# Patient Record
Sex: MALE | Race: WHITE | NOT HISPANIC OR LATINO | Employment: PART TIME | URBAN - METROPOLITAN AREA
[De-identification: names, ages, dates, MRNs, and addresses within clinical notes are randomized per-mention and may not be internally consistent; named-entity substitution may affect disease eponyms.]

---

## 2017-10-31 ENCOUNTER — HOSPITAL ENCOUNTER (OUTPATIENT)
Dept: BEHAVIORAL HEALTH | Facility: CLINIC | Age: 22
Discharge: HOME | End: 2017-10-31
Attending: PSYCHIATRY & NEUROLOGY | Admitting: PSYCHIATRY & NEUROLOGY
Payer: COMMERCIAL

## 2017-10-31 ENCOUNTER — BEH TREATMENT PLAN (OUTPATIENT)
Dept: BEHAVIORAL HEALTH | Facility: CLINIC | Age: 22
End: 2017-10-31
Attending: PSYCHIATRY & NEUROLOGY

## 2017-10-31 PROCEDURE — 90791 PSYCH DIAGNOSTIC EVALUATION: CPT | Performed by: SOCIAL WORKER

## 2017-10-31 ASSESSMENT — PAIN SCALES - GENERAL: PAINLEVEL: MODERATE PAIN (4)

## 2017-10-31 NOTE — PROGRESS NOTES
Initial Individual Treatment Plan     Patient: Amrit Padilla   MRN: [id number]  : 1995  Age: 22 year old  Sex: male    Diagnostic Assessment Date / Date of Initial Individual Treatment Plan: 10/31/17      Immediate Health Concerns:  Yes. Identify health concern and plan to address: hx of Epilsepsy     Immediate Safety Concerns:  Passive suicidal ideation. A coping plan for safety was initiated. Mom is main support and willing to drive to appointments.    Identify the issues to be addressed in treatment:  Symptom Management, Personal Safety, Community Resources/Discharge Planning, Abstinence/Relapse Prevention, Develop / Improve Independent Living Skills, Develop Socialization / Interpersonal Relationship Skills and Physical Health     Client Initial Individualized Goals for Treatment: Use mindful breathing for 2 minutes to manage anxiety.    Initial Treatment suggestions for the client during the time between Diagnostic Assessment and completion of the Individualized Treatment Plan:  Follow Safety Plan   Ask for more information, support and/or assistance as needed.  Follow up with providers/community supports as needed: [facility]  Report increases or changes in symptoms to staff.  Report any personal safety concerns to staff.   Take medications as prescribed.  Report medication changes and/or side effects to staff.  Attend and participate in groups as scheduled or notify staff if unable to do so.  Report any use of substances to staff as this may impact your symptoms and/or personal safety.  Notify staff if you have any other issues that need to be addressed. This may include any current abuse / neglect / exploitation or other vulnerability.  Follow recommendations of your treatment team and discuss concerns if not in agreement.     Treatment Team Responsible: Day Treatment (DT)      Therapeutic Interventions/Treatment Strategies may include:  Support, Redirection,  Feedback, Limit/Boundaries, Safety Assessments, Structured Activity, Problem Solving, Clarification, Education, Motivational Enhancement and Relapse Prevention as needed.    Jie Newberry, MSW, LICSW

## 2017-10-31 NOTE — PROGRESS NOTES
MY COPING PLAN FOR SAFETY          Things that are most important to me & reasons for living: family              My relapse Warning Signs: difficulty getting out of bed, loss of interest in enjoyable things, feelings of giving up (suicidal ideation), negative self tlak  When in crisis, I will use the following coping skills: (relaxation/self-soothing/distraction/activity):  Watching classic cartoons, breathing, talk with mom    I will use My Support System:   Personal Supports: I can ask for reminders, support, or for them to stay with me  Family Member/s : Sarah Padilla [phone], Ted, brother                 Professional Supports: I can ask for med changes, emergency appointments, help  Psychiatrist:[facility]- Karin Carlisle [phone]   Other: [facility] [phone]    Crisis Lines I can call to discuss options and to access support:  [facility]:    [place] ([facility]) [phone]   [place] (Mental Health Crisis Program) [phone]   [place]  [phone]   [place] (COPE) [phone]   [place] [phone]   [place] ([facility]  Health Crisis Line) [phone]   [place] ([place], [place], [place], [place], [facility]) [phone]   Other Crisis Lines:   [facility] (Counseling) [phone]   National Suicide Prevention 1-890.940.4621   Suicide Prevention [phone]        x  I will attend my Treatment Program and talk to my one of my therapists: DANIA Guzman, [facility] [phone]      x   I agree that I will report any current / recent thoughts, impulses or plans of suicide, homicide, self injurious behavior or substance use .   x   I agree that I will not act on the above symptoms, but will follow the above plan instead.  [facility] can also go to the Emergency Department at [facility]: [facility] [phone] or call: [phone]

## 2017-10-31 NOTE — PROGRESS NOTES
Acknowledgement of Current Treatment Plan       I have reviewed my treatment plan with my therapist / counselor on 10/31/17. I agree with the plan as it is written in the electronic health record.    Name Signature   Amrit Padilla    Name of Therapist / Counselor    Jie Newberry, DANIA, LICSW

## 2017-10-31 NOTE — PROGRESS NOTES
"Standard Diagnostic Assessment     CLIENT'S NAME: Amrit Padilla  MRN:   [id number]  :   1995 AGE:22 year old SEX: male  ACCT. NUMBER: [id number]  DATE OF SERVICE: 10/31/17 Start Time:  10:45 End Time:  1:00      Home Phone [phone]   Work Phone Not on file.   Mobile [phone]     Preferred Phone: [phone]  May we leave a program related message? yes    Yes, the patient has been informed that any other mental health professional providing mental health services to me will need access to this Diagnostic Assessment in order to develop a treatment plan and receive payment.     Identifying Information:  Amrit Padilla is a 22 year old, White, single male. Amrit attended the   with mother.     Reason for Referral: Amrit was referred to [facility] ()    by [facility]. Amrit reports the reason for referral at this time is elevated anxiety and depression symptoms that are impairing his functioning level. He recently had to withdrawal from his college courses at the [employer]. He has been consulting with Disability Services.  Hx of Aspergers Syndrome and hx of Epilepsy.     Amrit verbalizes the following treatment/discharge goals: \"I dont know.\"  I just want to the pain to stop.    Current Stressors/Losses/Disappointments: recent withdrawal from college courses at the [employer], increased need for sleep-can sleep up to 12 hours per day with naps and still doesn't feel rested. He is unable to decide on a major or career path which is a source of stress. He compares himself to his younger siblings. His father is residing and working for his job as an  in [place].    Per Client, Review of Symptoms:  Mood (Depression/Anxiety/January/Anger): depressed mood, anxiety with panic attacks, frustrated with illness     Thoughts: worry, difficulty controlling the worry, rumination   Concentration/Memory: poor concentration, poor memory   Appetite/Weight: (see also, Physical Health " Screening below) low appetite with difficulty eating. Mom make meals for him.   Sleep: increased need for sleep with 12 hours with naps, difficulty falling asleep.    Motivation/Energy: low motivation, low energy  Behavior: social isolation     Psychosis: denies     Trauma: bullied in school by name calling   Other: low self esteem, negative self talk, anhedonia, difficulty making decisions, social isolation, has no social [place] outside of work, hx of heart palpitations, restlessness, worry, racing thoughts, hx of anxiety attacks a few times per day.    Mental Health History:  Amrit reports first onset of mental health symptoms age 8. Age 9: would bang head against the wall.  Age 12 dx with Epilepsy. Last Seizure was in September 2017. He does not drive.  Amrit was first diagnosed: Age 5 had IEP when attending [employer]. In  03' Aspergers Syndrome (Age 8). He had testing at the [facility]. Age 13 dx with Depression and Anxiety.  Amrit received the following mental health services in the past: counseling, medication(s) from physician / PCP and psychiatry. He has been consulting with Karin Carlisle at [facility] for the past year. He did consult with a therapist one year ago. In August, he consulted with [address]. Last week, he consulted with [facility].  Psychiatric Hospitalizations: None.   Amrit denies a history of civil commitment.      Onset/Duration/Pattern of Symptoms noted above:  He has had elevated depression and anxiety symptoms that impaired his functioning for the past several weeks. He had to withdrawal this semester from his college coursework at the  of . He was consulting with Disability Services on [place]. He had four classes.   He reports feeling hopeless with suicidal ideation and grabbed a kitchen knife three weeks ago. His mom and brother intervened and they have removed the kitchen knifes from the home. 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 He reports having increased nervousness due to feeling uncertain if he is doing things right.     Amrit reports the following understanding of his diagnosis: Clinical Depression, Anxiety, Aspergers Syndrome, Epilepsy.      Personal Safety:    Are you depressed or being treated for depression? yes   Have you ever thought about hurting yourself (SIB) now or in the past? yes in school use to band head when overstimulated. Urges to cute self with a knife a few weeks ago. Mother intervened.     Have you ever thought about suicide now or in the past? Suicidal ideation to either jump off a bridge or kill self with a knife. He does not drive or have a drivers license. His mom removed the kitchen knifes from the kitchen and moved them to another location. He resides with his mother who is very supportive. We inititated a coping plan for safety. He agreed that he could maintain his safety. He is aware of community resources. He has a 1:30 pm appointment with his medication provider at [facility] today. He does not feel an inpatient hopsitalization is needed at this time.     Do you have a gun, weapons or other means (including medications) to harm yourself available to you? No   Have any of your family members or friends attempted or completed suicide? (If yes, Who, When, How) no     Do you take chances with your safety?   no   Have you currently or in the past had trouble with physical aggression (If yes, describe)? no     Have you ever thought about killing someone else? No   Have you ever heard voices? No       Supports:   From whom do you receive support? (family/friends/agency) Mom- works within the home and is available for support as needed. His brother, Ted moved back into the home in September.      How often do you have contact with them? Daily     Do your support people want/need education/resources? no        Is there anything in your life (current or history)  that is satisfying to you (include leisure interests/hobbies)?   No-use to watch classic cartoons. He has two dogs at home.      Hope/Belief System:  Do you think things can get better? Not sure     Rate how strongly you believe things can get better:   (Scale 1-5; 1=no belief; 5=Very Strong Belief)    3    What would make it better?  Not sure   What gives you hope?    My family       Personal Safety Summary:  After gathering the above information, Amrit  presents the following high risk factors for suicide: Male Panic,extreme anxiety Extreme psychic pain Hopelessness, Worthlessness.  Amrit denies current fears or concerns for personal safety.    Amrit has the following Protective Factors: Sense of responsibility to family and Positive therapeutic releationships      Upon review of the patient interview and identification of high risk factors determine individualized safety strategies alternatives and treatment plan interventions. Client consented to co-developed safety plan, which includes coping skills, relapse warning signs, community supports and support system..     Substance Use History:   Denies alcohol or illicit chemical use.    Substance Use Disorder Treatment: Amrit is currently receiving the following services: No indications of CD issues.       CAGE-AID:  Have you ever felt you ought to cut down on your drinking or drug use?   No    Have people annoyed you by criticizing your drinking or drug use?   No    Have you ever felt bad or guilty about your drinking or drug use?   No    Have you ever had a drink or used drugs first thing in the morning to steady your nerves or to get rid of a hangover?  No    Do you feel these issues have been adequately addressed?   Yes. How? Maintaining abstinence.    Chemical Dependency Assessment Recommended?  No          Amrit has a negative Cage-Aid score.       Legal History:    Amrit reports that he has not been involved with the legal system.    ________________________________________________________________________    Life Situation (Employment/School/Finances/Basic Needs):  Amrit  is currently living with his mom, his brother and 2 dogs. His father comes back every few weeks.     The safety/stability of this environment is described as: safe. The knives were removed from the kitchen. Mother is very supportive.    Amrit is currently employed part time: two weeks has worked at [employer] in [place].     Amrit reports finances are obtained through Employment and parents  Amrit does not identify his finances as a current stressor.  Amrit denies a history of gambling and denies a history of gambling treatment.     Amrit reports his highest level of education is associate degree / vocational certificate. He has an AA from [employer]. He has taken courses at the [employer]. In 2011, he got his High School Diploma.   Amrit did identify the following learning problems: writing. Hx of having an IEP. He did particpate in [employer] for two years.  He is connected with Disability Services at the [employer]: separate testing, extended testing and note taking/notes online. He had to have a recent withdrawal from his four courses.  Amrit describes academic performance as: difficulty with writing, negative self talk made it difficult to complete work.  Amrit describes school social experience as: challenging. Few friends. His family is his main support. His younger sister is a Freshman and attends the [employer]. He sometimes compares himself with his younger siblings.     Amrit denies concerns regarding his current ability to meet basic needs.     Social/Family History:  Amrit  reports he grew up in [place] since age 5.   Amrit was the first born of 3 children. Has a younger brother who graduated from college and is an actor, age 23. Has a sister, age 18 who is a Freshman at the [employer]>  Amrit reports his biological parents are  . Parents are supportive. Mom has an AA in Business and works in the home. His father works as an  in [place].    Amrit describes his childhood as challenging due to having Aspergers Syndrome. He also had an IEP in school. He did well with accommodations. Had few friends. His family is supportive.   Amrit describes his current relationships with his family of origin as good.    Amrit identifies his relationship status as: single.      Amrit reports having 0 children.    Amrit describes the quantity/quality of his social relationships: has difficulty with social cues outside his family.        Significant Losses / Trauma / Abuse / Neglect Issues / Developmental Incidents:  Amrit denies significant loss/trauma/abuse/neglect issues/developmental incidents   Amrit reports client s experience of emotional abuse bullied by naming calling in school.   Amrit has not addressed the above concerns in previous therapy/treatment.     Amrit denies personal  experience.     [place] Preference/Spiritual Beliefs/Cultural Considerations:    A. Ethnic Self-Identification:  Amrit self-identifies his race/ethnicities as:  and his preferred language to be English.   Amrit reports he does not need the assistance of an . Amrit  reports he does not need other support or modifications involved in therapy.      B. Do you experience cultural bias (the practice of interpreting judging behavior by standards inherent to one's own culture) by other people as a stressor? If yes, describe how this relates to overall mental health symptoms.  No    C. Are there any cultural influences that may need to be considered for your treatment?  (This includes historical, geographical and familial factors that affect assessment and intervention processes). No, Denies any cultural influences or concerns that need to be considered for treatment    Strengths/Vulnerabilities:   Amrit identifies his  personal strengths as: caring, educated, employed, intelligent, motivated, open to learning, support of family, friends and providers, wants to learn and willing to ask questions .   Things that may interfere with the clients success in treatment include: few friends and transportation concerns.   Other identified areas of vulnerability include: Suicidal Ideation  Anxiety with/without panic attacks  Depressive symptoms  Physical/medical. Hx of epilepsy    Medical History / Physical Health Screen:     Primary Care Physician: Amrit consults with Ke Lee with  Friday..   Last Physical Exam: within the past year. Client was encouraged to follow up with PCP if symptoms were to develop.  He is going to lab work to check his Vitamin D level.  Mental Health Medication Management Provider / Psychiatrist: Amrit reports Karin Carlisle at [facility]. He has an appointment today at 1:30 pm..     Last visit: Recently        Next visit: Today at 1:30 pm.    Current medications including prescription, non-prescription, herbals, dietary aids and vitamins:  Per client report: Outpatient Prescriptions Marked as Taking for the 10/31/17 encounter (Hospital Encounter) with Jie Newberry LICSW   Medication Sig     ZONISAMIDE PO Take 300 mg by mouth 2 times daily     FLUOXETINE HCL PO Take 40 mg by mouth     MELATONIN PO Take 5 mg by mouth At Bedtime       Amrit reports current medications are: Not effective: depression and anxiety continue to elevate and persist.   Amrit describes taking his medications as: Independent.  Amrit reports taking prescribed medications as prescribed.     Amrit provides the following current assessment of pain: Pain Loc: Head; Pain Score: Moderate Pain (4);  .     Amrit provides the following information regarding past significant medical conditions/diagnoses:      Medical:  Past Medical History:   Diagnosis Date     Headaches, cluster        Surgical:  Past Surgical History:    Procedure Laterality Date     BACK SURGERY       Allergy:   Amrit reports   Allergies   Allergen Reactions     Dust Mites         Family History of Medical, Mental Health and/or Substance Use problems:  Per client report: History reviewed. No pertinent family history.    Amrit reports the following current medical concerns: hx of epilepsy. He takes medications. His last seizure was in August 2017..      General Health:   Have you had any exposure to any communicable disease in the past 2-3 weeks? no     Are you aware of safe sex practices? Not applicable     Is there a possibility of pregnancy?  no       Nutrition:    Are you on a special diet? If yes, please explain:  no   Do you have any concerns regarding your nutritional status? If yes, please explain:  yes -low appetite.   Have you had any appetite changes in the last 3 months?  Yes, low appetite. Low motivation to prepare food. Mom provides food.      Have you had any weight loss or weight gain in the last 3 months?  No     Do you have a history of an eating disorder? no   Do you have a history of being in an eating disorder program? no   NOTE: BMI to be calculated following program admission.    Fall Risk:   Have you had any falls in the past 3 months? no     Do you currently useany assistive devices for mobility?   no     NOTE: If client reports 3 or more falls in the past 3 months, the client will not be accepted into the program until further assessment is completed by the program nurse. Check if a nurse is available to assess at time of DA.    NOTE: If client reports 2 falls in the past 3 months and/or the client currently uses assistive devices for mobility, the  will send an in-basket to the program nurse to meet with the client within the first week of programming.    Head Injury/Trauma:   Do you have a history of head injury / trauma? no     Do you have any cognitive impairment? no       Per completion of the Medical History / Physical  Health Screen, is there a recommendation to see / follow up with a primary care physician/clinic?    No.      Clinical Findings     Mental Status Assessment/Clinical Observation:  Appearance:   awake, alert  Eye Contact:   good  Psychomotor Behavior: Normal  no evidence of tardive dyskinesia, dystonia, or tics  Attitude:   Cooperative    Oriented to:   All    Speech   Rate / Production: Normal    Volume:  Normal   Mood:    Anxious  Depressed     Affect:    Flat      Thought Content:  Rumination  passive suicidal ideation present  Thought Form:  logical no loose associations  Insight:    good    Judgment:     intact  Attention Span/Concentration: intact  Recent and Remote Memory:  intact      Psychiatric Diagnosis:    296.32 (F33.1) Major Depressive Disorder, Recurrent Episode, Moderate _ and With anxious distress  300.02 (F41.1) Generalized Anxiety Disorder   Hx of Aspergers syndrome    Provisional Diagnostic Hypothesis (Explain R/O, other Provisional Diagnosis, and why alternative Diagnosis that were considered were ruled out): N/A    Medical Concerns that may Impact Treatment:   Hx of epilepsy    Psychosocial and Contextual Factors (V-Codes):  V62.3 Academic or educational problem recent withdrawl from college courses at the [employer].    WHODAS 2.0 SCORE: 94.9/100 % (Total=39)      Client and family participation in assessment:   Amrit was alone during this assessment.   This assessment does not include collateral information.      Summary & Recommendations  Provide a brief summary of how diagnostic criteria is met (symptoms, duration & functional impairment), cause, prognosis, and likely consequences of symptoms. Include overview of pertinent client strengths, cultural influences, life situations, relationships, health concerns and how diagnosis interacts/impacts with client's life. Recommendations include: client preferences, prioritization of needed mental health, ancillary or other services and any referrals to  services required by statute or rule.          Amrit is a 23 year old  male who was referred to [facility] by [facility] due to elevated depression, anxiety symptoms that have been impairing his functioning to the point he had to withdrawal from his four college courses.  He had been consulting [facility] Disability Services. He has a hx of Aspergers Syndrome as well as Epilepsy. His last seizure was in September 2017. He does not drive.  About three weeks ago, Amrit's mood symptoms elevated where he was feeling quite hopeless and he grabbed a kitchen knife to stab himself. His mom and brother intervened. He  consulted with [facility] around that time. His mood symptoms continue to persist. 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 He resides with his mom and brother who moved back into the home in September. He is an Actor and completed college. His sister started at the [facility] in the fall. It is hard not to compare himself to his siblings. His father resides and works in [place] as an .  His mom works in the home currently.        Amrit reports mood symptoms include: depressed mood, anxiety mood with panic attacks, worry, difficulty controlling the worry, rumination, poor concentration, poor memory, low motivation, low energy, social isolation with no social [place] outside his family and co-worker, lower self esteem, negative self talk, feelings of shame, increased need for sleep, difficulty falling asleep, anhedonia, difficulty making decisions, hx of heart palpitations, restlessness, racing thoughts, helplessness, hopelessness with recent suicidal ideation. He endorses intermittent suicidal ideation. He is frustrated with his illness. He reports he will use crisis hotline called crisis text line. His mom is supportive and he finds tactile support helpful. During the interview he was holding his mom's hand for reassurance and grounding. He and  his mom feel he can be responsible for himself and his safety. He is also aware of the [facility] at [facility]. He does not want to be hospitalized nor feel it would be helpful.       Amrit has resilience and perserverance. He has a supportive family. We discussed what program would meet his needs and he was interested in [facility] Day Treatment. He recently retained a part time job and is concerned he may lost it if he takes time for treatment. He would like to consult with his family and contact writer back. Writer has left two messages for him so far to check in about his decision. He would be an ideal candidate for the day treatment program. He would benefit from learning coping skills for symptom management, sensory regulation tools and cognitive behavioral therapy to reframe negative self talk. Task and life management tools would also be helpful. He would be able to have a peer group for social skill building as well as to process his feelings about having depression, anxiety and co-morbid health issues. He has an appointment with Karin Carlisle today at [facility] and will continue to consult with her.        Prognosis is unknown. Without the recommended intervention, the client is likely to experience the following consequences of their symptoms: a higher level of care would be needed.    Referrals to services required by statute or rule:   Report to child/adult protection services was NA.   Referral to another professional/service is not indicated at this time.    Program Recommendation: Day Treatment (DT) .  He would be an ideal candidate in the 2C track.    Assessment Completed by: Jie Newberry, DANIA, LICSW

## 2017-11-03 NOTE — PROGRESS NOTES
ADULT PARTIAL PROGRESS NOTE                   Writer left a message for Amrit to inquire his decision about day treatment. Writer encouraged him to give a return call back to writer whatever his decision. Await call back.    DANIA Guzman, [facility]  Outpatient Psychotherapist

## 2017-11-07 NOTE — ADDENDUM NOTE
Encounter addended by: Jei Newberry LICSW on: 11/7/2017  5:52 PM<BR>     Actions taken: Sign clinical note

## 2017-11-07 NOTE — PROGRESS NOTES
ADULT PARTIAL PROGRESS NOTE                   Writer left a message on pts cell phone to inquire what he has decided about [facility] Day [facility]. He was at work and writer got his voicemail. Writer encouraged call back.     DANIA Guzman, [facility]  Outpatient Psychotherapist

## 2017-11-08 NOTE — PROGRESS NOTES
ADULT PARTIAL PROGRESS NOTE                   Writer left a third voicemail for Amrit. Writer has not received a return call back to discuss coordinating day treatment.  At this point, writer is assuming he is not interested in pursuing day treatment. Pt was encouraged to contact program if this changed. Assessment paperwork will be sent to medical records.    DANIA Guzman, [facility]  Outpatient Psychotherapist

## 2017-11-08 NOTE — ADDENDUM NOTE
Encounter addended by: Jie Newberry LICSW on: 11/8/2017  3:22 PM<BR>     Actions taken: Sign clinical note

## 2017-11-13 ENCOUNTER — TELEPHONE (OUTPATIENT)
Dept: BEHAVIORAL HEALTH | Facility: CLINIC | Age: 22
End: 2017-11-13

## 2017-11-13 NOTE — TELEPHONE ENCOUNTER
Kalyanilore had received a phone message on Thursday, 11/9 from Sergio Smith's mother, asking to talk about program options.  called back on her cell phone number and left a message with group time options and expectations about attendance. As of this afternoon,  has not received a call back.  A second message was left on Sarah's mobile phone line regarding her sons's interest in the program.

## 2017-11-16 ENCOUNTER — TELEPHONE (OUTPATIENT)
Dept: BEHAVIORAL HEALTH | Facility: CLINIC | Age: 22
End: 2017-11-16

## 2017-11-16 NOTE — TELEPHONE ENCOUNTER
Writer received a phone call from Pt's mother stating he has worked out a schedule with his work and he would like to be involved in the 2C group starting on December 4.  She explained with the Holiday next week and his current work schedule, he wouldn't be able to start until December.     Writer did call Pt's mother back and had to leave a message. Stated he can start the program on December 4.  Hopefully will not have to do an update since it will be a month between the DA and start date. Requested that if he is able to begin the program prior to December 4, even if he needs to miss a few days due to work, that they call back to change the start date.    At this time will plan on a December 4 start in the 2C group.

## 2017-12-04 ENCOUNTER — TELEPHONE (OUTPATIENT)
Dept: BEHAVIORAL HEALTH | Facility: CLINIC | Age: 22
End: 2017-12-04

## 2017-12-05 ENCOUNTER — HOSPITAL ENCOUNTER (OUTPATIENT)
Dept: BEHAVIORAL HEALTH | Facility: CLINIC | Age: 22
End: 2017-12-05
Attending: PSYCHIATRY & NEUROLOGY
Payer: COMMERCIAL

## 2017-12-05 PROBLEM — F32.9 MAJOR DEPRESSIVE DISORDER: Status: ACTIVE | Noted: 2017-12-05

## 2017-12-05 PROCEDURE — H2012 BEHAV HLTH DAY TREAT, PER HR: HCPCS

## 2017-12-05 PROCEDURE — 97150 GROUP THERAPEUTIC PROCEDURES: CPT | Mod: GO

## 2017-12-05 ASSESSMENT — PATIENT HEALTH QUESTIONNAIRE - PHQ9
5. POOR APPETITE OR OVEREATING: NEARLY EVERY DAY
SUM OF ALL RESPONSES TO PHQ QUESTIONS 1-9: 21

## 2017-12-05 ASSESSMENT — ANXIETY QUESTIONNAIRES
7. FEELING AFRAID AS IF SOMETHING AWFUL MIGHT HAPPEN: NEARLY EVERY DAY
2. NOT BEING ABLE TO STOP OR CONTROL WORRYING: NEARLY EVERY DAY
5. BEING SO RESTLESS THAT IT IS HARD TO SIT STILL: NEARLY EVERY DAY
3. WORRYING TOO MUCH ABOUT DIFFERENT THINGS: NEARLY EVERY DAY
1. FEELING NERVOUS, ANXIOUS, OR ON EDGE: NEARLY EVERY DAY
6. BECOMING EASILY ANNOYED OR IRRITABLE: MORE THAN HALF THE DAYS
GAD7 TOTAL SCORE: 20
IF YOU CHECKED OFF ANY PROBLEMS ON THIS QUESTIONNAIRE, HOW DIFFICULT HAVE THESE PROBLEMS MADE IT FOR YOU TO DO YOUR WORK, TAKE CARE OF THINGS AT HOME, OR GET ALONG WITH OTHER PEOPLE: VERY DIFFICULT

## 2017-12-05 NOTE — PROGRESS NOTES
"Adult Mental Health Outpatient Group Therapy Progress Note     Client Initial Individualized Goals for Treatment:  \"I don't know. I just want to the pain to stop.\"     Initial Treatment suggestions for the client during the time between Diagnostic Assessment and completion of the Individualized Treatment Plan:  Follow Safety Plan  Abstain from Substance Use   Ask for more information, support and/or assistance as needed.  Follow up with providers/community supports as needed: Dr. Cori Patel, Oneyda Koehler  Report increases or changes in symptoms to staff.  Report any personal safety concerns to staff.   Take medications as prescribed.  Report medication changes and/or side effects to staff.  Attend and participate in groups as scheduled or notify staff if unable to do so.  Report any use of substances to staff as this may impact your symptoms and/or personal safety.  Notify staff if you have any other issues that need to be addressed. This may include any current abuse / neglect / exploitation or other vulnerability.  Follow recommendations of your treatment team and discuss concerns if not in agreement.    See Initial Treatment suggestions for the client during the time between Diagnostic Assessment and completion of the Master Individualized Treatment Plan     Area of Treatment Focus:  Symptom Management    Therapeutic Interventions/Treatment Strategies:  Support, Feedback and Cognitive Behavioral Therapy    Response to Treatment Strategies:  Accepted Feedback    Name of Group:  Group Psychotherapy    Description and Outcome:  This was Amrit's first day in the Day Treatment Program.  Amrit was oriented to group psychotherapy, introduced to the other group members, and advised of group rules.  Amrit was offered the opportunity to share with the group.  He talked about his issues with seizures (epilepsy), and how in the past several weeks he feels \"my thoughts just go away.\"  He was asked about hallucinations, " and endorsed some auditory hallucinations (hypnagogic and hypnopompic), and racing thoughts.  He described his major issue as depression and low self esteem.  Sergio also described symptoms consistent with derealization and depersonalization.  His presentation was consistent with a possible thought disorder, and/or extreme anxiety.     Client demonstrated understanding of session content by sharing information with the group and accepting group rules and guidelines.    Client would benefit from additional opportunities to practice and implement content from this session.    Is this a Weekly Review of the Progress on the Treatment Plan?  No

## 2017-12-06 ENCOUNTER — HOSPITAL ENCOUNTER (OUTPATIENT)
Dept: BEHAVIORAL HEALTH | Facility: CLINIC | Age: 22
End: 2017-12-06
Attending: PSYCHIATRY & NEUROLOGY
Payer: COMMERCIAL

## 2017-12-06 PROCEDURE — H2012 BEHAV HLTH DAY TREAT, PER HR: HCPCS

## 2017-12-06 PROCEDURE — 97150 GROUP THERAPEUTIC PROCEDURES: CPT | Mod: GO

## 2017-12-06 ASSESSMENT — ANXIETY QUESTIONNAIRES: GAD7 TOTAL SCORE: 20

## 2017-12-07 NOTE — PROGRESS NOTES
"Adult Mental Health Outpatient Group Therapy Progress Note     Client Initial Individualized Goals for Treatment:  \"I don't know. I just want to the pain to stop.\"      Initial Treatment suggestions for the client during the time between Diagnostic Assessment and completion of the Individualized Treatment Plan:  Follow Safety Plan  Abstain from Substance Use   Ask for more information, support and/or assistance as needed.  Follow up with providers/community supports as needed: Dr. Cori Patel, Oneyda Koehler  Report increases or changes in symptoms to staff.  Report any personal safety concerns to staff.   Take medications as prescribed.  Report medication changes and/or side effects to staff.  Attend and participate in groups as scheduled or notify staff if unable to do so.  Report any use of substances to staff as this may impact your symptoms and/or personal safety.  Notify staff if you have any other issues that need to be addressed. This may include any current abuse / neglect / exploitation or other vulnerability.  Follow recommendations of your treatment team and discuss concerns if not in agreement.     Area of Treatment Focus:  Symptom Management, Personal Safety and Develop / Improve Independent Living Skills    Therapeutic Interventions/Treatment Strategies:  Support, Feedback, Safety Assessments, Structured Activity, Clarification and Education    Response to Treatment Strategies:  Gave Feedback, Accepted Support, Alert and Distracted    Name of Group:  OT Clinic/OT Initial Note     Description and Outcome:  Sergio  attended and participated in a structured occupational therapy group where intervention focused on coping through structured hands-on activities.  Sergio began Day Treatment today.  He was oriented to OT Services and the OT Clinic.  He completed and reviewed his OT Assessment with this writer.  Sergio reported increased difficulty \"putting his thoughts together and finishing sentences since last " "Thursday or Friday\".  Reported he has been having \"increased seizure activity\" as well and he \"called his doctor but hasn't heard back yet\".   Encouraged him to follow up as needed and offered to have him meet with a nurse here but he declined stating he was \"okay\".  Sergio had difficulty identifying anything particular he wanted to work on while here as he has \"difficulty with everything.\"  He was open to looking for a task to work on and spent the remainder of group doing this.  He denied any safety concerns.  Reported he currently lives with his parents.  Mood was anxious, despondent at times.  Client would benefit from additional opportunities to practice and implement content from this session.    Is this a Weekly Review of the Progress on the Treatment Plan?  No  "

## 2017-12-08 ENCOUNTER — HOSPITAL ENCOUNTER (OUTPATIENT)
Dept: BEHAVIORAL HEALTH | Facility: CLINIC | Age: 22
End: 2017-12-08
Attending: PSYCHIATRY & NEUROLOGY
Payer: COMMERCIAL

## 2017-12-08 PROCEDURE — H2012 BEHAV HLTH DAY TREAT, PER HR: HCPCS

## 2017-12-08 PROCEDURE — 97150 GROUP THERAPEUTIC PROCEDURES: CPT | Mod: GO

## 2017-12-11 NOTE — PROGRESS NOTES
"Adult Mental Health Outpatient Group Therapy Progress Note     Client Initial Individualized Goals for Treatment:  \"I don't know. I just want to the pain to stop.\"       Initial Treatment suggestions for the client during the time between Diagnostic Assessment and completion of the Individualized Treatment Plan:  Follow Safety Plan  Abstain from Substance Use   Ask for more information, support and/or assistance as needed.  Follow up with providers/community supports as needed: Dr. Cori Patel, Oneyda Koehelr  Report increases or changes in symptoms to staff.  Report any personal safety concerns to staff.   Take medications as prescribed.  Report medication changes and/or side effects to staff.  Attend and participate in groups as scheduled or notify staff if unable to do so.  Report any use of substances to staff as this may impact your symptoms and/or personal safety.  Notify staff if you have any other issues that need to be addressed. This may include any current abuse / neglect / exploitation or other vulnerability.  Follow recommendations of your treatment team and discuss concerns if not in agreement.     Area of Treatment Focus:  Symptom Management, Personal Safety and Develop / Improve Independent Living Skills    Therapeutic Interventions/Treatment Strategies:  Support, Feedback, Safety Assessments and Structured Activity    Response to Treatment Strategies:  Accepted Feedback, Focused on Goals, Accepted Support, Alert and Distracted    Name of Group:  OT Clinic     Description and Outcome:  Sergio  attended and participated in a structured occupational therapy group where intervention focused on coping through structured hands-on activities.  Sergio spent the first couple of minutes looking for a task to begin.  He eventually selected and worked on a paper pencil puzzle task with fair concentration.  He seemed able to concentrate for 10 minutes or so before needing a short break.  He was able to resume his work " independently.  He was quiet in group and only minimally interacted with others.  Affect was constricted.  Client demonstrated understanding of session content by engaging in structured task work to help manage symptoms and stressors. Client would benefit from additional opportunities to practice and implement content from this session.    Is this a Weekly Review of the Progress on the Treatment Plan?  No

## 2017-12-12 ENCOUNTER — HOSPITAL ENCOUNTER (OUTPATIENT)
Dept: BEHAVIORAL HEALTH | Facility: CLINIC | Age: 22
End: 2017-12-12
Attending: PSYCHIATRY & NEUROLOGY
Payer: COMMERCIAL

## 2017-12-12 PROCEDURE — H2012 BEHAV HLTH DAY TREAT, PER HR: HCPCS

## 2017-12-12 PROCEDURE — 97150 GROUP THERAPEUTIC PROCEDURES: CPT | Mod: GO

## 2017-12-12 NOTE — PROGRESS NOTES
"Adult Mental Health Outpatient Group Therapy Progress Note     Client Initial Individualized Goals for Treatment:  \"I don't know. I just want to the pain to stop.\"     Initial Treatment suggestions for the client during the time between Diagnostic Assessment and completion of the Individualized Treatment Plan:  Follow Safety Plan  Abstain from Substance Use   Ask for more information, support and/or assistance as needed.  Follow up with providers/community supports as needed: Dr. Cori Patel, Oneyda Koehler  Report increases or changes in symptoms to staff.  Report any personal safety concerns to staff.   Take medications as prescribed.  Report medication changes and/or side effects to staff.  Attend and participate in groups as scheduled or notify staff if unable to do so.  Report any use of substances to staff as this may impact your symptoms and/or personal safety.  Notify staff if you have any other issues that need to be addressed. This may include any current abuse / neglect / exploitation or other vulnerability.  Follow recommendations of your treatment team and discuss concerns if not in agreement.    See Initial Treatment suggestions for the client during the time between Diagnostic Assessment and completion of the Master Individualized Treatment Plan     Area of Treatment Focus:  Symptom Management    Therapeutic Interventions/Treatment Strategies:  Support, Feedback and Cognitive Behavioral Therapy    Response to Treatment Strategies:  Accepted Feedback    Name of Group:  Group Psychotherapy    Description and Outcome:  Sergio also described symptoms consistent with derealization and depersonalization.  He reported not feeling like himself for over one week. He reports not being able to remember thinks and says he speaks words, but doesn't feel like he can control them. He did report thinking about actions or incidents he had been part of in the past that he regretted and then as a result immediately having " the thought that he needs to kill himself. He stated he does not take that initial thought further into planning or action steps. He stated he understands the thoughts are not rational and often will squeeze a pillow, hit is legs with his fist, or try to go to sleep.  He did contract for safety in the group. Sergio also shared with the group his interest in football and fantasy football leagues. Will have to continue to assess safety and symptoms related to anxiety or thought disorder.     Client verbalized understanding of session content by reporting on symptoms and bran for safety. He also acknowledged resources if suicidal thoughts become more intense with intent.     Client would benefit from additional opportunities to practice and implement content from this session.    Is this a Weekly Review of the Progress on the Treatment Plan?  No

## 2017-12-12 NOTE — PROGRESS NOTES
"Adult Mental Health Outpatient Group Therapy Progress Note         Client Initial Individualized Goals for Treatment:  \"I don't know. I just want to the pain to stop.\"      Initial Treatment suggestions for the client during the time between Diagnostic Assessment and completion of the Individualized Treatment Plan:  Follow Safety Plan  Abstain from Substance Use   Ask for more information, support and/or assistance as needed.  Follow up with providers/community supports as needed: Dr. Cori Patel, Oneyda Koehler  Report increases or changes in symptoms to staff.  Report any personal safety concerns to staff.   Take medications as prescribed.  Report medication changes and/or side effects to staff.  Attend and participate in groups as scheduled or notify staff if unable to do so.  Report any use of substances to staff as this may impact your symptoms and/or personal safety.  Notify staff if you have any other issues that need to be addressed. This may include any current abuse / neglect / exploitation or other vulnerability.  Follow recommendations of your treatment team and discuss concerns if not in agreement.     See Initial Treatment suggestions for the client during the time between Diagnostic Assessment and completion of the Master Individualized Treatment Plan      Area of Treatment Focus:  Symptom Management     Therapeutic Interventions/Treatment Strategies:  Support, Feedback and Cognitive Behavioral Therapy     Response to Treatment Strategies:  Accepted Feedback     Name of Group:  Group Psychotherapy     Description and Outcome:  Sergio reported depression symptoms and low self esteem.  Sergio also described symptoms consistent with derealization and depersonalization. He reported a high level of anxiety. He stated that he felt stressed with recent seizures and uncomfortable experience with the derealization and depersonalization.  Client planned to discuss the new symptoms with his neurology provider.  Client " denied suicidal ideation, intent and plan.      Client demonstrated understanding of session content by sharing coping skills he plans to use.     Client would benefit from additional opportunities to practice and implement content from this session.     Is this a Weekly Review of the Progress on the Treatment Plan?  No

## 2017-12-12 NOTE — PROGRESS NOTES
"Adult Mental Health Outpatient Group Therapy Progress Note     Client Initial Individualized Goals for Treatment:  \"I don't know. I just want to the pain to stop.\"       Initial Treatment suggestions for the client during the time between Diagnostic Assessment and completion of the Individualized Treatment Plan:  Follow Safety Plan  Abstain from Substance Use   Ask for more information, support and/or assistance as needed.  Follow up with providers/community supports as needed: Dr. Cori Patel, Oneyda Koehler  Report increases or changes in symptoms to staff.  Report any personal safety concerns to staff.   Take medications as prescribed.  Report medication changes and/or side effects to staff.  Attend and participate in groups as scheduled or notify staff if unable to do so.  Report any use of substances to staff as this may impact your symptoms and/or personal safety.  Notify staff if you have any other issues that need to be addressed. This may include any current abuse / neglect / exploitation or other vulnerability.  Follow recommendations of your treatment team and discuss concerns if not in agreement.     Area of Treatment Focus:  Symptom Management, Personal Safety and Develop / Improve Independent Living Skills    Therapeutic Interventions/Treatment Strategies:  Support, Feedback, Safety Assessments, Structured Activity and Clarification    Response to Treatment Strategies:  Listened, Accepted Support and Alert    Name of Group:  OT Clinic     Description and Outcome:  Sergio  attended and participated in a structured occupational therapy group where intervention focused on coping through structured hands-on activities.  Sergio worked on a paper pencil puzzle task during group today with fair concentration and energy.  He appeared able to sustain his attention for 10-15 minutes before needing a short break.  He was quiet in group today.  Answered direct questions with brief, 1 word answers.  Affect was blunted. " Client would benefit from additional opportunities to practice and implement content from this session.    Is this a Weekly Review of the Progress on the Treatment Plan?  Yes.      Are Treatment Plan Goals being addressed?  Yes, continue treatment goals      Are Treatment Plan Strategies to Address Goals Effective?  Yes, continue treatment strategies      Are there any current contracts in place?  No

## 2017-12-13 ENCOUNTER — HOSPITAL ENCOUNTER (OUTPATIENT)
Dept: BEHAVIORAL HEALTH | Facility: CLINIC | Age: 22
End: 2017-12-13
Attending: PSYCHIATRY & NEUROLOGY
Payer: COMMERCIAL

## 2017-12-13 PROCEDURE — H2012 BEHAV HLTH DAY TREAT, PER HR: HCPCS

## 2017-12-13 PROCEDURE — 97150 GROUP THERAPEUTIC PROCEDURES: CPT | Mod: GO

## 2017-12-13 NOTE — PROGRESS NOTES
"Adult Mental Health Outpatient Group Therapy Progress Note     Client Initial Individualized Goals for Treatment:  \"I don't know. I just want to the pain to stop.\"     Initial Treatment suggestions for the client during the time between Diagnostic Assessment and completion of the Individualized Treatment Plan:  Follow Safety Plan  Abstain from Substance Use   Ask for more information, support and/or assistance as needed.  Follow up with providers/community supports as needed: Dr. Cori Patel, Oneyda Koehler  Report increases or changes in symptoms to staff.  Report any personal safety concerns to staff.   Take medications as prescribed.  Report medication changes and/or side effects to staff.  Attend and participate in groups as scheduled or notify staff if unable to do so.  Report any use of substances to staff as this may impact your symptoms and/or personal safety.  Notify staff if you have any other issues that need to be addressed. This may include any current abuse / neglect / exploitation or other vulnerability.  Follow recommendations of your treatment team and discuss concerns if not in agreement.    See Initial Treatment suggestions for the client during the time between Diagnostic Assessment and completion of the Master Individualized Treatment Plan     Area of Treatment Focus:  Symptom Management    Therapeutic Interventions/Treatment Strategies:  Support, Feedback and Cognitive Behavioral Therapy    Response to Treatment Strategies:  Accepted Feedback    Name of Group:  Group Psychotherapy    Description and Outcome:  Sergio reviewed his symptoms in group. He stated he was \"not feeling human\" he said he saw a shadow briefly yesterday and wasn't sure if it was a hallucination. He stated he doesn't feel like his movements or his words are his own. He briefly talked about his ability to function at work and said he is afraid to hold a knife. 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 We discussed " being safe at work. He also stated he found out he is not enjoying his typical interests including the [employer] and movies.  It was suggested in group that he see a psychiatrist to get his medication evaluated. He was placed on fluoxetine by a doctor at [place]. He is no longer eligible to see this provider since he is not enrolled at the [facility]. He says he is working with his parents on getting a psychiatrist appointment, but none scheduled yet. Writer recommends he see the nurse practitioner in Day Treatment due to the above increase in symptoms. He would like to talk to his parents. Writer highlighted the nurses' phone number and asked that he call after discussing with parents so that we can get him scheduled as soon as possible with our consulting nurse practitioner. Sergio agrees with this plan.     Client verbalized understanding of session content by reporting on symptoms and bran for safety. He also acknowledged resources if suicidal thoughts become more intense with intent. He verbalized understanding of the recommendation to see a psychiatrist or psychiatric provider as soon as possible (including the possibility of seeing the CNP in Day Tx program).     Client would benefit from additional opportunities to practice and implement content from this session.    Is this a Weekly Review of the Progress on the Treatment Plan?  No

## 2017-12-13 NOTE — PROGRESS NOTES
Adult Mental Health Outpatient Group Therapy Progress Note     Client Initial Individualized Goals for Treatment: Use mindful breathing for 2 minutes to manage anxiety.     Initial Treatment suggestions for the client during the time between Diagnostic Assessment and completion of the Individualized Treatment Plan:  Follow Safety Plan   Ask for more information, support and/or assistance as needed.  Follow up with providers/community supports as needed: [facility] Stallings [facility]  Report increases or changes in symptoms to staff.  Report any personal safety concerns to staff.   Take medications as prescribed.  Report medication changes and/or side effects to staff.  Attend and participate in groups as scheduled or notify staff if unable to do so.  Report any use of substances to staff as this may impact your symptoms and/or personal safety.  Notify staff if you have any other issues that need to be addressed. This may include any current abuse / neglect / exploitation or other vulnerability.  Follow recommendations of your treatment team and discuss concerns if not in agreement.     Area of Treatment Focus:  Symptom Management, Personal Safety and Develop / Improve Independent Living Skills    Therapeutic Interventions/Treatment Strategies:  Support, Feedback, Safety Assessments, Structured Activity and Clarification    Response to Treatment Strategies:  Listened, Accepted Support, Alert and Distracted    Name of Group:  OT Clinic     Description and Outcome:  Sergio  attended and participated in a structured occupational therapy group where intervention focused on coping through structured hands-on activities.  Sergio quietly worked on a paper pencil puzzle task during group today.  Concentration seemed limited to 5-10 minutes before he took a break.  Affect was blunted.  He was generally quiet in group.  Client demonstrated understanding of session content by working on focused task work related to  his psychiatric recovery.  Client would benefit from additional opportunities to practice and implement content from this session.    Is this a Weekly Review of the Progress on the Treatment Plan?  No

## 2017-12-14 NOTE — PROGRESS NOTES
"         Adult Mental Health Outpatient Group Therapy Progress Note         Client Initial Individualized Goals for Treatment:  \"I don't know. I just want to the pain to stop.\"      Initial Treatment suggestions for the client during the time between Diagnostic Assessment and completion of the Individualized Treatment Plan:  Follow Safety Plan  Abstain from Substance Use   Ask for more information, support and/or assistance as needed.  Follow up with providers/community supports as needed: Dr. Cori Patel, Oneyda Koehler  Report increases or changes in symptoms to staff.  Report any personal safety concerns to staff.   Take medications as prescribed.  Report medication changes and/or side effects to staff.  Attend and participate in groups as scheduled or notify staff if unable to do so.  Report any use of substances to staff as this may impact your symptoms and/or personal safety.  Notify staff if you have any other issues that need to be addressed. This may include any current abuse / neglect / exploitation or other vulnerability.  Follow recommendations of your treatment team and discuss concerns if not in agreement.     See Initial Treatment suggestions for the client during the time between Diagnostic Assessment and completion of the Master Individualized Treatment Plan    Area of Treatment Focus:  Symptom Management, Personal Safety and Community Resources/Discharge Planning    Therapeutic Interventions/Treatment Strategies:  Support, Feedback, Safety Assessments, Clarification, Education and Cognitive Behavioral Therapy    Response to Treatment Strategies:  Listened, Attentive, Accepted Support, Alert and Made bizarre statements    Name of Group:  Group Psychotherapy at 10:00 am       Description and Outcome:   Sergio's presence was different than the others in group.  He came into group with shorts on, even though the temps were in the teens.  His affect was flat and his voice was monotone.  He did not talk to " the others in group on any social level.  During his turn he spent a considerable amount of time discussing National Football statistics.  The group had been talking about National football but they had stopped before the actual group started.  He was redirected and then he described to the group that recently he had not been feeling like others, he was not feeling human.  He reported poor memory issues from seizure activity but he does have a seizure disorder.  He was very anxious and afraid that bad things were going to happen to him.  He would not elaborate which was probably okay in the group setting. His affect was flat and his voice was monotone.       Sergio demonstrated understanding of session content by taking a turn but some of his statements were odd and bizare.    Sergio would benefit from additional opportunities to practice and implement content from this session.  He is new to the group and there may need to be more assessment for an accurate diagnosis.      Is this a Weekly Review of the Progress on the Treatment Plan?  No

## 2017-12-15 ENCOUNTER — HOSPITAL ENCOUNTER (OUTPATIENT)
Dept: BEHAVIORAL HEALTH | Facility: CLINIC | Age: 22
End: 2017-12-15
Attending: PSYCHIATRY & NEUROLOGY
Payer: COMMERCIAL

## 2017-12-15 PROCEDURE — H2012 BEHAV HLTH DAY TREAT, PER HR: HCPCS

## 2017-12-15 PROCEDURE — 97150 GROUP THERAPEUTIC PROCEDURES: CPT | Mod: GO

## 2017-12-18 NOTE — PROGRESS NOTES
"  Adult Mental Health Outpatient Group Therapy Progress Note     Client Initial Individualized Goals for Treatment: \"I don't know. I just want to the pain to stop.\"     Initial Treatment suggestions for the client during the time between Diagnostic Assessment and completion of the Individualized Treatment Plan:  Follow Safety Plan  Abstain from Substance Use   Ask for more information, support and/or assistance as needed.  Follow up with providers/community supports as needed: Dr. Cori Patel, Oneyda Koehler  Report increases or changes in symptoms to staff.  Report any personal safety concerns to staff.   Take medications as prescribed.  Report medication changes and/or side effects to staff.  Attend and participate in groups as scheduled or notify staff if unable to do so.  Report any use of substances to staff as this may impact your symptoms and/or personal safety.  Notify staff if you have any other issues that need to be addressed. This may include any current abuse / neglect / exploitation or other vulnerability.  Follow recommendations of your treatment team and discuss concerns if not in agreement.    See Initial Treatment suggestions for the client during the time between Diagnostic Assessment and completion of the Master Individualized Treatment Plan.    Area of Treatment Focus:  Symptom Management and Develop Socialization / Interpersonal Relationship Skills    Therapeutic Interventions/Treatment Strategies:  Support, Feedback, Problem Solving, Clarification, Education and Cognitive Behavioral Therapy    Response to Treatment Strategies:  Accepted Feedback, Gave Feedback and Accepted Support    Name of Group:  Group Psychotherapy and Mental Health Management    Description and Outcome, Group Psychotherapy:  Sergio talked about having to put one of the family dogs down due to old age related issues.  He talked about understanding the necessity of this, and expressed some sadness at the situation.  His feelings " were validated, and the group discussed similar situations they had been through, providing Sergio with support.  Sergio also talked about frustration he feels at his job, wondering if he is doing good work, or if his boss is being honest with him in their interactions.  We discussed reassurance-seeking and external validation, and psychoeducation was provided to help Sergio distinguish this from paranoia.  The group again worked with Sergio to help him focus on what he can and can not control, and more assertive ways to communicate with his boss to better understand if/how she is evaluating him.  Sergio was receptive to this.  He mentioned he was diagnosed with Asperger syndrome when he was in elementary school.  We discussed the concrete thinking and challenges with picking up on social cues that are characteristic of the Autism spectrum, and how this may also contribute to Sergio's challenges with his boss.  We also discussed challenging his negative assumptions, and examining the evidence to support/refute his thoughts.      Client demonstrated understanding of session content by sharing more personal information with the group that will be helpful in clarifying how to best work with Sergio.    Client would benefit from additional opportunities to practice and implement content from this session.    Description and Outcome, Mental Health Management:  Amrit participated in a structured psychoeducational group focused on distress tolerance.  Background on DBT was provided, followed by information and instruction on practicing specific distress tolerance skills.  These included the STOP skill for recognizing and adjusting to emotionally activating crisis situations, using Pros and Cons, TIP skills for changing body chemistry and addressing physiological activation when in crisis, the use of distraction (ACCEPTS), and self-soothing (5 senses), and using IMPROVE skills to reduce the impact of crisis.  The group also discussed preparing  a self-soothing kit, and were given instructions on how to put one together at home.   Amrit participated in the discussions, asked relevant questions, and provided feedback to others.  Amrit gave examples of distressing situations in which he may find the skills helpful.        Client demonstrated understanding of session content by interacting with peers to discuss the material presented and sharing personal experiences relevant to the topic.    Is this a Weekly Review of the Progress on the Treatment Plan?  Yes.      Are Treatment Plan Goals being addressed?  Yes, continue treatment goals      Are Treatment Plan Strategies to Address Goals Effective?  Yes, continue treatment strategies      Are there any current contracts in place?  No

## 2017-12-19 ENCOUNTER — HOSPITAL ENCOUNTER (OUTPATIENT)
Dept: BEHAVIORAL HEALTH | Facility: CLINIC | Age: 22
End: 2017-12-19
Attending: PSYCHIATRY & NEUROLOGY
Payer: COMMERCIAL

## 2017-12-19 ENCOUNTER — HOSPITAL ENCOUNTER (EMERGENCY)
Facility: CLINIC | Age: 22
Discharge: HOME | End: 2017-12-19
Attending: PSYCHIATRY & NEUROLOGY | Admitting: PSYCHIATRY & NEUROLOGY
Payer: COMMERCIAL

## 2017-12-19 VITALS
TEMPERATURE: 96.9 F | WEIGHT: 183 LBS | OXYGEN SATURATION: 100 % | SYSTOLIC BLOOD PRESSURE: 125 MMHG | HEART RATE: 56 BPM | DIASTOLIC BLOOD PRESSURE: 78 MMHG | RESPIRATION RATE: 16 BRPM

## 2017-12-19 DIAGNOSIS — F43.23 ADJUSTMENT DISORDER WITH MIXED ANXIETY AND DEPRESSED MOOD: ICD-10-CM

## 2017-12-19 DIAGNOSIS — F84.0 HISTORY OF AUTISM SPECTRUM DISORDER: ICD-10-CM

## 2017-12-19 PROCEDURE — H2012 BEHAV HLTH DAY TREAT, PER HR: HCPCS

## 2017-12-19 PROCEDURE — 97150 GROUP THERAPEUTIC PROCEDURES: CPT | Mod: GO

## 2017-12-19 PROCEDURE — 99284 EMERGENCY DEPT VISIT MOD MDM: CPT | Mod: Z6 | Performed by: PSYCHIATRY & NEUROLOGY

## 2017-12-19 PROCEDURE — 99285 EMERGENCY DEPT VISIT HI MDM: CPT | Mod: 25 | Performed by: PSYCHIATRY & NEUROLOGY

## 2017-12-19 PROCEDURE — 90791 PSYCH DIAGNOSTIC EVALUATION: CPT

## 2017-12-19 RX ORDER — FEXOFENADINE HCL 180 MG/1
TABLET ORAL
COMMUNITY
Start: 2010-09-29 | End: 2020-07-27

## 2017-12-19 ASSESSMENT — ENCOUNTER SYMPTOMS
HYPERACTIVE: 0
ENDOCRINE NEGATIVE: 1
GASTROINTESTINAL NEGATIVE: 1
MUSCULOSKELETAL NEGATIVE: 1
EYES NEGATIVE: 1
NEUROLOGICAL NEGATIVE: 1
RESPIRATORY NEGATIVE: 1
NERVOUS/ANXIOUS: 1
CONSTITUTIONAL NEGATIVE: 1
CARDIOVASCULAR NEGATIVE: 1
HALLUCINATIONS: 0
SLEEP DISTURBANCE: 1
DECREASED CONCENTRATION: 1
HEMATOLOGIC/LYMPHATIC NEGATIVE: 1

## 2017-12-19 NOTE — ED PROVIDER NOTES
History     Chief Complaint   Patient presents with     Suicidal     More suicidal then ever.  has ongoing depression, flat affect, nightmares - stopped taking Seroquel on Sat. Is in a day treatment program here at      confused     mom stated he has been flat     HPI  Amrit Padilla is a 22 year old male who is here accompanied by mother. Patient has history of Aspergers/Autism Spectrum. He has a seizure disorder and is followed by a neurologist. Patient was enrolled here at the [facility] but dropped out 6 weeks ago as he was feeling overwhelmed and stressed, leading to poor seizure control and vice versa. He had been prescribed Prozac and Seroquel through [facility], but lost follow-up care when he dropped out. He now does not have a psychiatric provider and decided to stop Seroquel as he felt he was having nightmares from it. He also reduced his Prozac dose due to feeling worse on it. He began to feel better when he reduced the dose. Patient in the meantime started day treatment. He has been having misperceptions(?) of his surroundings and is concerned about it. He has no recall after his seizures so he does not know if it is related to seizures. He thought maybe he is having psychotic experiences. Day treatment staff processed this with him and he subsequently was referred here for further assessment when he was commenting on feeling suicidal. While waiting to be seen, patient was feeling much better. He denied feeling suicidal nor unsafe. He is comfortable going home. He denies using drugs. He is open to having help with getting connected to a new psychiatric provider.    Please see DEC Crisis Assessment on 12/19/17 in [facility] for further details.    PERSONAL MEDICAL HISTORY  Past Medical History:   Diagnosis Date     Depressive disorder      Headaches, cluster      PAST SURGICAL HISTORY  Past Surgical History:   Procedure Laterality Date     BACK SURGERY       FAMILY HISTORY  No family history on file.  SOCIAL  HISTORY  Social History   Substance Use Topics     Smoking status: Never Smoker     Smokeless tobacco: Never Used     Alcohol use No     MEDICATIONS  No current facility-administered medications for this encounter.      Current Outpatient Prescriptions   Medication     eslicarbazepine acetate (APTIOM) 200 MG tablet     ZONISAMIDE PO     FLUOXETINE HCL PO     QUEtiapine Fumarate (SEROQUEL PO)     MELATONIN PO     fexofenadine (ALLEGRA) 180 MG tablet     ALLERGIES  Allergies   Allergen Reactions     No Clinical Screening - See Comments Rash     Reaction to a seizure med that he can not recall.     Dust Mites          I have reviewed the Medications, Allergies, Past Medical and Surgical History, and Social History in the Epic system.    Review of Systems   Constitutional: Negative.    HENT: Negative.    Eyes: Negative.    Respiratory: Negative.    Cardiovascular: Negative.    Gastrointestinal: Negative.    Endocrine: Negative.    Genitourinary: Negative.    Musculoskeletal: Negative.    Skin: Negative.    Neurological: Negative.    Hematological: Negative.    Psychiatric/Behavioral: Positive for decreased concentration, sleep disturbance and suicidal ideas. Negative for hallucinations. The patient is nervous/anxious. The patient is not hyperactive.    All other systems reviewed and are negative.      Physical Exam   BP: 122/75  Pulse: 57  Temp: 96.9  F (36.1  C)  Resp: 18  Weight: 83 kg (183 lb)  SpO2: 98 %      Physical Exam   Constitutional: He appears well-developed and well-nourished.   HENT:   Head: Normocephalic.   Eyes: Pupils are equal, round, and reactive to light.   Neck: Normal range of motion.   Cardiovascular: Normal rate.    Pulmonary/Chest: Effort normal.   Abdominal: Soft.   Musculoskeletal: Normal range of motion.   Neurological: He is alert.   Skin: Skin is warm.   Psychiatric: His speech is normal. Judgment and thought content normal. His mood appears anxious. His affect is blunt. He is withdrawn. He  is not agitated, not aggressive, not hyperactive, not actively hallucinating and not combative. Thought content is not paranoid and not delusional. Cognition and memory are normal. He expresses no homicidal and no suicidal ideation. He is inattentive.   Nursing note and vitals reviewed.      ED Course     ED Course     Procedures    Labs Ordered and Resulted from Time of ED Arrival Up to the Time of Departure from the ED - No data to display         Assessments & Plan (with Medical Decision Making)   Patient with an adjustment disorder. He presently feels calm, and in behavioral and emotional control. He is not in imminent danger and does not require an acute intervention. Patient can be discharged. As Seroquel appears to cause concerning side effects and little benefit, he is encouraged to stop taking it. He is encouraged to continue with his current (lower) dose of Prozac. He is encouraged to follow-up with his neurologist for further evaluation of possible seizure activity as part of his concerned experience. [facility] will try to help with a referral to a new provider but parents need to work with their insurance as it is not [facility] contracted.    I have reviewed the nursing notes.    I have reviewed the findings, diagnosis, plan and need for follow up with the patient.    New Prescriptions    No medications on file       Final diagnoses:   Adjustment disorder with mixed anxiety and depressed mood   History of autism spectrum disorder       12/19/2017   [facility], [place], EMERGENCY DEPARTMENT     Raf Garcia MD  12/19/17 3385       Raf Garcia MD  12/19/17 3656

## 2017-12-19 NOTE — DISCHARGE INSTRUCTIONS
Stop Seroquel as you feel it contributed to your nightmares and you are not seeing any benefit.  Cntinue with Prozac at the dose that you had lowered to currently.   Follow-up with your neurologist for further management of your seizures. Perhaps your neurologist can manage your Prozac until you can connect to a new psychiatric provider.  You will need to work with your insurance on a referral to a new psychiatric provider as it is not contracted with [facility]'s network of provider and services.  Continue with day treatment for support.

## 2017-12-19 NOTE — ED AVS SNAPSHOT
[facility], Emergency Department    [address]    [place] [address]    Phone:  [phone]    Fax:  [phone]                                       Amrit Padilla   MRN: [id number]    Department:  [facility], Emergency Department   Date of Visit:  12/19/2017           Patient Information     Date Of Birth          1995        Your diagnoses for this visit were:     Adjustment disorder with mixed anxiety and depressed mood     History of autism spectrum disorder        You were seen by Raf Garcia MD.      Follow-up Information     Follow up with No Ref-Primary, Physician.        Discharge Instructions       Stop Seroquel as you feel it contributed to your nightmares and you are not seeing any benefit.  Cntinue with Prozac at the dose that you had lowered to currently.   Follow-up with your neurologist for further management of your seizures. Perhaps your neurologist can manage your Prozac until you can connect to a new psychiatric provider.  You will need to work with your insurance on a referral to a new psychiatric provider as it is not contracted with [facility]'s network of provider and services.  Continue with day treatment for support.    Future Appointments        Provider Department Dept Phone Center    12/20/2017 9:00 AM ADULT MH DAY 4B [facility] [phone] [place]    12/22/2017 9:00 AM ADULT MH DAY 4B [facility] [phone] [place]    12/26/2017 9:00 AM ADULT MH DAY 4B [facility] [phone] [place]    12/27/2017 9:00 AM ADULT MH DAY 4B [facility] [phone] [place]    12/29/2017 9:00 AM ADULT MH DAY 4B [facility] [phone] [place]    1/2/2018 9:00 AM ADULT MH DAY 4B [facility] [phone] [place]    1/3/2018 9:00 AM ADULT MH DAY 4B [facility] [phone] [place]    1/5/2018 9:00  AM ADULT MH DAY 4B [facility] [phone] [place]    1/9/2018 9:00 AM ADULT MH DAY 4B [facility] [phone] [place]    1/10/2018 9:00 AM ADULT MH DAY 4B [facility] [phone] [place]    1/12/2018 9:00 AM ADULT MH DAY 4B [facility] [phone] [place]    1/16/2018 9:00 AM ADULT MH DAY 4B [facility] [phone] [place]    1/17/2018 9:00 AM ADULT MH DAY 4B [facility] [phone] [place]    1/19/2018 9:00 AM ADULT MH DAY 4B [facility] [phone] [place]    1/23/2018 9:00 AM ADULT MH DAY 4B [facility] [phone] [place]    1/24/2018 9:00 AM ADULT MH DAY 4B [facility] [phone] [place]    1/26/2018 9:00 AM ADULT MH DAY 4B [facility] [phone] [place]    1/30/2018 9:00 AM ADULT MH DAY 4B [facility] [phone] [place]    1/31/2018 9:00 AM ADULT MH DAY 4B [facility] [phone] [place]    2/2/2018 9:00 AM ADULT MH DAY 4B [facility] [phone] [place]    2/6/2018 9:00 AM ADULT MH DAY 4B [facility] [phone] [place]    2/7/2018 9:00 AM ADULT MH DAY 4B [facility] [phone] [place]    2/9/2018 9:00 AM ADULT MH DAY 4B [facility] [phone] [place]    2/13/2018 9:00 AM ADULT MH DAY 4B [facility] [phone] [place]    2/14/2018 9:00 AM ADULT MH DAY 4B [facility] [phone] [place]    2/16/2018 9:00 AM ADULT MH DAY 4B [facility] [phone] [place]    2/20/2018 9:00 AM ADULT  DAY 4B [facility] [phone] [place]    2/21/2018 9:00 AM ADULT  DAY [address] [place] [address]  [facility] [phone] [place]      24 Hour Appointment Hotline       To make an appointment at any [facility], call [phone] ([phone]). If you don't have a family doctor or clinic, we will help you find one. [facility] clinics are conveniently located to serve the needs of you and your family.             Review of your medicines      Our records show that you are taking the medicines listed below. If these are incorrect, please call your family doctor or clinic.        Dose / Directions Last dose taken    eslicarbazepine acetate 200 MG tablet   Commonly known as:  APTIOM   Dose:  1000 mg        Take 1,000 mg by mouth   Refills:  0        fexofenadine 180 MG tablet   Commonly known as:  ALLEGRA        Refills:  0        FLUOXETINE HCL PO   Dose:  40 mg        Take 40 mg by mouth   Refills:  0        MELATONIN PO   Dose:  5 mg        Take 5 mg by mouth At Bedtime   Refills:  0        SEROQUEL PO   Dose:  25 mg        Take 25 mg by mouth At Bedtime   Refills:  0        ZONISAMIDE PO   Dose:  300 mg        Take 300 mg by mouth 2 times daily   Refills:  0                Orders Needing Specimen Collection     Ordered          12/19/17 1259  Drug abuse screen 6 urine (chem dep) - ROUTINE, [facility]: Routine, Needs to be Collected     Scheduled Task Status   12/19/17 1300 Collect Drug abuse screen 6 urine (chem dep) Open   Order Class:  PCU Collect                  Pending Results     No orders found from 12/17/2017 to 12/20/2017.            Pending Culture Results     No orders found from 12/17/2017 to 12/20/2017.            Pending Results Instructions     If you had any lab results that were not finalized at the time of your Discharge, you can call the ED Lab Result RN at [phone]. You will be contacted by this team for any positive Lab results or changes in treatment. The nurses are available 7 days a week from 10A to 6:30P.  You can leave a message 24 hours per day and they will  "return your call.        Thank you for choosing [facility]       Thank you for choosing [facility] for your care. Our goal is always to provide you with excellent care. Hearing back from our patients is one way we can continue to improve our services. Please take a few minutes to complete the written survey that you may receive in the mail after you visit with us. Thank you!        surespotharRitani Information     [facility] lets you send messages to your doctor, view your test results, renew your prescriptions, schedule appointments and more. To sign up, go to [email or web address] . Click on \"Log in\" on the left side of the screen, which will take you to the Welcome page. Then click on \"Sign up Now\" on the right side of the page.     You will be asked to enter the access code listed below, as well as some personal information. Please follow the directions to create your username and password.     Your access code is: [email or web address]  Expires: 3/19/2018  4:13 PM     Your access code will  in 90 days. If you need help or a new code, please call your [facility] clinic or [phone].        Care EveryWhere ID     This is your Care EveryWhere ID. This could be used by other organizations to access your [facility] medical records  [id number]        Equal Access to Services     CAMERON HERRERA : Nataliya mgo Sohusam, waaxda luqadaha, qaybta kaalmada adeegyada, syed ortiz. So [facility] [phone].    ATENCIÓN: Si habla español, tiene a damon disposición servicios gratuitos de asistencia lingüística. Llame al [phone].    We comply with applicable federal civil rights laws and [place] laws. We do not discriminate on the basis of race, color, national origin, age, disability, sex, sexual orientation, or gender identity.            After Visit Summary       This is your record. Keep this with you and show to your community pharmacist(s) and doctor(s) at your next visit.                  "

## 2017-12-19 NOTE — ED AVS SNAPSHOT
[facility], [place], Emergency Department    [address]    [place] [address]    Phone:  [phone]    Fax:  [phone]                                       Amrit Padilla   MRN: [id number]    Department:  [facility], Emergency Department   Date of Visit:  12/19/2017           After Visit Summary Signature Page     I have received my discharge instructions, and my questions have been answered. I have discussed any challenges I see with this plan with the nurse or doctor.    ..........................................................................................................................................  Patient/Patient Representative Signature      ..........................................................................................................................................  Patient Representative Print Name and Relationship to Patient    ..................................................               ................................................  Date                                            Time    ..........................................................................................................................................  Reviewed by Signature/Title    ...................................................              ..............................................  Date                                                            Time

## 2017-12-19 NOTE — ED NOTES
Patient's mother, whom he lives with, is here today.  Sergio was at the day treatment program and his therapist brought him here for his flat affect, listlessness, suicidal thoughts.

## 2017-12-20 ENCOUNTER — HOSPITAL ENCOUNTER (OUTPATIENT)
Dept: BEHAVIORAL HEALTH | Facility: CLINIC | Age: 22
End: 2017-12-20
Attending: PSYCHIATRY & NEUROLOGY
Payer: COMMERCIAL

## 2017-12-20 DIAGNOSIS — F33.1 MODERATE EPISODE OF RECURRENT MAJOR DEPRESSIVE DISORDER (H): Primary | ICD-10-CM

## 2017-12-20 PROCEDURE — 97150 GROUP THERAPEUTIC PROCEDURES: CPT | Mod: GO

## 2017-12-20 PROCEDURE — H2012 BEHAV HLTH DAY TREAT, PER HR: HCPCS

## 2017-12-20 PROCEDURE — 99203 OFFICE O/P NEW LOW 30 MIN: CPT | Performed by: NURSE PRACTITIONER

## 2017-12-20 RX ORDER — FLUOXETINE 20 MG/1
40 TABLET, FILM COATED ORAL EVERY MORNING
Qty: 30 TABLET | Refills: 1 | Status: SHIPPED | OUTPATIENT
Start: 2017-12-20 | End: 2018-01-26

## 2017-12-20 RX ORDER — HYDROXYZINE HYDROCHLORIDE 25 MG/1
25 TABLET, FILM COATED ORAL 3 TIMES DAILY PRN
Qty: 90 TABLET | Refills: 1 | Status: SHIPPED | OUTPATIENT
Start: 2017-12-20 | End: 2020-07-27

## 2017-12-20 RX ORDER — OLANZAPINE 2.5 MG/1
2.5-5 TABLET, FILM COATED ORAL AT BEDTIME
Qty: 30 TABLET | Refills: 1 | Status: SHIPPED | OUTPATIENT
Start: 2017-12-20 | End: 2018-01-17

## 2017-12-20 NOTE — PROGRESS NOTES
"Adult Mental Health Outpatient Group Therapy Progress Note     Client Initial Individualized Goals for Treatment: Use mindful breathing for 2 minutes to manage anxiety.      Initial Treatment suggestions for the client during the time between Diagnostic Assessment and completion of the Individualized Treatment Plan:  Follow Safety Plan   Ask for more information, support and/or assistance as needed.  Follow up with providers/community supports as needed: BEBETO Stallings at [facility]  Report increases or changes in symptoms to staff.  Report any personal safety concerns to staff.   Take medications as prescribed.  Report medication changes and/or side effects to staff.  Attend and participate in groups as scheduled or notify staff if unable to do so.  Report any use of substances to staff as this may impact your symptoms and/or personal safety.  Notify staff if you have any other issues that need to be addressed. This may include any current abuse / neglect / exploitation or other vulnerability.  Follow recommendations of your treatment team and discuss concerns if not in agreement.     Area of Treatment Focus:  Symptom Management, Personal Safety and Develop / Improve Independent Living Skills    Therapeutic Interventions/Treatment Strategies:  Support, Feedback, Safety Assessments and Structured Activity    Response to Treatment Strategies:  Listened, Accepted Support, Alert and Distracted    Name of Group:  OT Clinic     Description and Outcome:  Sergio attended and participated in a structured occupational therapy group where intervention focused on coping through structured hands-on activities.  Sergio continued work on a paper pencil puzzle task with limited concentration and focus.  Reported the task was \"alright\" when asked.  Answered direct questions with brief responses.  Eye contact was poor.  Affect was blunted.  Client would benefit from additional opportunities to practice and implement " content from this session.    Is this a Weekly Review of the Progress on the Treatment Plan?  No

## 2017-12-20 NOTE — PROGRESS NOTES
"Adult Mental Health Outpatient Group Therapy Progress Note     Client Initial Individualized Goals for Treatment: \"I don't know. I just want to the pain to stop.\"     Initial Treatment suggestions for the client during the time between Diagnostic Assessment and completion of the Individualized Treatment Plan:  Follow Safety Plan  Abstain from Substance Use   Ask for more information, support and/or assistance as needed.  Follow up with providers/community supports as needed: Dr. Cori Patel, [facility] Koehler  Report increases or changes in symptoms to staff.  Report any personal safety concerns to staff.   Take medications as prescribed.  Report medication changes and/or side effects to staff.  Attend and participate in groups as scheduled or notify staff if unable to do so.  Report any use of substances to staff as this may impact your symptoms and/or personal safety.  Notify staff if you have any other issues that need to be addressed. This may include any current abuse / neglect / exploitation or other vulnerability.  Follow recommendations of your treatment team and discuss concerns if not in agreement.    See Initial Treatment suggestions for the client during the time between Diagnostic Assessment and completion of the Master Individualized Treatment Plan.    Area of Treatment Focus:  Symptom Management and Personal Safety    Therapeutic Interventions/Treatment Strategies:  Support, Feedback, Problem Solving and Cognitive Behavioral Therapy    Response to Treatment Strategies:  Accepted Feedback, Gave Feedback and Focused on Goals    Name of Group:  Group Psychotherapy    Description and Outcome:  Sergio talked about his experience yesterday at the ED and DEC.  He said he was feeling better today, and had a good sleep last night.  He said he is still having intrusive thoughts, telling himself he is \"a dumba**\" and \"stupid, useless,\" and other deprecating terms.  Sergio set a goal of using distraction when he becomes " ruminative and the thoughts are persistent.  We also discussed efforts to find him a psychiatric provider, and I discussed having tentatively set up a meeting with MANJU Ge at noon today to review his medication situation.  Sergio was agreeable to this.  I offered to call his mother after session to inform her of this and Sergio agreed.  Sergio continues to present with flat affect, however his thought processes and communication seemed more logical and congruent with his affect today.  Sergio was supportive of the other group members.    Client demonstrated understanding of session content by sharing his symptoms and experiences with the group and focusing on securing psychiatric care.    Client would benefit from additional opportunities to practice and implement content from this session.    Is this a Weekly Review of the Progress on the Treatment Plan?  No

## 2017-12-21 NOTE — H&P
DATE OF SERVICE:  12/20/2017       LEGAL STATUS:  Voluntary.        SOURCE OF INFORMATION:  Information was obtained from the patient and available records.        REASON FOR ADMISSION:  Continuation of the day treatment program.      HISTORY OF PRESENT ILLNESS:  Amrit Padilla is a 22-year-old  male who was referred to the day treatment program by [facility]. Amrit has a history of depression (since age 13), anxiety, Asperger's syndrome (diagnosed at age 8), and epilepsy( since age 12).  He remembers having mental health symptoms as early as age 8 or 9.   He has never been hospitalized for mental illness or chemical dependency.  He has been seen by Karin Carlisle at [facility] for the last year.  He has seen a therapist once or twice last year.  He has consulted with Sandy Jones once, and had a consult with [facility].      Per diagnostic assessment completed on 10/3117 he reported the following symptoms:  Sleeping 12-16 hours a day, increased depression and anxiety, panic attacks, excessive worries, difficulties concentrating, ruminating thoughts, low motivation, decreased energy, decreased appetite, negative self-talk, problems making decisions, isolating, feeling restless and suicidal on and off.  When I met with Amrit, he was very vague about his symptoms and his prior treatment some of which contradict the symptoms he described 6 weeks ago. He told me that he has been depressed most of his life.  He has not been able to sleep.  He is waking up 6-7 times a night and have difficulties falling back to sleep.  States that he has been having sleep issues for the last 3-4 years.  He states that he was started on Seroquel about a year ago, states it was helpful for his sleep, but was causing vivid dreams and nightmares, so he stopped taking it.  He also tells me that he has been on Cymbalta in the past but did not remember when, for how long and the therapeutic response.  "Initially stated it was helpful but also increased suicidal thinking.  He was started on Prozac about a year ago and he had the same type of reaction, and the dose was decreased.  He continues to have transient passive SI. He is currently denying any suicidal ideation.  He states he has been making SI plans in the past but not recently.  Reports that all medications he had tried have caused some type of a side effect.  Besides Cymbalta and Prozac, he could not remember being on anything else.  The patient is currently taking Zonisamide and Aptiom  for seizures, Melatonin and  Prozac 20 mg in the morning and 40 at bedtime.  The patient tells me that the dose of Prozac was changed from 40 mg in the morning and 60 at bedtime to 20 in the morning and 40 at bedtime.  Per records, Prozac is only at 40 mg in the morning so I am not sure where the 20 mg comes from.  He is currently feeling depressed, hopeless, helpless and worthless.  His energy is low.  Memory and concentration are low.  Appetite is poor.  He denies feeling manic.  He reports some type of psychosis; however, he is not able to give me more details.  He states that he is hearing a voice, but is not sure if this voice is his own negative thoughts.  The voice is saying that he is no good and he should just kill himself.  He is describing some type of visual disturbance, seeing floaters every now and than.  He is reporting high anxiety rated currently as 9/10; however, the patient is sitting calmly in the chair and does not appear to be in any distress.  He is also reporting panic attacks but when pressed for more details he said that symptoms are unknown but describes the panic attacks as \"yesterday morning I felt that I did not care at all and I felt that I should grab a knife and kill myself\".   He is denying any history of trauma, OCD,  eating disorders and borderline personality disorder.  The patient reports significant negative self-thought for years and " urges to harm himself.  He was seen in the ED yesterday for suicidal ideation; however, he felt better and decided to go home and not be admitted.  He denies SI today.      SUBSTANCE USE HISTORY:  The patient does not have any history of alcohol or illicit chemical use.      PAST PSYCHIATRIC HISTORY:  The patient has a diagnosis of major depressive disorder, Asperger's syndrome and seizure disorder.  He remembers having symptoms as early as age 8; however, he was diagnosed with depression at age 13.  He has never been hospitalized and has seen a therapist on and off for a couple of years.  He currently does not have a psychiatrist.  He denies history of auditory and visual hallucinations.  The patient has never been hospitalized for mental illness.  He says that he might have been hospitalized at age 11, but he is not sure.  He has never had ECT treatment.  No suicidal ideation, no suicide attempts.  No violence toward others.  No history of a court commitment.  He is currently not having a psychiatrist and I am not sure who is prescribing the Prozac and the Seroquel.      PAST MEDICAL HISTORY:  Positive for seizure disorder.      ALLERGIES:  Positive for dust mites.      FAMILY HISTORY:  Negative for mental illness and chemical dependency.      SOCIAL HISTORY:  The patient was born and raised in [place].  He is the firstborn of 3 children.  He has a younger brother and a younger sister.  He currently lives in his home with his mother, brother and 2 dogs.  His parents are .  His father travels to [place] and stays there for weeks at a time.  He works as a .  The patient has never been , no children.  Currently lives with mom, dogs and brother.      OCCUPATIONAL HISTORY:  He has worked for about 2 weeks at [employer] in [place].  He denies any  history.  No legal history and denies abuse history.  His finances are obtained from part-time job and his parents.      REVIEW OF  SYSTEMS:  Negative except as noted in the HPI.      MENTAL STATUS EXAMINATION:  Appearance:  Amrit is a 22-year-old  male who is dressed appropriately for the season.  He is cooperative.  Eye contact is good.  Mood is depressed and anxious.  Affect is full range.  Speech is clear and coherent.  Psychomotor behavior is negative for tardive dyskinesia, dystonias or tics.  Thought process is linear and goal oriented.  No loose associations.  Thought content is negative for suicidal and homicidal ideation, auditory and visual hallucinations, paranoia and delusions.  Insight and judgment are intact.  He is oriented to self, place, date and situation.  Attention span and concentration are intact.  Recent and remote memory is intact.  Language:  He has no problems expressing himself and fund of knowledge is adequate for the level of education and training.      ASSESSMENT:   1.  Major depressive disorder, recurrent, moderate, with anxious distress.   2.  Asperger's syndrome.   3.  Seizure disorder.      CURRENT MEDICATIONS:   1.  Prozac 20 mg in the morning and 40 mg at bedtime.   2.  Zonisamide 300 mg twice a day for seizures.   3.  Aptiom 200 mg at bedtime.   4.  Melatonin 5 mg at bedtime.   5.  Allegra as needed.      PLAN AND RECOMMENDATIONS:   1.  Continue the day treatment program.   2.  Continue current medications. Change:   ---Prozac to 40 mg once a day in the morning.    ---Start Zyprexa 2.5-5 mg at bedtime, for sleep   ---Start hydroxyzine 25 mg 3 times a day as needed for anxiety.    ------A 1-month supply plus 3 additional refills for Prozac, Hydroxyzine and Zyprexa was sent to [facility] pharmacy in [place].    ---Recommend returning to the clinic in 2 weeks.   3.  The patient was encouraged to follow up with his primary care team and find a psychiatrist.   4.  The patient was advised to let us know if inpatient admission or further help is needed.   5.  Care was coordinated with the treatment team.       ATTESTATION:  The patient has been seen and evaluated by me, BEBETO Javed, CNP.         BEBETO MENDEZ, CNP             D: 2017 14:48   T: 2017 18:06   MT:       Name:     BRIDGET KNIGHT   MRN:      [id number]        Account:      [id number]   :      1995           Admitted:     [id number]      Document: [id number]

## 2017-12-22 ENCOUNTER — HOSPITAL ENCOUNTER (OUTPATIENT)
Dept: BEHAVIORAL HEALTH | Facility: CLINIC | Age: 22
End: 2017-12-22
Attending: PSYCHIATRY & NEUROLOGY
Payer: COMMERCIAL

## 2017-12-22 PROCEDURE — H2012 BEHAV HLTH DAY TREAT, PER HR: HCPCS

## 2017-12-22 PROCEDURE — 97150 GROUP THERAPEUTIC PROCEDURES: CPT | Mod: GO

## 2017-12-22 NOTE — PROGRESS NOTES
Psychiatry staffing: case discussed  Diagnosis:  MDD, thought disorder possible.      Current Outpatient Prescriptions   Medication     FLUoxetine 20 MG tablet     OLANZapine (ZYPREXA) 2.5 MG tablet     hydrOXYzine (ATARAX) 25 MG tablet     eslicarbazepine acetate (APTIOM) 200 MG tablet     fexofenadine (ALLEGRA) 180 MG tablet     ZONISAMIDE PO     MELATONIN PO     No current facility-administered medications for this encounter.        Current Outpatient Prescriptions:      FLUoxetine 20 MG tablet, Take 2 tablets (40 mg) by mouth every morning, Disp: 30 tablet, Rfl: 1     OLANZapine (ZYPREXA) 2.5 MG tablet, Take 1-2 tablets (2.5-5 mg) by mouth At Bedtime, Disp: 30 tablet, Rfl: 1     hydrOXYzine (ATARAX) 25 MG tablet, Take 1 tablet (25 mg) by mouth 3 times daily as needed for anxiety, Disp: 90 tablet, Rfl: 1     eslicarbazepine acetate (APTIOM) 200 MG tablet, Take 1,000 mg by mouth, Disp: , Rfl:      fexofenadine (ALLEGRA) 180 MG tablet, , Disp: , Rfl:      ZONISAMIDE PO, Take 300 mg by mouth 2 times daily, Disp: , Rfl:      MELATONIN PO, Take 5 mg by mouth At Bedtime, Disp: , Rfl:

## 2017-12-22 NOTE — PROGRESS NOTES
"  Adult Mental Health Outpatient Group Therapy Progress Note     Client Initial Individualized Goals for Treatment: \"I don't know. I just want to the pain to stop.\"     Initial Treatment suggestions for the client during the time between Diagnostic Assessment and completion of the Individualized Treatment Plan:  Follow Safety Plan  Abstain from Substance Use   Ask for more information, support and/or assistance as needed.  Follow up with providers/community supports as needed: Dr. Cori Patel, [facility] Koehler  Report increases or changes in symptoms to staff.  Report any personal safety concerns to staff.   Take medications as prescribed.  Report medication changes and/or side effects to staff.  Attend and participate in groups as scheduled or notify staff if unable to do so.  Report any use of substances to staff as this may impact your symptoms and/or personal safety.  Notify staff if you have any other issues that need to be addressed. This may include any current abuse / neglect / exploitation or other vulnerability.  Follow recommendations of your treatment team and discuss concerns if not in agreement.    See Initial Treatment suggestions for the client during the time between Diagnostic Assessment and completion of the Master Individualized Treatment Plan.    Area of Treatment Focus:  Symptom Management and Personal Safety    Therapeutic Interventions/Treatment Strategies:  Support, Feedback, Safety Assessments and Cognitive Behavioral Therapy    Response to Treatment Strategies:  Accepted Feedback, Gave Feedback and Distracted    Name of Group:  Group Psychotherapy and Mental Health Management    Description and Outcome, Group Psychotherapy:  Sergio was quiet in group therapy the first hour, and looked sleepy and frustrated.  I asked him how he was feeling and he said he was \"tired.\"  At the end of the first hour, we were taking a break and Sergio collapsed to his knees when he stood up.  I helped him up, and asked " "him what was going on.  He was confused and unstable on his feet and I helped him back to his chair.  Sergio was difficult to engage, responding to my questions by repeating \"ok.\"  After several minutes, he was able to tell me where he was, and who I was, and was more oriented to his surroundings.  I suggested he get some water, and Sergio walked to the [place] without assistance and seemed ok.  We started the next hour with Sergio, and he described what had happened as \"one of my seizures.\"  He gave some background on his seizure disorder, and we discussed how psychological stress and depression can cause physiological responses such as dissociation, depersonalization, derealization, that are similar in presentation of absence seizures.  Sergio accepted this.  He said he would call his neurologist next hour to get an appointment, and postulated that his vitamin D levels may be low, as this has contributed to similar experiences before.  Sergio talked about ongoing memory issues and cognitive confusion, but was coherent and participated in the MHM section of the session. It was recommended to Sergio in his treatment planning meeting after group today that he schedule a psychological assessment to help clarify his cognition, memory, and possible psychosis symptoms.  He was agreeable to this.    Client would benefit from additional opportunities to practice and implement content from this session.    Description and Outcome, Mental Health Management:  Amrit participated in a structured psychoeducational group focused on self-directed music therapy.  Psychoeducation was provided on using music as a means of influencing mood.  The group discussed the use of music and/or other media to influence mood, and resources were provided to review and access playlists of songs that are likely to influence certain regions of the brain to enhance neurotransmitter activity to positively impact mood.  Amrit participated in the discussion, asked " relevant questions, and provided feedback to others.  Amrit shared his thoughts and feelings on the use of music and other media for self-care, and what he finds helpful in reducing symptoms of depression and anxiety.     Client demonstrated understanding of session content by interacting with peers to discuss the material presented and sharing personal experiences relevant to the topic.    Is this a Weekly Review of the Progress on the Treatment Plan?  Yes.      Are Treatment Plan Goals being addressed?  Yes, continue treatment goals      Are Treatment Plan Strategies to Address Goals Effective?  Yes, continue treatment strategies      Are there any current contracts in place?  No

## 2017-12-22 NOTE — PROGRESS NOTES
"Adult Mental Health Outpatient Group Therapy Progress Note     Client Initial Individualized Goals for Treatment: \"I don't know. I just want to the pain to stop.\"      Initial Treatment suggestions for the client during the time between Diagnostic Assessment and completion of the Individualized Treatment Plan:  Follow Safety Plan  Abstain from Substance Use   Ask for more information, support and/or assistance as needed.  Follow up with providers/community supports as needed: Dr. Cori Patel, Oneyda Koehler  Report increases or changes in symptoms to staff.  Report any personal safety concerns to staff.   Take medications as prescribed.  Report medication changes and/or side effects to staff.  Attend and participate in groups as scheduled or notify staff if unable to do so.  Report any use of substances to staff as this may impact your symptoms and/or personal safety.  Notify staff if you have any other issues that need to be addressed. This may include any current abuse / neglect / exploitation or other vulnerability.  Follow recommendations of your treatment team and discuss concerns if not in agreement.     Area of Treatment Focus:  Symptom Management, Personal Safety and Develop / Improve Independent Living Skills    Therapeutic Interventions/Treatment Strategies:  Support, Feedback, Safety Assessments, Structured Activity, Problem Solving and Education    Response to Treatment Strategies:  Accepted Feedback, Gave Feedback, Listened and Alert    Name of Group:  [facility] Clinic     Description and Outcome:  Sergio  attended and participated in a structured occupational therapy group where intervention focused on coping through structured hands-on activities.  Sergio continued work on a paper pencil puzzle task with variable concentration and energy.  He was quieter in group today and did not join in conversations around him.  He also seemed more restless today compared to Wednesday.  Client would benefit from additional " opportunities to practice and implement content from this session.    Met with the treatment team after groups to establish treatment plan goals.  See treatment plan for details.     Is this a Weekly Review of the Progress on the Treatment Plan?  Yes.      Are Treatment Plan Goals being addressed?  No, treatment goals revised      Are Treatment Plan Strategies to Address Goals Effective?  Yes, continue treatment strategies      Are there any current contracts in place?  No

## 2017-12-22 NOTE — PROGRESS NOTES
Adult Mental Health Outpatient Group Therapy Progress Note     Client Initial Individualized Goals for Treatment: Use mindful breathing for 2 minutes to manage anxiety.      Initial Treatment suggestions for the client during the time between Diagnostic Assessment and completion of the Individualized Treatment Plan:  Follow Safety Plan   Ask for more information, support and/or assistance as needed.  Follow up with providers/community supports as needed: BEBETO Stallings at [facility]  Report increases or changes in symptoms to staff.  Report any personal safety concerns to staff.   Take medications as prescribed.  Report medication changes and/or side effects to staff.  Attend and participate in groups as scheduled or notify staff if unable to do so.  Report any use of substances to staff as this may impact your symptoms and/or personal safety.  Notify staff if you have any other issues that need to be addressed. This may include any current abuse / neglect / exploitation or other vulnerability.  Follow recommendations of your treatment team and discuss concerns if not in agreement.     Area of Treatment Focus:  Symptom Management, Personal Safety and Develop / Improve Independent Living Skills    Therapeutic Interventions/Treatment Strategies:  Support, Feedback, Safety Assessments, Structured Activity, Problem Solving and Clarification    Response to Treatment Strategies:  Accepted Feedback, Gave Feedback, Listened, Focused on Goals, Accepted Support and Alert    Name of Group:  OT Clinic     Description and Outcome:  Sergio  attended and participated in a structured occupational therapy group where intervention focused on coping through structured hands-on activities.  Sergio spent time in group working on a paper pencil puzzle task and then a jigsaw puzzle with a peer.  He was much more engaged in conversations with peers today than previous days.  He demonstrated rigid thinking and had some  difficulty seeing other people's point of view in conversations.  Mood seemed a little irritable.  Thought process was clear and organized.  Concentration was fair to good today.  Client demonstrated understanding of session content by engaging in task work and conversation aimed at helping with his psychiatric recovery.  Client would benefit from additional opportunities to practice and implement content from this session.    Is this a Weekly Review of the Progress on the Treatment Plan?  No

## 2017-12-22 NOTE — PROGRESS NOTES
Adult Mental Health Outpatient Group Therapy Progress Note     Client Initial Individualized Goals for Treatment: Use mindful breathing for 2 minutes to manage anxiety.      Initial Treatment suggestions for the client during the time between Diagnostic Assessment and completion of the Individualized Treatment Plan:  Follow Safety Plan   Ask for more information, support and/or assistance as needed.  Follow up with providers/community supports as needed: BEBETO Stallings at [facility]  Report increases or changes in symptoms to staff.  Report any personal safety concerns to staff.   Take medications as prescribed.  Report medication changes and/or side effects to staff.  Attend and participate in groups as scheduled or notify staff if unable to do so.  Report any use of substances to staff as this may impact your symptoms and/or personal safety.  Notify staff if you have any other issues that need to be addressed. This may include any current abuse / neglect / exploitation or other vulnerability.  Follow recommendations of your treatment team and discuss concerns if not in agreement.     Area of Treatment Focus:  Symptom Management, Personal Safety and Develop / Improve Independent Living Skills    Therapeutic Interventions/Treatment Strategies:  Support, Feedback, Safety Assessments, Structured Activity and Problem Solving    Response to Treatment Strategies:  Listened, Disengaged and Distracted    Name of Group:  [facility]     Description and Outcome:  Sergio  attended and participated in a structured occupational therapy group where intervention focused on coping through structured hands-on activities.  Sergio spent time in group working on a paper pencil puzzle task but had difficulty sustaining his attention for more than a couple of minutes at a time.  Affect was flat.  He minimally interacted with peers and this writer but only when asked direct questions today.  Client would benefit from additional  opportunities to practice and implement content from this session.    Is this a Weekly Review of the Progress on the Treatment Plan?  No

## 2017-12-26 NOTE — ADDENDUM NOTE
Encounter addended by: Sweta Wesley RN on: 12/26/2017  3:33 PM<BR>     Actions taken: Flowsheet accepted

## 2018-01-02 ENCOUNTER — HOSPITAL ENCOUNTER (OUTPATIENT)
Dept: BEHAVIORAL HEALTH | Facility: CLINIC | Age: 23
End: 2018-01-02
Attending: PSYCHIATRY & NEUROLOGY
Payer: COMMERCIAL

## 2018-01-02 PROCEDURE — 97150 GROUP THERAPEUTIC PROCEDURES: CPT | Mod: GO

## 2018-01-02 PROCEDURE — H2012 BEHAV HLTH DAY TREAT, PER HR: HCPCS

## 2018-01-02 NOTE — PROGRESS NOTES
Adult Mental Health Outpatient Group Therapy Progress Note     Client Initial Individualized Goals for Treatment: Use mindful breathing for 2 minutes to manage anxiety.      Initial Treatment suggestions for the client during the time between Diagnostic Assessment and completion of the Individualized Treatment Plan:  Follow Safety Plan   Ask for more information, support and/or assistance as needed.  Follow up with providers/community supports as needed: BEBETO Stallings at [facility]  Report increases or changes in symptoms to staff.  Report any personal safety concerns to staff.   Take medications as prescribed.  Report medication changes and/or side effects to staff.  Attend and participate in groups as scheduled or notify staff if unable to do so.  Report any use of substances to staff as this may impact your symptoms and/or personal safety.  Notify staff if you have any other issues that need to be addressed. This may include any current abuse / neglect / exploitation or other vulnerability.  Follow recommendations of your treatment team and discuss concerns if not in agreement.     Area of Treatment Focus:  Symptom Management, Personal Safety and Develop / Improve Independent Living Skills    Therapeutic Interventions/Treatment Strategies:  Support, Feedback, Safety Assessments and Structured Activity    Response to Treatment Strategies:  Accepted Feedback, Gave Feedback, Listened, Focused on Goals, Attentive, Accepted Support and Alert    Name of Group:  [facility]     Description and Outcome:  Sergio  attended and participated in a structured occupational therapy group where intervention focused on coping through structured hands-on activities.  Sergio reported he was absent last week due to being out of town and other family commitments.  In group today, Sergio was much more socially engaged and involved with peers than previous sessions.  Affect was still constricted.  Discussed his long term plan of  moving to [place], [place] with his parents and enrolling in school there.  He spent time in group working on a paper pencil puzzle task, as well as, playing a game on his phone at the same time.  Energy level was much higher than last time here.  Client would benefit from additional opportunities to practice and implement content from this session.    Is this a Weekly Review of the Progress on the Treatment Plan?  No

## 2018-01-03 ENCOUNTER — HOSPITAL ENCOUNTER (OUTPATIENT)
Dept: BEHAVIORAL HEALTH | Facility: CLINIC | Age: 23
End: 2018-01-03
Attending: PSYCHIATRY & NEUROLOGY
Payer: COMMERCIAL

## 2018-01-03 PROCEDURE — H2012 BEHAV HLTH DAY TREAT, PER HR: HCPCS

## 2018-01-03 PROCEDURE — 97150 GROUP THERAPEUTIC PROCEDURES: CPT | Mod: GO

## 2018-01-03 NOTE — PROGRESS NOTES
Adult Mental Health Outpatient Group Therapy Progress Note     Client Initial Individualized Goals for Treatment: Use mindful breathing for 2 minutes to manage anxiety.      Initial Treatment suggestions for the client during the time between Diagnostic Assessment and completion of the Individualized Treatment Plan:  Follow Safety Plan   Ask for more information, support and/or assistance as needed.  Follow up with providers/community supports as needed: BEBETO Stallings at [facility]  Report increases or changes in symptoms to staff.  Report any personal safety concerns to staff.   Take medications as prescribed.  Report medication changes and/or side effects to staff.  Attend and participate in groups as scheduled or notify staff if unable to do so.  Report any use of substances to staff as this may impact your symptoms and/or personal safety.  Notify staff if you have any other issues that need to be addressed. This may include any current abuse / neglect / exploitation or other vulnerability.  Follow recommendations of your treatment team and discuss concerns if not in agreement.     Area of Treatment Focus:  Symptom Management, Personal Safety and Develop / Improve Independent Living Skills    Therapeutic Interventions/Treatment Strategies:  Support, Feedback, Safety Assessments, Structured Activity and Problem Solving    Response to Treatment Strategies:  Gave Feedback, Listened, Focused on Goals, Accepted Support and Alert    Name of Group:  [facility]     Description and Outcome:  Sergio  attended and participated in a structured occupational therapy group where intervention focused on coping through structured hands-on activities.  Sergio continued work on a paper pencil puzzle task with fair concentration and energy.  Sergio was social with peers and this writer off and on during group.  Observed to have rigid, inflexible thinking when interacting with others.  Affect was constricted.  Client  demonstrated understanding of session content by engaging in focused task work to help manage symptoms. Client would benefit from additional opportunities to practice and implement content from this session.    Is this a Weekly Review of the Progress on the Treatment Plan?  Yes.      Are Treatment Plan Goals being addressed?  Yes, continue treatment goals      Are Treatment Plan Strategies to Address Goals Effective?  Yes, continue treatment strategies      Are there any current contracts in place?  No

## 2018-01-05 ENCOUNTER — HOSPITAL ENCOUNTER (OUTPATIENT)
Dept: BEHAVIORAL HEALTH | Facility: CLINIC | Age: 23
End: 2018-01-05
Attending: PSYCHIATRY & NEUROLOGY
Payer: COMMERCIAL

## 2018-01-05 PROCEDURE — H2012 BEHAV HLTH DAY TREAT, PER HR: HCPCS

## 2018-01-05 PROCEDURE — 97150 GROUP THERAPEUTIC PROCEDURES: CPT | Mod: GO

## 2018-01-05 NOTE — PROGRESS NOTES
Adult Mental Health Outpatient Group Therapy Progress Note     Client Initial Individualized Goals for Treatment: Use mindful breathing for 2 minutes to manage anxiety.    See Initial Treatment suggestions for the client during the time between Diagnostic Assessment and completion of the Master Individualized Treatment Plan.    Treatment Goals:     See above     Area of Treatment Focus:  Symptom Management    Therapeutic Interventions/Treatment Strategies:  Support, Feedback, Safety Assessments, Structured Activity and Education    Response to Treatment Strategies:  Accepted Feedback, Listened, Attentive, Accepted Support and Alert    Name of Group:  Self Support Skills     Description and Outcome:  Client presented with as mildly restless and talkative. Briefly played a  game with a peer. When asked about his management of anxiety he gave a vague response in terms of whether his is able to use any type of skill to cope ore effectively.   Client would benefit from additional opportunities to practice and implement content from this session in his eveyday  life and mental health recovery.    Is this a Weekly Review of the Progress on the Treatment Plan?  Yes.      Are Treatment Plan Goals being addressed?  Yes, continue treatment goals      Are Treatment Plan Strategies to Address Goals Effective?  Yes, continue treatment strategies      Are there any current contracts in place?  No

## 2018-01-10 NOTE — ADDENDUM NOTE
Encounter addended by: Ed Bernardo on: 1/10/2018 11:52 AM<BR>     Actions taken: Flowsheet accepted, Pend clinical note

## 2018-01-10 NOTE — PROGRESS NOTES
"Adult Mental Health Outpatient Group Therapy Progress Note     Client Initial Individualized Goals for Treatment: \"I don't know. I just want to the pain to stop.\"     Initial Treatment suggestions for the client during the time between Diagnostic Assessment and completion of the Individualized Treatment Plan:  Follow Safety Plan  Abstain from Substance Use   Ask for more information, support and/or assistance as needed.  Follow up with providers/community supports as needed: Dr. Cori Patel, [facility]  Report increases or changes in symptoms to staff.  Report any personal safety concerns to staff.   Take medications as prescribed.  Report medication changes and/or side effects to staff.  Attend and participate in groups as scheduled or notify staff if unable to do so.  Report any use of substances to staff as this may impact your symptoms and/or personal safety.  Notify staff if you have any other issues that need to be addressed. This may include any current abuse / neglect / exploitation or other vulnerability.  Follow recommendations of your treatment team and discuss concerns if not in agreement.    See Initial Treatment suggestions for the client during the time between Diagnostic Assessment and completion of the Master Individualized Treatment Plan.     Area of Treatment Focus:  Symptom Management and Develop Socialization / Interpersonal Relationship Skills    Therapeutic Interventions/Treatment Strategies:  Support, Feedback, Clarification and Cognitive Behavioral Therapy    Response to Treatment Strategies:  Accepted Feedback and Gave Feedback    Name of Group:  Group Psychotherapy    Description and Outcome:  Sergio talked about having spent time in [place] last week with family.  He said he had an enjoyable time, and since arriving home yesterday has been feeling \"hyper.\"  Sergio described having racing thoughts, however did not endorse other criteria for a bipolar 2 disorder.  Sergio could not account for " "this apparent change in his energy level, and was asked to monitor his symptoms and report back.  Sergio's speech was pressured in session, and his thought processes were coherent but tangential.  He said with his increased energy he was feeling somewhat better (mood), but still experiences periods of feeing \"disconnected,\" which by his description are consistent with depersonalization/dissociation.  Sergio was supportive of the other group members.    Client demonstrated understanding of session content by sharing symptoms and agreeing to monitor for several days to rule out a hypomanic episode.  Patient has not history of bipolar disorder or hypomania.    Client would benefit from additional opportunities to practice and implement content from this session.    Is this a Weekly Review of the Progress on the Treatment Plan?  No      "

## 2018-01-12 ENCOUNTER — HOSPITAL ENCOUNTER (OUTPATIENT)
Dept: BEHAVIORAL HEALTH | Facility: CLINIC | Age: 23
End: 2018-01-12
Attending: PSYCHIATRY & NEUROLOGY
Payer: COMMERCIAL

## 2018-01-12 PROCEDURE — H2012 BEHAV HLTH DAY TREAT, PER HR: HCPCS

## 2018-01-12 PROCEDURE — 97150 GROUP THERAPEUTIC PROCEDURES: CPT | Mod: GO

## 2018-01-12 NOTE — PROGRESS NOTES
Psychiatry staffing: case discussed  Diagnosis:  MDD, autism, here about one month.      Current Outpatient Prescriptions   Medication     FLUoxetine 20 MG tablet     OLANZapine (ZYPREXA) 2.5 MG tablet     hydrOXYzine (ATARAX) 25 MG tablet     eslicarbazepine acetate (APTIOM) 200 MG tablet     fexofenadine (ALLEGRA) 180 MG tablet     ZONISAMIDE PO     MELATONIN PO     No current facility-administered medications for this encounter.      Past Medical History:   Diagnosis Date     Depressive disorder      Headaches, cluster

## 2018-01-16 ENCOUNTER — HOSPITAL ENCOUNTER (OUTPATIENT)
Dept: BEHAVIORAL HEALTH | Facility: CLINIC | Age: 23
End: 2018-01-16
Attending: PSYCHIATRY & NEUROLOGY
Payer: COMMERCIAL

## 2018-01-16 PROCEDURE — 97150 GROUP THERAPEUTIC PROCEDURES: CPT | Mod: GO

## 2018-01-16 PROCEDURE — H2012 BEHAV HLTH DAY TREAT, PER HR: HCPCS

## 2018-01-16 NOTE — PROGRESS NOTES
Adult Mental Health Outpatient Group Therapy Progress Note     Client Initial Individualized Goals for Treatment: Use mindful breathing for 2 minutes to manage anxiety.      Initial Treatment suggestions for the client during the time between Diagnostic Assessment and completion of the Individualized Treatment Plan:  Follow Safety Plan   Ask for more information, support and/or assistance as needed.  Follow up with providers/community supports as needed: BEBETO Stallings at [facility]  Report increases or changes in symptoms to staff.  Report any personal safety concerns to staff.   Take medications as prescribed.  Report medication changes and/or side effects to staff.  Attend and participate in groups as scheduled or notify staff if unable to do so.  Report any use of substances to staff as this may impact your symptoms and/or personal safety.  Notify staff if you have any other issues that need to be addressed. This may include any current abuse / neglect / exploitation or other vulnerability.  Follow recommendations of your treatment team and discuss concerns if not in agreement.     Area of Treatment Focus:  Symptom Management, Personal Safety and Develop / Improve Independent Living Skills    Therapeutic Interventions/Treatment Strategies:  Support, Feedback, Safety Assessments, Structured Activity and Problem Solving    Response to Treatment Strategies:  Accepted Feedback, Gave Feedback, Listened, Focused on Goals, Accepted Support and Alert    Name of Group:  [facility]     Description and Outcome:  Sergio attended and participated in a structured occupational therapy group where intervention focused on coping through structured hands-on activities.  Sergio initially asked to begin a new, unfamiliar structured task but then changed his mind once the task was explained.  When asked why he changed his mind he reported he didn't think he could successfully complete the amount of detail required.  He  then selected and worked on an origami task for the remainder of the group which also required a lot of detail and sequential steps.  He struggled with the task and eventually quit it due to not being able to get the steps correctly.  He was reluctant to ask or receive help.  Mood was low.  Client would benefit from additional opportunities to practice and implement content from this session.    Is this a Weekly Review of the Progress on the Treatment Plan?  Yes.      Are Treatment Plan Goals being addressed?  Yes, continue treatment goals      Are Treatment Plan Strategies to Address Goals Effective?  Yes, continue treatment strategies      Are there any current contracts in place?  No

## 2018-01-17 ENCOUNTER — HOSPITAL ENCOUNTER (OUTPATIENT)
Dept: BEHAVIORAL HEALTH | Facility: CLINIC | Age: 23
End: 2018-01-17
Attending: PSYCHIATRY & NEUROLOGY
Payer: COMMERCIAL

## 2018-01-17 PROCEDURE — 99214 OFFICE O/P EST MOD 30 MIN: CPT | Performed by: NURSE PRACTITIONER

## 2018-01-17 PROCEDURE — 99207 ZZC CDG-MDM COMPONENT: MEETS MODERATE - UP CODED: CPT | Performed by: NURSE PRACTITIONER

## 2018-01-17 PROCEDURE — H2012 BEHAV HLTH DAY TREAT, PER HR: HCPCS

## 2018-01-17 PROCEDURE — 97150 GROUP THERAPEUTIC PROCEDURES: CPT | Mod: GO

## 2018-01-17 NOTE — PROGRESS NOTES
[facility], [place]   Psychiatric Progress Note        Interim History:   From H&P: Amrit Padilla is a 22-year-old  male who was referred to the day treatment program by [facility]. Amrit has a history of depression (since age 13), anxiety, Asperger's syndrome (diagnosed at age 8), and epilepsy( since age 12).  He remembers having mental health symptoms as early as age 8 or 9.   He has never been hospitalized for mental illness or chemical dependency.  He has been seen by Karin Carlisle at [facility] for the last year.  He has seen a therapist once or twice last year.  He has consulted with Sandy Jones once, and had a consult with [facility].       Per diagnostic assessment completed on 10/3117 he reported the following symptoms:  Sleeping 12-16 hours a day, increased depression and anxiety, panic attacks, excessive worries, difficulties concentrating, ruminating thoughts, low motivation, decreased energy, decreased appetite, negative self-talk, problems making decisions, isolating, feeling restless and suicidal on and off.  When I met with Amrit, he was very vague about his symptoms and his prior treatment some of which contradict the symptoms he described 6 weeks ago. He told me that he has been depressed most of his life.  He has not been able to sleep.  He is waking up 6-7 times a night and have difficulties falling back to sleep.  States that he has been having sleep issues for the last 3-4 years.  He states that he was started on Seroquel about a year ago, states it was helpful for his sleep, but was causing vivid dreams and nightmares, so he stopped taking it.  He also tells me that he has been on Cymbalta in the past but did not remember when, for how long and the therapeutic response. Initially stated it was helpful but also increased suicidal thinking.  He was started on Prozac about a year ago and he had the same type of reaction,  "and the dose was decreased.  He continues to have transient passive SI. He is currently denying any suicidal ideation.  He states he has been making SI plans in the past but not recently.  Reports that all medications he had tried have caused some type of a side effect.  Besides Cymbalta and Prozac, he could not remember being on anything else.  The patient is currently taking Zonisamide and Aptiom  for seizures, Melatonin and  Prozac 20 mg in the morning and 40 at bedtime.  The patient tells me that the dose of Prozac was changed from 40 mg in the morning and 60 at bedtime to 20 in the morning and 40 at bedtime.  Per records, Prozac is only at 40 mg in the morning so I am not sure where the 20 mg comes from.  He is currently feeling depressed, hopeless, helpless and worthless.  His energy is low.  Memory and concentration are low.  Appetite is poor.  He denies feeling manic.  He reports some type of psychosis; however, he is not able to give me more details.  He states that he is hearing a voice, but is not sure if this voice is his own negative thoughts.  The voice is saying that he is no good and he should just kill himself.  He is describing some type of visual disturbance, seeing floaters every now and than.  He is reporting high anxiety rated currently as 9/10; however, the patient is sitting calmly in the chair and does not appear to be in any distress.  He is also reporting panic attacks but when pressed for more details he said that symptoms are unknown but describes the panic attacks as \"yesterday morning I felt that I did not care at all and I felt that I should grab a knife and kill myself\".   He is denying any history of trauma, OCD,  eating disorders and borderline personality disorder.  The patient reports significant negative self-thought for years and urges to harm himself.  He was seen in the ED yesterday for suicidal ideation; however, he felt better and decided to go home and not be admitted.  He " "denies SI today.     1/17/2018: Met with patient for f/u. States since we started Zyprexa last time he had several seizures. His  neurologist felt that the Zyprexa is likely the cause of the seizures and stopped it. Patient does not feel better or worst since this medication was started. He is denying AH/VH. States he hears a voice in his head but is not sure if this is his own voice or a hallucination. States depression and anxiety \"come and go depending on the day\". Currently rates depression as moderate, anxiety verries between minimal and moderate. Sleep is not consistent. States he sleep up to 15 hours some days and others, he has a hard time falling asleep and staying asleep. States suicidal thinking have decreased, he thinks about it less often. Denies plan or intention to harm himself.   Discussed his medications. He is still confused about what he takes. Prozac should be 40 mg a day. He can take Hydroxyzine prn for anxiety up to 3 times a day. Discussed taking Melatonin prn vs scheduled. Patient will think about it.     Patient has an appointment with a neurologist tomorrow afternoon. He is still trying to set up a psychiatric appointment.          Medications:     1.  Prozac 40 mg every morning.   2.  Zonisamide 300 mg twice a day for seizures.   3.  Aptiom 200 mg at bedtime.   4.  Melatonin 5 mg at bedtime.   5. Hydroxyzine 25 mg tid, prn, for anxiety  6.  Allegra as needed.   7. D/C Zyprexa, due to seizures.          Allergies:    Zyprexa caused seizures  Allergies   Allergen Reactions     No Clinical Screening - See Comments Rash     Reaction to a seizure med that he can not recall.     Dust Mites           Labs:   No results found for this or any previous visit (from the past 672 hour(s)).         Psychiatric Examination:                      Weight is 0 lbs 0 oz  There is no height or weight on file to calculate BMI.    Appearance: well groomed, awake, alert, cooperative, no apparent distress and " normal weight  Attitude:  cooperative  Eye Contact:  good  Mood:  sad   Affect:  appropriate and in normal range  Speech:  clear, coherent  Psychomotor Behavior:  no evidence of tardive dyskinesia, dystonia, or tics  Throught Process:  logical and goal oriented  Associations:  no loose associations  Thought Content:  Passive SI, no plan or intention, decreasing. Denies HI, SIB, manic and psychotic symptoms.   Insight:  good  Judgement:  intact  Oriented to:  time, person, and place  Attention Span and Concentration:  intact  Recent and Remote Memory:  intact         Precautions:           DIagnoses:   1.  Major depressive disorder, recurrent, moderate, with anxious distress.   2.  Asperger's syndrome.   3.  Seizure disorder.          Plan:   1.  Continue the day treatment program.   2.  Continue current medications. Change:   ---Prozac to 40 mg once a day in the morning.    ---D/C Zyprexa due to seizure  ---Continue hydroxyzine 25 mg 3 times a day as needed for anxiety.    ---Pharmacy was notified about the changes. Spoke with Jose.   3.  The patient was encouraged to follow up with his primary care team and find a psychiatrist.   4.  The patient was advised to let us know if inpatient admission or further help is needed.   5.  Care was coordinated with the treatment team.       Raquel TATE CNP  Date: 01/17/18  Time: 12:52 PM

## 2018-01-17 NOTE — ADDENDUM NOTE
Encounter addended by: Raquel Ge APRN CNP on: 1/17/2018 12:59 PM<BR>     Actions taken: Sign clinical note

## 2018-01-18 NOTE — PROGRESS NOTES
Individualized Treatment Plan     Date of Plan: 17  Name: Amrit Padilla MRN: [id number]    : 1995    Programs:  Day Treatment (DT)     Clinical Track (if applicable):  4B    DSM5 Diagnosis  296.32 (F33.1) Major Depressive Disorder, Recurrent Episode, Moderate _ and With anxious distress  300.02 (F41.1) Generalized Anxiety Disorder   Hx of Aspergers syndrome    Team Members Contributing to Plan:  Erica Lee, Ed Bernardo and Glen Arizmendi    Client Strengths:  caring, empathetic, has a previous history of therapy, motivated, open to learning, open to suggestions / feedback, support of family, friends and providers, wants to learn and willing to ask questions    Client Participation in Plan:  Contributed to goals and plan   Attended individual treatment plan meeting on 2017, 18  Agrees with plan   Received copy of treatment plan     Areas of Vulnerability:  Anxiety  Depressive symptoms   Cognitive impairment   Sensory deficit: Hx of Aspergers     Long-Term Goals:  Knowledge about illness and management of symptoms   Maintenance of personal safety     Discharge Criteria:  Satisfactory progress toward treatment goals   Improvement re: identified problems and symptoms   Ability to continue recovery at next level of service   Has a discharge plan in place   Regular attendance as scheduled     Areas of Treatment Focus            Area of Treatment Focus:   Personal Safety  Start Date:    17    Goal:   Target Date: 18 Status: Active     Client will notify staff when needing assistance to develop or implement a coping plan to manage suicidal or self injurious urges.      Progress:  17: At initial treatment planning meeting, Sergio said he has some infrequent suicidal ideation.  He was uncertain if thoughts were his or hallucinations.  Committed to communicate with staff if this changes.   18:  Met with Sergio and his mother.  Sergio said his suicidal ideation continues to be rare,  with vague thoughts of a plan but no intent.      Treatment Strategies:   Assist clients in establishing / strengthening support network  Assess / reassess for appropriate therapy program involvement, encourage participation in therapies  Assess / reassess level of potential for harm to self or others  Engage in safety planning when indicated  Facilitate increased self awareness  Provide feedback about social skills  Teach adaptive coping skills and communication skills          Area of Treatment Focus:   Symptom Stabilization and Management  Start Date:    12/22/17    Goal: Group Psychotherapy Target Date: 1/26/18    Status: Active     Will report on symptoms and identify skills to use to manage negative self-talk, anxiety symptoms, dissociation.   Sergio will also explore ways to increase social connection and strengthen his support network. Sergio will also process his thoughts and feelings regarding his longer-term goals with respect to employment.  Progress will be measured by subjective report.      Progress:   12/22/17:  In the first two weeks of group, Sergio has been an active participant.  He is perseverative on times and needs re-direction to discuss issues relative to his goals.      1/26/18:  Met with Sergio and his mother.  Discussed progress and issues in group therapy, and importance of Sergio finding a psychiatric provider and individual therapist.  Sergio and mother committed to doing so, while continuing to build skills in program.        Treatment Strategies:   Assist clients in establishing / strengthening support network  Assess / reassess for appropriate therapy program involvement, encourage participation in therapies  Facilitate increased self awareness  Provide feedback about social skills  Teach adaptive coping skills and communication skills            Area of Treatment Focus:   Symptom Stabilization and Management  Start Date:    12/22/17    Goal:  Occupational Therapy Target Date: 1/26/18 Status:  Active    Sergio will use OT time to explore what is helpful and what is not with respect to managing his time and activity levels.  The client will explore and identify which of his / her routines or lifestyle issues that need to change in order to reduce stress.        Progress:   1/26/18:        Treatment Strategies:   Assist clients in establishing / strengthening support network  Assess / reassess for appropriate therapy program involvement, encourage participation in therapies  Facilitate increased self awareness  Provide feedback about social skills  Teach adaptive coping skills and communication skills            Area of Treatment Focus:   Community Resources / Support and Discharge Planning  Start Date:    12/22/17    Goal:   Target Date: 1/26/18 Status: Active  Will develop an aftercare / transition plan by finding and scheduling with an individual therapist and psychiatric prescriber.      Progress:  2/2/18: Sergio has yet to schedule with a therapist or prescriber. He did meet with the Day Treatment nurse practioner for a consult on medications in the meantime.  Sergio and his mother met with staff today and the importance of finding a psychiatric provider and an individual therapist was stressed.  Referral resources were also provided with a focus on adult autism services.          Treatment Strategies:   Assist clients in establishing / strengthening support network  Assist with discharge planning  Assess / reassess for appropriate therapy program involvement, encourage participation in therapies  Facilitate increased self awareness  Provide feedback about social skills  Teach adaptive coping skills and communication skills

## 2018-01-18 NOTE — PROGRESS NOTES
Adult Mental Health Outpatient Group Therapy Progress Note        Client Initial Individualized Goals for Treatment: Use mindful breathing for 2 minutes to manage anxiety.      Initial Treatment suggestions for the client during the time between Diagnostic Assessment and completion of the Individualized Treatment Plan:  Follow Safety Plan   Ask for more information, support and/or assistance as needed.  Follow up with providers/community supports as needed: [facility] Stall[facility] at [facility]  Report increases or changes in symptoms to staff.  Report any personal safety concerns to staff.   Take medications as prescribed.  Report medication changes and/or side effects to staff.  Attend and participate in groups as scheduled or notify staff if unable to do so.  Report any use of substances to staff as this may impact your symptoms and/or personal safety.  Notify staff if you have any other issues that need to be addressed. This may include any current abuse / neglect / exploitation or other vulnerability.  Follow recommendations of your treatment team and discuss concerns if not in agreement.    Area of Treatment Focus:  Symptom Management, Personal Safety and Develop / Improve Independent Living Skills    Therapeutic Interventions/Treatment Strategies:  Support, Feedback, Safety Assessments, Structured Activity and Problem Solving    Response to Treatment Strategies:  Gave Feedback, Listened, Accepted Support and Alert    Name of Group:  [facility]     Description and Outcome:  Sergio  attended and participated in a structured occupational therapy group where intervention focused on coping through structured hands-on activities.  Sergio spent time in group working on a paper pencil puzzle task with variable concentration.  Observed to make little progress on solving the puzzles despite work on this.  He hasn't wanted to try anything else in group when asked about different options.  He was social with peers but  struggles to notice social cues from others.  Affect was constricted.  Client would benefit from additional opportunities to practice and implement content from this session.    Is this a Weekly Review of the Progress on the Treatment Plan?  No

## 2018-01-18 NOTE — PROGRESS NOTES
"  Adult Mental Health Outpatient Group Therapy Progress Note       Treatment Goals:  Sergio will report on symptoms and identify skills to use to manage negative self-talk, anxiety symptoms, dissociation.   Sergio will also explore ways to increase social connection and strengthen his support network. Sergio will also process his thoughts and feelings regarding his longer-term goals with respect to employment.  Progress will be measured by subjective report.     Area of Treatment Focus:  Symptom Management and Develop Socialization / Interpersonal Relationship Skills    Therapeutic Interventions/Treatment Strategies:  Support, Feedback and Cognitive Behavioral Therapy    Response to Treatment Strategies:  Accepted Feedback and Gave Feedback    Name of Group:  Group Psychotherapy and Mental Health Management    Description and Outcome, Group Psychotherapy:  Sergio had missed the first two days of programming this week due to reporting having experienced multiple seizures on the weekend.  He said his neurologist suggested the seizure activity may have been triggered by Sergio taking Olanzapine.  Sergio discontinued this medication on the advice of his neurologist and has not had a seizure since.  Sergio was vague about the characterization of his seizures, and we discussed the physiological and psychological factors that may contribute to seizures and seizure-like events.  Sergio was receptive to this information.  He said he has not felt as \"hyper\" this week as last, and could not correlate this with anything specific (other than the seizure activity \"slowing me down\").  He said his mood has been \"okay\" but was anxious about what may be causing his seizures.  The group validated this and Sergio recognized he is exploring this with his providers.  Sergio said he has not yet found a psychiatry provider and may have some \"insurance issues\" getting in the way. I asked him to have his mother call me (release on file) to discuss this.  Sergio was " supportive of the other group members.    Client demonstrated understanding of session content by sharing experiences and being receptive to psychoeducation and feedback.    Client would benefit from additional opportunities to practice and implement content from this session.    Description and Outcome, Mental Health Management:  Sergio participated in a structured psychoeducational group focused on procrastination.  This was the second session on this topic.  The group worked through an interactive module focused on changing procrastination behaviors identified last session.   Sergio contributed personal information to the group discussion, and filled out worksheets to characterize his own patterns of procrastination behaviors, and related strategies to reduce them.          Client demonstrated understanding of session content by interacting with peers to discuss the material presented and sharing personal experiences relevant to the topic.    Is this a Weekly Review of the Progress on the Treatment Plan?  Yes.      Are Treatment Plan Goals being addressed?  Yes, continue treatment goals      Are Treatment Plan Strategies to Address Goals Effective?  Yes, continue treatment strategies      Are there any current contracts in place?  No

## 2018-01-19 ENCOUNTER — HOSPITAL ENCOUNTER (OUTPATIENT)
Dept: BEHAVIORAL HEALTH | Facility: CLINIC | Age: 23
End: 2018-01-19
Attending: PSYCHIATRY & NEUROLOGY
Payer: COMMERCIAL

## 2018-01-19 PROCEDURE — H2012 BEHAV HLTH DAY TREAT, PER HR: HCPCS

## 2018-01-19 PROCEDURE — 97150 GROUP THERAPEUTIC PROCEDURES: CPT | Mod: GO

## 2018-01-22 NOTE — PROGRESS NOTES
Adult Mental Health Outpatient Group Therapy Progress Note       Treatment Goals:  Sergio will report on symptoms and identify skills to use to manage negative self-talk, anxiety symptoms, dissociation.   Sergio will also explore ways to increase social connection and strengthen his support network. Sergio will also process his thoughts and feelings regarding his longer-term goals with respect to employment.  Progress will be measured by subjective report.      Area of Treatment Focus:  Symptom Management and Develop / Improve Independent Living Skills    Therapeutic Interventions/Treatment Strategies:  Support, Feedback and Cognitive Behavioral Therapy    Response to Treatment Strategies:  Accepted Feedback and Gave Feedback    Name of Group:  Group Psychotherapy    Description and Outcome:  Sergio continued talking today about a lack of motivation.  We discussed going online to do some research on his values/interests, as well as spending time looking for a job for distraction/money.  The group offered suggestions such as visiting a [employer], and volunteering.  Sergio said he also feels some paranoia about leaving the house, which questioning and exploration by the group revealed to be consistent with anxiety/agoraphobia (situational).  Sergio said he was planning to see his neurologist tomorrow and would hopefully get more information on what may be contributing to seizure activity. He was supportive of the other group members.    Client demonstrated understanding of session content by considering group feedback and setting goals to increase his activity levels.    Client would benefit from additional opportunities to practice and implement content from this session.    Is this a Weekly Review of the Progress on the Treatment Plan?  No

## 2018-01-22 NOTE — PROGRESS NOTES
"Adult Mental Health Outpatient Group Therapy Progress Note       Treatment Goals:  Sergio will report on symptoms and identify skills to use to manage negative self-talk, anxiety symptoms, dissociation.   Sergio will also explore ways to increase social connection and strengthen his support network. Sergio will also process his thoughts and feelings regarding his longer-term goals with respect to employment.  Progress will be measured by subjective report.      Area of Treatment Focus:  Symptom Management    Therapeutic Interventions/Treatment Strategies:  Support, Feedback and Cognitive Behavioral Therapy    Response to Treatment Strategies:  Accepted Feedback, Gave Feedback and Focused on Goals    Name of Group:  Group Psychotherapy    Description and Outcome:  Sergio said he was happy to be \"seizure free\" all week.  We discussed the physiological and psychological contributors to seizures/dissociative experiences again.  Sergio said he still does not have a psychiatry provider, but he and his mother are trying to find one.  He was able to meet with our psychiatric NP, Raquel Ge after group today for interim care.  Sergio discussed how he has felt for the past month on his current medications, and we discussed what he will talk about at the appointment later today.  Sergio said his mood had been \"okay,\" but he feels tired and unmotivated most of the day.  The group validated this and offered suggestions to Sergio to add structure to his day, and get physical exercise/increase activities.  Sergio was receptive to this and supportive of the other group members.    Client demonstrated understanding of session content by accepting and providing relevant feedback.    Client would benefit from additional opportunities to practice and implement content from this session.    Is this a Weekly Review of the Progress on the Treatment Plan?  No      "

## 2018-01-23 ENCOUNTER — HOSPITAL ENCOUNTER (OUTPATIENT)
Dept: BEHAVIORAL HEALTH | Facility: CLINIC | Age: 23
End: 2018-01-23
Attending: PSYCHIATRY & NEUROLOGY
Payer: COMMERCIAL

## 2018-01-23 NOTE — PROGRESS NOTES
Adult Mental Health Outpatient Group Therapy Progress Note     Client Initial Individualized Goals for Treatment: Use mindful breathing for 2 minutes to manage anxiety.      Initial Treatment suggestions for the client during the time between Diagnostic Assessment and completion of the Individualized Treatment Plan:  Follow Safety Plan   Ask for more information, support and/or assistance as needed.  Follow up with providers/community supports as needed: [facility] at [facility]  Report increases or changes in symptoms to staff.  Report any personal safety concerns to staff.   Take medications as prescribed.  Report medication changes and/or side effects to staff.  Attend and participate in groups as scheduled or notify staff if unable to do so.  Report any use of substances to staff as this may impact your symptoms and/or personal safety.  Notify staff if you have any other issues that need to be addressed. This may include any current abuse / neglect / exploitation or other vulnerability.  Follow recommendations of your treatment team and discuss concerns if not in agreement.     Area of Treatment Focus:  Symptom Management, Personal Safety and Develop / Improve Independent Living Skills    Therapeutic Interventions/Treatment Strategies:  Support, Feedback, Safety Assessments, Structured Activity and Clarification    Response to Treatment Strategies:  Gave Feedback, Listened, Accepted Support, Alert and Distracted    Name of Group:  [facility]     Description and Outcome:  Sergio attended and participated in a structured occupational therapy group where intervention focused on coping through structured hands-on activities.  Sergio continued work on a paper pencil puzzle task with fair concentration.  He was social with peers but needed redirection at times when he became overly focused on a discussion point.  Client would benefit from additional opportunities to practice and implement content from  this session.      Is this a Weekly Review of the Progress on the Treatment Plan?  No

## 2018-01-23 NOTE — PROGRESS NOTES
"  Adult Mental Health Outpatient Group Therapy Progress Note       Treatment Goals:  Sergio will report on symptoms and identify skills to use to manage negative self-talk, anxiety symptoms, dissociation.   Sergio will also explore ways to increase social connection and strengthen his support network. Sergio will also process his thoughts and feelings regarding his longer-term goals with respect to employment.  Progress will be measured by subjective report.      Area of Treatment Focus:  Symptom Management and Personal Safety    Therapeutic Interventions/Treatment Strategies:  Support, Feedback, Safety Assessments and Cognitive Behavioral Therapy    Response to Treatment Strategies:  Accepted Feedback, Gave Feedback and Focused on Goals    Name of Group:  Group Psychotherapy and Mental Health Management    Description and Outcome, Group Psychotherapy:  Sergio talked about a compulsion to \"get a knife and stab myself\" last night.  He describe this as \"my own thoughts,\" and it was difficult to determine if he experienced a command hallucination or an intrusive thought.  Sergio has talked about experiences of repetitive thoughts that may in fact be hallucinations in the past.  He said he was able to challenge the thought and distract himself over a period of 1/2 hour, then was able to get to sleep.  We discussed hallucinations, psychosis as part of a depressive episode, and psychotic experiences in general.  A referral for a prodrome assessment has been requested from the [facility] psychiatry clinic.  The group validated Sergio's experiences, and worked with him to practice challenging his negative thoughts, and using distraction/sensory stimulation to help with these episodes.  Sergio was supportive of the other group members.    Client demonstrated understanding of session content by attending to psychoeducation and seeking group feedback to help with symptom management.    Client would benefit from additional opportunities to practice and " implement content from this session.    Description and Outcome, Mental Health Management:  Sergio  participated in a structured psychoeducational session focused on understanding and managing dysfunctional family relationship patterns in families.  Material covered included defining different types of dysfunctional families and their Characteristics, problems that result due to abuse and neglect, ways to make both personal changes and changes to the family system. Using CBT skills and skills from Acceptance and Commitment Therapy to manage resulting issues.     Client demonstrated understanding of session content by being an active participant in the group activity; sharing personal Information and relevant feedback for others.    Is this a Weekly Review of the Progress on the Treatment Plan?  Yes.      Are Treatment Plan Goals being addressed?  Yes, continue treatment goals      Are Treatment Plan Strategies to Address Goals Effective?  Yes, continue treatment strategies      Are there any current contracts in place?  No

## 2018-01-24 ENCOUNTER — TELEPHONE (OUTPATIENT)
Dept: BEHAVIORAL HEALTH | Facility: CLINIC | Age: 23
End: 2018-01-24

## 2018-01-24 ENCOUNTER — HOSPITAL ENCOUNTER (OUTPATIENT)
Dept: BEHAVIORAL HEALTH | Facility: CLINIC | Age: 23
End: 2018-01-24
Attending: PSYCHIATRY & NEUROLOGY
Payer: COMMERCIAL

## 2018-01-24 PROCEDURE — H2012 BEHAV HLTH DAY TREAT, PER HR: HCPCS

## 2018-01-24 PROCEDURE — 97150 GROUP THERAPEUTIC PROCEDURES: CPT | Mod: GO

## 2018-01-24 NOTE — PROGRESS NOTES
Adult Mental Health Outpatient Group Therapy Progress Note       Treatment Goals:  Sergio will report on symptoms and identify skills to use to manage negative self-talk, anxiety symptoms, dissociation.   Sergio will also explore ways to increase social connection and strengthen his support network. Sergio will also process his thoughts and feelings regarding his longer-term goals with respect to employment.  Progress will be measured by subjective report.      Area of Treatment Focus:  Symptom Management and Develop Socialization / Interpersonal Relationship Skills    Therapeutic Interventions/Treatment Strategies:  Support, Feedback, Limit/Boundaries and Cognitive Behavioral Therapy    Response to Treatment Strategies:  Accepted Feedback, Gave Feedback and Focused on Goals    Name of Group:  Group Psychotherapy    Description and Outcome:  This group session was focused on discussing recent conflicts between group members and discussion ways to manage challenging interpersonal interactions, and respecting personal boundaries.  Sergio was involved in the discussion, and identified himself as an individual that has had negative interactions with peers in the past several days outside of group therapy.  Sergio was apologetic to the group for this, and tried throughout the session to suggest ways to manage conflict, if and when it arises.  He was aware that there are times when he may say something that other's take as hurtful, without realizing this.  Sergio agreed to have others point this out to him when it happens, so that he can increase awareness and improve his communication skills.  Sergio was supportive of other group members.    Client demonstrated understanding of session content by recognizing his potential contribution to group discord and agreeing to work with group members to mitigate this.    Client would benefit from additional opportunities to practice and implement content from this session.    Is this a Weekly Review  of the Progress on the Treatment Plan?  No

## 2018-01-24 NOTE — TELEPHONE ENCOUNTER
Left message for patient's mother regarding insurance issue.  Asked her to call me back and/or the business office.

## 2018-01-25 NOTE — PROGRESS NOTES
"Adult Mental Health Outpatient Group Therapy Progress Note     Client Initial Individualized Goals for Treatment: Use mindful breathing for 2 minutes to manage anxiety.    See Initial Treatment suggestions for the client during the time between Diagnostic Assessment and completion of the Master Individualized Treatment Plan.    Treatment Goals:  Sergio will use OT time to explore what is helpful and what is not with respect to managing his time and activity levels.  The client will explore and identify which of his / her routines or lifestyle issues that need to change in order to reduce stress.       Area of Treatment Focus:  Symptom Management    Therapeutic Interventions/Treatment Strategies:  Support, Feedback, Safety Assessments, Structured Activity and Education    Response to Treatment Strategies:  Accepted Feedback, Listened, Attentive, Accepted Support and Alert    Name of Group:  Self Support Skills     Description and Outcome:  Client presented as calm and quiet. Good focus and concentration when working on a structured task. When asked about his mood today he responded   \"mediocre,rallly tired,sleeping so much always\".  Per goal client reported that he is trying to get a job and is also trying to counter constant negative thinking. At the end of session another client tried to engage him in a conversation but he did not \"read between the lines\" and comprehend the comment to further engage in the conversation.  Client would benefit from additional opportunities to practice and implement content from this session in his eveyday  life and mental health recovery.    Is this a Weekly Review of the Progress on the Treatment Plan?  Yes.      Are Treatment Plan Goals being addressed?  Yes, continue treatment goals      Are Treatment Plan Strategies to Address Goals Effective?  Yes, continue treatment strategies      Are there any current contracts in place?  No      "

## 2018-01-25 NOTE — PROGRESS NOTES
Adult Mental Health Outpatient Group Therapy Progress Note     Client Initial Individualized Goals for Treatment: Use mindful breathing for 2 minutes to manage anxiety.      Initial Treatment suggestions for the client during the time between Diagnostic Assessment and completion of the Individualized Treatment Plan:  Follow Safety Plan   Ask for more information, support and/or assistance as needed.  Follow up with providers/community supports as needed: [facility]  Report increases or changes in symptoms to staff.  Report any personal safety concerns to staff.   Take medications as prescribed.  Report medication changes and/or side effects to staff.  Attend and participate in groups as scheduled or notify staff if unable to do so.  Report any use of substances to staff as this may impact your symptoms and/or personal safety.  Notify staff if you have any other issues that need to be addressed. This may include any current abuse / neglect / exploitation or other vulnerability.  Follow recommendations of your treatment team and discuss concerns if not in agreement.     Area of Treatment Focus:  Symptom Management, Personal Safety and Develop / Improve Independent Living Skills    Therapeutic Interventions/Treatment Strategies:  Support, Redirection, Feedback, Safety Assessments and Structured Activity    Response to Treatment Strategies:  Gave Feedback, Accepted Support and Alert    Name of Group:  [facility]     Description and Outcome:  Sergio attended and participated in a structured occupational therapy group where intervention focused on coping through structured hands-on activities.  Sergio continued work on a paper pencil puzzle task with fair to poor concentration.  During group, Sergoi engaged in conversation with a peer that became argumentative at times.  Both Sergio and this peer had difficulty accepting redirection.  Sergio continues to demonstrate rigid thinking which makes  redirection difficult.  Client would benefit from additional opportunities to practice and implement content from this session.    Is this a Weekly Review of the Progress on the Treatment Plan?  No

## 2018-01-26 ENCOUNTER — HOSPITAL ENCOUNTER (OUTPATIENT)
Dept: BEHAVIORAL HEALTH | Facility: CLINIC | Age: 23
End: 2018-01-26
Attending: PSYCHIATRY & NEUROLOGY
Payer: COMMERCIAL

## 2018-01-26 PROCEDURE — 97150 GROUP THERAPEUTIC PROCEDURES: CPT | Mod: GO

## 2018-01-26 PROCEDURE — H2012 BEHAV HLTH DAY TREAT, PER HR: HCPCS

## 2018-01-26 RX ORDER — FLUOXETINE 20 MG/1
60 TABLET, FILM COATED ORAL EVERY MORNING
Qty: 30 TABLET | Refills: 1 | Status: SHIPPED | OUTPATIENT
Start: 2018-01-26 | End: 2018-05-31

## 2018-01-26 NOTE — PROGRESS NOTES
1/26/2018: Received a msg from Ed Bernardo that patient is not doing well on a low dose of Prozac and is requesting an increase to previous dose of 60 mg,. Which I approve. Pharmacy notified about the change. Refill is available.

## 2018-01-26 NOTE — ADDENDUM NOTE
Encounter addended by: Raquel Ge APRN CNP on: 1/26/2018 12:48 PM<BR>     Actions taken: Sign clinical note

## 2018-01-29 NOTE — PROGRESS NOTES
Adult Mental Health Outpatient Group Therapy Progress Note       Treatment Goals:  Sergio will report on symptoms and identify skills to use to manage negative self-talk, anxiety symptoms, dissociation.   Sergio will also explore ways to increase social connection and strengthen his support network. Sergio will also process his thoughts and feelings regarding his longer-term goals with respect to employment.  Progress will be measured by subjective report.      Area of Treatment Focus:  Symptom Management    Therapeutic Interventions/Treatment Strategies:  Support, Feedback and Cognitive Behavioral Therapy    Response to Treatment Strategies:  Accepted Feedback and Gave Feedback    Name of Group:  Group Psychotherapy and Mental Health Management    Description and Outcome, Group Psychotherapy:  Segrio shared that he has had fewer intrusive thoughts over the past several days, particularly related to negative self-judgment.  He could not identify any specific reason for this.  He also said he continues to have very low energy and motivation  The group validated his thoughts and feelings, and Sergio explored things he can do (behavioral activation) to try and increase energy and activity levels.  Sergio was perseverative at several times during the session, mainly focused on thinking his medications are not working as he would like.  The group worked with Sergio to identify other reasons for his lack of motivation, normalizing his experiences.  He offered some support to others.     Client would benefit from additional opportunities to practice and implement content from this session.    Description and Outcome, Mental Health Management:  Sergio participated in a structured psychoeducational session on Self-Compassion.  Material covered included a definition of self-compassion and a breakdown and explanation of three elements of self compassion (self-kindness vs. Self-judgment, common humanity vs. Isolation, and mindfulness vs.  over-identification).  The group discussed how typically one treats a friend with more self-compassion than that they give themselves.  Several examples were given, and Sergio contributed to the discussion.    Client demonstrated understanding of session content by sharing results and thoughts with others.     Is this a Weekly Review of the Progress on the Treatment Plan?  Yes.      Are Treatment Plan Goals being addressed?  Yes, continue treatment goals      Are Treatment Plan Strategies to Address Goals Effective?  Yes, continue treatment strategies      Are there any current contracts in place?  No

## 2018-01-30 ENCOUNTER — HOSPITAL ENCOUNTER (OUTPATIENT)
Dept: BEHAVIORAL HEALTH | Facility: CLINIC | Age: 23
End: 2018-01-30
Attending: PSYCHIATRY & NEUROLOGY
Payer: COMMERCIAL

## 2018-01-30 PROCEDURE — 97150 GROUP THERAPEUTIC PROCEDURES: CPT | Mod: GO

## 2018-01-30 PROCEDURE — H2012 BEHAV HLTH DAY TREAT, PER HR: HCPCS

## 2018-01-31 ENCOUNTER — HOSPITAL ENCOUNTER (OUTPATIENT)
Dept: BEHAVIORAL HEALTH | Facility: CLINIC | Age: 23
End: 2018-01-31
Attending: PSYCHIATRY & NEUROLOGY
Payer: COMMERCIAL

## 2018-01-31 PROCEDURE — 97150 GROUP THERAPEUTIC PROCEDURES: CPT | Mod: GO

## 2018-01-31 PROCEDURE — H2012 BEHAV HLTH DAY TREAT, PER HR: HCPCS

## 2018-02-01 NOTE — PROGRESS NOTES
Adult Mental Health Outpatient Group Therapy Progress Note     Client Initial Individualized Goals for Treatment: Use mindful breathing for 2 minutes to manage anxiety.     Treatment Goals:  1.  Client will notify staff when needing assistance to develop or implement a coping plan to manage suicidal or self injurious urges.    2.  Will report on symptoms and identify skills to use to manage negative self-talk, anxiety symptoms, dissociation.   Sergio will also explore ways to increase social connection and strengthen his support network. Sergio will also process his thoughts and feelings regarding his longer-term goals with respect to employment.  Progress will be measured by subjective report.    3.  Sergio will use OT time to explore what is helpful and what is not with respect to managing his time and activity levels.  The client will explore and identify which of his / her routines or lifestyle issues that need to change in order to reduce stress.    4.  Will improve wellness related behaviors by finding a psychiatry provider and individual therapist.    Area of Treatment Focus:  Symptom Management, Personal Safety and Develop Socialization / Interpersonal Relationship Skills    Therapeutic Interventions/Treatment Strategies:  Support, Feedback, Safety Assessments, Structured Activity and Education    Response to Treatment Strategies:  Accepted Feedback, Gave Feedback, Listened, Accepted Support and Alert    Name of Group:  OT Clinic     Description and Outcome:  Sergio attended and participated in a structured occupational therapy group where intervention focused on coping through structured hands-on activities.  Sergio continued work on a paper pencil puzzle task of his choice.  He had fair concentration in his work.  Energy level was low.  He was generally quiet in groups today.  Client demonstrated understanding of session content by engaging in focused task work to help manage symptoms and stressors. Client would benefit  from additional opportunities to practice and implement content from this session.    Is this a Weekly Review of the Progress on the Treatment Plan?  No

## 2018-02-02 ENCOUNTER — HOSPITAL ENCOUNTER (OUTPATIENT)
Dept: BEHAVIORAL HEALTH | Facility: CLINIC | Age: 23
End: 2018-02-02
Attending: PSYCHIATRY & NEUROLOGY
Payer: COMMERCIAL

## 2018-02-02 PROCEDURE — 97150 GROUP THERAPEUTIC PROCEDURES: CPT | Mod: GO

## 2018-02-02 PROCEDURE — H2012 BEHAV HLTH DAY TREAT, PER HR: HCPCS

## 2018-02-02 NOTE — PROGRESS NOTES
Adult Mental Health Outpatient Group Therapy Progress Note         Treatment Goals:  Sergio will report on symptoms and identify skills to use to manage negative self-talk, anxiety symptoms, dissociation.   Sergio will also explore ways to increase social connection and strengthen his support network. Sergio will also process his thoughts and feelings regarding his longer-term goals with respect to employment.  Progress will be measured by subjective report.      Area of Treatment Focus:  Symptom Management and Develop / Improve Independent Living Skills    Therapeutic Interventions/Treatment Strategies:  Support, Feedback and Cognitive Behavioral Therapy    Response to Treatment Strategies:  Accepted Feedback, Gave Feedback and Focused on Goals    Name of Group:  Group Psychotherapy    Description and Outcome:  Sergio shared that he has not been sleeping well.  He said he had changed his Prozac does as directed by his interim psychiatric provider, but had not yet noticed a difference in his daytime drowsiness.  Sergio said he spends the days he is not here, and the afternoons he is, watching TV, which fails to hold his interest to the level of not falling asleep.  The group validated this and worked again with Sergio to identify other activities to try, as well as setting an alarm to limit his nap time should he doze off. Sergio committed to do this.  He was supportive of the other group members.    Client demonstrated understanding of session content by reviewing an accountability goal with the group and committing to it.    Client would benefit from additional opportunities to practice and implement content from this session.    Is this a Weekly Review of the Progress on the Treatment Plan?  No

## 2018-02-02 NOTE — PROGRESS NOTES
"Adult Mental Health Outpatient Group Therapy Progress Note       Treatment Goals:  Sergio will report on symptoms and identify skills to use to manage negative self-talk, anxiety symptoms, dissociation.   Sergio will also explore ways to increase social connection and strengthen his support network. Sergio will also process his thoughts and feelings regarding his longer-term goals with respect to employment.  Progress will be measured by subjective report.      Area of Treatment Focus:  Symptom Management    Therapeutic Interventions/Treatment Strategies:  Support, Feedback and Cognitive Behavioral Therapy    Response to Treatment Strategies:  Accepted Feedback and Gave Feedback    Name of Group:  Group Psychotherapy    Description and Outcome:  Sergio talked about feeling in a cognitive \"fog\" when he gets up in the morning.  The group worked with him to get a better idea of what he was trying to describe.  Sergio had some difficulty with this, so the focus became discussing his morning routine (or lack of one), and how to add more structure and regularity to his mornings.  Sergio identified fewer issues on days he comes to group, and it was suggested that he follow the same morning routine daily.  He was receptive to this. Sergio was supportive of the other group members.    Client demonstrated understanding of session content by processing an issue with the group and developing an action plan.    Client would benefit from additional opportunities to practice and implement content from this session.    Is this a Weekly Review of the Progress on the Treatment Plan?  No      "

## 2018-02-05 NOTE — PROGRESS NOTES
Adult Mental Health Outpatient Group Therapy Progress Note         Treatment Goals:  Sergio will report on symptoms and identify skills to use to manage negative self-talk, anxiety symptoms, dissociation.   Sergio will also explore ways to increase social connection and strengthen his support network. Sergio will also process his thoughts and feelings regarding his longer-term goals with respect to employment.  Progress will be measured by subjective report.      Area of Treatment Focus:  Symptom Management and Develop Socialization / Interpersonal Relationship Skills    Therapeutic Interventions/Treatment Strategies:  Support, Feedback and Cognitive Behavioral Therapy    Response to Treatment Strategies:  Accepted Feedback and Gave Feedback    Name of Group:  Group Psychotherapy and Mental Health Management    Description and Outcome, Group Psychotherapy:  Sergio shared that he attended the [employer] yesterday, and had a good time.  He said he recognized that when he has adequate distraction, he is less sleepy (or at least less aware of feeling tired).  Sergio said he had set a group accountability goal to stay awake (no nap) for three days during the week.  He said he still feels tired, but is encouraged that he can achieve a level of distraction/engagement that mitigates this.  Sergio worked with the group to identify more activities that may help him increase his energy level and sustain his attention.  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  Sergio was supportive of the other group members, and seemed to put effort into relating better to other group members.    Client demonstrated understanding of session content by connecting his low activity levels to his low energy levels and recognizing the benefit of distraction and behavioral activation to reduce symptoms.    Client would benefit from additional opportunities to practice and implement content from this  session.    Description and Outcome, Mental Health Management:  Sergio participated in a structured psychoeducational session on Self-Compassion, continued from last week.  Material covered included a review of the definition of self-compassion and a breakdown and explanation of three elements of self compassion (self-kindness vs. self-judgment, common humanity vs. isolation, and mindfulness vs. over-identification).  The group reviewed last week's exercise, and did an additional two exercises (Self-compassion Break, and Self-compassion Journal instructions). Sergio was an active participant in the exercise, sharing personal experiences and providing relevant feedback to others.    Client demonstrated understanding of session content by working through the exercise and sharing results and thoughts with others.     Is this a Weekly Review of the Progress on the Treatment Plan?  Yes.      Are Treatment Plan Goals being addressed?  Yes, continue treatment goals      Are Treatment Plan Strategies to Address Goals Effective?  Yes, continue treatment strategies      Are there any current contracts in place?  No

## 2018-02-06 ENCOUNTER — HOSPITAL ENCOUNTER (OUTPATIENT)
Dept: BEHAVIORAL HEALTH | Facility: CLINIC | Age: 23
End: 2018-02-06
Attending: PSYCHIATRY & NEUROLOGY
Payer: COMMERCIAL

## 2018-02-06 PROCEDURE — H2012 BEHAV HLTH DAY TREAT, PER HR: HCPCS

## 2018-02-06 PROCEDURE — 97150 GROUP THERAPEUTIC PROCEDURES: CPT | Mod: GO

## 2018-02-06 NOTE — ADDENDUM NOTE
Encounter addended by: Erica Lee OTR/L on: 2/6/2018  3:47 PM<BR>     Actions taken: Sign clinical note

## 2018-02-07 ENCOUNTER — HOSPITAL ENCOUNTER (OUTPATIENT)
Dept: BEHAVIORAL HEALTH | Facility: CLINIC | Age: 23
End: 2018-02-07
Attending: PSYCHIATRY & NEUROLOGY
Payer: COMMERCIAL

## 2018-02-07 PROCEDURE — H2012 BEHAV HLTH DAY TREAT, PER HR: HCPCS

## 2018-02-07 PROCEDURE — 97150 GROUP THERAPEUTIC PROCEDURES: CPT | Mod: GO

## 2018-02-08 NOTE — PROGRESS NOTES
Adult Mental Health Outpatient Group Therapy Progress Note     Client Initial Individualized Goals for Treatment: Use mindful breathing for 2 minutes to manage anxiety.       Treatment Goals:  1.  Client will notify staff when needing assistance to develop or implement a coping plan to manage suicidal or self injurious urges.      2.  Will report on symptoms and identify skills to use to manage negative self-talk, anxiety symptoms, dissociation.   Sergio will also explore ways to increase social connection and strengthen his support network. Sergio will also process his thoughts and feelings regarding his longer-term goals with respect to employment.  Progress will be measured by subjective report.      3.  Sergio will use OT time to explore what is helpful and what is not with respect to managing his time and activity levels.  The client will explore and identify which of his / her routines or lifestyle issues that need to change in order to reduce stress.      4.  Will improve wellness related behaviors by finding a psychiatry provider and individual therapist.     Area of Treatment Focus:  Symptom Management, Personal Safety and Develop / Improve Independent Living Skills    Therapeutic Interventions/Treatment Strategies:  Support, Feedback, Safety Assessments and Structured Activity    Response to Treatment Strategies:  Accepted Feedback, Gave Feedback, Listened, Focused on Goals, Accepted Support and Alert    Name of Group:  OT Clinic     Description and Outcome:  Sergio  attended and participated in a structured occupational therapy group where intervention focused on coping through structured hands-on activities.  Sergio worked in a self directed, goal oriented manner on a paper pencil puzzle task with fair concentration and energy.  He was generally quiet in group today.  Affect was constricted.  Client demonstrated understanding of session content by engaging in focused task work to help manage his symptoms and stressors.      Is this a Weekly Review of the Progress on the Treatment Plan?  Yes.      Are Treatment Plan Goals being addressed?  Yes, continue treatment goals      Are Treatment Plan Strategies to Address Goals Effective?  Yes, continue treatment strategies      Are there any current contracts in place?  No

## 2018-02-08 NOTE — PROGRESS NOTES
"Adult Mental Health Outpatient Group Therapy Progress Note         Treatment Goals:  Sergio will report on symptoms and identify skills to use to manage negative self-talk, anxiety symptoms, dissociation.   Sergio will also explore ways to increase social connection and strengthen his support network. Sergio will also process his thoughts and feelings regarding his longer-term goals with respect to employment.  Progress will be measured by subjective report.      Area of Treatment Focus:  Symptom Management, Community Resources/Discharge Planning and Develop / Improve Independent Living Skills    Therapeutic Interventions/Treatment Strategies:  Support, Feedback and Cognitive Behavioral Therapy    Response to Treatment Strategies:  Accepted Feedback and Gave Feedback    Name of Group:  Group Psychotherapy    Description and Outcome:  Sergio talked about his goal of staying more awake through the day.  He initially said he had \"failed\" at the goal, following doing well on it late last week.  The group challenged Sergio on his characterization of his progress, and asked him for more details.  Sergio talked shared details of his trip to [place] to spend time with his father, and identified distinct periods of wakefulness throughout the day, with a few naps, which he limited in duration by setting an alarm.  The group worked with Sergio to recognized he had made significant progress on his goal, by increasing wakefulness incrementally each day.  Sergio accepted this re-framing, and considered how he could work on his negative characterization of things by examining the evidence.  He also talked about having a job interview Wednesday evening, and that he felt prepared for this.  Sergio offered support to other group members.  At times he engaged in self-deprecating humor, and when this was pointed out to him, he acknowledged how this is not supportive of reducing symptoms and increasing self-esteem.      Client demonstrated understanding of " session content by processing thoughts and feelings with the group to identify when and how to challenge cognitive distortions.    Client would benefit from additional opportunities to practice and implement content from this session.    Is this a Weekly Review of the Progress on the Treatment Plan?  No

## 2018-02-08 NOTE — PROGRESS NOTES
"Adult Mental Health Outpatient Group Therapy Progress Note         Treatment Goals:  Sergio will report on symptoms and identify skills to use to manage negative self-talk, anxiety symptoms, dissociation.   Sergio will also explore ways to increase social connection and strengthen his support network. Sergio will also process his thoughts and feelings regarding his longer-term goals with respect to employment.  Progress will be measured by subjective report.      Area of Treatment Focus:  Symptom Management and Community Resources/Discharge Planning    Therapeutic Interventions/Treatment Strategies:  Support, Feedback and Cognitive Behavioral Therapy    Response to Treatment Strategies:  Accepted Feedback, Gave Feedback and Focused on Goals    Name of Group:  Group Psychotherapy    Description and Outcome:  Sergio talked about his upcoming job interview, and the group helped him clarify his goals for employment.  Sergio concluded that while how he spends his time at work is not as important right now as getting a regular schedule, interacting with others, and making some money.  We discussed having a short-term focus with respect to a job, while considering and refining his goals for a career.  Sergio committed to this approach, and we discussed how a job interview is a two-way process, where one can be mindful of ideally seeking a fit between their goals/values and the corporate culture, and other factors specific to the position.  Sergio seemed to recognize that he has some control over what he decided to do, and where/who he works.  He said his intrusive thoughts have continued to decrease over the past week, and he is \"feeling a little better, with the exception of being tired.\"  This was validated and Sergio was reminded of his goal to increase his activity levels to reduce rumination time and sleepiness.  He was supportive of the other group members.    Client would benefit from additional opportunities to practice and implement " content from this session.    Is this a Weekly Review of the Progress on the Treatment Plan?  No

## 2018-02-09 ENCOUNTER — HOSPITAL ENCOUNTER (OUTPATIENT)
Dept: BEHAVIORAL HEALTH | Facility: CLINIC | Age: 23
End: 2018-02-09
Attending: PSYCHIATRY & NEUROLOGY
Payer: COMMERCIAL

## 2018-02-09 PROCEDURE — H2012 BEHAV HLTH DAY TREAT, PER HR: HCPCS

## 2018-02-09 PROCEDURE — 97150 GROUP THERAPEUTIC PROCEDURES: CPT | Mod: GO

## 2018-02-09 NOTE — PROGRESS NOTES
Adult Mental Health Outpatient Group Therapy Progress Note     Client Initial Individualized Goals for Treatment: Use mindful breathing for 2 minutes to manage anxiety.    See Initial Treatment suggestions for the client during the time between Diagnostic Assessment and completion of the Master Individualized Treatment Plan.    Treatment Goals:    1.  Client will notify staff when needing assistance to develop or implement a coping plan to manage suicidal or self injurious urges.      2.  Will report on symptoms and identify skills to use to manage negative self-talk, anxiety symptoms, dissociation.   Sergio will also explore ways to increase social connection and strengthen his support network. Sergio will also process his thoughts and feelings regarding his longer-term goals with respect to employment.  Progress will be measured by subjective report.      3.  Sergio will use OT time to explore what is helpful and what is not with respect to managing his time and activity levels.  The client will explore and identify which of his / her routines or lifestyle issues that need to change in order to reduce stress.      4.  Will improve wellness related behaviors by finding a psychiatry provider and individual therapist.     Area of Treatment Focus:  Symptom Management    Therapeutic Interventions/Treatment Strategies:  Support, Feedback, Safety Assessments, Structured Activity and Education    Response to Treatment Strategies:  Accepted Feedback, Listened, Attentive, Accepted Support and Alert    Name of Group:  Self Support Skills     Description and Outcome:  Client presented with calm and alert. He spent his time working on a puzzle book. Interacted some with peers particular with a newer client who self disclosed that he had an autism spectrum disorder (Asberger's). Goal #1 (no personal safety concerns reported or observed). Goal #2 Client reports he has too many negative thoughts which can effect his ability to get  anything accomplished. He acknowledged that taking care of a dog helps with stress reduction. For the weekend he plans to spend time watching the [place].  Client would benefit from additional opportunities to practice and implement content from this session in his eveyday  life and mental health recovery.    Is this a Weekly Review of the Progress on the Treatment Plan?  Yes.      Are Treatment Plan Goals being addressed?  Yes, continue treatment goals      Are Treatment Plan Strategies to Address Goals Effective?  Yes, continue treatment strategies      Are there any current contracts in place?  No

## 2018-02-09 NOTE — PROGRESS NOTES
Psychiatry staffing: case discussed  Diagnosis:  MDD, ASD; possible dissociation and some quasi-voices.  Funding issues will shorten stay here.    Current Outpatient Prescriptions   Medication     FLUoxetine 20 MG tablet     hydrOXYzine (ATARAX) 25 MG tablet     eslicarbazepine acetate (APTIOM) 200 MG tablet     fexofenadine (ALLEGRA) 180 MG tablet     ZONISAMIDE PO     MELATONIN PO     No current facility-administered medications for this encounter.      Past Medical History:   Diagnosis Date     Depressive disorder      Headaches, cluster

## 2018-02-09 NOTE — PROGRESS NOTES
"  Adult Mental Health Outpatient Group Therapy Progress Note       Treatment Goals:  Sergio will report on symptoms and identify skills to use to manage negative self-talk, anxiety symptoms, dissociation.   Sergio will also explore ways to increase social connection and strengthen his support network. Sergio will also process his thoughts and feelings regarding his longer-term goals with respect to employment.  Progress will be measured by subjective report.      Area of Treatment Focus:  Symptom Management and Community Resources/Discharge Planning    Therapeutic Interventions/Treatment Strategies:  Support, Redirection and Cognitive Behavioral Therapy    Response to Treatment Strategies:  Accepted Feedback and Gave Feedback    Name of Group:  Group Psychotherapy and Mental Health Management    Description and Outcome, Group Psychotherapy:  Sergio talked about his job interview yesterday.  He said it went \"okay\" but his was concerned that the interviewer had asked too many \"psychological questions.\"  Sergio gave examples and the group provided feedback to Sergio about the possible reasons for the questions, including corporate culture, concerns about the temperament of employees who will be working with animals, and other suggestions.  Sergio continued to focus on the questions being \"unusual,\" and was validated and redirected to focus on his thoughts about working for the company, and the fit of the job.  Sergio also talked about waking up at 10AM yesterday, and deciding to try to sleep until 11AM (his typical wake-up time when he is not attending day treatment).  He said he had gone to bed around midnight. He has been trying to stay awake more during the day, and seemed fixated on getting up at 11, regardless of his energy level/time of waking.  We discussed a more flexible approach to his goal, and viewing awaking earlier than 11AM as an opportunity to get up sooner.  Sergio seemed receptive to this, with some reinforcement of how this " flexibility may help him meet his goal.  He offered some support to other group members, however made some attempts at humor that were received as unusual by the group.  He was redirected when this happened to focus on positive, supportive feedback.      Client would benefit from additional opportunities to practice and implement content from this session.  Sergio's thinking continues to be very concrete, and attempts at introducing/encouraging more cognitive flexibility are continuing.      Description and Outcome, Mental Health Management:  The group informally discussed the use/value of positive affirmations, and ways of changing established patterns of thinking by using simple tools to reinforce cognitive restructuring.  Discussions centered around the tendency to over-think and search for insight on the origins and reasons for negative thoughts, vs. focusing on making more intentional, easy, and simple behavioral and cognitive changes throughout the day.  The group discussed tools that could be used to assist with this (phone apps, post-it notes, flash cards, etc.) and how to incorporate affirmations and supportive/positive thoughts into daily routines.  The client was an active participant in the discussion, and had some suggestions for the group that were deemed helpful.    Client demonstrated understanding of session content by working with the group to identify and share tools to assist with cognitive restructuring and promoting positive thoughts.    Client would benefit from additional opportunities to practice and implement content from this session.    Is this a Weekly Review of the Progress on the Treatment Plan?  Yes.      Are Treatment Plan Goals being addressed?  Yes, continue treatment goals      Are Treatment Plan Strategies to Address Goals Effective?  Yes, continue treatment strategies      Are there any current contracts in place?  No

## 2018-02-13 ENCOUNTER — HOSPITAL ENCOUNTER (OUTPATIENT)
Dept: BEHAVIORAL HEALTH | Facility: CLINIC | Age: 23
End: 2018-02-13
Attending: PSYCHIATRY & NEUROLOGY
Payer: COMMERCIAL

## 2018-02-14 ENCOUNTER — HOSPITAL ENCOUNTER (OUTPATIENT)
Dept: BEHAVIORAL HEALTH | Facility: CLINIC | Age: 23
End: 2018-02-14
Attending: PSYCHIATRY & NEUROLOGY
Payer: COMMERCIAL

## 2018-02-14 PROCEDURE — H2012 BEHAV HLTH DAY TREAT, PER HR: HCPCS

## 2018-02-14 PROCEDURE — 97150 GROUP THERAPEUTIC PROCEDURES: CPT | Mod: GO

## 2018-02-14 NOTE — ADDENDUM NOTE
Encounter addended by: Erica Lee OTR/L on: 2/14/2018 10:44 AM<BR>     Actions taken: Episode edited, Flowsheet accepted, Sign clinical note

## 2018-02-14 NOTE — PROGRESS NOTES
Adult Mental Health Outpatient Group Therapy Progress Note     Client Initial Individualized Goals for Treatment: Use mindful breathing for 2 minutes to manage anxiety.       Treatment Goals:  1.  Client will notify staff when needing assistance to develop or implement a coping plan to manage suicidal or self injurious urges.      2.  Will report on symptoms and identify skills to use to manage negative self-talk, anxiety symptoms, dissociation.   Sergio will also explore ways to increase social connection and strengthen his support network. Sergio will also process his thoughts and feelings regarding his longer-term goals with respect to employment.  Progress will be measured by subjective report.      3.  Sergio will use OT time to explore what is helpful and what is not with respect to managing his time and activity levels.  The client will explore and identify which of his / her routines or lifestyle issues that need to change in order to reduce stress.      4.  Will improve wellness related behaviors by finding a psychiatry provider and individual therapist.     Area of Treatment Focus:  Symptom Management, Personal Safety and Develop / Improve Independent Living Skills    Therapeutic Interventions/Treatment Strategies:  Support, Feedback, Safety Assessments and Structured Activity    Response to Treatment Strategies:  Accepted Feedback, Gave Feedback, Listened, Focused on Goals, Accepted Support and Alert    Name of Group:  OT Clinic     Description and Outcome:  Sergio attended and participated in a structured occupational therapy group where intervention focused on coping through structured hands-on activities.  Sergio continued work on a paper pencil puzzle task with fair to good concentration and energy.  He reported he was in [place] for the weekend and had an okay time.  Mood was even. Client demonstrated understanding of session content by engaging in focused task work to help manage symptoms and stressors.    Is  this a Weekly Review of the Progress on the Treatment Plan?  No

## 2018-02-14 NOTE — PROGRESS NOTES
Adult Mental Health Outpatient Group Therapy Progress Note       Treatment Goals:  Sergio will report on symptoms and identify skills to use to manage negative self-talk, anxiety symptoms, dissociation.   Sergio will also explore ways to increase social connection and strengthen his support network. Sergio will also process his thoughts and feelings regarding his longer-term goals with respect to employment.  Progress will be measured by subjective report.      Area of Treatment Focus:  Symptom Management and Community Resources/Discharge Planning    Therapeutic Interventions/Treatment Strategies:  Support and Feedback    Response to Treatment Strategies:  Accepted Feedback and Gave Feedback    Name of Group:  Group Psychotherapy    Description and Outcome:  Sergio said he has not heard back about the job he interviewed for.  He said he was planning to call them to get an update.  Sergio was very negative about his prospects and the fact that they had not called him.  He was reminded by the group that the interviewer had asked him to call her if he had not heard back by mid week.  Sergio worked with the group to identify his cognitive distortions and default negative view of the situation, examine the evidence, and come up with a more neutral, realistic view of the situation.  Sergio also talked about waiting to do some exercise until he has a job.  The group challenged him on this, reminding him that he will need to be more active when working, and preparing for this by getting some physical activity now will be helpful.  Sergio seemed dismissive of this, but agreed there were things he could do to increase his physical activity level, and acknowledged the potential benefits.  He was supportive of the other group members.    Client would benefit from additional opportunities to practice and implement content from this session.    Is this a Weekly Review of the Progress on the Treatment Plan?  No

## 2018-02-14 NOTE — PROGRESS NOTES
Adult Mental Health Outpatient Group Therapy Progress Note     Client Initial Individualized Goals for Treatment: Use mindful breathing for 2 minutes to manage anxiety.       Treatment Goals:  1.  Client will notify staff when needing assistance to develop or implement a coping plan to manage suicidal or self injurious urges.      2.  Will report on symptoms and identify skills to use to manage negative self-talk, anxiety symptoms, dissociation.   Sergio will also explore ways to increase social connection and strengthen his support network. Sergio will also process his thoughts and feelings regarding his longer-term goals with respect to employment.  Progress will be measured by subjective report.      3.  Sergio will use OT time to explore what is helpful and what is not with respect to managing his time and activity levels.  The client will explore and identify which of his / her routines or lifestyle issues that need to change in order to reduce stress.      4.  Will improve wellness related behaviors by finding a psychiatry provider and individual therapist.     Area of Treatment Focus:  Symptom Management, Personal Safety and Develop / Improve Independent Living Skills    Therapeutic Interventions/Treatment Strategies:  Support, Feedback, Safety Assessments and Structured Activity    Response to Treatment Strategies:  Accepted Feedback, Listened, Focused on Goals, Accepted Support and Alert and Gave Feedback    Name of Group:  OT [facility]     Description and Outcome:  Sergio  attended and participated in a structured occupational therapy group where intervention focused on coping through structured hands-on activities.  Sergoi worked in a self directed, goal oriented manner on his ongoing paper pencil puzzle task with fair concentration.  He was generally quiet in group.  He did give some feedback to a peer at the end of group and the peer quickly asked him not to give her feedback.  Sergio had difficulty accepting this limit and  continued commenting.  Eventually the peer walked away from him.  Sergio continues to struggle with communication within the group.  Sergio could benefit from additional coaching and practicing in effective communication skills.      Is this a Weekly Review of the Progress on the Treatment Plan?  No

## 2018-02-14 NOTE — ADDENDUM NOTE
Encounter addended by: Erica Lee OTR/L on: 2/14/2018  1:48 PM<BR>     Actions taken: Episode edited, Flowsheet accepted, Sign clinical note

## 2018-02-14 NOTE — PROGRESS NOTES
Adult Mental Health Outpatient Group Therapy Progress Note       Treatment Goals:  Sergio will report on symptoms and identify skills to use to manage negative self-talk, anxiety symptoms, dissociation.   Sergio will also explore ways to increase social connection and strengthen his support network. Sergio will also process his thoughts and feelings regarding his longer-term goals with respect to employment.  Progress will be measured by subjective report.      Area of Treatment Focus:  Symptom Management and Community Resources/Discharge Planning    Therapeutic Interventions/Treatment Strategies:  Support, Feedback and Cognitive Behavioral Therapy    Response to Treatment Strategies:  Accepted Feedback, Gave Feedback and Focused on Goals    Name of Group:  Group Psychotherapy    Description and Outcome:  Sergio talked about improving his sleep habits.  He said he has been getting up by 10AM most days, and improvement of 1-2 hours.  He said he still feels tired and needs a nap in the afternoon, but has limited this to 1-2 hours.  We discussed other possible reasons for his fatigue (besides depression) and Sergio said he has not had a physical checkup for over a year.  The group recommended he schedule a checkup with his PCP to rule-out physiological reasons for his lack of energy.  We also discussed setting a goal of walking every day, and trying to get some type of physical exercise several times per week.  Sergio was receptive to this.  He was supportive of the other group members.    Client demonstrated understanding of session content by accepting feedback and planning to investigate reasons for his fatigue.    Client would benefit from additional opportunities to practice and implement content from this session.    Is this a Weekly Review of the Progress on the Treatment Plan?  No

## 2018-02-16 ENCOUNTER — HOSPITAL ENCOUNTER (OUTPATIENT)
Dept: BEHAVIORAL HEALTH | Facility: CLINIC | Age: 23
End: 2018-02-16
Attending: PSYCHIATRY & NEUROLOGY
Payer: COMMERCIAL

## 2018-02-16 PROCEDURE — 97150 GROUP THERAPEUTIC PROCEDURES: CPT | Mod: GO

## 2018-02-16 PROCEDURE — H2012 BEHAV HLTH DAY TREAT, PER HR: HCPCS

## 2018-02-16 NOTE — PROGRESS NOTES
"  Adult Mental Health Outpatient Group Therapy Progress Note       Treatment Goals:  Sergio will report on symptoms and identify skills to use to manage negative self-talk, anxiety symptoms, dissociation.   Sergio will also explore ways to increase social connection and strengthen his support network. Sergio will also process his thoughts and feelings regarding his longer-term goals with respect to employment.  Progress will be measured by subjective report.      Area of Treatment Focus:  Symptom Management and Physical Health     Therapeutic Interventions/Treatment Strategies:  Support, Feedback and Cognitive Behavioral Therapy    Response to Treatment Strategies:  Accepted Feedback and Gave Feedback    Name of Group:  Group Psychotherapy and Mental Health Management    Description and Outcome, Group Psychotherapy:  Sergio shared that he did not get the job he had interviewed for.  He was a little disappointed but said he is applying for other positions.  We discussed choosing companies he is interested in, to increase his chances of being hired.  Sergio also talked about continuing to feel tired \"all the time.\"  We discussed his activity level, and how increasing activity may help his energy level.  Sergio was initially dismissive of this, but agreed it was worth a try.  He also wondered if he may have a physical condition that is causing the fatigue. He was encouraged to schedule a physical with his PCP.  We also discussed discharge next week and Sergio's efforts to find a psychiatrist and therapist. Sergio was supportive of the other group members.      Client would benefit from additional opportunities to practice and implement content from this session. Sergio has made the connection between depression and low motivation/energy, however continues to be hesitant to try to increase his activity levels, even incrementally.     Description and Outcome, Mental Health Management:  Sergio participated in a structured psychoeducational group " focused on understanding and managing anxiety.  The session focused on the definition and etiology of anxiety, different anxiety disorders, and explanation of the fight, flight, freeze response, ways to manage anxiety including CBT (challenging thoughts, exposure therapy, behavioral skills), group therapy, and medication.  We also discussed relaxation techniques, including breathing exercises and progressive muscle relaxation.  Sergio participated in the discussions, asked relevant questions, and provided feedback to others.  Sergio worked through challenging thoughts related to a recent anxiety-provoking situation.      Client demonstrated understanding of session content by interacting with peers to discuss the material presented and sharing personal experiences relevant to the topic.    Is this a Weekly Review of the Progress on the Treatment Plan?  Yes.      Are Treatment Plan Goals being addressed?  Yes, continue treatment goals      Are Treatment Plan Strategies to Address Goals Effective?  Yes, continue treatment strategies      Are there any current contracts in place?  No

## 2018-02-20 ENCOUNTER — HOSPITAL ENCOUNTER (OUTPATIENT)
Dept: BEHAVIORAL HEALTH | Facility: CLINIC | Age: 23
End: 2018-02-20
Attending: PSYCHIATRY & NEUROLOGY
Payer: COMMERCIAL

## 2018-02-20 PROCEDURE — 97150 GROUP THERAPEUTIC PROCEDURES: CPT | Mod: GO

## 2018-02-20 PROCEDURE — H2012 BEHAV HLTH DAY TREAT, PER HR: HCPCS

## 2018-02-20 ASSESSMENT — ANXIETY QUESTIONNAIRES
5. BEING SO RESTLESS THAT IT IS HARD TO SIT STILL: SEVERAL DAYS
7. FEELING AFRAID AS IF SOMETHING AWFUL MIGHT HAPPEN: SEVERAL DAYS
6. BECOMING EASILY ANNOYED OR IRRITABLE: MORE THAN HALF THE DAYS
2. NOT BEING ABLE TO STOP OR CONTROL WORRYING: MORE THAN HALF THE DAYS
IF YOU CHECKED OFF ANY PROBLEMS ON THIS QUESTIONNAIRE, HOW DIFFICULT HAVE THESE PROBLEMS MADE IT FOR YOU TO DO YOUR WORK, TAKE CARE OF THINGS AT HOME, OR GET ALONG WITH OTHER PEOPLE: SOMEWHAT DIFFICULT
1. FEELING NERVOUS, ANXIOUS, OR ON EDGE: SEVERAL DAYS
GAD7 TOTAL SCORE: 10
3. WORRYING TOO MUCH ABOUT DIFFERENT THINGS: SEVERAL DAYS
4. TROUBLE RELAXING: MORE THAN HALF THE DAYS

## 2018-02-20 NOTE — PROGRESS NOTES
Adult Mental Health Intensive Outpatient Discharge Summary/Instructions      Patient: Amrit Padilla MRN: [id number]   : 1995 Age: 22 year old Sex: male     Admission Date: 2017  Discharge Date: 2018  Diagnosis: 296.32 (F33.1) Major Depressive Disorder, Recurrent Episode, Moderate _ and With anxious distress    300.02 (F41.1) Generalized Anxiety Disorder     Hx of Asperger's syndrome    Focus of Treatment / Progress    Personal Safety:  Suicidal ideation has decreased since beginning the program, as have his negative thoughts.  Should this change, please discuss with your family/providers and:     * Follow your safety plan     * Call crisis lines as needed:    [facility] [phone] [facility] [phone]  [facility] [phone] Crisis Connection [phone]  [facility] [phone] [facility] [phone]  [facility] [phone] National Suicide Prevention [phone]  [facility] [phone] Suicide Prevention [phone]  [facility] [phone]    Managing symptoms of:  Depression, anxiety with panic attacks, concentration and memory issues, low motivation and low energy, increased sleep.    Community support/health:  Sergio has been given resource information on [facility] services ([facility]),     Managing Symptoms and Preventing Relapse    * Go to all of your appointments    * Take all medications as directed.      * Carry a current list if medication with you    * Do not use illicit (street) drugs.  Avoid alcohol    * Report these symptoms to your care team. These are early signs of relapse:      Thoughts of suicide   Losing more sleep   Increased confusion   Mood getting worse   Feeling more aggressive       *Use these skills daily:  Challenging negative thoughts, opposite to emotion, having a morning routine/bedtime routine and getting proper sleep, self compassion, being assertive.    Copy of summary sent to: Available in EPIC    Follow up with  psychiatrist / main caregiver:  MONA, referrals given    Next visit:  TBD    Follow up with your therapist: MONA, referrals given   Next visit: TBD    Go to group therapy and / or support groups at:      [facility], [address], [place] [address], [phone], [email or web address]  [facility], [address] [place] [address], [phone], [email or web address]  [facility], [address], [place] [address], [phone], [email or web address]    See your medical doctor about:  Physical health concerns.    Other: Your team has appreciated working with you and wishes you the best of luck, please feel free to contact us with further questions or to if needing to return to the program in the future [phone].                  Client Signature:_______________________  Date / Time:___________                Staff Signature:________________________   Date / Time:___________

## 2018-02-21 NOTE — PROGRESS NOTES
"Adult Mental Health Outpatient Group Therapy Progress Note       Treatment Goals:  Sergio will report on symptoms and identify skills to use to manage negative self-talk, anxiety symptoms, dissociation.   Sergio will also explore ways to increase social connection and strengthen his support network. Sergio will also process his thoughts and feelings regarding his longer-term goals with respect to employment.  Progress will be measured by subjective report.      Area of Treatment Focus:  Symptom Management and Community Resources/Discharge Planning    Therapeutic Interventions/Treatment Strategies:  Support, Feedback and Cognitive Behavioral Therapy    Response to Treatment Strategies:  Accepted Feedback, Gave Feedback and Focused on Goals    Name of Group:  Group Psychotherapy    Description and Outcome:  This was Sergio's last day in the program.  While in OT Sergio expressed surprise at being discharged today, despite my having discussed this with him last week, and leaving his mother a message early last week.  I met with Sergio 1:1 to discuss this and he said he may have forgotten, and had not discussed it with his mother.  I validated Sergio's feeling unsettled with the situation, and we talked about his progress in the program, and his plans for aftercare.  In group Sergio talked briefly about finishing the program, and thanked the other group members for their support.  He said on the weekend he had an enjoyable outing with his family on Saturday night, however on Sunday was \"hit by negative thoughts.\"  Sergio said his thoughts of worthlessness were persistent for several hours, and distraction and rest did not seem to help.  He was able to eventually engage in an activity that was successful in \"snapping out of it.\"  Sergio was concerned about this being the first such episode in many weeks, leading him to conclude that it may be the start of a downward spiral.  The group validated his feelings and helped him put the episode in " perspective, addressing his catastrophizing.    Client demonstrated understanding of session content by recognizing his catastrophizing, with help from the group.    Is this a Weekly Review of the Progress on the Treatment Plan?  Yes.      Are Treatment Plan Goals being addressed?  Client discharged      Are Treatment Plan Strategies to Address Goals Effective?  Client discharged      Are there any current contracts in place?  No

## 2018-02-21 NOTE — PROGRESS NOTES
Adult Mental Health Outpatient Group Therapy Progress Note     Client Initial Individualized Goals for Treatment: Use mindful breathing for 2 minutes to manage anxiety.       Treatment Goals:  1.  Client will notify staff when needing assistance to develop or implement a coping plan to manage suicidal or self injurious urges.      2.  Will report on symptoms and identify skills to use to manage negative self-talk, anxiety symptoms, dissociation.   Sergio will also explore ways to increase social connection and strengthen his support network. Sergio will also process his thoughts and feelings regarding his longer-term goals with respect to employment.  Progress will be measured by subjective report.      3.  Sergio will use OT time to explore what is helpful and what is not with respect to managing his time and activity levels.  The client will explore and identify which of his / her routines or lifestyle issues that need to change in order to reduce stress.      4.  Will improve wellness related behaviors by finding a psychiatry provider and individual therapist.     Area of Treatment Focus:  Symptom Management, Personal Safety and Develop / Improve Independent Living Skills    Therapeutic Interventions/Treatment Strategies:  Support, Feedback, Safety Assessments, Structured Activity and Clarification    Response to Treatment Strategies:  Accepted Feedback, Listened, Accepted Support, Alert and Distracted    Name of Group:  OT Clinic/OT Discharge Note     Description and Outcome:  Sergio  attended and participated in a structured occupational therapy group where intervention focused on coping through structured hands-on activities.  Today was Sergio's last day in the program.  Sergio reported he thought he was done later this week but after clarification he reported he was okay with discharging.  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  Sergio  spent time in group working on a paper pencil puzzle task.  Mood was initially anxious but this decreased after clarification around discharge. Did not report any safety concerns.  See discharge summary for additional information.  Client demonstrated understanding of session content by engaging in structured task work to better manage symptoms and stressors.     Is this a Weekly Review of the Progress on the Treatment Plan?  Yes.      Are Treatment Plan Goals being addressed?  Client discharged      Are Treatment Plan Strategies to Address Goals Effective?  Client discharged      Are there any current contracts in place?  No

## 2018-02-22 ASSESSMENT — ANXIETY QUESTIONNAIRES: GAD7 TOTAL SCORE: 10

## 2018-02-22 ASSESSMENT — PATIENT HEALTH QUESTIONNAIRE - PHQ9: SUM OF ALL RESPONSES TO PHQ QUESTIONS 1-9: 14

## 2018-02-23 NOTE — ADDENDUM NOTE
Encounter addended by: Erica Lee OTR/L on: 2/23/2018  2:49 PM<BR>     Actions taken: Sign clinical note

## 2018-02-23 NOTE — PROGRESS NOTES
Adult Mental Health Outpatient Group Therapy Progress Note        Client Initial Individualized Goals for Treatment: Use mindful breathing for 2 minutes to manage anxiety.       Treatment Goals:  1.  Client will notify staff when needing assistance to develop or implement a coping plan to manage suicidal or self injurious urges.      2.  Will report on symptoms and identify skills to use to manage negative self-talk, anxiety symptoms, dissociation.   Sergio will also explore ways to increase social connection and strengthen his support network. Sergio will also process his thoughts and feelings regarding his longer-term goals with respect to employment.  Progress will be measured by subjective report.      3.  Sergio will use OT time to explore what is helpful and what is not with respect to managing his time and activity levels.  The client will explore and identify which of his / her routines or lifestyle issues that need to change in order to reduce stress.      4.  Will improve wellness related behaviors by finding a psychiatry provider and individual therapist.     Area of Treatment Focus:  Symptom Management, Personal Safety and Develop / Improve Independent Living Skills    Therapeutic Interventions/Treatment Strategies:  Support, Feedback, Safety Assessments and Structured Activity    Response to Treatment Strategies:  Accepted Feedback, Listened, Accepted Support and Alert    Name of Group:  OT Clinic     Description and Outcome:  Sergio  attended and participated in a structured occupational therapy group where intervention focused on coping through structured hands-on activities.  Sergio continued work on his ongoing paper pencil puzzle task with fair to good concentration.  He was observed to make progress on his puzzle task today.  He was generally quiet in group unless asked direct questions.  Client demonstrated understanding of session content by practicing mindful, focused task work to help manage symptoms and  stressors.     Is this a Weekly Review of the Progress on the Treatment Plan?  No

## 2018-02-24 NOTE — ADDENDUM NOTE
Encounter addended by: Erica Lee OTR/L on: 2/24/2018  8:37 AM<BR>     Actions taken: Sign clinical note

## 2018-02-24 NOTE — PROGRESS NOTES
Adult Mental Health Outpatient Group Therapy Progress Note     Client Initial Individualized Goals for Treatment: Use mindful breathing for 2 minutes to manage anxiety.       Treatment Goals:  1.  Client will notify staff when needing assistance to develop or implement a coping plan to manage suicidal or self injurious urges.      2.  Will report on symptoms and identify skills to use to manage negative self-talk, anxiety symptoms, dissociation.   Sergio will also explore ways to increase social connection and strengthen his support network. Sergio will also process his thoughts and feelings regarding his longer-term goals with respect to employment.  Progress will be measured by subjective report.      3.  Sergio will use OT time to explore what is helpful and what is not with respect to managing his time and activity levels.  The client will explore and identify which of his / her routines or lifestyle issues that need to change in order to reduce stress.      4.  Will improve wellness related behaviors by finding a psychiatry provider and individual therapist.     Area of Treatment Focus:  Symptom Management, Personal Safety and Develop / Improve Independent Living Skills    Therapeutic Interventions/Treatment Strategies:  Support, Redirection, Feedback, Limit/Boundaries, Safety Assessments and Structured Activity    Response to Treatment Strategies:  Gave Feedback, Accepted Support, Alert and Interrupted    Name of Group:  OT Clinic     Description and Outcome:  Sergio  attended and participated in a structured occupational therapy group where intervention focused on coping through structured hands-on activities.  Sergio continued work on a paper pencil puzzle with fair concentration.  Sergio got into a verbal disagreement with a peer and they both needed redirection.  Sergio was able to disengage from the conversation after a few minutes and with a lot of redirection.  Client would benefit from additional opportunities to practice  and implement content from this session.    Is this a Weekly Review of the Progress on the Treatment Plan?  Yes.      Are Treatment Plan Goals being addressed?  Yes, continue treatment goals      Are Treatment Plan Strategies to Address Goals Effective?  Yes, continue treatment strategies      Are there any current contracts in place?  No

## 2018-03-08 ENCOUNTER — TELEPHONE (OUTPATIENT)
Dept: BEHAVIORAL HEALTH | Facility: CLINIC | Age: 23
End: 2018-03-08

## 2018-03-08 NOTE — TELEPHONE ENCOUNTER
Patient had called asking for refill on medications as he had seen Raquel Ge in January.  I spoke with Raquel and she said she could not provide refills since patient had discharged.  Called patient and spoke with him and his mother advising them of this.  Mom said they likely had sufficient medications to get through the next week, and they would contact Sergio's neurologist about a refill as they still have not found a psychiatrist.

## 2018-03-29 ENCOUNTER — OFFICE VISIT (OUTPATIENT)
Dept: NEUROLOGY | Facility: CLINIC | Age: 23
End: 2018-03-29
Payer: COMMERCIAL

## 2018-03-29 ENCOUNTER — ALLIED HEALTH/NURSE VISIT (OUTPATIENT)
Dept: NEUROLOGY | Facility: CLINIC | Age: 23
End: 2018-03-29
Payer: COMMERCIAL

## 2018-03-29 VITALS
TEMPERATURE: 97.8 F | BODY MASS INDEX: 25.52 KG/M2 | HEART RATE: 71 BPM | WEIGHT: 188.4 LBS | SYSTOLIC BLOOD PRESSURE: 122 MMHG | DIASTOLIC BLOOD PRESSURE: 85 MMHG | HEIGHT: 72 IN

## 2018-03-29 DIAGNOSIS — R56.9 SEIZURES (H): Primary | ICD-10-CM

## 2018-03-29 DIAGNOSIS — F33.9 EPISODE OF RECURRENT MAJOR DEPRESSIVE DISORDER, UNSPECIFIED DEPRESSION EPISODE SEVERITY (H): ICD-10-CM

## 2018-03-29 DIAGNOSIS — G40.909 SEIZURE DISORDER (H): Primary | ICD-10-CM

## 2018-03-29 RX ORDER — LORAZEPAM 1 MG/1
1 TABLET ORAL
COMMUNITY
Start: 2018-01-08 | End: 2020-07-27

## 2018-03-29 ASSESSMENT — PAIN SCALES - GENERAL: PAINLEVEL: NO PAIN (0)

## 2018-03-29 NOTE — LETTER
"3/29/2018       RE: Amrit Padilla  : 1995   MRN: [id number]      Dear Colleague,    Thank you for referring your patient, Amrit Padilla, to the [facility] EPILE[facility] at [facility]. Please see a copy of my visit note below.    Service Date: 2018      CHIEF COMPLAINT:  Establishing care for seizures.      HISTORY OF PRESENT ILLNESS:  This patient is a 22-year-old right-handed male with history of epilepsy, Asperger's disorder, depression, who presented today for establishing care.  He is accompanied by his mother in the clinic today.  He was previously seen by Dr. Morales at [facility] for his epilepsy.  The mother would like to move all his care to the [facility] and he is here to establish care.      The patient started to have seizures when he was 16 years old and he was managed at the [facility] for the past few years for his seizures.  He is currently taking Aptiom 1200 mg q.h.s. and the Zonegran 300 mg b.i.d. for his epilepsy.  He was recently admitted to the hospital twice because of 2 clusters of seizures, one in January when he was admitted to [facility] and one was in February when he was admitted to [facility].  On both occasions, he had 4-5 back to back generalized tonic-clonic seizures within a short period of time.      According to the mother, the patient has 2 different types of seizures.  Type #1 is described as \"mild seizures,\" during which he will have some facial twitching.  He will have repetitive hand/arm jerking movement.  He will become unresponsive for less than a minute.  Postictally, he was having some confusion and headaches and fatigue.  Before the seizures, he will have an aura of \"seeing bees populating the earth with eggs.\"  This type of seizure is relatively infrequent, average once per month.      Type #2 are generalized tonic-clonic seizures which the patient will have generalized convulsions of the body and his body will " be stiff.  It usually will last for 1-2 minutes followed by postictal fatigue and hallucinations.  This happens usually once every 3 months, but sometimes it occurs in clusters with 2-5 seizures back to back.      The patient had tried Keppra and Depakote in the past which did not work.  He had EEGs done in the past which were reported negative.      The patient had severe depression and requested a lot of behavioral health and counseling in the past and at certain time, he was suicidal, but that improved.  At the present time, he is not suicidal.  He does not think about hurting himself.      TRIGGERS FOR SEIZURES:  Depression.      RISK FACTORS FOR SEIZURES:  He had no history of head trauma with loss of consciousness.  No history of febrile convulsions.  No history of CNS infections.        Current Outpatient Prescriptions   Medication Sig Dispense Refill     FLUoxetine 20 MG tablet Take 3 tablets (60 mg) by mouth every morning 30 tablet 1     eslicarbazepine acetate (APTIOM) 200 MG tablet Take 1,200 mg by mouth        ZONISAMIDE PO Take 300 mg by mouth 2 times daily       MELATONIN PO Take 5 mg by mouth At Bedtime       LORazepam (ATIVAN) 1 MG tablet Take 1 mg by mouth       hydrOXYzine (ATARAX) 25 MG tablet Take 1 tablet (25 mg) by mouth 3 times daily as needed for anxiety (Patient not taking: Reported on 3/29/2018) 90 tablet 1     fexofenadine (ALLEGRA) 180 MG tablet          PAST MEDICAL HISTORY:  Asperger's disorder, depression, epilepsy.      PAST SURGICAL HISTORY:  None.      FAMILY HISTORY:  No family history of seizures.  Both parents have hypertension.  Maternal grandmother had heart disease.      SOCIAL HISTORY:  He is single, living with his mother.  He had some college education but did not finish because of depression.  He is currently working at [employer].  No smoking, no alcohol, no drug abuse.      REVIEW OF SYSTEMS:  Positive for depression, frequent seizures.  The rest of the 12-point review  "of systems are negative.      ALLERGIES:  Sertraline and Zyprexa.      PHYSICAL EXAMINATION:      Blood pressure 122/85, pulse 71, temperature 97.8  F (36.6  C), temperature source Temporal, height 5' 11.5\" (181.6 cm), weight 188 lb 6.4 oz (85.5 kg).    General exam: General Appearance:  No acute distress.  HEENT:  Normocephalic, atraumatic.  Neck:  Supple, no lymphadenopathy, no carotid bruit. Cardiovascular:  Regular rate and rhythm, no murmurs.   Extremities:  No edema, no clubbing, no cyanosis.      Neurologic Exam:  Alert and oriented x3.  Speech fluent, appropriate. Normal attention, naming normal, repeat normal.  Cranial Nerves:  Pupils are equal, round, reactive to light and accomodation.  Extraocular movement intact.  No facial weakness or asymmetry.  Facial sensation was normal.  Tongue and palate midline.  Hearing normal.  Fundi exams were normal, discs were sharp. Visual field : normal to confrontation.   Motor Exam:  Normal bulk.  Normal tone.  Strength 5/5 in all extremities.  Sensory:  Normal to light touch and vibration in all extremities.  Deep tendon reflexes 2+ bilaterally in both upper and lower extremities.  Coordination:  Finger-to-nose, heel-to-shin exams showed no ataxia.  Rapid alternating movement was normal.  Gait and Station:  normal casual gait.  Normal tendem gait. No difficulties with tip-toe or heel walking.  Romberg sign negative.      PREVIOUS DIAGNOSTIC TESTING:  CT scan of the head on 02/27/2018 was reported negative from [facility].  He had EEGs in the past which were reported negative.      IMPRESSION:   1.  Epilepsy.  Epilepsy syndrome is unclear at this time.  He is 22 years old, right-handed, seizure onset was at 16 years old.  He has 2 types of seizures.  It sounds like they are focal seizure with impaired awareness and the generalized tonic-clonic seizures.  On average, he is having about 1 seizure per month.  Sometimes seizures are in clusters, especially the " generalized tonic-clonic seizures for which he was hospitalized twice for the past several months.  At the present time he is on Aptiom 1200 mg q.h.s. and Zonegran 300 mg b.i.d.  The Aptiom dose was increased following the recent hospital stay.      It seems that the patient has not had an MRI scan of the brain for many years.  I am not sure if he had ever had one.  I would like to get an MRI of the brain for further evaluation.  Continue the current medications.     2.  Asperger's syndrome.   3.  Depression.  We will refer to Dr. Ben Barros in [place] for further evaluation.      PLAN:   1.  MRI of the brain with epilepsy protocol 3 Ernestine.   2.  Continue Aptiom 1200 mg q.h.s. and Zonegran 300 mg b.i.d.   3.  Check Zonegran level.   4.  Return to clinic in 3 months.   5.  Future options for anti-seizure medications are Vimpat, lamotrigine, Topamax, etc.         60 min was spent on the visit.  Over 50% of the time was spent on education, counseling about optimal seizure control and coordination of care.        NORMAN HAMMOND MD             D: 2018   T: 2018   MT: al      Name:     BRIDGET KNIGHT   MRN:      [id number]        Account:      [id number]   :      1995           Service Date: 2018      Document: [id number]        Again, thank you for allowing me to participate in the care of your patient.      Sincerely,    Norman Hammond MD

## 2018-03-29 NOTE — MR AVS SNAPSHOT
After Visit Summary   3/29/2018    Amrit Padilla    MRN: [id number]           Patient Information     Date Of Birth          1995        Visit Information        Provider Department      3/29/2018 9:30 AM [facility]        Today's Diagnoses     Seizures (H)    -  1       Follow-ups after your visit        Your next 10 appointments already scheduled     Mar 29, 2018  1:00 PM CDT   New Patient Visit with Norman Dukes MD   [facility] ([facility])    [address], [address]  [facility] [place] [facility] [address] [phone]              Who to contact     Please call your clinic at [phone] to:    Ask questions about your health    Make or cancel appointments    Discuss your medicines    Learn about your test results    Speak to your doctor            Additional Information About Your Visit        MyChart Information     [employer] is an electronic gateway that provides easy, online access to your medical records. With [employer], you can request a clinic appointment, read your test results, renew a prescription or communicate with your care team.     To sign up for [employer] visit the website at [email or web address]   You will be asked to enter the access code listed below, as well as some personal information. Please follow the directions to create your username and password.     Your access code is: [email or web address]  Expires: 2018 12:57 PM     Your access code will  in 90 days. If you need help or a new code, please contact your [facility] or call [phone] for assistance.        Care EveryWhere ID     This is your Care EveryWhere ID. This could be used by other organizations to access your [place] medical records  [id number]         Blood Pressure from Last 3 Encounters:   17 125/78    Weight from Last 3 Encounters:   17 183 lb (83 kg)              We Performed the Following     CHARGE: Video EEG   < 12 hours (26104-87)     ORDER:  EEG video monitoring        Primary Care Provider Fax #    Physician No Ref-Primary [phone]       No address on file        Equal Access to Services     RICHMONDDEB JAVIER : Hadii aad ku hadatulnohemi Kamilahhusam, brant andreamarylou, sunni kalowell gruber, syed gravesgodfrey angel. So [facility] [phone].    ATENCIÓN: Si habla español, tiene a damon disposición servicios gratuitos de asistencia lingüística. Llame al [phone].    We comply with applicable federal civil rights laws and [place] laws. We do not discriminate on the basis of race, color, national origin, age, disability, sex, sexual orientation, or gender identity.            Thank you!     Thank you for choosing [facility] EPILEPSY [facility]  for your care. Our goal is always to provide you with excellent care. Hearing back from our patients is one way we can continue to improve our services. Please take a few minutes to complete the written survey that you may receive in the mail after your visit with us. Thank you!             Your Updated Medication List - Protect others around you: Learn how to safely use, store and throw away your medicines at www.disposemymeds.org.          This list is accurate as of 3/29/18 12:57 PM.  Always use your most recent med list.                   Brand Name Dispense Instructions for use Diagnosis    eslicarbazepine acetate 200 MG tablet    APTIOM     Take 1,000 mg by mouth        fexofenadine 180 MG tablet    ALLEGRA          FLUoxetine 20 MG tablet     30 tablet    Take 3 tablets (60 mg) by mouth every morning    Moderate episode of recurrent major depressive disorder (H)       hydrOXYzine 25 MG tablet    ATARAX    90 tablet    Take 1 tablet (25 mg) by mouth 3 times daily as needed for anxiety    Moderate episode of recurrent major depressive disorder (H)       MELATONIN PO      Take 5 mg by mouth At Bedtime        ZONISAMIDE PO      Take 300 mg by mouth 2 times daily

## 2018-03-29 NOTE — LETTER
Date:April 6, 2018      Patient was self referred, no letter generated. Do not send.        [facility] Information

## 2018-03-29 NOTE — PATIENT INSTRUCTIONS
Times of Days am pm        Medication Tablet Size Number of Tablets/Capsules Total Daily Dosage    Aptiom 200   2        400 mg    Zonegran 100 3  3         600 mg    Aptiom 800   1        800 mg                                                                                               Carry this with you at all times.  CONTINUE TAKING YOUR OTHER MEDICATIONS AS PREVIOUSLY DIRECTED.      * * *Do not store medications in the bathroom.  Keep medications away from children!* * *

## 2018-03-29 NOTE — PROGRESS NOTES
"Service Date: 03/29/2018      CHIEF COMPLAINT:  Establishing care for seizures.      HISTORY OF PRESENT ILLNESS:  This patient is a 22-year-old right-handed male with history of epilepsy, Asperger's disorder, depression, who presented today for establishing care.  He is accompanied by his mother in the clinic today.  He was previously seen by Dr. Morales at [facility] for his epilepsy.  The mother would like to move all his care to the [facility] and he is here to establish care.      The patient started to have seizures when he was 16 years old and he was managed at the [facility] for the past few years for his seizures.  He is currently taking Aptiom 1200 mg q.h.s. and the Zonegran 300 mg b.i.d. for his epilepsy.  He was recently admitted to the hospital twice because of 2 clusters of seizures, one in January when he was admitted to [facility] and one was in February when he was admitted to [facility].  On both occasions, he had 4-5 back to back generalized tonic-clonic seizures within a short period of time.      According to the mother, the patient has 2 different types of seizures.  Type #1 is described as \"mild seizures,\" during which he will have some facial twitching.  He will have repetitive hand/arm jerking movement.  He will become unresponsive for less than a minute.  Postictally, he was having some confusion and headaches and fatigue.  Before the seizures, he will have an aura of \"seeing bees populating the earth with eggs.\"  This type of seizure is relatively infrequent, average once per month.      Type #2 are generalized tonic-clonic seizures which the patient will have generalized convulsions of the body and his body will be stiff.  It usually will last for 1-2 minutes followed by postictal fatigue and hallucinations.  This happens usually once every 3 months, but sometimes it occurs in clusters with 2-5 seizures back to back.      The patient had tried Keppra and Depakote in the past " "which did not work.  He had EEGs done in the past which were reported negative.      The patient had severe depression and requested a lot of behavioral health and counseling in the past and at certain time, he was suicidal, but that improved.  At the present time, he is not suicidal.  He does not think about hurting himself.      TRIGGERS FOR SEIZURES:  Depression.      RISK FACTORS FOR SEIZURES:  He had no history of head trauma with loss of consciousness.  No history of febrile convulsions.  No history of CNS infections.        Current Outpatient Prescriptions   Medication Sig Dispense Refill     FLUoxetine 20 MG tablet Take 3 tablets (60 mg) by mouth every morning 30 tablet 1     eslicarbazepine acetate (APTIOM) 200 MG tablet Take 1,200 mg by mouth        ZONISAMIDE PO Take 300 mg by mouth 2 times daily       MELATONIN PO Take 5 mg by mouth At Bedtime       LORazepam (ATIVAN) 1 MG tablet Take 1 mg by mouth       hydrOXYzine (ATARAX) 25 MG tablet Take 1 tablet (25 mg) by mouth 3 times daily as needed for anxiety (Patient not taking: Reported on 3/29/2018) 90 tablet 1     fexofenadine (ALLEGRA) 180 MG tablet          PAST MEDICAL HISTORY:  Asperger's disorder, depression, epilepsy.      PAST SURGICAL HISTORY:  None.      FAMILY HISTORY:  No family history of seizures.  Both parents have hypertension.  Maternal grandmother had heart disease.      SOCIAL HISTORY:  He is single, living with his mother.  He had some college education but did not finish because of depression.  He is currently working at [employer].  No smoking, no alcohol, no drug abuse.      REVIEW OF SYSTEMS:  Positive for depression, frequent seizures.  The rest of the 12-point review of systems are negative.      ALLERGIES:  Sertraline and Zyprexa.      PHYSICAL EXAMINATION:      Blood pressure 122/85, pulse 71, temperature 97.8  F (36.6  C), temperature source Temporal, height 5' 11.5\" (181.6 cm), weight 188 lb 6.4 oz (85.5 kg).    General exam: " General Appearance:  No acute distress.  HEENT:  Normocephalic, atraumatic.  Neck:  Supple, no lymphadenopathy, no carotid bruit. Cardiovascular:  Regular rate and rhythm, no murmurs.   Extremities:  No edema, no clubbing, no cyanosis.      Neurologic Exam:  Alert and oriented x3.  Speech fluent, appropriate. Normal attention, naming normal, repeat normal.  Cranial Nerves:  Pupils are equal, round, reactive to light and accomodation.  Extraocular movement intact.  No facial weakness or asymmetry.  Facial sensation was normal.  Tongue and palate midline.  Hearing normal.  Fundi exams were normal, discs were sharp. Visual field : normal to confrontation.   Motor Exam:  Normal bulk.  Normal tone.  Strength 5/5 in all extremities.  Sensory:  Normal to light touch and vibration in all extremities.  Deep tendon reflexes 2+ bilaterally in both upper and lower extremities.  Coordination:  Finger-to-nose, heel-to-shin exams showed no ataxia.  Rapid alternating movement was normal.  Gait and Station:  normal casual gait.  Normal tendem gait. No difficulties with tip-toe or heel walking.  Romberg sign negative.      PREVIOUS DIAGNOSTIC TESTING:  CT scan of the head on 02/27/2018 was reported negative from [facility].  He had EEGs in the past which were reported negative.      IMPRESSION:   1.  Epilepsy.  Epilepsy syndrome is unclear at this time.  He is 22 years old, right-handed, seizure onset was at 16 years old.  He has 2 types of seizures.  It sounds like they are focal seizure with impaired awareness and the generalized tonic-clonic seizures.  On average, he is having about 1 seizure per month.  Sometimes seizures are in clusters, especially the generalized tonic-clonic seizures for which he was hospitalized twice for the past several months.  At the present time he is on Aptiom 1200 mg q.h.s. and Zonegran 300 mg b.i.d.  The Aptiom dose was increased following the recent hospital stay.      It seems that the patient  has not had an MRI scan of the brain for many years.  I am not sure if he had ever had one.  I would like to get an MRI of the brain for further evaluation.  Continue the current medications.     2.  Asperger's syndrome.   3.  Depression.  We will refer to Dr. Ben Barros in [place] for further evaluation.      PLAN:   1.  MRI of the brain with epilepsy protocol 3 Ernestine.   2.  Continue Aptiom 1200 mg q.h.s. and Zonegran 300 mg b.i.d.   3.  Check Zonegran level.   4.  Return to clinic in 3 months.   5.  Future options for anti-seizure medications are Vimpat, lamotrigine, Topamax, etc.         60 min was spent on the visit.  Over 50% of the time was spent on education, counseling about optimal seizure control and coordination of care.        KATHRYN HAMMOND MD             D: 2018   T: 2018   MT: al      Name:     BRIDGET KNIGHT   MRN:      [id number]        Account:      [id number]   :      1995           Service Date: 2018      Document: [id number]

## 2018-03-29 NOTE — MR AVS SNAPSHOT
After Visit Summary   3/29/2018    Amrit Padilla    MRN: [id number]           Patient Information     Date Of Birth          1995        Visit Information        Provider Department      3/29/2018 1:00 PM Norman Dukes MD [facility]        Today's Diagnoses     Seizure disorder (H)    -  1    Episode of recurrent major depressive disorder, unspecified depression episode severity (H)          Care Instructions      Times of Days am pm        Medication Tablet Size Number of Tablets/Capsules Total Daily Dosage    Aptiom 200   2        400 mg    Zonegran 100 3  3         600 mg    Aptiom 800   1        800 mg                                                                                               Carry this with you at all times.  CONTINUE TAKING YOUR OTHER MEDICATIONS AS PREVIOUSLY DIRECTED.      * * *Do not store medications in the bathroom.  Keep medications away from children!* * *             Follow-ups after your visit        Additional Services     MENTAL HEALTH REFERRAL  - Adult; Psychiatry and Medication Management; Psychiatry; Other: Not Listed - Enter Referral Details in Scheduling Comments Below; Patient call to schedule       All scheduling is subject to the client's specific insurance plan & benefits, provider/location availability, and provider clinical specialities.  Please arrive 15 minutes early for your first appointment and bring your completed paperwork.    Please be aware that coverage of these services is subject to the terms and limitations of your health insurance plan.  Call member services at your health plan with any benefit or coverage questions.                            Follow-up notes from your care team     Return in about 3 months (around 6/29/2018).      Your next 10 appointments already scheduled     Jun 28, 2018  2:00 PM CDT   Return Visit with MD [facility]    [address], [address]  [facility] [address]   [phone]              Who to contact     Please call your clinic at [phone] to:    Ask questions about your health    Make or cancel appointments    Discuss your medicines    Learn about your test results    Speak to your doctor            Additional Information About Your Visit        MyChart Information     [email or web address] is an electronic gateway that provides easy, online access to your medical records. With SlideBatch, you can request a clinic appointment, read your test results, renew a prescription or communicate with your care team.     To sign up for SlideBatch visit the website at [email or web address]   You will be asked to enter the access code listed below, as well as some personal information. Please follow the directions to create your username and password.     Your access code is: [email or web address]  Expires: 2018 12:57 PM     Your access code will  in 90 days. If you need help or a new code, please contact your [facility] or call [phone] for assistance.        Care EveryWhere ID     This is your Care EveryWhere ID. This could be used by other organizations to access your [facility] medical records  [id number]        Your Vitals Were     Pulse Temperature Height BMI (Body Mass Index)          71 97.8  F (36.6  C) (Temporal) 5' 11.5\" (181.6 cm) 25.91 kg/m2         Blood Pressure from Last 3 Encounters:   18 122/85   17 125/78    Weight from Last 3 Encounters:   18 188 lb 6.4 oz (85.5 kg)   17 183 lb (83 kg)              We Performed the Following     MENTAL HEALTH REFERRAL  - Adult; Psychiatry and Medication Management; Psychiatry; Other: Not Listed - Enter Referral Details in Scheduling Comments Below; Patient call to schedule     MR Brain w/o & w Contrast     Vitamin D Deficiency (Calcifediol)     Zonisamide Level Quantitative        Primary Care Provider Fax #    Physician No Ref-Primary [phone] "       No address on file        Equal Access to Services     [facility] JAVIER : Hadii aad pascual rhett Lechuga, wachantelda luqmarylou, qaybta kaaldontae erisaugustohumble, waxjosette cedric harriskristianiya ortiz. So [facility] [phone].    ATENCIÓN: Si habla español, tiene a damon disposición servicios gratuitos de asistencia lingüística. Renatoame al [phone].    We comply with applicable federal civil rights laws and [place] laws. We do not discriminate on the basis of race, color, national origin, age, disability, sex, sexual orientation, or gender identity.            Thank you!     Thank you for choosing [facility]  for your care. Our goal is always to provide you with excellent care. Hearing back from our patients is one way we can continue to improve our services. Please take a few minutes to complete the written survey that you may receive in the mail after your visit with us. Thank you!             Your Updated Medication List - Protect others around you: Learn how to safely use, store and throw away your medicines at www.disposemymeds.org.          This list is accurate as of 3/29/18  2:11 PM.  Always use your most recent med list.                   Brand Name Dispense Instructions for use Diagnosis    eslicarbazepine acetate 200 MG tablet    APTIOM     Take 1,200 mg by mouth        fexofenadine 180 MG tablet    ALLEGRA          FLUoxetine 20 MG tablet     30 tablet    Take 3 tablets (60 mg) by mouth every morning    Moderate episode of recurrent major depressive disorder (H)       hydrOXYzine 25 MG tablet    ATARAX    90 tablet    Take 1 tablet (25 mg) by mouth 3 times daily as needed for anxiety    Moderate episode of recurrent major depressive disorder (H)       LORazepam 1 MG tablet    ATIVAN     Take 1 mg by mouth        MELATONIN PO      Take 5 mg by mouth At Bedtime        ZONISAMIDE PO      Take 300 mg by mouth 2 times daily

## 2018-03-30 LAB — DEPRECATED CALCIDIOL+CALCIFEROL SERPL-MC: 24 UG/L (ref 20–75)

## 2018-03-31 LAB — ZONISAMIDE SERPL-MCNC: 25 UG/ML (ref 10–40)

## 2018-04-02 NOTE — PROCEDURES
Procedure Date: 2018      EEG #[id number]       DATE OF RECORDING/SERVICE DATE:         DURATION OF STUDY:  3 hours 1 minute.        CLINICAL HISTORY:  This patient is a 22-year-old right-handed male with history of depression, Asperger's syndrome and a history of epilepsy.  EEG was requested for further evaluation.         CURRENT MEDICATIONS:  Zonisamide, fluoxetine.       TECHNICAL SUMMARY: This continuous video- EEG monitoring procedure was performed with 23 scalp electrodes in 10-20 electrode system placements, and additional scalp, precordial and other surface electrodes used for electrical referencing and artifact detection.  Video monitoring was utilized and periodically reviewed by EEG technologists and the physician for electroclinical correlations.    RESULTS:   BACKGROUND ACTIVITIES: During maximal wakefulness, there is a symmetric, moderate amplitude, well formed, approximately 9 Hz posterior dominant rhythm, which attenuated with eye opening. Stage I and II sleep were identified with well formed sleep architectures including vertex sharp transients and sleep spindles. Hyperventilation produced no change of the background activities.  Photic stimulation produced no driving responses.  INTERICTAL ACTIVITIES: There are no focal pathological slowing or epileptiform abnormalities.   ICTAL ACTIVITIES: There are no clinical or electrographic seizures during this recording.  IMPRESSION:  This is a normal waking and sleep EEG.          KATHRYN HAMMOND MD             D: 2018   T: 2018   MT: [email or web address]      Name:     BRIDGET KNIGHT   MRN:      [id number]        Account:        [id number]   :      1995           Procedure Date: 2018      Document: [id number]

## 2018-04-04 ENCOUNTER — TRANSFERRED RECORDS (OUTPATIENT)
Dept: HEALTH INFORMATION MANAGEMENT | Facility: CLINIC | Age: 23
End: 2018-04-04

## 2018-04-10 ENCOUNTER — TELEPHONE (OUTPATIENT)
Dept: NEUROLOGY | Facility: CLINIC | Age: 23
End: 2018-04-10

## 2018-04-10 NOTE — TELEPHONE ENCOUNTER
Nurse received In-Basket message as follows:    Caller: Amrit     Relationship to Patient: self     Call Back Number: [phone] or [phone]     Reason for Call: Would like MRI results.     Nurse reviewed MRI Report, but remained uncertain about what to tell Patient.   Sent message to Dr. Dukes for advisement, and received return message as follows:    You can tell him that there is a bright spot on the cerebellum, and we do not know what it is.  Will need to repeat a scan in 6 months to see if this changes.     Thanks!   [place]    Nurse returned call to Patient, left voice mail with call back number and name.     Patient returned call, information was given and Patient is asking about treating it now.  Nurse indicated to him that it is an unknown and it is not an urgent matter, but just something to watch too be certain it does not change.  Patient also request a copy of report be mailed out to him and nurse agreed to do so.

## 2018-05-04 ENCOUNTER — TELEPHONE (OUTPATIENT)
Dept: NEUROLOGY | Facility: CLINIC | Age: 23
End: 2018-05-04

## 2018-05-04 NOTE — TELEPHONE ENCOUNTER
"Sandhya Garza Me Minsiobhan Haskins [place]                   Caller: Amrit   Relationship to Patient: Self   Call Back Number: [phone]   Reason for Call: Patient has been experiencing visual hallucinations, hearing things, and lack of energy at work.  He'd like to talk with someone about this.  Also, patient's employer has asked if he could increase his hours at work.  He feels that this may be signs he should not increase his hours.   Thanks, Sandhya        Amrit thought he saw blood spewing out of a co-workers back. This lasted 2-5 seconds. He stated that he realized there was a string on his apron or if his eyes may have been playing tricks on him.  After this event he lacked energy.    Amrit does not feel SI/SIB. No plan to harm himself. He is establishing care with a Psychiatrist who begins seeing on 5/25/18. He does not remember the Psychiatrist's name.    His employer is asking that he increase his work hours. Amrit currently works 20 hours per week and his employer would like to increase to as many hours as is comfortable. Amrit would like a recommendation from Dr. Dukes. Will discuss and return call to Amrit.    Update 5/8/18:  Amrit calls reporting he had a seizure while at work yesterday. He reports he was standing up on the truck and the next minute he was sitting down. He believes he was staring throughout the seizure. Amrit believes the seizure lasted a few seconds up to one minute. He reports feeling more aura's today in addition to dizzy spells and headaches.    He has a RX Lorazepam 1 mg.    Copied forward:  According to the mother, the patient has 2 different types of seizures.  Type #1 is described as \"mild seizures,\" during which he will have some facial twitching.  He will have repetitive hand/arm jerking movement.  He will become unresponsive for less than a minute.  Postictally, he was having some confusion and headaches and fatigue.  Before the seizures, he will have an aura of " "\"seeing bees populating the earth with eggs.\"  This type of seizure is relatively infrequent, average once per month.       Type #2 are generalized tonic-clonic seizures which the patient will have generalized convulsions of the body and his body will be stiff.  It usually will last for 1-2 minutes followed by postictal fatigue and hallucinations.  This happens usually once every 3 months, but sometimes it occurs in clusters with 2-5 seizures back to back    Call back number: [phone]    PLAN:  Advised to take Lorazepam 1 mg tab placed under tongue to dissolve.    Patient is scheduled to see Dr. Dukes at the [facility] tomorrow (5/11/18) at 11:20 AM.  Amrit will discuss work hours with Dr. Dukes at this time.    Time spent:  25 min      "

## 2018-05-11 ENCOUNTER — OFFICE VISIT (OUTPATIENT)
Dept: NEUROLOGY | Facility: CLINIC | Age: 23
End: 2018-05-11
Payer: COMMERCIAL

## 2018-05-11 VITALS
HEART RATE: 67 BPM | HEIGHT: 72 IN | OXYGEN SATURATION: 99 % | BODY MASS INDEX: 24.54 KG/M2 | RESPIRATION RATE: 24 BRPM | DIASTOLIC BLOOD PRESSURE: 83 MMHG | WEIGHT: 181.2 LBS | TEMPERATURE: 97.9 F | SYSTOLIC BLOOD PRESSURE: 129 MMHG

## 2018-05-11 DIAGNOSIS — G40.909 SEIZURE DISORDER (H): Primary | ICD-10-CM

## 2018-05-11 RX ORDER — LACOSAMIDE 100 MG/1
100 TABLET ORAL 2 TIMES DAILY
Qty: 62 TABLET | Refills: 5 | Status: SHIPPED | OUTPATIENT
Start: 2018-05-11 | End: 2018-05-24 | Stop reason: SINTOL

## 2018-05-11 RX ORDER — ERGOCALCIFEROL 1.25 MG/1
1 CAPSULE, LIQUID FILLED ORAL DAILY
COMMUNITY
Start: 2018-04-11 | End: 2018-09-06

## 2018-05-11 ASSESSMENT — PAIN SCALES - GENERAL: PAINLEVEL: MILD PAIN (2)

## 2018-05-11 NOTE — PATIENT INSTRUCTIONS
Times of Days am  pm        Medication Tablet Size Number of Tablets/Capsules Total Daily Dosage    Aptiom 200   6        1200 mg    Zonegran 100 3 3        600 mg    Vimpat 100 1 1        200 mg                                                                                               Carry this with you at all times.  CONTINUE TAKING YOUR OTHER MEDICATIONS AS PREVIOUSLY DIRECTED.      * * *Do not store medications in the bathroom.  Keep medications away from children!* * *

## 2018-05-11 NOTE — LETTER
"5/11/2018       RE: Amrit Padilla  [address]  [place] [address]     Dear Colleague,    Thank you for referring your patient, Amrit Padilla, to the [facility] NEUROLOGY at [facility]. Please see a copy of my visit note below.    CHIEF COMPLAINT:  Follow up for seizures.      HISTORY OF PRESENT ILLNESS:  This patient is a 22-year-old right-handed male with history of epilepsy, Asperger's disorder, depression, who returns for follow up for seizures.  He is by himself in the clinic today.  He was previously seen by Dr. Morales at [facility] for his epilepsy.  Since the last visit, he had one GTC about 4 days ago. He did not miss his medications. He had no sleep deprivation.       The patient started to have seizures when he was 16 years old and he was managed at the [facility] for the past few years for his seizures.  He is currently taking Aptiom 1200 mg q.h.s. and the Zonegran 300 mg b.i.d. for his epilepsy.  He was recently admitted to the hospital twice because of 2 clusters of seizures, one in January when he was admitted to [facility] and one was in February when he was admitted to [facility].  On both occasions, he had 4-5 back to back generalized tonic-clonic seizures within a short period of time.      According to the mother, the patient has 2 different types of seizures.  Type #1 is described as \"mild seizures,\" during which he will have some facial twitching.  He will have repetitive hand/arm jerking movement.  He will become unresponsive for less than a minute.  Postictally, he was having some confusion and headaches and fatigue.  Before the seizures, he will have an aura of \"seeing bees populating the earth with eggs.\"  This type of seizure is relatively infrequent, average once per month.      Type #2 are generalized tonic-clonic seizures which the patient will have generalized convulsions of the body and his body will be stiff.  It usually will last for 1-2 " minutes followed by postictal fatigue and hallucinations.  This happens usually once every 3 months, but sometimes it occurs in clusters with 2-5 seizures back to back.      The patient had tried Keppra and Depakote in the past which did not work.  He had EEGs done in the past which were reported negative.      The patient had severe depression and requested a lot of behavioral health and counseling in the past and at certain time, he was suicidal, but that improved.  At the present time, he is not suicidal.  He does not think about hurting himself.      TRIGGERS FOR SEIZURES:  Depression.      RISK FACTORS FOR SEIZURES:  He had no history of head trauma with loss of consciousness.  No history of febrile convulsions.  No history of CNS infections.        Current Outpatient Prescriptions   Medication Sig Dispense Refill     eslicarbazepine acetate (APTIOM) 200 MG tablet Take 1,200 mg by mouth        fexofenadine (ALLEGRA) 180 MG tablet        FLUoxetine 20 MG tablet Take 3 tablets (60 mg) by mouth every morning 30 tablet 1     hydrOXYzine (ATARAX) 25 MG tablet Take 1 tablet (25 mg) by mouth 3 times daily as needed for anxiety 90 tablet 1     LORazepam (ATIVAN) 1 MG tablet Take 1 mg by mouth       MELATONIN PO Take 5 mg by mouth At Bedtime       vitamin D (ERGOCALCIFEROL) 18321 UNIT capsule Take 1 capsule by mouth daily       ZONISAMIDE PO Take 300 mg by mouth 2 times daily         PAST AEDs: Keppra, Depakote.    PAST MEDICAL HISTORY:  Asperger's disorder, depression, epilepsy.      PAST SURGICAL HISTORY:  None.      FAMILY HISTORY:  No family history of seizures.  Both parents have hypertension.  Maternal grandmother had heart disease.      SOCIAL HISTORY:  He is single, living with his mother.  He had some college education but did not finish because of depression.  He is currently working at [employer].  No smoking, no alcohol, no drug abuse.      REVIEW OF SYSTEMS:  Positive for depression, frequent seizures.  The  "rest of the 8-point review of systems are negative.      ALLERGIES:  Sertraline and Zyprexa.      PHYSICAL EXAMINATION:      Blood pressure 129/83, pulse 67, temperature 97.9  F (36.6  C), temperature source Oral, resp. rate 24, height 1.816 m (5' 11.5\"), weight 82.2 kg (181 lb 3.2 oz), SpO2 99 %.    General exam: General Appearance: No acute distress. HEENT: Normocephalic, atraumatic. Neck: Supple.  Extremities: No edema, no clubbing, no cyanosis.     Neurologic Exam: Alert and oriented x3. Speech fluent, appropriate. Normal attention. Cranial Nerves: Pupils are equal, round, reactive to light and accomodation. Extraocular movement intact. No facial weakness or asymmetry. Hearing normal. Motor Exam: Normal. Coordination:no ataxia.  Gait and Station: normal.    PREVIOUS DIAGNOSTIC TESTING:  CT scan of the head on 02/27/2018 was reported negative from [facility].  He had EEGs in the past which were reported negative.      IMPRESSION:   1.  Epilepsy.  Epilepsy syndrome is unclear at this time.  This patient is a 22-year-old right-handed male with history of epilepsy, Asperger's disorder, depression, who returns for follow up for seizures.  He is by himself in the clinic today.  He was previously seen by Dr. Morales at [facility] for his epilepsy.  Since the last visit, he had one GTC about 4 days ago. He did not miss his medications. He had no sleep deprivation.       2.  Asperger's syndrome.   3.  Depression.  We will refer to Dr. Ben Barros in [place] for further evaluation.      PLAN:   1.  Continue Aptiom 1200 mg q.h.s. and Zonegran 300 mg b.i.d.   2.  Start Vimpat 50 mg bid for 2 weeks, then 100 mg bid.  3.  Return to clinic in 3 months.   4.  Future options for anti-seizure medications are Lamotrigine, Topamax, etc.      25 min was spent on the visit.  Over 50% of the time was spent on education, counseling about optimal seizure control and coordination of care.      Again, thank you for allowing me to " participate in the care of your patient.      Sincerely,    Norman Dukes MD

## 2018-05-11 NOTE — MR AVS SNAPSHOT
After Visit Summary   5/11/2018    Amrit Padilla    MRN: [id number]           Patient Information     Date Of Birth          1995        Visit Information        Provider Department      5/11/2018 11:20 AM Norman Dukes MD [facility] Neurology        Today's Diagnoses     Seizure disorder (H)    -  1      Care Instructions      Times of Days am  pm        Medication Tablet Size Number of Tablets/Capsules Total Daily Dosage    Aptiom 200   6        1200 mg    Zonegran 100 3 3        600 mg    Vimpat 100 1 1        200 mg                                                                                               Carry this with you at all times.  CONTINUE TAKING YOUR OTHER MEDICATIONS AS PREVIOUSLY DIRECTED.      * * *Do not store medications in the bathroom.  Keep medications away from children!* * *             Follow-ups after your visit        Follow-up notes from your care team     Return in about 3 months (around 8/11/2018).      Your next 10 appointments already scheduled     May 14, 2018  3:30 PM CDT   [facility]    [address], [address]  [facility] [id number] [phone]            Jun 28, 2018  2:00 PM CDT   Return Visit with [facility]    [address], [address]  [facility] [id number] [phone]              Who to contact     Please call your clinic at [phone] to:    Ask questions about your health    Make or cancel appointments    Discuss your medicines    Learn about your test results    Speak to your doctor            Additional Information About Your Visit        MyChart Information     [facility] is an electronic gateway that provides easy, online access to your medical records. With [facility], you can request a clinic appointment, read your test results, renew a prescription or communicate with your care team.     To sign up for  "[facility] visit the website at [email or web address]   You will be asked to enter the access code listed below, as well as some personal information. Please follow the directions to create your username and password.     Your access code is: [email or web address]  Expires: 2018 12:57 PM     Your access code will  in 90 days. If you need help or a new code, please contact your [facility] or call [phone] for assistance.        Care EveryWhere ID     This is your Care EveryWhere ID. This could be used by other organizations to access your [place] medical records  [id number]        Your Vitals Were     Pulse Temperature Respirations Height Pulse Oximetry BMI (Body Mass Index)    67 97.9  F (36.6  C) (Oral) 24 1.816 m (5' 11.5\") 99% 24.92 kg/m2       Blood Pressure from Last 3 Encounters:   18 129/83   18 122/85   17 125/78    Weight from Last 3 Encounters:   18 82.2 kg (181 lb 3.2 oz)   18 85.5 kg (188 lb 6.4 oz)   17 83 kg (183 lb)              Today, you had the following     No orders found for display         Today's Medication Changes          These changes are accurate as of 18 11:59 PM.  If you have any questions, ask your nurse or doctor.               Start taking these medicines.        Dose/Directions    Lacosamide 100 MG Tabs tablet   Commonly known as:  VIMPAT   Used for:  Seizure disorder (H)   Started by:  Norman Dukes MD        Dose:  100 mg   Take 1 tablet (100 mg) by mouth 2 times daily Start 1/2 tab (50mg) twice daily for 2 weeks, then 1 tab (100 mg) twice daily.   Quantity:  62 tablet   Refills:  5            Where to get your medicines      Some of these will need a paper prescription and others can be bought over the counter.  Ask your nurse if you have questions.     Bring a paper prescription for each of these medications     Lacosamide 100 MG Tabs tablet                Primary Care Provider Fax #    Physician No " Ref-Primary [phone]       No address on file        Equal Access to Services     CAMERON GASTELUMDOLLY : Hadii scott garner rhett Lechuga, wachantelda lujakmarylou, qabereketta kalowell flaquitojarocho, syed aaronin hayaagodfrey huddlestonsourav herman lasuresh ortiz. So [facility] [phone].    ATENCIÓN: Si habla español, tiene a damon disposición servicios gratuitos de asistencia lingüística. Llame al [phone].    We comply with applicable federal civil rights laws and [place] laws. We do not discriminate on the basis of race, color, national origin, age, disability, sex, sexual orientation, or gender identity.            Thank you!     Thank you for choosing [facility] NEUROLOGY  for your care. Our goal is always to provide you with excellent care. Hearing back from our patients is one way we can continue to improve our services. Please take a few minutes to complete the written survey that you may receive in the mail after your visit with us. Thank you!             Your Updated Medication List - Protect others around you: Learn how to safely use, store and throw away your medicines at www.disposemymeds.org.          This list is accurate as of 5/11/18 11:59 PM.  Always use your most recent med list.                   Brand Name Dispense Instructions for use Diagnosis    eslicarbazepine acetate 200 MG tablet    APTIOM     Take 1,200 mg by mouth        fexofenadine 180 MG tablet    ALLEGRA          FLUoxetine 20 MG tablet     30 tablet    Take 3 tablets (60 mg) by mouth every morning    Moderate episode of recurrent major depressive disorder (H)       hydrOXYzine 25 MG tablet    ATARAX    90 tablet    Take 1 tablet (25 mg) by mouth 3 times daily as needed for anxiety    Moderate episode of recurrent major depressive disorder (H)       Lacosamide 100 MG Tabs tablet    VIMPAT    62 tablet    Take 1 tablet (100 mg) by mouth 2 times daily Start 1/2 tab (50mg) twice daily for 2 weeks, then 1 tab (100 mg) twice daily.    Seizure disorder (H)       LORazepam 1 MG tablet     ATIVAN     Take 1 mg by mouth        MELATONIN PO      Take 5 mg by mouth At Bedtime        vitamin D 67813 UNIT capsule    ERGOCALCIFEROL     Take 1 capsule by mouth daily        ZONISAMIDE PO      Take 300 mg by mouth 2 times daily

## 2018-05-11 NOTE — PROGRESS NOTES
"CHIEF COMPLAINT:  Follow up for seizures.      HISTORY OF PRESENT ILLNESS:  This patient is a 22-year-old right-handed male with history of epilepsy, Asperger's disorder, depression, who returns for follow up for seizures.  He is by himself in the clinic today.  He was previously seen by Dr. Morales at [facility] for his epilepsy.  Since the last visit, he had one GTC about 4 days ago. He did not miss his medications. He had no sleep deprivation.       The patient started to have seizures when he was 16 years old and he was managed at the [facility] for the past few years for his seizures.  He is currently taking Aptiom 1200 mg q.h.s. and the Zonegran 300 mg b.i.d. for his epilepsy.  He was recently admitted to the hospital twice because of 2 clusters of seizures, one in January when he was admitted to [facility] and one was in February when he was admitted to [facility].  On both occasions, he had 4-5 back to back generalized tonic-clonic seizures within a short period of time.      According to the mother, the patient has 2 different types of seizures.  Type #1 is described as \"mild seizures,\" during which he will have some facial twitching.  He will have repetitive hand/arm jerking movement.  He will become unresponsive for less than a minute.  Postictally, he was having some confusion and headaches and fatigue.  Before the seizures, he will have an aura of \"seeing bees populating the earth with eggs.\"  This type of seizure is relatively infrequent, average once per month.      Type #2 are generalized tonic-clonic seizures which the patient will have generalized convulsions of the body and his body will be stiff.  It usually will last for 1-2 minutes followed by postictal fatigue and hallucinations.  This happens usually once every 3 months, but sometimes it occurs in clusters with 2-5 seizures back to back.      The patient had tried Keppra and Depakote in the past which did not work.  He had EEGs done in the past " "which were reported negative.      The patient had severe depression and requested a lot of behavioral health and counseling in the past and at certain time, he was suicidal, but that improved.  At the present time, he is not suicidal.  He does not think about hurting himself.      TRIGGERS FOR SEIZURES:  Depression.      RISK FACTORS FOR SEIZURES:  He had no history of head trauma with loss of consciousness.  No history of febrile convulsions.  No history of CNS infections.        Current Outpatient Prescriptions   Medication Sig Dispense Refill     eslicarbazepine acetate (APTIOM) 200 MG tablet Take 1,200 mg by mouth        fexofenadine (ALLEGRA) 180 MG tablet        FLUoxetine 20 MG tablet Take 3 tablets (60 mg) by mouth every morning 30 tablet 1     hydrOXYzine (ATARAX) 25 MG tablet Take 1 tablet (25 mg) by mouth 3 times daily as needed for anxiety 90 tablet 1     LORazepam (ATIVAN) 1 MG tablet Take 1 mg by mouth       MELATONIN PO Take 5 mg by mouth At Bedtime       vitamin D (ERGOCALCIFEROL) 54772 UNIT capsule Take 1 capsule by mouth daily       ZONISAMIDE PO Take 300 mg by mouth 2 times daily         PAST AEDs: Keppra, Depakote.    PAST MEDICAL HISTORY:  Asperger's disorder, depression, epilepsy.      PAST SURGICAL HISTORY:  None.      FAMILY HISTORY:  No family history of seizures.  Both parents have hypertension.  Maternal grandmother had heart disease.      SOCIAL HISTORY:  He is single, living with his mother.  He had some college education but did not finish because of depression.  He is currently working at [employer].  No smoking, no alcohol, no drug abuse.      REVIEW OF SYSTEMS:  Positive for depression, frequent seizures.  The rest of the 8-point review of systems are negative.      ALLERGIES:  Sertraline and Zyprexa.      PHYSICAL EXAMINATION:      Blood pressure 129/83, pulse 67, temperature 97.9  F (36.6  C), temperature source Oral, resp. rate 24, height 1.816 m (5' 11.5\"), weight 82.2 kg (181 " lb 3.2 oz), SpO2 99 %.    General exam: General Appearance: No acute distress. HEENT: Normocephalic, atraumatic. Neck: Supple.  Extremities: No edema, no clubbing, no cyanosis.     Neurologic Exam: Alert and oriented x3. Speech fluent, appropriate. Normal attention. Cranial Nerves: Pupils are equal, round, reactive to light and accomodation. Extraocular movement intact. No facial weakness or asymmetry. Hearing normal. Motor Exam: Normal. Coordination:no ataxia.  Gait and Station: normal.    PREVIOUS DIAGNOSTIC TESTING:  CT scan of the head on 02/27/2018 was reported negative from [facility].  He had EEGs in the past which were reported negative.      IMPRESSION:   1.  Epilepsy.  Epilepsy syndrome is unclear at this time.  This patient is a 22-year-old right-handed male with history of epilepsy, Asperger's disorder, depression, who returns for follow up for seizures.  He is by himself in the clinic today.  He was previously seen by Dr. Morales at [facility] for his epilepsy.  Since the last visit, he had one GTC about 4 days ago. He did not miss his medications. He had no sleep deprivation.       2.  Asperger's syndrome.   3.  Depression.  We will refer to Dr. Ben Barros in [place] for further evaluation.      PLAN:   1.  Continue Aptiom 1200 mg q.h.s. and Zonegran 300 mg b.i.d.   2.  Start Vimpat 50 mg bid for 2 weeks, then 100 mg bid.  3.  Return to clinic in 3 months.   4.  Future options for anti-seizure medications are Lamotrigine, Topamax, etc.      25 min was spent on the visit.  Over 50% of the time was spent on education, counseling about optimal seizure control and coordination of care.

## 2018-05-15 ENCOUNTER — TELEPHONE (OUTPATIENT)
Dept: NEUROLOGY | Facility: CLINIC | Age: 23
End: 2018-05-15

## 2018-05-15 NOTE — TELEPHONE ENCOUNTER
Nurse received In-Basket message as follows:  Caller: Amrit     Relationship to Patient: Self     Call Back Number: [phone] or [phone]     Reason for Call: Patient was recently prescribed Vimpat and the packaging insert states to let your doctor know if you have depression before taking this medication.  Patient would like to know if it is still safe to take.     Thanks, Sandhya       Nurse returned call to Patient and informed him that there was little chance that it would have a negative effect on him and that there is research indicating that it has been helpful to many Patient in respect to their depression.  Patient satisfied with answer and has no additional questions at this time.

## 2018-05-23 ENCOUNTER — TELEPHONE (OUTPATIENT)
Dept: NEUROLOGY | Facility: CLINIC | Age: 23
End: 2018-05-23

## 2018-05-24 ENCOUNTER — TELEPHONE (OUTPATIENT)
Dept: NEUROLOGY | Facility: CLINIC | Age: 23
End: 2018-05-24

## 2018-05-24 NOTE — TELEPHONE ENCOUNTER
Patient called.  He stated that he has been experiencing some suicidal thoughts during the past few days and this is getting worse.  He does not have plan for suicide at this time.  He was seen in the clinic about 2 weeks ago.  He is currently taking Aptiom 1200 mg q.h.s. and Zonegran 300 mg b.i.d.  Vimpat 50 mg bid was started about 2 weeks ago.    Recommendations:  Patient was urged to go to the emergency department for psychiatric care.  Patient understood and agreed with the plan.  He stated that he will go to the local ED ASAP.

## 2018-05-24 NOTE — TELEPHONE ENCOUNTER
Sandhya Garza [facility] [place]                   Caller: Amrit     Relationship to Patient: Self     Call Back Number: [phone]     Reason for Call: Patient has been told Vimpat may increase depression or suicidal thoughts.  Dr. Dukes told patient to call us if he was feeling these symptoms.       Thanks, Sandhya Marcus calls reporting he experienced problems with Vimpat. Amrit reports as of yesterday has been experiencing depression and suicidal thoughts. At the time of this call he does not have suicidal thoughts.     He has been taking Vimpat for one week and two days.    Current/confirmed ASD's:    Aptiom [phone]  Zonisamide (300) 300-300  Vimpat (100) 50-50    PLAN: Discussed with Dr. Blake    1) Discontinue Vimpat 50-50.  2) Continue Aption and Zonisamide  3) Schedule return visit to discuss alternative medications with Dr. Dukes.      Time spent:  16 min

## 2018-05-31 ENCOUNTER — TELEPHONE (OUTPATIENT)
Dept: NEUROLOGY | Facility: CLINIC | Age: 23
End: 2018-05-31

## 2018-05-31 DIAGNOSIS — F33.1 MODERATE EPISODE OF RECURRENT MAJOR DEPRESSIVE DISORDER (H): ICD-10-CM

## 2018-05-31 RX ORDER — FLUOXETINE 20 MG/1
40 TABLET, FILM COATED ORAL EVERY MORNING
Qty: 60 TABLET | Refills: 2 | Status: SHIPPED | OUTPATIENT
Start: 2018-05-31 | End: 2020-08-24

## 2018-05-31 NOTE — TELEPHONE ENCOUNTER
----- Message from Sandhya Garza sent at 2018 12:09 PM CDT -----  RE: Fluoxetine 20 mg    SI tab every morning    RE: Fluoxetine 40 mg    SI tab every morning    # of days supply: 30    Pharmacy: [facility] date: 18 Patient previously was prescribed this by day program, but no longer participating in this day program.  Patient does not have a PCP and is working on establishing care with a new mental health provider.  Would like to know if we could prescribe in the interim.      Request from: Patient calling

## 2018-05-31 NOTE — TELEPHONE ENCOUNTER
Per Dr. Dukes, he is willing to give patient Fluoxetine 20 mg 2tab/day for max 3 months. During this time patient needs to establish PCP or psychiatry to prescribe the medication .

## 2018-06-05 ENCOUNTER — TELEPHONE (OUTPATIENT)
Dept: NEUROLOGY | Facility: CLINIC | Age: 23
End: 2018-06-05

## 2018-06-05 NOTE — TELEPHONE ENCOUNTER
"Piedad Olmos LPN  P Me Archana Haskins [place]                   Caller: Amrit     Relationship to Patient: self     Call Back Number: [phone]     Reason for Call: Patient had a seizure yesterday. Patient missed 2 days of medication because he was out. He states he was also throwing up on Sunday.   Family traveled to [place] on Friday and pt forgot his medications. They were able to fill all but the Aptium. He missed 3 doses of medication. Mom states he had a seizure on Sunday morning in his sleep. Mom was able to give rescue medication. She states it was a pretty bad one. Mom states in between the start of the seizure and prior to giving the rescue medication, patient was vomiting violently. Patient was doing fine until this morning and now states he feels warm and has a slight headache which is typically a precursor for seizures. Mom would like to know how long it will take for medication levels to return to normal, if the vomiting was from missing meds or something else, what to do next if he has more seizures? Please call Patient back and he will hand the phone to mom as needed      Amrit reports he is \"feeling weird\". He feels warm, tired and has a slight headache. Mom reports Amrit is \"a little fuzzy and he feels detached\". Mom was able to  a Aptiom at the pharmacy & Amrit has taken his ASD's.     Current/confirmed ASD's    ZNS (100) 100 TID  Aptiom (400/800) 1200 Aptiom    Follow up call with Amrit: Amrit is feeling better. He has had a few episodes of dizziness. No seizures. He reports his mom/dad and siblings currently have a cold. No additional questions/concerns.   "

## 2018-06-08 ENCOUNTER — TELEPHONE (OUTPATIENT)
Dept: NEUROLOGY | Facility: CLINIC | Age: 23
End: 2018-06-08

## 2018-06-08 NOTE — TELEPHONE ENCOUNTER
"Piedad Olmos LPN  P Me Archana H[place]                   Caller: amrit   Relationship to Patient: self   Call Back Number: [phone]   Reason for Call: Pt states that when he does an action it feels like it is coming from a dream or movie set and not from reality. He does not know if this is a result from a seizure he had this past weekend      Amrit had a seizure on Sunday, 6/3/18. No additional seizures since 6/3/18. He calls today reporting that he feels \"his body is that of a movie or dream than a reality\". He has been experiencing this over the last week. Amrit currently has a cold. No new medications, he missed one dose of Aptiom x 3 days earlier this week. He has been taking Aptiom as directed since having missed doses, sleeping okay/enjoys napping, no other concerns.    Current/confirmed ASD's    ZNS (100) 300-300  Aptiom (200/800) 1200 HS    PLAN:  Return to clinic as scheduled on Monday, 6/11/18 at 9:30 PM with Dr. Dukes.    "

## 2018-06-11 ENCOUNTER — OFFICE VISIT (OUTPATIENT)
Dept: NEUROLOGY | Facility: CLINIC | Age: 23
End: 2018-06-11
Payer: COMMERCIAL

## 2018-06-11 VITALS
TEMPERATURE: 97 F | BODY MASS INDEX: 24.4 KG/M2 | SYSTOLIC BLOOD PRESSURE: 119 MMHG | DIASTOLIC BLOOD PRESSURE: 83 MMHG | WEIGHT: 177.4 LBS | HEART RATE: 96 BPM

## 2018-06-11 DIAGNOSIS — G40.909 SEIZURE DISORDER (H): Primary | ICD-10-CM

## 2018-06-11 ASSESSMENT — PAIN SCALES - GENERAL: PAINLEVEL: NO PAIN (1)

## 2018-06-11 NOTE — MR AVS SNAPSHOT
After Visit Summary   6/11/2018    Amirt Padilla    MRN: [id number]           Patient Information     Date Of Birth          1995        Visit Information        Provider Department      6/11/2018 9:30 AM Norman Dukes MD [facility]        Today's Diagnoses     Seizure disorder (H)    -  1      Care Instructions      Times of Days am pm        Medication Tablet Size Number of Tablets/Capsules Total Daily Dosage    Aptiom 800   1        800 mg    Apiom 200   2        400 mg    Zonegran 100 3  3        600 mg                                                                                               Carry this with you at all times.  CONTINUE TAKING YOUR OTHER MEDICATIONS AS PREVIOUSLY DIRECTED.      * * *Do not store medications in the bathroom.  Keep medications away from children!* * *             Follow-ups after your visit        Follow-up notes from your care team     Return in about 3 months (around 9/11/2018).      Your next 10 appointments already scheduled     Jun 12, 2018  8:00 AM CDT   EEG with [facility] EEG 1   [facility])    [address], [address]  [facility] [address] [phone]            Jun 28, 2018  2:00 PM CDT   Return Visit with Norman Dukes MD   [facility]    [address], [address]  [facility] [address] [phone]            Aug 23, 2018  9:30 AM CDT   Return Visit with Norman Dukes MD   [facility]    [address], [address]  [facility] [id number] [phone]              Who to contact     Please call your clinic at [phone] to:    Ask questions about your health    Make or cancel appointments    Discuss your medicines    Learn about your test results    Speak to your doctor            Additional Information About Your Visit        [employer] Information     [employer] is an electronic gateway that provides easy, online  access to your medical records. With [employer], you can request a clinic appointment, read your test results, renew a prescription or communicate with your care team.     To sign up for [employer] visit the website at [email or web address]   You will be asked to enter the access code listed below, as well as some personal information. Please follow the directions to create your username and password.     Your access code is: [email or web address]  Expires: 2018  9:12 AM     Your access code will  in 90 days. If you need help or a new code, please contact your [facility] or call [phone] for assistance.        Care EveryWhere ID     This is your Care EveryWhere ID. This could be used by other organizations to access your [place] medical records  [id number]        Your Vitals Were     Pulse Temperature BMI (Body Mass Index)             96 97  F (36.1  C) 24.4 kg/m2          Blood Pressure from Last 3 Encounters:   18 119/83   18 129/83   18 122/85    Weight from Last 3 Encounters:   18 177 lb 6.4 oz (80.5 kg)   18 181 lb 3.2 oz (82.2 kg)   18 188 lb 6.4 oz (85.5 kg)              Today, you had the following     No orders found for display       Primary Care Provider Fax #    Physician No Ref-Primary [phone]       No address on file        Equal Access to Services     CAMERON HERRERA : Hadii scott mgo Sozaydaali, waaxda luqadaha, qaybta kaalmada adeegyada, syed connor . So [facility] [phone].    ATENCIÓN: Si habla español, tiene a damon disposición servicios gratuitos de asistencia lingüística. Llame al [phone].    We comply with applicable federal civil rights laws and [place] laws. We do not discriminate on the basis of race, color, national origin, age, disability, sex, sexual orientation, or gender identity.            Thank you!     Thank you for choosing [facility]  for your care. Our goal is  always to provide you with excellent care. Hearing back from our patients is one way we can continue to improve our services. Please take a few minutes to complete the written survey that you may receive in the mail after your visit with us. Thank you!             Your Updated Medication List - Protect others around you: Learn how to safely use, store and throw away your medicines at www.disposemymeds.org.          This list is accurate as of 6/11/18 10:12 AM.  Always use your most recent med list.                   Brand Name Dispense Instructions for use Diagnosis    eslicarbazepine acetate 200 MG tablet    APTIOM     Take 1,200 mg by mouth        fexofenadine 180 MG tablet    ALLEGRA          FLUoxetine 20 MG tablet     60 tablet    Take 2 tablets (40 mg) by mouth every morning    Moderate episode of recurrent major depressive disorder (H)       hydrOXYzine 25 MG tablet    ATARAX    90 tablet    Take 1 tablet (25 mg) by mouth 3 times daily as needed for anxiety    Moderate episode of recurrent major depressive disorder (H)       LORazepam 1 MG tablet    ATIVAN     Take 1 mg by mouth        MELATONIN PO      Take 5 mg by mouth At Bedtime        vitamin D 13524 UNIT capsule    ERGOCALCIFEROL     Take 1 capsule by mouth daily        ZONISAMIDE PO      Take 300 mg by mouth 2 times daily

## 2018-06-11 NOTE — LETTER
"2018       RE: Amrit Padilla  : 1995   MRN: [id number]      Dear Colleague,    Thank you for referring your patient, Amrit Padilla, to the [facility] at [facility]. Please see a copy of my visit note below.    [facility]      Patient:  Amrit Padilla  :  1995   Age:  23 year old   Today's Office Visit:  2018    Epilepsy Data:    Patient History  Primary Epileptologist/Provider: Norman Dukes M.D.  Patient Status: Not yet controlled  Epilepsy Syndrome: Epilepsy unspecified  Epilepsy Syndrome Status: Undetermined - Evaluation in Progress  Age of Onset: Age 16  Other Relevant Dx/ Issues: Asperger's     Tests/Surgery History  Last EEG: 3/30/2018  Last MRI: 2018    Seizure Record  Current Visit Date: 18  Previous Visit Date: 18  Months since last visit: 1.02  Seizure Type 1: Complex partial seizures with impairment of consciousness at onset  Description of Sz Type 1: \"mild seizures\", during which he will have some facial twitching. He will have repetitive harnd/arm jerking movement. He will become unresponsive for less than a minute. Postictally, he was having some confusion and headaches and fatigue. He has an aura of \"seeing bees populating the earth with eggs.\"  Seizure Type 2: Tonic-clonic seizures  Description of Sz Type 2: He will have generalized convulsions of the body and his body will be stiff. It usually lasts 1-2 minutes followed by postictal fatigue and hallucinations.  # of Type 2 Seizure since last visit: 1  Freq. Type 2 / Month: 0.98      Current Outpatient Prescriptions   Medication Sig Dispense Refill     eslicarbazepine acetate (APTIOM) 200 MG tablet Take 1,200 mg by mouth        fexofenadine (ALLEGRA) 180 MG tablet        FLUoxetine 20 MG tablet Take 2 tablets (40 mg) by mouth every morning 60 tablet 2     LORazepam (ATIVAN) 1 MG tablet Take 1 mg by mouth       MELATONIN PO Take 5 mg by " "mouth At Bedtime       vitamin D (ERGOCALCIFEROL) 02736 UNIT capsule Take 1 capsule by mouth daily       ZONISAMIDE PO Take 300 mg by mouth 2 times daily       hydrOXYzine (ATARAX) 25 MG tablet Take 1 tablet (25 mg) by mouth 3 times daily as needed for anxiety 90 tablet 1            CHIEF COMPLAINT:  Follow up for seizures.      HISTORY OF PRESENT ILLNESS:  This patient is a 23-year-old right-handed male with history of epilepsy, Asperger's disorder, depression, who returns for follow up for seizures.  He is accompanied by his mother in the clinic today.  He was previously seen by Dr. Morales at [facility] for his epilepsy.  He is currently taking aptiom 1200 mg qhs,  zonegran 300 mg bid. Since the last visit, he had one GTC last week after he missed 2 doses of aptiom.  He had no sleep deprivation at that time.  He stopped Vimpat a few weeks ago because of suicidal thoughts.  He went to [facility] for suicidal thoughts.and was released.  He reports that he is having \"twitches\" of extremities and body, about once an hour.    He saw Dr. Ben Barros about 2 weeks ago.  He was suggested to participate in a therapy program.     The patient started to have seizures when he was 16 years old and he was managed at the [facility] for the past few years for his seizures.  He is currently taking Aptiom 1200 mg q.h.s. and the Zonegran 300 mg b.i.d. for his epilepsy.  He was recently admitted to the hospital twice because of 2 clusters of seizures, one in January when he was admitted to [facility] and one was in February when he was admitted to [facility].  On both occasions, he had 4-5 back to back generalized tonic-clonic seizures within a short period of time.      According to the mother, the patient has 2 different types of seizures.  Type #1 is described as \"mild seizures,\" during which he will have some facial twitching.  He will have repetitive hand/arm jerking movement.  He will become unresponsive for less than a minute. " " Postictally, he was having some confusion and headaches and fatigue.  Before the seizures, he will have an aura of \"seeing bees populating the earth with eggs.\"  This type of seizure is relatively infrequent, average once per month.      Type #2 are generalized tonic-clonic seizures which the patient will have generalized convulsions of the body and his body will be stiff.  It usually will last for 1-2 minutes followed by postictal fatigue and hallucinations.  This happens usually once every 3 months, but sometimes it occurs in clusters with 2-5 seizures back to back.      The patient had tried Keppra and Depakote in the past which did not work.  He had EEGs done in the past which were reported negative.      The patient had severe depression and requested a lot of behavioral health and counseling in the past and at certain time, he was suicidal, but that improved.  At the present time, he is not suicidal.  He does not think about hurting himself.      TRIGGERS FOR SEIZURES:  Depression.      RISK FACTORS FOR SEIZURES:  He had no history of head trauma with loss of consciousness.  No history of febrile convulsions.  No history of CNS infections.        PAST AEDs: Keppra, Depakote.    PAST MEDICAL HISTORY:  Asperger's disorder, depression, epilepsy.      PAST SURGICAL HISTORY:  None.      FAMILY HISTORY:  No family history of seizures.  Both parents have hypertension.  Maternal grandmother had heart disease.      SOCIAL HISTORY:  He is single, living with his mother.  He had some college education but did not finish because of depression.  He is currently working at [employer].  No smoking, no alcohol, no drug abuse.      REVIEW OF SYSTEMS:  Positive for fatigue, depression, frequent seizures.  The rest of the 8-point review of systems are negative.      ALLERGIES:  Sertraline and Zyprexa.      PHYSICAL EXAMINATION:      Blood pressure 119/83, pulse 96, temperature 97  F (36.1  C), weight 80.5 kg (177 lb 6.4 " "oz).    General exam: General Appearance: No acute distress. HEENT: Normocephalic, atraumatic. Neck: Supple.  Extremities: No edema, no clubbing, no cyanosis.     Neurologic Exam: Alert and oriented x3. Speech fluent, appropriate. Normal attention. Cranial Nerves: Pupils are equal, round, reactive to light and accomodation. Extraocular movement intact. No facial weakness or asymmetry. Hearing normal. Motor Exam: Normal. Coordination:no ataxia.  Gait and Station: normal.    PREVIOUS DIAGNOSTIC TESTING:  CT scan of the head on 02/27/2018 was reported negative from [facility].  He had EEGs in the past which were reported negative.      IMPRESSION:   1.  Epilepsy.  This patient is a 23-year-old right-handed male with history of epilepsy, Asperger's disorder, depression, who returns for follow up for seizures.  He is accompanied by his mother in the clinic today.  He was previously seen by Dr. Morales at [facility] for his epilepsy.  He is currently taking aptiom 1200 mg qhs,  zonegran 300 mg bid. Since the last visit, he had one GTC last week after he missed 2 doses of aptiom.  He had no sleep deprivation at that time.  He stopped Vimpat a few weeks ago because of suicidal thoughts.  He went to [facility] for suicidal thoughts.and was released.  He reports that he is having \"twitches\" of extremities and body, about once an hour.       2.  Asperger's syndrome.   3.  Depression.  He saw Dr. Ben Barros about 2 weeks ago.  He was suggested to participate in a therapy program.     PLAN:   1.  Continue Aptiom 1200 mg q.h.s. and Zonegran 300 mg b.i.d.   2.  Outpatient EEG.  3.  Continue to follow up with Dr. Barros for depression and get in therapy.  4.  Return to clinic in 3 months.   5.  May consider add Vimpat back if patient continue to have seizures. Future options for anti-seizure medications are Lamotrigine, Topamax, etc      30 min was spent on the visit.  Over 50% of the time was spent on education, counseling " about optimal seizure control and coordination of care.      Again, thank you for allowing me to participate in the care of your patient.      Sincerely,    Norman Dukes MD

## 2018-06-11 NOTE — PATIENT INSTRUCTIONS
Times of Days am pm        Medication Tablet Size Number of Tablets/Capsules Total Daily Dosage    Aptiom 800   1        800 mg    Apiom 200   2        400 mg    Zonegran 100 3  3        600 mg                                                                                               Carry this with you at all times.  CONTINUE TAKING YOUR OTHER MEDICATIONS AS PREVIOUSLY DIRECTED.      * * *Do not store medications in the bathroom.  Keep medications away from children!* * *

## 2018-06-11 NOTE — PROGRESS NOTES
"[facility] Progress Note      Patient:  Amrit Padilla  :  1995   Age:  23 year old   Today's Office Visit:  2018    Epilepsy Data:    Patient History  Primary Epileptologist/Provider: Norman Dukes M.D.  Patient Status: Not yet controlled  Epilepsy Syndrome: Epilepsy unspecified  Epilepsy Syndrome Status: Undetermined - Evaluation in Progress  Age of Onset: Age 16  Other Relevant Dx/ Issues: Asperger's     Tests/Surgery History  Last EEG: 3/30/2018  Last MRI: 2018    Seizure Record  Current Visit Date: 18  Previous Visit Date: 18  Months since last visit: 1.02  Seizure Type 1: Complex partial seizures with impairment of consciousness at onset  Description of Sz Type 1: \"mild seizures\", during which he will have some facial twitching. He will have repetitive harnd/arm jerking movement. He will become unresponsive for less than a minute. Postictally, he was having some confusion and headaches and fatigue. He has an aura of \"seeing bees populating the earth with eggs.\"  Seizure Type 2: Tonic-clonic seizures  Description of Sz Type 2: He will have generalized convulsions of the body and his body will be stiff. It usually lasts 1-2 minutes followed by postictal fatigue and hallucinations.  # of Type 2 Seizure since last visit: 1  Freq. Type 2 / Month: 0.98      Current Outpatient Prescriptions   Medication Sig Dispense Refill     eslicarbazepine acetate (APTIOM) 200 MG tablet Take 1,200 mg by mouth        fexofenadine (ALLEGRA) 180 MG tablet        FLUoxetine 20 MG tablet Take 2 tablets (40 mg) by mouth every morning 60 tablet 2     LORazepam (ATIVAN) 1 MG tablet Take 1 mg by mouth       MELATONIN PO Take 5 mg by mouth At Bedtime       vitamin D (ERGOCALCIFEROL) 02820 UNIT capsule Take 1 capsule by mouth daily       ZONISAMIDE PO Take 300 mg by mouth 2 times daily       hydrOXYzine (ATARAX) 25 MG tablet Take 1 tablet (25 mg) by mouth 3 times daily as needed for anxiety 90 tablet 1 " "           CHIEF COMPLAINT:  Follow up for seizures.      HISTORY OF PRESENT ILLNESS:  This patient is a 23-year-old right-handed male with history of epilepsy, Asperger's disorder, depression, who returns for follow up for seizures.  He is accompanied by his mother in the clinic today.  He was previously seen by Dr. Morales at [facility] for his epilepsy.  He is currently taking aptiom 1200 mg qhs,  zonegran 300 mg bid. Since the last visit, he had one GTC last week after he missed 2 doses of aptiom.  He had no sleep deprivation at that time.  He stopped Vimpat a few weeks ago because of suicidal thoughts.  He went to [facility] for suicidal thoughts.and was released.  He reports that he is having \"twitches\" of extremities and body, about once an hour.    He saw Dr. Ben Barros about 2 weeks ago.  He was suggested to participate in a therapy program.     The patient started to have seizures when he was 16 years old and he was managed at the [facility] for the past few years for his seizures.  He is currently taking Aptiom 1200 mg q.h.s. and the Zonegran 300 mg b.i.d. for his epilepsy.  He was recently admitted to the hospital twice because of 2 clusters of seizures, one in January when he was admitted to [facility] and one was in February when he was admitted to [facility].  On both occasions, he had 4-5 back to back generalized tonic-clonic seizures within a short period of time.      According to the mother, the patient has 2 different types of seizures.  Type #1 is described as \"mild seizures,\" during which he will have some facial twitching.  He will have repetitive hand/arm jerking movement.  He will become unresponsive for less than a minute.  Postictally, he was having some confusion and headaches and fatigue.  Before the seizures, he will have an aura of \"seeing bees populating the earth with eggs.\"  This type of seizure is relatively infrequent, average once per month.      Type #2 are generalized " tonic-clonic seizures which the patient will have generalized convulsions of the body and his body will be stiff.  It usually will last for 1-2 minutes followed by postictal fatigue and hallucinations.  This happens usually once every 3 months, but sometimes it occurs in clusters with 2-5 seizures back to back.      The patient had tried Keppra and Depakote in the past which did not work.  He had EEGs done in the past which were reported negative.      The patient had severe depression and requested a lot of behavioral health and counseling in the past and at certain time, he was suicidal, but that improved.  At the present time, he is not suicidal.  He does not think about hurting himself.      TRIGGERS FOR SEIZURES:  Depression.      RISK FACTORS FOR SEIZURES:  He had no history of head trauma with loss of consciousness.  No history of febrile convulsions.  No history of CNS infections.        PAST AEDs: Keppra, Depakote.    PAST MEDICAL HISTORY:  Asperger's disorder, depression, epilepsy.      PAST SURGICAL HISTORY:  None.      FAMILY HISTORY:  No family history of seizures.  Both parents have hypertension.  Maternal grandmother had heart disease.      SOCIAL HISTORY:  He is single, living with his mother.  He had some college education but did not finish because of depression.  He is currently working at [employer].  No smoking, no alcohol, no drug abuse.      REVIEW OF SYSTEMS:  Positive for fatigue, depression, frequent seizures.  The rest of the 8-point review of systems are negative.      ALLERGIES:  Sertraline and Zyprexa.      PHYSICAL EXAMINATION:      Blood pressure 119/83, pulse 96, temperature 97  F (36.1  C), weight 80.5 kg (177 lb 6.4 oz).    General exam: General Appearance: No acute distress. HEENT: Normocephalic, atraumatic. Neck: Supple.  Extremities: No edema, no clubbing, no cyanosis.     Neurologic Exam: Alert and oriented x3. Speech fluent, appropriate. Normal attention. Cranial Nerves:  "Pupils are equal, round, reactive to light and accomodation. Extraocular movement intact. No facial weakness or asymmetry. Hearing normal. Motor Exam: Normal. Coordination:no ataxia.  Gait and Station: normal.    PREVIOUS DIAGNOSTIC TESTING:  CT scan of the head on 02/27/2018 was reported negative from [facility].  He had EEGs in the past which were reported negative.      IMPRESSION:   1.  Epilepsy.  This patient is a 23-year-old right-handed male with history of epilepsy, Asperger's disorder, depression, who returns for follow up for seizures.  He is accompanied by his mother in the clinic today.  He was previously seen by Dr. Morales at [facility] for his epilepsy.  He is currently taking aptiom 1200 mg qhs,  zonegran 300 mg bid. Since the last visit, he had one GTC last week after he missed 2 doses of aptiom.  He had no sleep deprivation at that time.  He stopped Vimpat a few weeks ago because of suicidal thoughts.  He went to [facility] for suicidal thoughts.and was released.  He reports that he is having \"twitches\" of extremities and body, about once an hour.       2.  Asperger's syndrome.   3.  Depression.  He saw Dr. Ben Barros about 2 weeks ago.  He was suggested to participate in a therapy program.     PLAN:   1.  Continue Aptiom 1200 mg q.h.s. and Zonegran 300 mg b.i.d.   2.  Outpatient EEG.  3.  Continue to follow up with Dr. Barros for depression and get in therapy.  4.  Return to clinic in 3 months.   5.  May consider add Vimpat back if patient continue to have seizures. Future options for anti-seizure medications are Lamotrigine, Topamax, etc      30 min was spent on the visit.  Over 50% of the time was spent on education, counseling about optimal seizure control and coordination of care.    "

## 2018-06-12 ENCOUNTER — ALLIED HEALTH/NURSE VISIT (OUTPATIENT)
Dept: NEUROLOGY | Facility: CLINIC | Age: 23
End: 2018-06-12
Payer: COMMERCIAL

## 2018-06-12 DIAGNOSIS — G40.909 SEIZURE DISORDER (H): Primary | ICD-10-CM

## 2018-06-12 NOTE — MR AVS SNAPSHOT
After Visit Summary   6/12/2018    Amrit Padilla    MRN: [id number]           Patient Information     Date Of Birth          1995        Visit Information        Provider Department      6/12/2018 8:00 AM [facility] EEG 1 [facility]        Today's Diagnoses     Seizure disorder (H)    -  1       Follow-ups after your visit        Your next 10 appointments already scheduled     Jun 28, 2018  2:00 PM CDT   [facility]    [address], [address]  [facility] [place] [id number] [phone]            Aug 23, 2018  9:30 AM CDT   [facility] Visit with [facility] ([facility])    [address], [address]  [facility] [place] [address] [phone]              Who to contact     Please call your clinic at [phone] to:    Ask questions about your health    Make or cancel appointments    Discuss your medicines    Learn about your test results    Speak to your doctor            Additional Information About Your Visit        [employer] Information     [employer] gives you secure access to your electronic health record. If you see a primary care provider, you can also send messages to your care team and make appointments. If you have questions, please call your primary care clinic.  If you do not have a primary care provider, please call [phone] and they will assist you.      [employer] is an electronic gateway that provides easy, online access to your medical records. With [employer], you can request a clinic appointment, read your test results, renew a prescription or communicate with your care team.     To access your existing account, please contact your [facility] or call [phone] for assistance.        Care EveryWhere ID     This is your Care EveryWhere ID. This could be used by other organizations to access your [place] medical records  [id number]          Blood Pressure from Last 3 Encounters:   No data found for BP    Weight from Last 3 Encounters:   No data found for Wt              Today, you had the following     No orders found for display       Primary Care Provider Fax #    Physician No Ref-Primary [phone]       No address on file        Equal Access to Services     CAMERON HERRERA : Nataliya scott garner rhett Sohusam, wachantelda luqadaha, qaybta kaalmada adejarocho, syed herman lachanogodfrey . So [facility] [phone].    ATENCIÓN: Si habla español, tiene a damon disposición servicios gratuitos de asistencia lingüística. Llame al [phone].    We comply with applicable federal civil rights laws and [place] laws. We do not discriminate on the basis of race, color, national origin, age, disability, sex, sexual orientation, or gender identity.            Thank you!     Thank you for choosing [facility]PI[facility]  for your care. Our goal is always to provide you with excellent care. Hearing back from our patients is one way we can continue to improve our services. Please take a few minutes to complete the written survey that you may receive in the mail after your visit with us. Thank you!             Your Updated Medication List - Protect others around you: Learn how to safely use, store and throw away your medicines at www.disposemymeds.org.          This list is accurate as of 6/12/18 11:59 PM.  Always use your most recent med list.                   Brand Name Dispense Instructions for use Diagnosis    eslicarbazepine acetate 200 MG tablet    APTIOM     Take 1,200 mg by mouth        fexofenadine 180 MG tablet    ALLEGRA          FLUoxetine 20 MG tablet     60 tablet    Take 2 tablets (40 mg) by mouth every morning    Moderate episode of recurrent major depressive disorder (H)       hydrOXYzine 25 MG tablet    ATARAX    90 tablet    Take 1 tablet (25 mg) by mouth 3 times daily as needed for anxiety    Moderate episode of recurrent major depressive  disorder (H)       LORazepam 1 MG tablet    ATIVAN     Take 1 mg by mouth        MELATONIN PO      Take 5 mg by mouth At Bedtime        vitamin D 26132 UNIT capsule    ERGOCALCIFEROL     Take 1 capsule by mouth daily

## 2018-06-14 LAB — ZONISAMIDE SERPL-MCNC: 22 UG/ML (ref 10–40)

## 2018-06-15 ENCOUNTER — TELEPHONE (OUTPATIENT)
Dept: NEUROLOGY | Facility: CLINIC | Age: 23
End: 2018-06-15

## 2018-06-15 DIAGNOSIS — G40.909 EPILEPTIC SEIZURE (H): Primary | ICD-10-CM

## 2018-06-15 RX ORDER — ZONISAMIDE 100 MG/1
CAPSULE ORAL
Qty: 211 CAPSULE | Refills: 1 | Status: SHIPPED | OUTPATIENT
Start: 2018-06-15 | End: 2018-09-06

## 2018-06-15 NOTE — TELEPHONE ENCOUNTER
"Caller: Amrit     Relationship to Patient: Self     Call Back Number: [phone]     Reason for Call: Pt calling to report that he had a seizure yesterday.  Would like a call back to discuss     Amrit calls to report \"I may have had a seizure yesterday\". He reports sitting on the toilet and the next he new he was laying on the floor hanging onto the bath curtain. No injury. No tongue bite, no bruises.     Denies missed medications, denies ETOH, sleep \"is always an issue\", no major sleep deprivation, no increase in stress.     Current/confirmed ASD's    Aptiom 1200 mg HS  -300     Ref. Range 6/12/2018 12:30   Zonisamide Level Quant Latest Ref Range: 10 - 40 ug/mL 22     Mom reports Amrit is \"very tired\".  "

## 2018-06-15 NOTE — TELEPHONE ENCOUNTER
PLAN: Discussed with Dr. Etienne    1) Increase ZNS by 100 mg/day. Daily dose will be  mg AM and 300 mg HS.  2) RX sent to pharmacy  3) Keep appointment with Dr. Dukes on 6/28/18 @ 2PM

## 2018-06-18 NOTE — PROCEDURES
Procedure Date: 2018      VIDEO EEG #:  [id number]       DATE OF RECORDIN2018      DURATION OF RECORDING:  3 hours 1 minute      CLINICAL HISTORY:  This patient is a 23-year-old male with a history of epilepsy, Asperger syndrome and depression who presented for followup for seizures.  Video EEG was requested for further evaluation.      CURRENT MEDICATIONS:  Aptiom, zonisamide.      TECHNICAL SUMMARY: This continuous video- EEG monitoring procedure was performed with 23 scalp electrodes in 10-20 electrode system placements, and additional scalp, precordial and other surface electrodes used for electrical referencing and artifact detection.  Video monitoring was utilized and periodically reviewed by EEG technologists and the physician for electroclinical correlations.    BACKGROUND ACTIVITIES:  The background activity of this video EEG was symmetric and consisted of mild to moderate generalized slowing with mostly theta activities.  There were frontal predominant intermittent rhythmic delta activities (FIRDA).      No clear interictal epileptiform activities were observed.      Sleep stages were recorded with poorly formed sleep architectures.      Hyperventilation produced no change of the background activity.  Photic stimulation produced no driving responses.      ICTAL ACTIVITIES:  None.      IMPRESSION:  This video EEG is abnormal due to the presence of mild-to-moderate generalized slowing of the background activities and frontal predominant intermittent rhythmic delta activities (FIRDA).  These findings are consistent with mild to moderate diffuse encephalopathy.  No seizures were recorded.         KATHRYN HAMMOND MD             D: 2018   T: 2018   MT: caitlin      Name:     BRIDGET KNIGHT   MRN:      [id number]        Account:        [id number]   :      1995           Procedure Date: 2018      Document: [id number]

## 2018-06-28 ENCOUNTER — OFFICE VISIT (OUTPATIENT)
Dept: NEUROLOGY | Facility: CLINIC | Age: 23
End: 2018-06-28
Payer: COMMERCIAL

## 2018-06-28 VITALS
DIASTOLIC BLOOD PRESSURE: 77 MMHG | SYSTOLIC BLOOD PRESSURE: 118 MMHG | HEART RATE: 80 BPM | WEIGHT: 183.4 LBS | TEMPERATURE: 98 F | BODY MASS INDEX: 25.22 KG/M2 | RESPIRATION RATE: 16 BRPM

## 2018-06-28 DIAGNOSIS — G40.909 SEIZURE DISORDER (H): Primary | ICD-10-CM

## 2018-06-28 RX ORDER — LAMOTRIGINE 25 MG/1
TABLET ORAL
Qty: 252 TABLET | Refills: 2 | Status: SHIPPED | OUTPATIENT
Start: 2018-06-28 | End: 2018-09-06

## 2018-06-28 ASSESSMENT — PAIN SCALES - GENERAL: PAINLEVEL: NO PAIN (0)

## 2018-06-28 NOTE — LETTER
"2018       RE: Amrit Padilla  : 1995   MRN: [id number]      Dear Colleague,    Thank you for referring your patient, Amrit Padilla, to the [facility] EPILEPSY [facility]ARE at [facility]. Please see a copy of my visit note below.          CHIEF COMPLAINT:  Follow up for seizures.      HISTORY OF PRESENT ILLNESS:  This patient is a 23-year-old right-handed male with history of epilepsy, Asperger's disorder, depression, who returns for follow up for seizures.  He is by himself in the clinic today.  [facility].  He is currently taking aptiom 1200 mg qhs,  zonegran 300 mg bid. Since the last visit, he had one seizure last week.  He stopped Vimpat a few weeks ago because of suicidal thoughts.  He is seeing Dr. Ben Barros for his depression.    He saw Dr. Ben Barros about 4 weeks ago.  He was suggested to participate in a therapy program. He will start the program after .     The patient started to have seizures when he was 16 years old and [facility].  He is currently taking Aptiom 1200 mg q.h.s. and the Zonegran 300 mg b.i.d. for his epilepsy.  He was recently admitted to the hospital twice because of 2 clusters of seizures, one in January when he was admitted to [facility] and one was in February when he was admitted to [facility].  On both occasions, he had 4-5 back to back generalized tonic-clonic seizures within a short period of time.      According to the mother, the patient has 2 different types of seizures.  Type #1 is described as \"mild seizures,\" during which he will have some facial twitching.  He will have repetitive hand/arm jerking movement.  He will become unresponsive for less than a minute.  Postictally, he was having some confusion and headaches and fatigue.  Before the seizures, he will have an aura of \"seeing bees populating the earth " "with eggs.\"  This type of seizure is relatively infrequent, average once per month.      Type #2 are generalized tonic-clonic seizures which the patient will have generalized convulsions of the body and his body will be stiff.  It usually will last for 1-2 minutes followed by postictal fatigue and hallucinations.  This happens usually once every 3 months, but sometimes it occurs in clusters with 2-5 seizures back to back.      The patient had tried Keppra and Depakote in the past which did not work.  He had EEGs done in the past which were reported negative.      The patient had severe depression and requested a lot of behavioral health and counseling in the past and at certain time, he was suicidal, but that improved.  At the present time, he is not suicidal.  He does not think about hurting himself.      TRIGGERS FOR SEIZURES:  Depression.      RISK FACTORS FOR SEIZURES:  He had no history of head trauma with loss of consciousness.  No history of febrile convulsions.  No history of CNS infections.        PAST AEDs: Keppra, Depakote.    PAST MEDICAL HISTORY:  Asperger's disorder, depression, epilepsy.      PAST SURGICAL HISTORY:  None.      FAMILY HISTORY:  No family history of seizures.  Both parents have hypertension.  Maternal grandmother had heart disease.      SOCIAL HISTORY:  He is single, living with his mother.  He had some college education but did not finish because of depression.  He is currently working at [employer].  No smoking, no alcohol, no drug abuse.      REVIEW OF SYSTEMS:  Positive for fatigue, depression, frequent seizures.  The rest of the 8-point review of systems are negative.      ALLERGIES:  Sertraline and Zyprexa.      PHYSICAL EXAMINATION:      There were no vitals taken for this visit.    General exam: General Appearance: No acute distress. HEENT: Normocephalic, atraumatic. Neck: Supple.  Extremities: No edema, no clubbing, no cyanosis.     Neurologic Exam: Alert and oriented x3. " Speech fluent, appropriate. Normal attention. Cranial Nerves: Pupils are equal, round, reactive to light and accomodation. Extraocular movement intact. No facial weakness or asymmetry. Hearing normal. Motor Exam: Normal. Coordination:no ataxia.  Gait and Station: normal.    PREVIOUS DIAGNOSTIC TESTING:  CT scan of the head on 02/27/2018 was reported negative from [facility].  He had EEGs in the past which were reported negative.      IMPRESSION:   1.  Epilepsy.  This patient is a 23-year-old right-handed male with history of epilepsy, Asperger's disorder, depression, who returns for follow up for seizures.  He is by himself in the clinic today.  He was previously seen by Dr. Morales at [facility] for his epilepsy.  He is currently taking aptiom 1200 mg qhs,  zonegran 300 mg bid. Since the last visit, he had one seizure last week.  He stopped Vimpat a few weeks ago because of suicidal thoughts.  He is seeing Dr. Ben Barros for his depression.     2.  Asperger's syndrome.   3.  Depression.  He saw Dr. Ben Barros about 4 weeks ago.  He was suggested to participate in a therapy program. He will start the program after July 4th.     PLAN:   1.  Continue Aptiom 1200 mg q.h.s. and Zonegran 300 mg b.i.d.   2.  Continue to follow up with Dr. Barros for depression and get in therapy.  3.  Start Lamictal 25 mg qd, increase to 25 mg bid after one week, then go up weekly to target 100 mg bid. May need to increase further in the future.  Patient was advised about the potential for rashes.  4.  May consider add Topamax, Felbamate, etc in the future.      25 min was spent on the visit.  Over 50% of the time was spent on education, counseling about optimal seizure control and coordination of care.      Again, thank you for allowing me to participate in the care of your patient.      Sincerely,    Norman Dukes MD

## 2018-06-28 NOTE — PATIENT INSTRUCTIONS
Times of Days am pm        Medication Tablet Size Number of Tablets/Capsules Total Daily Dosage    Aptiom  800   1        800 mg    Aptiom  200   2        400 mg    Zonegran  100 3 3        600 mg    Lamictal              As directed                                                                             Carry this with you at all times.  CONTINUE TAKING YOUR OTHER MEDICATIONS AS PREVIOUSLY DIRECTED.      * * *Do not store medications in the bathroom.  Keep medications away from children!* * *

## 2018-06-28 NOTE — PROGRESS NOTES
"      CHIEF COMPLAINT:  Follow up for seizures.      HISTORY OF PRESENT ILLNESS:  This patient is a 23-year-old right-handed male with history of epilepsy, Asperger's disorder, depression, who returns for follow up for seizures.  He is by himself in the clinic today.  He was previously seen by Dr. Morales at [facility] for his epilepsy.  He is currently taking aptiom 1200 mg qhs,  zonegran 300 mg bid. Since the last visit, he had one seizure last week.  He stopped Vimpat a few weeks ago because of suicidal thoughts.  He is seeing Dr. Ben Barros for his depression.    He saw Dr. Ben Barros about 4 weeks ago.  He was suggested to participate in a therapy program. He will start the program after July 4th.     The patient started to have seizures when he was 16 years old and he was managed at the [facility] for the past few years for his seizures.  He is currently taking Aptiom 1200 mg q.h.s. and the Zonegran 300 mg b.i.d. for his epilepsy.  He was recently admitted to the hospital twice because of 2 clusters of seizures, one in January when he was admitted to [facility] and one was in February when he was admitted to [facility].  On both occasions, he had 4-5 back to back generalized tonic-clonic seizures within a short period of time.      According to the mother, the patient has 2 different types of seizures.  Type #1 is described as \"mild seizures,\" during which he will have some facial twitching.  He will have repetitive hand/arm jerking movement.  He will become unresponsive for less than a minute.  Postictally, he was having some confusion and headaches and fatigue.  Before the seizures, he will have an aura of \"seeing bees populating the earth with eggs.\"  This type of seizure is relatively infrequent, average once per month.      Type #2 are generalized tonic-clonic seizures which the patient will have generalized convulsions of the body and his body will be stiff.  It usually will last for 1-2 minutes " followed by postictal fatigue and hallucinations.  This happens usually once every 3 months, but sometimes it occurs in clusters with 2-5 seizures back to back.      The patient had tried Keppra and Depakote in the past which did not work.  He had EEGs done in the past which were reported negative.      The patient had severe depression and requested a lot of behavioral health and counseling in the past and at certain time, he was suicidal, but that improved.  At the present time, he is not suicidal.  He does not think about hurting himself.      TRIGGERS FOR SEIZURES:  Depression.      RISK FACTORS FOR SEIZURES:  He had no history of head trauma with loss of consciousness.  No history of febrile convulsions.  No history of CNS infections.        PAST AEDs: Keppra, Depakote.    PAST MEDICAL HISTORY:  Asperger's disorder, depression, epilepsy.      PAST SURGICAL HISTORY:  None.      FAMILY HISTORY:  No family history of seizures.  Both parents have hypertension.  Maternal grandmother had heart disease.      SOCIAL HISTORY:  He is single, living with his mother.  He had some college education but did not finish because of depression.  He is currently working at [employer].  No smoking, no alcohol, no drug abuse.      REVIEW OF SYSTEMS:  Positive for fatigue, depression, frequent seizures.  The rest of the 8-point review of systems are negative.      ALLERGIES:  Sertraline and Zyprexa.      PHYSICAL EXAMINATION:      There were no vitals taken for this visit.    General exam: General Appearance: No acute distress. HEENT: Normocephalic, atraumatic. Neck: Supple.  Extremities: No edema, no clubbing, no cyanosis.     Neurologic Exam: Alert and oriented x3. Speech fluent, appropriate. Normal attention. Cranial Nerves: Pupils are equal, round, reactive to light and accomodation. Extraocular movement intact. No facial weakness or asymmetry. Hearing normal. Motor Exam: Normal. Coordination:no ataxia.  Gait and Station:  normal.    PREVIOUS DIAGNOSTIC TESTING:  CT scan of the head on 02/27/2018 was reported negative from [facility].  He had EEGs in the past which were reported negative.      IMPRESSION:   1.  Epilepsy.  This patient is a 23-year-old right-handed male with history of epilepsy, Asperger's disorder, depression, who returns for follow up for seizures.  He is by himself in the clinic today.  He was previously seen by Dr. Morales at [facility] for his epilepsy.  He is currently taking aptiom 1200 mg qhs,  zonegran 300 mg bid. Since the last visit, he had one seizure last week.  He stopped Vimpat a few weeks ago because of suicidal thoughts.  He is seeing Dr. Ben Barros for his depression.     2.  Asperger's syndrome.   3.  Depression.  He saw Dr. Ben Barros about 4 weeks ago.  He was suggested to participate in a therapy program. He will start the program after July 4th.     PLAN:   1.  Continue Aptiom 1200 mg q.h.s. and Zonegran 300 mg b.i.d.   2.  Continue to follow up with Dr. Barros for depression and get in therapy.  3.  Start Lamictal 25 mg qd, increase to 25 mg bid after one week, then go up weekly to target 100 mg bid. May need to increase further in the future.  Patient was advised about the potential for rashes.  4.  May consider add Topamax, Felbamate, etc in the future.      25 min was spent on the visit.  Over 50% of the time was spent on education, counseling about optimal seizure control and coordination of care.

## 2018-06-28 NOTE — MR AVS SNAPSHOT
After Visit Summary   6/28/2018    Amrit Padilla    MRN: [id number]           Patient Information     Date Of Birth          1995        Visit Information        Provider Department      6/28/2018 2:00 PM [facility]        Today's Diagnoses     Seizure disorder (H)    -  1      Care Instructions      Times of Days am pm        Medication Tablet Size Number of Tablets/Capsules Total Daily Dosage    Aptiom  800   1        800 mg    Aptiom  200   2        400 mg    Zonegran  100 3 3        600 mg    Lamictal              As directed                                                                             Carry this with you at all times.  CONTINUE TAKING YOUR OTHER MEDICATIONS AS PREVIOUSLY DIRECTED.      * * *Do not store medications in the bathroom.  Keep medications away from children!* * *             Follow-ups after your visit        Follow-up notes from your care team     Return in about 3 months (around 9/28/2018).      Your next 10 appointments already scheduled     Aug 23, 2018  9:30 AM CDT   [facility] ([facility])    [address], [address]  [facility] [address] [phone]            Sep 06, 2018 11:30 AM CDT   Return Visit with MD [facility] ([facility])    [address], [address]  [facility] [address] [phone]              Who to contact     Please call your clinic at [phone] to:    Ask questions about your health    Make or cancel appointments    Discuss your medicines    Learn about your test results    Speak to your doctor            Additional Information About Your Visit        [employer] Information     [employer] gives you secure access to your electronic health record. If you see a primary care provider, you can also send messages to your care team and make appointments. If you have questions, please call your primary care  clinic.  If you do not have a primary care provider, please call [phone] and they will assist you.      [employer] is an electronic gateway that provides easy, online access to your medical records. With [employer], you can request a clinic appointment, read your test results, renew a prescription or communicate with your care team.     To access your existing account, please contact your [facility] or call [phone] for assistance.        Care EveryWhere ID     This is your Care EveryWhere ID. This could be used by other organizations to access your [place] medical records  [id number]        Your Vitals Were     Pulse Temperature Respirations BMI (Body Mass Index)          80 98  F (36.7  C) 16 25.22 kg/m2         Blood Pressure from Last 3 Encounters:   06/28/18 118/77   06/11/18 119/83   05/11/18 129/83    Weight from Last 3 Encounters:   06/28/18 183 lb 6.4 oz (83.2 kg)   06/11/18 177 lb 6.4 oz (80.5 kg)   05/11/18 181 lb 3.2 oz (82.2 kg)              Today, you had the following     No orders found for display         Today's Medication Changes          These changes are accurate as of 6/28/18  2:39 PM.  If you have any questions, ask your nurse or doctor.               Start taking these medicines.        Dose/Directions    lamoTRIgine 25 MG tablet   Commonly known as:  laMICtal   Used for:  Seizure disorder (H)   Started by:  Norman Dukes MD        Start at 25mg every night at bedtime then increase by 25 mg weekly to a target dose of 100mg twice daily. Per Comments below   Quantity:  252 tablet   Refills:  2            Where to get your medicines      Some of these will need a paper prescription and others can be bought over the counter.  Ask your nurse if you have questions.     Bring a paper prescription for each of these medications     lamoTRIgine 25 MG tablet                Primary Care Provider Fax #    Physician No Ref-Primary [phone]       No address on file         Equal Access to Services     [facility] : Hadii aad ku hadatulnohemi Kamilahhusam, wachantelda luqcheryleha, qaybta karileysyed gutierrez. So [facility] [phone].    ATENCIÓN: Si habla español, tiene a damon disposición servicios gratuitos de asistencia lingüística. Renatoame al [phone].    We comply with applicable federal civil rights laws and [place] laws. We do not discriminate on the basis of race, color, national origin, age, disability, sex, sexual orientation, or gender identity.            Thank you!     Thank you for choosing [facility]  for your care. Our goal is always to provide you with excellent care. Hearing back from our patients is one way we can continue to improve our services. Please take a few minutes to complete the written survey that you may receive in the mail after your visit with us. Thank you!             Your Updated Medication List - Protect others around you: Learn how to safely use, store and throw away your medicines at www.disposemymeds.org.          This list is accurate as of 6/28/18  2:39 PM.  Always use your most recent med list.                   Brand Name Dispense Instructions for use Diagnosis    eslicarbazepine acetate 200 MG tablet    APTIOM     Take 1,200 mg by mouth        fexofenadine 180 MG tablet    ALLEGRA          FLUoxetine 20 MG tablet     60 tablet    Take 2 tablets (40 mg) by mouth every morning    Moderate episode of recurrent major depressive disorder (H)       hydrOXYzine 25 MG tablet    ATARAX    90 tablet    Take 1 tablet (25 mg) by mouth 3 times daily as needed for anxiety    Moderate episode of recurrent major depressive disorder (H)       lamoTRIgine 25 MG tablet    laMICtal    252 tablet    Start at 25mg every night at bedtime then increase by 25 mg weekly to a target dose of 100mg twice daily. Per Comments below    Seizure disorder (H)       LORazepam 1 MG tablet    ATIVAN     Take 1 mg by mouth        MELATONIN PO       Take 5 mg by mouth At Bedtime        vitamin D 75218 UNIT capsule    ERGOCALCIFEROL     Take 1 capsule by mouth daily        zonisamide 100 MG capsule    ZONEGRAN    211 capsule    Take 4 caps (400 mg) each AM and 3 caps (300 mg) each HS    Epileptic seizure (H)

## 2018-07-16 ENCOUNTER — TELEPHONE (OUTPATIENT)
Dept: NEUROLOGY | Facility: CLINIC | Age: 23
End: 2018-07-16

## 2018-07-16 NOTE — TELEPHONE ENCOUNTER
Patient calling because he did not take his full dose of zonisamide last night. He was only able to take 100mg because he did not have adequate supply.  Patient was able to get a supply and took his morning medications correctly  Patient reports he took all of his other seizure medications as ordered.  Patient wondering if he needs to make up any missed doses.    Current dose of ZON 400mg am and 300mg hs confirmed.    Discussed missed dose instructions with patient, and advised him to take the missed medication.  Patient had questions about the prescription the pharmacy had on file.  I confirmed the preferred pharmacy in the EMR is correct.  We discussed that the last prescription sent was to that pharmacy about a month ago, and there were enough for two months supply  I also instructed the patient to contact his pharmacy with further questions about his prescriptions, and that the pharmacy could contact us for additional refills if needed    No other questions at this time

## 2018-07-31 ENCOUNTER — TELEPHONE (OUTPATIENT)
Dept: NEUROLOGY | Facility: CLINIC | Age: 23
End: 2018-07-31

## 2018-07-31 NOTE — TELEPHONE ENCOUNTER
Patient is on an WYATT increase, and is not sure at what dose he needs to stop.  Review of chart show goal dose is 100 mg bid.  Currently at 50 mg bid  Discussed how to increase to goal dose.  Sergio able to repeat instructions.  No other questions at this time

## 2018-07-31 NOTE — TELEPHONE ENCOUNTER
----- Message from Tiesha Brice MA sent at 7/31/2018  1:21 PM CDT -----  Regarding: Medication Clarification  Contact: [phone]  Caller: Amrit    Relationship to Patient: Self    Call Back Number: [phone] or    [phone]    Reason for Call: Pt has question on Lamotrigine, Directions are unclear to him after reading lable on prescription bottle.  Would like clairification

## 2018-08-13 ENCOUNTER — PATIENT OUTREACH (OUTPATIENT)
Dept: NEUROLOGY | Facility: CLINIC | Age: 23
End: 2018-08-13

## 2018-08-13 ENCOUNTER — TELEPHONE (OUTPATIENT)
Dept: NEUROLOGY | Facility: CLINIC | Age: 23
End: 2018-08-13

## 2018-08-13 NOTE — TELEPHONE ENCOUNTER
Nurse received In-Basket message as follows:    Caller: Amrit     Relationship to Patient: pt     Call Back Number: [phone]     Reason for Call: Pt would like to discuss medication side effects.     Nurse returned call to left voice mail with call back number and name.

## 2018-08-13 NOTE — TELEPHONE ENCOUNTER
Patient returned call to this nurse and indicates that he has been experiencing HAs and dizziness as well as some tiredness ( he also indicates that Dr. Dukes does not think the tiredness is related to his meds).  Patient is currently triturating Lamotrigine to 100 BID. At the present time, he is at 75 / 75.  Nurse instructed patient to stay at 75 / 75 for an extra week, and then to increase to 75 / 100.  Nurse also instructed patient to be certain to have food in his stomach before taking his meds, to stay well hydrated, and to call nurse back in one week.    Patient is in agreement with this plan, and has no further questions at present time.

## 2018-08-21 NOTE — TELEPHONE ENCOUNTER
Nurse called patient again to find out how he is doing with Lamotrigine regime / side effects; left voice mail with call back number and name.

## 2018-08-30 ENCOUNTER — TELEPHONE (OUTPATIENT)
Dept: NEUROLOGY | Facility: CLINIC | Age: 23
End: 2018-08-30

## 2018-08-30 DIAGNOSIS — G40.909 SEIZURE DISORDER (H): Primary | ICD-10-CM

## 2018-08-30 RX ORDER — LAMOTRIGINE 100 MG/1
100 TABLET ORAL 2 TIMES DAILY
Qty: 186 TABLET | Refills: 3 | Status: SHIPPED | OUTPATIENT
Start: 2018-08-30 | End: 2018-09-06

## 2018-08-30 NOTE — TELEPHONE ENCOUNTER
Patient has completed titration of Lamotrigine now at 75 /100 and ready to go to 100 / 100    Ordering 100 mg tabs to continue with Lamotrigine without all 25 mg tabs.

## 2018-09-06 ENCOUNTER — OFFICE VISIT (OUTPATIENT)
Dept: NEUROLOGY | Facility: CLINIC | Age: 23
End: 2018-09-06
Payer: COMMERCIAL

## 2018-09-06 VITALS
BODY MASS INDEX: 25.25 KG/M2 | SYSTOLIC BLOOD PRESSURE: 126 MMHG | TEMPERATURE: 99.2 F | HEART RATE: 76 BPM | WEIGHT: 183.6 LBS | DIASTOLIC BLOOD PRESSURE: 78 MMHG

## 2018-09-06 DIAGNOSIS — G40.909 SEIZURE DISORDER (H): Primary | ICD-10-CM

## 2018-09-06 DIAGNOSIS — G40.219 PARTIAL SYMPTOMATIC EPILEPSY WITH COMPLEX PARTIAL SEIZURES, INTRACTABLE, WITHOUT STATUS EPILEPTICUS (H): ICD-10-CM

## 2018-09-06 RX ORDER — LAMOTRIGINE 100 MG/1
100 TABLET ORAL 2 TIMES DAILY
Qty: 62 TABLET | Refills: 11 | Status: SHIPPED | OUTPATIENT
Start: 2018-09-06 | End: 2018-09-24

## 2018-09-06 RX ORDER — ZONISAMIDE 100 MG/1
CAPSULE ORAL
Qty: 211 CAPSULE | Refills: 11 | Status: SHIPPED | OUTPATIENT
Start: 2018-09-06 | End: 2019-09-11

## 2018-09-06 RX ORDER — ERGOCALCIFEROL 1.25 MG/1
50000 CAPSULE, LIQUID FILLED ORAL DAILY
Qty: 30 CAPSULE | Refills: 11 | Status: SHIPPED | OUTPATIENT
Start: 2018-09-06 | End: 2018-10-09

## 2018-09-06 ASSESSMENT — PAIN SCALES - GENERAL: PAINLEVEL: MODERATE PAIN (4)

## 2018-09-06 NOTE — PATIENT INSTRUCTIONS
Times of Days am pm        Medication Tablet Size Number of Tablets/Capsules Total Daily Dosage    Aptiom  600 1 1        1200 mg                  Zonegran  100 3 4        700 mg    Lamictal  100 1 1       200 mg                                                                             Carry this with you at all times.  CONTINUE TAKING YOUR OTHER MEDICATIONS AS PREVIOUSLY DIRECTED.      * * *Do not store medications in the bathroom.  Keep medications away from children!* * *

## 2018-09-06 NOTE — MR AVS SNAPSHOT
After Visit Summary   9/6/2018    Amrit Padilla    MRN: [id number]           Patient Information     Date Of Birth          1995        Visit Information        Provider Department      9/6/2018 11:30 AM Norman Dukes MD [facility]        Today's Diagnoses     Seizure disorder (H)    -  1    Partial symptomatic epilepsy with complex partial seizures, intractable, without status epilepticus (H)          Care Instructions      Times of Days am pm        Medication Tablet Size Number of Tablets/Capsules Total Daily Dosage    Aptiom  600 1 1        1200 mg                  Zonegran  100 3 4        700 mg    Lamictal  100 1 1       200 mg                                                                             Carry this with you at all times.  CONTINUE TAKING YOUR OTHER MEDICATIONS AS PREVIOUSLY DIRECTED.      * * *Do not store medications in the bathroom.  Keep medications away from children!* * *             Follow-ups after your visit        Follow-up notes from your care team     Return in about 3 months (around 12/6/2018).      Your next 10 appointments already scheduled     Dec 06, 2018 11:00 AM CST   Return Visit with MD [facility] ([facility])    [address], [address]  [facility] [address] [phone]              Who to contact     Please call your clinic at [phone] to:    Ask questions about your health    Make or cancel appointments    Discuss your medicines    Learn about your test results    Speak to your doctor            Additional Information About Your Visit        MyChart Information     [employer] gives you secure access to your electronic health record. If you see a primary care provider, you can also send messages to your care team and make appointments. If you have questions, please call your primary care clinic.  If you do not have a primary care provider, please call [phone] and they will assist you.       [employer] is an electronic gateway that provides easy, online access to your medical records. With [employer], you can request a clinic appointment, read your test results, renew a prescription or communicate with your care team.     To access your existing account, please contact your [facility] or call [phone] for assistance.        Care EveryWhere ID     This is your Care EveryWhere ID. This could be used by other organizations to access your [place] medical records  [id number]        Your Vitals Were     Pulse Temperature BMI (Body Mass Index)             76 99.2  F (37.3  C) 25.25 kg/m2          Blood Pressure from Last 3 Encounters:   09/06/18 126/78   06/28/18 118/77   06/11/18 119/83    Weight from Last 3 Encounters:   09/06/18 183 lb 9.6 oz (83.3 kg)   06/28/18 183 lb 6.4 oz (83.2 kg)   06/11/18 177 lb 6.4 oz (80.5 kg)              We Performed the Following     Lamotrigine Level     Zonisamide Level Quantitative          Today's Medication Changes          These changes are accurate as of 9/6/18 12:02 PM.  If you have any questions, ask your nurse or doctor.               These medicines have changed or have updated prescriptions.        Dose/Directions    * eslicarbazepine acetate 200 MG tablet   Commonly known as:  APTIOM   This may have changed:  Another medication with the same name was added. Make sure you understand how and when to take each.   Changed by:  Norman Dukes MD        Dose:  1200 mg   Take 1,200 mg by mouth   Refills:  0       * eslicarbazepine acetate 600 MG tablet   Commonly known as:  APTIOM   This may have changed:  You were already taking a medication with the same name, and this prescription was added. Make sure you understand how and when to take each.   Used for:  Seizure disorder (H)   Changed by:  Norman Dukes MD        Dose:  1200 mg   Take 2 tablets (1,200 mg) by mouth At Bedtime   Quantity:  60 tablet   Refills:  11       lamoTRIgine 100 MG  tablet   Commonly known as:  LaMICtal   This may have changed:  Another medication with the same name was removed. Continue taking this medication, and follow the directions you see here.   Used for:  Seizure disorder (H)   Changed by:  Norman Dukes MD        Dose:  100 mg   Take 1 tablet (100 mg) by mouth 2 times daily   Quantity:  62 tablet   Refills:  11       zonisamide 100 MG capsule   Commonly known as:  ZONEGRAN   This may have changed:  additional instructions   Used for:  Partial symptomatic epilepsy with complex partial seizures, intractable, without status epilepticus (H)   Changed by:  Norman Dukes MD        Take 3 caps (300 mg) each AM and 4 caps (400 mg) each HS   Quantity:  211 capsule   Refills:  11       * Notice:  This list has 2 medication(s) that are the same as other medications prescribed for you. Read the directions carefully, and ask your doctor or other care provider to review them with you.         Where to get your medicines      These medications were sent to [facility]/pharmacy #[id number] [address] [address]     Phone:  [phone]     eslicarbazepine acetate 600 MG tablet    lamoTRIgine 100 MG tablet    vitamin D 76768 UNIT capsule    zonisamide 100 MG capsule                Primary Care Provider Fax #    Physician No Ref-Primary [phone]       No address on file        Equal Access to Services     [facility] HERRERA AH: Nataliya delaney Sohusam, waaxda luqadaha, qaybta kaalmada adejarocho, syed ortiz. So [facility] [phone].    ATENCIÓN: Si habla español, tiene a damon disposición servicios gratuitos de asistencia lingüística. Llame al [phone].    We comply with applicable federal civil rights laws and [place] laws. We do not discriminate on the basis of race, color, national origin, age, disability, sex, sexual orientation, or gender identity.            Thank you!     Thank you  for choosing [facility]  for your care. Our goal is always to provide you with excellent care. Hearing back from our patients is one way we can continue to improve our services. Please take a few minutes to complete the written survey that you may receive in the mail after your visit with us. Thank you!             Your Updated Medication List - Protect others around you: Learn how to safely use, store and throw away your medicines at www.disposemymeds.org.          This list is accurate as of 9/6/18 12:02 PM.  Always use your most recent med list.                   Brand Name Dispense Instructions for use Diagnosis    * eslicarbazepine acetate 200 MG tablet    APTIOM     Take 1,200 mg by mouth        * eslicarbazepine acetate 600 MG tablet    APTIOM    60 tablet    Take 2 tablets (1,200 mg) by mouth At Bedtime    Seizure disorder (H)       fexofenadine 180 MG tablet    ALLEGRA          FLUoxetine 20 MG tablet     60 tablet    Take 2 tablets (40 mg) by mouth every morning    Moderate episode of recurrent major depressive disorder (H)       hydrOXYzine 25 MG tablet    ATARAX    90 tablet    Take 1 tablet (25 mg) by mouth 3 times daily as needed for anxiety    Moderate episode of recurrent major depressive disorder (H)       lamoTRIgine 100 MG tablet    LaMICtal    62 tablet    Take 1 tablet (100 mg) by mouth 2 times daily    Seizure disorder (H)       LORazepam 1 MG tablet    ATIVAN     Take 1 mg by mouth        MELATONIN PO      Take 5 mg by mouth At Bedtime        vitamin D 65520 UNIT capsule    ERGOCALCIFEROL    30 capsule    Take 1 capsule (50,000 Units) by mouth daily    Seizure disorder (H)       zonisamide 100 MG capsule    ZONEGRAN    211 capsule    Take 3 caps (300 mg) each AM and 4 caps (400 mg) each HS    Partial symptomatic epilepsy with complex partial seizures, intractable, without status epilepticus (H)       * Notice:  This list has 2 medication(s) that are the same as other medications  prescribed for you. Read the directions carefully, and ask your doctor or other care provider to review them with you.

## 2018-09-06 NOTE — PROGRESS NOTES
"[facility] Epilepsy Care Progress Note      Patient:  Amrit Padilla  :  1995   Age:  23 year old   Today's Office Visit:  2018    Epilepsy Data:    Patient History  Primary Epileptologist/Provider: Norman Dukes M.D.  Patient Status: Not yet controlled  Epilepsy Syndrome: Epilepsy unspecified  Epilepsy Syndrome Status: Undetermined - Evaluation in Progress  Age of Onset: Age 16  Other Relevant Dx/ Issues: Asperger's     Tests/Surgery History  Last EEG: 3/30/2018  Last MRI: 2018    Seizure Record  Current Visit Date: 18  Previous Visit Date: 18  Months since last visit: 2.3  Seizure Type 1: Complex partial seizures with impairment of consciousness at onset  Description of Sz Type 1: \"mild seizures\", during which he will have some facial twitching. He will have repetitive harnd/arm jerking movement. He will become unresponsive for less than a minute. Postictally, he was having some confusion and headaches and fatigue. He has an aura of \"seeing bees populating the earth with eggs.\"  Seizure Type 2: Tonic-clonic seizures  Description of Sz Type 2: He will have generalized convulsions of the body and his body will be stiff. It usually lasts 1-2 minutes followed by postictal fatigue and hallucinations.  # of Type 2 Seizure since last visit: 0  Freq. Type 2 / Month: 0      Current Outpatient Prescriptions   Medication Sig Dispense Refill     eslicarbazepine acetate (APTIOM) 200 MG tablet Take 1,200 mg by mouth        fexofenadine (ALLEGRA) 180 MG tablet        FLUoxetine 20 MG tablet Take 2 tablets (40 mg) by mouth every morning 60 tablet 2     lamoTRIgine (LAMICTAL) 100 MG tablet Take 1 tablet (100 mg) by mouth 2 times daily 186 tablet 3     MELATONIN PO Take 5 mg by mouth At Bedtime       vitamin D (ERGOCALCIFEROL) 89026 UNIT capsule Take 1 capsule by mouth daily       zonisamide (ZONEGRAN) 100 MG capsule Take 4 caps (400 mg) each AM and 3 caps (300 mg) each  capsule 1     hydrOXYzine " "(ATARAX) 25 MG tablet Take 1 tablet (25 mg) by mouth 3 times daily as needed for anxiety (Patient not taking: Reported on 6/28/2018) 90 tablet 1     lamoTRIgine (LAMICTAL) 25 MG tablet Start at 25mg every night at bedtime then increase by 25 mg weekly to a target dose of 100mg twice daily. Per Comments below (Patient not taking: Reported on 9/6/2018) 252 tablet 2     LORazepam (ATIVAN) 1 MG tablet Take 1 mg by mouth              CHIEF COMPLAINT:  Follow up for seizures.      HISTORY OF PRESENT ILLNESS:  This patient is a 23-year-old right-handed male with history of epilepsy, Asperger's disorder, depression, who returns for follow up for seizures.  He is by himself in the clinic today.  He was previously seen by Dr. Morales at [facility] for his epilepsy.  He is currently taking aptiom 1200 mg qhs,  zonegran 400-300, Lamictal 100 mg bid. Lamictal was added since the last visit. Since the last visit, he had no seizures.  He stopped Vimpat before because of suicidal thoughts. He complains of fatigue.  He is seeing Dr. Ben Barros for his depression.     The patient started to have seizures when he was 16 years old and he was managed at the [facility] for the past few years for his seizures.  He is currently taking Aptiom 1200 mg q.h.s. and the Zonegran 300 mg b.i.d. for his epilepsy.  He was recently admitted to the hospital twice because of 2 clusters of seizures, one in January when he was admitted to [facility] and one was in February when he was admitted to [facility].  On both occasions, he had 4-5 back to back generalized tonic-clonic seizures within a short period of time.      According to the mother, the patient has 2 different types of seizures.  Type #1 is described as \"mild seizures,\" during which he will have some facial twitching.  He will have repetitive hand/arm jerking movement.  He will become unresponsive for less than a minute.  Postictally, he was having some confusion and headaches and fatigue.  " "Before the seizures, he will have an aura of \"seeing bees populating the earth with eggs.\"  This type of seizure is relatively infrequent, average once per month.      Type #2 are generalized tonic-clonic seizures which the patient will have generalized convulsions of the body and his body will be stiff.  It usually will last for 1-2 minutes followed by postictal fatigue and hallucinations.  This happens usually once every 3 months, but sometimes it occurs in clusters with 2-5 seizures back to back.      The patient had tried Keppra and Depakote in the past which did not work.  He had EEGs done in the past which were reported negative.      The patient had severe depression and requested a lot of behavioral health and counseling in the past and at certain time, he was suicidal, but that improved.  At the present time, he is not suicidal.  He does not think about hurting himself.      TRIGGERS FOR SEIZURES:  Depression.      RISK FACTORS FOR SEIZURES:  He had no history of head trauma with loss of consciousness.  No history of febrile convulsions.  No history of CNS infections.        PAST AEDs: Keppra, Depakote.    PAST MEDICAL HISTORY:  Asperger's disorder, depression, epilepsy.      PAST SURGICAL HISTORY:  None.      FAMILY HISTORY:  No family history of seizures.  Both parents have hypertension.  Maternal grandmother had heart disease.      SOCIAL HISTORY:  He is single, living with his mother.  He had some college education but did not finish because of depression.  He is currently working at [employer].  No smoking, no alcohol, no drug abuse.      REVIEW OF SYSTEMS:  Positive for fatigue, depression.  The rest of the 8-point review of systems are negative.      ALLERGIES:  Sertraline and Zyprexa.      PHYSICAL EXAMINATION:      Blood pressure 126/78, pulse 76, temperature 99.2  F (37.3  C), weight 183 lb 9.6 oz (83.3 kg).    General exam: General Appearance: No acute distress. HEENT: Normocephalic, atraumatic. " Neck: Supple.  Extremities: No edema, no clubbing, no cyanosis.     Neurologic Exam: Alert and oriented x3. Speech fluent, appropriate. Normal attention. Cranial Nerves: Pupils are equal, round, reactive to light and accomodation. Extraocular movement intact. No facial weakness or asymmetry. Hearing normal. Motor Exam: Normal. Coordination:no ataxia.  Gait and Station: normal.    PREVIOUS DIAGNOSTIC TESTING:  CT scan of the head on 02/27/2018 was reported negative from [facility].  He had EEGs in the past which were reported negative.      IMPRESSION:   1.  Epilepsy.  This patient is a 23-year-old right-handed male with history of epilepsy, Asperger's disorder, depression, who returns for follow up for seizures.  He is by himself in the clinic today.  He was previously seen by Dr. Morales at [facility] for his epilepsy.  He is currently taking aptiom 1200 mg qhs,  zonegran 400-300, Lamictal 100 mg bid. Lamictal was added since the last visit. Since the last visit, he had no seizures.  He stopped Vimpat before because of suicidal thoughts. He complains of fatigue.  He is seeing Dr. Ben Barros for his depression.     2.  Asperger's syndrome.   3.  Depression.  He is followed by Dr. Ben Barros.     PLAN:   1.  Continue Aptiom 1200 mg q.h.s. and Zonegran 400-300 mg b.i.d.   2.  Continue to follow up with Dr. Barros for depression and get in therapy.  3.  Continue Lamictal 100 mg bid.  4.  May consider add Topamax, Felbamate, etc in the future if seizures get worse.  5.  RTC in 3 months.      30 min was spent on the visit.  Over 50% of the time was spent on education, counseling about optimal seizure control and coordination of care.

## 2018-09-06 NOTE — LETTER
"2018     RE: Amrit Padilla  : 1995   MRN: [id number]      Dear Colleague,    Thank you for referring your patient, Amrit Padilla, to the [facility] EPILEPSY CARE at [facility]. Please see a copy of my visit note below.    [facility]      Patient:  Amrit Padilla  :  1995   Age:  23 year old   Today's Office Visit:  2018    Epilepsy Data:    Patient History  Primary Epileptologist/Provider: Norman Dukes M.D.  Patient Status: Not yet controlled  Epilepsy Syndrome: Epilepsy unspecified  Epilepsy Syndrome Status: Undetermined - Evaluation in Progress  Age of Onset: Age 16  Other Relevant Dx/ Issues: Asperger's     Tests/Surgery History  Last EEG: 3/30/2018  Last MRI: 2018    Seizure Record  Current Visit Date: 18  Previous Visit Date: 18  Months since last visit: 2.3  Seizure Type 1: Complex partial seizures with impairment of consciousness at onset  Description of Sz Type 1: \"mild seizures\", during which he will have some facial twitching. He will have repetitive harnd/arm jerking movement. He will become unresponsive for less than a minute. Postictally, he was having some confusion and headaches and fatigue. He has an aura of \"seeing bees populating the earth with eggs.\"  Seizure Type 2: Tonic-clonic seizures  Description of Sz Type 2: He will have generalized convulsions of the body and his body will be stiff. It usually lasts 1-2 minutes followed by postictal fatigue and hallucinations.  # of Type 2 Seizure since last visit: 0  Freq. Type 2 / Month: 0      Current Outpatient Prescriptions   Medication Sig Dispense Refill     eslicarbazepine acetate (APTIOM) 200 MG tablet Take 1,200 mg by mouth        fexofenadine (ALLEGRA) 180 MG tablet        FLUoxetine 20 MG tablet Take 2 tablets (40 mg) by mouth every morning 60 tablet 2     lamoTRIgine (LAMICTAL) 100 MG tablet Take 1 tablet (100 mg) by mouth 2 times daily 186 " tablet 3     MELATONIN PO Take 5 mg by mouth At Bedtime       vitamin D (ERGOCALCIFEROL) 94497 UNIT capsule Take 1 capsule by mouth daily       zonisamide (ZONEGRAN) 100 MG capsule Take 4 caps (400 mg) each AM and 3 caps (300 mg) each  capsule 1     hydrOXYzine (ATARAX) 25 MG tablet Take 1 tablet (25 mg) by mouth 3 times daily as needed for anxiety (Patient not taking: Reported on 6/28/2018) 90 tablet 1     lamoTRIgine (LAMICTAL) 25 MG tablet Start at 25mg every night at bedtime then increase by 25 mg weekly to a target dose of 100mg twice daily. Per Comments below (Patient not taking: Reported on 9/6/2018) 252 tablet 2     LORazepam (ATIVAN) 1 MG tablet Take 1 mg by mouth              CHIEF COMPLAINT:  Follow up for seizures.      HISTORY OF PRESENT ILLNESS:  This patient is a 23-year-old right-handed male with history of epilepsy, Asperger's disorder, depression, who returns for follow up for seizures.  He is by himself in the clinic today.  He was previously seen by Dr. Morales at [facility] for his epilepsy.  He is currently taking aptiom 1200 mg qhs,  zonegran 400-300, Lamictal 100 mg bid. Lamictal was added since the last visit. Since the last visit, he had no seizures.  He stopped Vimpat before because of suicidal thoughts. He complains of fatigue.  He is seeing Dr. Ben Barros for his depression.     The patient started to have seizures when he was 16 years old and he was managed at the [facility] for the past few years for his seizures.  He is currently taking Aptiom 1200 mg q.h.s. and the Zonegran 300 mg b.i.d. for his epilepsy.  He was recently admitted to the hospital twice because of 2 clusters of seizures, one in January when he was admitted to [facility] and one was in February when he was admitted to [facility].  On both occasions, he had 4-5 back to back generalized tonic-clonic seizures within a short period of time.      According to the mother, the patient has 2 different types of seizures.   "Type #1 is described as \"mild seizures,\" during which he will have some facial twitching.  He will have repetitive hand/arm jerking movement.  He will become unresponsive for less than a minute.  Postictally, he was having some confusion and headaches and fatigue.  Before the seizures, he will have an aura of \"seeing bees populating the earth with eggs.\"  This type of seizure is relatively infrequent, average once per month.      Type #2 are generalized tonic-clonic seizures which the patient will have generalized convulsions of the body and his body will be stiff.  It usually will last for 1-2 minutes followed by postictal fatigue and hallucinations.  This happens usually once every 3 months, but sometimes it occurs in clusters with 2-5 seizures back to back.      The patient had tried Keppra and Depakote in the past which did not work.  He had EEGs done in the past which were reported negative.      The patient had severe depression and requested a lot of behavioral health and counseling in the past and at certain time, he was suicidal, but that improved.  At the present time, he is not suicidal.  He does not think about hurting himself.      TRIGGERS FOR SEIZURES:  Depression.      RISK FACTORS FOR SEIZURES:  He had no history of head trauma with loss of consciousness.  No history of febrile convulsions.  No history of CNS infections.        PAST AEDs: Keppra, Depakote.    PAST MEDICAL HISTORY:  Asperger's disorder, depression, epilepsy.      PAST SURGICAL HISTORY:  None.      FAMILY HISTORY:  No family history of seizures.  Both parents have hypertension.  Maternal grandmother had heart disease.      SOCIAL HISTORY:  He is single, living with his mother.  He had some college education but did not finish because of depression.  He is currently working at [employer].  No smoking, no alcohol, no drug abuse.      REVIEW OF SYSTEMS:  Positive for fatigue, depression.  The rest of the 8-point review of systems are " negative.      ALLERGIES:  Sertraline and Zyprexa.      PHYSICAL EXAMINATION:      Blood pressure 126/78, pulse 76, temperature 99.2  F (37.3  C), weight 183 lb 9.6 oz (83.3 kg).    General exam: General Appearance: No acute distress. HEENT: Normocephalic, atraumatic. Neck: Supple.  Extremities: No edema, no clubbing, no cyanosis.     Neurologic Exam: Alert and oriented x3. Speech fluent, appropriate. Normal attention. Cranial Nerves: Pupils are equal, round, reactive to light and accomodation. Extraocular movement intact. No facial weakness or asymmetry. Hearing normal. Motor Exam: Normal. Coordination:no ataxia.  Gait and Station: normal.    PREVIOUS DIAGNOSTIC TESTING:  CT scan of the head on 02/27/2018 was reported negative from [facility].  He had EEGs in the past which were reported negative.      IMPRESSION:   1.  Epilepsy.  This patient is a 23-year-old right-handed male with history of epilepsy, Asperger's disorder, depression, who returns for follow up for seizures.  He is by himself in the clinic today.  He was previously seen by Dr. Morales at [facility] for his epilepsy.  He is currently taking aptiom 1200 mg qhs,  zonegran 400-300, Lamictal 100 mg bid. Lamictal was added since the last visit. Since the last visit, he had no seizures.  He stopped Vimpat before because of suicidal thoughts. He complains of fatigue.  He is seeing Dr. Ben Barros for his depression.     2.  Asperger's syndrome.   3.  Depression.  He is followed by Dr. Ben Barros.     PLAN:   1.  Continue Aptiom 1200 mg q.h.s. and Zonegran 400-300 mg b.i.d.   2.  Continue to follow up with Dr. Barros for depression and get in therapy.  3.  Continue Lamictal 100 mg bid.  4.  May consider add Topamax, Felbamate, etc in the future if seizures get worse.  5.  RTC in 3 months.    30 min was spent on the visit.  Over 50% of the time was spent on education, counseling about optimal seizure control and coordination of care.    Again, thank  you for allowing me to participate in the care of your patient.      Sincerely,    Norman Dukes MD

## 2018-09-20 ENCOUNTER — TELEPHONE (OUTPATIENT)
Dept: NEUROLOGY | Facility: CLINIC | Age: 23
End: 2018-09-20

## 2018-09-20 DIAGNOSIS — G40.909 EPILEPTIC SEIZURE (H): Primary | ICD-10-CM

## 2018-09-20 NOTE — TELEPHONE ENCOUNTER
Tiesha Brice MA [facility] [place]        Phone Number: [phone]                     Caller: Sarah   Relationship to Patient: Mom   Call Back Number: [phone]   Reason for Call: Pt mother calling would like to speak to a nurse, Pt just had a seizure and she gave him his medications but would like to speak to someone asap      Sarah (mom) calls reporting Sergio had a seizure a few minutes ago. The seizure woke up him from a nap. He had been seizure free for a few months. While at work last Monday he felt dizzy. Tuesday and Wednesday he was tired. Today he was tired and had a headache and ended up having a seizure. Mom administered 1 ml lorazepam to prevent a cluster of seizures from occurring.     No missed medications, no injury.     PLAN: Discussed with Dr. Dukes    1) Check AED serum levels (LTG and ZNS).  2) FAX lab order to [facility], [place] @ [phone]

## 2018-09-21 ENCOUNTER — TRANSFERRED RECORDS (OUTPATIENT)
Dept: HEALTH INFORMATION MANAGEMENT | Facility: CLINIC | Age: 23
End: 2018-09-21

## 2018-09-23 ENCOUNTER — TELEPHONE (OUTPATIENT)
Dept: NEUROLOGY | Facility: CLINIC | Age: 23
End: 2018-09-23

## 2018-09-23 LAB
LAMOTRIGINE SERPL-MCNC: 2.4 UG/ML (ref 2.5–15)
ZONISAMIDE LEVEL QUANT: 24 UG/ML (ref 10–40)

## 2018-09-23 NOTE — TELEPHONE ENCOUNTER
Patient called about a spell that he had overnight. Around 3AM he was awaken from sleep and noticed that both shoulders were shaking and face was shaking, he was conscious and aware but he did feel 'off.' The spell lasted ~1 minute, and it took 3-5 minutes following the spell before he felt back to normal. He proceeded to fall back asleep afterwards. No more spells throughout the day today, but he called because he wanted to inform Dr. Dukes about this event. He has some spells earlier in the week, which was discussed w/ Dr. Dukes over the phone & he was recommended to get AED levels drawn.    Overall, I am reassured by the fact that patient has returned to baseline and has not had additional spells during the day today (his seizures have clustered in the past). I will send a message to Dr. Dukes, so that he is aware. I advised patient to get the AED levels drawn as previously recommended; no changes to AED regimen recommended at this time.

## 2018-09-24 ENCOUNTER — TELEPHONE (OUTPATIENT)
Dept: NEUROLOGY | Facility: CLINIC | Age: 23
End: 2018-09-24

## 2018-09-24 DIAGNOSIS — G40.909 SEIZURE DISORDER (H): ICD-10-CM

## 2018-09-24 DIAGNOSIS — G40.909 EPILEPTIC SEIZURE (H): ICD-10-CM

## 2018-09-24 RX ORDER — LAMOTRIGINE 100 MG/1
150 TABLET ORAL 2 TIMES DAILY
Qty: 93 TABLET | Refills: 11 | Status: SHIPPED | OUTPATIENT
Start: 2018-09-24 | End: 2018-10-15

## 2018-09-24 NOTE — TELEPHONE ENCOUNTER
Patient called. He had 2 seizures last week.  One seizure was on Thurs. and one seizure was on Sat. He is current ly Aptiom 1200 mg at bedtime, Zonegran 400-300, Lamictal 100 mg bid.    Plan:  1. Increase Lamictal to 150 mg bid.  Patient is advised about the possible side effect of severe rashes.  2. Patient has an appointment in Dec.

## 2018-09-27 ENCOUNTER — TELEPHONE (OUTPATIENT)
Dept: NEUROLOGY | Facility: CLINIC | Age: 23
End: 2018-09-27

## 2018-09-27 NOTE — TELEPHONE ENCOUNTER
"Kati Rogers MA  [facility] [place]                   Caller: Amrit     Relationship to Patient: Pt     Call Back Number: [phone]     Reason for Call: Pt wanted to report seizure from last night and this morning 09/26/2018. He does not know if it was an actual seizure      Amrit reports he's been experiencing dreams and when he wakes up he is experiencing actions after the dreams. For an example he bit down in a pillow, then blanket.   This morning Amrit woke up with gurgling sounds. He also stated \"I was trying to chomp/eat my food\".   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  Amrit states after waking up from sleeping, he still feels drowsy for most of the day.  These episodes have been occurring for approximately one week.     Amrit is concerned the above experiences are seizure related.     PLAN:    Advised that this is not likely seizure related, more likely a sleep issue.  Amrit is currently taking HS dose of Lamictal just prior to going to bed. Advised Amrit to take HS dose of Lamictal ~ 2 hours prior to going to bed.  Amrit is agreeable to the plan above.                        "

## 2018-10-02 ENCOUNTER — TELEPHONE (OUTPATIENT)
Dept: NEUROLOGY | Facility: CLINIC | Age: 23
End: 2018-10-02

## 2018-10-02 NOTE — TELEPHONE ENCOUNTER
"  Caller: amrit   Relationship to Patient: self   Call Back Number: [phone]   Reason for Call: pt feeling \"zoned out.\" For example when he is out shopping he feels like he's not there but know he's there. Sometimes when he sits down his energy is just feels \"zapped\" where he has no energy and a little lost. This morning he also had a \"really big headache\" which lasted from 9am- 1pm. Pt cannot tell If he had a sz or not     Amrit reports he felt \"zoned out\" yesterday afternoon. He reports feeling lack of energy, he had moments when he was eating \"there was nothing in my mind at all\".   He is not certain if this is seizure related or other issues.  Transferred to patient services to coordinate a phone call with MD to further discuss concerns.  "

## 2018-10-03 ENCOUNTER — TELEPHONE (OUTPATIENT)
Dept: NEUROLOGY | Facility: CLINIC | Age: 23
End: 2018-10-03

## 2018-10-03 DIAGNOSIS — G40.909 SEIZURE DISORDER (H): Primary | ICD-10-CM

## 2018-10-04 ENCOUNTER — TELEPHONE (OUTPATIENT)
Dept: NEUROLOGY | Facility: CLINIC | Age: 23
End: 2018-10-04

## 2018-10-04 ENCOUNTER — TRANSFERRED RECORDS (OUTPATIENT)
Dept: HEALTH INFORMATION MANAGEMENT | Facility: CLINIC | Age: 23
End: 2018-10-04

## 2018-10-04 DIAGNOSIS — G40.909 SEIZURE DISORDER (H): Primary | ICD-10-CM

## 2018-10-04 NOTE — TELEPHONE ENCOUNTER
Nurse received In-Basket message as follows:     Caller: Amrit     Relationship to Patient: pt     Call Back Number: [phone]     Reason for Call: Questions about Lorazepam.     Nurse returned call to patient who is asking if it might be helpful to take an ativan that he was prescribed at [facility].  Nurse indicated that Ativan is helpful to prevent additional seizures.  Patient is interested in getting an Ativan order from Dr. Dukes.  Mother came to phone with question about certification for CBD oil and nurse indicated that many seizure patients found it helpful and some found it unhelpful; Mother would like Dr. Dukes to consider it and nurse indicated he would pass this on to Dr. Dukes.  Also informed Mother that MD can certify a person for CBD oil, but that it is up to the dispensers to decide how much and which of the oils to give, and that it is an out of pocket cost.  Mother indicated understanding and willingness to pay for it as she fells something more is needed.  Lastly Mother ask if this seizure today might have been brought on by patient's anxiousness about starting a new job and nurse indicated to her that stress is very much a trigger.

## 2018-10-04 NOTE — TELEPHONE ENCOUNTER
Nurse received In-Basket message as follows:    Caller: Amrit     Relationship to Patient: Self     Call Back Number: [phone]     Reason for Call: Patient had a seizure today around noon and he would like to know whether or not he should go to work tomorrow at 9 AM.     Nurse returned call to patient who indicates that a few hours ago he was at orientation for a new job and woke up on a table with people over him asking if he has Epilepsy.  He is asking if he should go home now and if he should go to work tomorrow at 9AM. Patient reports he has a headache now but otherwise is Ok.    Patient indicates the last time he had a seizure was 2 weeks ago during noc 22nd into 23rd.  Nurse indicated that if he felt ok he could stay at work now and if he was feeling up to it tomorrow he could return.  Patient has phone call appointment on 10/15 ( in 11 days with Dr. Dukes ).  Instructed patient to have food in his stomach before his meds, stay hydrated, and to get to clinic for labs as soon as he is able.  Patient is agreeable with this and will have labs done at [facility] [place].

## 2018-10-06 LAB
LAMOTRIGINE SERPL-MCNC: 3.5 UG/ML (ref 2.5–15)
ZONISAMIDE LEVEL QUANT: 23 UG/ML (ref 10–40)

## 2018-10-09 RX ORDER — CHOLECALCIFEROL (VITAMIN D3) 1250 MCG
CAPSULE ORAL
Qty: 7 CAPSULE | Refills: 0 | Status: SHIPPED | OUTPATIENT
Start: 2018-10-09 | End: 2020-07-27

## 2018-10-09 RX ORDER — ERGOCALCIFEROL 1.25 MG/1
CAPSULE, LIQUID FILLED ORAL
Qty: 8 CAPSULE | Refills: 0 | Status: SHIPPED | OUTPATIENT
Start: 2018-10-09 | End: 2018-10-09 | Stop reason: ALTCHOICE

## 2018-10-09 RX ORDER — ERGOCALCIFEROL 1.25 MG/1
CAPSULE, LIQUID FILLED ORAL
Qty: 8 CAPSULE | Refills: 0 | Status: CANCELLED | OUTPATIENT
Start: 2018-10-09

## 2018-10-09 NOTE — TELEPHONE ENCOUNTER
Caller: Radah   Relationship to Patient: Pharmacy worker   Call Back Number: [phone]   Reason for Call: Pharmacy would like to verify pt's Vitamin D medication instructions    PLAN: Discussed with Dr. Dukes    Administer Vitamin D3 50,000 IU once each week x 7 weeks, then begin maintenance dose of Vitamin D3 1000 IU daily.

## 2018-10-15 ENCOUNTER — VIRTUAL VISIT (OUTPATIENT)
Dept: NEUROLOGY | Facility: CLINIC | Age: 23
End: 2018-10-15
Payer: COMMERCIAL

## 2018-10-15 DIAGNOSIS — G40.909 SEIZURE DISORDER (H): ICD-10-CM

## 2018-10-15 RX ORDER — LAMOTRIGINE 100 MG/1
200 TABLET ORAL 2 TIMES DAILY
Qty: 124 TABLET | Refills: 11 | Status: SHIPPED | OUTPATIENT
Start: 2018-10-15 | End: 2018-12-12

## 2018-10-15 NOTE — PROGRESS NOTES
Called patient as scheduled.    Patient had a seizure on Oct. 4th, 2018. Patient is having about one seizure every 2 weeks.  He used to have one seizure every 3 months.    Patient is taking Aptiom 1200 mg q.h.s. and Zonegran 400-300 mg b.i.d. Lamictal 150 mg bid.    Plan:  1. Increase Lamictal to 200 mg bid.  2. Patient is certified for medical cannabis.  3. Prescription of Aptiom for 3 months each time as per patient's request.      Time spent: 15 min

## 2018-10-15 NOTE — MR AVS SNAPSHOT
After Visit Summary   10/15/2018    Amrit Padilla    MRN: [id number]           Patient Information     Date Of Birth          1995        Visit Information        Provider Department      10/15/2018 11:30 AM Norman Dukes MD [facility]        Today's Diagnoses     Seizure disorder (H)           Follow-ups after your visit        Your next 10 appointments already scheduled     Dec 06, 2018 11:00 AM CST   Return Visit with MD [facility] ([facility])    [address], [address]  [facility] [address] [phone]              Who to contact     Please call your clinic at [phone] to:    Ask questions about your health    Make or cancel appointments    Discuss your medicines    Learn about your test results    Speak to your doctor            Additional Information About Your Visit        [facility] Information     [employer] gives you secure access to your electronic health record. If you see a primary care provider, you can also send messages to your care team and make appointments. If you have questions, please call your primary care clinic.  If you do not have a primary care provider, please call [phone] and they will assist you.      [employer] is an electronic gateway that provides easy, online access to your medical records. With [employer], you can request a clinic appointment, read your test results, renew a prescription or communicate with your care team.     To access your existing account, please contact your [facility] or call [phone] for assistance.        Care EveryWhere ID     This is your Care EveryWhere ID. This could be used by other organizations to access your [place] medical records  [id number]         Blood Pressure from Last 3 Encounters:   09/06/18 126/78   06/28/18 118/77   06/11/18 119/83    Weight from Last 3 Encounters:   09/06/18 183 lb 9.6 oz (83.3 kg)   06/28/18 183 lb 6.4 oz  (83.2 kg)   06/11/18 177 lb 6.4 oz (80.5 kg)              Today, you had the following     No orders found for display         Today's Medication Changes          These changes are accurate as of 10/15/18  1:31 PM.  If you have any questions, ask your nurse or doctor.               These medicines have changed or have updated prescriptions.        Dose/Directions    eslicarbazepine acetate 600 MG tablet   Commonly known as:  APTIOM   This may have changed:  Another medication with the same name was removed. Continue taking this medication, and follow the directions you see here.   Used for:  Seizure disorder (H)        Dose:  1200 mg   Take 2 tablets (1,200 mg) by mouth At Bedtime   Quantity:  180 tablet   Refills:  3       lamoTRIgine 100 MG tablet   Commonly known as:  LaMICtal   This may have changed:  how much to take   Used for:  Seizure disorder (H)        Dose:  200 mg   Take 2 tablets (200 mg) by mouth 2 times daily   Quantity:  124 tablet   Refills:  11            Where to get your medicines      These medications were sent to [facility]/pharmacy #[id number] [place] [address]     Phone:  [phone]     eslicarbazepine acetate 600 MG tablet    lamoTRIgine 100 MG tablet                Primary Care Provider Fax #    Physician No Ref-Primary [phone]       No address on file        Equal Access to Services     CAMERON HERRERA : Hadii scott delaney Sohusam, waaxda luqadaha, qaybta kaalmada adeegyada, syed ortiz. So [facility] [phone].    ATENCIÓN: Si habla español, tiene a damon disposición servicios gratuitos de asistencia lingüística. Llame al [phone].    We comply with applicable federal civil rights laws and [place] laws. We do not discriminate on the basis of race, color, national origin, age, disability, sex, sexual orientation, or gender identity.            Thank you!     Thank you for  choosing [facility]  for your care. Our goal is always to provide you with excellent care. Hearing back from our patients is one way we can continue to improve our services. Please take a few minutes to complete the written survey that you may receive in the mail after your visit with us. Thank you!             Your Updated Medication List - Protect others around you: Learn how to safely use, store and throw away your medicines at www.disposemymeds.org.          This list is accurate as of 10/15/18  1:31 PM.  Always use your most recent med list.                   Brand Name Dispense Instructions for use Diagnosis    eslicarbazepine acetate 600 MG tablet    APTIOM    180 tablet    Take 2 tablets (1,200 mg) by mouth At Bedtime    Seizure disorder (H)       fexofenadine 180 MG tablet    ALLEGRA          FLUoxetine 20 MG tablet     60 tablet    Take 2 tablets (40 mg) by mouth every morning    Moderate episode of recurrent major depressive disorder (H)       hydrOXYzine 25 MG tablet    ATARAX    90 tablet    Take 1 tablet (25 mg) by mouth 3 times daily as needed for anxiety    Moderate episode of recurrent major depressive disorder (H)       lamoTRIgine 100 MG tablet    LaMICtal    124 tablet    Take 2 tablets (200 mg) by mouth 2 times daily    Seizure disorder (H)       LORazepam 1 MG tablet    ATIVAN     Take 1 mg by mouth        MELATONIN PO      Take 5 mg by mouth At Bedtime        * vitamin D3 64975 UNITS capsule    CHOLECALCIFEROL    7 capsule    Take 1 capsule (50,000 units) by mouth once each week x 7 weeks, then begin maintenance dose    Seizure disorder (H)       * cholecalciferol 1000 units capsule    vitamin  -D    30 capsule    Take 1 capsule (1,000 Units) by mouth daily    Seizure disorder (H)       zonisamide 100 MG capsule    ZONEGRAN    211 capsule    Take 3 caps (300 mg) each AM and 4 caps (400 mg) each HS    Partial symptomatic epilepsy with complex partial seizures, intractable, without  status epilepticus (H)       * Notice:  This list has 2 medication(s) that are the same as other medications prescribed for you. Read the directions carefully, and ask your doctor or other care provider to review them with you.

## 2018-10-23 DIAGNOSIS — G40.909 SEIZURE DISORDER (H): ICD-10-CM

## 2018-11-05 ENCOUNTER — TELEPHONE (OUTPATIENT)
Dept: NEUROLOGY | Facility: CLINIC | Age: 23
End: 2018-11-05

## 2018-11-05 DIAGNOSIS — G40.909 SEIZURE DISORDER (H): ICD-10-CM

## 2018-11-12 ENCOUNTER — TELEPHONE (OUTPATIENT)
Dept: NEUROLOGY | Facility: CLINIC | Age: 23
End: 2018-11-12

## 2018-11-12 NOTE — TELEPHONE ENCOUNTER
Nurse received In-Basket message as follows:    Caller: Amrit     Relationship to Patient: pt     Call Back Number: [phone]     Reason for Call: Pt is unsure if he had a seizure last night. He would like a follow up call.     Nurse returned call to patient who indicates that he had an incident yesterday evening when he appeared to be awakening from a nap on the couch and he was not able to respond correctly to those who spoke to him, He is amnesic for the event, patient indicated also that once he became aware he felt confused and did not know where he was at. Denies tongue bite or any other event, he was on couch and did hot have a fall.    Patient's Mother witnessed this event and felt he was just in a waking state and had not fully waken up.    Nurse indicated to patient that his opinion was that the confusion afterwards, and not remembering the event seemed to point toward a seizure event, but that it could not be certain without an EEG.

## 2018-11-13 ENCOUNTER — TELEPHONE (OUTPATIENT)
Dept: NEUROLOGY | Facility: CLINIC | Age: 23
End: 2018-11-13

## 2018-11-13 NOTE — TELEPHONE ENCOUNTER
Patient called back today asking if he should go to work today after he had the two episodes of confusion on waking.  Nurse indicated to patient that he was likely going to be ok going to work as these episodes he had were arising out of sleep which is a more likely time for a seizure to happen.

## 2018-11-15 NOTE — TELEPHONE ENCOUNTER
Nurse spoke with Dr. Dukes in regard to patient's question about episodes as in below note.    Dr. Dukes was in agreement that these episodes were not seizure and that patient is ok to go to work.    Nurse called patient back with this information and he indicated understanding.

## 2018-12-06 ENCOUNTER — OFFICE VISIT (OUTPATIENT)
Dept: NEUROLOGY | Facility: CLINIC | Age: 23
End: 2018-12-06
Payer: COMMERCIAL

## 2018-12-06 ENCOUNTER — TELEPHONE (OUTPATIENT)
Dept: NEUROLOGY | Facility: CLINIC | Age: 23
End: 2018-12-06

## 2018-12-06 DIAGNOSIS — G40.909 SEIZURE DISORDER (H): Primary | ICD-10-CM

## 2018-12-06 NOTE — PROGRESS NOTES
"[facility] Progress Note      Patient:  Amrit Padilla  :  1995   Age:  23 year old   Today's Office Visit:  2018    Epilepsy Data:    Patient History  Primary Epileptologist/Provider: Norman Dukes M.D.  Patient Status: Not yet controlled  Epilepsy Syndrome: Epilepsy unspecified  Epilepsy Syndrome Status: Undetermined - Evaluation in Progress  Age of Onset: Age 16  Other Relevant Dx/ Issues: Asperger's     Tests/Surgery History  Last EEG: 3/30/2018  Last MRI: 2018    Seizure Record  Current Visit Date: 18  Previous Visit Date: 18  Months since last visit: 2.99  Seizure Type 1: Complex partial seizures with impairment of consciousness at onset  Description of Sz Type 1: \"mild seizures\", during which he will have some facial twitching. He will have repetitive harnd/arm jerking movement. He will become unresponsive for less than a minute. Postictally, he was having some confusion and headaches and fatigue. He has an aura of \"seeing bees populating the earth with eggs.\"  # of Type 1 Seizure since last visit: 2  Freq. Type 1 / Month: 0.67  Seizure Type 2: Tonic-clonic seizures  Description of Sz Type 2: He will have generalized convulsions of the body and his body will be stiff. It usually lasts 1-2 minutes followed by postictal fatigue and hallucinations.  # of Type 2 Seizure since last visit: 0  Freq. Type 2 / Month: 0      Current Outpatient Prescriptions   Medication Sig Dispense Refill     cholecalciferol (VITAMIN  -D) 1000 units capsule Take one cap ( 1,000 mg ) by mouth daily 93 capsule 3     eslicarbazepine acetate (APTIOM) 600 MG tablet Take 2 tablets (1,200 mg) by mouth At Bedtime 180 tablet 3     fexofenadine (ALLEGRA) 180 MG tablet        FLUoxetine 20 MG tablet Take 2 tablets (40 mg) by mouth every morning 60 tablet 2     hydrOXYzine (ATARAX) 25 MG tablet Take 1 tablet (25 mg) by mouth 3 times daily as needed for anxiety (Patient not taking: Reported on 2018) 90 " tablet 1     lamoTRIgine (LAMICTAL) 100 MG tablet Take 2 tablets (200 mg) by mouth 2 times daily 124 tablet 11     LORazepam (ATIVAN) 1 MG tablet Take 1 mg by mouth       MELATONIN PO Take 5 mg by mouth At Bedtime       vitamin D3 (CHOLECALCIFEROL) 07266 UNITS capsule Take 1 capsule (50,000 units) by mouth once each week x 7 weeks, then begin maintenance dose 7 capsule 0     zonisamide (ZONEGRAN) 100 MG capsule Take 3 caps (300 mg) each AM and 4 caps (400 mg) each  capsule 11          CHIEF COMPLAINT:  Follow up for seizures.      HISTORY OF PRESENT ILLNESS:  23-year-old right-handed male with history of epilepsy, Asperger's disorder, depression returns for follow up for seizures.  He is by himself in the clinic today.  He was previously seen by Dr. Morales at [facility] for his epilepsy.  He is currently taking aptiom 1200 mg qhs,  zonegran 400-300, Lamictal 200 mg bid. Lamictal was added recently. Since the last visit, he had 2 seizures, one was definitely CPS and the other was possibly a CPS out of sleep. He is taking Aptiom 1200 mg hs, Zonegran 300-400.  He is also taking CBD oil for anxiety. He stopped Vimpat before because of suicidal thoughts.  He is seeing Dr. Ben Barros for his depression.  He is complaining of fatigue and sleep over 10 hours a day.     Mood improved a lot. No suicidal thoughts, no thoughts of hurting himself.    The patient started to have seizures when he was 16 years old and he was managed at the [facility] for the past few years for his seizures.  He is currently taking Aptiom 1200 mg q.h.s. and the Zonegran 300 mg b.i.d. for his epilepsy.  He was recently admitted to the hospital twice because of 2 clusters of seizures, one in January when he was admitted to [facility] and one was in February when he was admitted to [facility].  On both occasions, he had 4-5 back to back generalized tonic-clonic seizures within a short period of time.      According to the mother, the patient has  "2 different types of seizures.  Type #1 is described as \"mild seizures,\" during which he will have some facial twitching.  He will have repetitive hand/arm jerking movement.  He will become unresponsive for less than a minute.  Postictally, he was having some confusion and headaches and fatigue.  Before the seizures, he will have an aura of \"seeing bees populating the earth with eggs.\"  This type of seizure is relatively infrequent, average once per month.      Type #2 are generalized tonic-clonic seizures which the patient will have generalized convulsions of the body and his body will be stiff.  It usually will last for 1-2 minutes followed by postictal fatigue and hallucinations.  This happens usually once every 3 months, but sometimes it occurs in clusters with 2-5 seizures back to back.      The patient had tried Keppra and Depakote in the past which did not work.  He had EEGs done in the past which were reported negative.      The patient had severe depression and requested a lot of behavioral health and counseling in the past and at certain time, he was suicidal, but that improved.  At the present time, he is not suicidal.  He does not think about hurting himself.      TRIGGERS FOR SEIZURES:  Depression.      RISK FACTORS FOR SEIZURES:  He had no history of head trauma with loss of consciousness.  No history of febrile convulsions.  No history of CNS infections.        PAST AEDs: Keppra, Depakote.    PAST MEDICAL HISTORY:  Asperger's disorder, depression, epilepsy.      PAST SURGICAL HISTORY:  None.      FAMILY HISTORY:  No family history of seizures.  Both parents have hypertension.  Maternal grandmother had heart disease.      SOCIAL HISTORY:  He is single, living with his mother.  He had some college education but did not finish because of depression.  He is currently working at [employer].  No smoking, no alcohol, no drug abuse.      REVIEW OF SYSTEMS:  Positive for fatigue, depression.  The rest of the " 8-point review of systems are negative.      ALLERGIES:  Sertraline and Zyprexa.      PHYSICAL EXAMINATION:      There were no vitals taken for this visit.    General exam: General Appearance: No acute distress. HEENT: Normocephalic, atraumatic. Neck: Supple.  Extremities: No edema, no clubbing, no cyanosis.     Neurologic Exam: Alert and oriented x3. Speech fluent, appropriate. Normal attention. Cranial Nerves: Pupils are equal, round, reactive to light and accomodation. Extraocular movement intact. No facial weakness or asymmetry. Hearing normal. Motor Exam: Normal. Coordination:no ataxia.  Gait and Station: normal.    PREVIOUS DIAGNOSTIC TESTING:  CT scan of the head on 02/27/2018 was reported negative from [facility].  He had EEGs in the past which were reported negative.      IMPRESSION:   1.  Epilepsy. 23-year-old right-handed male with history of epilepsy, Asperger's disorder, depression returns for follow up for seizures.  He is by himself in the clinic today.  He was previously seen by Dr. Morales at [facility] for his epilepsy.  He is currently taking aptiom 1200 mg qhs,  zonegran 400-300, Lamictal 100 mg bid. Lamictal was added recently. Since the last visit, he had 2 seizures, one was definitely CPS and the other was possibly a CPS out of sleep. He is taking Aptiom 1200 mg hs, Zonegran 400-300.  He is also taking CBD oil for anxiety. He stopped Vimpat before because of suicidal thoughts.  He is seeing Dr. Ben Barros for his depression.  He is complaining of fatigue and sleep over 10 hours a day.    2.  Asperger's syndrome.   3.  Depression.  He is followed by Dr. Ben Barros.     PLAN:   1.  Continue Aptiom 1200 mg q.h.s. and Zonegran 300-400 mg, Lamictal 200 mg bid.  2.  Continue to follow up with Dr. Barros for depression and get in therapy.  3.  May consider add Topamax, Felbamate, etc in the future if seizures get worse.  4.  Aptiom, Zonegran, and Lamictal levels.  5.  Progressive muscle  relaxation with nurses.  6.  RTC in 3 months.      30 min was spent on the visit.  Over 50% of the time was spent on education, counseling about optimal seizure control and coordination of care.

## 2018-12-06 NOTE — PATIENT INSTRUCTIONS
Times of Days am pm        Medication Tablet Size Number of Tablets/Capsules Total Daily Dosage    Aptiom  600 1 1        1200 mg                  Zonegran  100 3 4        700 mg    Lamictal  100 2 2        400 mg                                                                             Carry this with you at all times.  CONTINUE TAKING YOUR OTHER MEDICATIONS AS PREVIOUSLY DIRECTED.      * * *Do not store medications in the bathroom.  Keep medications away from children!* * *

## 2018-12-06 NOTE — LETTER
"2018       RE: Amrit Padilla  : 1995   MRN: [id number]      Dear Colleague,    Thank you for referring your patient, Amrit Padilla, to the [facility] EPILEPSY CARE at [facility]. Please see a copy of my visit note below.    [facility]      Patient:  Amrit Padilla  :  1995   Age:  23 year old   Today's Office Visit:  2018    Epilepsy Data:    Patient History  Primary Epileptologist/Provider: Norman Dukes M.D.  Patient Status: Not yet controlled  Epilepsy Syndrome: Epilepsy unspecified  Epilepsy Syndrome Status: Undetermined - Evaluation in Progress  Age of Onset: Age 16  Other Relevant Dx/ Issues: Asperger's     Tests/Surgery History  Last EEG: 3/30/2018  Last MRI: 2018    Seizure Record  Current Visit Date: 18  Previous Visit Date: 18  Months since last visit: 2.99  Seizure Type 1: Complex partial seizures with impairment of consciousness at onset  Description of Sz Type 1: \"mild seizures\", during which he will have some facial twitching. He will have repetitive harnd/arm jerking movement. He will become unresponsive for less than a minute. Postictally, he was having some confusion and headaches and fatigue. He has an aura of \"seeing bees populating the earth with eggs.\"  # of Type 1 Seizure since last visit: 2  Freq. Type 1 / Month: 0.67  Seizure Type 2: Tonic-clonic seizures  Description of Sz Type 2: He will have generalized convulsions of the body and his body will be stiff. It usually lasts 1-2 minutes followed by postictal fatigue and hallucinations.  # of Type 2 Seizure since last visit: 0  Freq. Type 2 / Month: 0      Current Outpatient Prescriptions   Medication Sig Dispense Refill     cholecalciferol (VITAMIN  -D) 1000 units capsule Take one cap ( 1,000 mg ) by mouth daily 93 capsule 3     eslicarbazepine acetate (APTIOM) 600 MG tablet Take 2 tablets (1,200 mg) by mouth At Bedtime 180 tablet 3     " fexofenadine (ALLEGRA) 180 MG tablet        FLUoxetine 20 MG tablet Take 2 tablets (40 mg) by mouth every morning 60 tablet 2     hydrOXYzine (ATARAX) 25 MG tablet Take 1 tablet (25 mg) by mouth 3 times daily as needed for anxiety (Patient not taking: Reported on 6/28/2018) 90 tablet 1     lamoTRIgine (LAMICTAL) 100 MG tablet Take 2 tablets (200 mg) by mouth 2 times daily 124 tablet 11     LORazepam (ATIVAN) 1 MG tablet Take 1 mg by mouth       MELATONIN PO Take 5 mg by mouth At Bedtime       vitamin D3 (CHOLECALCIFEROL) 64654 UNITS capsule Take 1 capsule (50,000 units) by mouth once each week x 7 weeks, then begin maintenance dose 7 capsule 0     zonisamide (ZONEGRAN) 100 MG capsule Take 3 caps (300 mg) each AM and 4 caps (400 mg) each  capsule 11          CHIEF COMPLAINT:  Follow up for seizures.      HISTORY OF PRESENT ILLNESS:  23-year-old right-handed male with history of epilepsy, Asperger's disorder, depression returns for follow up for seizures.  He is by himself in the clinic today.  He was previously seen by Dr. Morales at [facility] for his epilepsy.  He is currently taking aptiom 1200 mg qhs,  zonegran 400-300, Lamictal 200 mg bid. Lamictal was added recently. Since the last visit, he had 2 seizures, one was definitely CPS and the other was possibly a CPS out of sleep. He is taking Aptiom 1200 mg hs, Zonegran 300-400.  He is also taking CBD oil for anxiety. He stopped Vimpat before because of suicidal thoughts.  He is seeing Dr. Ben Barros for his depression.  He is complaining of fatigue and sleep over 10 hours a day.     Mood improved a lot. No suicidal thoughts, no thoughts of hurting himself.    The patient started to have seizures when he was 16 years old and he was managed at the [facility] for the past few years for his seizures.  He is currently taking Aptiom 1200 mg q.h.s. and the Zonegran 300 mg b.i.d. for his epilepsy.  He was recently admitted to the hospital twice because of 2  "clusters of seizures, one in January when he was admitted to [facility] and one was in February when he was [facility]  On both occasions, he had 4-5 back to back generalized tonic-clonic seizures within a short period of time.      According to the mother, the patient has 2 different types of seizures.  Type #1 is described as \"mild seizures,\" during which he will have some facial twitching.  He will have repetitive hand/arm jerking movement.  He will become unresponsive for less than a minute.  Postictally, he was having some confusion and headaches and fatigue.  Before the seizures, he will have an aura of \"seeing bees populating the earth with eggs.\"  This type of seizure is relatively infrequent, average once per month.      Type #2 are generalized tonic-clonic seizures which the patient will have generalized convulsions of the body and his body will be stiff.  It usually will last for 1-2 minutes followed by postictal fatigue and hallucinations.  This happens usually once every 3 months, but sometimes it occurs in clusters with 2-5 seizures back to back.      The patient had tried Keppra and Depakote in the past which did not work.  He had EEGs done in the past which were reported negative.      The patient had severe depression and requested a lot of behavioral health and counseling in the past and at certain time, he was suicidal, but that improved.  At the present time, he is not suicidal.  He does not think about hurting himself.      TRIGGERS FOR SEIZURES:  Depression.      RISK FACTORS FOR SEIZURES:  He had no history of head trauma with loss of consciousness.  No history of febrile convulsions.  No history of CNS infections.        PAST AEDs: Keppra, Depakote.    PAST MEDICAL HISTORY:  Asperger's disorder, depression, epilepsy.      PAST SURGICAL HISTORY:  None.      FAMILY HISTORY:  No family history of seizures.  Both parents have hypertension.  Maternal grandmother had heart disease.    "   SOCIAL HISTORY:  He is single, living with his mother.  He had some college education but did not finish because of depression.  He is currently working at [employer].  No smoking, no alcohol, no drug abuse.      REVIEW OF SYSTEMS:  Positive for fatigue, depression.  The rest of the 8-point review of systems are negative.      ALLERGIES:  Sertraline and Zyprexa.      PHYSICAL EXAMINATION:      There were no vitals taken for this visit.    General exam: General Appearance: No acute distress. HEENT: Normocephalic, atraumatic. Neck: Supple.  Extremities: No edema, no clubbing, no cyanosis.     Neurologic Exam: Alert and oriented x3. Speech fluent, appropriate. Normal attention. Cranial Nerves: Pupils are equal, round, reactive to light and accomodation. Extraocular movement intact. No facial weakness or asymmetry. Hearing normal. Motor Exam: Normal. Coordination:no ataxia.  Gait and Station: normal.    PREVIOUS DIAGNOSTIC TESTING:  CT scan of the head on 02/27/2018 was reported negative from [facility].  He had EEGs in the past which were reported negative.      IMPRESSION:   1.  Epilepsy. 23-year-old right-handed male with history of epilepsy, Asperger's disorder, depression returns for follow up for seizures.  He is by himself in the clinic today.  He was previously seen by Dr. Morales at [facility] for his epilepsy.  He is currently taking aptiom 1200 mg qhs,  zonegran 400-300, Lamictal 100 mg bid. Lamictal was added recently. Since the last visit, he had 2 seizures, one was definitely CPS and the other was possibly a CPS out of sleep. He is taking Aptiom 1200 mg hs, Zonegran 400-300.  He is also taking CBD oil for anxiety. He stopped Vimpat before because of suicidal thoughts.  He is seeing Dr. Ben Barros for his depression.  He is complaining of fatigue and sleep over 10 hours a day.    2.  Asperger's syndrome.   3.  Depression.  He is followed by Dr. Ben Barros.     PLAN:   1.  Continue Aptiom 1200 mg  q.h.s. and Zonegran 300-400 mg, Lamictal 200 mg bid.  2.  Continue to follow up with Dr. Barros for depression and get in therapy.  3.  May consider add Topamax, Felbamate, etc in the future if seizures get worse.  4.  Aptiom, Zonegran, and Lamictal levels.  5.  Progressive muscle relaxation with nurses.  6.  RTC in 3 months.      30 min was spent on the visit.  Over 50% of the time was spent on education, counseling about optimal seizure control and coordination of care.      Again, thank you for allowing me to participate in the care of your patient.      Sincerely,    Norman Dukes MD

## 2018-12-06 NOTE — MR AVS SNAPSHOT
After Visit Summary   12/6/2018    Amrit Padilla    MRN: [id number]           Patient Information     Date Of Birth          1995        Visit Information        Provider Department      12/6/2018 11:00 AM [facility] Dukes MD [facility]        Today's Diagnoses     Seizure disorder (H)    -  1      Care Instructions      Times of Days am pm        Medication Tablet Size Number of Tablets/Capsules Total Daily Dosage    Aptiom  600 1 1        1200 mg                  Zonegran  100 3 4        700 mg    Lamictal  100 2 2        400 mg                                                                             Carry this with you at all times.  CONTINUE TAKING YOUR OTHER MEDICATIONS AS PREVIOUSLY DIRECTED.      * * *Do not store medications in the bathroom.  Keep medications away from children!* * *             Follow-ups after your visit        Additional Services     [facility] Patient Education Referral       Progressive muscle relaxation.                  Follow-up notes from your care team     Return in about 3 months (around 3/6/2019).      Your next 10 appointments already scheduled     Dec 10, 2018 11:00 AM CST   Education with [facility] Nurse 2   [facility] Epilepsy Care ([facility])    [address], [address]  [facility] [id number] [phone]            Jm 10, 2019 11:00 AM CST   Education with [facility] Nurse 2   [facility] Epilepsy [facility] ([facility])    [address], [address]  [facility] [id number] [phone]            Jan 24, 2019  1:00 PM CST   Telephone Call with [facility] Nurse 2   [facility] Epilepsy [facility] ([facility])    [address], [address]  [place] [id number] [phone]           Note: this is not an onsite visit; there is no need to come to the facility.            Feb 14, 2019 11:00 AM CST   Return Visit with Norman Dukes MD   [facility] Epilepsy Care ([facility])    [address]  [facility] [address]   [phone]              Who to contact     Please call your clinic at [phone] to:    Ask questions about your health    Make or cancel appointments    Discuss your medicines    Learn about your test results    Speak to your doctor            Additional Information About Your Visit        MyChart Information     [employer] gives you secure access to your electronic health record. If you see a primary care provider, you can also send messages to your care team and make appointments. If you have questions, please call your primary care clinic.  If you do not have a primary care provider, please call [phone] and they will assist you.      [employer] is an electronic gateway that provides easy, online access to your medical records. With [employer], you can request a clinic appointment, read your test results, renew a prescription or communicate with your care team.     To access your existing account, please contact your [facility] or call [phone] for assistance.        Care EveryWhere ID     This is your Care EveryWhere ID. This could be used by other organizations to access your [facility] medical records  [id number]         Blood Pressure from Last 3 Encounters:   09/06/18 126/78   06/28/18 118/77   06/11/18 119/83    Weight from Last 3 Encounters:   09/06/18 183 lb 9.6 oz (83.3 kg)   06/28/18 183 lb 6.4 oz (83.2 kg)   06/11/18 177 lb 6.4 oz (80.5 kg)              We Performed the Following     Lamotrigine Level     [facility] Patient Education Referral     Zonisamide Level Quantitative        Primary Care Provider Fax #    Physician No Ref-Primary [phone]       No address on file        Equal Access to Services     CAMERON HERRERA : Hadcedrick Lechuga, wachantelda lujakadaha, qaybta kaalsyed victor. So [facility] [phone].    ATENCIÓN: Si habla español, tiene a damon disposición servicios gratuitos de  asistencia lingüística. Nathan al [phone].    We comply with applicable federal civil rights laws and [place] laws. We do not discriminate on the basis of race, color, national origin, age, disability, sex, sexual orientation, or gender identity.            Thank you!     Thank you for choosing [facility] EPILEPSY CARE  for your care. Our goal is always to provide you with excellent care. Hearing back from our patients is one way we can continue to improve our services. Please take a few minutes to complete the written survey that you may receive in the mail after your visit with us. Thank you!             Your Updated Medication List - Protect others around you: Learn how to safely use, store and throw away your medicines at www.disposemymeds.org.          This list is accurate as of 12/6/18 12:05 PM.  Always use your most recent med list.                   Brand Name Dispense Instructions for use Diagnosis    * cholecalciferol 58275 units (1250 mcg) capsule    VITAMIN D3    7 capsule    Take 1 capsule (50,000 units) by mouth once each week x 7 weeks, then begin maintenance dose    Seizure disorder (H)       * cholecalciferol 1000 units (25 mcg) capsule    VITAMIN D3    93 capsule    Take one cap ( 1,000 mg ) by mouth daily    Seizure disorder (H)       eslicarbazepine acetate 600 MG tablet    APTIOM    180 tablet    Take 2 tablets (1,200 mg) by mouth At Bedtime    Seizure disorder (H)       fexofenadine 180 MG tablet    ALLEGRA          FLUoxetine 20 MG tablet     60 tablet    Take 2 tablets (40 mg) by mouth every morning    Moderate episode of recurrent major depressive disorder (H)       hydrOXYzine 25 MG tablet    ATARAX    90 tablet    Take 1 tablet (25 mg) by mouth 3 times daily as needed for anxiety    Moderate episode of recurrent major depressive disorder (H)       lamoTRIgine 100 MG tablet    LaMICtal    124 tablet    Take 2 tablets (200 mg) by mouth 2 times daily    Seizure disorder (H)       LORazepam  1 MG tablet    ATIVAN     Take 1 mg by mouth        MELATONIN PO      Take 5 mg by mouth At Bedtime        zonisamide 100 MG capsule    ZONEGRAN    211 capsule    Take 3 caps (300 mg) each AM and 4 caps (400 mg) each HS    Partial symptomatic epilepsy with complex partial seizures, intractable, without status epilepticus (H)       * Notice:  This list has 2 medication(s) that are the same as other medications prescribed for you. Read the directions carefully, and ask your doctor or other care provider to review them with you.

## 2018-12-10 ENCOUNTER — ALLIED HEALTH/NURSE VISIT (OUTPATIENT)
Dept: NEUROLOGY | Facility: CLINIC | Age: 23
End: 2018-12-10
Payer: COMMERCIAL

## 2018-12-10 DIAGNOSIS — G40.909 EPILEPTIC SEIZURE (H): Primary | ICD-10-CM

## 2018-12-10 NOTE — NURSING NOTE
Amrit Padilla comes into clinic today at the request of Dr. Dukes Ordering Provider for Progressive Muscle Relaxation. This service provided today was under the supervising provider of the day Dr. Blake who was available if needed.  [place] [facility]    Amrit Padilla returns today to learn Progressive Muscle Relaxation (PMR) as discussed with Dr. Dukes. The purpose of PMR is to reduce stress and improve seizures. Areas focuses on include tensing and relaxing each muscle group, beginning with the feet and working up to the head.  We went through 2 sessions of PMR during our visit. Amrit was provided with written directions so he can continue to practice PMR at home.

## 2018-12-12 ENCOUNTER — TELEPHONE (OUTPATIENT)
Dept: NEUROLOGY | Facility: CLINIC | Age: 23
End: 2018-12-12

## 2018-12-12 DIAGNOSIS — G40.909 SEIZURE DISORDER (H): Primary | ICD-10-CM

## 2018-12-12 RX ORDER — LAMOTRIGINE 100 MG/1
TABLET ORAL
Qty: 183 TABLET | Refills: 3 | Status: SHIPPED | OUTPATIENT
Start: 2018-12-12 | End: 2019-04-06

## 2018-12-12 NOTE — TELEPHONE ENCOUNTER
Michel Moctezuma, JULIO  [facility]             Caller: Sarah     Relationship to Patient: mother     Call Back Number: [phone]     Reason for Call: pt just had a seizure today and lorazepam was given. Mom wants to know if she should get labs drawn     Amrit woke up this morning and he was so tired he went back to bed. He went to therapy and ended up having a headache. He laid down for awhile and ended up having a seizure.     Mom observed the seizure. The seizure woke him up, he made throat noises, shaking, clenched mouth, staring into space lasting at least one minute. Mom administered Lorazepam 1 ml. Amrit woke up and wasn't aware he had a seizure. He is currently sleeping.    Amrit also reports having had head pain behind both of his ears.      Ref. Range 10/4/2018 15:39   Lamotrigine Level Latest Ref Range: 2.5 - 15.0 ug/mL 3.5   Zonisamide Level Quant Latest Ref Range: 10 - 40 ug/mL 23     Current/confirmed ASD's    CBD Oil  Aption (600) 1200 at HS  Lamotrigine (100) 200-200  Zonisamide (100) 300-400    PLAN: Discussed with Dr. Freeman    1) Week 1 and 2: Increase Lamotrigine to 250-200 x 2 weeks  2) Week 3 and thereafter: Increase Lamotrigine to 300-200

## 2018-12-12 NOTE — TELEPHONE ENCOUNTER
Amrit' mom calls back to see if it is okay to repeat 1 ml Ativan if another seizure occurs.     PLAN: Discussed with Dr. Freeman  Okay to repeat Lorazepam 1 mg tab PRN seizure.

## 2018-12-21 NOTE — TELEPHONE ENCOUNTER
Completed forms sent back to Pt, copy sent to scan    Tiesha Brice, [facility]

## 2018-12-30 ENCOUNTER — TELEPHONE (OUTPATIENT)
Dept: NEUROLOGY | Facility: CLINIC | Age: 23
End: 2018-12-30

## 2018-12-30 DIAGNOSIS — R56.9 SEIZURES (H): Primary | ICD-10-CM

## 2018-12-31 ENCOUNTER — TELEPHONE (OUTPATIENT)
Dept: NEUROLOGY | Facility: CLINIC | Age: 23
End: 2018-12-31

## 2018-12-31 DIAGNOSIS — G40.909 EPILEPTIC SEIZURE (H): Primary | ICD-10-CM

## 2018-12-31 NOTE — TELEPHONE ENCOUNTER
"Nurse received In-Basket message as follows:    Michel Moctezuma CMA sent to [facility]             Caller: Amrit     Relationship to Patient: self     Call Back Number: [phone]     Reason for Call: Experiencing \"post seizure feelings\" would like a call back to discuss this.      Nurse returned call to patient who indicates that he thinks he had a seizure, and he is asking if there is any way that he can find out if he had a seizure ( in sleep ).  Patient indicates that there are times when he gets up feeling dizzy, and that he has frequent twitching \" like crazy\" at about one twitch every 15 seconds, that there are times when he has breaks from them, but it is often continuous, it occurs in one body area at a time, but not in the same area.   Nurse indicated that unless someone sees the seizure or he bites himself, or finds himself out of bed on the floor,  Or he has video recording set up, or something on this order; it is really not possible to definitively say that he has had a seizure.  Nurse also mentioned that another possibility would be that he could come in for an EEG when he feels that he has had a seizure, but that it would be subject to schedule availability.  Patient is interested in doing this and nurse indicated he would place a EEG order and that he should call when he feels he had an seizure and see if there is available time on schedule to perform EEG.  "

## 2019-01-01 ENCOUNTER — TELEPHONE (OUTPATIENT)
Dept: NEUROLOGY | Facility: CLINIC | Age: 24
End: 2019-01-01

## 2019-01-02 NOTE — TELEPHONE ENCOUNTER
"Patient called tonight to report one and a half weeks of \"extreme fatigue.\" He reports that he has been thinking less clearly, and has some difficulty with word finding. He describes wanting to get out of a chair and not knowing which direction to turn once he stands. He has had episodes of lost time while watching a football game; he is unsure if he is dozing off.  These episodes of loss time and not proceeded by his typical aura nor followed by any confusion.  He has not had any witnessed CPS activity in the last one month, nor any GTCs. He reports napping three times a day for three hours each. He wonders if his [place] tree may be triggering the above symptoms. Light has not triggered his seizures in the past. He has been compliant with his AEDs without any recent dose changes. He denies fever or chills. He reports that his mood is somewhat depressed but not terribly.    Based on the above symptoms and history, it does not sound like he is having any of his known seizures.  To me it sounds as if there is something more more systemic going on, like depression or hypothyroidism accounting for his psychomotor slowing.    He does not have a primary care provider, but plans to make an appointment with one.  I offered to help him with this, but he says he has a plan to call and make an appointment. I see that Dr. Dukes recommended that he keep working with his psychiatrist to manage his depression.     I encouraged the patient to call if he has any of his typical seizures or any other symptoms that suggest he may be having a seizure. It looks like he has a plan for further evaluation with EEG at [facility] per documentation on 12/31/18 (yesterday).    CC: Dr. Norman Dukes  "

## 2019-01-11 ENCOUNTER — TELEPHONE (OUTPATIENT)
Dept: NEUROLOGY | Facility: CLINIC | Age: 24
End: 2019-01-11

## 2019-01-11 NOTE — TELEPHONE ENCOUNTER
Nurse received In-Basket message as follows:    Chrissie Forbes sent to [facility]             Caller: Amrit     Relationship to Patient: pt     Call Back Number: [phone] or [phone]     Reason for Call: PT had a seizure last night around 6/7pm. He would like a follow up call.      Nurse returned call to patient who indicates he had a seizure last night.  He is unable to identify a trigger for it.  He indicates he has not missed any medication, has had his usual sleep. He has not been injured, has not been ill.     Nurse indicated that he would review his meds and levels with MD and see if there is a medication tweak we can do and I would get back to his as soon as I had anything for him.    Patient's also expressed regret for missing his PMR appointment yesterday, but explains that he worked long hrs and then forgot what he was supposed to do the next day.

## 2019-01-21 ENCOUNTER — TELEPHONE (OUTPATIENT)
Dept: NEUROLOGY | Facility: CLINIC | Age: 24
End: 2019-01-21

## 2019-01-21 DIAGNOSIS — T42.6X5S ADVERSE EFFECT OF OTHER ANTIEPILEPTIC AND SEDATIVE-HYPNOTIC DRUGS, SEQUELA: ICD-10-CM

## 2019-01-21 DIAGNOSIS — G40.919 INTRACTABLE EPILEPSY WITHOUT STATUS EPILEPTICUS, UNSPECIFIED EPILEPSY TYPE (H): Primary | ICD-10-CM

## 2019-01-21 NOTE — TELEPHONE ENCOUNTER
----- Message from Chrissie Forbes sent at 1/21/2019  9:32 AM CST -----  Regarding: kristi  Caller: Amrit     Relationship to Patient: pt     Call Back Number: [phone] or [phone]    Reason for Call: PT reports lightheadedness, headaches, dizzyness, lack of energy, getting harder to function. Pt is wondering if this is side effects from medication.

## 2019-01-21 NOTE — TELEPHONE ENCOUNTER
Patient is having incresaed diffeiculty, occurring most days.  Previously would be intermittently.    He feels he is having good sleep, but that he could sleep all day.  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  He needs to able to interact with people.  Has an appointment scheduled for later this week.  Last seizure that they are aware of was Jan 10th    No missed or late medications  -400  Aptiom 1200 evening  Lamotrigine 200-300  CBD oil (purchased as a supplement, NOT through the [place] [facility] [employer] [facility])  Lorazepam intensol, last taken about 3 weeks ago    No other medical changes.    Patient has not yet established care with a primary provider,(see note from ) but has a provider in mind. I encouraged him to pursue this    Discussed with .  Check levels (including sodium) in advance of clinic visit.    Fax order requests to lab requested by patient

## 2019-01-24 ENCOUNTER — OFFICE VISIT (OUTPATIENT)
Dept: NEUROLOGY | Facility: CLINIC | Age: 24
End: 2019-01-24
Payer: COMMERCIAL

## 2019-01-24 VITALS
BODY MASS INDEX: 25.99 KG/M2 | TEMPERATURE: 97.7 F | WEIGHT: 189 LBS | DIASTOLIC BLOOD PRESSURE: 71 MMHG | HEART RATE: 69 BPM | SYSTOLIC BLOOD PRESSURE: 126 MMHG

## 2019-01-24 DIAGNOSIS — G40.909 RECURRENT SEIZURES (H): Primary | ICD-10-CM

## 2019-01-24 ASSESSMENT — PAIN SCALES - GENERAL: PAINLEVEL: MILD PAIN (3)

## 2019-01-24 NOTE — PROGRESS NOTES
"P/[facility] Epilepsy Care Progress Note      Patient:  Amrit Padilla  :  1995   Age:  23 year old   Today's Office Visit:  2019    Epilepsy Data:    Patient History  Primary Epileptologist/Provider: Norman Dukes M.D. (P)  Patient Status: (P) Not yet controlled  Epilepsy Syndrome: (P) Epilepsy unspecified  Epilepsy Syndrome Status: (P) Undetermined - Evaluation in Progress  Age of Onset: (P) Age 16  Other Relevant Dx/ Issues: (P) Asperger's     Tests/Surgery History  Last EEG: (P) 3/30/2018  Last MRI: (P) 2018    Seizure Record  Seizure Type 1: (P) Complex partial seizures with impairment of consciousness at onset  Description of Sz Type 1: (P) \"mild seizures\", during which he will have some facial twitching. He will have repetitive harnd/arm jerking movement. He will become unresponsive for less than a minute. Postictally, he was having some confusion and headaches and fatigue. He has an aura of \"seeing bees populating the earth with eggs.\"  # of Type 1 Seizure since last visit: (P) 0  Seizure Type 2: (P) Tonic-clonic seizures  Description of Sz Type 2: (P) He will have generalized convulsions of the body and his body will be stiff. It usually lasts 1-2 minutes followed by postictal fatigue and hallucinations.  # of Type 2 Seizure since last visit: (P) 2      Current Outpatient Medications   Medication Sig Dispense Refill     cholecalciferol (VITAMIN  -D) 1000 units capsule Take one cap ( 1,000 mg ) by mouth daily 93 capsule 3     eslicarbazepine acetate (APTIOM) 600 MG tablet Take 2 tablets (1,200 mg) by mouth At Bedtime (Patient taking differently: Take 1,200 mg by mouth daily ) 180 tablet 3     FLUoxetine 20 MG tablet Take 2 tablets (40 mg) by mouth every morning 60 tablet 2     lamoTRIgine (LAMICTAL) 100 MG tablet Take 2.5 tablets AM, 2 tablets HS x one week, then increase to 3 tabs AM, 2 tabs HS and continue this dose 183 tablet 3     LORazepam (ATIVAN) 1 MG tablet Take 1 mg by mouth       " MELATONIN PO Take 5 mg by mouth At Bedtime       zonisamide (ZONEGRAN) 100 MG capsule Take 3 caps (300 mg) each AM and 4 caps (400 mg) each  capsule 11     fexofenadine (ALLEGRA) 180 MG tablet        hydrOXYzine (ATARAX) 25 MG tablet Take 1 tablet (25 mg) by mouth 3 times daily as needed for anxiety (Patient not taking: Reported on 1/24/2019) 90 tablet 1     vitamin D3 (CHOLECALCIFEROL) 68561 UNITS capsule Take 1 capsule (50,000 units) by mouth once each week x 7 weeks, then begin maintenance dose (Patient not taking: Reported on 1/24/2019) 7 capsule 0          CHIEF COMPLAINT:  Follow up for seizures.      HISTORY OF PRESENT ILLNESS:  23-year-old right-handed male with history of epilepsy, Asperger's disorder, depression returns for follow up for seizures.  He is accompanied by his mother in the clinic today.  He was previously seen by Dr. Morales at [facility] for his epilepsy.  He is currently taking aptiom 1200 mg qhs,  zonegran 400-300, Lamictal 200 mg bid. Lamictal was added recently. Since the last visit, he had 2 seizures, both were likely GTC. He is taking Aptiom 1200 mg hs, Zonegran 300-400.  He is also taking CBD oil for anxiety.He is seeing Dr. Ben Barros for his depression.  He is complaining of fatigue and sleep over 10 hours a day. He is very concerned about the fatigue and is questioning what other options we have for treating his seizures. He did not want to do surgery or neuromodulation in the past. However, mother expressed that they are open to these ideas now, because they want the patient to have a better QOL.     Mood improved a lot. No suicidal thoughts, no thoughts of hurting himself.    The patient started to have seizures when he was 16 years old and he was managed at the [facility] for the past few years for his seizures.  He is currently taking Aptiom 1200 mg q.h.s. and the Zonegran 300 mg b.i.d. for his epilepsy.  He was recently admitted to the hospital twice because of 2  "clusters of seizures, one in January when he was admitted to [facility] and one was in [place] when [place] [facility]  On both occasions, he had 4-5 back to back generalized tonic-clonic seizures within a short period of time.      According to the mother, the patient has 2 different types of seizures.  Type #1 is described as \"mild seizures,\" during which he will have some facial twitching.  He will have repetitive hand/arm jerking movement.  He will become unresponsive for less than a minute.  Postictally, he was having some confusion and headaches and fatigue.  Before the seizures, he will have an aura of \"seeing bees populating the earth with eggs.\"  This type of seizure is relatively infrequent, average once per month.      Type #2 are generalized tonic-clonic seizures which the patient will have generalized convulsions of the body and his body will be stiff.  It usually will last for 1-2 minutes followed by postictal fatigue and hallucinations.  This happens usually once every 3 months, but sometimes it occurs in clusters with 2-5 seizures back to back.      The patient had tried Keppra and Depakote in the past which did not work.  He had EEGs done in the past which were reported negative.      The patient had severe depression and requested a lot of behavioral health and counseling in the past and at certain time, he was suicidal, but that improved.  At the present time, he is not suicidal.  He does not think about hurting himself.      TRIGGERS FOR SEIZURES:  Depression.      RISK FACTORS FOR SEIZURES:  He had no history of head trauma with loss of consciousness.  No history of febrile convulsions.  No history of CNS infections.        PAST AEDs: Keppra, Depakote. Vimpat caused suicidal ideations.    PAST MEDICAL HISTORY:  Asperger's disorder, depression, epilepsy.      PAST SURGICAL HISTORY:  None.      FAMILY HISTORY:  No family history of seizures.  Both parents have hypertension.  Maternal " grandmother had heart disease.      SOCIAL HISTORY:  He is single, living with his mother.  He had some college education but did not finish because of depression.  He is currently working at [employer].  No smoking, no alcohol, no drug abuse.      REVIEW OF SYSTEMS:  Positive for fatigue, depression.  The rest of the 8-point review of systems are negative.      ALLERGIES:  Sertraline and Zyprexa.      PHYSICAL EXAMINATION:      There were no vitals taken for this visit.    General exam: General Appearance: No acute distress. HEENT: Normocephalic, atraumatic. Neck: Supple.  Extremities: No edema, no clubbing, no cyanosis.     Neurologic Exam: Alert and oriented x3. Speech fluent, appropriate. Normal attention. Cranial Nerves: Pupils are equal, round, reactive to light and accomodation. Extraocular movement intact. No facial weakness or asymmetry. Hearing normal. Motor Exam: Normal. Coordination:no ataxia.  Gait and Station: normal.    PREVIOUS DIAGNOSTIC TESTING:  CT scan of the head on 02/27/2018 was reported negative from [facility].  He had EEGs in the past which were reported negative.      IMPRESSION:   1.  Epilepsy. 23-year-old right-handed male with history of epilepsy, Asperger's disorder, depression returns for follow up for seizures.  He is accompanied by his mother in the clinic today.  He was previously seen by Dr. Morales at [facility] for his epilepsy.  He is currently taking aptiom 1200 mg qhs,  zonegran 400-300, Lamictal 200 mg bid. Lamictal was added recently. Since the last visit, he had 2 seizures, both were likely GTC. He is taking Aptiom 1200 mg hs, Zonegran 300-400.  He is also taking CBD oil for anxiety.He is seeing Dr. Ben Barros for his depression.  He is complaining of fatigue and sleep over 10 hours a day. He is very concerned about the fatigue and is questioning what other options we have for treating his seizures. He did not want to do surgery or neuromodulation in the past.  However, mother expressed that they are open to these ideas now, because they want the patient to have a better QOL.    I have a long discussion with the patient and mother regarding different treatment options for epilepsy, including meds, surgery, neuromodulation and diet treatment.    2.  Asperger's syndrome.   3.  Depression.  He is followed by Dr. Ben Barros.     PLAN:   1.  Continue Aptiom 1200 mg q.h.s. and Zonegran 300-400 mg, Lamictal 200 mg bid.  2.  Continue to follow up with Dr. Barros for depression and get in therapy.  3.  May consider add Topamax, Felbamate, etc in the future if seizures get worse.  4.  Patient will discuss with family regarding epilepsy surgery and neuromodulation.  5.  Continue Progressive muscle relaxation.  6.  RTC in 3 months. Patient will try Peoples diet on himself.    50 min was spent on the visit.  Over 50% of the time was spent on education, counseling about optimal seizure control and coordination of care.

## 2019-01-24 NOTE — LETTER
"2019       RE: Amrit Padilla  : 1995   MRN: [id number]      Dear Colleague,    Thank you for referring your patient, Amrit Padilla, to the [facility] EPILEPSY CARE at [facility]. Please see a copy of my visit note below.    [facility] Ep[facility] Progress Note    Patient:  Amrit Padilla  :  1995   Age:  23 year old   Today's Office Visit:  2019    Epilepsy Data:    Patient History  Primary Epileptologist/Provider: ([facility]) Norman Dukes M.D.  Patient Status: (P) Not yet controlled  Epilepsy Syndrome: (P) Epilepsy unspecified  Epilepsy Syndrome Status: (P) Undetermined - Evaluation in Progress  Age of Onset: (P) Age 16  Other Relevant Dx/ Issues: (P) Asperger's     Tests/Surgery History  Last EEG: (P) 3/30/2018  Last MRI: (P) 2018    Seizure Record  Seizure Type 1: (P) Complex partial seizures with impairment of consciousness at onset  Description of Sz Type 1: (P) \"mild seizures\", during which he will have some facial twitching. He will have repetitive harnd/arm jerking movement. He will become unresponsive for less than a minute. Postictally, he was having some confusion and headaches and fatigue. He has an aura of \"seeing bees populating the earth with eggs.\"  # of Type 1 Seizure since last visit: (P) 0  Seizure Type 2: (P) Tonic-clonic seizures  Description of Sz Type 2: (P) He will have generalized convulsions of the body and his body will be stiff. It usually lasts 1-2 minutes followed by postictal fatigue and hallucinations.  # of Type 2 Seizure since last visit: (P) 2    Current Outpatient Medications   Medication Sig Dispense Refill     cholecalciferol (VITAMIN  -D) 1000 units capsule Take one cap ( 1,000 mg ) by mouth daily 93 capsule 3     eslicarbazepine acetate (APTIOM) 600 MG tablet Take 2 tablets (1,200 mg) by mouth At Bedtime (Patient taking differently: Take 1,200 mg by mouth daily ) 180 tablet 3     FLUoxetine 20 MG tablet Take 2 tablets " (40 mg) by mouth every morning 60 tablet 2     lamoTRIgine (LAMICTAL) 100 MG tablet Take 2.5 tablets AM, 2 tablets HS x one week, then increase to 3 tabs AM, 2 tabs HS and continue this dose 183 tablet 3     LORazepam (ATIVAN) 1 MG tablet Take 1 mg by mouth       MELATONIN PO Take 5 mg by mouth At Bedtime       zonisamide (ZONEGRAN) 100 MG capsule Take 3 caps (300 mg) each AM and 4 caps (400 mg) each  capsule 11     fexofenadine (ALLEGRA) 180 MG tablet        hydrOXYzine (ATARAX) 25 MG tablet Take 1 tablet (25 mg) by mouth 3 times daily as needed for anxiety (Patient not taking: Reported on 1/24/2019) 90 tablet 1     vitamin D3 (CHOLECALCIFEROL) 44579 UNITS capsule Take 1 capsule (50,000 units) by mouth once each week x 7 weeks, then begin maintenance dose (Patient not taking: Reported on 1/24/2019) 7 capsule 0          CHIEF COMPLAINT:  Follow up for seizures.      HISTORY OF PRESENT ILLNESS:  23-year-old right-handed male with history of epilepsy, Asperger's disorder, depression returns for follow up for seizures.  He is accompanied by his mother in the clinic today.  He was previously seen by Dr. Morales at [facility] for his epilepsy.  He is currently taking aptiom 1200 mg qhs,  zonegran 400-300, Lamictal 200 mg bid. Lamictal was added recently. Since the last visit, he had 2 seizures, both were likely GTC. He is taking Aptiom 1200 mg hs, Zonegran 300-400.  He is also taking CBD oil for anxiety.He is seeing Dr. Ben Barros for his depression.  He is complaining of fatigue and sleep over 10 hours a day. He is very concerned about the fatigue and is questioning what other options we have for treating his seizures. He did not want to do surgery or neuromodulation in the past. However, mother expressed that they are open to these ideas now, because they want the patient to have a better QOL.     Mood improved a lot. No suicidal thoughts, no thoughts of hurting himself.    The patient started to have seizures  "when he was 16 years old and he was managed at the [facility] for the past few years for his seizures.  He is currently taking Aptiom 1200 mg q.h.s. and the Zonegran 300 mg b.i.d. for his epilepsy.  He was recently admitted to the hospital twice because of 2 clusters of seizures, one in January when he was admitted to [facility] and one was in February when he was admitted to [facility].  On both occasions, he had 4-5 back to back generalized tonic-clonic seizures within a short period of time.      According to the mother, the patient has 2 different types of seizures.  Type #1 is described as \"mild seizures,\" during which he will have some facial twitching.  He will have repetitive hand/arm jerking movement.  He will become unresponsive for less than a minute.  Postictally, he was having some confusion and headaches and fatigue.  Before the seizures, he will have an aura of \"seeing bees populating the earth with eggs.\"  This type of seizure is relatively infrequent, average once per month.      Type #2 are generalized tonic-clonic seizures which the patient will have generalized convulsions of the body and his body will be stiff.  It usually will last for 1-2 minutes followed by postictal fatigue and hallucinations.  This happens usually once every 3 months, but sometimes it occurs in clusters with 2-5 seizures back to back.      The patient had tried Keppra and Depakote in the past which did not work.  He had EEGs done in the past which were reported negative.      The patient had severe depression and requested a lot of behavioral health and counseling in the past and at certain time, he was suicidal, but that improved.  At the present time, he is not suicidal.  He does not think about hurting himself.      TRIGGERS FOR SEIZURES:  Depression.      RISK FACTORS FOR SEIZURES:  He had no history of head trauma with loss of consciousness.  No history of febrile convulsions.  No history of CNS infections.        PAST " AEDs: Keppra, Depakote. Vimpat caused suicidal ideations.    PAST MEDICAL HISTORY:  Asperger's disorder, depression, epilepsy.      PAST SURGICAL HISTORY:  None.      FAMILY HISTORY:  No family history of seizures.  Both parents have hypertension.  Maternal grandmother had heart disease.      SOCIAL HISTORY:  He is single, living with his mother.  He had some college education but did not finish because of depression.  He is currently working at [employer].  No smoking, no alcohol, no drug abuse.      REVIEW OF SYSTEMS:  Positive for fatigue, depression.  The rest of the 8-point review of systems are negative.      ALLERGIES:  Sertraline and Zyprexa.      PHYSICAL EXAMINATION:      There were no vitals taken for this visit.    General exam: General Appearance: No acute distress. HEENT: Normocephalic, atraumatic. Neck: Supple.  Extremities: No edema, no clubbing, no cyanosis.     Neurologic Exam: Alert and oriented x3. Speech fluent, appropriate. Normal attention. Cranial Nerves: Pupils are equal, round, reactive to light and accomodation. Extraocular movement intact. No facial weakness or asymmetry. Hearing normal. Motor Exam: Normal. Coordination:no ataxia.  Gait and Station: normal.    PREVIOUS DIAGNOSTIC TESTING:  CT scan of the head on 02/27/2018 was reported negative from [facility].  He had EEGs in the past which were reported negative.      IMPRESSION:   1.  Epilepsy. 23-year-old right-handed male with history of epilepsy, Asperger's disorder, depression returns for follow up for seizures.  He is accompanied by his mother in the clinic today.  He was previously seen by Dr. Morales at [facility] for his epilepsy.  He is currently taking aptiom 1200 mg qhs,  zonegran 400-300, Lamictal 200 mg bid. Lamictal was added recently. Since the last visit, he had 2 seizures, both were likely GTC. He is taking Aptiom 1200 mg hs, Zonegran 300-400.  He is also taking CBD oil for anxiety.He is seeing Dr. Ben Barros for  his depression.  He is complaining of fatigue and sleep over 10 hours a day. He is very concerned about the fatigue and is questioning what other options we have for treating his seizures. He did not want to do surgery or neuromodulation in the past. However, mother expressed that they are open to these ideas now, because they want the patient to have a better QOL.    I have a long discussion with the patient and mother regarding different treatment options for epilepsy, including meds, surgery, neuromodulation and diet treatment.    2.  Asperger's syndrome.   3.  Depression.  He is followed by Dr. Ben Barros.     PLAN:   1.  Continue Aptiom 1200 mg q.h.s. and Zonegran 300-400 mg, Lamictal 200 mg bid.  2.  Continue to follow up with Dr. Barros for depression and get in therapy.  3.  May consider add Topamax, Felbamate, etc in the future if seizures get worse.  4.  Patient will discuss with family regarding epilepsy surgery and neuromodulation.  5.  Continue Progressive muscle relaxation.  6.  RTC in 3 months. Patient will try Peoples diet on himself.    50 min was spent on the visit.  Over 50% of the time was spent on education, counseling about optimal seizure control and coordination of care.  Again, thank you for allowing me to participate in the care of your patient.    Sincerely,  Norman Dukes MD

## 2019-03-04 ENCOUNTER — TELEPHONE (OUTPATIENT)
Dept: NEUROLOGY | Facility: CLINIC | Age: 24
End: 2019-03-04

## 2019-03-04 DIAGNOSIS — G40.919 INTRACTABLE EPILEPSY WITHOUT STATUS EPILEPTICUS, UNSPECIFIED EPILEPSY TYPE (H): Primary | ICD-10-CM

## 2019-03-04 RX ORDER — ZONISAMIDE 100 MG/1
CAPSULE ORAL
Qty: 14 CAPSULE | Refills: 0 | Status: SHIPPED | OUTPATIENT
Start: 2019-03-04 | End: 2019-04-22

## 2019-03-04 RX ORDER — LAMOTRIGINE 200 MG/1
200 TABLET ORAL 2 TIMES DAILY
Qty: 4 TABLET | Refills: 0 | Status: SHIPPED | OUTPATIENT
Start: 2019-03-04 | End: 2019-04-22

## 2019-03-05 NOTE — TELEPHONE ENCOUNTER
Patient calls tonight from [place], [place] tonight, stating that he is stuck due to car trouble and does not have his medications. He needs a few doses of:    Zonisamide 300 mg - 400 mg  Lamictal 200 mg BID  Aptiom 1200 mg at bedtime    He requests that two days worth of doses be sent to a pharmacy in [place], which is reasonable. I will attempt to send these medications electronically, and hopefully there aren't issues filling the meds.     Two days worth of meds were sent to the below pharmacy @ 9:14 PM    [facility]     Cherelle Gardiner DO on 3/4/2019 at 9:14 PM

## 2019-03-06 ENCOUNTER — TELEPHONE (OUTPATIENT)
Dept: NEUROLOGY | Facility: CLINIC | Age: 24
End: 2019-03-06

## 2019-03-06 NOTE — TELEPHONE ENCOUNTER
Nurse received In-Basket message as follows:       Piedad Olmos LPN sent to [facility]             Caller: Amrit     Relationship to Patient: self     Call Back Number: [phone]     Reason for Call: Has questions about his Lamotrigine dose      Review of chart indicates that patient's dose as per Dr. Dukes on 1/24/19 is200 mg BID.  Reviewed this with patient who has been taking this dose, but remembers it having been 200 / 300 and unsure why or when it was changed, and also wants to make sure it was Dr. Dukes who made the change.    No further questions at present.

## 2019-04-06 DIAGNOSIS — G40.909 SEIZURE DISORDER (H): ICD-10-CM

## 2019-04-09 RX ORDER — LAMOTRIGINE 200 MG/1
200 TABLET ORAL 2 TIMES DAILY
Qty: 62 TABLET | Refills: 0 | Status: SHIPPED | OUTPATIENT
Start: 2019-04-09 | End: 2019-04-22

## 2019-04-22 ENCOUNTER — OFFICE VISIT (OUTPATIENT)
Dept: NEUROLOGY | Facility: CLINIC | Age: 24
End: 2019-04-22
Payer: COMMERCIAL

## 2019-04-22 VITALS
DIASTOLIC BLOOD PRESSURE: 77 MMHG | HEART RATE: 75 BPM | BODY MASS INDEX: 26.68 KG/M2 | WEIGHT: 194 LBS | SYSTOLIC BLOOD PRESSURE: 130 MMHG

## 2019-04-22 DIAGNOSIS — G40.909 SEIZURE DISORDER (H): ICD-10-CM

## 2019-04-22 RX ORDER — LAMOTRIGINE 200 MG/1
200 TABLET ORAL 2 TIMES DAILY
Qty: 62 TABLET | Refills: 11 | Status: SHIPPED | OUTPATIENT
Start: 2019-04-22 | End: 2019-10-03

## 2019-04-22 ASSESSMENT — PAIN SCALES - GENERAL: PAINLEVEL: NO PAIN (0)

## 2019-04-22 NOTE — LETTER
"2019       RE: Amrit Padilla  : 1995   MRN: [id number]      Dear Colleague,    Thank you for referring your patient, Amrit Padilla, to the [facility] EPILEPSY CARE at [facility]. Please see a copy of my visit note below.    [facility] [place] [place] Epilepsy [facility] Progress Note      Patient:  Amrit Padilla  :  1995   Age:  23 year old   Today's Office Visit:  2019    Epilepsy Data:    Patient History  Primary Epileptologist/Provider: (CABRERA) Norman Dukes M.D.  Patient Status: (P) Not yet controlled  Epilepsy Syndrome: (P) Epilepsy unspecified  Epilepsy Syndrome Status: (P) Undetermined - Evaluation in Progress  Age of Onset: (P) Age 16  Other Relevant Dx/ Issues: (P) Asperger's     Tests/Surgery History  Last EEG: (P) 3/30/2018  Last MRI: (P) 2018    Seizure Record  Current Visit Date: (P) 19  Previous Visit Date: (P) 19  Months since last visit: (P) 2.89  Seizure Type 1: (P) Complex partial seizures with impairment of consciousness at onset  Description of Sz Type 1: (P) \"mild seizures\", during which he will have some facial twitching. He will have repetitive harnd/arm jerking movement. He will become unresponsive for less than a minute. Postictally, he was having some confusion and headaches and fatigue. He has an aura of \"seeing bees populating the earth with eggs.\"  # of Type 1 Seizure since last visit: (P) 0  Freq. Type 1 / Month: (P) 0  Seizure Type 2: (P) Tonic-clonic seizures  Description of Sz Type 2: (P) He will have generalized convulsions of the body and his body will be stiff. It usually lasts 1-2 minutes followed by postictal fatigue and hallucinations.  # of Type 2 Seizure since last visit: (P) 5  Freq. Type 2 / Month: (P) 1.73      Current Outpatient Medications   Medication Sig Dispense Refill     cholecalciferol (VITAMIN  -D) 1000 units capsule Take one cap ( 1,000 mg ) by mouth daily 93 capsule 3     eslicarbazepine acetate (APTIOM) 600 MG " tablet Take 2 tablets (1,200 mg) by mouth At Bedtime (Patient taking differently: Take 1,200 mg by mouth daily ) 180 tablet 3     fexofenadine (ALLEGRA) 180 MG tablet        FLUoxetine 20 MG tablet Take 2 tablets (40 mg) by mouth every morning 60 tablet 2     hydrOXYzine (ATARAX) 25 MG tablet Take 1 tablet (25 mg) by mouth 3 times daily as needed for anxiety 90 tablet 1     lamoTRIgine (LAMICTAL) 200 MG tablet Take 1 tablet (200 mg) by mouth 2 times daily 62 tablet 0     LORazepam (ATIVAN) 1 MG tablet Take 1 mg by mouth       MELATONIN PO Take 5 mg by mouth At Bedtime       vitamin D3 (CHOLECALCIFEROL) 09048 UNITS capsule Take 1 capsule (50,000 units) by mouth once each week x 7 weeks, then begin maintenance dose 7 capsule 0     zonisamide (ZONEGRAN) 100 MG capsule Take 3 caps (300 mg) each AM and 4 caps (400 mg) each  capsule 11     lamoTRIgine (LAMICTAL) 200 MG tablet Take 1 tablet (200 mg) by mouth 2 times daily for 2 days 4 tablet 0     zonisamide (ZONEGRAN) 100 MG capsule Take 3 capsules (300 mg) by mouth every morning AND 4 capsules (400 mg) At Bedtime. Do all this for 2 days. 14 capsule 0         CHIEF COMPLAINT:  Follow up for seizures.      HISTORY OF PRESENT ILLNESS:  23-year-old right-handed male with history of epilepsy, Asperger's disorder, depression returns for follow up for seizures.  He is by himself in the clinic today.  He was previously seen by Dr. Morales at [facility] for his epilepsy.  He is currently taking aptiom 1200 mg qhs,  zonegran 400-300, Lamictal 200 mg bid. Lamictal was added recently. Since the last visit, he had probably 5 seizures, most were likely GTCs. He is taking Aptiom 1200 mg hs, Zonegran 300-400.  He is also taking CBD oil for anxiety. He is seeing Dr. Ben Barros for his depression.  He is complaining of fatigue and sleep over 10 hours a day. He is very concerned about the fatigue and is questioning what other options we have for treating his seizures. He did not  "want to do surgery or neuromodulation in the past. However, mother expressed that they are open to these ideas now, because they want the patient to have a better QOL.     Mood improved a lot. No suicidal thoughts, no thoughts of hurting himself.    The patient started to have seizures when he was 16 years old and he was managed at the [facility] for the past few years for his seizures.  He is currently taking Aptiom 1200 mg q.h.s. and the Zonegran 300 mg b.i.d. for his epilepsy.  He was recently admitted to the hospital twice because of 2 clusters of seizures, one in January when he was admitted to [facility] and one was in February when he was admitted to [facility].  On both occasions, he had 4-5 back to back generalized tonic-clonic seizures within a short period of time.      According to the mother, the patient has 2 different types of seizures.  Type #1 is described as \"mild seizures,\" during which he will have some facial twitching.  He will have repetitive hand/arm jerking movement.  He will become unresponsive for less than a minute.  Postictally, he was having some confusion and headaches and fatigue.  Before the seizures, he will have an aura of \"seeing bees populating the earth with eggs.\"  This type of seizure is relatively infrequent, average once per month.      Type #2 are generalized tonic-clonic seizures which the patient will have generalized convulsions of the body and his body will be stiff.  It usually will last for 1-2 minutes followed by postictal fatigue and hallucinations.  This happens usually once every 3 months, but sometimes it occurs in clusters with 2-5 seizures back to back.      The patient had tried Keppra and Depakote in the past which did not work.  He had EEGs done in the past which were reported negative.      The patient had severe depression and requested a lot of behavioral health and counseling in the past and at certain time, he was suicidal, but that improved.  At the " present time, he is not suicidal.  He does not think about hurting himself.      TRIGGERS FOR SEIZURES:  Depression.      RISK FACTORS FOR SEIZURES:  He had no history of head trauma with loss of consciousness.  No history of febrile convulsions.  No history of CNS infections.        PAST AEDs: Keppra, Depakote. Vimpat caused suicidal ideations.    PAST MEDICAL HISTORY:  Asperger's disorder, depression, epilepsy.      PAST SURGICAL HISTORY:  None.      FAMILY HISTORY:  No family history of seizures.  Both parents have hypertension.  Maternal grandmother had heart disease.      SOCIAL HISTORY:  He is single, living with his mother.  He had some college education but did not finish because of depression.  He is currently working at [employer].  No smoking, no alcohol, no drug abuse.      REVIEW OF SYSTEMS:  Positive for fatigue, depression.  The rest of the 8-point review of systems are negative.      ALLERGIES:  Sertraline and Zyprexa.      PHYSICAL EXAMINATION:      Blood pressure 130/77, pulse 75, weight 194 lb (88 kg).    General exam: General Appearance: No acute distress. HEENT: Normocephalic, atraumatic. Neck: Supple.  Extremities: No edema, no clubbing, no cyanosis.     Neurologic Exam: Alert and oriented x3. Speech fluent, appropriate. Normal attention. Cranial Nerves: Pupils are equal, round, reactive to light and accomodation. Extraocular movement intact. No facial weakness or asymmetry. Hearing normal. Motor Exam: Normal. Coordination:no ataxia.  Gait and Station: normal.    PREVIOUS DIAGNOSTIC TESTING:  CT scan of the head on 02/27/2018 was reported negative from [facility].  He had EEGs in the past which were reported negative.      IMPRESSION:   1.  Epilepsy. 23-year-old right-handed male with history of epilepsy, Asperger's disorder, depression returns for follow up for seizures. 23-year-old right-handed male with history of epilepsy, Asperger's disorder, depression returns for follow up for  seizures.  He is by himself in the clinic today.  He was previously seen by Dr. Morales at [facility] for his epilepsy.  He is currently taking aptiom 1200 mg qhs,  zonegran 400-300, Lamictal 200 mg bid. Lamictal was added recently. Since the last visit, he had probably 5 seizures, most were likely GTCs. He is taking Aptiom 1200 mg hs, Zonegran 300-400.  He is also taking CBD oil for anxiety. He is seeing Dr. Ben Barros for his depression.  He is complaining of fatigue and sleep over 10 hours a day. He is very concerned about the fatigue and is questioning what other options we have for treating his seizures. He did not want to do surgery or neuromodulation in the past. However, mother expressed that they are open to these ideas now, because they want the patient to have a better QOL.    I have a long discussion with the patient and mother regarding different treatment options for epilepsy, including meds, surgery, neuromodulation and diet treatment.    2.  Asperger's syndrome.   3.  Depression.  He is followed by Dr. Ben Barros.     PLAN:   1.  Continue Aptiom 1200 mg q.h.s. and Zonegran 300-400 mg, Lamictal 200 mg bid.  2.  Continue to follow up with Dr. Barros for depression. Continue with therapy.  3.  May consider add Topamax, Felbamate, etc in the future if seizures get worse.  4.  Patient will discuss with family regarding epilepsy surgery and neuromodulation.  5.  RTC in 3 months. Patient may want to try Peoples diet on himself.        30 min was spent on the visit.  Over 50% of the time was spent on education, counseling about optimal seizure control and coordination of care.      Again, thank you for allowing me to participate in the care of your patient.      Sincerely,    Norman Dukes MD

## 2019-04-22 NOTE — PROGRESS NOTES
"P/[facility] Epilepsy Care Progress Note      Patient:  Amrit Padilla  :  1995   Age:  23 year old   Today's Office Visit:  2019    Epilepsy Data:    Patient History  Primary Epileptologist/Provider: ALAYNA) Norman Dukes M.D.  Patient Status: (P) Not yet controlled  Epilepsy Syndrome: (P) Epilepsy unspecified  Epilepsy Syndrome Status: (P) Undetermined - Evaluation in Progress  Age of Onset: (P) Age 16  Other Relevant Dx/ Issues: (P) Asperger's     Tests/Surgery History  Last EEG: (P) 3/30/2018  Last MRI: (P) 2018    Seizure Record  Current Visit Date: (P) 19  Previous Visit Date: (P) 19  Months since last visit: (P) 2.89  Seizure Type 1: (P) Complex partial seizures with impairment of consciousness at onset  Description of Sz Type 1: (P) \"mild seizures\", during which he will have some facial twitching. He will have repetitive harnd/arm jerking movement. He will become unresponsive for less than a minute. Postictally, he was having some confusion and headaches and fatigue. He has an aura of \"seeing bees populating the earth with eggs.\"  # of Type 1 Seizure since last visit: (P) 0  Freq. Type 1 / Month: (P) 0  Seizure Type 2: (P) Tonic-clonic seizures  Description of Sz Type 2: (P) He will have generalized convulsions of the body and his body will be stiff. It usually lasts 1-2 minutes followed by postictal fatigue and hallucinations.  # of Type 2 Seizure since last visit: (P) 5  Freq. Type 2 / Month: (P) 1.73      Current Outpatient Medications   Medication Sig Dispense Refill     cholecalciferol (VITAMIN  -D) 1000 units capsule Take one cap ( 1,000 mg ) by mouth daily 93 capsule 3     eslicarbazepine acetate (APTIOM) 600 MG tablet Take 2 tablets (1,200 mg) by mouth At Bedtime (Patient taking differently: Take 1,200 mg by mouth daily ) 180 tablet 3     fexofenadine (ALLEGRA) 180 MG tablet        FLUoxetine 20 MG tablet Take 2 tablets (40 mg) by mouth every morning 60 tablet 2     hydrOXYzine " (ATARAX) 25 MG tablet Take 1 tablet (25 mg) by mouth 3 times daily as needed for anxiety 90 tablet 1     lamoTRIgine (LAMICTAL) 200 MG tablet Take 1 tablet (200 mg) by mouth 2 times daily 62 tablet 0     LORazepam (ATIVAN) 1 MG tablet Take 1 mg by mouth       MELATONIN PO Take 5 mg by mouth At Bedtime       vitamin D3 (CHOLECALCIFEROL) 66181 UNITS capsule Take 1 capsule (50,000 units) by mouth once each week x 7 weeks, then begin maintenance dose 7 capsule 0     zonisamide (ZONEGRAN) 100 MG capsule Take 3 caps (300 mg) each AM and 4 caps (400 mg) each  capsule 11     lamoTRIgine (LAMICTAL) 200 MG tablet Take 1 tablet (200 mg) by mouth 2 times daily for 2 days 4 tablet 0     zonisamide (ZONEGRAN) 100 MG capsule Take 3 capsules (300 mg) by mouth every morning AND 4 capsules (400 mg) At Bedtime. Do all this for 2 days. 14 capsule 0         CHIEF COMPLAINT:  Follow up for seizures.      HISTORY OF PRESENT ILLNESS:  23-year-old right-handed male with history of epilepsy, Asperger's disorder, depression returns for follow up for seizures.  He is by himself in the clinic today.  He was previously seen by Dr. Morales at [facility] for his epilepsy.  He is currently taking aptiom 1200 mg qhs,  zonegran 400-300, Lamictal 200 mg bid. Lamictal was added recently. Since the last visit, he had probably 5 seizures, most were likely GTCs. He is taking Aptiom 1200 mg hs, Zonegran 300-400.  He is also taking CBD oil for anxiety. He is seeing Dr. Ben Barros for his depression.  He is complaining of fatigue and sleep over 10 hours a day. He is very concerned about the fatigue and is questioning what other options we have for treating his seizures. He did not want to do surgery or neuromodulation in the past. However, mother expressed that they are open to these ideas now, because they want the patient to have a better QOL.     Mood improved a lot. No suicidal thoughts, no thoughts of hurting himself.    The patient started to  "have seizures when he was 16 years old and he was managed at the [facility] for the past few years for his seizures.  He is currently taking Aptiom 1200 mg q.h.s. and the Zonegran 300 mg b.i.d. for his epilepsy.  He was recently admitted to the hospital twice because of 2 clusters of seizures, one in January when he was admitted to [facility] and one was in February when he was admitted to [facility].  On both occasions, he had 4-5 back to back generalized tonic-clonic seizures within a short period of time.      According to the mother, the patient has 2 different types of seizures.  Type #1 is described as \"mild seizures,\" during which he will have some facial twitching.  He will have repetitive hand/arm jerking movement.  He will become unresponsive for less than a minute.  Postictally, he was having some confusion and headaches and fatigue.  Before the seizures, he will have an aura of \"seeing bees populating the earth with eggs.\"  This type of seizure is relatively infrequent, average once per month.      Type #2 are generalized tonic-clonic seizures which the patient will have generalized convulsions of the body and his body will be stiff.  It usually will last for 1-2 minutes followed by postictal fatigue and hallucinations.  This happens usually once every 3 months, but sometimes it occurs in clusters with 2-5 seizures back to back.      The patient had tried Keppra and Depakote in the past which did not work.  He had EEGs done in the past which were reported negative.      The patient had severe depression and requested a lot of behavioral health and counseling in the past and at certain time, he was suicidal, but that improved.  At the present time, he is not suicidal.  He does not think about hurting himself.      TRIGGERS FOR SEIZURES:  Depression.      RISK FACTORS FOR SEIZURES:  He had no history of head trauma with loss of consciousness.  No history of febrile convulsions.  No history of CNS infections. "        PAST AEDs: Keppra, Depakote. Vimpat caused suicidal ideations.    PAST MEDICAL HISTORY:  Asperger's disorder, depression, epilepsy.      PAST SURGICAL HISTORY:  None.      FAMILY HISTORY:  No family history of seizures.  Both parents have hypertension.  Maternal grandmother had heart disease.      SOCIAL HISTORY:  He is single, living with his mother.  He had some college education but did not finish because of depression.  He is currently working at [employer].  No smoking, no alcohol, no drug abuse.      REVIEW OF SYSTEMS:  Positive for fatigue, depression.  The rest of the 8-point review of systems are negative.      ALLERGIES:  Sertraline and Zyprexa.      PHYSICAL EXAMINATION:      Blood pressure 130/77, pulse 75, weight 194 lb (88 kg).    General exam: General Appearance: No acute distress. HEENT: Normocephalic, atraumatic. Neck: Supple.  Extremities: No edema, no clubbing, no cyanosis.     Neurologic Exam: Alert and oriented x3. Speech fluent, appropriate. Normal attention. Cranial Nerves: Pupils are equal, round, reactive to light and accomodation. Extraocular movement intact. No facial weakness or asymmetry. Hearing normal. Motor Exam: Normal. Coordination:no ataxia.  Gait and Station: normal.    PREVIOUS DIAGNOSTIC TESTING:  CT scan of the head on 02/27/2018 was reported negative from [facility].  He had EEGs in the past which were reported negative.      IMPRESSION:   1.  Epilepsy. 23-year-old right-handed male with history of epilepsy, Asperger's disorder, depression returns for follow up for seizures. 23-year-old right-handed male with history of epilepsy, Asperger's disorder, depression returns for follow up for seizures.  He is by himself in the clinic today.  He was previously seen by Dr. Morales at [facility] for his epilepsy.  He is currently taking aptiom 1200 mg qhs,  zonegran 400-300, Lamictal 200 mg bid. Lamictal was added recently. Since the last visit, he had probably 5 seizures,  most were likely GTCs. He is taking Aptiom 1200 mg hs, Zonegran 300-400.  He is also taking CBD oil for anxiety. He is seeing Dr. Ben Barros for his depression.  He is complaining of fatigue and sleep over 10 hours a day. He is very concerned about the fatigue and is questioning what other options we have for treating his seizures. He did not want to do surgery or neuromodulation in the past. However, mother expressed that they are open to these ideas now, because they want the patient to have a better QOL.    I have a long discussion with the patient and mother regarding different treatment options for epilepsy, including meds, surgery, neuromodulation and diet treatment.    2.  Asperger's syndrome.   3.  Depression.  He is followed by Dr. Ben Barros.     PLAN:   1.  Continue Aptiom 1200 mg q.h.s. and Zonegran 300-400 mg, Lamictal 200 mg bid.  2.  Continue to follow up with Dr. Barros for depression. Continue with therapy.  3.  May consider add Topamax, Felbamate, etc in the future if seizures get worse.  4.  Patient will discuss with family regarding epilepsy surgery and neuromodulation.  5.  RTC in 3 months. Patient may want to try Peoples diet on himself.        30 min was spent on the visit.  Over 50% of the time was spent on education, counseling about optimal seizure control and coordination of care.

## 2019-05-31 ENCOUNTER — TELEPHONE (OUTPATIENT)
Dept: NEUROLOGY | Facility: CLINIC | Age: 24
End: 2019-05-31

## 2019-05-31 NOTE — TELEPHONE ENCOUNTER
Patient calls the clinic to report that his seizures have continued and that he is ready to schedule his ambulatory EEG. Call transferred to scheduling.

## 2019-05-31 NOTE — TELEPHONE ENCOUNTER
[facility] Call Center    Phone Message    May a detailed message be left on voicemail: yes    Reason for Call: Patient would like a call back to discuss getting an EEG done.     Action Taken: Other: Mincep RN Pool

## 2019-06-26 ENCOUNTER — ALLIED HEALTH/NURSE VISIT (OUTPATIENT)
Dept: NEUROLOGY | Facility: CLINIC | Age: 24
End: 2019-06-26

## 2019-06-26 DIAGNOSIS — G40.919 INTRACTABLE EPILEPSY WITHOUT STATUS EPILEPTICUS, UNSPECIFIED EPILEPSY TYPE (H): Primary | ICD-10-CM

## 2019-06-27 ENCOUNTER — ALLIED HEALTH/NURSE VISIT (OUTPATIENT)
Dept: NEUROLOGY | Facility: CLINIC | Age: 24
End: 2019-06-27

## 2019-06-27 DIAGNOSIS — R56.9 SEIZURES (H): Primary | ICD-10-CM

## 2019-06-27 NOTE — PROGRESS NOTES
CPT: 32353  [facility]/OP/24 Hour Ambulatory EEG Day 2 of 2  Reading MD: Ernst

## 2019-06-28 ENCOUNTER — ALLIED HEALTH/NURSE VISIT (OUTPATIENT)
Dept: NEUROLOGY | Facility: CLINIC | Age: 24
End: 2019-06-28

## 2019-06-28 DIAGNOSIS — G40.919 INTRACTABLE EPILEPSY WITHOUT STATUS EPILEPTICUS, UNSPECIFIED EPILEPSY TYPE (H): Primary | ICD-10-CM

## 2019-07-05 NOTE — PROCEDURES
Procedure Date: 2019      EEG #: [id number] (day #1 of ambulatory EEG recording)      RE: Amrit Knight   MRN: [id number]   : 1995      DATE OF RECORDIN/02 to 2019.   DURATION OF RECORDIN hours and 9 minutes.        CLINICAL HISTORY:  This patient is a 24-year-old right-handed male with history of Asperger's syndrome, epilepsy, depression.  He had severe intractable epilepsy.  EEG was requested for further evaluation for his seizures.      CURRENT MEDICATIONS:  Zonegran, Lamictal, Aptiom, Atarax, Ativan      TECHNICAL SUMMARY: This continuous anbulatory EEG monitoring procedure was performed with 23 scalp electrodes in 10-20 electrode system placements, and additional scalp, precordial and other surface electrodes used for electrical referencing and artifact detection.    BACKGROUND ACTIVITIES:  The background activities of this EEG were symmetric and consisted of 7-8 Hz poorly formed posterior dominant rhythm during maximal wakefulness with moderate amplitude.  There were mild to moderate generalized slowing of the background activities mostly in the theta frequencies.      Hyperventilation and photic stimulation were not performed.      Sleep stages were recorded with poorly formed sleep architectures.      No interictal epileptiform activities were observed.      ICTAL ACTIVITIES:  No seizures were recorded.      SUMMARY OF DAY #1 OF AMBULATORY EEG MONITORING:  This EEG is abnormal due to the presence of mild to moderate generalized slowing of the background activities.  No epileptiform activities or seizures were recorded.  The patient reported multiple symptoms during this recording including headaches, dizziness, vertigo, with no clear EEG correlations.      MD KATHRYN Almendarez MD             D: 2019   T: 2019   MT: BRAYAN      Name:     AMRIT KNIGHT   MRN:      [id number]        Account:        [id number]   :      1995           Procedure  Date: 06/26/2019      Document: [id number]

## 2019-07-08 ENCOUNTER — OFFICE VISIT (OUTPATIENT)
Dept: NEUROLOGY | Facility: CLINIC | Age: 24
End: 2019-07-08

## 2019-07-08 VITALS
TEMPERATURE: 97.7 F | DIASTOLIC BLOOD PRESSURE: 73 MMHG | HEART RATE: 88 BPM | WEIGHT: 194 LBS | SYSTOLIC BLOOD PRESSURE: 122 MMHG | BODY MASS INDEX: 26.68 KG/M2

## 2019-07-08 DIAGNOSIS — G40.909 RECURRENT SEIZURES (H): Primary | ICD-10-CM

## 2019-07-08 ASSESSMENT — PAIN SCALES - GENERAL: PAINLEVEL: NO PAIN (0)

## 2019-07-08 NOTE — PROGRESS NOTES
"[facility]psy [facility] Progress Note      Patient:  Amrit Padilla  :  1995   Age:  24 year old   Today's Office Visit:  2019    Epilepsy Data:    Patient History  Primary Epileptologist/Provider: Norman Dukes M.D.  Patient Status: Not yet controlled  Epilepsy Syndrome: Epilepsy unspecified  Epilepsy Syndrome Status: Undetermined - Evaluation in Progress  Age of Onset: Age 16  Other Relevant Dx/ Issues: Asperger's     Tests/Surgery History  Last EEG: 3/30/2018  Last MRI: 2018    Seizure Record  Current Visit Date: 19  Previous Visit Date: 19  Months since last visit: 2.53  Seizure Type 1: Complex partial seizures with impairment of consciousness at onset  Description of Sz Type 1: \"mild seizures\", during which he will have some facial twitching. He will have repetitive harnd/arm jerking movement. He will become unresponsive for less than a minute. Postictally, he was having some confusion and headaches and fatigue. He has an aura of \"seeing bees populating the earth with eggs.\"  # of Type 1 Seizure since last visit: 0  Freq. Type 1 / Month: 0  Seizure Type 2: Tonic-clonic seizures  Description of Sz Type 2: He will have generalized convulsions of the body and his body will be stiff. It usually lasts 1-2 minutes followed by postictal fatigue and hallucinations.  # of Type 2 Seizure since last visit: 2  Freq. Type 2 / Month: 0.79        Current Outpatient Medications   Medication Sig Dispense Refill     cholecalciferol (VITAMIN  -D) 1000 units capsule Take one cap ( 1,000 mg ) by mouth daily 93 capsule 3     eslicarbazepine acetate (APTIOM) 600 MG tablet Take 2 tablets (1,200 mg) by mouth At Bedtime 180 tablet 3     fexofenadine (ALLEGRA) 180 MG tablet        FLUoxetine 20 MG tablet Take 2 tablets (40 mg) by mouth every morning 60 tablet 2     hydrOXYzine (ATARAX) 25 MG tablet Take 1 tablet (25 mg) by mouth 3 times daily as needed for anxiety 90 tablet 1     lamoTRIgine (LAMICTAL) 200 MG " tablet Take 1 tablet (200 mg) by mouth 2 times daily 62 tablet 11     LORazepam (ATIVAN) 1 MG tablet Take 1 mg by mouth       MELATONIN PO Take 5 mg by mouth At Bedtime       vitamin D3 (CHOLECALCIFEROL) 50425 UNITS capsule Take 1 capsule (50,000 units) by mouth once each week x 7 weeks, then begin maintenance dose 7 capsule 0     zonisamide (ZONEGRAN) 100 MG capsule Take 3 caps (300 mg) each AM and 4 caps (400 mg) each  capsule 11          CHIEF COMPLAINT:  Follow up for seizures.      HISTORY OF PRESENT ILLNESS:  24-year-old right-handed male with history of epilepsy, Asperger's disorder, depression returns for follow up for seizures.  He is accompanied by his mother in the clinic today.  He was previously seen by Dr. Morales at [facility] for his epilepsy.  He is currently taking aptiom 1200 mg qhs,  zonegran 300-400, Lamictal 200 mg bid. Since the last visit, he had probably 2-3 seizures, which were likely GTCs.  He is also taking CBD oil for anxiety. He is seeing Dr. Ben Barros for his depression.  He is complaining of fatigue and sleep over 10 hours a day. He is very concerned about the fatigue and is questioning what other options we have for treating his seizures. He did not want to do surgery or neuromodulation in the past. However, mother expressed that they are open to these ideas now, because they want the patient to have a better QOL.     Mood improved a lot. No suicidal thoughts, no thoughts of hurting himself.    The patient started to have seizures when he was 16 years old and he was managed at the [facility] for the past few years for his seizures.  He is currently taking Aptiom 1200 mg q.h.s. and the Zonegran 300 mg b.i.d. for his epilepsy.  He was recently admitted to the hospital twice because of 2 clusters of seizures, one in January when he was admitted to [facility] and one was in February when he was admitted to [facility].  On both occasions, he had 4-5 back to back generalized  "tonic-clonic seizures within a short period of time.      According to the mother, the patient has 2 different types of seizures.  Type #1 is described as \"mild seizures,\" during which he will have some facial twitching.  He will have repetitive hand/arm jerking movement.  He will become unresponsive for less than a minute.  Postictally, he was having some confusion and headaches and fatigue.  Before the seizures, he will have an aura of \"seeing bees populating the earth with eggs.\"  This type of seizure is relatively infrequent, average once per month.      Type #2 are generalized tonic-clonic seizures which the patient will have generalized convulsions of the body and his body will be stiff.  It usually will last for 1-2 minutes followed by postictal fatigue and hallucinations.  This happens usually once every 3 months, but sometimes it occurs in clusters with 2-5 seizures back to back.      The patient had tried Keppra and Depakote in the past which did not work.  He had EEGs done in the past which were reported negative.      The patient had severe depression and requested a lot of behavioral health and counseling in the past and at certain time, he was suicidal, but that improved.  At the present time, he is not suicidal.  He does not think about hurting himself.      TRIGGERS FOR SEIZURES:  Depression.      RISK FACTORS FOR SEIZURES:  He had no history of head trauma with loss of consciousness.  No history of febrile convulsions.  No history of CNS infections.        PAST AEDs: Keppra, Depakote. Vimpat caused suicidal ideations.    PAST MEDICAL HISTORY:  Asperger's disorder, depression, epilepsy.      PAST SURGICAL HISTORY:  None.      FAMILY HISTORY:  No family history of seizures.  Both parents have hypertension.  Maternal grandmother had heart disease.      SOCIAL HISTORY:  He is single, living with his mother.  He had some college education but did not finish because of depression.  He is currently working " at [employer].  No smoking, no alcohol, no drug abuse.      REVIEW OF SYSTEMS:  Positive for fatigue, depression.  The rest of the 8-point review of systems are negative.      ALLERGIES:  Sertraline and Zyprexa.      PHYSICAL EXAMINATION:      Blood pressure 122/73, pulse 88, temperature 97.7  F (36.5  C), weight 194 lb (88 kg).    General exam: General Appearance: No acute distress. HEENT: Normocephalic, atraumatic. Neck: Supple.  Extremities: No edema, no clubbing, no cyanosis.     Neurologic Exam: Alert and oriented x3. Speech fluent, appropriate. Normal attention. Cranial Nerves: Pupils are equal, round, reactive to light and accomodation. Extraocular movement intact. No facial weakness or asymmetry. Hearing normal. Motor Exam: Normal. Coordination:no ataxia.  Gait and Station: normal.    PREVIOUS DIAGNOSTIC TESTING:  CT scan of the head on 02/27/2018 was reported negative from [facility].  He had EEGs in the past which were reported negative.      IMPRESSION:   1.  Epilepsy. 24-year-old right-handed male with history of epilepsy, Asperger's disorder, depression returns for follow up for seizures.  He is accompanied by his mother in the clinic today.  He was previously seen by Dr. Morales at [facility] for his epilepsy.  He is currently taking aptiom 1200 mg qhs,  zonegran 300-400, Lamictal 200 mg bid. Since the last visit, he had probably 2-3 seizures, which were likely GTCs.  He is also taking CBD oil for anxiety. He is seeing Dr. Ben Barros for his depression.  He is complaining of fatigue and sleep over 10 hours a day. He is very concerned about the fatigue and is questioning what other options we have for treating his seizures. He did not want to do surgery or neuromodulation in the past. However, mother expressed that they are open to these ideas now, because they want the patient to have a better QOL.     Mood improved a lot. No suicidal thoughts, no thoughts of hurting himself.    I again had a  long discussion with the patient and mother regarding different treatment options for epilepsy, including meds, surgery, neuromodulation and diet treatment.    2.  Asperger's syndrome.   3.  Depression.  He is followed by Dr. Ben Barros.     PLAN:   1.  Continue Aptiom 1200 mg q.h.s. and Zonegran 300-400 mg, Lamictal 200 mg bid.  2.  Continue to follow up with Dr. Barros for depression. Continue with therapy.  3.  May consider add Topamax, Felbamate, etc in the future if seizures get worse.  4.  Patient and family are willing to start work up for epilepsy surgery and neuromodulation if diet does not work for him..  5.  RTC in 3 months. Patient will try Peoples diet on himself. (Mother encourage patient to do this)        25 min was spent on the visit.  Over 50% of the time was spent on education, counseling about optimal seizure control and coordination of care.

## 2019-07-08 NOTE — LETTER
Date:July 15, 2019      Patient was self referred, no letter generated. Do not send.        [facility] Information

## 2019-07-08 NOTE — LETTER
"2019       RE: Amrit Padilla  : 1995   MRN: [id number]      Dear Colleague,    Thank you for referring your patient, Amrit Padilla, to the [facility] EPILEPSY [facility]ARE at [facility]. Please see a copy of my visit note below.    [facility] Progress Note      Patient:  Amrit Padilla  :  1995   Age:  24 year old   Today's Office Visit:  2019    Epilepsy Data:    Patient History  Primary Epileptologist/Provider: Norman Dukes M.D.  Patient Status: Not yet controlled  Epilepsy Syndrome: Epilepsy unspecified  Epilepsy Syndrome Status: Undetermined - Evaluation in Progress  Age of Onset: Age 16  Other Relevant Dx/ Issues: Asperger's     Tests/Surgery History  Last EEG: 3/30/2018  Last MRI: 2018    Seizure Record  Current Visit Date: 19  Previous Visit Date: 19  Months since last visit: 2.53  Seizure Type 1: Complex partial seizures with impairment of consciousness at onset  Description of Sz Type 1: \"mild seizures\", during which he will have some facial twitching. He will have repetitive harnd/arm jerking movement. He will become unresponsive for less than a minute. Postictally, he was having some confusion and headaches and fatigue. He has an aura of \"seeing bees populating the earth with eggs.\"  # of Type 1 Seizure since last visit: 0  Freq. Type 1 / Month: 0  Seizure Type 2: Tonic-clonic seizures  Description of Sz Type 2: He will have generalized convulsions of the body and his body will be stiff. It usually lasts 1-2 minutes followed by postictal fatigue and hallucinations.  # of Type 2 Seizure since last visit: 2  Freq. Type 2 / Month: 0.79        Current Outpatient Medications   Medication Sig Dispense Refill     cholecalciferol (VITAMIN  -D) 1000 units capsule Take one cap ( 1,000 mg ) by mouth daily 93 capsule 3     eslicarbazepine acetate (APTIOM) 600 MG tablet Take 2 tablets (1,200 mg) by mouth At Bedtime 180 tablet 3     " fexofenadine (ALLEGRA) 180 MG tablet        FLUoxetine 20 MG tablet Take 2 tablets (40 mg) by mouth every morning 60 tablet 2     hydrOXYzine (ATARAX) 25 MG tablet Take 1 tablet (25 mg) by mouth 3 times daily as needed for anxiety 90 tablet 1     lamoTRIgine (LAMICTAL) 200 MG tablet Take 1 tablet (200 mg) by mouth 2 times daily 62 tablet 11     LORazepam (ATIVAN) 1 MG tablet Take 1 mg by mouth       MELATONIN PO Take 5 mg by mouth At Bedtime       vitamin D3 (CHOLECALCIFEROL) 98862 UNITS capsule Take 1 capsule (50,000 units) by mouth once each week x 7 weeks, then begin maintenance dose 7 capsule 0     zonisamide (ZONEGRAN) 100 MG capsule Take 3 caps (300 mg) each AM and 4 caps (400 mg) each  capsule 11          CHIEF COMPLAINT:  Follow up for seizures.      HISTORY OF PRESENT ILLNESS:  24-year-old right-handed male with history of epilepsy, Asperger's disorder, depression returns for follow up for seizures.  He is accompanied by his mother in the clinic today.  He was previously seen by Dr. Morales at [facility] for his epilepsy.  He is currently taking aptiom 1200 mg qhs,  zonegran 300-400, Lamictal 200 mg bid. Since the last visit, he had probably 2-3 seizures, which were likely GTCs.  He is also taking CBD oil for anxiety. He is seeing Dr. Ben Barros for his depression.  He is complaining of fatigue and sleep over 10 hours a day. He is very concerned about the fatigue and is questioning what other options we have for treating his seizures. He did not want to do surgery or neuromodulation in the past. However, mother expressed that they are open to these ideas now, because they want the patient to have a better QOL.     Mood improved a lot. No suicidal thoughts, no thoughts of hurting himself.    The patient started to have seizures when he was 16 years old and he was managed at the [facility] for the past few years for his seizures.  He is currently taking Aptiom 1200 mg q.h.s. and the Zonegran 300 mg  "b.i.d. for his epilepsy.  He was recently admitted to the hospital twice because of 2 clusters of seizures, one in January when he was admitted to [facility] and one was in February when he was [place] [facility]  On both occasions, he had 4-5 back to back generalized tonic-clonic seizures within a short period of time.      According to the mother, the patient has 2 different types of seizures.  Type #1 is described as \"mild seizures,\" during which he will have some facial twitching.  He will have repetitive hand/arm jerking movement.  He will become unresponsive for less than a minute.  Postictally, he was having some confusion and headaches and fatigue.  Before the seizures, he will have an aura of \"seeing bees populating the earth with eggs.\"  This type of seizure is relatively infrequent, average once per month.      Type #2 are generalized tonic-clonic seizures which the patient will have generalized convulsions of the body and his body will be stiff.  It usually will last for 1-2 minutes followed by postictal fatigue and hallucinations.  This happens usually once every 3 months, but sometimes it occurs in clusters with 2-5 seizures back to back.      The patient had tried Keppra and Depakote in the past which did not work.  He had EEGs done in the past which were reported negative.      The patient had severe depression and requested a lot of behavioral health and counseling in the past and at certain time, he was suicidal, but that improved.  At the present time, he is not suicidal.  He does not think about hurting himself.      TRIGGERS FOR SEIZURES:  Depression.      RISK FACTORS FOR SEIZURES:  He had no history of head trauma with loss of consciousness.  No history of febrile convulsions.  No history of CNS infections.        PAST AEDs: Keppra, Depakote. Vimpat caused suicidal ideations.    PAST MEDICAL HISTORY:  Asperger's disorder, depression, epilepsy.      PAST SURGICAL HISTORY:  None.      FAMILY " HISTORY:  No family history of seizures.  Both parents have hypertension.  Maternal grandmother had heart disease.      SOCIAL HISTORY:  He is single, living with his mother.  He had some college education but did not finish because of depression.  He is currently working at [employer].  No smoking, no alcohol, no drug abuse.      REVIEW OF SYSTEMS:  Positive for fatigue, depression.  The rest of the 8-point review of systems are negative.      ALLERGIES:  Sertraline and Zyprexa.      PHYSICAL EXAMINATION:      Blood pressure 122/73, pulse 88, temperature 97.7  F (36.5  C), weight 194 lb (88 kg).    General exam: General Appearance: No acute distress. HEENT: Normocephalic, atraumatic. Neck: Supple.  Extremities: No edema, no clubbing, no cyanosis.     Neurologic Exam: Alert and oriented x3. Speech fluent, appropriate. Normal attention. Cranial Nerves: Pupils are equal, round, reactive to light and accomodation. Extraocular movement intact. No facial weakness or asymmetry. Hearing normal. Motor Exam: Normal. Coordination:no ataxia.  Gait and Station: normal.    PREVIOUS DIAGNOSTIC TESTING:  CT scan of the head on 02/27/2018 was reported negative from [facility].  He had EEGs in the past which were reported negative.      IMPRESSION:   1.  Epilepsy. 24-year-old right-handed male with history of epilepsy, Asperger's disorder, depression returns for follow up for seizures.  He is accompanied by his mother in the clinic today.  He was previously seen by Dr. Morales at [facility] for his epilepsy.  He is currently taking aptiom 1200 mg qhs,  zonegran 300-400, Lamictal 200 mg bid. Since the last visit, he had probably 2-3 seizures, which were likely GTCs.  He is also taking CBD oil for anxiety. He is seeing Dr. Ben Barros for his depression.  He is complaining of fatigue and sleep over 10 hours a day. He is very concerned about the fatigue and is questioning what other options we have for treating his seizures. He  did not want to do surgery or neuromodulation in the past. However, mother expressed that they are open to these ideas now, because they want the patient to have a better QOL.     Mood improved a lot. No suicidal thoughts, no thoughts of hurting himself.    I again had a long discussion with the patient and mother regarding different treatment options for epilepsy, including meds, surgery, neuromodulation and diet treatment.    2.  Asperger's syndrome.   3.  Depression.  He is followed by Dr. Ben Barros.     PLAN:   1.  Continue Aptiom 1200 mg q.h.s. and Zonegran 300-400 mg, Lamictal 200 mg bid.  2.  Continue to follow up with Dr. Barros for depression. Continue with therapy.  3.  May consider add Topamax, Felbamate, etc in the future if seizures get worse.  4.  Patient and family are willing to start work up for epilepsy surgery and neuromodulation if diet does not work for him..  5.  RTC in 3 months. Patient will try Peoples diet on himself. (Mother encourage patient to do this)        25 min was spent on the visit.  Over 50% of the time was spent on education, counseling about optimal seizure control and coordination of care.    Again, thank you for allowing me to participate in the care of your patient.      Sincerely,    Norman Dukes MD

## 2019-07-09 ENCOUNTER — TELEPHONE (OUTPATIENT)
Dept: NEUROLOGY | Facility: CLINIC | Age: 24
End: 2019-07-09

## 2019-07-09 NOTE — TELEPHONE ENCOUNTER
What is the concern that needs to be addressed by a nurse? Mom dropped off form in clinic. Please complete form and fax to [phone] and mail pt a copy. Form placed in nurses folder.      May a detailed message be left on voicemail? yes    Date of last office visit:     Message routed to: [facility] RN pool

## 2019-07-18 ENCOUNTER — TELEPHONE (OUTPATIENT)
Dept: NEUROLOGY | Facility: CLINIC | Age: 24
End: 2019-07-18

## 2019-07-18 NOTE — TELEPHONE ENCOUNTER
"Amrit reports he woke up on Sunday after having had a seizure. After the seizure, Amrit reports having had a migraine headache which he initially rated a 10. He reported feeling \"funny and loopy\". At the time of this call he continues to have a migraine / he rates his headache 7-8. He stated \"I don't know if this is a sign because this always happens\". He is referring to finding his blankets messed up/pillows on the floor, etc. Amrit stated \"it is so difficulty to determine if a seizure occurred.     He is not sure if he continues to have seizures in his sleep.     Amrti had a seizure last Sunday. He doesn't know if he had any additional seizures over the last few days.     He does not want to make any medication changes at this time.    PLAN:  Advised Amrit to call back if additional seizures or other concerns. He verbalized agreement.            "

## 2019-07-18 NOTE — TELEPHONE ENCOUNTER
What is the concern that needs to be addressed by a nurse? Pt would like a call back to discuss a seizure he had on Sunday. Pt states that he's had a headache/migrane feeling and confusion since that seizure.     May a detailed message be left on voicemail? Yes     Date of last office visit: 7/8/19    Message routed to: [facility] RN Pool

## 2019-07-22 NOTE — TELEPHONE ENCOUNTER
Forms reviewed. Patient's mother (Insurance Subscriber) did not fully fill out her portion of the form, nor did she sign it.     Voicemail left at her contact number requesting a call back. If she calls, please ask her to come in to sign/finish.       --Form placed in Dr. Dukes's folder for review and signature.

## 2019-07-22 NOTE — TELEPHONE ENCOUNTER
Patient's mother returned my call. She will send a fully completed form to the insurance company and asks that once Dr. Dukes has signed his portion that we send it in to [facility] without her portion added.

## 2019-07-26 ENCOUNTER — TELEPHONE (OUTPATIENT)
Dept: NEUROLOGY | Facility: CLINIC | Age: 24
End: 2019-07-26

## 2019-07-26 NOTE — TELEPHONE ENCOUNTER
What is the concern that needs to be addressed by a nurse? Pt has questions about the saleem diet that was recommended at his last appt. Patient has been looking this up in the internet and some talk about net carb and carb in general. Patient wants to know which one he is supposed to be looking at and how many carbs he should going for.     May a detailed message be left on voicemail? Yes, [phone]    Date of last office visit: 7/8/19    Message routed to: [facility] RN POOL

## 2019-07-26 NOTE — TELEPHONE ENCOUNTER
"Patient calls the clinic today requesting clarification of the amount of carbohydrates allowed in the Modified Atkins Diet; specifically, if the diet allows for total carbs or net carbs where fiber is removed from the total carbs.     I did a quick Google Scholar search of Modified Atkins Diet and found an article by Sandy \"The Modified Atkins Diet (Epilepsia, 2009). It states, \"The composition of the MAD was recently elaborated upon in a prospective, crossover?design evaluation (Ramone et al., 2007) with detailed 3?day food record provided by parents. The MAD was similar in fat composition to a 0.9:1 ketogenic ratio (fat:carbohydrate and protein) diet, with approximately 65% of the calories from fat sources. This is certainly less fat than a standard 4:1 KD (90% fat) but more than a typical diet (0.3:1, 35% fat) (Fig. 1). In children, the carbohydrates are limited initially to 10 g/day, with planned increases after 1 month to 15 g, then 20-30 g/day as tolerated based on seizure control. Adults are started at 15 g/day and can be increased to 20-30 g/day after 1 month. All carbohydrates are allowed, in contrast to the low glycemic index treatment which restricts carbohydrates to those with a glycemic index less than 50 (Kaley & Nancy, 2005). Carbohydrates can be given throughout the day or at one meal. We allow fiber to be ignored from the total carbohydrate count, but not sugar alcohols. The MAD protocol still in use today is provided in Table 1. \"    Thus, I advised the patient to subtract the fiber carbs from his count. He is agreeable. He asks me to let him know if Dr. Dukes feels differently. I will send the chart to Dr. Dukes for him to review and comment if needed.         "

## 2019-08-01 NOTE — PROCEDURES
Procedure Date: 2019      EEG #: [id number]      DATE OF RECORDIN2019      SOURCE FILE DURATION:  23 hours and 43 minutes      DAY #2 OF AMBULATORY EEG      CLINICAL HISTORY:  This patient is a 24-year-old right-handed male with history of Asperger's syndrome, epilepsy, depression.  He had severe intractable epilepsy.  EEG was requested for further evaluation for his seizures.      CURRENT MEDICATIONS:  Zonegran, Lamictal, Aptiom, Atarax, Ativan      TECHNICAL SUMMARY: This continuous anbulatory EEG monitoring procedure was performed with 23 scalp electrodes in 10-20 electrode system placements, and additional scalp, precordial and other surface electrodes used for electrical referencing and artifact detection.     BACKGROUND ACTIVITIES:  The background activities of this EEG were symmetric and consisted of 7-8 Hz poorly formed posterior dominant rhythm during maximal wakefulness with moderate amplitude.  There were mild to moderate generalized slowing of the background activities mostly in the theta frequencies.      Hyperventilation and photic stimulation were not performed.      Sleep stages were recorded with poorly formed sleep architectures.      No interictal epileptiform activities were observed.      ICTAL ACTIVITIES: No electrographic seizures were observed.  The patient reported multiple events of weird feeling lightheadedness, slight confusion, etc., with no clear EEG correlations.      SUMMARY OF DAY #2 OF AMBULATORY EEG MONITORING:  This EEG is abnormal due to the presence of mild-to-moderate generalized slowing of the background activities.  No epileptiform activities or seizures were recorded.  These findings are consistent with mild to moderate diffuse encephalopathy.  Etiology is nonspecific.  The patient reported multiple symptoms during this day of recording including dizziness, lightheadedness, weird feeling, etc., with no clear EEG correlations.  These events are most likely not  epileptic by nature.  Clinical correlation is advised.      SUMMARY OF 2 DAYS OF AMBULATORY EEG MONITORING:  This EEG is abnormal due to the presence of mild-to-moderate generalized slowing of the background activities.  No epileptiform activities or seizures were recorded.  These findings are consistent with mild to moderate diffuse encephalopathy.  Etiology is nonspecific.  The patient reported multiple symptoms during this day of recording including dizziness, lightheadedness, weird feeling, etc., with no clear EEG correlations.  These events are most likely not epileptic by nature.  Clinical correlation is advised.            KATHRYN HAMMOND MD             D: 2019   T: 2019   MT: AKA      Name:     BRIDGET KNIGHT   MRN:      [id number]        Account:        [id number]   :      1995           Procedure Date: 2019      Document: [id number]

## 2019-08-13 ENCOUNTER — TELEPHONE (OUTPATIENT)
Dept: NEUROLOGY | Facility: CLINIC | Age: 24
End: 2019-08-13

## 2019-08-13 NOTE — TELEPHONE ENCOUNTER
Received call from mom. Patient is visiting family in [place] and had 2 seizures. Mom wants the rescue medication to be sent to [facility] in [place], [place]    [id number] - [place], [place] - [address]    Mom stated patient is currently getting liquid drop medication. She is not sure of the dose or name. Assuming lorazepam. Informed mom there is no liquid medication on file for him, only tablet.     Note: [facility]. Pharmacist does not have any liquid medication on file either for this patient. Was unable to obtain dose.     Not sure if patient is using an old prescription.

## 2019-08-13 NOTE — TELEPHONE ENCOUNTER
Placed call to Mother and left voice message indicating as was indicated to her when she called that there is no liquid rescue med on record, that staff checked with their pharmacy and they have no liquids on record, and that it would be best to get him to an ER and ask there for a rescue med.  Also request that she call me back, left call back number and name.

## 2019-08-14 NOTE — TELEPHONE ENCOUNTER
Returned call from Sarah echevarria.  This morning, had two seizures while awake, lasted > 5 minutes around 10am.  Around noon had another one.  Patient was endorsing headache, feeling warm, feeling achy.  Mother is afraid that he may experience a third seizure.    Had been prescribed lorazepam drops at [facility], more than a year ago.  Was for use if having seizures > 2 times per day.  Had left these drops in the refrigerator at home, does not have them on hand while currently visiting family.  Desires refill of drops.    No mention of lorazepam drops in Dr. Dukes's latest note 7/8/2019.  Unable to refill drops at this time.  Advised mother to have low threshold to bring patient to the ED given she is concerned about seizure.

## 2019-09-11 DIAGNOSIS — G40.219 PARTIAL SYMPTOMATIC EPILEPSY WITH COMPLEX PARTIAL SEIZURES, INTRACTABLE, WITHOUT STATUS EPILEPTICUS (H): Primary | ICD-10-CM

## 2019-09-11 RX ORDER — ZONISAMIDE 100 MG/1
CAPSULE ORAL
Qty: 211 CAPSULE | Refills: 3 | Status: SHIPPED | OUTPATIENT
Start: 2019-09-11 | End: 2020-01-06

## 2019-10-03 ENCOUNTER — OFFICE VISIT (OUTPATIENT)
Dept: NEUROLOGY | Facility: CLINIC | Age: 24
End: 2019-10-03
Payer: COMMERCIAL

## 2019-10-03 VITALS
BODY MASS INDEX: 26.04 KG/M2 | HEIGHT: 71 IN | SYSTOLIC BLOOD PRESSURE: 116 MMHG | WEIGHT: 186 LBS | DIASTOLIC BLOOD PRESSURE: 75 MMHG | TEMPERATURE: 98 F | HEART RATE: 81 BPM

## 2019-10-03 DIAGNOSIS — G40.909 SEIZURE DISORDER (H): ICD-10-CM

## 2019-10-03 DIAGNOSIS — G40.909 RECURRENT SEIZURES (H): Primary | ICD-10-CM

## 2019-10-03 RX ORDER — LORAZEPAM 2 MG/ML
2 CONCENTRATE ORAL EVERY 12 HOURS PRN
Qty: 30 ML | Refills: 2 | Status: ON HOLD | OUTPATIENT
Start: 2019-10-03 | End: 2021-03-16

## 2019-10-03 RX ORDER — LAMOTRIGINE 150 MG/1
150 TABLET ORAL 2 TIMES DAILY
Qty: 60 TABLET | Refills: 6 | Status: SHIPPED | OUTPATIENT
Start: 2019-10-03 | End: 2020-01-30

## 2019-10-03 RX ORDER — DIVALPROEX SODIUM 500 MG/1
500 TABLET, EXTENDED RELEASE ORAL 2 TIMES DAILY
Qty: 60 TABLET | Refills: 6 | Status: SHIPPED | OUTPATIENT
Start: 2019-10-03 | End: 2019-11-14

## 2019-10-03 ASSESSMENT — PAIN SCALES - GENERAL: PAINLEVEL: NO PAIN (0)

## 2019-10-03 ASSESSMENT — MIFFLIN-ST. JEOR: SCORE: 1841.85

## 2019-10-03 NOTE — LETTER
"10/3/2019       RE: Armit Padilla  : 1995   MRN: [id number]      Dear Colleague,    Thank you for referring your patient, Amrit Padilla, to the [facility] EPILEPSY CARE at [facility]. Please see a copy of my visit note below.    [facility]      Patient:  Amrit Padilla  :  1995   Age:  24 year old   Today's Office Visit:  2019    Epilepsy Data:    Patient History  Primary Epileptologist/Provider: Norman Dukes M.D.  Patient Status: Not yet controlled  Epilepsy Syndrome: Epilepsy unspecified  Epilepsy Syndrome Status: Undetermined - Evaluation in Progress  Age of Onset: Age 16  Other Relevant Dx/ Issues: Asperger's     Tests/Surgery History  Last EEG: 3/30/2018  Last MRI: 2018    Seizure Record  Current Visit Date: 19  Previous Visit Date: 19  Months since last visit: 2.53  Seizure Type 1: Complex partial seizures with impairment of consciousness at onset  Description of Sz Type 1: \"mild seizures\", during which he will have some facial twitching. He will have repetitive harnd/arm jerking movement. He will become unresponsive for less than a minute. Postictally, he was having some confusion and headaches and fatigue. He has an aura of \"seeing bees populating the earth with eggs.\"  # of Type 1 Seizure since last visit: 0  Freq. Type 1 / Month: 0  Seizure Type 2: Tonic-clonic seizures  Description of Sz Type 2: He will have generalized convulsions of the body and his body will be stiff. It usually lasts 1-2 minutes followed by postictal fatigue and hallucinations.  # of Type 2 Seizure since last visit: 2  Freq. Type 2 / Month: 0.79        Current Outpatient Medications   Medication Sig Dispense Refill     cholecalciferol (VITAMIN  -D) 1000 units capsule Take one cap ( 1,000 mg ) by mouth daily 93 capsule 3     eslicarbazepine acetate (APTIOM) 600 MG tablet Take 2 tablets (1,200 mg) by mouth At Bedtime 180 tablet 3     " fexofenadine (ALLEGRA) 180 MG tablet        FLUoxetine 20 MG tablet Take 2 tablets (40 mg) by mouth every morning 60 tablet 2     hydrOXYzine (ATARAX) 25 MG tablet Take 1 tablet (25 mg) by mouth 3 times daily as needed for anxiety 90 tablet 1     lamoTRIgine (LAMICTAL) 200 MG tablet Take 1 tablet (200 mg) by mouth 2 times daily 62 tablet 11     LORazepam (ATIVAN) 1 MG tablet Take 1 mg by mouth       MELATONIN PO Take 5 mg by mouth At Bedtime       vitamin D3 (CHOLECALCIFEROL) 85743 UNITS capsule Take 1 capsule (50,000 units) by mouth once each week x 7 weeks, then begin maintenance dose 7 capsule 0     zonisamide (ZONEGRAN) 100 MG capsule Take 3 caps (300 mg) each AM and 4 caps (400 mg) each  capsule 3          CHIEF COMPLAINT:  Follow up for seizures.      HISTORY OF PRESENT ILLNESS:  24-year-old right-handed male with history of epilepsy, Asperger's disorder, depression returns for follow up for seizures.  He is accompanied by his mother in the clinic today.  He was previously seen by Dr. Morales at [facility] for his epilepsy.  He is currently taking aptiom 1200 mg qhs,  zonegran 300-400, Lamictal 200 mg bid. Since the last visit, he had probably 2-3 seizures, which were likely GTCs.  He is also taking CBD oil for anxiety. He is seeing Dr. Ben Barros for his depression.  He is complaining of fatigue and sleep over 10 hours a day. He is very concerned about the fatigue and is questioning what other options we have for treating his seizures. He did not want to do surgery or neuromodulation in the past. However, mother expressed that they are open to these ideas now, because they want the patient to have a better QOL.     Mood improved a lot. No suicidal thoughts, no thoughts of hurting himself.    The patient started to have seizures when he was 16 years old and he was managed at the [facility] for the past few years for his seizures.  He is currently taking Aptiom 1200 mg q.h.s. and the Zonegran 300 mg  "b.i.d. for his epilepsy.  He was recently admitted to the hospital twice because of 2 clusters of seizures, one in January when he was admitted to [facility] and one was in February when he was admitted to [facility].  On both occasions, he had 4-5 back to back generalized tonic-clonic seizures within a short period of time.      According to the mother, the patient has 2 different types of seizures.  Type #1 is described as \"mild seizures,\" during which he will have some facial twitching.  He will have repetitive hand/arm jerking movement.  He will become unresponsive for less than a minute.  Postictally, he was having some confusion and headaches and fatigue.  Before the seizures, he will have an aura of \"seeing bees populating the earth with eggs.\"  This type of seizure is relatively infrequent, average once per month.      Type #2 are generalized tonic-clonic seizures which the patient will have generalized convulsions of the body and his body will be stiff.  It usually will last for 1-2 minutes followed by postictal fatigue and hallucinations.  This happens usually once every 3 months, but sometimes it occurs in clusters with 2-5 seizures back to back.      The patient had tried Keppra and Depakote in the past which did not work.  He had EEGs done in the past which were reported negative.      The patient had severe depression and requested a lot of behavioral health and counseling in the past and at certain time, he was suicidal, but that improved.  At the present time, he is not suicidal.  He does not think about hurting himself.      TRIGGERS FOR SEIZURES:  Depression.      RISK FACTORS FOR SEIZURES:  He had no history of head trauma with loss of consciousness.  No history of febrile convulsions.  No history of CNS infections.        PAST AEDs: Keppra, Depakote. Vimpat caused suicidal ideations.    PAST MEDICAL HISTORY:  Asperger's disorder, depression, epilepsy.      PAST SURGICAL HISTORY:  None.      FAMILY " HISTORY:  No family history of seizures.  Both parents have hypertension.  Maternal grandmother had heart disease.      SOCIAL HISTORY:  He is single, living with his mother.  He had some college education but did not finish because of depression.  He is currently working at [employer].  No smoking, no alcohol, no drug abuse.      REVIEW OF SYSTEMS:  Positive for fatigue, depression.  The rest of the 8-point review of systems are negative.      ALLERGIES:  Sertraline and Zyprexa.      PHYSICAL EXAMINATION:      There were no vitals taken for this visit.    General exam: General Appearance: No acute distress. HEENT: Normocephalic, atraumatic. Neck: Supple.  Extremities: No edema, no clubbing, no cyanosis.     Neurologic Exam: Alert and oriented x3. Speech fluent, appropriate. Normal attention. Cranial Nerves: Pupils are equal, round, reactive to light and accomodation. Extraocular movement intact. No facial weakness or asymmetry. Hearing normal. Motor Exam: Normal. Coordination:no ataxia.  Gait and Station: normal.    PREVIOUS DIAGNOSTIC TESTING:  CT scan of the head on 02/27/2018 was reported negative from [facility].  He had EEGs in the past which were reported negative.      IMPRESSION:   1.  Epilepsy. 24-year-old right-handed male with history of epilepsy, Asperger's disorder, depression returns for follow up for seizures.  He is accompanied by his mother in the clinic today.  He was previously seen by Dr. Morales at [facility] for his epilepsy.  He is currently taking aptiom 1200 mg qhs,  zonegran 300-400, Lamictal 200 mg bid. Since the last visit, he had probably 2-3 seizures, which were likely GTCs.  He is also taking CBD oil for anxiety. He is seeing Dr. Ben Barros for his depression.  He is complaining of fatigue and sleep over 10 hours a day. He is very concerned about the fatigue and is questioning what other options we have for treating his seizures. He did not want to do surgery or neuromodulation  "in the past. However, mother expressed that they are open to these ideas now, because they want the patient to have a better QOL.     Mood improved a lot. No suicidal thoughts, no thoughts of hurting himself.    I again had a long discussion with the patient and mother regarding different treatment options for epilepsy, including meds, surgery, neuromodulation and diet treatment.    2.  Asperger's syndrome.   3.  Depression.  He is followed by Dr. Ben Barros.     PLAN:   1.  Continue Aptiom 1200 mg q.h.s. and Zonegran 300-400 mg, Lamictal 200 mg bid.  2.  Continue to follow up with Dr. Barros for depression. Continue with therapy.  3.  May consider add Topamax, Felbamate, etc in the future if seizures get worse.  4.  Patient and family are willing to start work up for epilepsy surgery and neuromodulation if diet does not work for him..  5.  RTC in 3 months. Patient will try Peoples diet on himself. (Mother encourage patient to do this)        45 min was spent on the visit.  Over 50% of the time was spent on education, counseling about optimal seizure control and coordination of care.      [facility]      Patient:  Amrit Padilla  :  1995   Age:  24 year old   Today's Office Visit:  10/3/2019    Epilepsy Data:    Patient History  Primary Epileptologist/Provider: Norman Dukes M.D.  Patient Status: Not yet controlled  Epilepsy Syndrome: Epilepsy unspecified  Epilepsy Syndrome Status: Undetermined - Evaluation in Progress  Age of Onset: Age 16  Other Relevant Dx/ Issues: Asperger's     Tests/Surgery History  Last EEG: 3/30/2018  Last MRI: 2018    Seizure Record  Current Visit Date: 10/03/19  Previous Visit Date: 19  Months since last visit: 2.86  Seizure Type 1: Complex partial seizures with impairment of consciousness at onset  Description of Sz Type 1: \"mild seizures\", during which he will have some facial twitching. He will have repetitive harnd/arm jerking movement. He " "will become unresponsive for less than a minute. Postictally, he was having some confusion and headaches and fatigue. He has an aura of \"seeing bees populating the earth with eggs.\"  # of Type 1 Seizure since last visit: 1  Freq. Type 1 / Month: 0.35  Seizure Type 2: Tonic-clonic seizures  Description of Sz Type 2: He will have generalized convulsions of the body and his body will be stiff. It usually lasts 1-2 minutes followed by postictal fatigue and hallucinations.  # of Type 2 Seizure since last visit: 4  Freq. Type 2 / Month: 1.4      Current Outpatient Medications   Medication Sig Dispense Refill     cholecalciferol (VITAMIN  -D) 1000 units capsule Take one cap ( 1,000 mg ) by mouth daily 93 capsule 3     eslicarbazepine acetate (APTIOM) 600 MG tablet Take 2 tablets (1,200 mg) by mouth At Bedtime 180 tablet 3     fexofenadine (ALLEGRA) 180 MG tablet        FLUoxetine 20 MG tablet Take 2 tablets (40 mg) by mouth every morning 60 tablet 2     hydrOXYzine (ATARAX) 25 MG tablet Take 1 tablet (25 mg) by mouth 3 times daily as needed for anxiety 90 tablet 1     lamoTRIgine (LAMICTAL) 200 MG tablet Take 1 tablet (200 mg) by mouth 2 times daily 62 tablet 11     LORazepam (ATIVAN) 1 MG tablet Take 1 mg by mouth       MELATONIN PO Take 5 mg by mouth At Bedtime       vitamin D3 (CHOLECALCIFEROL) 86362 UNITS capsule Take 1 capsule (50,000 units) by mouth once each week x 7 weeks, then begin maintenance dose 7 capsule 0     zonisamide (ZONEGRAN) 100 MG capsule Take 3 caps (300 mg) each AM and 4 caps (400 mg) each  capsule 3          CHIEF COMPLAINT:  Follow up for seizures.      HISTORY OF PRESENT ILLNESS:  24-year-old right-handed male with history of epilepsy, Asperger's disorder, depression returns for follow up for seizures.  He is accompanied by his mother in the clinic today.  He was previously seen by Dr. Morales at [facility] for his epilepsy.  He is currently taking aptiom 1200 mg qhs,  zonegran 300-400, " "Lamictal 200 mg bid. Since the last visit, he had probably 2-3 seizures, which were likely GTCs.  He is also taking CBD oil for anxiety. He is seeing Dr. Ben Barros for his depression.  He is complaining of fatigue and sleep over 10 hours a day. He is very concerned about the fatigue and is questioning what other options we have for treating his seizures. He did not want to do surgery or neuromodulation in the past. However, mother expressed that they are open to these ideas now, because they want the patient to have a better QOL.     Mood improved a lot. No suicidal thoughts, no thoughts of hurting himself.    The patient started to have seizures when he was 16 years old and he was managed at the [facility] for the past few years for his seizures.  He is currently taking Aptiom 1200 mg q.h.s. and the Zonegran 300 mg b.i.d. for his epilepsy.  He was recently admitted to the hospital twice because of 2 clusters of seizures, one in January when he was admitted to [facility] and one was in February when he was admitted to [facility].  On both occasions, he had 4-5 back to back generalized tonic-clonic seizures within a short period of time.      According to the mother, the patient has 2 different types of seizures.  Type #1 is described as \"mild seizures,\" during which he will have some facial twitching.  He will have repetitive hand/arm jerking movement.  He will become unresponsive for less than a minute.  Postictally, he was having some confusion and headaches and fatigue.  Before the seizures, he will have an aura of \"seeing bees populating the earth with eggs.\"  This type of seizure is relatively infrequent, average once per month.      Type #2 are generalized tonic-clonic seizures which the patient will have generalized convulsions of the body and his body will be stiff.  It usually will last for 1-2 minutes followed by postictal fatigue and hallucinations.  This happens usually once every 3 months, but sometimes " it occurs in clusters with 2-5 seizures back to back.      The patient had tried Keppra and Depakote in the past which did not work.  He had EEGs done in the past which were reported negative.      The patient had severe depression and requested a lot of behavioral health and counseling in the past and at certain time, he was suicidal, but that improved.  At the present time, he is not suicidal.  He does not think about hurting himself.      TRIGGERS FOR SEIZURES:  Depression.      RISK FACTORS FOR SEIZURES:  He had no history of head trauma with loss of consciousness.  No history of febrile convulsions.  No history of CNS infections.        PAST AEDs: Keppra, Depakote. Vimpat caused suicidal ideations.    PAST MEDICAL HISTORY:  Asperger's disorder, depression, epilepsy.      PAST SURGICAL HISTORY:  None.      FAMILY HISTORY:  No family history of seizures.  Both parents have hypertension.  Maternal grandmother had heart disease.      SOCIAL HISTORY:  He is single, living with his mother.  He had some college education but did not finish because of depression.  He is currently working at [employer].  No smoking, no alcohol, no drug abuse.      REVIEW OF SYSTEMS:  Positive for fatigue, depression.  The rest of the 8-point review of systems are negative.      ALLERGIES:  Sertraline and Zyprexa.      PHYSICAL EXAMINATION:      There were no vitals taken for this visit.    General exam: General Appearance: No acute distress. HEENT: Normocephalic, atraumatic. Neck: Supple.  Extremities: No edema, no clubbing, no cyanosis.     Neurologic Exam: Alert and oriented x3. Speech fluent, appropriate. Normal attention. Cranial Nerves: Pupils are equal, round, reactive to light and accomodation. Extraocular movement intact. No facial weakness or asymmetry. Hearing normal. Motor Exam: Normal. Coordination:no ataxia.  Gait and Station: normal.    PREVIOUS DIAGNOSTIC TESTING:  CT scan of the head on 02/27/2018 was reported negative  from [facility].  He had EEGs in the past which were reported negative.      IMPRESSION:   1.  Epilepsy. 24-year-old right-handed male with history of epilepsy, Asperger's disorder, depression returns for follow up for seizures.  He is accompanied by his mother in the clinic today.  He was previously seen by Dr. Morales at [facility] for his epilepsy.  He is currently taking aptiom 1200 mg qhs,  zonegran 300-400, Lamictal 200 mg bid. Since the last visit, he had probably 2-3 seizures, which were likely GTCs.  He is also taking CBD oil for anxiety. He is seeing Dr. Ben Barros for his depression.  He is complaining of fatigue and sleep over 10 hours a day. He is very concerned about the fatigue and is questioning what other options we have for treating his seizures. He did not want to do surgery or neuromodulation in the past. However, mother expressed that they are open to these ideas now, because they want the patient to have a better QOL.     Mood improved a lot. No suicidal thoughts, no thoughts of hurting himself.    I again had a long discussion with the patient and mother regarding different treatment options for epilepsy, including meds, surgery, neuromodulation and diet treatment.    2.  Asperger's syndrome.   3.  Depression.  He is followed by Dr. Ben Barros.     PLAN:   1.  Continue Aptiom 1200 mg q.h.s. and Zonegran 300-400 mg,   2.  Continue to follow up with Dr. Barros for depression. Continue with therapy.  3.  Start Depakote  mg bid.  4.  Decrease Lamictal 150 mg bid.  5.  May consider add Topamax, Felbamate, etc in the future if seizures get worse.  6.  Patient and family are willing to start work up for epilepsy surgery and neuromodulation if diet does not work for him..  5.  RTC in 3 months. Patient will try Peoples diet on himself. (Mother encourage patient to do this)        45 min was spent on the visit.  Over 50% of the time was spent on education, counseling about optimal seizure  control and coordination of care.    Again, thank you for allowing me to participate in the care of your patient.      Sincerely,    Norman Dukes MD

## 2019-10-03 NOTE — PROGRESS NOTES
"[facility] Progress Note      Patient:  Amrit Padilla  :  1995   Age:  24 year old   Today's Office Visit:  10/3/2019    Epilepsy Data:    Patient History  Primary Epileptologist/Provider: Norman Dukes M.D.  Patient Status: Not yet controlled  Epilepsy Syndrome: Epilepsy unspecified  Epilepsy Syndrome Status: Undetermined - Evaluation in Progress  Age of Onset: Age 16  Other Relevant Dx/ Issues: Asperger's     Tests/Surgery History  Last EEG: 3/30/2018  Last MRI: 2018    Seizure Record  Current Visit Date: 10/03/19  Previous Visit Date: 19  Months since last visit: 2.86  Seizure Type 1: Complex partial seizures with impairment of consciousness at onset  Description of Sz Type 1: \"mild seizures\", during which he will have some facial twitching. He will have repetitive harnd/arm jerking movement. He will become unresponsive for less than a minute. Postictally, he was having some confusion and headaches and fatigue. He has an aura of \"seeing bees populating the earth with eggs.\"  # of Type 1 Seizure since last visit: 1  Freq. Type 1 / Month: 0.35  Seizure Type 2: Tonic-clonic seizures  Description of Sz Type 2: He will have generalized convulsions of the body and his body will be stiff. It usually lasts 1-2 minutes followed by postictal fatigue and hallucinations.  # of Type 2 Seizure since last visit: 4  Freq. Type 2 / Month: 1.4      Current Outpatient Medications   Medication Sig Dispense Refill     cholecalciferol (VITAMIN  -D) 1000 units capsule Take one cap ( 1,000 mg ) by mouth daily 93 capsule 3     eslicarbazepine acetate (APTIOM) 600 MG tablet Take 2 tablets (1,200 mg) by mouth At Bedtime 180 tablet 3     fexofenadine (ALLEGRA) 180 MG tablet        FLUoxetine 20 MG tablet Take 2 tablets (40 mg) by mouth every morning 60 tablet 2     hydrOXYzine (ATARAX) 25 MG tablet Take 1 tablet (25 mg) by mouth 3 times daily as needed for anxiety 90 tablet 1     lamoTRIgine (LAMICTAL) 200 MG " tablet Take 1 tablet (200 mg) by mouth 2 times daily 62 tablet 11     LORazepam (ATIVAN) 1 MG tablet Take 1 mg by mouth       MELATONIN PO Take 5 mg by mouth At Bedtime       vitamin D3 (CHOLECALCIFEROL) 57461 UNITS capsule Take 1 capsule (50,000 units) by mouth once each week x 7 weeks, then begin maintenance dose 7 capsule 0     zonisamide (ZONEGRAN) 100 MG capsule Take 3 caps (300 mg) each AM and 4 caps (400 mg) each  capsule 3          CHIEF COMPLAINT:  Follow up for seizures.      HISTORY OF PRESENT ILLNESS:  24-year-old right-handed male with history of epilepsy, Asperger's disorder, depression returns for follow up for seizures.  He is accompanied by his mother in the clinic today.  He was previously seen by Dr. Morales at [facility] for his epilepsy.  He is currently taking aptiom 1200 mg qhs,  zonegran 300-400, Lamictal 200 mg bid. Since the last visit, he had probably 2-3 seizures, which were likely GTCs.  He is also taking CBD oil for anxiety. He is seeing Dr. Ben Barros for his depression.  He is complaining of fatigue and sleep over 10 hours a day. He is very concerned about the fatigue and is questioning what other options we have for treating his seizures. He did not want to do surgery or neuromodulation in the past. However, mother expressed that they are open to these ideas now, because they want the patient to have a better QOL.     Mood improved a lot. No suicidal thoughts, no thoughts of hurting himself.    The patient started to have seizures when he was 16 years old and he was managed at the [facility] for the past few years for his seizures.  He is currently taking Aptiom 1200 mg q.h.s. and the Zonegran 300 mg b.i.d. for his epilepsy.  He was recently admitted to the hospital twice because of 2 clusters of seizures, one in January when he was admitted to [facility] and one was in February when he was admitted to [facility].  On both occasions, he had 4-5 back to back generalized  "tonic-clonic seizures within a short period of time.      According to the mother, the patient has 2 different types of seizures.  Type #1 is described as \"mild seizures,\" during which he will have some facial twitching.  He will have repetitive hand/arm jerking movement.  He will become unresponsive for less than a minute.  Postictally, he was having some confusion and headaches and fatigue.  Before the seizures, he will have an aura of \"seeing bees populating the earth with eggs.\"  This type of seizure is relatively infrequent, average once per month.      Type #2 are generalized tonic-clonic seizures which the patient will have generalized convulsions of the body and his body will be stiff.  It usually will last for 1-2 minutes followed by postictal fatigue and hallucinations.  This happens usually once every 3 months, but sometimes it occurs in clusters with 2-5 seizures back to back.      The patient had tried Keppra and Depakote in the past which did not work.  He had EEGs done in the past which were reported negative.      The patient had severe depression and requested a lot of behavioral health and counseling in the past and at certain time, he was suicidal, but that improved.  At the present time, he is not suicidal.  He does not think about hurting himself.      TRIGGERS FOR SEIZURES:  Depression.      RISK FACTORS FOR SEIZURES:  He had no history of head trauma with loss of consciousness.  No history of febrile convulsions.  No history of CNS infections.        PAST AEDs: Keppra, Depakote. Vimpat caused suicidal ideations.    PAST MEDICAL HISTORY:  Asperger's disorder, depression, epilepsy.      PAST SURGICAL HISTORY:  None.      FAMILY HISTORY:  No family history of seizures.  Both parents have hypertension.  Maternal grandmother had heart disease.      SOCIAL HISTORY:  He is single, living with his mother.  He had some college education but did not finish because of depression.  He is currently working " at [employer].  No smoking, no alcohol, no drug abuse.      REVIEW OF SYSTEMS:  Positive for fatigue, depression.  The rest of the 8-point review of systems are negative.      ALLERGIES:  Sertraline and Zyprexa.      PHYSICAL EXAMINATION:      There were no vitals taken for this visit.    General exam: General Appearance: No acute distress. HEENT: Normocephalic, atraumatic. Neck: Supple.  Extremities: No edema, no clubbing, no cyanosis.     Neurologic Exam: Alert and oriented x3. Speech fluent, appropriate. Normal attention. Cranial Nerves: Pupils are equal, round, reactive to light and accomodation. Extraocular movement intact. No facial weakness or asymmetry. Hearing normal. Motor Exam: Normal. Coordination:no ataxia.  Gait and Station: normal.    PREVIOUS DIAGNOSTIC TESTING:  CT scan of the head on 02/27/2018 was reported negative from [facility].  He had EEGs in the past which were reported negative.      IMPRESSION:   1.  Epilepsy. 24-year-old right-handed male with history of epilepsy, Asperger's disorder, depression returns for follow up for seizures.  He is accompanied by his mother in the clinic today.  He was previously seen by Dr. Morales at [facility] for his epilepsy.  He is currently taking aptiom 1200 mg qhs,  zonegran 300-400, Lamictal 200 mg bid. Since the last visit, he had probably 2-3 seizures, which were likely GTCs.  He is also taking CBD oil for anxiety. He is seeing Dr. Ben Barros for his depression.  He is complaining of fatigue and sleep over 10 hours a day. He is very concerned about the fatigue and is questioning what other options we have for treating his seizures. He did not want to do surgery or neuromodulation in the past. However, mother expressed that they are open to these ideas now, because they want the patient to have a better QOL.     Mood improved a lot. No suicidal thoughts, no thoughts of hurting himself.    I again had a long discussion with the patient and mother  regarding different treatment options for epilepsy, including meds, surgery, neuromodulation and diet treatment.    2.  Asperger's syndrome.   3.  Depression.  He is followed by Dr. Ben Barros.     PLAN:   1.  Continue Aptiom 1200 mg q.h.s. and Zonegran 300-400 mg,   2.  Continue to follow up with Dr. Barros for depression. Continue with therapy.  3.  Start Depakote  mg bid.  4.  Decrease Lamictal 150 mg bid.  5.  May consider add Topamax, Felbamate, etc in the future if seizures get worse.  6.  Patient and family are willing to start work up for epilepsy surgery and neuromodulation if diet does not work for him..  5.  RTC in 3 months. Patient will try Peoples diet on himself. (Mother encourage patient to do this)        45 min was spent on the visit.  Over 50% of the time was spent on education, counseling about optimal seizure control and coordination of care.

## 2019-10-03 NOTE — LETTER
Date:October 16, 2019      Patient was self referred, no letter generated. Do not send.        [facility] Health Information

## 2019-10-03 NOTE — PATIENT INSTRUCTIONS
Times of Days am pm        Medication Tablet Size Number of Tablets/Capsules Total Daily Dosage    Aptiom  600 1 1        1200 mg    Depakote 500 1 1        1000 mg    Zonegran  100 3 4        700 mg    Lamictal  100 1.5 1.5        300 mg                                                                             Carry this with you at all times.  CONTINUE TAKING YOUR OTHER MEDICATIONS AS PREVIOUSLY DIRECTED.      * * *Do not store medications in the bathroom.  Keep medications away from children!* * *

## 2019-10-03 NOTE — PROGRESS NOTES
"[facility] Progress Note      Patient:  Amrit Padilla  :  1995   Age:  24 year old   Today's Office Visit:  2019    Epilepsy Data:    Patient History  Primary Epileptologist/Provider: Norman Dukes M.D.  Patient Status: Not yet controlled  Epilepsy Syndrome: Epilepsy unspecified  Epilepsy Syndrome Status: Undetermined - Evaluation in Progress  Age of Onset: Age 16  Other Relevant Dx/ Issues: Asperger's     Tests/Surgery History  Last EEG: 3/30/2018  Last MRI: 2018    Seizure Record  Current Visit Date: 19  Previous Visit Date: 19  Months since last visit: 2.53  Seizure Type 1: Complex partial seizures with impairment of consciousness at onset  Description of Sz Type 1: \"mild seizures\", during which he will have some facial twitching. He will have repetitive harnd/arm jerking movement. He will become unresponsive for less than a minute. Postictally, he was having some confusion and headaches and fatigue. He has an aura of \"seeing bees populating the earth with eggs.\"  # of Type 1 Seizure since last visit: 0  Freq. Type 1 / Month: 0  Seizure Type 2: Tonic-clonic seizures  Description of Sz Type 2: He will have generalized convulsions of the body and his body will be stiff. It usually lasts 1-2 minutes followed by postictal fatigue and hallucinations.  # of Type 2 Seizure since last visit: 2  Freq. Type 2 / Month: 0.79        Current Outpatient Medications   Medication Sig Dispense Refill     cholecalciferol (VITAMIN  -D) 1000 units capsule Take one cap ( 1,000 mg ) by mouth daily 93 capsule 3     eslicarbazepine acetate (APTIOM) 600 MG tablet Take 2 tablets (1,200 mg) by mouth At Bedtime 180 tablet 3     fexofenadine (ALLEGRA) 180 MG tablet        FLUoxetine 20 MG tablet Take 2 tablets (40 mg) by mouth every morning 60 tablet 2     hydrOXYzine (ATARAX) 25 MG tablet Take 1 tablet (25 mg) by mouth 3 times daily as needed for anxiety 90 tablet 1     lamoTRIgine (LAMICTAL) 200 MG " tablet Take 1 tablet (200 mg) by mouth 2 times daily 62 tablet 11     LORazepam (ATIVAN) 1 MG tablet Take 1 mg by mouth       MELATONIN PO Take 5 mg by mouth At Bedtime       vitamin D3 (CHOLECALCIFEROL) 75258 UNITS capsule Take 1 capsule (50,000 units) by mouth once each week x 7 weeks, then begin maintenance dose 7 capsule 0     zonisamide (ZONEGRAN) 100 MG capsule Take 3 caps (300 mg) each AM and 4 caps (400 mg) each  capsule 3          CHIEF COMPLAINT:  Follow up for seizures.      HISTORY OF PRESENT ILLNESS:  24-year-old right-handed male with history of epilepsy, Asperger's disorder, depression returns for follow up for seizures.  He is accompanied by his mother in the clinic today.  He was previously seen by Dr. Morales at [facility] for his epilepsy.  He is currently taking aptiom 1200 mg qhs,  zonegran 300-400, Lamictal 200 mg bid. Since the last visit, he had probably 2-3 seizures, which were likely GTCs.  He is also taking CBD oil for anxiety. He is seeing Dr. Ben Barros for his depression.  He is complaining of fatigue and sleep over 10 hours a day. He is very concerned about the fatigue and is questioning what other options we have for treating his seizures. He did not want to do surgery or neuromodulation in the past. However, mother expressed that they are open to these ideas now, because they want the patient to have a better QOL.     Mood improved a lot. No suicidal thoughts, no thoughts of hurting himself.    The patient started to have seizures when he was 16 years old and he was managed at the [facility] for the past few years for his seizures.  He is currently taking Aptiom 1200 mg q.h.s. and the Zonegran 300 mg b.i.d. for his epilepsy.  He was recently admitted to the hospital twice because of 2 clusters of seizures, one in January when he was admitted to [facility] and one was in February when he was admitted to [facility].  On both occasions, he had 4-5 back to back generalized  "tonic-clonic seizures within a short period of time.      According to the mother, the patient has 2 different types of seizures.  Type #1 is described as \"mild seizures,\" during which he will have some facial twitching.  He will have repetitive hand/arm jerking movement.  He will become unresponsive for less than a minute.  Postictally, he was having some confusion and headaches and fatigue.  Before the seizures, he will have an aura of \"seeing bees populating the earth with eggs.\"  This type of seizure is relatively infrequent, average once per month.      Type #2 are generalized tonic-clonic seizures which the patient will have generalized convulsions of the body and his body will be stiff.  It usually will last for 1-2 minutes followed by postictal fatigue and hallucinations.  This happens usually once every 3 months, but sometimes it occurs in clusters with 2-5 seizures back to back.      The patient had tried Keppra and Depakote in the past which did not work.  He had EEGs done in the past which were reported negative.      The patient had severe depression and requested a lot of behavioral health and counseling in the past and at certain time, he was suicidal, but that improved.  At the present time, he is not suicidal.  He does not think about hurting himself.      TRIGGERS FOR SEIZURES:  Depression.      RISK FACTORS FOR SEIZURES:  He had no history of head trauma with loss of consciousness.  No history of febrile convulsions.  No history of CNS infections.        PAST AEDs: Keppra, Depakote. Vimpat caused suicidal ideations.    PAST MEDICAL HISTORY:  Asperger's disorder, depression, epilepsy.      PAST SURGICAL HISTORY:  None.      FAMILY HISTORY:  No family history of seizures.  Both parents have hypertension.  Maternal grandmother had heart disease.      SOCIAL HISTORY:  He is single, living with his mother.  He had some college education but did not finish because of depression.  He is currently working " at [employer].  No smoking, no alcohol, no drug abuse.      REVIEW OF SYSTEMS:  Positive for fatigue, depression.  The rest of the 8-point review of systems are negative.      ALLERGIES:  Sertraline and Zyprexa.      PHYSICAL EXAMINATION:      There were no vitals taken for this visit.    General exam: General Appearance: No acute distress. HEENT: Normocephalic, atraumatic. Neck: Supple.  Extremities: No edema, no clubbing, no cyanosis.     Neurologic Exam: Alert and oriented x3. Speech fluent, appropriate. Normal attention. Cranial Nerves: Pupils are equal, round, reactive to light and accomodation. Extraocular movement intact. No facial weakness or asymmetry. Hearing normal. Motor Exam: Normal. Coordination:no ataxia.  Gait and Station: normal.    PREVIOUS DIAGNOSTIC TESTING:  CT scan of the head on 02/27/2018 was reported negative from [facility].  He had EEGs in the past which were reported negative.      IMPRESSION:   1.  Epilepsy. 24-year-old right-handed male with history of epilepsy, Asperger's disorder, depression returns for follow up for seizures.  He is accompanied by his mother in the clinic today.  He was previously seen by Dr. Morales at [facility] for his epilepsy.  He is currently taking aptiom 1200 mg qhs,  zonegran 300-400, Lamictal 200 mg bid. Since the last visit, he had probably 2-3 seizures, which were likely GTCs.  He is also taking CBD oil for anxiety. He is seeing Dr. Ben Barros for his depression.  He is complaining of fatigue and sleep over 10 hours a day. He is very concerned about the fatigue and is questioning what other options we have for treating his seizures. He did not want to do surgery or neuromodulation in the past. However, mother expressed that they are open to these ideas now, because they want the patient to have a better QOL.     Mood improved a lot. No suicidal thoughts, no thoughts of hurting himself.    I again had a long discussion with the patient and mother  regarding different treatment options for epilepsy, including meds, surgery, neuromodulation and diet treatment.    2.  Asperger's syndrome.   3.  Depression.  He is followed by Dr. Ben Barros.     PLAN:   1.  Continue Aptiom 1200 mg q.h.s. and Zonegran 300-400 mg, Lamictal 200 mg bid.  2.  Continue to follow up with Dr. Barros for depression. Continue with therapy.  3.  May consider add Topamax, Felbamate, etc in the future if seizures get worse.  4.  Patient and family are willing to start work up for epilepsy surgery and neuromodulation if diet does not work for him..  5.  RTC in 3 months. Patient will try Peoples diet on himself. (Mother encourage patient to do this)        45 min was spent on the visit.  Over 50% of the time was spent on education, counseling about optimal seizure control and coordination of care.

## 2019-10-24 DIAGNOSIS — G40.909 RECURRENT SEIZURES (H): ICD-10-CM

## 2019-10-24 RX ORDER — DIVALPROEX SODIUM 500 MG/1
500 TABLET, EXTENDED RELEASE ORAL 2 TIMES DAILY
Qty: 60 TABLET | Refills: 6 | OUTPATIENT
Start: 2019-10-24

## 2019-10-30 ENCOUNTER — TELEPHONE (OUTPATIENT)
Dept: NEUROLOGY | Facility: CLINIC | Age: 24
End: 2019-10-30

## 2019-10-30 NOTE — TELEPHONE ENCOUNTER
Prior Authorization Retail Medication Request    Medication/Dose: Depakote  ICD code (if different than what is on RX):    Previously Tried and Failed:  See chart  Rationale: 90-day supply of Divalproex, 500 mg.  Qty: 180.      Insurance Name:  [facility]  Insurance ID:        Pharmacy Information (if different than what is on RX)  Name:    Phone:

## 2019-10-31 NOTE — TELEPHONE ENCOUNTER
Prior Authorization Not Needed per Insurance. Called pharmacy, confirmed they were able to process rx through insurance on  10/21/19.    Medication: divalproex sodium extended-release (DEPAKOTE ER) 500 MG 24 hr tablet  Insurance Company: [employer] - Phone [phone] Fax [phone]  Expected CoPay:      Pharmacy Filling the Rx: [facility]/PHARMACY #[id number] [address]   Pharmacy Notified: Yes  Patient Notified:

## 2019-11-14 DIAGNOSIS — G40.909 RECURRENT SEIZURES (H): ICD-10-CM

## 2019-11-15 RX ORDER — DIVALPROEX SODIUM 500 MG/1
TABLET, EXTENDED RELEASE ORAL
Qty: 48 TABLET | Refills: 8 | Status: SHIPPED | OUTPATIENT
Start: 2019-11-15 | End: 2020-01-30

## 2019-11-18 ENCOUNTER — TELEPHONE (OUTPATIENT)
Dept: NEUROLOGY | Facility: CLINIC | Age: 24
End: 2019-11-18

## 2019-11-18 DIAGNOSIS — T42.6X5S ADVERSE EFFECT OF OTHER ANTIEPILEPTIC AND SEDATIVE-HYPNOTIC DRUGS, SEQUELA: ICD-10-CM

## 2019-11-18 DIAGNOSIS — G40.919 INTRACTABLE EPILEPSY WITHOUT STATUS EPILEPTICUS, UNSPECIFIED EPILEPSY TYPE (H): Primary | ICD-10-CM

## 2019-11-18 NOTE — TELEPHONE ENCOUNTER
What is the concern that needs to be addressed by a nurse? Patient has been having issues with his vision, specifically going cross eyed.  Please call patient to address if this could be medication related.    May a detailed message be left on voicemail? Yes    Date of last office visit: 10/3/19    Message routed to: [facility] RN Pool

## 2019-11-18 NOTE — LETTER
11/18/2019       RE: Amrit Padilla  [address]  [place] [address]            To Whom It May Concern,       Please excuse Mr. Amrit Padilla from work this week. 11/18/19 - 11/24/19.           Sincerely,       Norman Dukes MD

## 2019-11-18 NOTE — TELEPHONE ENCOUNTER
"For the past 3 weeks, patient is \"cross eyed vision\", double vision, others have not commented on the appearance of his eyes. This makes it hard to walk during this time. The episode lasts a varied length 45-60 minutes; once it lasted two hours. Once it did last 4 hours. Sometimes has a headache sometimes does not. He often sleeps when this happens, when he wakes up symptoms are gone.     Provider: Dr. Norman Dukes    1) Diagnosis: recurrent seizure    2) Description of seizure: work up with a big headache, bedside lamp was on the floor, other stuff on the floor too. He felt confused this morning. [place] like he was dreaming during wakefulness. \"This has never happened before\"    3) Date of last seizure: August 2019, before that March 2019    4) Duration of seizure: not known    5) Missed medication/new medications/other med changes: misses vit d on occasion. As well as CBD    6) Sleep deprivation/illness/stress, other concerns:  Ear infection, flu; nothing this week. No change of stress; holiday stress    7) Using pill box: no    8) Current anticonvulsant medications:   Aptiom 1200  Zonegran 300-400  DVP -500  -150      Should he return to work? Traveling to [place] with another person on Friday.       "

## 2019-11-19 DIAGNOSIS — G40.919 INTRACTABLE EPILEPSY WITHOUT STATUS EPILEPTICUS, UNSPECIFIED EPILEPSY TYPE (H): ICD-10-CM

## 2019-11-19 DIAGNOSIS — T42.6X5S ADVERSE EFFECT OF OTHER ANTIEPILEPTIC AND SEDATIVE-HYPNOTIC DRUGS, SEQUELA: ICD-10-CM

## 2019-11-19 LAB — VALPROATE SERPL-MCNC: 75 MG/L (ref 50–100)

## 2019-11-19 PROCEDURE — 36415 COLL VENOUS BLD VENIPUNCTURE: CPT | Performed by: PSYCHIATRY & NEUROLOGY

## 2019-11-19 PROCEDURE — 80175 DRUG SCREEN QUAN LAMOTRIGINE: CPT | Mod: 90 | Performed by: PSYCHIATRY & NEUROLOGY

## 2019-11-19 PROCEDURE — 80203 DRUG SCREEN QUANT ZONISAMIDE: CPT | Mod: 90 | Performed by: PSYCHIATRY & NEUROLOGY

## 2019-11-19 PROCEDURE — 99000 SPECIMEN HANDLING OFFICE-LAB: CPT | Performed by: PSYCHIATRY & NEUROLOGY

## 2019-11-19 PROCEDURE — 80164 ASSAY DIPROPYLACETIC ACD TOT: CPT | Performed by: PSYCHIATRY & NEUROLOGY

## 2019-11-19 PROCEDURE — 80165 DIPROPYLACETIC ACID FREE: CPT | Mod: 90 | Performed by: PSYCHIATRY & NEUROLOGY

## 2019-11-20 LAB
LAMOTRIGINE SERPL-MCNC: 6.9 UG/ML (ref 2.5–15)
VALPROATE FREE SERPL-MCNC: 5.9 UG/ML (ref 6–20)
ZONISAMIDE SERPL-MCNC: 20 UG/ML (ref 10–40)

## 2019-11-21 ENCOUNTER — TELEPHONE (OUTPATIENT)
Dept: NEUROLOGY | Facility: CLINIC | Age: 24
End: 2019-11-21

## 2019-11-21 NOTE — TELEPHONE ENCOUNTER
Amrit Padilla calls today reporting that his mind has many thoughts. He becomes easily distracted. At the time of this call Sergio is on day 3 of feeling distracted. He is not suicidal.    Encouraged Sergio to try reading a book, taking a walk, or find other ideas that interests him. Often times extra sleep can be helpful.     Encouraged Amrit to call back if he has additional questions/concerns. He was agreeable to the plan.

## 2019-11-21 NOTE — TELEPHONE ENCOUNTER
What is the concern that needs to be addressed by a nurse? Post-Sz symptoms, would like a call back    May a detailed message be left on voicemail?     Date of last office visit:     Message routed to: [facility] RN Pool

## 2019-11-22 ENCOUNTER — TELEPHONE (OUTPATIENT)
Dept: NEUROLOGY | Facility: CLINIC | Age: 24
End: 2019-11-22

## 2019-11-22 NOTE — TELEPHONE ENCOUNTER
What is the concern that needs to be addressed by a nurse? Patient would like a call back to discuss results that is in patient chart and also he needs a letter.Please call patient back.    May a detailed message be left on voicemail? yes    Date of last office visit: 10/03/2019    Message routed to: [facility] RN POOL

## 2019-11-22 NOTE — LETTER
11/18/2019       RE: Amrit Padilla  [address]  [place] [address]            To Whom It May Concern,       Please excuse Mr. Amrit Padilla from work this week. 11/18/19 - 11/24/19.           Sincerely,       Norman Dukes MD

## 2019-11-22 NOTE — TELEPHONE ENCOUNTER
"The following results were shared with Sergio.     Results for BRIDGET KNIGHT (MRN [id number]) as of 11/22/2019 13:25   Ref. Range 11/19/2019 09:42   Lamotrigine Level Latest Ref Range: 2.5 - 15.0 ug/mL 6.9   Valproic Acid Level Latest Ref Range: 50 - 100 mg/L 75   Valproic Acid Free Latest Ref Range: 6.0 - 20.0 ug/mL 5.9 (L)   Zonisamide Level Quant Latest Ref Range: 10 - 40 ug/mL 20     Patient took his med just prior to this draw. Today, Sergio also is concerned about \"thoughts that keep popping up in my head\". He says this happens usually after seizures but that it's lasting longer than usual this time. I advised to him that the letter was mailed to him at home; he has not checked his mail box in a few days.     Dr. Dukes was paged to comment on lab results and patient symptoms of vision problems as previously discussed.   "

## 2019-11-22 NOTE — TELEPHONE ENCOUNTER
"Message received from Dr. Dukes, \"His labs looked good. A lot of the symptoms are probably related to depression and anxiety. Make sure he follows with psychiatry to address some of these issues.\"     I shared this with the patient. He last saw Dr. Barros earlier this month and is schedule to see him again in Feb 2020. I asked him to call the office to get advice regarding his symptoms. He again denies that this needs to be done but he compromises that if he still has invasive thoughts on Monday that he will reach out to them at that time. He worries his letter won't be in the mailbox, he has not checked this yet. I shared with him his [employer] login ID and we set a generic password for him to reaccess his account. I sent a copy of the letter to his [employer] account.   "

## 2019-11-26 ENCOUNTER — TELEPHONE (OUTPATIENT)
Dept: NEUROLOGY | Facility: CLINIC | Age: 24
End: 2019-11-26

## 2019-11-26 NOTE — TELEPHONE ENCOUNTER
"Patient calls the clinic today to ask if his symptoms sound like a seizure.   Patient woke up yesterday. He felt like his feet were back and forth shaking, \"waving\". He developed a large \"canker sore\" on the top of his tongue; it is painful. Today, he awoke with feeling that it is difficult to process, thinking.     I advised to him that we cannot know exactly if this was a seizure. I offered him a return appointment with Dr. Dukes since he has called the clinic multiple times this past week with concerns. He will return my call later today or tomorrow after speaking with his family.      "

## 2019-11-26 NOTE — TELEPHONE ENCOUNTER
What is the concern that needs to be addressed by a nurse? Reporting seizure activity and wants to confirm medication change.     May a detailed message be left on voicemail? yes    Date of last office visit:     Message routed to: [facility] rn pool

## 2019-12-11 ENCOUNTER — TELEPHONE (OUTPATIENT)
Dept: NEUROLOGY | Facility: CLINIC | Age: 24
End: 2019-12-11

## 2019-12-11 NOTE — TELEPHONE ENCOUNTER
What is the concern that needs to be addressed by a nurse? What medications can the pt take with his szz meds - he has a cold     May a detailed message be left on voicemail? Yes     Date of last office visit:     Message routed to: [facility] RN Pool

## 2019-12-11 NOTE — TELEPHONE ENCOUNTER
[facility] - ANTIEPILEPTIC DRUGS 11/15/2019   ZONISAMIDE PO Take 3 caps (300 mg) each AM and 4 caps (400 mg) each HS (100 MG CAPS)   lamoTRIgine (Oral) 150 mg BID   eslicarbazepine acetate (Oral) 1,200 mg At Bedtime   divalproex sodium extended-release (Oral) TAKE 1 TABLET BY MOUTH TWICE A DAY (500 MG TB24)     Patient called in again.  He has a cold and wants to take an OTC cold medication.    Discussed meds as listed in the [facility] Guide to Commonly Used Medication hand out.    Advised to talk with the pharmacist on duty where he buys the medication if there are other questions    No other questions at this time

## 2020-01-01 DIAGNOSIS — G40.219 PARTIAL SYMPTOMATIC EPILEPSY WITH COMPLEX PARTIAL SEIZURES, INTRACTABLE, WITHOUT STATUS EPILEPTICUS (H): Primary | ICD-10-CM

## 2020-01-06 RX ORDER — ZONISAMIDE 100 MG/1
CAPSULE ORAL
Qty: 630 CAPSULE | Refills: 2 | Status: SHIPPED | OUTPATIENT
Start: 2020-01-06 | End: 2020-01-30

## 2020-01-06 NOTE — TELEPHONE ENCOUNTER
Filling per [facility] medication refill protocols - seizure medications.  No labs required.  Last Clinic Visit: 10/3/19

## 2020-01-30 ENCOUNTER — OFFICE VISIT (OUTPATIENT)
Dept: NEUROLOGY | Facility: CLINIC | Age: 25
End: 2020-01-30
Payer: COMMERCIAL

## 2020-01-30 VITALS
BODY MASS INDEX: 27 KG/M2 | DIASTOLIC BLOOD PRESSURE: 70 MMHG | HEART RATE: 75 BPM | WEIGHT: 192.2 LBS | SYSTOLIC BLOOD PRESSURE: 112 MMHG

## 2020-01-30 DIAGNOSIS — G40.909 RECURRENT SEIZURES (H): ICD-10-CM

## 2020-01-30 DIAGNOSIS — G40.219 PARTIAL SYMPTOMATIC EPILEPSY WITH COMPLEX PARTIAL SEIZURES, INTRACTABLE, WITHOUT STATUS EPILEPTICUS (H): ICD-10-CM

## 2020-01-30 RX ORDER — LAMOTRIGINE 150 MG/1
150 TABLET ORAL 2 TIMES DAILY
Qty: 60 TABLET | Refills: 6 | Status: SHIPPED | OUTPATIENT
Start: 2020-01-30 | End: 2020-11-03

## 2020-01-30 RX ORDER — ZONISAMIDE 100 MG/1
CAPSULE ORAL
Qty: 630 CAPSULE | Refills: 1 | Status: SHIPPED | OUTPATIENT
Start: 2020-01-30 | End: 2020-09-18

## 2020-01-30 RX ORDER — DIVALPROEX SODIUM 500 MG/1
TABLET, EXTENDED RELEASE ORAL
Qty: 90 TABLET | Refills: 11 | Status: SHIPPED | OUTPATIENT
Start: 2020-01-30 | End: 2020-11-16

## 2020-01-30 ASSESSMENT — PAIN SCALES - GENERAL: PAINLEVEL: NO PAIN (0)

## 2020-01-30 NOTE — PATIENT INSTRUCTIONS
Times of Days am pm        Medication Tablet Size Number of Tablets/Capsules Total Daily Dosage    Aptiom  600 1 1        1200 mg    Depakote 500 1 2        1500 mg    Zonegran  100 3 4        700 mg    Lamictal  100 1.5 1.5        300 mg                                                                             Carry this with you at all times.  CONTINUE TAKING YOUR OTHER MEDICATIONS AS PREVIOUSLY DIRECTED.      * * *Do not store medications in the bathroom.  Keep medications away from children!* * *

## 2020-01-30 NOTE — LETTER
Date:February 3, 2020      Patient was self referred, no letter generated. Do not send.        [facility] Health Information

## 2020-01-30 NOTE — LETTER
"2020       RE: Amrit Padilla  : 1995   MRN: [id number]      Dear Colleague,    Thank you for referring your patient, Amrit Padilla, to the [facility] EPILEPSY CARE at [facility]. Please see a copy of my visit note below.    [facility] [place] [place] [facility] Progress Note      Patient:  Amrit Padilla  :  1995   Age:  24 year old   Today's Office Visit:  2020    Epilepsy Data:    Patient History  Primary Epileptologist/Provider: Norman Dukes M.D.  Patient Status: Not yet controlled  Epilepsy Syndrome: Epilepsy unspecified  Epilepsy Syndrome Status: Undetermined - Evaluation in Progress  Age of Onset: Age 16  Other Relevant Dx/ Issues: Asperger's     Tests/Surgery History  Last EEG: 3/30/2018  Last MRI: 2018    Seizure Record  Current Visit Date: 20  Previous Visit Date: 10/03/19  Months since last visit: 3.91  Seizure Type 1: Complex partial seizures with impairment of consciousness at onset  Description of Sz Type 1: \"mild seizures\", during which he will have some facial twitching. He will have repetitive harnd/arm jerking movement. He will become unresponsive for less than a minute. Postictally, he was having some confusion and headaches and fatigue. He has an aura of \"seeing bees populating the earth with eggs.\"  # of Type 1 Seizure since last visit: 0  Freq. Type 1 / Month: 0  Seizure Type 2: Tonic-clonic seizures  Description of Sz Type 2: He will have generalized convulsions of the body and his body will be stiff. It usually lasts 1-2 minutes followed by postictal fatigue and hallucinations.  # of Type 2 Seizure since last visit: 2  Freq. Type 2 / Month: 0.51      Current Outpatient Medications   Medication Sig Dispense Refill     cholecalciferol (VITAMIN  -D) 1000 units capsule Take one cap ( 1,000 mg ) by mouth daily 93 capsule 3     divalproex sodium extended-release (DEPAKOTE ER) 500 MG 24 hr tablet TAKE 1 TABLET BY MOUTH TWICE A DAY 48 tablet 8 "     eslicarbazepine acetate (APTIOM) 600 MG tablet Take 2 tablets (1,200 mg) by mouth At Bedtime 180 tablet 3     fexofenadine (ALLEGRA) 180 MG tablet        FLUoxetine 20 MG tablet Take 2 tablets (40 mg) by mouth every morning 60 tablet 2     hydrOXYzine (ATARAX) 25 MG tablet Take 1 tablet (25 mg) by mouth 3 times daily as needed for anxiety 90 tablet 1     lamoTRIgine (LAMICTAL) 150 MG tablet Take 1 tablet (150 mg) by mouth 2 times daily 60 tablet 6     LORazepam (ATIVAN) 1 MG tablet Take 1 mg by mouth       LORazepam (LORAZEPAM INTENSOL) 2 MG/ML (HIGH CONC) solution Take 1 mL (2 mg) by mouth every 12 hours as needed for seizures May repeat after 30 min if seizure do not stop. Max 2 ml (4 mg) per 24 hours. 30 mL 2     MELATONIN PO Take 5 mg by mouth At Bedtime       vitamin D3 (CHOLECALCIFEROL) 05158 UNITS capsule Take 1 capsule (50,000 units) by mouth once each week x 7 weeks, then begin maintenance dose 7 capsule 0     zonisamide (ZONEGRAN) 100 MG capsule Take 3 caps (300 mg) each AM and 4 caps (400 mg) each  capsule 2          CHIEF COMPLAINT:  Follow up for seizures.      HISTORY OF PRESENT ILLNESS:  24-year-old right-handed male with history of epilepsy, Asperger's disorder, depression returns for follow up for seizures.  He is accompanied by his mother in the clinic today.  He was previously seen by Dr. Morales at [facility] for his epilepsy.  He is currently taking aptiom 1200 mg qhs,  zonegran 300-400, Lamictal 150 mg bid, Depakote was added 500 mg bid. Since the last visit, he had probably 2 GTCs.  He is also taking CBD oil for anxiety. He is seeing Dr. Ben Barros for his depression.  He is complaining of fatigue and sleep over 8-10 hours a day. He is very concerned about the fatigue and is questioning what other options we have for treating his seizures.     They are now open to VEEG monitoring for surgical evaluation, resection or neuromodulations. However, they are still not sure if they want  "to move forward to the surgical treatment.    Mood improved a lot. No suicidal thoughts, no thoughts of hurting himself.    The patient started to have seizures when he was 16 years old and he was managed at the [facility] for the past few years for his seizures.  He is currently taking Aptiom 1200 mg q.h.s. and the Zonegran 300 mg b.i.d. for his epilepsy.  He was recently admitted to the hospital twice because of 2 clusters of seizures, one in January when he was admitted to [facility] and one was in February when he was admitted to [facility].  On both occasions, he had 4-5 back to back generalized tonic-clonic seizures within a short period of time.      According to the mother, the patient has 2 different types of seizures.  Type #1 is described as \"mild seizures,\" during which he will have some facial twitching.  He will have repetitive hand/arm jerking movement.  He will become unresponsive for less than a minute.  Postictally, he was having some confusion and headaches and fatigue.  Before the seizures, he will have an aura of \"seeing bees populating the earth with eggs.\"  This type of seizure is relatively infrequent, average once per month.      Type #2 are generalized tonic-clonic seizures which the patient will have generalized convulsions of the body and his body will be stiff.  It usually will last for 1-2 minutes followed by postictal fatigue and hallucinations.  This happens usually once every 3 months, but sometimes it occurs in clusters with 2-5 seizures back to back.      The patient had tried Keppra and Depakote in the past which did not work.  He had EEGs done in the past which were reported negative.      The patient had severe depression and requested a lot of behavioral health and counseling in the past and at certain time, he was suicidal, but that improved.  At the present time, he is not suicidal.  He does not think about hurting himself.      TRIGGERS FOR SEIZURES:  Depression.      RISK " FACTORS FOR SEIZURES:  He had no history of head trauma with loss of consciousness.  No history of febrile convulsions.  No history of CNS infections.        PAST AEDs: Keppra, Depakote. Vimpat caused suicidal ideations.    PAST MEDICAL HISTORY:  Asperger's disorder, depression, epilepsy.      PAST SURGICAL HISTORY:  None.      FAMILY HISTORY:  No family history of seizures.  Both parents have hypertension.  Maternal grandmother had heart disease.      SOCIAL HISTORY:  He is single, living with his mother.  He had some college education but did not finish because of depression.  He is currently working at [employer].  No smoking, no alcohol, no drug abuse.      REVIEW OF SYSTEMS:  Positive for fatigue, depression.  The rest of the 8-point review of systems are negative.      ALLERGIES:  Sertraline and Zyprexa.      PHYSICAL EXAMINATION:      Blood pressure 112/70, pulse 75, weight 192 lb 3.2 oz (87.2 kg).    General exam: General Appearance: No acute distress. HEENT: Normocephalic, atraumatic. Neck: Supple.  Extremities: No edema, no clubbing, no cyanosis.     Neurologic Exam: Alert and oriented x3. Speech fluent, appropriate. Normal attention. Cranial Nerves: Pupils are equal, round, reactive to light and accomodation. Extraocular movement intact. No facial weakness or asymmetry. Hearing normal. Motor Exam: Normal. Coordination:no ataxia.  Gait and Station: normal.    PREVIOUS DIAGNOSTIC TESTING:  CT scan of the head on 02/27/2018 was reported negative from [facility].  He had EEGs in the past which were reported negative.      IMPRESSION:   1.  Epilepsy. 24-year-old right-handed male with history of epilepsy, Asperger's disorder, depression returns for follow up for seizures.  He is accompanied by his mother in the clinic today.  He was previously seen by Dr. Morales at [facility] for his epilepsy.  He is currently taking aptiom 1200 mg qhs,  zonegran 300-400, Lamictal 200 mg bid. Since the last visit, he had  probably 2-3 seizures, which were likely GTCs.  He is also taking CBD oil for anxiety. He is seeing Dr. Ben Barros for his depression.  He is complaining of fatigue and sleep over 10 hours a day. He is very concerned about the fatigue and is questioning what other options we have for treating his seizures. He did not want to do surgery or neuromodulation in the past. However, mother expressed that they are open to these ideas now, because they want the patient to have a better QOL.     Mood improved a lot. No suicidal thoughts, no thoughts of hurting himself.    I again had a long discussion with the patient and mother regarding different treatment options for epilepsy, including meds, surgery, neuromodulation and diet treatment.    2.  Asperger's syndrome.   3.  Depression.  He is followed by Dr. Ben Barros.     PLAN:   1.  Continue Aptiom 1200 mg q.h.s. and Zonegran 300-400 mg, Lamictal 150 mg bid.  2.  Continue to follow up with Dr. Barros for depression. Continue with therapy.  3.  Increase Depakote -1000.  4.  Continue CBD.  5.  May consider add Topamax, Felbamate, etc in the future if seizures get worse.  6.  Patient and family are willing to start VEEG work up for epilepsy surgery and neuromodulation (Just the evaluation part, not necessary the surgical treatment).  7.  RTC in 3 months. Patient tried Peoples diet, but did not continue.      30 min was spent on the visit.  Over 50% of the time was spent on education, counseling about optimal seizure control and coordination of care.    Again, thank you for allowing me to participate in the care of your patient.      Sincerely,    Norman Dukes MD

## 2020-01-30 NOTE — PROGRESS NOTES
"[facility] Epilepsy Care Progress Note      Patient:  Amrit Padilla  :  1995   Age:  24 year old   Today's Office Visit:  2020    Epilepsy Data:    Patient History  Primary Epileptologist/Provider: Norman Dukes M.D.  Patient Status: Not yet controlled  Epilepsy Syndrome: Epilepsy unspecified  Epilepsy Syndrome Status: Undetermined - Evaluation in Progress  Age of Onset: Age 16  Other Relevant Dx/ Issues: Asperger's     Tests/Surgery History  Last EEG: 3/30/2018  Last MRI: 2018    Seizure Record  Current Visit Date: 20  Previous Visit Date: 10/03/19  Months since last visit: 3.91  Seizure Type 1: Complex partial seizures with impairment of consciousness at onset  Description of Sz Type 1: \"mild seizures\", during which he will have some facial twitching. He will have repetitive harnd/arm jerking movement. He will become unresponsive for less than a minute. Postictally, he was having some confusion and headaches and fatigue. He has an aura of \"seeing bees populating the earth with eggs.\"  # of Type 1 Seizure since last visit: 0  Freq. Type 1 / Month: 0  Seizure Type 2: Tonic-clonic seizures  Description of Sz Type 2: He will have generalized convulsions of the body and his body will be stiff. It usually lasts 1-2 minutes followed by postictal fatigue and hallucinations.  # of Type 2 Seizure since last visit: 2  Freq. Type 2 / Month: 0.51      Current Outpatient Medications   Medication Sig Dispense Refill     cholecalciferol (VITAMIN  -D) 1000 units capsule Take one cap ( 1,000 mg ) by mouth daily 93 capsule 3     divalproex sodium extended-release (DEPAKOTE ER) 500 MG 24 hr tablet TAKE 1 TABLET BY MOUTH TWICE A DAY 48 tablet 8     eslicarbazepine acetate (APTIOM) 600 MG tablet Take 2 tablets (1,200 mg) by mouth At Bedtime 180 tablet 3     fexofenadine (ALLEGRA) 180 MG tablet        FLUoxetine 20 MG tablet Take 2 tablets (40 mg) by mouth every morning 60 tablet 2     hydrOXYzine (ATARAX) 25 MG " tablet Take 1 tablet (25 mg) by mouth 3 times daily as needed for anxiety 90 tablet 1     lamoTRIgine (LAMICTAL) 150 MG tablet Take 1 tablet (150 mg) by mouth 2 times daily 60 tablet 6     LORazepam (ATIVAN) 1 MG tablet Take 1 mg by mouth       LORazepam (LORAZEPAM INTENSOL) 2 MG/ML (HIGH CONC) solution Take 1 mL (2 mg) by mouth every 12 hours as needed for seizures May repeat after 30 min if seizure do not stop. Max 2 ml (4 mg) per 24 hours. 30 mL 2     MELATONIN PO Take 5 mg by mouth At Bedtime       vitamin D3 (CHOLECALCIFEROL) 49302 UNITS capsule Take 1 capsule (50,000 units) by mouth once each week x 7 weeks, then begin maintenance dose 7 capsule 0     zonisamide (ZONEGRAN) 100 MG capsule Take 3 caps (300 mg) each AM and 4 caps (400 mg) each  capsule 2          CHIEF COMPLAINT:  Follow up for seizures.      HISTORY OF PRESENT ILLNESS:  24-year-old right-handed male with history of epilepsy, Asperger's disorder, depression returns for follow up for seizures.  He is accompanied by his mother in the clinic today.  He was previously seen by Dr. Morales at [facility] for his epilepsy.  He is currently taking aptiom 1200 mg qhs,  zonegran 300-400, Lamictal 150 mg bid, Depakote was added 500 mg bid. Since the last visit, he had probably 2 GTCs.  He is also taking CBD oil for anxiety. He is seeing Dr. Ben Barros for his depression.  He is complaining of fatigue and sleep over 8-10 hours a day. He is very concerned about the fatigue and is questioning what other options we have for treating his seizures.     They are now open to VEEG monitoring for surgical evaluation, resection or neuromodulations. However, they are still not sure if they want to move forward to the surgical treatment.    Mood improved a lot. No suicidal thoughts, no thoughts of hurting himself.    The patient started to have seizures when he was 16 years old and he was managed at the [facility] for the past few years for his seizures.  He  "is currently taking Aptiom 1200 mg q.h.s. and the Zonegran 300 mg b.i.d. for his epilepsy.  He was recently admitted to the hospital twice because of 2 clusters of seizures, one in January when he was admitted to [facility] and one was in February when he was admitted to [facility].  On both occasions, he had 4-5 back to back generalized tonic-clonic seizures within a short period of time.      According to the mother, the patient has 2 different types of seizures.  Type #1 is described as \"mild seizures,\" during which he will have some facial twitching.  He will have repetitive hand/arm jerking movement.  He will become unresponsive for less than a minute.  Postictally, he was having some confusion and headaches and fatigue.  Before the seizures, he will have an aura of \"seeing bees populating the earth with eggs.\"  This type of seizure is relatively infrequent, average once per month.      Type #2 are generalized tonic-clonic seizures which the patient will have generalized convulsions of the body and his body will be stiff.  It usually will last for 1-2 minutes followed by postictal fatigue and hallucinations.  This happens usually once every 3 months, but sometimes it occurs in clusters with 2-5 seizures back to back.      The patient had tried Keppra and Depakote in the past which did not work.  He had EEGs done in the past which were reported negative.      The patient had severe depression and requested a lot of behavioral health and counseling in the past and at certain time, he was suicidal, but that improved.  At the present time, he is not suicidal.  He does not think about hurting himself.      TRIGGERS FOR SEIZURES:  Depression.      RISK FACTORS FOR SEIZURES:  He had no history of head trauma with loss of consciousness.  No history of febrile convulsions.  No history of CNS infections.        PAST AEDs: Keppra, Depakote. Vimpat caused suicidal ideations.    PAST MEDICAL HISTORY:  Asperger's disorder, " depression, epilepsy.      PAST SURGICAL HISTORY:  None.      FAMILY HISTORY:  No family history of seizures.  Both parents have hypertension.  Maternal grandmother had heart disease.      SOCIAL HISTORY:  He is single, living with his mother.  He had some college education but did not finish because of depression.  He is currently working at [employer].  No smoking, no alcohol, no drug abuse.      REVIEW OF SYSTEMS:  Positive for fatigue, depression.  The rest of the 8-point review of systems are negative.      ALLERGIES:  Sertraline and Zyprexa.      PHYSICAL EXAMINATION:      Blood pressure 112/70, pulse 75, weight 192 lb 3.2 oz (87.2 kg).    General exam: General Appearance: No acute distress. HEENT: Normocephalic, atraumatic. Neck: Supple.  Extremities: No edema, no clubbing, no cyanosis.     Neurologic Exam: Alert and oriented x3. Speech fluent, appropriate. Normal attention. Cranial Nerves: Pupils are equal, round, reactive to light and accomodation. Extraocular movement intact. No facial weakness or asymmetry. Hearing normal. Motor Exam: Normal. Coordination:no ataxia.  Gait and Station: normal.    PREVIOUS DIAGNOSTIC TESTING:  CT scan of the head on 02/27/2018 was reported negative from [facility].  He had EEGs in the past which were reported negative.      IMPRESSION:   1.  Epilepsy. 24-year-old right-handed male with history of epilepsy, Asperger's disorder, depression returns for follow up for seizures.  He is accompanied by his mother in the clinic today.  He was previously seen by Dr. Morales at [facility] for his epilepsy.  He is currently taking aptiom 1200 mg qhs,  zonegran 300-400, Lamictal 200 mg bid. Since the last visit, he had probably 2-3 seizures, which were likely GTCs.  He is also taking CBD oil for anxiety. He is seeing Dr. Ben Barros for his depression.  He is complaining of fatigue and sleep over 10 hours a day. He is very concerned about the fatigue and is questioning what other  options we have for treating his seizures. He did not want to do surgery or neuromodulation in the past. However, mother expressed that they are open to these ideas now, because they want the patient to have a better QOL.     Mood improved a lot. No suicidal thoughts, no thoughts of hurting himself.    I again had a long discussion with the patient and mother regarding different treatment options for epilepsy, including meds, surgery, neuromodulation and diet treatment.    2.  Asperger's syndrome.   3.  Depression.  He is followed by Dr. Ben Barros.     PLAN:   1.  Continue Aptiom 1200 mg q.h.s. and Zonegran 300-400 mg, Lamictal 150 mg bid.  2.  Continue to follow up with Dr. Barros for depression. Continue with therapy.  3.  Increase Depakote -1000.  4.  Continue CBD.  5.  May consider add Topamax, Felbamate, etc in the future if seizures get worse.  6.  Patient and family are willing to start VEEG work up for epilepsy surgery and neuromodulation (Just the evaluation part, not necessary the surgical treatment).  7.  RTC in 3 months. Patient tried Peoples diet, but did not continue.      30 min was spent on the visit.  Over 50% of the time was spent on education, counseling about optimal seizure control and coordination of care.

## 2020-01-31 ENCOUNTER — TELEPHONE (OUTPATIENT)
Dept: NEUROLOGY | Facility: CLINIC | Age: 25
End: 2020-01-31

## 2020-01-31 NOTE — TELEPHONE ENCOUNTER
"Patient had a seizure earlier today (11:15 approx)  He was tugging at his dogs collar, he did not respond to his mothers basic questions / commands.  He has no recollection of the seizure or what he did during the seizure.    When he came around, he had a headache.  Usually a post ictal  HA will go away after about 15 mins, but it continues.  Initially the HA was more severe (\"8\")  Now it varies \"between a 3 and a 5 \"  He has not taken anything for the headache.  He feels that when he takes tylenol after a seizure sometimes he will have a second seizure.    Seizure sounds like a \"type 1 \" seizure noted in MD notes    Jolene also asked about whether he should use lorazepam liquid.  He reported that  instructed him to take a dose after a second seizure.I recommended he continue to follow 's instruction    We discussed options for TC headache medication.  Amrit has access to Aleve (Naproxen) and will likely use that.  I advised him to make sure he follows directions, and that if he has any concerns, or the headache worsens to call the clinic number  I encouraged adequate fluid intake    No other questions  "

## 2020-03-11 ENCOUNTER — HEALTH MAINTENANCE LETTER (OUTPATIENT)
Age: 25
End: 2020-03-11

## 2020-03-27 ENCOUNTER — TELEPHONE (OUTPATIENT)
Dept: NEUROLOGY | Facility: CLINIC | Age: 25
End: 2020-03-27

## 2020-03-27 NOTE — TELEPHONE ENCOUNTER
What is the concern that needs to be addressed by a nurse? Would like to talk about symptoms he is having.    May a detailed message be left on voicemail? yes    Date of last office visit:     Message routed to: RN pool

## 2020-04-06 ENCOUNTER — TELEPHONE (OUTPATIENT)
Dept: NEUROLOGY | Facility: CLINIC | Age: 25
End: 2020-04-06

## 2020-04-06 NOTE — TELEPHONE ENCOUNTER
Patient calls the clinic today to discuss lamotrigine. He called in to his pharmacy to  all his medications and they gave him both 150 mg tablets and 200 mg tablets.     Per our chart, patient is on 150 mg BID, the 200 mg BID was discontinued October 2019.     Call placed to [facility] to discontinue the lamotrigine 200 mg tablets.

## 2020-04-06 NOTE — TELEPHONE ENCOUNTER
What is the concern that needs to be addressed by a nurse?   Patient would a clarification on his lamotrigine and the reason behind the change.     May a detailed message be left on voicemail? yes    Date of last office visit: 1/30/20    Message routed to: nurse

## 2020-04-08 ENCOUNTER — TELEPHONE (OUTPATIENT)
Dept: NEUROLOGY | Facility: CLINIC | Age: 25
End: 2020-04-08

## 2020-04-08 DIAGNOSIS — G40.009 PARTIAL IDIOPATHIC EPILEPSY WITH SEIZURES OF LOCALIZED ONSET, NOT INTRACTABLE, WITHOUT STATUS EPILEPTICUS (H): Primary | ICD-10-CM

## 2020-04-08 DIAGNOSIS — F95.9 TIC DISORDER: ICD-10-CM

## 2020-04-08 NOTE — TELEPHONE ENCOUNTER
What is the concern that needs to be addressed by a nurse? Patient is having a lot of twitching and he states people are noticing    May a detailed message be left on voicemail? yes    Date of last office visit:     Message routed to: Pérez Moss

## 2020-04-08 NOTE — TELEPHONE ENCOUNTER
"Patient calls the clinic today to discuss twitching and jerking that has been happening. He states that he previously perceived this to be normal but a friend told him that it is not. These twitches occur through out his body, not in one specific part and is different parts at a time, not the whole body. Happens in bed. Happens out of sleep. Occurring \"every minute of the day\". No clear injury, he states to the question \"not that I can remember\". No missed doses of antiseizure medication. Has body has shaking too; he has hand tremoring while he was eating.     It is unclear to me if he is experiencing anxiety or a myoclonic type twitch.     ADDENDUM 4/8/2020 4:38 PM  This situation was discussed with Dr. Temple. He ordered a 3 hour EEG to be done with the timeline at the nurses discretion. Phone call placed to the patient but had to leave a voicemail.  "

## 2020-04-09 NOTE — TELEPHONE ENCOUNTER
"I further questioned the patient regarding his twitching symptoms. He states this has been present for \"a year\" but that he hasn't discussed it with Dr. Dukes before. I offered the patient to have an EEG to rule out seizures. He will discuss this with his family and call us back.     ADDENDUM: Patient called back to schedule. Call transferred to the schedulers to arrange an EEG for a date in the next week.   "

## 2020-04-22 ENCOUNTER — ALLIED HEALTH/NURSE VISIT (OUTPATIENT)
Dept: NEUROLOGY | Facility: CLINIC | Age: 25
End: 2020-04-22
Payer: COMMERCIAL

## 2020-04-22 DIAGNOSIS — G40.009 PARTIAL IDIOPATHIC EPILEPSY WITH SEIZURES OF LOCALIZED ONSET, NOT INTRACTABLE, WITHOUT STATUS EPILEPTICUS (H): Primary | ICD-10-CM

## 2020-04-23 ENCOUNTER — TELEPHONE (OUTPATIENT)
Dept: NEUROLOGY | Facility: CLINIC | Age: 25
End: 2020-04-23

## 2020-04-23 NOTE — TELEPHONE ENCOUNTER
What is the concern that needs to be addressed by a nurse? Patient is not feeling well and it's keeping him to go to work and doing various things.Patient think he had a seizure on Sunday evening-Monday morning while laying in bed.Patient would like a call back ASAP.    May a detailed message be left on voicemail? Yes     Date of last office visit: 01/30/2020    Message routed to: [facility] RN POOL

## 2020-04-24 NOTE — TELEPHONE ENCOUNTER
Patient calls the office to discuss symptoms he is experiencing. Feels dizzy, feels confused like someone is controlling him, double vision. During his EEG on Wednesday he felt these symptoms. Patient asks how long these post ictal symptoms will last and when to be worried about them.     Per Dr. Dukes the EEG is positive for signs of epilepsy but did not show seizures, the twitching is not seizure related. I shared this with the patient. I explained that we cannot guess how long postictal symptoms will last. I advised that he take really good care of himself this weekend, getting adequate sleep, managing his stress and anxiety, eating three nutritious meals per day, eat before taking his medication doses. He verbalized understanding. I asked Amrit to schedule a return visit to discuss the concerns he has had over the past month to which he agreed to. His call was transferred to the  to arrange.

## 2020-04-27 ENCOUNTER — VIRTUAL VISIT (OUTPATIENT)
Dept: NEUROLOGY | Facility: CLINIC | Age: 25
End: 2020-04-27
Payer: COMMERCIAL

## 2020-04-27 DIAGNOSIS — G40.909 RECURRENT SEIZURES (H): Primary | ICD-10-CM

## 2020-04-27 NOTE — PROGRESS NOTES
"Amrit Padilla is a 24 year old male who is being evaluated via a billable video visit.      The patient has been notified of following:     \"This video visit will be conducted via a call between you and your physician/provider. We have found that certain health care needs can be provided without the need for an in-person physical exam.  This service lets us provide the care you need with a video conversation.  If a prescription is necessary we can send it directly to your pharmacy.  If lab work is needed we can place an order for that and you can then stop by our lab to have the test done at a later time.    Video visits are billed at different rates depending on your insurance coverage.  Please reach out to your insurance provider with any questions.    If during the course of the call the physician/provider feels a video visit is not appropriate, you will not be charged for this service.\"    Patient has given verbal consent for Video visit? Yes    How would you like to obtain your AVS? Grace    Patient would like the video invitation sent by: Send to e-mail at:[email or web address]   Will anyone else be joining your video visit? Yes: Parents. How would they like to receive their invitation? Send to e-mail at: [email or web address]          CHIEF COMPLAINT:  Follow up for seizures.      HISTORY OF PRESENT ILLNESS:  Video call for follow up. 24-year-old right-handed male with history of epilepsy, Asperger's disorder, depression.  His mother is with him in the video today.  He was previously seen by Dr. Morales at [facility] for his epilepsy.  Since the last visit, he had probably 2-3 seizures, both unwitnessed out of sleep. He had postictal confusion which made his mother think those were seizures. He continue to have small twitches that last for a spit of a second, some of them in clusters. No LOC with these twitches. These were likely not seizures. He is currently taking aptiom 1200 mg qhs,  zonegran 300-400, " "Lamictal 150 mg bid, Depakote 500 - 1000. He is also taking CBD oil for anxiety.     He is seeing Dr. Ben Barros for his depression.  He is complaining of fatigue and sleep over 8-10 hours a day. He is very concerned about the fatigue and is questioning what other options we have for treating his seizures.      They are now open to VEEG monitoring for surgical evaluation, resection or neuromodulations. However, they are still not sure if they want to move forward to the surgical treatment.     Mood improved a lot. No suicidal thoughts, no thoughts of hurting himself.     The patient started to have seizures when he was 16 years old and he was managed at the [facility] for the past few years for his seizures.  He is currently taking Aptiom 1200 mg q.h.s. and the Zonegran 300 mg b.i.d. for his epilepsy.  He was recently admitted to the hospital twice because of 2 clusters of seizures, one in January when he was admitted to [facility] and one was in February when he was admitted to [facility].  On both occasions, he had 4-5 back to back generalized tonic-clonic seizures within a short period of time.      According to the mother, the patient has 2 different types of seizures.  Type #1 is described as \"mild seizures,\" during which he will have some facial twitching.  He will have repetitive hand/arm jerking movement.  He will become unresponsive for less than a minute.  Postictally, he was having some confusion and headaches and fatigue.  Before the seizures, he will have an aura of \"seeing bees populating the earth with eggs.\"  This type of seizure is relatively infrequent, average once per month.      Type #2 are generalized tonic-clonic seizures which the patient will have generalized convulsions of the body and his body will be stiff.  It usually will last for 1-2 minutes followed by postictal fatigue and hallucinations.  This happens usually once every 3 months, but sometimes it occurs in clusters with 2-5 seizures " back to back.      The patient had tried Keppra and Depakote in the past which did not work.  He had EEGs done in the past which were reported negative.      The patient had severe depression and requested a lot of behavioral health and counseling in the past and at certain time, he was suicidal, but that improved.  At the present time, he is not suicidal.  He does not think about hurting himself.      TRIGGERS FOR SEIZURES:  Depression.      RISK FACTORS FOR SEIZURES:  He had no history of head trauma with loss of consciousness.  No history of febrile convulsions.  No history of CNS infections.         PAST AEDs: Keppra, Depakote. Vimpat caused suicidal ideations.     PAST MEDICAL HISTORY:  Asperger's disorder, depression, epilepsy.      PAST SURGICAL HISTORY:  None.      FAMILY HISTORY:  No family history of seizures.  Both parents have hypertension.  Maternal grandmother had heart disease.      SOCIAL HISTORY:  He is single, living with his mother.  He had some college education but did not finish because of depression.  He is currently working at [employer].  No smoking, no alcohol, no drug abuse.      REVIEW OF SYSTEMS:  Positive for fatigue, depression.  The rest of the 8-point review of systems are negative.      ALLERGIES:  Sertraline and Zyprexa.      PHYSICAL EXAMINATION:       AAO x 3, speech fluent and appreopriate.       PREVIOUS DIAGNOSTIC TESTING:  CT scan of the head on 02/27/2018 was reported negative from [facility].  He had EEGs in the past which were reported negative.      IMPRESSION:   1.  Epilepsy. Since the last visit, he had probably 2-3 seizures, both unwitnessed out of sleep. He had postictal confusion which made his mother think those were seizures. He continue to have small twitches that last for a spit of a second, some of them in clusters. No LOC with these twitches. These were likely not seizures. He is currently taking aptiom 1200 mg qhs,  zonegran 300-400, Lamictal 150 mg bid,  Depakote 500 - 1000. He is also taking CBD oil for anxiety.      Mood improved a lot. No suicidal thoughts, no thoughts of hurting himself.     I again had a long discussion with the patient and mother regarding different treatment options for epilepsy, including meds, surgery, neuromodulation and diet treatment.     2.  Asperger's syndrome.   3.  Depression.  He is followed by Dr. Ben Barros.     PLAN:   1.  Continue Aptiom 1200 mg q.h.s. and Zonegran 300-400 mg, Lamictal 150 mg bid. Depakote -1000.  2.  Continue to follow up with Dr. Barros for depression. Continue with therapy.  3.  Continue CBD.  5.  May consider add Topamax, Felbamate, etc in the future if seizures get worse.  6.  Patient and family are willing to start VEEG work up for epilepsy surgery and neuromodulation (Just the evaluation part, not necessary the surgical treatment).  7.  RTC in 3 months. Patient tried Peoples diet, but did not continue.             Video-Visit Details    Type of service:  Video Visit    Video Start Time: 1:22  Video End Time: 2:03 PM    Originating Location (pt. Location): Home    Distant Location (provider location):  [facility] EPILEPSY CARE     Mode of Communication:  Video Conference via [email or web address]      20 min was spent on the visit.  Over 50% of the time was spent on education, counseling about optimal seizure control and coordination of care.      Norman Dukes MD

## 2020-04-27 NOTE — LETTER
Date:May 8, 2020      Patient was self referred, no letter generated. Do not send.        [facility] Physicians Health Information

## 2020-05-04 DIAGNOSIS — G40.909 SEIZURE DISORDER (H): ICD-10-CM

## 2020-05-05 RX ORDER — ESLICARBAZEPINE ACETATE 600 MG/1
1200 TABLET ORAL AT BEDTIME
Qty: 180 TABLET | Refills: 0 | Status: SHIPPED | OUTPATIENT
Start: 2020-05-05 | End: 2020-07-29

## 2020-05-05 NOTE — TELEPHONE ENCOUNTER
eslicarbazepine acetate (APTIOM) 600 MG tablet  Take 2 tablets (1,200 mg) by mouth At Bedtime       Last Written Prescription Date:  4/22/19  Last Fill Quantity: 180,   # refills: 3  Last Office Visit : 4/27/20  Future Office visit:  7/27/20    LAB: sodium every 18 months:   Outside lab 1/22/19  Sodium  136 - 145 mmol/L  138      90 day to pharmacy.

## 2020-06-01 ENCOUNTER — TELEPHONE (OUTPATIENT)
Dept: NEUROLOGY | Facility: CLINIC | Age: 25
End: 2020-06-01

## 2020-06-01 NOTE — TELEPHONE ENCOUNTER
"Patient is feeling a little better today.  He feels this is typical for having had a seizure.    No recent missed or late medications that he is aware of  About a week ago he vomited, he feels he had over eaten.  No signs of illness such as fever.  No diarrhea.    Patient confirmed that he has not had \"bad\" thoughts, just \"whacky thoughts\"  He specifically denies any thoughts of self harm, or harm to others.    No injuries.    Patient likely had an unwitnessed seizure.    Patient was reassured to continue taking his medications as per 's recommendations.  He was instructed to call if he doesn't improve of there next few days, the thought worsen, or he has other concerns    No other questions at this time.  "

## 2020-06-01 NOTE — TELEPHONE ENCOUNTER
What is the concern that needs to be addressed by a nurse? Patient had a recent seizure (not sure when) but states he is having a headache starting from yesterday, yesterday, random weird thoughts (crazy thoughts of random things like an egg), lack of energy, less ability to focus. He states this is all post seizure but he doesn't remember having a seizure    May a detailed message be left on voicemail? yes    Date of last office visit:     Message routed to: RN pool

## 2020-06-02 RX ORDER — DIVALPROEX SODIUM 500 MG/1
500 TABLET, EXTENDED RELEASE ORAL 2 TIMES DAILY
Qty: 60 TABLET | Refills: 6 | OUTPATIENT
Start: 2020-06-02

## 2020-06-23 ENCOUNTER — TELEPHONE (OUTPATIENT)
Dept: NEUROLOGY | Facility: CLINIC | Age: 25
End: 2020-06-23

## 2020-06-23 NOTE — TELEPHONE ENCOUNTER
What is the concern that needs to be addressed by a nurse? Patient has had 2-3 szs in the last month. He states it is affecting him greatly. He is having a lot of trouble word finding and telling me what is happening. He would like to talk to someone about this.     May a detailed message be left on voicemail? Yes    Date of last office visit:     Message routed to: RN pool

## 2020-06-24 RX ORDER — LORATADINE 10 MG/1
10 TABLET ORAL DAILY
COMMUNITY
End: 2020-07-27

## 2020-06-24 NOTE — TELEPHONE ENCOUNTER
Patient calls with complaints of not knowing where he is or what is around him. Feels like he is watching himself than doing them. Like an out of body experience. Moment of forgetting why or what he is doing. He thinks he has had three seizures in the past month.    He believes he has a seizure on Sunday, he was in the car with family whom noticed that he woke up from sleep and was unresponsive, poking someone, and when they asked him questions he didn't answer properly. He feels this is like previous seizures. He is tolerating his medications. Has some diplopia on occasion. He feels exhausted, but he feels this might be normal.  No missed doses. No illnesses. Sleeping ok. Stress is as usual. Therapy with Dr. Barros has been limited due to a barrier with patients availability.     I asked Sergio if he was concerned about the seizures or if he wanted to wait and see before pursuing a medication or treatment change. He said that he is more interested in doing the hospital admission for seizure workup and possible surgical evaluation. He decided that he would wait until his scheduled appointment with Dr. Dukes the end of July to discuss this further. I advised that he keep a seizure calendar, noting time of seizure, features and the circumstances in which it occurred. He was open to talking about surgery options. I advised that he check the [employer] website epilepsy.com to check out the information their on epilepsy surgeries and to connect with other patient's with epilepsy. He thought this is something he can do. I also introduced the concepts of neurostimulation and resective surgery. He will follow up with Dr. Dukes at his office visit on this topic.

## 2020-07-25 DIAGNOSIS — G40.909 SEIZURE DISORDER (H): ICD-10-CM

## 2020-07-27 ENCOUNTER — VIRTUAL VISIT (OUTPATIENT)
Dept: NEUROLOGY | Facility: CLINIC | Age: 25
End: 2020-07-27
Payer: COMMERCIAL

## 2020-07-27 DIAGNOSIS — G40.909 RECURRENT SEIZURES (H): Primary | ICD-10-CM

## 2020-07-27 RX ORDER — CETIRIZINE HYDROCHLORIDE 10 MG/1
10 TABLET ORAL PRN
COMMUNITY

## 2020-07-27 ASSESSMENT — PATIENT HEALTH QUESTIONNAIRE - PHQ9: SUM OF ALL RESPONSES TO PHQ QUESTIONS 1-9: 24

## 2020-07-27 NOTE — LETTER
Date:July 31, 2020      Patient was self referred, no letter generated. Do not send.        [facility] Physicians Health Information

## 2020-07-27 NOTE — LETTER
"2020       RE: Amrit Padilla  : 1995   MRN: [id number]      Dear Colleague,    Thank you for referring your patient, Amrit Padilla, to the [facility] EPILE[facility] at [facility]. Please see a copy of my visit note below.    Amrit Padilla is a 25 year old male who is being evaluated via a billable video visit.      The patient has been notified of following:     \"This video visit will be conducted via a call between you and your physician/provider. We have found that certain health care needs can be provided without the need for an in-person physical exam.  This service lets us provide the care you need with a video conversation.  If a prescription is necessary we can send it directly to your pharmacy.  If lab work is needed we can place an order for that and you can then stop by our lab to have the test done at a later time.    Video visits are billed at different rates depending on your insurance coverage.  Please reach out to your insurance provider with any questions.    If during the course of the call the physician/provider feels a video visit is not appropriate, you will not be charged for this service.\"    Patient has given verbal consent for Video visit? Yes  How would you like to obtain your AVS? MyChart  If you are dropped from the video visit, the video invite should be resent to: Send to e-mail at: [email or web address]  Will anyone else be joining your video visit? No        CHIEF COMPLAINT:  Follow up for seizures.      HISTORY OF PRESENT ILLNESS:  Video call for follow up. 25-year-old right-handed male with history of epilepsy, Asperger's disorder, depression.  His mother is also with him in the video today.  He was previously seen by Dr. Morales at [facility] for his epilepsy.  Since the last visit, he had probably 4-5 seizures, all out of sleep unwitnessed. He will feel different int he morning after the seizures.  He continue to have small twitches " "that last for a spit of a second, some of them in clusters. No LOC with these twitches. These likely are not seizures. He is currently taking aptiom 1200 mg qhs,  zonegran 300-400, Lamictal 150 mg bid, Depakote 500 - 1000. He is also taking CBD oil for anxiety.  Patient and his mother agreed to do VEEG monitoring for presurgical evaluation.     He is seeing Dr. Ben Barros for his depression.  He is also seeing Deo Shipman for therapy. He is complaining of fatigue and sleep over 8-10 hours a day. He is very concerned about the fatigue and is questioning what other options we have for treating his seizures.     They are now open to VEEG monitoring for surgical evaluation, resection or neuromodulations. However, they are still not sure if they want to move forward to the surgical treatment.     Mood improved a lot. No suicidal thoughts, no thoughts of hurting himself.     The patient started to have seizures when he was 16 years old and he was managed at the [facility] for the past few years for his seizures.  He is currently taking Aptiom 1200 mg q.h.s. and the Zonegran 300 mg b.i.d. for his epilepsy.  He was recently admitted to the hospital twice because of 2 clusters of seizures, one in January when he was admitted to [facility] and one was in February when he was admitted to [facility].  On both occasions, he had 4-5 back to back generalized tonic-clonic seizures within a short period of time.      According to the mother, the patient has 2 different types of seizures.  Type #1 is described as \"mild seizures,\" during which he will have some facial twitching.  He will have repetitive hand/arm jerking movement.  He will become unresponsive for less than a minute.  Postictally, he was having some confusion and headaches and fatigue.  Before the seizures, he will have an aura of \"seeing bees populating the earth with eggs.\"  This type of seizure is relatively infrequent, average once per month.      Type #2 are " generalized tonic-clonic seizures which the patient will have generalized convulsions of the body and his body will be stiff.  It usually will last for 1-2 minutes followed by postictal fatigue and hallucinations.  This happens usually once every 3 months, but sometimes it occurs in clusters with 2-5 seizures back to back.      The patient had tried Keppra and Depakote in the past which did not work.  He had EEGs done in the past which were reported negative.      The patient had severe depression and requested a lot of behavioral health and counseling in the past and at certain time, he was suicidal, but that improved.  At the present time, he is not suicidal.  He does not think about hurting himself.      TRIGGERS FOR SEIZURES:  Depression.      RISK FACTORS FOR SEIZURES:  He had no history of head trauma with loss of consciousness.  No history of febrile convulsions.  No history of CNS infections.         PAST AEDs: Keppra, Depakote. Vimpat caused suicidal ideations.     PAST MEDICAL HISTORY:  Asperger's disorder, depression, epilepsy.      PAST SURGICAL HISTORY:  None.      FAMILY HISTORY:  No family history of seizures.  Both parents have hypertension.  Maternal grandmother had heart disease.      SOCIAL HISTORY:  He is single, living with his mother.  He had some college education but did not finish because of depression.  He is currently working at [employer].  No smoking, no alcohol, no drug abuse.      REVIEW OF SYSTEMS:  Positive for fatigue, depression.  The rest of the 8-point review of systems are negative.      ALLERGIES:  Sertraline and Zyprexa.      PHYSICAL EXAMINATION:       AAO x 3, speech fluent and appreopriate.        PREVIOUS DIAGNOSTIC TESTING:  CT scan of the head on 02/27/2018 was reported negative from [facility].  He had EEGs in the past which were reported negative.      IMPRESSION:   1.  Epilepsy.  Since the last visit, he had probably 4-5 seizures, all out of sleep unwitnessed. He  will feel different int he morning after the seizures.  He continue to have small twitches that last for a spit of a second, some of them in clusters. No LOC with these twitches. These likely are not seizures. He is currently taking aptiom 1200 mg qhs,  zonegran 300-400, Lamictal 150 mg bid, Depakote 500 - 1000. He is also taking CBD oil for anxiety.  Patient and his mother agreed to do VEEG monitoring for presurgical evaluation.    I again had a long discussion in the past with the patient and mother regarding different treatment options for epilepsy, including meds, surgery, neuromodulation and diet treatment.     2.  Asperger's syndrome.   3.  Depression.  He is followed by Dr. Ben Barros and Deo Shipman for therapy.  No plan for suicide.     PLAN:   1.  Continue Aptiom 1200 mg q.h.s. and Zonegran 300-400 mg, Lamictal 150 mg bid. Depakote -1000.  2.  Continue to follow up with Dr. Barros for depression. Continue with therapy.  3.  Continue CBD, change to night dose.  5.  May consider add Topamax, Felbamate, etc in the future if seizures get worse.  6.  Patient and family are willing to start VEEG work up for epilepsy surgery and neuromodulation (Just the evaluation part, not necessary the surgical treatment). Patient may consider going in in Sep. or Oct.     7.  RTC in 3 months. Patient tried Peoples diet, but did not continue.            Video-Visit Details    Type of service:  Video Visit    Video Start Time: 10:47 AM  Video End Time: 11:21 AM    Originating Location (pt. Location): Home    Distant Location (provider location):  [facility] EPILEPSY CARE     Platform used for Video Visit: Pham Dukes MD    + PHQ-9; jump off deck with a knife in his hand, jump out his window. Sometimes he has intention. Does not ask for help. Does report this to Dr. Barros after the fact. He sees Deo Shipman for therapy.   Not suicidal at this time. He was agreeable to call 911 or notify his family if his thoughts  become intention.     Again, thank you for allowing me to participate in the care of your patient.      Sincerely,    Norman Dukes MD

## 2020-07-27 NOTE — PROGRESS NOTES
+ PHQ-9; jump off deck with a knife in his hand, jump out his window. Sometimes he has intention. Does not ask for help. Does report this to Dr. Barros after the fact. He sees Deo Shipman for therapy.   Not suicidal at this time. He was agreeable to call 911 or notify his family if his thoughts become intention.

## 2020-07-27 NOTE — PROGRESS NOTES
"Amrit Padilla is a 25 year old male who is being evaluated via a billable video visit.      The patient has been notified of following:     \"This video visit will be conducted via a call between you and your physician/provider. We have found that certain health care needs can be provided without the need for an in-person physical exam.  This service lets us provide the care you need with a video conversation.  If a prescription is necessary we can send it directly to your pharmacy.  If lab work is needed we can place an order for that and you can then stop by our lab to have the test done at a later time.    Video visits are billed at different rates depending on your insurance coverage.  Please reach out to your insurance provider with any questions.    If during the course of the call the physician/provider feels a video visit is not appropriate, you will not be charged for this service.\"    Patient has given verbal consent for Video visit? Yes  How would you like to obtain your AVS? MyChart  If you are dropped from the video visit, the video invite should be resent to: Send to e-mail at: [email or web address]  Will anyone else be joining your video visit? No        CHIEF COMPLAINT:  Follow up for seizures.      HISTORY OF PRESENT ILLNESS:  Video call for follow up. 25-year-old right-handed male with history of epilepsy, Asperger's disorder, depression.  His mother is also with him in the video today.  He was previously seen by Dr. Morales at [facility] for his epilepsy.  Since the last visit, he had probably 4-5 seizures, all out of sleep unwitnessed. He will feel different int he morning after the seizures.  He continue to have small twitches that last for a spit of a second, some of them in clusters. No LOC with these twitches. These likely are not seizures. He is currently taking aptiom 1200 mg qhs,  zonegran 300-400, Lamictal 150 mg bid, Depakote 500 - 1000. He is also taking CBD oil for anxiety.  Patient " "and his mother agreed to do VEEG monitoring for presurgical evaluation.     He is seeing Dr. Ben Barros for his depression.  He is also seeing Deo Shipman for therapy. He is complaining of fatigue and sleep over 8-10 hours a day. He is very concerned about the fatigue and is questioning what other options we have for treating his seizures.     They are now open to VEEG monitoring for surgical evaluation, resection or neuromodulations. However, they are still not sure if they want to move forward to the surgical treatment.     Mood improved a lot. No suicidal thoughts, no thoughts of hurting himself.     The patient started to have seizures when he was 16 years old and he was managed at the [facility] for the past few years for his seizures.  He is currently taking Aptiom 1200 mg q.h.s. and the Zonegran 300 mg b.i.d. for his epilepsy.  He was recently admitted to the hospital twice because of 2 clusters of seizures, one in January when he was admitted to [facility] and one was in February when he was admitted to [facility].  On both occasions, he had 4-5 back to back generalized tonic-clonic seizures within a short period of time.      According to the mother, the patient has 2 different types of seizures.  Type #1 is described as \"mild seizures,\" during which he will have some facial twitching.  He will have repetitive hand/arm jerking movement.  He will become unresponsive for less than a minute.  Postictally, he was having some confusion and headaches and fatigue.  Before the seizures, he will have an aura of \"seeing bees populating the earth with eggs.\"  This type of seizure is relatively infrequent, average once per month.      Type #2 are generalized tonic-clonic seizures which the patient will have generalized convulsions of the body and his body will be stiff.  It usually will last for 1-2 minutes followed by postictal fatigue and hallucinations.  This happens usually once every 3 months, but sometimes it " occurs in clusters with 2-5 seizures back to back.      The patient had tried Keppra and Depakote in the past which did not work.  He had EEGs done in the past which were reported negative.      The patient had severe depression and requested a lot of behavioral health and counseling in the past and at certain time, he was suicidal, but that improved.  At the present time, he is not suicidal.  He does not think about hurting himself.      TRIGGERS FOR SEIZURES:  Depression.      RISK FACTORS FOR SEIZURES:  He had no history of head trauma with loss of consciousness.  No history of febrile convulsions.  No history of CNS infections.         PAST AEDs: Keppra, Depakote. Vimpat caused suicidal ideations.     PAST MEDICAL HISTORY:  Asperger's disorder, depression, epilepsy.      PAST SURGICAL HISTORY:  None.      FAMILY HISTORY:  No family history of seizures.  Both parents have hypertension.  Maternal grandmother had heart disease.      SOCIAL HISTORY:  He is single, living with his mother.  He had some college education but did not finish because of depression.  He is currently working at [employer].  No smoking, no alcohol, no drug abuse.      REVIEW OF SYSTEMS:  Positive for fatigue, depression.  The rest of the 8-point review of systems are negative.      ALLERGIES:  Sertraline and Zyprexa.      PHYSICAL EXAMINATION:       AAO x 3, speech fluent and appreopriate.        PREVIOUS DIAGNOSTIC TESTING:  CT scan of the head on 02/27/2018 was reported negative from [facility].  He had EEGs in the past which were reported negative.      IMPRESSION:   1.  Epilepsy.  Since the last visit, he had probably 4-5 seizures, all out of sleep unwitnessed. He will feel different int he morning after the seizures.  He continue to have small twitches that last for a spit of a second, some of them in clusters. No LOC with these twitches. These likely are not seizures. He is currently taking aptiom 1200 mg qhs,  zonegran 300-400,  Lamictal 150 mg bid, Depakote 500 - 1000. He is also taking CBD oil for anxiety.  Patient and his mother agreed to do VEEG monitoring for presurgical evaluation.    I again had a long discussion in the past with the patient and mother regarding different treatment options for epilepsy, including meds, surgery, neuromodulation and diet treatment.     2.  Asperger's syndrome.   3.  Depression.  He is followed by Dr. Ben Barros and Deo Shipman for therapy.  No plan for suicide.     PLAN:   1.  Continue Aptiom 1200 mg q.h.s. and Zonegran 300-400 mg, Lamictal 150 mg bid. Depakote -1000.  2.  Continue to follow up with Dr. Barros for depression. Continue with therapy.  3.  Continue CBD, change to night dose.  5.  May consider add Topamax, Felbamate, etc in the future if seizures get worse.  6.  Patient and family are willing to start VEEG work up for epilepsy surgery and neuromodulation (Just the evaluation part, not necessary the surgical treatment). Patient may consider going in in Sep. or Oct.     7.  RTC in 3 months. Patient tried Peoples diet, but did not continue.            Video-Visit Details    Type of service:  Video Visit    Video Start Time: 10:47 AM  Video End Time: 11:21 AM    Originating Location (pt. Location): Home    Distant Location (provider location):  [facility]     Platform used for Video Visit: Pham Dukes MD

## 2020-07-29 RX ORDER — ESLICARBAZEPINE ACETATE 600 MG/1
1200 TABLET ORAL AT BEDTIME
Qty: 180 TABLET | Refills: 0 | Status: SHIPPED | OUTPATIENT
Start: 2020-07-29 | End: 2020-11-02

## 2020-07-29 NOTE — TELEPHONE ENCOUNTER
APTIOM 600 MG tablet      Last Written Prescription Date:  5-5-2020  Last Fill Quantity: 180,   # refills: 0  Last Office Visit : 7-  Future Office visit:  10-    Sodium   Date Value Ref Range Status   01/22/2019 138 136 - 145 mmol/L Final       Kathleen M Doege RN

## 2020-08-24 ENCOUNTER — TELEPHONE (OUTPATIENT)
Dept: NEUROLOGY | Facility: CLINIC | Age: 25
End: 2020-08-24

## 2020-08-24 RX ORDER — FLUOXETINE 40 MG/1
80 CAPSULE ORAL DAILY
COMMUNITY
End: 2023-02-18

## 2020-08-24 NOTE — TELEPHONE ENCOUNTER
"Patient reports there is no consistent time of day when these episodes happen.  He is not able to identify any consistent factors or triggers.  The episode of double vision lasts about 30-60 mins (usually 30 mins), then it improves.   It may occur multiple times in a day  The episodes used to happen about once per week, now are about 3 days a week for the past two weeks    Patient reports that he sees two complete objects next to each other. He reports it as a mix of \"cross-eyes\" and two distinct images. Sometimes to the left, sometimes to the right, sometimes above, sometimes below.  The second image goes away if he closes one eye.  Some dizziness when he sees double.    AED - ANTIEPILEPTIC DRUGS 7/29/2020   ZONISAMIDE PO Take 3 caps (300 mg) each AM and 4 caps (400 mg) each HS (100 MG CAPS)   lamoTRIgine (Oral) 150 mg BID   eslicarbazepine acetate (Oral) 1,200 mg At Bedtime   divalproex sodium extended-release (Oral) TAKE 1 TABLET in the morning, and 2 tabs at night. (500 MG TB24)       Fluoxetine 80mg daily  Allertec (citirazine) for allergies  Melatonin at night  CBD oil    Patient confirmed that he has been taking 80mg fluoxetine for a while. The [facility] EMR shows a 40mg/d dose  Review of med dispensing show he has been receiving 40mg caps x180 every three months, supporting the 80mg per day as per patient report.  Will update med list.    Will contact MD for recommendations  Chart to be routed.  "

## 2020-08-24 NOTE — TELEPHONE ENCOUNTER
What is the concern that needs to be addressed by a nurse? Patient experiencing double vision. This has been more consistent.     May a detailed message be left on voicemail? Yes, [phone]    Date of last office visit: 7/27/20    Message routed to: nurse

## 2020-09-15 DIAGNOSIS — G40.909 RECURRENT SEIZURES (H): Primary | ICD-10-CM

## 2020-09-16 DIAGNOSIS — G40.219 PARTIAL SYMPTOMATIC EPILEPSY WITH COMPLEX PARTIAL SEIZURES, INTRACTABLE, WITHOUT STATUS EPILEPTICUS (H): ICD-10-CM

## 2020-09-18 RX ORDER — ZONISAMIDE 100 MG/1
CAPSULE ORAL
Qty: 630 CAPSULE | Refills: 1 | Status: SHIPPED | OUTPATIENT
Start: 2020-09-18 | End: 2020-11-16

## 2020-09-18 NOTE — TELEPHONE ENCOUNTER
" zonisamide (ZONEGRAN) 100 MG capsule     Take 3 caps (300 mg) each AM and 4 caps (400 mg) each HS   Last Written Prescription Date:  1/30/20  Last Fill Quantity: 630,   # refills: 1  Last Office Visit : 7/27/20    \" Continue Aptiom 1200 mg q.h.s. and Zonegran 300-400 mg, Lamictal 150 mg bid. \"  Future Office visit:  10/29/20  Filling per [facility] medication refill protocols - seizure medications.  Not all labs required.     refilled per protocol        "

## 2020-10-12 ENCOUNTER — TELEPHONE (OUTPATIENT)
Dept: NEUROLOGY | Facility: CLINIC | Age: 25
End: 2020-10-12

## 2020-10-12 DIAGNOSIS — Z53.9 ERRONEOUS ENCOUNTER--DISREGARD: Primary | ICD-10-CM

## 2020-10-15 ENCOUNTER — VIRTUAL VISIT (OUTPATIENT)
Dept: NEUROLOGY | Facility: CLINIC | Age: 25
End: 2020-10-15
Payer: COMMERCIAL

## 2020-10-15 DIAGNOSIS — G40.909 RECURRENT SEIZURES (H): Primary | ICD-10-CM

## 2020-10-15 NOTE — PROGRESS NOTES
"Amrit Padilla is a 25 year old male who is being evaluated via a billable video visit.      The patient has been notified of following:     \"This video visit will be conducted via a call between you and your physician/provider. We have found that certain health care needs can be provided without the need for an in-person physical exam.  This service lets us provide the care you need with a video conversation.  If a prescription is necessary we can send it directly to your pharmacy.  If lab work is needed we can place an order for that and you can then stop by our lab to have the test done at a later time.    Video visits are billed at different rates depending on your insurance coverage.  Please reach out to your insurance provider with any questions.    If during the course of the call the physician/provider feels a video visit is not appropriate, you will not be charged for this service.\"    Patient has given verbal consent for Video visit? Yes  How would you like to obtain your AVS? MyChart  If you are dropped from the video visit, the video invite should be resent to: Send to e-mail at:  [email or web address]  Will anyone else be joining your video visit? No          CHIEF COMPLAINT:  Follow up for seizures.      HISTORY OF PRESENT ILLNESS:  Video call for follow up. 25-year-old right-handed male with history of epilepsy, Asperger's disorder, depression.  He was previously seen by Dr. Morales at [facility] for his epilepsy.  Since the last visit about 3 months ago, he continue to have seizures. He had 2 GTCs witnessed by his family. He also has cheek biting about once a week, he will wake up in the morning found his cheek bitten. He will feel different in the morning after the seizures.  He is currently taking aptiom 1200 mg qhs,  zonegran 300-400, Lamictal 150 mg bid, Depakote 500 - 1000. He is also taking CBD oil for anxiety.  Patient and his mother agreed to do VEEG monitoring for presurgical evaluation. He " "is complaining of dizziness spells almost daily, lasting for 15 min to 1 hour, no pattern during the day, either morning or afternoon.      He continue to have small twitches that last for a spit of a second, some of them in clusters. No LOC with these twitches. These likely are not seizures.     Patient tried Peoples diet, but did not continue.    He is seeing Dr. Ben Barros for his depression.  He is also seeing Deo Shipman for therapy. He is complaining of fatigue and sleep over 8-10 hours a day. He is very concerned about the fatigue and is questioning what other options we have for treating his seizures.      They are now open to VEEG monitoring for surgical evaluation, resection or neuromodulations. However, they are still not sure if they want to move forward to the surgical treatment.     Mood improved a lot. No suicidal thoughts, no thoughts of hurting himself.     The patient started to have seizures when he was 16 years old and he was managed at the [facility] for the past few years for his seizures.  He is currently taking Aptiom 1200 mg q.h.s. and the Zonegran 300 mg b.i.d. for his epilepsy.  He was recently admitted to the hospital twice because of 2 clusters of seizures, one in January when he was admitted to [facility] and one was in February when he was admitted to [facility].  On both occasions, he had 4-5 back to back generalized tonic-clonic seizures within a short period of time.      According to the mother, the patient has 2 different types of seizures.  Type #1 is described as \"mild seizures,\" during which he will have some facial twitching.  He will have repetitive hand/arm jerking movement.  He will become unresponsive for less than a minute.  Postictally, he was having some confusion and headaches and fatigue.  Before the seizures, he will have an aura of \"seeing bees populating the earth with eggs.\"  This type of seizure is relatively infrequent, average once per month.      Type #2 are " generalized tonic-clonic seizures which the patient will have generalized convulsions of the body and his body will be stiff.  It usually will last for 1-2 minutes followed by postictal fatigue and hallucinations.  This happens usually once every 3 months, but sometimes it occurs in clusters with 2-5 seizures back to back.      The patient had tried Keppra and Depakote in the past which did not work.  He had EEGs done in the past which were reported negative.      The patient had severe depression and requested a lot of behavioral health and counseling in the past and at certain time, he was suicidal, but that improved.  At the present time, he is not suicidal.  He does not think about hurting himself.      TRIGGERS FOR SEIZURES:  Depression.      RISK FACTORS FOR SEIZURES:  He had no history of head trauma with loss of consciousness.  No history of febrile convulsions.  No history of CNS infections.         PAST AEDs: Keppra, Depakote. Vimpat caused suicidal ideations.     PAST MEDICAL HISTORY:  Asperger's disorder, depression, epilepsy.      PAST SURGICAL HISTORY:  None.      FAMILY HISTORY:  No family history of seizures.  Both parents have hypertension.  Maternal grandmother had heart disease.      SOCIAL HISTORY:  He is single, living with his mother.  He had some college education but did not finish because of depression.  He is currently working at [employer].  No smoking, no alcohol, no drug abuse.      REVIEW OF SYSTEMS:  Positive for fatigue, depression.  The rest of the 8-point review of systems are negative.      ALLERGIES:  Sertraline and Zyprexa.      PHYSICAL EXAMINATION:       AAO x 3, speech fluent and appreopriate.         PREVIOUS DIAGNOSTIC TESTING:  CT scan of the head on 02/27/2018 was reported negative from [facility].  He had EEGs in the past which were reported negative.      IMPRESSION:   1.  Epilepsy.  Since the last visit, he had probably 4-5 seizures, all out of sleep unwitnessed. He  will feel different int he morning after the seizures.  He continue to have small twitches that last for a spit of a second, some of them in clusters. No LOC with these twitches. These likely are not seizures. He is currently taking aptiom 1200 mg qhs,  zonegran 300-400, Lamictal 150 mg bid, Depakote 500 - 1000. He is also taking CBD oil for anxiety.  Patient and his mother agreed to do VEEG monitoring for presurgical evaluation.     He is complaining of dizziness spells almost daily, lasting for 15 min to 1 hour, no pattern during the day, either morning or afternoon.     I had extensive discussions in the past with the patient and mother regarding different treatment options for epilepsy, including meds, surgery, neuromodulation and diet treatment.    2.  Asperger's syndrome.   3.  Depression.  He is followed by Dr. Ben Barros and Deo Shipman for therapy.  No plan for suicide.     PLAN:   1.  Continue Aptiom 1200 mg q.h.s. and Zonegran 300-400 mg, Lamictal 150 mg bid. Depakote -1000.  2.  Continue to follow up with Dr. Barros for depression. Continue with therapy.  3.  Continue CBD, change to night dose.  5.  May consider add Topamax, Felbamate, etc in the future if seizures get worse.  6.  VEEG for presurgical evaluation (Likely device, resection is less likely)  7.  RTC in after the VEEG work up.          Video-Visit Details    Type of service:  Video Visit    Video Start Time: 4:15 PM  Video End Time: 4:49 PM    Originating Location (pt. Location): Home    Distant Location (provider location):  [facility]     Platform used for Video Visit: [employer]    27 min was spent on the visit.  Over 50% of the time was spent on education, counseling about optimal seizure control and coordination of care.      Norman Dukes MD

## 2020-10-15 NOTE — LETTER
Date:October 16, 2020      Patient was self referred, no letter generated. Do not send.        [facility] Information

## 2020-10-15 NOTE — LETTER
"10/15/2020       RE: Amrit Padilla  : 1995   MRN: [id number]      Dear Colleague,    Thank you for referring your patient, Amrit Padilla, to the [facility] EPILEPSY CARE at [facility]. Please see a copy of my visit note below.    Amrit Padilla is a 25 year old male who is being evaluated via a billable video visit.      The patient has been notified of following:     \"This video visit will be conducted via a call between you and your physician/provider. We have found that certain health care needs can be provided without the need for an in-person physical exam.  This service lets us provide the care you need with a video conversation.  If a prescription is necessary we can send it directly to your pharmacy.  If lab work is needed we can place an order for that and you can then stop by our lab to have the test done at a later time.    Video visits are billed at different rates depending on your insurance coverage.  Please reach out to your insurance provider with any questions.    If during the course of the call the physician/provider feels a video visit is not appropriate, you will not be charged for this service.\"    Patient has given verbal consent for Video visit? Yes  How would you like to obtain your AVS? MyChart  If you are dropped from the video visit, the video invite should be resent to: Send to e-mail at:  [email or web address]  Will anyone else be joining your video visit? No          CHIEF COMPLAINT:  Follow up for seizures.      HISTORY OF PRESENT ILLNESS:  Video call for follow up. 25-year-old right-handed male with history of epilepsy, Asperger's disorder, depression.  He was previously seen by Dr. Morales at [facility] for his epilepsy.  Since the last visit about 3 months ago, he continue to have seizures. He had 2 GTCs witnessed by his family. He also has cheek biting about once a week, he will wake up in the morning found his cheek bitten. He will feel " "different in the morning after the seizures.  He is currently taking aptiom 1200 mg qhs,  zonegran 300-400, Lamictal 150 mg bid, Depakote 500 - 1000. He is also taking CBD oil for anxiety.  Patient and his mother agreed to do VEEG monitoring for presurgical evaluation. He is complaining of dizziness spells almost daily, lasting for 15 min to 1 hour, no pattern during the day, either morning or afternoon.      He continue to have small twitches that last for a spit of a second, some of them in clusters. No LOC with these twitches. These likely are not seizures.     Patient tried Peoples diet, but did not continue.    He is seeing Dr. Ben Barros for his depression.  He is also seeing Deo Shipman for therapy. He is complaining of fatigue and sleep over 8-10 hours a day. He is very concerned about the fatigue and is questioning what other options we have for treating his seizures.      They are now open to VEEG monitoring for surgical evaluation, resection or neuromodulations. However, they are still not sure if they want to move forward to the surgical treatment.     Mood improved a lot. No suicidal thoughts, no thoughts of hurting himself.     The patient started to have seizures when he was 16 years old and he was managed at the [facility] for the past few years for his seizures.  He is currently taking Aptiom 1200 mg q.h.s. and the Zonegran 300 mg b.i.d. for his epilepsy.  He was recently admitted to the hospital twice because of 2 clusters of seizures, one in January when he was admitted to [facility] and one was in February when he was admitted to [facility].  On both occasions, he had 4-5 back to back generalized tonic-clonic seizures within a short period of time.      According to the mother, the patient has 2 different types of seizures.  Type #1 is described as \"mild seizures,\" during which he will have some facial twitching.  He will have repetitive hand/arm jerking movement.  He will become unresponsive for " "less than a minute.  Postictally, he was having some confusion and headaches and fatigue.  Before the seizures, he will have an aura of \"seeing bees populating the earth with eggs.\"  This type of seizure is relatively infrequent, average once per month.      Type #2 are generalized tonic-clonic seizures which the patient will have generalized convulsions of the body and his body will be stiff.  It usually will last for 1-2 minutes followed by postictal fatigue and hallucinations.  This happens usually once every 3 months, but sometimes it occurs in clusters with 2-5 seizures back to back.      The patient had tried Keppra and Depakote in the past which did not work.  He had EEGs done in the past which were reported negative.      The patient had severe depression and requested a lot of behavioral health and counseling in the past and at certain time, he was suicidal, but that improved.  At the present time, he is not suicidal.  He does not think about hurting himself.      TRIGGERS FOR SEIZURES:  Depression.      RISK FACTORS FOR SEIZURES:  He had no history of head trauma with loss of consciousness.  No history of febrile convulsions.  No history of CNS infections.         PAST AEDs: Keppra, Depakote. Vimpat caused suicidal ideations.     PAST MEDICAL HISTORY:  Asperger's disorder, depression, epilepsy.      PAST SURGICAL HISTORY:  None.      FAMILY HISTORY:  No family history of seizures.  Both parents have hypertension.  Maternal grandmother had heart disease.      SOCIAL HISTORY:  He is single, living with his mother.  He had some college education but did not finish because of depression.  He is currently working at [employer].  No smoking, no alcohol, no drug abuse.      REVIEW OF SYSTEMS:  Positive for fatigue, depression.  The rest of the 8-point review of systems are negative.      ALLERGIES:  Sertraline and Zyprexa.      PHYSICAL EXAMINATION:       AAO x 3, speech fluent and appreopriate.         PREVIOUS " DIAGNOSTIC TESTING:  CT scan of the head on 02/27/2018 was reported negative from [facility].  He had EEGs in the past which were reported negative.      IMPRESSION:   1.  Epilepsy.  Since the last visit, he had probably 4-5 seizures, all out of sleep unwitnessed. He will feel different int he morning after the seizures.  He continue to have small twitches that last for a spit of a second, some of them in clusters. No LOC with these twitches. These likely are not seizures. He is currently taking aptiom 1200 mg qhs,  zonegran 300-400, Lamictal 150 mg bid, Depakote 500 - 1000. He is also taking CBD oil for anxiety.  Patient and his mother agreed to do VEEG monitoring for presurgical evaluation.     He is complaining of dizziness spells almost daily, lasting for 15 min to 1 hour, no pattern during the day, either morning or afternoon.     I had extensive discussions in the past with the patient and mother regarding different treatment options for epilepsy, including meds, surgery, neuromodulation and diet treatment.    2.  Asperger's syndrome.   3.  Depression.  He is followed by Dr. Ben Barros and Deo Shipman for therapy.  No plan for suicide.     PLAN:   1.  Continue Aptiom 1200 mg q.h.s. and Zonegran 300-400 mg, Lamictal 150 mg bid. Depakote -1000.  2.  Continue to follow up with Dr. Barros for depression. Continue with therapy.  3.  Continue CBD, change to night dose.  5.  May consider add Topamax, Felbamate, etc in the future if seizures get worse.  6.  VEEG for presurgical evaluation (Likely device, resection is less likely)  7.  RTC in after the VEEG work up.          Video-Visit Details    Type of service:  Video Visit    Video Start Time: 4:15 PM  Video End Time: 4:49 PM    Originating Location (pt. Location): Home    Distant Location (provider location):  [facility]     Platform used for Video Visit: SetJam    27 min was spent on the visit.  Over 50% of the time was spent on education,  counseling about optimal seizure control and coordination of care.      Norman Dukes MD      Again, thank you for allowing me to participate in the care of your patient.      Sincerely,    Norman Dukes MD

## 2020-10-29 DIAGNOSIS — G40.909 SEIZURE DISORDER (H): ICD-10-CM

## 2020-11-03 DIAGNOSIS — G40.909 RECURRENT SEIZURES (H): ICD-10-CM

## 2020-11-03 RX ORDER — LAMOTRIGINE 150 MG/1
150 TABLET ORAL 2 TIMES DAILY
Qty: 60 TABLET | Refills: 11 | Status: SHIPPED | OUTPATIENT
Start: 2020-11-03 | End: 2020-11-16

## 2020-11-03 RX ORDER — ESLICARBAZEPINE ACETATE 600 MG/1
1200 TABLET ORAL AT BEDTIME
Qty: 180 TABLET | Refills: 0 | Status: SHIPPED | OUTPATIENT
Start: 2020-11-03 | End: 2020-11-03

## 2020-11-03 RX ORDER — LAMOTRIGINE 150 MG/1
150 TABLET ORAL 2 TIMES DAILY
Qty: 60 TABLET | Refills: 0 | Status: SHIPPED | OUTPATIENT
Start: 2020-11-03 | End: 2020-11-03

## 2020-11-03 RX ORDER — ESLICARBAZEPINE ACETATE 600 MG/1
1200 TABLET ORAL AT BEDTIME
Qty: 180 TABLET | Refills: 3 | Status: SHIPPED | OUTPATIENT
Start: 2020-11-03 | End: 2020-11-16

## 2020-11-03 NOTE — TELEPHONE ENCOUNTER
Patient is out of medication and need this to be filled TODAY.  
Refill placed to bridge patient due to being out of medication. One month Rx sent. Routing long term prescription to the MD.   
eslicarbazepine acetate (APTIOM) 600 MG tablet      Last Written Prescription Date:  7/29/20  Last Fill Quantity: 180,   # refills: 0  Last Virtual Visit : 10/15/20  Future Office visit:  None scheduled    Routing refill request to provider for review/approval because:  Overdue for sodium, last one in care everywhere 1/22/19 result 138      
97

## 2020-11-03 NOTE — TELEPHONE ENCOUNTER
Medication request meets [place] Medication Refill Protocol - Seizure Medications (non-controlled) requirements. Sending a long term prescription to the MD.

## 2020-11-05 DIAGNOSIS — G40.909 RECURRENT SEIZURES (H): ICD-10-CM

## 2020-11-08 RX ORDER — DIVALPROEX SODIUM 500 MG/1
TABLET, EXTENDED RELEASE ORAL
Qty: 90 TABLET | Refills: 11 | OUTPATIENT
Start: 2020-11-08

## 2020-11-09 NOTE — TELEPHONE ENCOUNTER
divalproex sodium extended-release (DEPAKOTE ER) 500 MG 24 hr tablet  TAKE 1 TABLET in the morning, and 2 tabs at night.   Last Written Prescription Date:  1/30/20  Last Fill Quantity: 90,   # refills: 11  [facility][id number] - [place] [id number] [place] [id number]  Last Office Visit : 10/15/20  Future Office visit:  None    Routing refill request to provider for review/approval because:   Too soon for refill

## 2020-11-16 ENCOUNTER — VIRTUAL VISIT (OUTPATIENT)
Dept: NEUROLOGY | Facility: CLINIC | Age: 25
End: 2020-11-16
Payer: COMMERCIAL

## 2020-11-16 DIAGNOSIS — G40.909 SEIZURE DISORDER (H): ICD-10-CM

## 2020-11-16 DIAGNOSIS — G40.219 PARTIAL SYMPTOMATIC EPILEPSY WITH COMPLEX PARTIAL SEIZURES, INTRACTABLE, WITHOUT STATUS EPILEPTICUS (H): ICD-10-CM

## 2020-11-16 DIAGNOSIS — G40.909 RECURRENT SEIZURES (H): ICD-10-CM

## 2020-11-16 RX ORDER — ZONISAMIDE 100 MG/1
CAPSULE ORAL
Qty: 630 CAPSULE | Refills: 3 | Status: SHIPPED | OUTPATIENT
Start: 2020-11-16 | End: 2022-01-19

## 2020-11-16 RX ORDER — ESLICARBAZEPINE ACETATE 600 MG/1
1200 TABLET ORAL AT BEDTIME
Qty: 180 TABLET | Refills: 3 | Status: ON HOLD | OUTPATIENT
Start: 2020-11-16 | End: 2021-03-16

## 2020-11-16 RX ORDER — LAMOTRIGINE 150 MG/1
150 TABLET ORAL 2 TIMES DAILY
Qty: 180 TABLET | Refills: 3 | Status: SHIPPED | OUTPATIENT
Start: 2020-11-16 | End: 2021-12-17

## 2020-11-16 RX ORDER — DIVALPROEX SODIUM 500 MG/1
TABLET, EXTENDED RELEASE ORAL
Qty: 270 TABLET | Refills: 3 | Status: SHIPPED | OUTPATIENT
Start: 2020-11-16 | End: 2021-12-13

## 2020-11-16 NOTE — LETTER
Date:November 20, 2020      Patient was self referred, no letter generated. Do not send.        [facility] Health Information

## 2020-11-16 NOTE — PROGRESS NOTES
"Amrit Padilla is a 25 year old male who is being evaluated via a billable video visit.      The patient has been notified of following:     \"This video visit will be conducted via a call between you and your physician/provider. We have found that certain health care needs can be provided without the need for an in-person physical exam.  This service lets us provide the care you need with a video conversation.  If a prescription is necessary we can send it directly to your pharmacy.  If lab work is needed we can place an order for that and you can then stop by our lab to have the test done at a later time.    Video visits are billed at different rates depending on your insurance coverage.  Please reach out to your insurance provider with any questions.    If during the course of the call the physician/provider feels a video visit is not appropriate, you will not be charged for this service.\"    Patient has given verbal consent for Video visit? Yes  How would you like to obtain your AVS? MyChart  If you are dropped from the video visit, the video invite should be resent to: Send to e-mail at: [email or web address]     Will anyone else be joining your video visit?  Mother             CHIEF COMPLAINT:  Follow up for seizures.      HISTORY OF PRESENT ILLNESS:  Video call for follow up. 25-year-old right-handed male with history of epilepsy, Asperger's disorder, depression.  He was previously seen by Dr. Morales at [facility] for his epilepsy.  Since the last visit about one months ago, he had no confirmed seizures. His mother thinks that majority of the seizures occur in early morning before he wakes up, and his \"big seizures\" are once every 3 months.  He also has cheek biting about once every 1-3 week, he will wake up in the morning found his cheek bitten. He will feel different in the morning after the seizures.  He is currently taking aptiom 1200 mg qhs,  zonegran 300-400, Lamictal 150 mg bid, Depakote 500 - 1000. He " "is also taking CBD oil for anxiety.  Patient and his mother agreed to do VEEG monitoring for presurgical evaluation.     He is complaining of vertigo spells, he was seen in the ED.  He was told that he has vestibular problems.  He also complains of double vision. He saw an ophthalmologist recently and he was given new lens. He is waiting for his lens.     He continue to have small twitches that last for a spit of a second, some of them in clusters. No LOC with these twitches. These likely are not seizures.      Patient tried Peoples diet, but did not continue.     He is seeing Dr. Ben Barros for his depression.  He is also seeing Deo Shipman for therapy. He is complaining of fatigue and sleep over 8-10 hours a day. He is very concerned about the fatigue and is questioning what other options we have for treating his seizures.      They are now open to VEEG monitoring for surgical evaluation, resection or neuromodulations. However, they are still not sure if they want to move forward to the surgical treatment.     Mood improved a lot. No suicidal thoughts, no thoughts of hurting himself.     The patient started to have seizures when he was 16 years old and he was managed at the [facility] for the past few years for his seizures.  He is currently taking Aptiom 1200 mg q.h.s. and the Zonegran 300 mg b.i.d. for his epilepsy.  He was recently admitted to the hospital twice because of 2 clusters of seizures, one in January when he was [facility] and one was in February when he was admitted to [facility].  On both occasions, he had 4-5 back to back generalized tonic-clonic seizures within a short period of time.      According to the mother, the patient has 2 different types of seizures.  Type #1 is described as \"mild seizures,\" during which he will have some facial twitching.  He will have repetitive hand/arm jerking movement.  He will become unresponsive for less than a minute.  Postictally, he was having some " "confusion and headaches and fatigue.  Before the seizures, he will have an aura of \"seeing bees populating the earth with eggs.\"  This type of seizure is relatively infrequent, average once per month.      Type #2 are generalized tonic-clonic seizures which the patient will have generalized convulsions of the body and his body will be stiff.  It usually will last for 1-2 minutes followed by postictal fatigue and hallucinations.  This happens usually once every 3 months, but sometimes it occurs in clusters with 2-5 seizures back to back.      The patient had tried Keppra and Depakote in the past which did not work.  He had EEGs done in the past which were reported negative.      The patient had severe depression and requested a lot of behavioral health and counseling in the past and at certain time, he was suicidal, but that improved.  At the present time, he is not suicidal.  He does not think about hurting himself.      TRIGGERS FOR SEIZURES:  Depression.      RISK FACTORS FOR SEIZURES:  He had no history of head trauma with loss of consciousness.  No history of febrile convulsions.  No history of CNS infections.         PAST AEDs: Keppra, Depakote. Vimpat caused suicidal ideations.     PAST MEDICAL HISTORY:  Asperger's disorder, depression, epilepsy.      PAST SURGICAL HISTORY:  None.      FAMILY HISTORY:  No family history of seizures.  Both parents have hypertension.  Maternal grandmother had heart disease.      SOCIAL HISTORY:  He is single, living with his mother.  He had some college education but did not finish because of depression.  He is currently working at [employer].  No smoking, no alcohol, no drug abuse.      REVIEW OF SYSTEMS:  Positive for fatigue, depression.  The rest of the 8-point review of systems are negative.      ALLERGIES:  Sertraline and Zyprexa.      PHYSICAL EXAMINATION:       AAO x 3, speech fluent and appreopriate.         PREVIOUS DIAGNOSTIC TESTING:  CT scan of the head on " 02/27/2018 was reported negative from [facility].  He had EEGs in the past which were reported negative.      IMPRESSION:   1.  Epilepsy.  Since the last visit, he had probably 4-5 seizures, all out of sleep unwitnessed. He will feel different int he morning after the seizures.  He continue to have small twitches that last for a spit of a second, some of them in clusters. No LOC with these twitches. These likely are not seizures. He is currently taking aptiom 1200 mg qhs,  zonegran 300-400, Lamictal 150 mg bid, Depakote 500 - 1000. He is also taking CBD oil for anxiety.  Patient and his mother agreed to do VEEG monitoring for presurgical evaluation.     He is complaining of dizziness spells almost daily, lasting for 15 min to 1 hour, no pattern during the day, either morning or afternoon.      I had extensive discussions in the past with the patient and mother regarding different treatment options for epilepsy, including meds, surgery, neuromodulation and diet treatment.     2.  Asperger's syndrome.   3.  Depression.  He is followed by Dr. Ben Barros and Deo Shipman for therapy.  No plan for suicide.     PLAN:   1.  Continue Aptiom 1200 mg q.h.s. and Zonegran 300-400 mg, Lamictal 150 mg bid. Depakote -1000.  2.  Continue to follow up with Dr. Barros for depression. Continue with therapy.  3.  Continue CBD, change to night dose.  5.  May consider add Topamax, Felbamate, etc in the future if seizures get worse.  6.  VEEG for presurgical evaluation (Likely device, resection is less likely)  7.  Consider PET CT during hospital stay.  8.  Neuropsych testing.  9.  RTC in after the VEEG work up.             Video-Visit Details    Type of service:  Video Visit    Video Start Time: 1:26 PM  Video End Time: 1:58 PM    Originating Location (pt. Location): Home    Distant Location (provider location):  [facility] EPILEPSY CARE     Platform used for Video Visit: ENDOGENX    34 min was spent on the visit.  Over 50% of the  time was spent on education, counseling about optimal seizure control and coordination of care.      Norman Dukes MD

## 2020-11-16 NOTE — LETTER
"2020       RE: Amrit Paidlla  : 1995   MRN: [id number]      Dear Colleague,    Thank you for referring your patient, Amrit Padilla, to the [facility] EPILEPSY CARE at [facility]. Please see a copy of my visit note below.    Amrit Padilla is a 25 year old male who is being evaluated via a billable video visit.      The patient has been notified of following:     \"This video visit will be conducted via a call between you and your physician/provider. We have found that certain health care needs can be provided without the need for an in-person physical exam.  This service lets us provide the care you need with a video conversation.  If a prescription is necessary we can send it directly to your pharmacy.  If lab work is needed we can place an order for that and you can then stop by our lab to have the test done at a later time.    Video visits are billed at different rates depending on your insurance coverage.  Please reach out to your insurance provider with any questions.    If during the course of the call the physician/provider feels a video visit is not appropriate, you will not be charged for this service.\"    Patient has given verbal consent for Video visit? Yes  How would you like to obtain your AVS? MyChart  If you are dropped from the video visit, the video invite should be resent to: Send to e-mail at: [email or web address]     Will anyone else be joining your video visit?  Mother             CHIEF COMPLAINT:  Follow up for seizures.      HISTORY OF PRESENT ILLNESS:  Video call for follow up. 25-year-old right-handed male with history of epilepsy, Asperger's disorder, depression.  He was previously seen by Dr. Morales at [facility] for his epilepsy.  Since the last visit about one months ago, he had no confirmed seizures. His mother thinks that majority of the seizures occur in early morning before he wakes up, and his \"big seizures\" are once every 3 months.  " He also has cheek biting about once every 1-3 week, he will wake up in the morning found his cheek bitten. He will feel different in the morning after the seizures.  He is currently taking aptiom 1200 mg qhs,  zonegran 300-400, Lamictal 150 mg bid, Depakote 500 - 1000. He is also taking CBD oil for anxiety.  Patient and his mother agreed to do VEEG monitoring for presurgical evaluation.     He is complaining of vertigo spells, he was seen in the ED.  He was told that he has vestibular problems.  He also complains of double vision. He saw an ophthalmologist recently and he was given new lens. He is waiting for his lens.     He continue to have small twitches that last for a spit of a second, some of them in clusters. No LOC with these twitches. These likely are not seizures.      Patient tried Peoples diet, but did not continue.     He is seeing Dr. Ben Barros for his depression.  He is also seeing Deo Shipman for therapy. He is complaining of fatigue and sleep over 8-10 hours a day. He is very concerned about the fatigue and is questioning what other options we have for treating his seizures.      They are now open to VEEG monitoring for surgical evaluation, resection or neuromodulations. However, they are still not sure if they want to move forward to the surgical treatment.     Mood improved a lot. No suicidal thoughts, no thoughts of hurting himself.     The patient started to have seizures when he was 16 years old and he was managed at the [facility] for the past few years for his seizures.  He is currently taking Aptiom 1200 mg q.h.s. and the Zonegran 300 mg b.i.d. for his epilepsy.  He was recently admitted to the hospital twice because of 2 clusters of seizures, one in January when he was admitted to [facility] and one was in February when he was [place] [facility]  On both occasions, he had 4-5 back to back generalized tonic-clonic seizures within a short period of time.      According to the mother, the  "patient has 2 different types of seizures.  Type #1 is described as \"mild seizures,\" during which he will have some facial twitching.  He will have repetitive hand/arm jerking movement.  He will become unresponsive for less than a minute.  Postictally, he was having some confusion and headaches and fatigue.  Before the seizures, he will have an aura of \"seeing bees populating the earth with eggs.\"  This type of seizure is relatively infrequent, average once per month.      Type #2 are generalized tonic-clonic seizures which the patient will have generalized convulsions of the body and his body will be stiff.  It usually will last for 1-2 minutes followed by postictal fatigue and hallucinations.  This happens usually once every 3 months, but sometimes it occurs in clusters with 2-5 seizures back to back.      The patient had tried Keppra and Depakote in the past which did not work.  He had EEGs done in the past which were reported negative.      The patient had severe depression and requested a lot of behavioral health and counseling in the past and at certain time, he was suicidal, but that improved.  At the present time, he is not suicidal.  He does not think about hurting himself.      TRIGGERS FOR SEIZURES:  Depression.      RISK FACTORS FOR SEIZURES:  He had no history of head trauma with loss of consciousness.  No history of febrile convulsions.  No history of CNS infections.         PAST AEDs: Keppra, Depakote. Vimpat caused suicidal ideations.     PAST MEDICAL HISTORY:  Asperger's disorder, depression, epilepsy.      PAST SURGICAL HISTORY:  None.      FAMILY HISTORY:  No family history of seizures.  Both parents have hypertension.  Maternal grandmother had heart disease.      SOCIAL HISTORY:  He is single, living with his mother.  He had some college education but did not finish because of depression.  He is currently working at [employer].  No smoking, no alcohol, no drug abuse.      REVIEW OF SYSTEMS: "  Positive for fatigue, depression.  The rest of the 8-point review of systems are negative.      ALLERGIES:  Sertraline and Zyprexa.      PHYSICAL EXAMINATION:       AAO x 3, speech fluent and appreopriate.         PREVIOUS DIAGNOSTIC TESTING:  CT scan of the head on 02/27/2018 was reported negative from [facility].  He had EEGs in the past which were reported negative.      IMPRESSION:   1.  Epilepsy.  Since the last visit, he had probably 4-5 seizures, all out of sleep unwitnessed. He will feel different int he morning after the seizures.  He continue to have small twitches that last for a spit of a second, some of them in clusters. No LOC with these twitches. These likely are not seizures. He is currently taking aptiom 1200 mg qhs,  zonegran 300-400, Lamictal 150 mg bid, Depakote 500 - 1000. He is also taking CBD oil for anxiety.  Patient and his mother agreed to do VEEG monitoring for presurgical evaluation.     He is complaining of dizziness spells almost daily, lasting for 15 min to 1 hour, no pattern during the day, either morning or afternoon.      I had extensive discussions in the past with the patient and mother regarding different treatment options for epilepsy, including meds, surgery, neuromodulation and diet treatment.     2.  Asperger's syndrome.   3.  Depression.  He is followed by Dr. Ben Barros and Deo Shipman for therapy.  No plan for suicide.     PLAN:   1.  Continue Aptiom 1200 mg q.h.s. and Zonegran 300-400 mg, Lamictal 150 mg bid. Depakote -1000.  2.  Continue to follow up with Dr. Barros for depression. Continue with therapy.  3.  Continue CBD, change to night dose.  5.  May consider add Topamax, Felbamate, etc in the future if seizures get worse.  6.  VEEG for presurgical evaluation (Likely device, resection is less likely)  7.  Consider PET CT during hospital stay.  8.  Neuropsych testing.  9.  RTC in after the VEEG work up.             Video-Visit Details    Type of service:   Video Visit    Video Start Time: 1:26 PM  Video End Time: 1:58 PM    Originating Location (pt. Location): Home    Distant Location (provider location):  [facility] EPILEPSY CARE     Platform used for Video Visit: [employer]    34 min was spent on the visit.  Over 50% of the time was spent on education, counseling about optimal seizure control and coordination of care.      Norman Dukes MD      Again, thank you for allowing me to participate in the care of your patient.      Sincerely,    Norman Dukes MD

## 2020-11-27 DIAGNOSIS — G40.909 RECURRENT SEIZURES (H): ICD-10-CM

## 2020-11-30 RX ORDER — LAMOTRIGINE 150 MG/1
150 TABLET ORAL 2 TIMES DAILY
Qty: 180 TABLET | Refills: 3 | OUTPATIENT
Start: 2020-11-30

## 2020-11-30 NOTE — TELEPHONE ENCOUNTER
lamoTRIgine (LAMICTAL) 150 MG tablet   Take 1 tablet (150 mg) by mouth 2 times daily      Last Written Prescription Date:  11/16/20  Last Fill Quantity: 180,   # refills: 3  Last Office Visit : 11/16/20  Future Office visit:  2/1/21        lamoTRIgine (LAMICTAL) 150 MG tablet 180 tablet 3 11/16/2020       Duplicate - sent to [facility]/PHARMACY #[id number] - [address] AT [facility] [address]    [facility] #[id number] Pharmacist (Juliane) at [place] notified.

## 2020-12-29 NOTE — PROGRESS NOTES
[facility] [address]  OP[facility]24hr Amb[facility]  [facility] [place]  Dr. Ernst parra     English

## 2021-02-01 ENCOUNTER — VIRTUAL VISIT (OUTPATIENT)
Dept: NEUROLOGY | Facility: CLINIC | Age: 26
End: 2021-02-01
Payer: COMMERCIAL

## 2021-02-01 DIAGNOSIS — G40.909 RECURRENT SEIZURES (H): Primary | ICD-10-CM

## 2021-02-01 NOTE — LETTER
"2021       RE: Amrit Padilla  : 1995   MRN: [id number]      Dear Colleague,    Thank you for referring your patient, Amrit Padilla, to the [facility] Please see a copy of my visit note below.    Amrit is a 25 year old who is being evaluated via a billable video visit.      How would you like to obtain your AVS? MyChart  If the video visit is dropped, the invitation should be resent by: Send to e-mail at: [email or web address]  Will anyone else be joining your video visit? Yes: mom . How would they like to receive their invitation? Send to e-mail at: [email or web address]      Video Start Time: 11:10 AM  Video-Visit Details    Type of service:  Video Visit    Video End Time:11:48 AM    Originating Location (pt. Location): Home    Distant Location (provider location):  [facility] EPILEPSY CARE     Platform used for Video Visit: [employer] and phone    CHIEF COMPLAINT:  Follow up for seizures.      HISTORY OF PRESENT ILLNESS:  Video call for follow up. 25-year-old right-handed male with history of epilepsy, Asperger's disorder, depression.  He was previously seen by Dr. Morales at [facility] for his epilepsy.  Since the last visit about 3 months ago, he had no confirmed seizures. He felt that he had a lot of \"seizure like feelings\" recently, but no witnessed seizures. These \"seizre like feelings\" consist of abnormal thought processes. They will last for a full day.  He also has cheek biting about once or twice a week, he will wake up in the morning found his cheek bitten. He will feel different in the morning after the seizures.  He is currently taking aptiom 1200 mg qhs,  zonegran 300-400, Lamictal 150 mg bid, Depakote 500 - 1000. He is also taking CBD oil for anxiety.  Patient and his mother discussed again about VEEG monitoring for presurgical evaluation. They agreed to do it.     He is complaining of vertigo spells, he was seen in the ED.  He was " "told that he has vestibular problems.  He also complains of double vision. He saw an ophthalmologist recently and he was given new lens. He is waiting for his lens.     He continue to have small twitches that last for a spit of a second, some of them in clusters. No LOC with these twitches. These likely are not seizures.      Patient tried Peoples diet, but did not continue.     He is seeing Dr. Ben Barros for his depression.  He is also seeing Deo Shipman for therapy. He is complaining of fatigue and sleep over 8-10 hours a day. He is very concerned about the fatigue and is questioning what other options we have for treating his seizures.      They are now open to VEEG monitoring for surgical evaluation, resection or neuromodulations. However, they are still not sure if they want to move forward to the surgical treatment.     Mood improved a lot. No suicidal thoughts, no thoughts of hurting himself.     The patient started to have seizures when he was 16 years old and he was managed at the [facility] for the past few years for his seizures.  He is currently taking Aptiom 1200 mg q.h.s. and the Zonegran 300 mg b.i.d. for his epilepsy.  He was recently admitted to the hospital twice because of 2 clusters of seizures, one in January when he was admitted to [facility] and one was in February when he was admitted to [facility].  On both occasions, he had 4-5 back to back generalized tonic-clonic seizures within a short period of time.      According to the mother, the patient has 2 different types of seizures.  Type #1 is described as \"mild seizures,\" during which he will have some facial twitching.  He will have repetitive hand/arm jerking movement.  He will become unresponsive for less than a minute.  Postictally, he was having some confusion and headaches and fatigue.  Before the seizures, he will have an aura of \"seeing bees populating the earth with eggs.\"  This type of seizure is relatively infrequent, average once " per month.      Type #2 are generalized tonic-clonic seizures which the patient will have generalized convulsions of the body and his body will be stiff.  It usually will last for 1-2 minutes followed by postictal fatigue and hallucinations.  This happens usually once every 3 months, but sometimes it occurs in clusters with 2-5 seizures back to back.      The patient had tried Keppra and Depakote in the past which did not work.  He had EEGs done in the past which were reported negative.      The patient had severe depression and requested a lot of behavioral health and counseling in the past and at certain time, he was suicidal, but that improved.  At the present time, he is not suicidal.  He does not think about hurting himself.      TRIGGERS FOR SEIZURES:  Depression.      RISK FACTORS FOR SEIZURES:  He had no history of head trauma with loss of consciousness.  No history of febrile convulsions.  No history of CNS infections.         PAST AEDs: Keppra, Depakote. Vimpat caused suicidal ideations.     PAST MEDICAL HISTORY:  Asperger's disorder, depression, epilepsy.      PAST SURGICAL HISTORY:  None.      FAMILY HISTORY:  No family history of seizures.  Both parents have hypertension.  Maternal grandmother had heart disease.      SOCIAL HISTORY:  He is single, living with his mother.  He had some college education but did not finish because of depression.  He is currently working at [employer].  No smoking, no alcohol, no drug abuse.      REVIEW OF SYSTEMS:  Positive for fatigue, depression.  The rest of the 8-point review of systems are negative.      ALLERGIES:  Sertraline and Zyprexa.      PHYSICAL EXAMINATION:       AAO x 3, speech fluent and appreopriate. No facial weakness, hearing grossly normal.        PREVIOUS DIAGNOSTIC TESTING:  CT scan of the head on 02/27/2018 was reported negative from [facility].  He had EEGs in the past which were reported negative.      IMPRESSION:   1.  Epilepsy.    Since the  "last visit about 3 months ago, he had no confirmed seizures. He felt that he had a lot of \"seizure like feelings\" recently, but no witnessed seizures. These \"seizre like feelings\" consist of abnormal thought processes. They will last for a full day.  He also has cheek biting about once or twice a week, he will wake up in the morning found his cheek bitten. He will feel different in the morning after the seizures.  He is currently taking aptiom 1200 mg qhs,  zonegran 300-400, Lamictal 150 mg bid, Depakote 500 - 1000. He is also taking CBD oil for anxiety.  Patient and his mother discussed again about VEEG monitoring for presurgical evaluation. They agreed to do it.     He is complaining of dizziness spells almost daily, lasting for 15 min to 1 hour, no pattern during the day, either morning or afternoon.      I had extensive discussions in the past with the patient and mother regarding different treatment options for epilepsy, including meds, surgery, neuromodulation and diet treatment.     2.  Asperger's syndrome.   3.  Depression.  He is followed by Dr. Ben Barros and Deo Shipman for therapy.  No plan for suicide.     PLAN:   1.  Continue Aptiom 1200 mg q.h.s. and Zonegran 300-400 mg, Lamictal 150 mg bid. Depakote -1000.  2.  Continue to follow up with Dr. Barros for depression. Continue with therapy.  3.  Continue CBD, change to night dose.  5.  May consider add Topamax, Felbamate, etc in the future if seizures get worse.  6.  VEEG for presurgical evaluation (Device or resection)  7.  Consider PET CT during hospital stay.  8.  Neuropsych testing.  9.  RTC in after the VEEG work up.      45 min total was spent on the visit.      38 min was spent on face to face time  3 min was spent on preparation to review charts and labs andordering medications and tests  4 min was spent on documentation of clinical information      Again, thank you for allowing me to participate in the care of your patient.  "     Sincerely,    Norman Dukes MD

## 2021-02-01 NOTE — PROGRESS NOTES
"CHIEF COMPLAINT:  Follow up for seizures.      HISTORY OF PRESENT ILLNESS:  Video call for follow up. 25-year-old right-handed male with history of epilepsy, Asperger's disorder, depression.  He was previously seen by Dr. Morales at [facility] for his epilepsy.  Since the last visit about 3 months ago, he had no confirmed seizures. He felt that he had a lot of \"seizure like feelings\" recently, but no witnessed seizures. These \"seizre like feelings\" consist of abnormal thought processes. They will last for a full day.  He also has cheek biting about once or twice a week, he will wake up in the morning found his cheek bitten. He will feel different in the morning after the seizures.  He is currently taking aptiom 1200 mg qhs,  zonegran 300-400, Lamictal 150 mg bid, Depakote 500 - 1000. He is also taking CBD oil for anxiety.  Patient and his mother discussed again about VEEG monitoring for presurgical evaluation. They agreed to do it.     He is complaining of vertigo spells, he was seen in the ED.  He was told that he has vestibular problems.  He also complains of double vision. He saw an ophthalmologist recently and he was given new lens. He is waiting for his lens.     He continue to have small twitches that last for a spit of a second, some of them in clusters. No LOC with these twitches. These likely are not seizures.      Patient tried Peoples diet, but did not continue.     He is seeing Dr. Ben Barros for his depression.  He is also seeing Deo Shipman for therapy. He is complaining of fatigue and sleep over 8-10 hours a day. He is very concerned about the fatigue and is questioning what other options we have for treating his seizures.      They are now open to VEEG monitoring for surgical evaluation, resection or neuromodulations. However, they are still not sure if they want to move forward to the surgical treatment.     Mood improved a lot. No suicidal thoughts, no thoughts of hurting himself.     The patient " "started to have seizures when he was 16 years old and he was managed at the [facility] for the past few years for his seizures.  He is currently taking Aptiom 1200 mg q.h.s. and the Zonegran 300 mg b.i.d. for his epilepsy.  He was recently admitted to the hospital twice because of 2 clusters of seizures, one in January when he was admitted to [facility] and one was in February when he was admitted to [facility].  On both occasions, he had 4-5 back to back generalized tonic-clonic seizures within a short period of time.      According to the mother, the patient has 2 different types of seizures.  Type #1 is described as \"mild seizures,\" during which he will have some facial twitching.  He will have repetitive hand/arm jerking movement.  He will become unresponsive for less than a minute.  Postictally, he was having some confusion and headaches and fatigue.  Before the seizures, he will have an aura of \"seeing bees populating the earth with eggs.\"  This type of seizure is relatively infrequent, average once per month.      Type #2 are generalized tonic-clonic seizures which the patient will have generalized convulsions of the body and his body will be stiff.  It usually will last for 1-2 minutes followed by postictal fatigue and hallucinations.  This happens usually once every 3 months, but sometimes it occurs in clusters with 2-5 seizures back to back.      The patient had tried Keppra and Depakote in the past which did not work.  He had EEGs done in the past which were reported negative.      The patient had severe depression and requested a lot of behavioral health and counseling in the past and at certain time, he was suicidal, but that improved.  At the present time, he is not suicidal.  He does not think about hurting himself.      TRIGGERS FOR SEIZURES:  Depression.      RISK FACTORS FOR SEIZURES:  He had no history of head trauma with loss of consciousness.  No history of febrile convulsions.  No history of CNS " "infections.         PAST AEDs: Keppra, Depakote. Vimpat caused suicidal ideations.     PAST MEDICAL HISTORY:  Asperger's disorder, depression, epilepsy.      PAST SURGICAL HISTORY:  None.      FAMILY HISTORY:  No family history of seizures.  Both parents have hypertension.  Maternal grandmother had heart disease.      SOCIAL HISTORY:  He is single, living with his mother.  He had some college education but did not finish because of depression.  He is currently working at [employer].  No smoking, no alcohol, no drug abuse.      REVIEW OF SYSTEMS:  Positive for fatigue, depression.  The rest of the 8-point review of systems are negative.      ALLERGIES:  Sertraline and Zyprexa.      PHYSICAL EXAMINATION:       AAO x 3, speech fluent and appreopriate. No facial weakness, hearing grossly normal.        PREVIOUS DIAGNOSTIC TESTING:  CT [facility] of the head [facility] 02/27/2018 [facility].  He had EEGs in the past which were reported negative.      IMPRESSION:   1.  Epilepsy.    Since the last visit about 3 months ago, he had no confirmed seizures. He felt that he had a lot of \"seizure like feelings\" recently, but no witnessed seizures. These \"seizre like feelings\" consist of abnormal thought processes. They will last for a full day.  He also has cheek biting about once or twice a week, he will wake up in the morning found his cheek bitten. He will feel different in the morning after the seizures.  He is currently taking aptiom 1200 mg qhs,  zonegran 300-400, Lamictal 150 mg bid, Depakote 500 - 1000. He is also taking CBD oil for anxiety.  Patient and his mother discussed again about VEEG monitoring for presurgical evaluation. They agreed to do it.     He is complaining of dizziness spells almost daily, lasting for 15 min to 1 hour, no pattern during the day, either morning or afternoon.      I had extensive discussions in the past with the patient and mother regarding different treatment options for " epilepsy, including meds, surgery, neuromodulation and diet treatment.     2.  Asperger's syndrome.   3.  Depression.  He is followed by Dr. Ben Barros and Deo Shipman for therapy.  No plan for suicide.     PLAN:   1.  Continue Aptiom 1200 mg q.h.s. and Zonegran 300-400 mg, Lamictal 150 mg bid. Depakote -1000.  2.  Continue to follow up with Dr. Barros for depression. Continue with therapy.  3.  Continue CBD, change to night dose.  5.  May consider add Topamax, Felbamate, etc in the future if seizures get worse.  6.  VEEG for presurgical evaluation (Device or resection)  7.  Consider PET CT during hospital stay.  8.  Neuropsych testing.  9.  RTC in after the VEEG work up.      45 min total was spent on the visit.      38 min was spent on face to face time  3 min was spent on preparation to review charts and labs andordering medications and tests  4 min was spent on documentation of clinical information

## 2021-02-01 NOTE — LETTER
Date:February 5, 2021      Patient was self referred, no letter generated. Do not send.        [facility] Health Information

## 2021-02-01 NOTE — PROGRESS NOTES
Amrit is a 25 year old who is being evaluated via a billable video visit.      How would you like to obtain your AVS? MyChart  If the video visit is dropped, the invitation should be resent by: Send to e-mail at: [email or web address]  Will anyone else be joining your video visit? Yes: mom . How would they like to receive their invitation? Send to e-mail at: [email or web address]      Video Start Time: 11:10 AM  Video-Visit Details    Type of service:  Video Visit    Video End Time:11:48 AM    Originating Location (pt. Location): Home    Distant Location (provider location):  [facility]     Platform used for Video Visit: eTruck and phone

## 2021-02-26 ENCOUNTER — VIRTUAL VISIT (OUTPATIENT)
Dept: NEUROLOGY | Facility: CLINIC | Age: 26
End: 2021-02-26
Payer: COMMERCIAL

## 2021-02-26 DIAGNOSIS — G40.909 RECURRENT SEIZURES (H): ICD-10-CM

## 2021-02-26 DIAGNOSIS — Z11.59 SCREENING FOR VIRAL DISEASE: Primary | ICD-10-CM

## 2021-02-26 DIAGNOSIS — G40.219 PARTIAL SYMPTOMATIC EPILEPSY WITH COMPLEX PARTIAL SEIZURES, INTRACTABLE, WITHOUT STATUS EPILEPTICUS (H): ICD-10-CM

## 2021-02-26 NOTE — PROGRESS NOTES
"Notes from last visit with :-    \"IMPRESSION:   1.  Epilepsy.    Since the last visit about 3 months ago, he had no confirmed seizures. He felt that he had a lot of \"seizure like feelings\" recently, but no witnessed seizures. These \"seizre like feelings\" consist of abnormal thought processes. They will last for a full day.  He also has cheek biting about once or twice a week, he will wake up in the morning found his cheek bitten. He will feel different in the morning after the seizures.  He is currently taking aptiom 1200 mg qhs,  zonegran 300-400, Lamictal 150 mg bid, Depakote 500 - 1000. He is also taking CBD oil for anxiety.  Patient and his mother discussed again about VEEG monitoring for presurgical evaluation. They agreed to do it.\"      Pre Admission call for upcoming admission for VEEG monitoring    [facility] provider: [place] Dukes M.D.   Admission date:  3/2/2021 at 9.15am checking   Reason for admit (pre surgical, characterize, quantify burden, classify diagnosis) Presurgical Evaluation  Device vs resective surgery   Discharge criteria: record enough events of seizures for surgical evaluation       Nurse education regarding admission: Reviewed by telephone   Imaging files are accessible: Yes through CDI protal   EEG reports are accessible: Yes   VEEG Consent scanned on file Yes 3/29/2018    needs: No   Is this a local patient?  YES   Harness room No   Barriers to discharge:  Private Car    Home environment  House at parents       Call placed to patient, also able to answer his mothers questions on this call too.    Overview of VEEG performed, including the need to have staff accompany him whenever he is not in bed of r a chair. I clearly explained the need to be accompanied whenever hs is in the bathroom and he expressed understanding of this.  We discussed COVID19 restrictions to visiting (also discussed with his mother)  I explained that the length of stay is usually 5-7 days. Sergio has been " told by  he will need to have 5 seizures recorded  Sergio has not had a COVID 19 test performed.  We ordered one today, but in the event he is not able to get to a facility to have one performed, he understands he will be tested soon after admission  Date, time, and location of the admission discussed    No other questions at this time  Patient was encouraged to call if he has additional questions or concerns

## 2021-02-27 DIAGNOSIS — Z11.59 SCREENING FOR VIRAL DISEASE: ICD-10-CM

## 2021-02-27 LAB
SARS-COV-2 RNA RESP QL NAA+PROBE: NORMAL
SPECIMEN SOURCE: NORMAL

## 2021-02-27 PROCEDURE — U0005 INFEC AGEN DETEC AMPLI PROBE: HCPCS | Performed by: PSYCHIATRY & NEUROLOGY

## 2021-02-27 PROCEDURE — U0003 INFECTIOUS AGENT DETECTION BY NUCLEIC ACID (DNA OR RNA); SEVERE ACUTE RESPIRATORY SYNDROME CORONAVIRUS 2 (SARS-COV-2) (CORONAVIRUS DISEASE [COVID-19]), AMPLIFIED PROBE TECHNIQUE, MAKING USE OF HIGH THROUGHPUT TECHNOLOGIES AS DESCRIBED BY CMS-2020-01-R: HCPCS | Performed by: PSYCHIATRY & NEUROLOGY

## 2021-02-28 LAB
LABORATORY COMMENT REPORT: NORMAL
SARS-COV-2 RNA RESP QL NAA+PROBE: NEGATIVE
SPECIMEN SOURCE: NORMAL

## 2021-03-02 ENCOUNTER — HOSPITAL ENCOUNTER (INPATIENT)
Facility: CLINIC | Age: 26
LOS: 14 days | Discharge: HOME | End: 2021-03-16
Attending: PSYCHIATRY & NEUROLOGY | Admitting: PSYCHIATRY & NEUROLOGY
Payer: COMMERCIAL

## 2021-03-02 ENCOUNTER — HOSPITAL ENCOUNTER (INPATIENT)
Dept: NEUROLOGY | Facility: CLINIC | Age: 26
End: 2021-03-02
Attending: PSYCHIATRY & NEUROLOGY | Admitting: PSYCHIATRY & NEUROLOGY
Payer: COMMERCIAL

## 2021-03-02 DIAGNOSIS — G40.119 INTRACTABLE FOCAL EPILEPSY (H): Primary | ICD-10-CM

## 2021-03-02 DIAGNOSIS — G40.909 RECURRENT SEIZURES (H): ICD-10-CM

## 2021-03-02 PROBLEM — G40.109 FOCAL EPILEPSY (H): Status: ACTIVE | Noted: 2021-03-02

## 2021-03-02 LAB
ALBUMIN SERPL-MCNC: 3.5 G/DL (ref 3.4–5)
ALP SERPL-CCNC: 74 U/L (ref 40–150)
ALT SERPL W P-5'-P-CCNC: 53 U/L (ref 0–70)
ANION GAP SERPL CALCULATED.3IONS-SCNC: 5 MMOL/L (ref 3–14)
AST SERPL W P-5'-P-CCNC: 25 U/L (ref 0–45)
BILIRUB SERPL-MCNC: 0.6 MG/DL (ref 0.2–1.3)
BUN SERPL-MCNC: 17 MG/DL (ref 7–30)
CALCIUM SERPL-MCNC: 8.7 MG/DL (ref 8.5–10.1)
CHLORIDE SERPL-SCNC: 110 MMOL/L (ref 94–109)
CO2 SERPL-SCNC: 24 MMOL/L (ref 20–32)
CREAT SERPL-MCNC: 0.91 MG/DL (ref 0.66–1.25)
ERYTHROCYTE [DISTWIDTH] IN BLOOD BY AUTOMATED COUNT: 12.6 % (ref 10–15)
GFR SERPL CREATININE-BSD FRML MDRD: >90 ML/MIN/{1.73_M2}
GLUCOSE SERPL-MCNC: 90 MG/DL (ref 70–99)
HCT VFR BLD AUTO: 40.1 % (ref 40–53)
HGB BLD-MCNC: 13.2 G/DL (ref 13.3–17.7)
MCH RBC QN AUTO: 31.7 PG (ref 26.5–33)
MCHC RBC AUTO-ENTMCNC: 32.9 G/DL (ref 31.5–36.5)
MCV RBC AUTO: 96 FL (ref 78–100)
PLATELET # BLD AUTO: 208 10E9/L (ref 150–450)
POTASSIUM SERPL-SCNC: 4.4 MMOL/L (ref 3.4–5.3)
PROT SERPL-MCNC: 6.3 G/DL (ref 6.8–8.8)
RBC # BLD AUTO: 4.16 10E12/L (ref 4.4–5.9)
SODIUM SERPL-SCNC: 139 MMOL/L (ref 133–144)
VALPROATE FREE SERPL-MCNC: 8 UG/ML (ref 6–20)
VALPROATE SERPL-MCNC: 84 MG/L (ref 50–100)
WBC # BLD AUTO: 6 10E9/L (ref 4–11)

## 2021-03-02 PROCEDURE — 80203 DRUG SCREEN QUANT ZONISAMIDE: CPT | Performed by: PHYSICIAN ASSISTANT

## 2021-03-02 PROCEDURE — 80053 COMPREHEN METABOLIC PANEL: CPT | Performed by: PHYSICIAN ASSISTANT

## 2021-03-02 PROCEDURE — 36415 COLL VENOUS BLD VENIPUNCTURE: CPT | Performed by: PHYSICIAN ASSISTANT

## 2021-03-02 PROCEDURE — 120N000002 HC R&B MED SURG/OB UMMC

## 2021-03-02 PROCEDURE — 85027 COMPLETE CBC AUTOMATED: CPT | Performed by: PHYSICIAN ASSISTANT

## 2021-03-02 PROCEDURE — 80165 DIPROPYLACETIC ACID FREE: CPT | Performed by: PHYSICIAN ASSISTANT

## 2021-03-02 PROCEDURE — 80175 DRUG SCREEN QUAN LAMOTRIGINE: CPT | Performed by: PHYSICIAN ASSISTANT

## 2021-03-02 PROCEDURE — 95720 EEG PHY/QHP EA INCR W/VEEG: CPT | Mod: GC | Performed by: PSYCHIATRY & NEUROLOGY

## 2021-03-02 PROCEDURE — 80164 ASSAY DIPROPYLACETIC ACD TOT: CPT | Performed by: PHYSICIAN ASSISTANT

## 2021-03-02 PROCEDURE — 95714 VEEG EA 12-26 HR UNMNTR: CPT

## 2021-03-02 PROCEDURE — 99221 1ST HOSP IP/OBS SF/LOW 40: CPT | Mod: 25 | Performed by: PHYSICIAN ASSISTANT

## 2021-03-02 PROCEDURE — 250N000013 HC RX MED GY IP 250 OP 250 PS 637: Performed by: PHYSICIAN ASSISTANT

## 2021-03-02 PROCEDURE — 999N000127 HC STATISTIC PERIPHERAL IV START W US GUIDANCE

## 2021-03-02 RX ORDER — DIVALPROEX SODIUM 500 MG/1
1000 TABLET, EXTENDED RELEASE ORAL EVERY EVENING
Status: DISCONTINUED | OUTPATIENT
Start: 2021-03-02 | End: 2021-03-02

## 2021-03-02 RX ORDER — LIDOCAINE 40 MG/G
CREAM TOPICAL
Status: DISCONTINUED | OUTPATIENT
Start: 2021-03-02 | End: 2021-03-16 | Stop reason: HOSPADM

## 2021-03-02 RX ORDER — IBUPROFEN 200 MG
200 TABLET ORAL EVERY 6 HOURS PRN
Status: DISCONTINUED | OUTPATIENT
Start: 2021-03-02 | End: 2021-03-16 | Stop reason: HOSPADM

## 2021-03-02 RX ORDER — DOCUSATE SODIUM 100 MG/1
100 CAPSULE, LIQUID FILLED ORAL 2 TIMES DAILY PRN
Status: DISCONTINUED | OUTPATIENT
Start: 2021-03-02 | End: 2021-03-16 | Stop reason: HOSPADM

## 2021-03-02 RX ORDER — LAMOTRIGINE 100 MG/1
100 TABLET ORAL 2 TIMES DAILY
Status: DISCONTINUED | OUTPATIENT
Start: 2021-03-02 | End: 2021-03-07

## 2021-03-02 RX ORDER — FLUOXETINE 40 MG/1
80 CAPSULE ORAL DAILY
Status: DISCONTINUED | OUTPATIENT
Start: 2021-03-03 | End: 2021-03-16 | Stop reason: HOSPADM

## 2021-03-02 RX ORDER — DIVALPROEX SODIUM 500 MG/1
500 TABLET, EXTENDED RELEASE ORAL EVERY MORNING
Status: DISCONTINUED | OUTPATIENT
Start: 2021-03-03 | End: 2021-03-06

## 2021-03-02 RX ORDER — ZONISAMIDE 100 MG/1
400 CAPSULE ORAL EVERY EVENING
Status: DISCONTINUED | OUTPATIENT
Start: 2021-03-02 | End: 2021-03-08

## 2021-03-02 RX ORDER — ZONISAMIDE 100 MG/1
300 CAPSULE ORAL EVERY MORNING
Status: DISCONTINUED | OUTPATIENT
Start: 2021-03-03 | End: 2021-03-08

## 2021-03-02 RX ORDER — LORAZEPAM 2 MG/ML
2 INJECTION INTRAMUSCULAR
Status: DISCONTINUED | OUTPATIENT
Start: 2021-03-02 | End: 2021-03-16 | Stop reason: HOSPADM

## 2021-03-02 RX ORDER — DIVALPROEX SODIUM 500 MG/1
500 TABLET, EXTENDED RELEASE ORAL EVERY EVENING
Status: DISCONTINUED | OUTPATIENT
Start: 2021-03-02 | End: 2021-03-06

## 2021-03-02 RX ORDER — LIDOCAINE HYDROCHLORIDE 20 MG/ML
5 SOLUTION OROPHARYNGEAL EVERY 6 HOURS PRN
Status: DISCONTINUED | OUTPATIENT
Start: 2021-03-02 | End: 2021-03-16 | Stop reason: HOSPADM

## 2021-03-02 RX ORDER — GINSENG 100 MG
CAPSULE ORAL 3 TIMES DAILY PRN
Status: DISCONTINUED | OUTPATIENT
Start: 2021-03-02 | End: 2021-03-16 | Stop reason: HOSPADM

## 2021-03-02 RX ORDER — LAMOTRIGINE 150 MG/1
150 TABLET ORAL 2 TIMES DAILY
Status: DISCONTINUED | OUTPATIENT
Start: 2021-03-02 | End: 2021-03-02

## 2021-03-02 RX ORDER — ACETAMINOPHEN 325 MG/1
650 TABLET ORAL EVERY 4 HOURS PRN
Status: DISCONTINUED | OUTPATIENT
Start: 2021-03-02 | End: 2021-03-16 | Stop reason: HOSPADM

## 2021-03-02 RX ADMIN — LAMOTRIGINE 100 MG: 100 TABLET ORAL at 19:58

## 2021-03-02 RX ADMIN — DIVALPROEX SODIUM 500 MG: 500 TABLET, EXTENDED RELEASE ORAL at 19:58

## 2021-03-02 RX ADMIN — ESLICARBAZEPINE ACETATE 1200 MG: 200 TABLET ORAL at 22:20

## 2021-03-02 RX ADMIN — ZONISAMIDE 400 MG: 100 CAPSULE ORAL at 22:20

## 2021-03-02 ASSESSMENT — ACTIVITIES OF DAILY LIVING (ADL)
ADLS_ACUITY_SCORE: 17
ADLS_ACUITY_SCORE: 19

## 2021-03-02 NOTE — H&P
"[facility] Epilepsy Admission     Amrit Padilla MRN# [id number]   YOB: 1995 Age: 25 year old        Reason for Admission: Patient is a 25 year old, right-handed male with a history of depression, migraines, asperger syndrome and focal epilepsy who is being admitted for a presurgical evaluation.     HPI:  Seizures started around age 16. He was initially managed at [facility] and started with Dr. Dukes at [facility] in 3/2018.  They describe following seizures:     Type 1:  No warning. Mom reports he may repeat \"what, what\" and have some facial twitching or head jerking. His fists clench and has some arm shaking. He is unresponsive. These last 30 seconds. He is amnestic for seizure. After he is confused, tired and may have a headache. This is happening about once a month.     Type 2: No clear warning but may have headache or tiredness prior. Whole body gets stiff and shakes. No incontinence. He bites his tongue or inside of cheek. Lasts 30 seconds or so. After he gets confused, repeats things. He has tried to get out of a moving car and has pushed people away or grabbed at things. His last witnessed one of these was around 1 year ago. They have clustered in the past (2017 and 2018) and he has had to go to ER but never been admitted or intubated. About once a week he wakes up noticing he has bitten his tongue or inside of cheek and he assumes he had a seizure.     Type 3: Multiple times a week he wakes up and feels he has abnormal thinking, repetitive thoughts, feeling like someone is controlling him or out of body experience. He has trouble focusing and processing what people are saying to him. He does not have loss of awareness and he can talk and communicate with family appropriately. This lasts all day up to 4 days. He attributes this to having a seizure overnight.     Triggers: unsure, maybe stress    Past antiepileptic drugs: levetiracetam, lacosamide (suicidal ideations), divalproex, lamotrigine, " "zonisamide, aptiom     Risk Factors: No  injury, developmental delay, febrile seizures, CNS infections, tumors, strokes, significant head injuries or family history of seizures. Mom reports diagnosed with Aspergers in 4th grade. Took regular classes (had IEP) and got HS diploma. Also got associates degree through [employer].     Prior Epilepsy Work-up:  1. EEG 20 was abnormal due to the presence of occasional right temporal slowing and rare right temporal epileptiform activities.  Findings are consistent with patient's history of focal seizures.     2. Two day ambulatory EEG was abnormal due to the presence of mild-to-moderate generalized slowing of the background activities.  No epileptiform activities or seizures were recorded.  These findings are consistent with mild to moderate diffuse encephalopathy.  Etiology is nonspecific.  The patient reported multiple symptoms during this day of recording including dizziness, lightheadedness, weird feeling, etc., with no clear EEG correlations.  These events are most likely not epileptic by nature.  Clinical correlation is advised.     3. EEG 2018 was abnormal due to the presence of mild-to-moderate generalized slowing of the background activities and frontal predominant intermittent rhythmic delta activities (FIRDA).  These findings are consistent with mild to moderate diffuse encephalopathy.  No seizures were recorded.     4. EEG 3/29/2018 was normal     5. Brain MRI at [facility] 2018 showed nonspecific, solitary T2/Flair hyperintense lesion in the upper posterior right cerebellar hemisphere measuring up to 7mm. Lesions with these characteristics have been described in the setting of neurofibromatosis type I (\"unidentified bright objects\"). Neoplasm remains an additional consideration. Intermediate, 6 month MR imaging follow-up without and with contrast may aid in documenting stability.    6. Brain MRI [facility] 2020 showed No evidence for " acute infarct. No abnormal intra-axial signal or enhancement and no evidence for intra-axial mass.     Past Medical History:   1. Focal epilepsy  2. Asperger's syndrome   3. Depression  4. Migraines  5. Vestibular issues with episodic imbalance and disequilibrium (seen by ENT)      Past Surgical History:   Procedure Laterality Date     BACK SURGERY           Medications:   Medications Prior to Admission   Medication Sig Dispense Refill Last Dose     divalproex sodium extended-release (DEPAKOTE ER) 500 MG 24 hr tablet TAKE 1 TABLET in the morning, and 2 tabs at night. 270 tablet 3 3/2/2021 at 1000     eslicarbazepine acetate (APTIOM) 600 MG tablet Take 2 tablets (1,200 mg) by mouth At Bedtime 180 tablet 3 3/1/2021 at Unknown time     FLUoxetine (PROZAC) 40 MG capsule Take 80 mg by mouth daily   3/2/2021 at 1000     lamoTRIgine (LAMICTAL) 150 MG tablet Take 1 tablet (150 mg) by mouth 2 times daily 180 tablet 3 3/2/2021 at 1000     MELATONIN PO Take 10 mg by mouth nightly as needed OTC   Past Week at Unknown time     zonisamide (ZONEGRAN) 100 MG capsule Take 3 caps (300 mg) each AM and 4 caps (400 mg) each  capsule 3 3/2/2021 at 1000     cetirizine (ZYRTEC) 10 MG tablet Take 10 mg by mouth daily   More than a month at Unknown time     HEMP OIL OR EXTRACT OR OTHER CBD CANNABINOID, NOT MEDICAL CANNABIS, Take 15 mg by mouth every evening         LORazepam (LORAZEPAM INTENSOL) 2 MG/ML (HIGH CONC) solution Take 1 mL (2 mg) by mouth every 12 hours as needed for seizures May repeat after 30 min if seizure do not stop. Max 2 ml (4 mg) per 24 hours. 30 mL 2 More than a month at Unknown time       Allergies:   Allergies   Allergen Reactions     Hydroxyzine      Other reaction(s): Seizures     No Clinical Screening - See Comments Rash     Reaction to a seizure med that he can not recall.     Dust Mites      Molds & Smuts      Other reaction(s): Unknown     Sertraline Other (See Comments)     mood swings     Zyprexa  [Olanzapine]      Seizure.        Family History:   No family history of seizures     Social History:   Social History     Tobacco Use     Smoking status: Never Smoker     Smokeless tobacco: Never Used   Substance Use Topics     Alcohol use: No   Took regular classes (had IEP) and got HS diploma. Did some school through [employer] after high school.  Also got associates degree through [employer]. Diagnosed with aspergers in 4th grade, did experience some bullying. No abuse or trauma. Lives with mom and dad. Not , no kids. 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 No tobacco, drug or alcohol use. Not driving.     He has h/o depression. Follows with psychiatrist (Dr. Ben Barros), therapist and does group therapy. He reports occasional suicidal thoughts, most recently last week. Mom reports he often reaches out to her. No psychiatric hospitalizations in past and has never attempted suicide, but rather threatened. Has had ER visit due to this.     Review of Systems: Positive for tiredness, hand tremor, double vision and urinary hesitancy. Has had 4 episodes of room spinning, imbalance and vomiting. He has seen ENT and they ruled out meniere disease.  No cough, SOB, chest pain, numbness/tingling or weakness. The rest of the10 point review of systems negative except per HPI    Physical Exam/Vitals:  Blood pressure 137/78, pulse 71, resp. rate 16, weight 91.2 kg (201 lb), SpO2 96 %.  General: NAD  Head: NC/AT  Respiratory: lungs CTA bilaterally  Cardiac: RRR, no murmur  Abdomen: soft, nontender, normal bowel sounds   Extremities: no LE edema  Neuro: Alert and oriented. Speech fluent. Hearing intact to normal conversation. Pupils equal, round and reactive to light. EOM's intact. Visual fields full. Face symmetric. Strong shoulder shrug bilaterally. Strength 5/5 bilaterally. No drift or pronation. Bilateral hand tremor. Sensation grossly intact to light touch. DTR's 2+ bilaterally. Toes downgoing.       Data:   Ref. Range 2/27/2021  09:53   SARS-CoV-2 PCR Result Unknown NEGATIVE       Current antiepileptic drugs:  1. Divalproex -1000  2. Aptiom 1200 hs  3. Lamotrigine 150-150  4. Zonisamide 300-400     Assessment and Plan:  1.  Patient is a 25 year old, right-handed male with a history of depression, migraines, asperger syndrome and focal epilepsy who is being admitted for a presurgical evaluation.     -admit for vEEG monitoring  -seizure precautions  -ativan PRN seizures per protocol  -SCD's for DVT prophylaxis  -continue PTA zonisamide and aptiom   -CBC, CMP, antiepileptic drug levels  -decrease divalproex ER to 500-500  -decrease lamotrigine to 100-100      Annia Iglesias PA-C    Total time: I spent 70 minutes face-to-face with patient and mom reviewing history and performing a physical examination. I spent an additional 15 minutes coordinating care, reviewing labs and imaging. I answered all of patients questions and addressed immediate concerns.

## 2021-03-03 ENCOUNTER — APPOINTMENT (OUTPATIENT)
Dept: NEUROLOGY | Facility: CLINIC | Age: 26
End: 2021-03-03
Attending: PHYSICIAN ASSISTANT
Payer: COMMERCIAL

## 2021-03-03 PROCEDURE — 95720 EEG PHY/QHP EA INCR W/VEEG: CPT | Mod: GC | Performed by: PSYCHIATRY & NEUROLOGY

## 2021-03-03 PROCEDURE — 250N000013 HC RX MED GY IP 250 OP 250 PS 637: Performed by: PHYSICIAN ASSISTANT

## 2021-03-03 PROCEDURE — 99232 SBSQ HOSP IP/OBS MODERATE 35: CPT | Mod: 25 | Performed by: PHYSICIAN ASSISTANT

## 2021-03-03 PROCEDURE — 120N000002 HC R&B MED SURG/OB UMMC

## 2021-03-03 PROCEDURE — 95714 VEEG EA 12-26 HR UNMNTR: CPT

## 2021-03-03 RX ADMIN — Medication 10 MG: at 22:46

## 2021-03-03 RX ADMIN — ESLICARBAZEPINE ACETATE 1200 MG: 200 TABLET ORAL at 22:46

## 2021-03-03 RX ADMIN — LAMOTRIGINE 100 MG: 100 TABLET ORAL at 09:03

## 2021-03-03 RX ADMIN — DIVALPROEX SODIUM 500 MG: 500 TABLET, EXTENDED RELEASE ORAL at 09:03

## 2021-03-03 RX ADMIN — Medication 10 MG: at 04:15

## 2021-03-03 RX ADMIN — DIVALPROEX SODIUM 500 MG: 500 TABLET, EXTENDED RELEASE ORAL at 19:52

## 2021-03-03 RX ADMIN — ZONISAMIDE 400 MG: 100 CAPSULE ORAL at 19:52

## 2021-03-03 RX ADMIN — LAMOTRIGINE 100 MG: 100 TABLET ORAL at 19:52

## 2021-03-03 RX ADMIN — FLUOXETINE HYDROCHLORIDE 80 MG: 40 CAPSULE ORAL at 09:04

## 2021-03-03 RX ADMIN — ZONISAMIDE 300 MG: 100 CAPSULE ORAL at 09:03

## 2021-03-03 ASSESSMENT — ACTIVITIES OF DAILY LIVING (ADL)
FALL_HISTORY_WITHIN_LAST_SIX_MONTHS: NO
DIFFICULTY_EATING/SWALLOWING: NO
HEARING_DIFFICULTY_OR_DEAF: NO
WEAR_GLASSES_OR_BLIND: YES
DIFFICULTY_COMMUNICATING: NO
ADLS_ACUITY_SCORE: 17
WALKING_OR_CLIMBING_STAIRS_DIFFICULTY: NO
ADLS_ACUITY_SCORE: 15
PATIENT_/_FAMILY_COMMUNICATION_STYLE: SPOKEN LANGUAGE (ENGLISH OR BILINGUAL)
TOILETING_ISSUES: NO
DOING_ERRANDS_INDEPENDENTLY_DIFFICULTY: YES
ADLS_ACUITY_SCORE: 17
CONCENTRATING,_REMEMBERING_OR_MAKING_DECISIONS_DIFFICULTY: YES
ADLS_ACUITY_SCORE: 17
ADLS_ACUITY_SCORE: 17
VISION_MANAGEMENT: GLASSES
COMMUNICATION: OTHER (SEE COMMENTS)
ADLS_ACUITY_SCORE: 17
DRESSING/BATHING_DIFFICULTY: NO

## 2021-03-03 NOTE — PLAN OF CARE
Status: Pt admitted for VEEG monitoring. No events witnessed or reported   Vitals: VSS on RA  Neuros: AO4, strength 5/5 throughout. Intermittently blurry/double vision at baseline   IV: PIV SL  Labs/Electrolytes: WNL  Resp/trach: LS clear throughout  Diet: Regular diet, tolerating well  Bowel status: Reports last BM 3/1  : Voiding spontaneously   Skin: EEG leads intact, otherwise no issues   Pain: Denies pain   Activity: Up SBA  Social: Mother at bedside suppotive  Plan: Continue to monitor and follow POC    Arrived from:  Home at 1330  Belongings/meds: Medications sent home with mom, belongings at bedside   2 RN Skin Assessment Completed by: ANGEL García and Grazyna JIM RN  Non-intact findings documented (yes/no/NA): Skin intact

## 2021-03-03 NOTE — PLAN OF CARE
Pt stated that about 2 hours ago, had brief periods of confusion when falling asleep. Upon waking up, pt was tangled in cords. Pt realized around 0400 that tongue was bleeding, and thinks he may have bit it during overnight event. Event was unwitnessed and unrecorded. Exact time of event is unknown.

## 2021-03-03 NOTE — PLAN OF CARE
"Status: Admitted for seizure monitoring  VS: VSS on RA  Neuros: A&Ox4. R eye nystagmus  GI: Tolerating regular diet, No BM this shift  : Voiding w/out difficulty  IV: PIV SL  Activity: SBA w/gb. Walked on unit x2  Pain: Denies  Skin: WNL  Labs/Tests: No events witnessed or reported this shift. Pt did report feeling \"funny\" x1, neuros intact  Social: Mother visited, supportive; pt reported having chronic suicidal thoughts every 2-3 days, with no active suicidal thoughts while in the hospital or any plans to hurt himself. Neuro attending Pete notified, psych consult to be ordered.  Plan of care: Continue to monitor for suicidal ideation and seizures.    "

## 2021-03-03 NOTE — PLAN OF CARE
Status: admitted for seizure monitoring, one unwitnessed event this shift (see previous note)    Vitals: VSS on RA   Neuros: AO x 4. Nystagmus in R eye. Denies N/T at there time of assessment. 5/5 all extremities.   IV: PIV SL   Labs/Electrolytes: WNL  Resp/trach: LS clear, no SOB reported.   Diet: Regular   Bowel status: BS present, pt states passing gas.   : Voiding spontaneously via toilet.  Skin: Intact. EEG leads in place.   Pain: No statements of pain overnight.   Activity: SBA w/ GB, seizure precautions.   Social: Pt updating mother independently.  Plan: Continue to monitor and follow POC.

## 2021-03-03 NOTE — PROGRESS NOTES
[facility], [place]   Epilepsy Service Daily Note      Interval History:   He reports he bit his tongue overnight so thinks may have had a seizure. Feels tired today and has a slight headache. He reports mood is ok and denies current suicidal ideation. He does follow with Dr. Ben Barros at [facility].     Review of System:   Sore tongue, HA, tiredness. No nausea, No vomiting, no dizziness, no chest pain.     Medications:   Antiepileptic Medications Home Doses: divalproex -1000, aptiom 1200 hs, lamotrigine 150-150, zonisamide 300-400  Antiepileptic Medications Current Doses: divalproex -500, aptiom 1200 hs, lamotrigine 100-100, zonisamide 300-400    Exam: Blood pressure 120/72, pulse 80, temperature 98  F (36.7  C), temperature source Oral, resp. rate 16, weight 91.2 kg (201 lb), SpO2 98 %.   General: NAD  Neuro: Alert and oriented. Speech fluent. Hearing intact to normal conversation. Pupils equal, round and reactive to light. EOM's intact. Visual fields full. Face symmetric. Strong shoulder shrug bilaterally. Strength 5/5 bilaterally. No drift or pronation. Bilateral hand tremor. Sensation grossly intact to light touch.  Mouth: lesion right tip of tongue     EE electrographic seizures noted on EEG overnight     Assessment and Plan: see and discussed with Dr. Pete Ríos.  Patient is a 25 year old, right-handed male with a history of depression, migraines, asperger syndrome and focal epilepsy who is being admitted for a presurgical evaluation. Two seizures noted overnight with stiffening and he did bite his tongue with one.     -continue vEEG monitoring  -continue divalproex 500-500 (-1000)  -continue lamotrigine 100-100 (-150)  -no changes to PTA aptiom or zonisamide       Annia Iglesias PA-C    Type of target event identified: staring spell with altered awareness and event with alteration in awareness - partial motor component   Number of events: more  needed  Discharge medication plan: To be decided  Further Imaging studies needed prior to discharge: TBD, possibly PET  Discharge transportation: not discussed  Other pertinent issues/goals for discharge: not at this time       Total time: 25 minute was spent in the care of this patient. The patient agrees with the above mentioned plan of care. I answered all the patient's questions and addressed immediate concerns. More than 50% of time spent consisted of counseling and coordinating care, including discussion of the diagnostic significance of EEG findings, anti-seizure medication management, and planning for discharge home

## 2021-03-04 ENCOUNTER — APPOINTMENT (OUTPATIENT)
Dept: NEUROLOGY | Facility: CLINIC | Age: 26
End: 2021-03-04
Attending: PHYSICIAN ASSISTANT
Payer: COMMERCIAL

## 2021-03-04 LAB
LAMOTRIGINE SERPL-MCNC: 9.3 UG/ML (ref 2.5–15)
ZONISAMIDE SERPL-MCNC: 22 UG/ML (ref 10–40)

## 2021-03-04 PROCEDURE — 250N000013 HC RX MED GY IP 250 OP 250 PS 637: Performed by: PHYSICIAN ASSISTANT

## 2021-03-04 PROCEDURE — 95714 VEEG EA 12-26 HR UNMNTR: CPT

## 2021-03-04 PROCEDURE — 95720 EEG PHY/QHP EA INCR W/VEEG: CPT | Mod: GC | Performed by: PSYCHIATRY & NEUROLOGY

## 2021-03-04 PROCEDURE — 99232 SBSQ HOSP IP/OBS MODERATE 35: CPT | Mod: 25 | Performed by: PHYSICIAN ASSISTANT

## 2021-03-04 PROCEDURE — 120N000002 HC R&B MED SURG/OB UMMC

## 2021-03-04 RX ADMIN — DIVALPROEX SODIUM 500 MG: 500 TABLET, EXTENDED RELEASE ORAL at 21:30

## 2021-03-04 RX ADMIN — Medication 10 MG: at 22:59

## 2021-03-04 RX ADMIN — DIVALPROEX SODIUM 500 MG: 500 TABLET, EXTENDED RELEASE ORAL at 07:42

## 2021-03-04 RX ADMIN — ZONISAMIDE 300 MG: 100 CAPSULE ORAL at 07:42

## 2021-03-04 RX ADMIN — ESLICARBAZEPINE ACETATE 600 MG: 200 TABLET ORAL at 21:30

## 2021-03-04 RX ADMIN — LAMOTRIGINE 100 MG: 100 TABLET ORAL at 21:29

## 2021-03-04 RX ADMIN — FLUOXETINE HYDROCHLORIDE 80 MG: 40 CAPSULE ORAL at 07:42

## 2021-03-04 RX ADMIN — ZONISAMIDE 400 MG: 100 CAPSULE ORAL at 21:30

## 2021-03-04 RX ADMIN — LAMOTRIGINE 100 MG: 100 TABLET ORAL at 09:34

## 2021-03-04 ASSESSMENT — ACTIVITIES OF DAILY LIVING (ADL)
ADLS_ACUITY_SCORE: 15

## 2021-03-04 NOTE — PLAN OF CARE
"Status: admitted for seizure monitoring.  Vitals: VSS on RA   Neuros: AO x 4. Nystagmus in R eye. Denies N/T. 5/5 all extremities.   IV: PIV SL   Labs/Electrolytes: WNL  Resp/trach: LS clear, no SOB reported.   Diet: Regular   Bowel status: BS present, pt states passing gas.   : Voiding spontaneously via toilet.  Skin: Intact. EEG leads in place.   Pain: No statements of pain overnight.   Activity: SBA w/ GB, seizure precautions.   Social: Pt updating mother independently.  Plan: Continue to monitor and follow POC.     **Pt pressed button around 0540. Woke up \"feeling slightly confused\" and \"like someone is controlling me.\" No tongue biting.**  "

## 2021-03-04 NOTE — PROGRESS NOTES
"[facility], [place]   Epilepsy Service Daily Note      Interval History: mom was on speaker phone during rounding today   He does report that he pressed button around 0540. Woke up \"feeling slightly confused\" and \"like someone is controlling me.\" He did have seizures on EEG overnight.     He reports no changes in his mood. Denies suicidal thoughts today. Patient feels he can continue to tolerate admission, possibly through the weekend.     Review of System:   Tired. No nausea, No vomiting, no dizziness, no chest pain.     Medications:   Antiepileptic Medications Home Doses: divalproex -1000, aptiom 1200 hs, lamotrigine 150-150, zonisamide 300-400  Antiepileptic Medications Current Doses: divalproex -500, aptiom 1200 hs, lamotrigine 100-100, zonisamide 300-400    Exam: Blood pressure 109/55, pulse 51, temperature 98  F (36.7  C), temperature source Oral, resp. rate 16, weight 91.2 kg (201 lb), SpO2 98 %.   General: NAD  Neuro: Alert and oriented. Speech fluent. Hearing intact to normal conversation. Pupils equal, round and reactive to light. EOM's intact. Visual fields full. Face symmetric. Strong shoulder shrug bilaterally. Strength 5/5 bilaterally. No drift or pronation. Bilateral hand tremor. Sensation grossly intact to light touch.    EEG: Day 1 3/2/21 was abnormal due to the presence of:  -Generalized theta slowing during waking consistent with a mild diffuse encephalopathy  -One electroclinical seizure with possible onset in the midline/bifrontal region progressed to generalized ictal pattern was recorded out of sleep. Clinically, patient had whole body stiffening (tonic seizure). No clear epileptiform discharges were noted. It would be helpful to record more seizures.     Assessment and Plan: see and discussed with Dr. Pete Ríos.  Patient is a 25 year old, right-handed male with a history of depression, migraines, asperger syndrome and focal epilepsy who is being " admitted for a presurgical evaluation. Seizures continue to be noted overnight with stiffening and he did bite his tongue with one on day 1. No daytime seizures thus far.     -continue vEEG monitoring  -no changes to PTA zonisamide 300-400  -continue divalproex 500-500 (-1000)  -continue lamotrigine 100-100 (-150)  -decrease aptiom to 600 hs       Annia Iglesias PA-C    Type of target event identified: staring spell with altered awareness and event with alteration in awareness - partial motor component   Number of events: more needed  Discharge medication plan: To be decided  Further Imaging studies needed prior to discharge: TBD, possibly PET  Discharge transportation: not discussed  Other pertinent issues/goals for discharge: not at this time       Total time: 25 minute was spent in the care of this patient. The patient agrees with the above mentioned plan of care. I answered all the patient's questions and addressed immediate concerns. More than 50% of time spent consisted of counseling and coordinating care, including discussion of the diagnostic significance of EEG findings, anti-seizure medication management, and planning for discharge home

## 2021-03-04 NOTE — PLAN OF CARE
Status: admitted for seizure monitoring.  Vitals: VSS on RA   Neuros: AO x 4. Nystagmus in R eye. Denies N/T. 5/5 all extremities. Pt very tired today, believes its due to his event last night.  IV: PIV SL   Labs/Electrolytes: WNL  Resp/trach: LS clear, no SOB reported.   Diet: Regular   Bowel status: BS present, pt states passing gas.   : Voiding spontaneously via toilet.  Skin: Intact. EEG leads in place.   Pain: No statements of pain overnight.   Activity: SBA w/ GB, seizure precautions.   Social: Pt updating mother independently.  Plan: Continue to monitor and follow POC.

## 2021-03-05 ENCOUNTER — APPOINTMENT (OUTPATIENT)
Dept: NEUROLOGY | Facility: CLINIC | Age: 26
End: 2021-03-05
Attending: PHYSICIAN ASSISTANT
Payer: COMMERCIAL

## 2021-03-05 PROCEDURE — 120N000002 HC R&B MED SURG/OB UMMC

## 2021-03-05 PROCEDURE — 250N000013 HC RX MED GY IP 250 OP 250 PS 637: Performed by: PHYSICIAN ASSISTANT

## 2021-03-05 PROCEDURE — 99231 SBSQ HOSP IP/OBS SF/LOW 25: CPT | Mod: 25 | Performed by: PHYSICIAN ASSISTANT

## 2021-03-05 PROCEDURE — 95714 VEEG EA 12-26 HR UNMNTR: CPT

## 2021-03-05 PROCEDURE — 95720 EEG PHY/QHP EA INCR W/VEEG: CPT | Performed by: PSYCHIATRY & NEUROLOGY

## 2021-03-05 RX ADMIN — ZONISAMIDE 300 MG: 100 CAPSULE ORAL at 07:58

## 2021-03-05 RX ADMIN — LAMOTRIGINE 100 MG: 100 TABLET ORAL at 07:58

## 2021-03-05 RX ADMIN — DIVALPROEX SODIUM 500 MG: 500 TABLET, EXTENDED RELEASE ORAL at 07:58

## 2021-03-05 RX ADMIN — LAMOTRIGINE 100 MG: 100 TABLET ORAL at 21:22

## 2021-03-05 RX ADMIN — ZONISAMIDE 400 MG: 100 CAPSULE ORAL at 21:23

## 2021-03-05 RX ADMIN — DIVALPROEX SODIUM 500 MG: 500 TABLET, EXTENDED RELEASE ORAL at 21:22

## 2021-03-05 RX ADMIN — Medication 10 MG: at 23:41

## 2021-03-05 RX ADMIN — FLUOXETINE HYDROCHLORIDE 80 MG: 40 CAPSULE ORAL at 07:58

## 2021-03-05 ASSESSMENT — ACTIVITIES OF DAILY LIVING (ADL)
ADLS_ACUITY_SCORE: 15

## 2021-03-05 NOTE — PROGRESS NOTES
"Time/length of seizure event: less than one minute, event @ 2120  Movements (head, eyes, extremities): none, pt sitting comfortably in bed and completing ROAR and Neuro assessment with no complication  Orientation during seizure: A&Ox4.  After seizure, remembers the unique phrase given during seizure:Yes  After seizure, remembers an unnamed visual object shown during seizure:Yes  Able to identify/name aloud item during seizure:Yes, cell phone  Able to follow command during seizure:Yes, pointed to ceiling  Able to read test sentence aloud during seizure:Yes  Able to read test sentence aloud again after the seizure:Yes  After seizure, remembers name of object shown during seizure:Yes  Orientation and level of consciousness after seizure:A&Ox4.  VS and oxygen saturation during/after seizure:VSS on RA.  Recall of the event?:Yes  Was this a typical seizure/event?:Yes  Presence of aura or pre-seizure activity:Yes, pt stated he was talking on the phone with his grandma and felt the \"staring and spaced out in his head\" feeling but didn't want to end the conversation with her. So he continued to be on the phone with his grandma for a couple minutes longer. Then continued the ROAR assessment.  Incontinence:No    "

## 2021-03-05 NOTE — SIGNIFICANT EVENT
"Time/length of seizure event: 1 min. 8:44am  Movements (head, eyes, extremities):None  Orientation during seizure: Button pressed after event. Orientation x4. Pt stated he was having \"seizure thoughts\" with \"weird,\" \"repetitive,\" \"pop culture\" thoughts.  After seizure, remembers the unique phrase given during seizure: Yes  After seizure, remembers an unnamed visual object shown during seizure: Yes  Able to identify/name aloud item during seizure: Yes  Able to follow command during seizure: Yes  Able to read test sentence aloud during seizure: Yes  Able to read test sentence aloud again after the seizure: Yes  After seizure, remembers name of object shown during seizure: Yes  Orientation and level of consciousness after seizure: x4.  VS and oxygen saturation during/after seizure: VSS,see flowsheet  Recall of the event?:  Yes  Was this a typical seizure/event?: Yes  Presence of aura or pre-seizure activity: None reported  Incontinence: No    "

## 2021-03-05 NOTE — PROGRESS NOTES
[facility], [place]   Epilepsy Service Daily Note      Interval History:   Pushed button last night because he briefly felt confused. Pushed button again this morning as was having seizure thoughts, repetitive thoughts.     He reports no changes in his mood. Denies suicidal thoughts today. Patient feels he can continue to tolerate admission, possibly through the weekend.     Review of System:   Tired. No nausea, No vomiting, no dizziness, no chest pain.     Medications:   Antiepileptic Medications Home Doses: divalproex -1000, aptiom 1200 hs, lamotrigine 150-150, zonisamide 300-400  Antiepileptic Medications Current Doses: divalproex -500, aptiom 600 hs, lamotrigine 100-100, zonisamide 300-400    Exam: Blood pressure 126/55, pulse 73, temperature 99.2  F (37.3  C), temperature source Oral, resp. rate 16, weight 91.2 kg (201 lb), SpO2 100 %.   General: NAD  Neuro: Alert and oriented. Speech fluent. Hearing intact to normal conversation. Pupils equal, round and reactive to light. EOM's intact. Visual fields full. Face symmetric. Strong shoulder shrug bilaterally. Strength 5/5 bilaterally. No drift or pronation. Bilateral hand tremor. Sensation grossly intact to light touch.    EEG: Day 1 3/2/21 was abnormal due to the presence of:  -Generalized theta slowing during waking consistent with a mild diffuse encephalopathy  -One electroclinical seizure with possible onset in the midline/bifrontal region progressed to generalized ictal pattern was recorded out of sleep. Clinically, patient had whole body stiffening (tonic seizure). No clear epileptiform discharges were noted. It would be helpful to record more seizures.     Day 2 (3/3)was  abnormal due to the presence of:  -Generalized theta slowing during waking consistent with a mild diffuse encephalopathy  -Rare epileptiform discharges with maximum right frontotemporal maximum negativity, indicating focal irritability in this  region.  -Two electroclinical seizure with possible onset in the bifrontal region with possible preference for the right side onset that rapidly progressed to generalized ictal pattern was recorded out of sleep. Clinically, patient had whole body stiffening (tonic seizure).   -One nonepileptic event in which the patient felt as though someone were controlling home.    Assessment and Plan:   1.  Patient is a 25 year old, right-handed male with a history of depression, migraines, asperger syndrome and focal epilepsy who is being admitted for a presurgical evaluation. Seizures continue to be noted overnight with stiffening and he did bite his tongue with one on day 1. See reports for further seizure details.      -continue vEEG monitoring  -no changes to PTA zonisamide 300-400  -continue divalproex 500-500 (-1000)  -continue lamotrigine 100-100 (-150)  -stop aptiom       Annia Iglesias PA-C    Type of target event identified: staring spell with altered awareness and event with alteration in awareness - partial motor component   Number of events: more needed  Discharge medication plan: To be decided  Further Imaging studies needed prior to discharge: TBD, possibly PET  Discharge transportation: not discussed  Other pertinent issues/goals for discharge: not at this time       Total time: 20 minute was spent in the care of this patient. The patient agrees with the above mentioned plan of care. I answered all the patient's questions and addressed immediate concerns. More than 50% of time spent consisted of counseling and coordinating care, including discussion of the diagnostic significance of EEG findings, anti-seizure medication management, and planning for discharge home

## 2021-03-05 NOTE — PLAN OF CARE
1900-0730  Status: Pt admitted for seizure monitoring. EEG leads in place.  Vitals: VSS on RA.  Neuros: A&Ox4. Denies N/T. 5/5 all extremities. R eye nystagmus.   IV: PIV SL.  Labs/Electrolytes: WNL.  Resp/trach: Stable on RA. LS clear all fields.  Diet: Regular diet.  Bowel status: BM x 1 this shift. BS active all quads.  : Voids spontaneously.  Skin: EEG leads intact. All other skin intact.  Pain: Denies.  Activity: Up w/ SBA and GB.  Social: No calls or visitors this shift. Pt updates family on his own.  Plan: Continue to monitor and follow POC.

## 2021-03-06 ENCOUNTER — APPOINTMENT (OUTPATIENT)
Dept: NEUROLOGY | Facility: CLINIC | Age: 26
End: 2021-03-06
Attending: PHYSICIAN ASSISTANT
Payer: COMMERCIAL

## 2021-03-06 PROCEDURE — 99231 SBSQ HOSP IP/OBS SF/LOW 25: CPT | Mod: 25 | Performed by: PSYCHIATRY & NEUROLOGY

## 2021-03-06 PROCEDURE — 95720 EEG PHY/QHP EA INCR W/VEEG: CPT | Performed by: PSYCHIATRY & NEUROLOGY

## 2021-03-06 PROCEDURE — 250N000013 HC RX MED GY IP 250 OP 250 PS 637: Performed by: PHYSICIAN ASSISTANT

## 2021-03-06 PROCEDURE — 120N000002 HC R&B MED SURG/OB UMMC

## 2021-03-06 PROCEDURE — 95714 VEEG EA 12-26 HR UNMNTR: CPT

## 2021-03-06 RX ADMIN — DIVALPROEX SODIUM 500 MG: 500 TABLET, EXTENDED RELEASE ORAL at 09:43

## 2021-03-06 RX ADMIN — ZONISAMIDE 300 MG: 100 CAPSULE ORAL at 09:42

## 2021-03-06 RX ADMIN — LAMOTRIGINE 100 MG: 100 TABLET ORAL at 19:40

## 2021-03-06 RX ADMIN — ZONISAMIDE 400 MG: 100 CAPSULE ORAL at 19:41

## 2021-03-06 RX ADMIN — Medication 10 MG: at 23:15

## 2021-03-06 RX ADMIN — FLUOXETINE HYDROCHLORIDE 80 MG: 40 CAPSULE ORAL at 09:42

## 2021-03-06 RX ADMIN — LAMOTRIGINE 100 MG: 100 TABLET ORAL at 09:42

## 2021-03-06 ASSESSMENT — ACTIVITIES OF DAILY LIVING (ADL)
ADLS_ACUITY_SCORE: 15

## 2021-03-06 NOTE — PLAN OF CARE
Status: Admitted for seizure monitoring. No events report or witnessed this shift  Vitals: VSS on RA  Neuros: A&Ox4, R eye nystagmus. 5/5 all extremities  IV: PIV SL  Diet: Regular diet  Bowel status: BS+ no BM overnight  : voiding spontaneously  Skin: EEG leads in place, otherwise intact  Pain: denies  Activity: SBA with GB  Plan: Continue to monitor and follow POC

## 2021-03-06 NOTE — PROGRESS NOTES
Time/length of seizure event:@ 1250 for approximately 2mins  Movements (head, eyes, extremities):pt was stable  Orientation during seizure:orientated x4  After seizure, remembers the unique phrase given during seizure:yes  After seizure, remembers an unnamed visual object shown during seizure:yes  Able to identify/name aloud item during seizure:yes  Able to follow command during seizure:yes  Able to read test sentence aloud during seizure:N/A pt in hallway  Able to read test sentence aloud again after the seizure:N/A pt in hallway  After seizure, remembers name of object shown during seizure:yes  Orientation and level of consciousness after seizure:alert and orientated x4  VS and oxygen saturation during/after seizure:VSS  Recall of the event?:yes  Was this a typical seizure/event?: pt was unsure  Presence of aura or pre-seizure activity: unsure  Incontinence:no          RN was ambulating pt and after 2 laps the patient began to feel light headed and dizzy, and slightly diaphoretic. Pt was immediately assisted to chair in hallway and VS taken, assigned RN updated. Pt safely back in bed.

## 2021-03-06 NOTE — PLAN OF CARE
Status: admitted for seizure monitoring. One event reported this shift, see note. New this shift: aptiom HS d/c'd  Vitals: VSS on RA   Neuros: AO x 4. Nystagmus in R eye. Denies N/T. 5/5 all extremities.  IV: PIV SL   Labs/Electrolytes: WNL  Resp/trach: LS clear, no SOB reported.   Diet: Regular   Bowel status: BS present, BM 3/3  : Voiding spontaneously via toilet.  Skin: Intact. EEG leads in place.   Pain: No statements of pain overnight.   Activity: SBA w/ GB, seizure precautions.   Social: Mother at bedside this shift.  Plan: Continue to monitor and follow POC.

## 2021-03-06 NOTE — PROGRESS NOTES
[facility], [place]   Epilepsy Service Daily Note      Interval History: Patient continues to have a headache.  He states he had episodes of confusion yesterday.       He reports no changes in his mood. Denies suicidal thoughts today. Patient feels he can continue to tolerate admission, possibly through the weekend.     Review of System:   Tired. No nausea, No vomiting, no dizziness, no chest pain.     Medications:   Antiepileptic Medications Home Doses: divalproex -1000, aptiom 1200 hs, lamotrigine 150-150, zonisamide 300-400  Antiepileptic Medications Current Doses: divalproex -500, aptiom 1200 hs, lamotrigine 100-100, zonisamide 300-400    Exam: Blood pressure 110/55, pulse 69, temperature 97.6  F (36.4  C), temperature source Oral, resp. rate 16, weight 91.2 kg (201 lb), SpO2 96 %.   General: NAD  Neuro: Alert and oriented. Speech fluent. Hearing intact to normal conversation.  EOM's intact. Visual fields full. Face symmetric. Strong shoulder shrug bilaterally. Strength 5/5 bilaterally. No drift or pronation. Bilateral hand tremor. Sensation grossly intact to light touch.    EEG: Day 1 3/2/21 was abnormal due to the presence of:  -Generalized theta slowing during waking consistent with a mild diffuse encephalopathy  -One electroclinical seizure with possible onset in the midline/bifrontal region progressed to generalized ictal pattern was recorded out of sleep. Clinically, patient had whole body stiffening (tonic seizure). No clear epileptiform discharges were noted. It would be helpful to record more seizures.     Assessment and Plan:   Patient is a 25 year old, right-handed male with a history of depression, migraines, asperger syndrome and focal epilepsy who is being admitted for a presurgical evaluation. Seizures continue to be noted overnight with stiffening and he did bite his tongue with one on day 1. No daytime seizures thus far.  Seizures seem to have a diffuse  onset with maximum involvement in the right temporal region.  It is difficult to define if he has focal epilepsy or generalized.  Interestingly, when we stopped Aptiom he stopped having seizures altogether.  This may just be coincidental, however we will continue to monitor.  May need more electrographic seizures to better define seizure onset zone.    -continue vEEG monitoring  -no changes to PTA zonisamide 300-400  -stop divalproex 500-500 (-1000)  -continue lamotrigine 100-100 (-150)  -stop aptiom        Rosario Freeman MD     Type of target event identified: staring spell with altered awareness and event with alteration in awareness - partial motor component   Number of events: more needed  Discharge medication plan: To be decided  Further Imaging studies needed prior to discharge: TBD, possibly PET  Discharge transportation: not discussed  Other pertinent issues/goals for discharge: not at this time       Total time: 20 minute was spent in the care of this patient. The patient agrees with the above mentioned plan of care. I answered all the patient's questions and addressed immediate concerns. More than 50% of time spent consisted of counseling and coordinating care, including discussion of the diagnostic significance of EEG findings, anti-seizure medication management, and planning for discharge home

## 2021-03-06 NOTE — PLAN OF CARE
"Status: Pt admitted for VEEG monitoring. Pt pushed button once this shift, stating he \"felt like nothing existed\" but the feeling passed and he was back at his baseline. Pt had another event while ambulating in [place] with nursing staff. See previous nursing note regarding this event.   Vitals: VSS, RA.  Neuros: A&Ox4. 5/5 all extremities. L eye nystagmus. BUE tremors noted, per pt it is baseline.   IV: PIV SL.    Resp/trach: LS CTA.   Diet: Regular diet.   Bowel status: BS+. No BM this shift.   : Voiding without difficulty.   Skin: EEG leads intact. Bruising noted to L forearm. Pt unclear of when bruising occurred.   Pain: Denies.  Activity: Up with SBAFREDDY. Walked halls x2 today.  Social: Mother visited today.   Plan: Hygiene break tomorrow.   "

## 2021-03-07 ENCOUNTER — APPOINTMENT (OUTPATIENT)
Dept: NEUROLOGY | Facility: CLINIC | Age: 26
End: 2021-03-07
Attending: PSYCHIATRY & NEUROLOGY
Payer: COMMERCIAL

## 2021-03-07 PROCEDURE — 95714 VEEG EA 12-26 HR UNMNTR: CPT

## 2021-03-07 PROCEDURE — 120N000002 HC R&B MED SURG/OB UMMC

## 2021-03-07 PROCEDURE — 95720 EEG PHY/QHP EA INCR W/VEEG: CPT | Mod: GC | Performed by: PSYCHIATRY & NEUROLOGY

## 2021-03-07 PROCEDURE — 99231 SBSQ HOSP IP/OBS SF/LOW 25: CPT | Mod: 25 | Performed by: PSYCHIATRY & NEUROLOGY

## 2021-03-07 PROCEDURE — 250N000013 HC RX MED GY IP 250 OP 250 PS 637: Performed by: PHYSICIAN ASSISTANT

## 2021-03-07 RX ADMIN — ZONISAMIDE 300 MG: 100 CAPSULE ORAL at 09:52

## 2021-03-07 RX ADMIN — ZONISAMIDE 400 MG: 100 CAPSULE ORAL at 20:53

## 2021-03-07 RX ADMIN — FLUOXETINE HYDROCHLORIDE 80 MG: 40 CAPSULE ORAL at 09:52

## 2021-03-07 RX ADMIN — Medication 10 MG: at 21:04

## 2021-03-07 ASSESSMENT — ACTIVITIES OF DAILY LIVING (ADL)
ADLS_ACUITY_SCORE: 15

## 2021-03-07 NOTE — PLAN OF CARE
"Time/length of seizure event: 1625, ~3 minutes  Movements (head, eyes, extremities):feeling \"jittery\", shaky, and energetic.   Orientation during seizure:A&O x4  After seizure, remembers the unique phrase given during seizure: Yes   After seizure, remembers an unnamed visual object shown during seizure:   Able to identify/name aloud item during seizure:yes  Able to follow command during seizure:yes  Able to read test sentence aloud during seizure:yes  Able to read test sentence aloud again after the seizure:yes  After seizure, remembers name of object shown during seizure:yes  Orientation and level of consciousness after seizure: A&O x4  VS and oxygen saturation during/after seizure: Stable on RA  Recall of the event?:yes  Was this a typical seizure/event?:yes  Presence of aura or pre-seizure activity:yes  Incontinence:no    "

## 2021-03-07 NOTE — PLAN OF CARE
Status: Pt admitted for VEEG monitoring. Pt had 1 button push for an event this shift. See previous note.  Vitals: VSS, RA.  Neuros: A&Ox4. 5/5 all extremities. Bilateral nystagmus. BUE tremors.   IV: PIV SL.    Resp/trach: LS CTA.   Diet: Regular diet.   Bowel status: BS+. No BM this shift.   : Voiding without difficulty.   Skin: EEG leads intact. Bruising noted to L forearm. Pt unclear of when bruising occurred.   Pain: Denies.  Activity: Up with SBA, GB. Pt requested to nap most of morning.   Social: No visitors this morning.   Plan: Pt to have hygiene break this afternoon. Continue with plan of care.

## 2021-03-07 NOTE — PROGRESS NOTES
[facility], [place]   Epilepsy Service Daily Note      Interval History: Patient continues to have a headache. Today he states he woke up afternoon feeling shaky and he thought he had a seizure.  He states he is able to tolerate hospital stay.    Review of System:   Shaky. No nausea, No vomiting, no dizziness, no chest pain.     Medications:   Antiepileptic Medications Home Doses: divalproex -1000, aptiom 1200 hs, lamotrigine 150-150, zonisamide 300-400  Antiepileptic Medications Current Doses: divalproex ER stopped, aptiom stopped, lamotrigine stopped, zonisamide 300-400    Exam: Blood pressure 113/58, pulse 70, temperature 98.7  F (37.1  C), temperature source Oral, resp. rate 16, weight 91.2 kg (201 lb), SpO2 97 %.   General: NAD  Neuro: Alert and oriented. Speech fluent. Hearing intact to normal conversation.  EOM's intact. Visual fields full. Face symmetric. Strong shoulder shrug bilaterally. Strength 5/5 bilaterally. No drift or pronation. Bilateral hand tremor. Sensation grossly intact to light touch.    EEG: Day 1 3/2/21 was abnormal due to the presence of:  -Generalized theta slowing during waking consistent with a mild diffuse encephalopathy  -One electroclinical seizure with possible onset in the midline/bifrontal region progressed to generalized ictal pattern was recorded out of sleep. Clinically, patient had whole body stiffening (tonic seizure). No clear epileptiform discharges were noted. It would be helpful to record more seizures.     Assessment and Plan:   Patient is a 25 year old, right-handed male with a history of depression, migraines, asperger syndrome and focal epilepsy who is being admitted for a presurgical evaluation. Seizures continue to be noted overnight with stiffening and he did bite his tongue with one on day 1. No daytime seizures thus far.  Seizures seem to have a diffuse onset with maximum involvement in the right temporal region.  It is  difficult to define if he has focal epilepsy or generalized.  Interestingly, when we stopped Aptiom he stopped having seizures altogether.  This may just be coincidental, however we will continue to monitor.  May need more electrographic seizures to better define seizure onset zone.    -continue vEEG monitoring  -no changes to PTA zonisamide 300-400  -stopped aptiom, lamotrigine and depakote    - Monitor  Mood since mood medications were stopped    Rosario Freeman MD     Type of target event identified: staring spell with altered awareness and event with alteration in awareness - partial motor component   Number of events: more needed  Discharge medication plan: To be decided  Further Imaging studies needed prior to discharge: TBD, possibly PET  Discharge transportation: not discussed  Other pertinent issues/goals for discharge: not at this time       Total time: 20 minute was spent in the care of this patient. The patient agrees with the above mentioned plan of care. I answered all the patient's questions and addressed immediate concerns. More than 50% of time spent consisted of counseling and coordinating care, including discussion of the diagnostic significance of EEG findings, anti-seizure medication management, and planning for discharge home

## 2021-03-07 NOTE — PLAN OF CARE
Status: VEEG in progress. No events witnessed or reported overnight  Vitals: VSS on RA. Educated pt but he refused to have continuous pulse oximetry overnight  Neuros: A&O x4. Strength 5/5 to all extremities. L eye nystagmus. BUE tremors noted, baseline per pt.   IV: PIV SL   Resp/trach: LS CTA   Diet: Regular diet.   Bowel status: BS+. Last BM 03/04  : VDSP  Skin: EEG leads intact. L forearm bruising covered with coban dressing. Pt reported at the start of shift he has sore tongue from biting but not sure when the tongue bite or left arm bruising occurred.  Pain: Denies  Activity: Up with SBA, and GB. Seizure precautions in place  Social: Mother visited yesterday  Plan: Hygiene break today. Cont with current POC

## 2021-03-07 NOTE — PLAN OF CARE
"Time/length of seizure event: 5 min per pt. Event was occurring for \"few minutes\" before button was pushed. Pt reports some drooling that occurred before writer entered room.   Movements (head, eyes, extremities): bilateral nystagmus. Tremors in bilateral arms. Pt reports an ongoing tingling, high energy feeling.   Orientation during seizure: A&Ox4.   After seizure, remembers the unique phrase given during seizure: yes  After seizure, remembers an unnamed visual object shown during seizure: yes  Able to identify/name aloud item during seizure: yes  Able to follow command during seizure: yes  Able to read test sentence aloud during seizure: yes  Able to read test sentence aloud again after the seizure: Yes  After seizure, remembers name of object shown during seizure:   Orientation and level of consciousness after seizure: A&Ox4  VS and oxygen saturation during/after seizure: VSS, RA  Recall of the event?: yes,   Was this a typical seizure/event?: pt is unsure.  Presence of aura or pre-seizure activity: pt was sleeping, felt something was wrong in sleep.  Incontinence: no  "

## 2021-03-08 ENCOUNTER — APPOINTMENT (OUTPATIENT)
Dept: NEUROLOGY | Facility: CLINIC | Age: 26
End: 2021-03-08
Attending: PSYCHIATRY & NEUROLOGY
Payer: COMMERCIAL

## 2021-03-08 PROCEDURE — 250N000013 HC RX MED GY IP 250 OP 250 PS 637: Performed by: PHYSICIAN ASSISTANT

## 2021-03-08 PROCEDURE — 95714 VEEG EA 12-26 HR UNMNTR: CPT

## 2021-03-08 PROCEDURE — 95718 EEG PHYS/QHP 2-12 HR W/VEEG: CPT | Performed by: PSYCHIATRY & NEUROLOGY

## 2021-03-08 PROCEDURE — 95700 EEG CONT REC W/VID EEG TECH: CPT | Performed by: PSYCHIATRY & NEUROLOGY

## 2021-03-08 PROCEDURE — 95720 EEG PHY/QHP EA INCR W/VEEG: CPT | Mod: GC | Performed by: PSYCHIATRY & NEUROLOGY

## 2021-03-08 PROCEDURE — 99231 SBSQ HOSP IP/OBS SF/LOW 25: CPT | Mod: 25 | Performed by: PHYSICIAN ASSISTANT

## 2021-03-08 PROCEDURE — 120N000002 HC R&B MED SURG/OB UMMC

## 2021-03-08 RX ORDER — ZONISAMIDE 100 MG/1
200 CAPSULE ORAL EVERY MORNING
Status: DISCONTINUED | OUTPATIENT
Start: 2021-03-09 | End: 2021-03-09

## 2021-03-08 RX ORDER — ZONISAMIDE 100 MG/1
200 CAPSULE ORAL EVERY EVENING
Status: DISCONTINUED | OUTPATIENT
Start: 2021-03-08 | End: 2021-03-09

## 2021-03-08 RX ADMIN — FLUOXETINE HYDROCHLORIDE 80 MG: 40 CAPSULE ORAL at 09:30

## 2021-03-08 RX ADMIN — ZONISAMIDE 300 MG: 100 CAPSULE ORAL at 09:30

## 2021-03-08 RX ADMIN — Medication 10 MG: at 22:00

## 2021-03-08 RX ADMIN — ZONISAMIDE 200 MG: 100 CAPSULE ORAL at 19:27

## 2021-03-08 ASSESSMENT — ACTIVITIES OF DAILY LIVING (ADL)
ADLS_ACUITY_SCORE: 15

## 2021-03-08 NOTE — PROGRESS NOTES
Clinical Nutrition Services- Brief Note    Reviewed nutrition risk factors due to LOS. Pt is tolerating a Regular diet, eating well per nursing documentation. Weight appears stable.     Wt Readings from Last 20 Encounters:   03/02/21 91.2 kg (201 lb)   01/30/20 87.2 kg (192 lb 3.2 oz)   10/03/19 84.4 kg (186 lb)   07/08/19 88 kg (194 lb)   04/22/19 88 kg (194 lb)   01/24/19 85.7 kg (189 lb)   09/06/18 83.3 kg (183 lb 9.6 oz)   06/28/18 83.2 kg (183 lb 6.4 oz)   06/11/18 80.5 kg (177 lb 6.4 oz)   05/11/18 82.2 kg (181 lb 3.2 oz)   03/29/18 85.5 kg (188 lb 6.4 oz)   12/19/17 83 kg (183 lb)     No nutrition issues identified at this time. RD will continue to follow per nutrition protocol.  Shayy Mallory, MS, RD, LD, [facility]  Neuro ICU  ASCOM [id number]  Pager: [phone]

## 2021-03-08 NOTE — PLAN OF CARE
D AVSS with sat's 98% on room air. Heart regular and lungs clear. Voiced no c/o pain or nausea and up to bathroom with SBA to void an adequate amount. Voiced no c/o epileptic spells and non observed. Slept well.  I Vital's, assessment and med's per order.   A Resting in bed with call light in reach.   P Continue to monitor and update MD with changes.

## 2021-03-08 NOTE — PLAN OF CARE
Status: Pt admitted for VEEG monitoring. Pt had 1 button push for an event this shift. See previous note.  Vitals: VSS, RA.  Neuros: A&Ox4. 5/5 all extremities. Bilateral nystagmus. BUE tremors.   IV: PIV SL.    Resp/trach: LS CTA.   Diet: Regular diet.   Bowel status: BS+. No BM this shift.   : Voiding without difficulty.   Skin: EEG leads intact. Bruising noted to L forearm. Pt unclear of when bruising occurred.   Pain: Denies.  Activity: Up with SBAFREDDY. had shower this evening.   Social: mother at bedside  Plan: Continue with plan of care.

## 2021-03-08 NOTE — PLAN OF CARE
"Status: Admitted for seizure monitoring as pre-op candidate. They are still needing to see more seizures. No events this shift.  Vitals: stable  Neuros: UE tremors. He states that he feels like he is been having \"out of body situations\",yesterday and today. Encouraged him to discuss this with Team.  IV: SL'd  Diet: regular diet.  : Voiding spont.  Activity: Up with SBA  Plan: Decreasing zonegran some more and continue monitoring.   "

## 2021-03-08 NOTE — PROGRESS NOTES
[facility], [place]   Epilepsy Service Daily Note      Interval History:   Had event yesterday with shaking and drooling. He reports can continue to tolerated admission. Denies worsening mood and suicidal ideations.     Review of System:   No nausea, No vomiting, no dizziness, no chest pain.     Medications:   Antiepileptic Medications Home Doses: divalproex -1000, aptiom 1200 hs, lamotrigine 150-150, zonisamide 300-400  Antiepileptic Medications Current Doses: zonisamide 300-400    Exam: Blood pressure 114/59, pulse 67, temperature 97.5  F (36.4  C), temperature source Oral, resp. rate 16, weight 91.2 kg (201 lb), SpO2 98 %.   General: NAD  Neuro: Alert and oriented. Speech fluent. Hearing intact to normal conversation. Pupils equal, round and reactive to light. EOM's intact. Visual fields full. Face symmetric. Strong shoulder shrug bilaterally. Strength 5/5 bilaterally. No drift or pronation. Bilateral hand tremor. Sensation grossly intact to light touch.    EEG: see formal reports    Assessment and Plan: patient seen and discussed with Dr. Blake   1.  Patient is a 25 year old, right-handed male with a history of depression, migraines, asperger syndrome and focal epilepsy who is being admitted for a presurgical evaluation. Has had 3 electrographic seizures, need to record more for presurgical evaluation.     -continue vEEG monitoring  -decrease zonisamide to 200-200  -continue off divalproex  -continue off lamotrigine   -continue off aptiom         Annia Iglesias PA-C    Type of target event identified: staring spell with altered awareness and event with alteration in awareness - partial motor component   Number of events: more needed  Discharge medication plan: To be decided  Further Imaging studies needed prior to discharge: TBD, possibly PET  Discharge transportation: not discussed  Other pertinent issues/goals for discharge: not at this time       Total time: 20 minute was  spent in the care of this patient. The patient agrees with the above mentioned plan of care. I answered all the patient's questions and addressed immediate concerns. More than 50% of time spent consisted of counseling and coordinating care, including discussion of the diagnostic significance of EEG findings, anti-seizure medication management, and planning for discharge home

## 2021-03-09 ENCOUNTER — APPOINTMENT (OUTPATIENT)
Dept: NEUROLOGY | Facility: CLINIC | Age: 26
End: 2021-03-09
Attending: PSYCHIATRY & NEUROLOGY
Payer: COMMERCIAL

## 2021-03-09 PROCEDURE — 120N000002 HC R&B MED SURG/OB UMMC

## 2021-03-09 PROCEDURE — 99232 SBSQ HOSP IP/OBS MODERATE 35: CPT | Performed by: PHYSICIAN ASSISTANT

## 2021-03-09 PROCEDURE — 95714 VEEG EA 12-26 HR UNMNTR: CPT

## 2021-03-09 PROCEDURE — U0003 INFECTIOUS AGENT DETECTION BY NUCLEIC ACID (DNA OR RNA); SEVERE ACUTE RESPIRATORY SYNDROME CORONAVIRUS 2 (SARS-COV-2) (CORONAVIRUS DISEASE [COVID-19]), AMPLIFIED PROBE TECHNIQUE, MAKING USE OF HIGH THROUGHPUT TECHNOLOGIES AS DESCRIBED BY CMS-2020-01-R: HCPCS | Performed by: PHYSICIAN ASSISTANT

## 2021-03-09 PROCEDURE — U0005 INFEC AGEN DETEC AMPLI PROBE: HCPCS | Performed by: PHYSICIAN ASSISTANT

## 2021-03-09 PROCEDURE — 250N000013 HC RX MED GY IP 250 OP 250 PS 637: Performed by: PHYSICIAN ASSISTANT

## 2021-03-09 PROCEDURE — 95720 EEG PHY/QHP EA INCR W/VEEG: CPT | Performed by: PSYCHIATRY & NEUROLOGY

## 2021-03-09 RX ORDER — ZONISAMIDE 100 MG/1
100 CAPSULE ORAL 2 TIMES DAILY
Status: DISCONTINUED | OUTPATIENT
Start: 2021-03-10 | End: 2021-03-11

## 2021-03-09 RX ORDER — ZONISAMIDE 100 MG/1
200 CAPSULE ORAL EVERY EVENING
Status: COMPLETED | OUTPATIENT
Start: 2021-03-09 | End: 2021-03-09

## 2021-03-09 RX ADMIN — ZONISAMIDE 200 MG: 100 CAPSULE ORAL at 09:07

## 2021-03-09 RX ADMIN — Medication 10 MG: at 23:01

## 2021-03-09 RX ADMIN — ZONISAMIDE 200 MG: 100 CAPSULE ORAL at 20:41

## 2021-03-09 RX ADMIN — FLUOXETINE HYDROCHLORIDE 80 MG: 40 CAPSULE ORAL at 09:07

## 2021-03-09 ASSESSMENT — ACTIVITIES OF DAILY LIVING (ADL)
ADLS_ACUITY_SCORE: 15

## 2021-03-09 NOTE — PROGRESS NOTES
Time/length of seizure event: 2015, unable to say exact length of event   Movements (head, eyes, extremities): No  Orientation during seizure: WNL  After seizure, remembers the unique phrase given during seizure: Yes  After seizure, remembers an unnamed visual object shown during seizure: Yes  Able to identify/name aloud item during seizure: Yes  Able to follow command during seizure: Yes  Able to read test sentence aloud during seizure: Yes  Able to read test sentence aloud again after the seizure: Yes  After seizure, remembers name of object shown during seizure: Yes  Orientation and level of consciousness after seizure: WNL  VS and oxygen saturation during/after seizure: WNL  Recall of the event?:  Yes  Was this a typical seizure/event?: Yes  Presence of aura or pre-seizure activity:  Incontinence: No

## 2021-03-09 NOTE — PLAN OF CARE
Status: Pt with hx of depression, migraines, asperger syndrome and focal epilepsy who is being admitted for seizure monitoring for presurgical evaluation. No events reported or witnessed this shift  Vitals: VSS on RA, slightly hypotensive   Neuros: Intact ex BUE tremors  IV: PIV SL  Labs/Electrolytes: NA  Resp/trach: WNL  Diet: Regular   Bowel status: No BM   : Voiding   Skin: EEG leads in place   Pain: Denied   Activity: SBA and GB  Plan: Zonegran decreased to 200 mg, off other AEDs. Continue to monitor and follow POC

## 2021-03-09 NOTE — PROGRESS NOTES
"[facility], [place]   Epilepsy Service Daily Note      Interval History: mom was on speaker phone during rounding   Reports event yesterday evening in which he felt \"out of it\". No seizures overnight that he is aware of. No new tongue bites.     Review of System:   No nausea, No vomiting, no dizziness, no chest pain.     Medications:   Antiepileptic Medications Home Doses: divalproex -1000, aptiom 1200 hs, lamotrigine 150-150, zonisamide 300-400  Antiepileptic Medications Current Doses: zonisamide 200-200    Exam: Blood pressure 98/61, pulse 61, temperature 97.9  F (36.6  C), temperature source Oral, resp. rate 16, weight 91.2 kg (201 lb), SpO2 98 %.   General: NAD  Neuro: Alert and oriented. Speech fluent. Hearing intact to normal conversation. Pupils equal, round and reactive to light. EOM's intact. Visual fields full. Face symmetric. Strong shoulder shrug bilaterally. Strength 5/5 bilaterally. No drift or pronation. Bilateral hand tremor. Sensation grossly intact to light touch.    EEG: see formal reports    Assessment and Plan: patient seen and discussed with Dr. Blake   1.  Patient is a 25 year old, right-handed male with a history of depression, migraines, asperger syndrome and focal epilepsy who is being admitted for a presurgical evaluation. Has had 3 electrographic seizures, need to record more for presurgical evaluation.     -continue vEEG monitoring  -decrease zonisamide to 100-100 starting tomorrow AM  -continue off divalproex  -continue off lamotrigine   -continue off aptiom         Annia Iglesias PA-C    Type of target event identified: staring spell with altered awareness and event with alteration in awareness - partial motor component   Number of events: more needed  Discharge medication plan: To be decided  Further Imaging studies needed prior to discharge: TBD, possibly PET  Discharge transportation: not discussed  Other pertinent issues/goals for discharge: not " at this time       Total time: 25 minute was spent in the care of this patient. The patient agrees with the above mentioned plan of care. I answered all the patient's questions and addressed immediate concerns. More than 50% of time spent consisted of counseling and coordinating care, including discussion of the diagnostic significance of EEG findings, anti-seizure medication management, and planning for discharge home

## 2021-03-09 NOTE — PLAN OF CARE
"Status: Pt with hx of depression, migraines, asperger syndrome and focal epilepsy who is being admitted for seizure monitoring for presurgical evaluation. Had one event of feeling \"out of body emotions\", VSS and assessment WNL  Vitals: VSS on RA  Neuros: Intact ex BUE tremors  IV: PIV SL  Labs/Electrolytes: NA  Resp/trach: WNL  Diet: Regular   Bowel status: No BM   : Voiding   Skin: EEG leads in place   Pain: Denied   Activity: SBA and GB, walked in halls x2  Social: Mom at bedside   Plan: Zonegran decreased to 200 mg, off other AEDs. Continue to monitor and follow POC  "

## 2021-03-10 ENCOUNTER — APPOINTMENT (OUTPATIENT)
Dept: NEUROLOGY | Facility: CLINIC | Age: 26
End: 2021-03-10
Attending: PSYCHIATRY & NEUROLOGY
Payer: COMMERCIAL

## 2021-03-10 PROCEDURE — 99231 SBSQ HOSP IP/OBS SF/LOW 25: CPT | Mod: 25 | Performed by: PHYSICIAN ASSISTANT

## 2021-03-10 PROCEDURE — 120N000002 HC R&B MED SURG/OB UMMC

## 2021-03-10 PROCEDURE — 95720 EEG PHY/QHP EA INCR W/VEEG: CPT | Performed by: PSYCHIATRY & NEUROLOGY

## 2021-03-10 PROCEDURE — 95714 VEEG EA 12-26 HR UNMNTR: CPT

## 2021-03-10 PROCEDURE — 250N000013 HC RX MED GY IP 250 OP 250 PS 637: Performed by: PHYSICIAN ASSISTANT

## 2021-03-10 RX ADMIN — ZONISAMIDE 100 MG: 100 CAPSULE ORAL at 09:06

## 2021-03-10 RX ADMIN — ZONISAMIDE 100 MG: 100 CAPSULE ORAL at 19:38

## 2021-03-10 RX ADMIN — FLUOXETINE HYDROCHLORIDE 80 MG: 40 CAPSULE ORAL at 09:06

## 2021-03-10 ASSESSMENT — ACTIVITIES OF DAILY LIVING (ADL)
ADLS_ACUITY_SCORE: 15

## 2021-03-10 NOTE — PROGRESS NOTES
[facility], [place]   Epilepsy Service Daily Note      Interval History: mom was on speaker phone during rounding   No seizures overnight. No new tongue bites. He reports yesterday was having out of body feelings on and off all day. He did not push event button for these.     Review of System:   No nausea, No vomiting, no dizziness, no chest pain.     Medications:   Antiepileptic Medications Home Doses: divalproex -1000, aptiom 1200 hs, lamotrigine 150-150, zonisamide 300-400  Antiepileptic Medications Current Doses: zonisamide 100-100    Exam: Blood pressure 104/66, pulse 56, temperature 97.5  F (36.4  C), temperature source Oral, resp. rate 16, weight 91.2 kg (201 lb), SpO2 98 %.   General: NAD  Neuro: Alert and oriented. Speech fluent. Hearing intact to normal conversation. Pupils equal, round and reactive to light. EOM's intact. Visual fields full. Face symmetric. Strong shoulder shrug bilaterally. Strength 5/5 bilaterally. No drift or pronation. Bilateral hand tremor. Sensation grossly intact to light touch.    EEG: see formal reports    Assessment and Plan: patient seen and discussed with Dr. Blake   1.  Patient is a 25 year old, right-handed male with a history of depression, migraines, asperger syndrome and focal epilepsy who is being admitted for a presurgical evaluation. Has had 3 electrographic seizures, need to record more for presurgical evaluation.     -continue vEEG monitoring  -zonisamide decreased to 100-100 starting this morning   -continue off divalproex  -continue off lamotrigine   -continue off aptiom         Annia Iglesias PA-C    Type of target event identified: staring spell with altered awareness and event with alteration in awareness - partial motor component   Number of events: more needed  Discharge medication plan: To be decided  Further Imaging studies needed prior to discharge: TBD, possibly PET  Discharge transportation: not discussed  Other  pertinent issues/goals for discharge: not at this time       Total time: 20 minute was spent in the care of this patient. The patient agrees with the above mentioned plan of care. I answered all the patient's questions and addressed immediate concerns. More than 50% of time spent consisted of counseling and coordinating care, including discussion of the diagnostic significance of EEG findings, anti-seizure medication management, and planning for discharge home

## 2021-03-10 NOTE — PLAN OF CARE
Status: Pt with hx of depression, migraines, asperger syndrome and focal epilepsy who is being admitted for seizure monitoring for presurgical evaluation. No events reported or witnessed this shift  Vitals: VSS on RA  Neuros: Intact ex BUE tremors  IV: PIV SL  Labs/Electrolytes: NA  Resp/trach: WNL  Diet: Regular   Bowel status: No BM, LBM 3/9  : Voiding   Skin: EEG leads in place   Pain: Denied   Activity: SBA and GB  Plan: Zonegran decreased to 100 mg in AM, off other AEDs. Continue to monitor and follow

## 2021-03-10 NOTE — PLAN OF CARE
Status: VEEG monitoring for characterization of seizures. No events witnessed or reported this shift.   Vitals: VSS on RA  Neuros: A&Ox4. BUE tremors at baseline.   IV: PIV SL  Labs/Electrolytes: WNL  Resp/trach: LS clear  Diet: Regular with good intake  Bowel status: BS+, LBM this morning per pt report.   : Voiding spontaneously without difficulty   Skin: Intact  Pain: denies  Activity: Independent.   Social: Mom at bedside and supportive of care.   Plan: Decrease Zonegran to 100mg tomorrow AM. Continue to monitor and follow POC.

## 2021-03-10 NOTE — PLAN OF CARE
Status: Pt admitted for VEEG monitoring. No events witnessed or reported this shift.  Vitals: VSS, RA.  Neuros: A&Ox4. 5/5 all extremities. BUE tremors. Per pt they are getting worse.  IV: PIV SL.    Resp/trach: LS CTA.   Diet: Regular diet.   Bowel status: BS+. No BM this shift.   : Voiding without difficulty.   Skin: EEG leads intact.  Pain: Denies.  Activity: Up with SBA, GB.    Social: No visitors this morning.   Plan: Continue with plan of care.

## 2021-03-11 ENCOUNTER — APPOINTMENT (OUTPATIENT)
Dept: NEUROLOGY | Facility: CLINIC | Age: 26
End: 2021-03-11
Attending: PSYCHIATRY & NEUROLOGY
Payer: COMMERCIAL

## 2021-03-11 LAB — LACTATE BLD-SCNC: 1.4 MMOL/L (ref 0.7–2)

## 2021-03-11 PROCEDURE — 120N000002 HC R&B MED SURG/OB UMMC

## 2021-03-11 PROCEDURE — 250N000011 HC RX IP 250 OP 636: Performed by: PHYSICIAN ASSISTANT

## 2021-03-11 PROCEDURE — 95714 VEEG EA 12-26 HR UNMNTR: CPT

## 2021-03-11 PROCEDURE — 95720 EEG PHY/QHP EA INCR W/VEEG: CPT | Mod: GC | Performed by: PSYCHIATRY & NEUROLOGY

## 2021-03-11 PROCEDURE — 99231 SBSQ HOSP IP/OBS SF/LOW 25: CPT | Mod: 25 | Performed by: PHYSICIAN ASSISTANT

## 2021-03-11 PROCEDURE — 83605 ASSAY OF LACTIC ACID: CPT | Performed by: PSYCHIATRY & NEUROLOGY

## 2021-03-11 PROCEDURE — 250N000013 HC RX MED GY IP 250 OP 250 PS 637: Performed by: PHYSICIAN ASSISTANT

## 2021-03-11 PROCEDURE — 36415 COLL VENOUS BLD VENIPUNCTURE: CPT | Performed by: PSYCHIATRY & NEUROLOGY

## 2021-03-11 RX ADMIN — Medication 10 MG: at 00:35

## 2021-03-11 RX ADMIN — ZONISAMIDE 100 MG: 100 CAPSULE ORAL at 09:46

## 2021-03-11 RX ADMIN — IBUPROFEN 200 MG: 200 TABLET, FILM COATED ORAL at 19:45

## 2021-03-11 RX ADMIN — FLUOXETINE HYDROCHLORIDE 80 MG: 40 CAPSULE ORAL at 09:47

## 2021-03-11 RX ADMIN — Medication 10 MG: at 22:17

## 2021-03-11 RX ADMIN — LORAZEPAM 2 MG: 2 INJECTION INTRAMUSCULAR; INTRAVENOUS at 15:22

## 2021-03-11 ASSESSMENT — ACTIVITIES OF DAILY LIVING (ADL)
ADLS_ACUITY_SCORE: 15

## 2021-03-11 NOTE — PROGRESS NOTES
"I was called to Mr. Padilla's beside to evaluate for abnormal behavior. He was in bed, appeared agitated and was pulling at lines, repeatedly attempting to adjust the EEG leads, pulse ox, etc. He explained that he had one of his typical headaches that can precede his seizure, and that he \"can't focus.\" He was able to perform simple commands and complex commands crossing midline. He was able to repeat a phrase accurately. The time of assessment was roughly 17:45. I ordered 2 mg ativan at bedside, though the agitation improved and it was not administered.     He had received 2 mg ativan at 15:22 in response to a GTC.     I paged the reading epileptologist in case they were in agreement with a dose of antiepileptic medication given these two recent spells, and will await the response before ordering medications.       Spell type 3 copied from the H and P:   \"Type 3: Multiple times a week he wakes up and feels he has abnormal thinking, repetitive thoughts, feeling like someone is controlling him or out of body experience. He has trouble focusing and processing what people are saying to him. He does not have loss of awareness and he can talk and communicate with family appropriately. This lasts all day up to 4 days. He attributes this to having a seizure overnight.\"        Xu Christie MD  PGY 4 Neurology Resident   "

## 2021-03-11 NOTE — PLAN OF CARE
Status: Pt admitted for VEEG monitoring. No events witnessed or reported this shift.  Vitals: VSS on RA, with continuous pulse ox overnight  Neuros: A&O x4. Strength 5/5 to all extremities. BUE tremors.  IV: PIV SL.  Resp/trach: LS clear  Diet: Regular diet  Bowel status: BM x1 overnight. Loose/semi-loose BM (baseline per pt)  : VDSP   Skin: EEG leads intact.  Pain: Denies.  Activity: Up with SBA, and GB. Seizure precautions  Social: No calls overnight  Plan: Zonisamide dose decreased since yesterday. Cont with current POC

## 2021-03-11 NOTE — PLAN OF CARE
Status: Pt admitted for VEEG monitoring. No events witnessed or reported this shift.  Vitals: VSS, RA.  Neuros: A&Ox4. 5/5 all extremities. BUE tremors. Per pt they are getting worse.  IV: PIV SL.    Resp/trach: LS CTA.   Diet: Regular diet.   Bowel status: BS+. No BM this shift.   : Voiding without difficulty.   Skin: EEG leads intact.  Pain: Denies.  Activity: Up with SBA, GB.    Social: No visitors this morning.   Plan: Continue with plan of care

## 2021-03-11 NOTE — PLAN OF CARE
"Time/length of seizure event: ~10 min  Movements (head, eyes, extremities): head twitching, teeth grinding, staring, arms stiffening and shaking. Legs stiffening and flexing. Pt squeezed tech's hand and was unable to let go. Playing and pulling at cords.   Orientation during seizure: BARTOLOME. Pt unable to respond or follow commands.   After seizure, remembers the unique phrase given during seizure: BARTOLOME.  After seizure, remembers an unnamed visual object shown during seizure: BARTOLOME  Able to identify/name aloud item during seizure: BARTOLOME  Able to follow command during seizure: no  Able to read test sentence aloud during seizure: BARTOLOME, pt was unresponsive.  Able to read test sentence aloud again after the seizure: BARTOLOME.  After seizure, remembers name of object shown during seizure: BARTOLOME  Orientation and level of consciousness after seizure: initially post seizure pt was confused, only able to say few words. Pt was unable to state their birthday and when asked if he knew where he was he said \"no\". Pt was able to recall a favorite Football player [employer] number [employer] correctly.   VS and oxygen saturation during/after seizure:  Recall of the event?: BP elevated during event (156/112), rechecked as event ended and it returned to baseline (126/61).   Was this a typical seizure/event?: Per mother, this sounded like one of pt's typical events.   Presence of aura or pre-seizure activity: no  Incontinence: no  "

## 2021-03-11 NOTE — PROGRESS NOTES
Time/length of seizure event: 5 minutes  Movements (head, eyes, extremities): Arms and legs shaking, twitching, squeezing fingers/hands of writer, foaming from mouth, face turning red and flushed  Orientation during seizure:BARTOLOME  After seizure, remembers the unique phrase given during seizure: No  After seizure, remembers an unnamed visual object shown during seizure: No  Able to identify/name aloud item during seizure: No  Able to follow command during seizure: No  Able to read test sentence aloud during seizure: No  Able to read test sentence aloud again after the seizure: No  After seizure, remembers name of object shown during seizure: No  Orientation and level of consciousness after seizure: AO x4.  VS and oxygen saturation during/after seizure: HR increased, oxygen decreased, BP within normal limits  Recall of the event?: No.  Was this a typical seizure/event?: Yes  Presence of aura or pre-seizure activity:No  Incontinence: No

## 2021-03-11 NOTE — PROGRESS NOTES
[facility], [place]   Epilepsy Service Daily Note      Interval History: mom was on speaker phone during rounding   No seizures overnight. No new tongue bites. Tolerating admission well. No changes or worsening in mood.     Review of System:   No nausea, No vomiting, no dizziness, no chest pain.     Medications:   Antiepileptic Medications Home Doses: divalproex -1000, aptiom 1200 hs, lamotrigine 150-150, zonisamide 300-400  Antiepileptic Medications Current Doses: zonisamide 100-100    Exam: Blood pressure 106/55, pulse 69, temperature 97.8  F (36.6  C), temperature source Oral, resp. rate 16, weight 91.2 kg (201 lb), SpO2 99 %.   General: NAD  Neuro: Alert and oriented. Speech fluent. Hearing intact to normal conversation. Pupils equal, round and reactive to light. EOM's intact. Visual fields full. Face symmetric. Strong shoulder shrug bilaterally. Strength 5/5 bilaterally. No drift or pronation. Bilateral hand tremor. Sensation grossly intact to light touch.    EEG: see formal reports    Assessment and Plan: patient seen and discussed with Dr. Blake   1.  Patient is a 25 year old, right-handed male with a history of depression, migraines, asperger syndrome and focal epilepsy who is being admitted for a presurgical evaluation. Has had 3 electrographic seizures, need to record more for presurgical evaluation.     -continue vEEG monitoring  -stop zonisamide   -continue off divalproex  -continue off lamotrigine   -continue off aptiom         Annia Iglesias PA-C    Type of target event identified: staring spell with altered awareness and event with alteration in awareness - partial motor component   Number of events: more needed  Discharge medication plan: To be decided  Further Imaging studies needed prior to discharge: TBD, possibly PET  Discharge transportation: not discussed  Other pertinent issues/goals for discharge: not at this time       Total time: 20 minute was spent in the  care of this patient. The patient agrees with the above mentioned plan of care. I answered all the patient's questions and addressed immediate concerns. More than 50% of time spent consisted of counseling and coordinating care, including discussion of the diagnostic significance of EEG findings, anti-seizure medication management, and planning for discharge home

## 2021-03-11 NOTE — PLAN OF CARE
Status: Pt admitted for VEEG monitoring. Pt had 1 target event this shift during hygiene break while EEG techs were replacing leads. Ativan was not given.   Vitals: BP elevated during event, returned to baseline post event. Otherwise stable, RA.  Neuros: See previous note regarding acute neuro changes during event. Pt is currently at his baseline neuro wise: A&Ox4. BUE tremors.    IV: PIV SL.   Resp/trach: LS CTA.  Diet: Regular.   Bowel status: No BM this shift.  : Voiding without difficulty.   Skin: New leads placed, intact. Red spots on scalp and forehead from prior leads.   Pain: Denies.   Activity: Up with SBA, GB. Seizure precautions in place.  Social: Mother updated via phone regarding pt event. Mother to visit this afternoon.   Plan: Per mother, events can cluster. Continue to watch for events.

## 2021-03-12 ENCOUNTER — APPOINTMENT (OUTPATIENT)
Dept: NEUROLOGY | Facility: CLINIC | Age: 26
End: 2021-03-12
Attending: PSYCHIATRY & NEUROLOGY
Payer: COMMERCIAL

## 2021-03-12 PROCEDURE — 250N000013 HC RX MED GY IP 250 OP 250 PS 637: Performed by: PHYSICIAN ASSISTANT

## 2021-03-12 PROCEDURE — 95720 EEG PHY/QHP EA INCR W/VEEG: CPT | Performed by: PSYCHIATRY & NEUROLOGY

## 2021-03-12 PROCEDURE — 99231 SBSQ HOSP IP/OBS SF/LOW 25: CPT | Mod: 25 | Performed by: PHYSICIAN ASSISTANT

## 2021-03-12 PROCEDURE — 120N000002 HC R&B MED SURG/OB UMMC

## 2021-03-12 PROCEDURE — 95714 VEEG EA 12-26 HR UNMNTR: CPT

## 2021-03-12 RX ADMIN — ACETAMINOPHEN 650 MG: 325 TABLET, FILM COATED ORAL at 08:57

## 2021-03-12 RX ADMIN — FLUOXETINE HYDROCHLORIDE 80 MG: 40 CAPSULE ORAL at 08:53

## 2021-03-12 ASSESSMENT — ACTIVITIES OF DAILY LIVING (ADL)
ADLS_ACUITY_SCORE: 15

## 2021-03-12 NOTE — PROGRESS NOTES
[facility], [place]   Epilepsy Service Daily Note      Interval History:   Had 2 seizures yesterday (was off EEG for 1 getting hygiene break). The second was recorded on EEG at 1513 and generalized. He did get ativan 2 mg. He does not recall any sort of warning. Today feels tired as he did not sleep well last night.     Review of System:   Tiredness. No nausea, No vomiting, no dizziness, no chest pain.     Medications:   Antiepileptic Medications Home Doses: divalproex -1000, aptiom 1200 hs, lamotrigine 150-150, zonisamide 300-400  Antiepileptic Medications Current Doses: none     Exam: Blood pressure 114/61, pulse 63, temperature 99  F (37.2  C), temperature source Oral, resp. rate 16, weight 91.2 kg (201 lb), SpO2 98 %.   General: NAD  Neuro: Alert and oriented. Speech fluent. Hearing intact to normal conversation. Pupils equal, round and reactive to light. EOM's intact. Visual fields full. Face symmetric. Strong shoulder shrug bilaterally. Strength 5/5 bilaterally. No drift or pronation. Bilateral hand tremor. Sensation grossly intact to light touch.    EEG: see formal reports    Assessment and Plan: patient seen and discussed with Dr. Blake   1.  Patient is a 25 year old, right-handed male with a history of depression, migraines, asperger syndrome and focal epilepsy who is being admitted for a presurgical evaluation. Has had 4 electrographic seizures recorded (and additional one during hygiene), need to record couple more for presurgical evaluation.     -continue vEEG monitoring  -continue off zonisamide   -continue off divalproex  -continue off lamotrigine   -continue off aptiom         Annia Iglesias PA-C    Type of target event identified: staring spell with altered awareness and event with alteration in awareness - partial motor component   Number of events: more needed  Discharge medication plan: To be decided  Further Imaging studies needed prior to discharge: TBD,  possibly PET  Discharge transportation: not discussed  Other pertinent issues/goals for discharge: not at this time       Total time: 20 minute was spent in the care of this patient. The patient agrees with the above mentioned plan of care. I answered all the patient's questions and addressed immediate concerns. More than 50% of time spent consisted of counseling and coordinating care, including discussion of the diagnostic significance of EEG findings, anti-seizure medication management, and planning for discharge home

## 2021-03-12 NOTE — PLAN OF CARE
Status: Pt admitted for VEEG monitoring. No events witnessed or reported overnight. Pt had 2 events yesterday, pls refer to [facility] notes  Vitals: VSS on RA, with continuous pulse ox  Neuros: A&O x4. Strength 5/5 to all extremities. BUE tremors.  IV: PIV SL  Resp/trach: LS clear  Diet: Regular diet  Bowel status: Last BM 03/11  : VDSP   Skin: EEG leads intact. Some red spots/skin tear on scalp & forehead from prior leads   Pain: Denies.  Activity: Up with SBA, and GB. Seizure precautions in place  Social: Updated mother Sarah who called at beginning of shift  Plan: All AEDs stopped. Cont with current POC

## 2021-03-12 NOTE — PLAN OF CARE
Status: admitted for seizure monitoring, no events this shift.    Vitals: VSS on RA, continuous pulse ox on.   Neuros: AO x 4. Lethargic. Denies N/T. 5/5 all extremities. BUE tremors.   IV: PIV SL   Labs/Electrolytes: WNL   Resp/trach: LS clear, no SOB reported.   Diet: Regular.   Bowel status: No BM this shift, BS present.   : Voiding spontaneously via toilet.   Skin: Intact, redness around old EEG sites.   Pain: PRN Tylenol x 1 for HA pain.   Activity: SBA d/t seizure precautions. Walked unit x 2 w/ RN.   Social: Mother at bedside.   Plan: Continue to monitor and follow POC.

## 2021-03-12 NOTE — PLAN OF CARE
Status: Pt admitted for VEEG monitoring. Pt had 1 target event this shift. Ativan was not given.   Vitals: BP elevated during event, returned to baseline post event. Otherwise stable, RA.  Neuros: See previous note regarding acute neuro changes during event. Pt is currently at his baseline neuro wise: A&Ox4. BUE tremors.    IV: PIV SL.   Resp/trach: LS CTA.  Diet: Regular.   Bowel status: No BM this shift.  : Voiding without difficulty.   Skin: New leads placed, intact. Red spots on scalp and forehead from prior leads.   Pain: Denies.   Activity: Up with SBA, GB. Seizure precautions in place.  Social: Mother at bedside this evening.   Plan: Per mother, events can cluster. Continue to watch for events

## 2021-03-13 ENCOUNTER — APPOINTMENT (OUTPATIENT)
Dept: NEUROLOGY | Facility: CLINIC | Age: 26
End: 2021-03-13
Attending: PSYCHIATRY & NEUROLOGY
Payer: COMMERCIAL

## 2021-03-13 PROCEDURE — 120N000002 HC R&B MED SURG/OB UMMC

## 2021-03-13 PROCEDURE — 95714 VEEG EA 12-26 HR UNMNTR: CPT

## 2021-03-13 PROCEDURE — 99231 SBSQ HOSP IP/OBS SF/LOW 25: CPT | Mod: 25 | Performed by: PSYCHIATRY & NEUROLOGY

## 2021-03-13 PROCEDURE — 250N000013 HC RX MED GY IP 250 OP 250 PS 637: Performed by: PHYSICIAN ASSISTANT

## 2021-03-13 PROCEDURE — 95720 EEG PHY/QHP EA INCR W/VEEG: CPT | Performed by: PSYCHIATRY & NEUROLOGY

## 2021-03-13 RX ADMIN — Medication 10 MG: at 23:29

## 2021-03-13 RX ADMIN — FLUOXETINE HYDROCHLORIDE 80 MG: 40 CAPSULE ORAL at 07:50

## 2021-03-13 RX ADMIN — Medication 10 MG: at 00:44

## 2021-03-13 ASSESSMENT — ACTIVITIES OF DAILY LIVING (ADL)
ADLS_ACUITY_SCORE: 15

## 2021-03-13 NOTE — PLAN OF CARE
Status: Here on VEEG monitoring for presurgical evaluation. No events noted this shift - off all AEDs. MHx of Aspergers  Vitals: AVSS  Neuros: A&Ox4. Intact except BUE tremors at baseline  IV: PIV SL  Labs/Electrolytes: WNL  Resp/trach: No cough or SOB  Diet: Regular  Bowel status: BS+x4. Last BM yesterday  : Voiding spontaneously in bathroom  Skin: Intact  Pain: Denies  Activity: Up Ax1 with gb - for seizure precautions  Social: Mom is his designated visitor  Plan: Has had 4 target events this hospital stay- needs some more. Will continue to monitor for.

## 2021-03-13 NOTE — PLAN OF CARE
Status: admitted for seizure monitoring, no events this shift. Hx: Asperger's syndrome   Vitals: VSS on RA, continuous pulse ox on.   Neuros: AO x 4. Denies N/T. 5/5 all extremities. BUE tremors.   IV: PIV SL   Labs/Electrolytes: WNL   Resp/trach: LS clear, no SOB reported.   Diet: Regular.   Bowel status: No BM this shift, BS present.   : Voiding spontaneously via toilet.   Skin: Intact, redness around old EEG sites.   Pain: No statements of pain this shift.  Activity: SBA d/t seizure precautions. Walked unit x 2 w/ RN.   Social: Mother at bedside for part of the shift.   Plan: Continue to monitor and follow POC.

## 2021-03-14 ENCOUNTER — APPOINTMENT (OUTPATIENT)
Dept: NEUROLOGY | Facility: CLINIC | Age: 26
End: 2021-03-14
Attending: PSYCHIATRY & NEUROLOGY
Payer: COMMERCIAL

## 2021-03-14 PROCEDURE — 258N000003 HC RX IP 258 OP 636: Performed by: PSYCHIATRY & NEUROLOGY

## 2021-03-14 PROCEDURE — 95720 EEG PHY/QHP EA INCR W/VEEG: CPT | Mod: GC | Performed by: PSYCHIATRY & NEUROLOGY

## 2021-03-14 PROCEDURE — 99231 SBSQ HOSP IP/OBS SF/LOW 25: CPT | Mod: 25 | Performed by: PSYCHIATRY & NEUROLOGY

## 2021-03-14 PROCEDURE — 250N000009 HC RX 250: Performed by: PSYCHIATRY & NEUROLOGY

## 2021-03-14 PROCEDURE — 95714 VEEG EA 12-26 HR UNMNTR: CPT

## 2021-03-14 PROCEDURE — 120N000002 HC R&B MED SURG/OB UMMC

## 2021-03-14 PROCEDURE — 250N000013 HC RX MED GY IP 250 OP 250 PS 637: Performed by: PHYSICIAN ASSISTANT

## 2021-03-14 PROCEDURE — 250N000013 HC RX MED GY IP 250 OP 250 PS 637: Performed by: PSYCHIATRY & NEUROLOGY

## 2021-03-14 RX ORDER — ZONISAMIDE 100 MG/1
300 CAPSULE ORAL EVERY MORNING
Status: DISCONTINUED | OUTPATIENT
Start: 2021-03-15 | End: 2021-03-16 | Stop reason: HOSPADM

## 2021-03-14 RX ORDER — ZONISAMIDE 100 MG/1
400 CAPSULE ORAL AT BEDTIME
Status: DISCONTINUED | OUTPATIENT
Start: 2021-03-14 | End: 2021-03-16 | Stop reason: HOSPADM

## 2021-03-14 RX ORDER — DIVALPROEX SODIUM 500 MG/1
500 TABLET, EXTENDED RELEASE ORAL EVERY EVENING
Status: COMPLETED | OUTPATIENT
Start: 2021-03-14 | End: 2021-03-14

## 2021-03-14 RX ORDER — DIVALPROEX SODIUM 500 MG/1
1000 TABLET, EXTENDED RELEASE ORAL EVERY EVENING
Status: DISCONTINUED | OUTPATIENT
Start: 2021-03-15 | End: 2021-03-16 | Stop reason: HOSPADM

## 2021-03-14 RX ORDER — LAMOTRIGINE 100 MG/1
100 TABLET ORAL 2 TIMES DAILY
Status: COMPLETED | OUTPATIENT
Start: 2021-03-15 | End: 2021-03-15

## 2021-03-14 RX ORDER — LAMOTRIGINE 25 MG/1
50 TABLET ORAL DAILY
Status: DISPENSED | OUTPATIENT
Start: 2021-03-14 | End: 2021-03-16

## 2021-03-14 RX ORDER — DIVALPROEX SODIUM 500 MG/1
500 TABLET, EXTENDED RELEASE ORAL EVERY MORNING
Status: DISCONTINUED | OUTPATIENT
Start: 2021-03-15 | End: 2021-03-16 | Stop reason: HOSPADM

## 2021-03-14 RX ORDER — LAMOTRIGINE 150 MG/1
150 TABLET ORAL DAILY
Status: DISCONTINUED | OUTPATIENT
Start: 2021-03-16 | End: 2021-03-15

## 2021-03-14 RX ADMIN — FLUOXETINE HYDROCHLORIDE 80 MG: 40 CAPSULE ORAL at 07:35

## 2021-03-14 RX ADMIN — SODIUM CHLORIDE 1000 MG: 9 INJECTION, SOLUTION INTRAVENOUS at 15:49

## 2021-03-14 RX ADMIN — ZONISAMIDE 400 MG: 100 CAPSULE ORAL at 22:33

## 2021-03-14 RX ADMIN — LAMOTRIGINE 50 MG: 25 TABLET ORAL at 15:52

## 2021-03-14 RX ADMIN — DIVALPROEX SODIUM 500 MG: 500 TABLET, EXTENDED RELEASE ORAL at 20:32

## 2021-03-14 ASSESSMENT — ACTIVITIES OF DAILY LIVING (ADL)
ADLS_ACUITY_SCORE: 15

## 2021-03-14 NOTE — PLAN OF CARE
Status: admitted for seizure monitoring, no events this shift. Per MD, 4 events seen on EEG overnight. Hx: Asperger's syndrome   Vitals: VSS on RA, continuous pulse ox on.   Neuros: AO x 4. Denies N/T. 5/5 all extremities. BUE tremors.   IV: PIV SL   Labs/Electrolytes: WNL   Resp/trach: LS clear, no SOB reported.   Diet: Regular.   Bowel status: No BM this shift, BS present.   : Voiding spontaneously via toilet.   Skin: Intact, redness around old EEG sites.   Pain: No statements of pain this shift.  Activity: SBA d/t seizure precautions. Walked unit x 2 w/ RN.   Social: Updated Mother over the phone.   Plan: Restarted AED's this shift. Possible discharge on Tuesday, following PET scan. Continue to monitor and follow POC.

## 2021-03-14 NOTE — PROGRESS NOTES
Epilepsy Service Progress Note   Interval History:   Sergio had 4 seizures out of sleep since midnight. He felt a little confused when he woke up, he didn't know where he was, why he was here. He is back to baseline now. He denies headache, dizziness or other complaints.    Physical Exam:   /88 (BP Location: Left arm)   Pulse 103   Temp 98.5  F (36.9  C) (Oral)   Resp 16   Wt 91.2 kg (201 lb)   SpO2 98%   BMI 28.23 kg/m    Alert, awake, cooperative, no aphasia or dysarthria, EOMI, visual fields full, face symmetric, normal tone and muscle strength 5/5, left greater than right hand tremors in outstretched arms and finger to nose.     Antiepileptic Medications Home Doses: divalproex -1000, aptiom 1200 hs, lamotrigine 150-150, zonisamide 300-400  Antiepileptic Medications Current Doses: none     Current Facility-Administered Medications   Medication     acetaminophen (TYLENOL) tablet 650 mg     bacitracin ointment     docusate sodium (COLACE) capsule 100 mg     FLUoxetine (PROzac) capsule 80 mg     ibuprofen (ADVIL/MOTRIN) tablet 200 mg     [START ON 3/15/2021] lamoTRIgine (LaMICtal) tablet 100 mg     [START ON 3/16/2021] lamoTRIgine (LaMICtal) tablet 150 mg     lamoTRIgine (LaMICtal) tablet 50 mg     lidocaine (LMX4) cream     lidocaine (XYLOCAINE) 2 % solution 5 mL     lidocaine 1 % 0.1-1 mL     LORazepam (ATIVAN) injection 2 mg     magnesium hydroxide (MILK OF MAGNESIA) suspension 30 mL     melatonin tablet 10 mg     sodium chloride (PF) 0.9% PF flush 3 mL     sodium chloride (PF) 0.9% PF flush 3 mL     [START ON 3/15/2021] zonisamide (ZONEGRAN) capsule 300 mg     zonisamide (ZONEGRAN) capsule 400 mg     Assessment/Plan:       Amrit Padilla is a 25 year old right-handed male with a history of depression, migraines, asperger syndrome and focal epilepsy who is being admitted for a presurgical evaluation. Has had 8 electrographic seizures recorded (and additional one during hygiene). Most seizures had a  bifrontal or right frontal maxima at onset.  I talked to Sergio's parents on the phone and discussed the findings on his seizures and medication plan.  We discussed restarting his home antiseizure medications except Aptiom since it was thought that perhaps Aptiom worsened his seizures.  I suggested to monitor Sergio for seizures in his the sleep for a few nights.  Parents state that would be easy, since they are going to a vacation and Sergio is going to sleep in the same room as his sibling.  I showed Sergio's seizures to him so he could tell his family what to expect with his seizures.  I discussed doing a PET scan before going home.  Will restart his medications on a lower dose today and will resume home dose tomorrow.     -continue vEEG monitoring  - Restart PTA medications except Aptiom   -seizure/fall precautions  - lorazepam 2 mg prn per seizure protocol   - tentative discharge on Tuesday  - FDG PET on Tuesday     Type of target event identified: staring spell with altered awareness and event with alteration in awareness   Number of events: No more needed  Discharge medication plan: Restart PTA medications except Aptiom  Further Imaging studies needed prior to discharge: FDG PET  Discharge transportation: not discussed  Other pertinent issues/goals for discharge: not at this time     I spent 20 minutes in the care of this patient. More than 50% of time spent on counseling and coordinating care, including discussion of the EEG findings, anti-seizure medication management, and answering the patient and family's questions.   Negra Montano MD

## 2021-03-14 NOTE — PROGRESS NOTES
Epilepsy Service Progress Note   Interval History:   The patient did not have any seizures overnight. He feels his tongue is more sore than yesterday and he thinks he might have had a seizure last night. He denies headache or sore muscles.     Physical Exam:   /81 (BP Location: Left arm)   Pulse 66   Temp 98.4  F (36.9  C) (Oral)   Resp 16   Wt 91.2 kg (201 lb)   SpO2 96%   BMI 28.23 kg/m    Alert, awake, cooperative, no aphasia or dysarthria, EOMI, face symmetric, normal tone and muscle strength 5/5, left greater than right hand tremors in outstretched arms and finger to nose.    Antiepileptic Medications Home Doses: divalproex -1000, aptiom 1200 hs, lamotrigine 150-150, zonisamide 300-400  Antiepileptic Medications Current Doses: none     Current Facility-Administered Medications   Medication     acetaminophen (TYLENOL) tablet 650 mg     bacitracin ointment     docusate sodium (COLACE) capsule 100 mg     FLUoxetine (PROzac) capsule 80 mg     ibuprofen (ADVIL/MOTRIN) tablet 200 mg     lidocaine (LMX4) cream     lidocaine (XYLOCAINE) 2 % solution 5 mL     lidocaine 1 % 0.1-1 mL     LORazepam (ATIVAN) injection 2 mg     magnesium hydroxide (MILK OF MAGNESIA) suspension 30 mL     melatonin tablet 10 mg     sodium chloride (PF) 0.9% PF flush 3 mL     sodium chloride (PF) 0.9% PF flush 3 mL     Assessment/Plan:      Amrit Padilla is a 25 year old right-handed male with a history of depression, migraines, asperger syndrome and focal epilepsy who is being admitted for a presurgical evaluation. Has had 4 electrographic seizures recorded (and additional one during hygiene), need to record couple more for presurgical evaluation.      -continue vEEG monitoring  -continue off zonisamide, divalproex, lamotrigine and aptiom    -seizure/fall precautions  -lorazepam 2 mg prn per seizure protocol      Type of target event identified: staring spell with altered awareness and event with alteration in awareness -  partial motor component   Number of events: more needed  Discharge medication plan: To be decided  Further Imaging studies needed prior to discharge: TBD, possibly PET  Discharge transportation: not discussed  Other pertinent issues/goals for discharge: not at this time     I spent 20 minutes in the care of this patient. More than 50% of time spent on  counseling and coordinating care, including discussion of the EEG findings, anti-seizure medication management, and answering the patient and family's questions.   Negra Montano MD

## 2021-03-14 NOTE — PLAN OF CARE
Status: Here on VEEG monitoring for presurgical evaluation. No events noted this shift - off all AEDs. MHx of Aspergers  Vitals: AVSS  Neuros: A&Ox4. Intact except BUE tremors at baseline  IV: PIV SL  Labs/Electrolytes: WNL  Resp/trach: No cough or SOB  Diet: Regular  Bowel status: BS+x4. Last BM today  : Voiding spontaneously in bathroom  Skin: Intact except marks on tongue from previously biting it - no bleeding  Pain: Denies  Activity: Up Ax1 with gb - for seizure precautions  Social: Mom is his designated visitor  Plan: Has had 4 target events this hospital stay- needs some more. Will continue to monitor for events.

## 2021-03-15 ENCOUNTER — APPOINTMENT (OUTPATIENT)
Dept: NEUROLOGY | Facility: CLINIC | Age: 26
End: 2021-03-15
Attending: PSYCHIATRY & NEUROLOGY
Payer: COMMERCIAL

## 2021-03-15 VITALS
TEMPERATURE: 98.2 F | HEART RATE: 65 BPM | WEIGHT: 201 LBS | BODY MASS INDEX: 28.23 KG/M2 | RESPIRATION RATE: 16 BRPM | DIASTOLIC BLOOD PRESSURE: 64 MMHG | OXYGEN SATURATION: 94 % | SYSTOLIC BLOOD PRESSURE: 128 MMHG

## 2021-03-15 PROCEDURE — 120N000002 HC R&B MED SURG/OB UMMC

## 2021-03-15 PROCEDURE — 250N000013 HC RX MED GY IP 250 OP 250 PS 637: Performed by: PSYCHIATRY & NEUROLOGY

## 2021-03-15 PROCEDURE — 99231 SBSQ HOSP IP/OBS SF/LOW 25: CPT | Performed by: PHYSICIAN ASSISTANT

## 2021-03-15 PROCEDURE — 250N000013 HC RX MED GY IP 250 OP 250 PS 637: Performed by: PHYSICIAN ASSISTANT

## 2021-03-15 PROCEDURE — 95720 EEG PHY/QHP EA INCR W/VEEG: CPT | Mod: GC | Performed by: PSYCHIATRY & NEUROLOGY

## 2021-03-15 PROCEDURE — 95714 VEEG EA 12-26 HR UNMNTR: CPT

## 2021-03-15 RX ORDER — LORAZEPAM 2 MG/ML
2 CONCENTRATE ORAL EVERY 12 HOURS PRN
Qty: 30 ML | Status: CANCELLED | OUTPATIENT
Start: 2021-03-15

## 2021-03-15 RX ORDER — LAMOTRIGINE 150 MG/1
150 TABLET ORAL 2 TIMES DAILY
Status: DISCONTINUED | OUTPATIENT
Start: 2021-03-16 | End: 2021-03-16 | Stop reason: HOSPADM

## 2021-03-15 RX ADMIN — LAMOTRIGINE 100 MG: 100 TABLET ORAL at 08:38

## 2021-03-15 RX ADMIN — DIVALPROEX SODIUM 1000 MG: 500 TABLET, EXTENDED RELEASE ORAL at 19:44

## 2021-03-15 RX ADMIN — ZONISAMIDE 300 MG: 100 CAPSULE ORAL at 07:34

## 2021-03-15 RX ADMIN — DIVALPROEX SODIUM 500 MG: 500 TABLET, EXTENDED RELEASE ORAL at 07:34

## 2021-03-15 RX ADMIN — Medication 10 MG: at 22:04

## 2021-03-15 RX ADMIN — FLUOXETINE HYDROCHLORIDE 80 MG: 40 CAPSULE ORAL at 07:33

## 2021-03-15 RX ADMIN — LAMOTRIGINE 100 MG: 100 TABLET ORAL at 19:44

## 2021-03-15 RX ADMIN — Medication 10 MG: at 01:24

## 2021-03-15 RX ADMIN — ZONISAMIDE 400 MG: 100 CAPSULE ORAL at 22:03

## 2021-03-15 ASSESSMENT — ACTIVITIES OF DAILY LIVING (ADL)
ADLS_ACUITY_SCORE: 15

## 2021-03-15 NOTE — PLAN OF CARE
Status: Here on VEEG monitoring for presurgical evaluation. No events noted this shift - off all AEDs. MHx of Aspergers  Vitals: AVSS  Neuros: A&Ox4. Intact except BUE tremors at baseline  IV: PIV SL  Labs/Electrolytes: WNL  Resp/trach: No cough or SOB  Diet: Regular  Bowel status: BS+x4. Last BM today  : Voiding spontaneously in bathroom  Skin: Intact except marks on tongue from previously biting it - no bleeding  Pain: Denies  Activity: Up Ax1 with gb - for seizure precautions  Social: Mom is his designated visitor  Plan: Pt back on AEDs.  Will discharge to home on Tuesday after PET.

## 2021-03-15 NOTE — PROGRESS NOTES
[facility], [place]   Epilepsy Service Daily Note      Interval History:   Two electrographic seizures yesterday at 0613 and 1022. His PTA antiepileptic drugs have been restarted, except aptiom which he will not continue at discharge. Lamotrigine will be increased to full PTA dose starting tomorrow.     Review of System:   Tiredness, some dizziness. No nausea, No vomiting, no dizziness, no chest pain.     Medications:   Antiepileptic Medications Home Doses: divalproex -1000, aptiom 1200 hs, lamotrigine 150-150, zonisamide 300-400  Antiepileptic Medications Current Doses: divalproex 500-1000, lamotrigine 100-100, zonisamide 300-400    Exam: Blood pressure 118/68, pulse 60, temperature 97  F (36.1  C), temperature source Oral, resp. rate 16, weight 91.2 kg (201 lb), SpO2 99 %.   General: NAD  Neuro: Alert and oriented. Speech fluent. Hearing intact to normal conversation. Pupils equal, round and reactive to light. EOM's intact. Visual fields full. Face symmetric. Strong shoulder shrug bilaterally. Strength 5/5 bilaterally. No drift or pronation. Bilateral hand tremor. Sensation grossly intact to light touch.    EEG: see formal reports    Assessment and Plan: patient seen and discussed with Dr. Maverick Ríos.  Patient is a 25 year old, right-handed male with a history of depression, migraines, asperger syndrome and focal epilepsy who is being admitted for a presurgical evaluation. Has had multiple electrographic seizures recorded this admission.     -continue vEEG monitoring  -continue off aptiom   -continue PTA divalproex 500-1000  -continue PTA zonisamide 300-400  -lamotrigine 100-100 today, starting tomorrow 150-150  -plan for discharge tomorrow morning (PET unable to be arranged prior to discharge, can be done as outpatient)        Annia Iglesias PA-C    Type of target event identified: staring spell with altered awareness and event with alteration in awareness - partial motor  component   Number of events: adequate number   Discharge medication plan: PTA divalproex, zonisamide and lamotrigine (no Aptiom)  Further Imaging studies needed prior to discharge: TBD, possibly PET-unable to be done prior to discharge   Discharge transportation: patient to arrange   Other pertinent issues/goals for discharge: not at this time       Total time: 20 minute was spent in the care of this patient. The patient agrees with the above mentioned plan of care. I answered all the patient's questions and addressed immediate concerns. More than 50% of time spent consisted of counseling and coordinating care, including discussion of the diagnostic significance of EEG findings, anti-seizure medication management, and planning for discharge home

## 2021-03-15 NOTE — PLAN OF CARE
Status: admitted for seizure monitoring, no events this shift. Hx: Asperger's syndrome   Vitals: VSS on RA, continuous pulse ox on.   Neuros: AO x 4. Denies N/T. 5/5 all extremities. BUE tremors.   IV: PIV SL   Labs/Electrolytes: WNL   Resp/trach: LS clear, no SOB reported.   Diet: Regular.   Bowel status: No BM this shift, BS present.   : Voiding spontaneously via toilet.   Skin: Intact, redness around old EEG sites.   Pain: No statements of pain this shift.  Activity: SBA d/t seizure precautions. Walked unit x 3 w/ RN.   Social: Updated mother about discharge plan for tomorrow.  Plan: Discharge on Tuesday around 1030 w/ PET scan outpatient (possibly Thursday). Continue to monitor and follow POC.

## 2021-03-16 ENCOUNTER — PATIENT OUTREACH (OUTPATIENT)
Dept: CARE COORDINATION | Facility: CLINIC | Age: 26
End: 2021-03-16

## 2021-03-16 ENCOUNTER — APPOINTMENT (OUTPATIENT)
Dept: NEUROLOGY | Facility: CLINIC | Age: 26
End: 2021-03-16
Attending: PSYCHIATRY & NEUROLOGY
Payer: COMMERCIAL

## 2021-03-16 PROCEDURE — 99239 HOSP IP/OBS DSCHRG MGMT >30: CPT | Mod: 25 | Performed by: PHYSICIAN ASSISTANT

## 2021-03-16 PROCEDURE — 250N000013 HC RX MED GY IP 250 OP 250 PS 637: Performed by: PHYSICIAN ASSISTANT

## 2021-03-16 PROCEDURE — 250N000013 HC RX MED GY IP 250 OP 250 PS 637: Performed by: PSYCHIATRY & NEUROLOGY

## 2021-03-16 PROCEDURE — 95711 VEEG 2-12 HR UNMONITORED: CPT

## 2021-03-16 PROCEDURE — 95718 EEG PHYS/QHP 2-12 HR W/VEEG: CPT | Mod: GC | Performed by: PSYCHIATRY & NEUROLOGY

## 2021-03-16 RX ORDER — LORAZEPAM 2 MG/ML
CONCENTRATE ORAL
Qty: 30 ML | Refills: 0 | Status: SHIPPED | OUTPATIENT
Start: 2021-03-16 | End: 2021-03-31

## 2021-03-16 RX ADMIN — LAMOTRIGINE 150 MG: 150 TABLET ORAL at 09:54

## 2021-03-16 RX ADMIN — ZONISAMIDE 300 MG: 100 CAPSULE ORAL at 09:53

## 2021-03-16 RX ADMIN — DIVALPROEX SODIUM 500 MG: 500 TABLET, EXTENDED RELEASE ORAL at 09:54

## 2021-03-16 RX ADMIN — FLUOXETINE HYDROCHLORIDE 80 MG: 40 CAPSULE ORAL at 09:53

## 2021-03-16 ASSESSMENT — ACTIVITIES OF DAILY LIVING (ADL)
ADLS_ACUITY_SCORE: 15

## 2021-03-16 NOTE — PLAN OF CARE
Patient discharged home today at 1100. Patient is stable and medically ready for discharge. Patient was sent home with all belongings and discharge prescriptions were sent with patient. Discharge paperwork was given to and discussed with patient. Patient verbalized understanding of discharge instructions and follow up appointments. Will continue to monitor.    Pt on 6A for VEEG monitoring. No events witnessed or reported. EEG leads removed for discharge. Neuros intact ex BUE tremors and R eye nystagmus. Pt denies N/T and pain. Will continue to monitor and follow POC.

## 2021-03-16 NOTE — DISCHARGE SUMMARY
"[facility]  EPILEPSY SERVICE DISCHARGE SUMMARY    Patient Name:  Amrit Padilla  MRN:  [id number]      :  1995      Date of Admission:  3/2/2021  Date of Discharge::  3/16/2021  Admitting Physician:  Norman Dukes MD  Discharge Physician:  ROSENDO Burrell, Dr. Temple   Primary Care Provider:   No Ref-Primary, Physician  Discharge Disposition:   Discharged to home    Admission Diagnoses:  1. Intractable focal epilepsy  2. Asperger's syndrome   3. Depression  4. Migraines  5. Vestibular issues with episodic imbalance and disequilibrium (seen by ENT)     Discharge Diagnoses:    1. Intractable focal epilepsy  2. Asperger's syndrome   3. Depression  4. Migraines  5. Vestibular issues with episodic imbalance and disequilibrium (seen by ENT)       Brief History of Illness:   Patient is a 25 year old, right-handed male with a history of depression, migraines, asperger syndrome and focal epilepsy who is being admitted for a presurgical evaluation.  Seizures started around age 16. He was initially managed at [facility] and started with Dr. Dukes at [facility][1] in 3/2018.  They describe following seizures:   Type 1:  No warning. Mom reports he may repeat \"what, what\" and have some facial twitching or head jerking. His fists clench and has some arm shaking. He is unresponsive. These last 30 seconds. He is amnestic for seizure. After he is confused, tired and may have a headache. This is happening about once a month.   Type 2: No clear warning but may have headache or tiredness prior. Whole body gets stiff and shakes. No incontinence. He bites his tongue or inside of cheek. Lasts 30 seconds or so. After he gets confused, repeats things. He has tried to get out of a moving car and has pushed people away or grabbed at things. His last witnessed one of these was around 1 year ago. They have clustered in the past ( and ) and he has had to go to ER but never been admitted or " "intubated. About once a week he wakes up noticing he has bitten his tongue or inside of cheek and he assumes he had a seizure.   Type 3: Multiple times a week he wakes up and feels he has abnormal thinking, repetitive thoughts, feeling like someone is controlling him or out of body experience. He has trouble focusing and processing what people are saying to him. He does not have loss of awareness and he can talk and communicate with family appropriately. This lasts all day up to 4 days. He attributes this to having a seizure overnight.     Hospital course:  Numerous seizures were recorded, see below for details. He also had various other times in which he pushed the event monitor button reporting out of body feelings, abnormal thinking, feeling out of it or having repetitive thoughts. These did not show seizure on EEG.     Prior to discharge his PTA divalproex, lamotrigine and zonisamide were restarted. His PTA aptiom was stopped.     Patient tolerated admission well. Unfortunately a PET scan was unable to be arranged prior to discharge and will need to be completed as outpatient. Psychiatry consult also not done as he follows with Dr. Barros regularly.     Attending Physician (Dr. Temple) Summary and Plan:  \"I reviewed the results of video-EEG monitoring with Mr. Amrit Padilla in preparation for discharge.  I told him that we recorded 9 epileptic seizures during this admission.    These electroclinical seizures showed bifrontal EEG activities at onset, usually with higher amplitude initial activity over the right frontal region compared to the left.  Ictal manifestations variably included tonic stiffening of his upper body, kicking of his legs, vocalizations, and non-purposeful movements of surrounding objects.  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  The data are reported in detail in the VEEG procedure reports.            He was discharged home on divalproex, lamotrigine and zonisamide, at the " "same doses as on admission.            He remains interested in evaluation for epilepsy surgery, which he has discussed with his primary epileptologist, . Dr. Norman Dukes.            He will be scheduled for a follow-up telephone call with an Epilepsy nurse and for a clinic visit with Dr. Dukes on 04/12/2021. \"    Video EEG:  SUMMARY OF 15 DAYS OF VIDEO-EEG MONITORING:       The patient's home medications were gradually tapered during his inpatient stay, with all four anticonvulsants being discontinued as of Day 10 of video EEG monitoring. During 15 days of video EEG monitoring the background showed intermittent theta slowing during waking consistent with a mild generalized encephalopathy. Additionally, intermittent midline slowing was seen which was of uncertain significance.   Occasional epileptiform discharges were seen with maximum negativity at Fp2-F4, and F8-T4.        A total of 9 focal impaired (complex partial) seizures were recorded during 15 days of VEEG. These seizures involved tonic stiffening of his upper body as well as kicking of his legs. Vocalizations were present in some seizures, and non-purposeful movements of surrounding objects were seen with many of the seizures. One of these seizures occurred while the leads were not hooked up. The 8 seizures which were seen on EEG involved bifrontal onset followed closely by significant muscle artifact. While onset was bifrontal, multiple seizures involved higher amplitude initial activity over the right frontal region compared to the left. These included the second seizure on Day 2 of recording, the second seizure on Day 10 of recording, the first seizure on Day 12, and the first seizure on Day 13. The seizure activity quickly spread over bilateral hemispheres. Greater amplitude slowing was often seen during seizures when muscle artifact cleared. Overall, the patient's seizure semiology is most consistent with a frontal lobe semiology.  Additionally, seizure " onset supports a bifrontal onset, with numerous seizures supporting right frontal onset. Multiple events without ictal correlate were seen involving a range of symptoms from spacing out to feeling jittery to feeling as though someone else were controlling him.  Clinical correlation is recommended.    Consultations:    None     Discharge Medications:    Current Discharge Medication List      CONTINUE these medications which have CHANGED    Details   LORazepam (LORAZEPAM INTENSOL) 2 MG/ML (HIGH CONC) solution Take 1 ml (2mg) for 2 focal seizures in 4 hours or for any generalized tonic-clonic seizure. Max 2 ml (4mg) in 24 hours.  Qty: 30 mL, Refills: 0    Associated Diagnoses: Recurrent seizures (H)         CONTINUE these medications which have NOT CHANGED    Details   divalproex sodium extended-release (DEPAKOTE ER) 500 MG 24 hr tablet TAKE 1 TABLET in the morning, and 2 tabs at night.  Qty: 270 tablet, Refills: 3    Associated Diagnoses: Recurrent seizures (H)      FLUoxetine (PROZAC) 40 MG capsule Take 80 mg by mouth daily      lamoTRIgine (LAMICTAL) 150 MG tablet Take 1 tablet (150 mg) by mouth 2 times daily  Qty: 180 tablet, Refills: 3    Associated Diagnoses: Recurrent seizures (H)      MELATONIN PO Take 10 mg by mouth nightly as needed OTC      zonisamide (ZONEGRAN) 100 MG capsule Take 3 caps (300 mg) each AM and 4 caps (400 mg) each HS  Qty: 630 capsule, Refills: 3    Associated Diagnoses: Partial symptomatic epilepsy with complex partial seizures, intractable, without status epilepticus (H)      cetirizine (ZYRTEC) 10 MG tablet Take 10 mg by mouth daily      HEMP OIL OR EXTRACT OR OTHER CBD CANNABINOID, NOT MEDICAL CANNABIS, Take 15 mg by mouth every evening          STOP taking these medications       eslicarbazepine acetate (APTIOM) 600 MG tablet Comments:   Reason for Stopping:               Discharge physical examination:   Vitals:  B/P: 128/64, T: 98.2, P: 65, R: 16  General:  Adult, in NAD,  cooperative  HEENT:  NC/AT  Cardiac:  RRR, no m/r/g  Chest:  CTAB  Abdomen:  S/NT/ND  Extremities:  No LE swelling.    Skin:  No rash or lesion.    Psych:  Mood pleasant, affect congruent  Neuro: Neuro: Alert and oriented. Speech fluent. Hearing intact to normal conversation. Pupils equal, round and reactive to light. EOM's intact. Visual fields full. Face symmetric. Strong shoulder shrug bilaterally. Strength 5/5 bilaterally. No drift or pronation. Bilateral hand tremor. Sensation grossly intact to light touch. DTR's 2+ bilaterally. Toes downgoing. Gait normal. Negative romberg.     Discharge follow up and instructions:    1.  Diet:   As prior to admission   2.  Activity: State laws prohibit operating a motor vehicle following any seizure or other episode with loss of awareness, or inability to sit up (Varies by state, be aware and comply with the regulations applicable to you). State law may require the licensed  to report any future episode to the DMV . Avoid any activities that might lead to self-injury or injury of others following any event with impaired awareness or impaired motor control. Such activities include but are not limited to holding babies or young children at heights from which they might be injured if dropped, bathing infants or young children in situations in which they might drown without continuous interactive care by an adult who is fully capable at all times during the bath, operating power cutting or other tools, handling firearms, exposure to heights from which you might fall, exposure to vessels with hot cooking oil or water, and swimming alone.  3.  Follow up:  [facility] nurse call in 2-3 days. Follow-up video visit in 4 weeks with Dr. Dukes. Neuropsychiatry testing as scheduled. PET unable to be arranged prior to discharge, will need to be completed as outpatient. Psychiatry consult not done as patient follows with Dr. Barros regularly.     ROSENDO Burrell    Total time:  35 minutes was spent in the care of this patient. The patient agrees with the above mentioned plan of care. I answered all the patient's questions and addressed immediate concerns. More than 50% of time spent consisted of counseling and coordinating care, including discussion of the diagnostic significance of EEG findings, anti-seizure medication management, and planning for discharge home

## 2021-03-16 NOTE — PLAN OF CARE
Status: admitted for seizure monitoring, no events this shift.   Vitals: VSS on RA, continuous pulse ox on.   Neuros: AO x 4. Denies N/T. 5/5 all extremities. BUE tremors. Nystagmus in right eye  IV: PIV SL   Labs/Electrolytes: WNL   Resp/trach: LS clear, no SOB reported.   Diet: Regular.   Bowel status: BM this shift BS present.   : Voiding spontaneously via toilet.   Skin: Intact, redness around old EEG sites.   Pain: No statements of pain this shift.  Activity: SBA d/t seizure precautions.  Plan: Discharge this AM 1030 w/ PET scan outpatient (possibly Thursday). Continue to monitor and follow POC.

## 2021-03-16 NOTE — PROGRESS NOTES
The patient will be contacted by another RNCC for post-hospital follow up. Will close encounter at this time.    Name: Amrit Padilla MRN: [id number]   Date: 3/18/2021 Status: [facility]   Time: 10:00 AM Length: 30   Visit Type: TELEPHONE VISIT RETURN [[id number]] KAYLA:     Provider: Khushbu, Isabel Rojas Nurse Department: [facility] EPILE[facility]         Keena Buckley CMA, GREWAL  Post Hospital Discharge Team

## 2021-03-18 ENCOUNTER — VIRTUAL VISIT (OUTPATIENT)
Dept: NEUROLOGY | Facility: CLINIC | Age: 26
End: 2021-03-18
Payer: COMMERCIAL

## 2021-03-18 DIAGNOSIS — G40.219 PARTIAL SYMPTOMATIC EPILEPSY WITH COMPLEX PARTIAL SEIZURES, INTRACTABLE, WITHOUT STATUS EPILEPTICUS (H): Primary | ICD-10-CM

## 2021-03-18 NOTE — PROGRESS NOTES
Discharge phone call:    Patient was admitted to [facility] from 3/2/21 to 3/16/21 for characterization of seizures.    Admission diagnosis:  Intractable focal epilepsy    Discharge diagnosis:  same    Patient's understanding of the discharge diagnosis: He explains to me that they said to stop Aptiom.      Hospital course: Chart review shows that multiple seizures were recorded during the study, his EEG improved when aptiom was weaned and thus he was not restarted on it.    Medications confirmed:  No longer on Aptiom. Other medications review, taking correctly    Questions regarding admission or discharge instructions:  None.     Confirmed follow up scheduled: Patient will see Dr. Dukes 4/12/21 10AM, he needs to reschedule the neuropsych exam because he will be out of town during the appointment.     Yu Moss RN

## 2021-03-19 ENCOUNTER — TELEPHONE (OUTPATIENT)
Dept: NEUROLOGY | Facility: CLINIC | Age: 26
End: 2021-03-19

## 2021-03-19 NOTE — TELEPHONE ENCOUNTER
Call placed to patient.  He notes he feels similar to how he does after he has a seizures  It is difficult to explain the feeling  He has been feeling it since about 9 or 10am, it was definitely not there yesterday or overnight    He does not believe he has had a seizure: no-one reported to him that he had a seizure, and he has no injury to suggest he had a seizure  Specifically denies tongue or cheek bite, or any unexplained bruising, or cuts and scrapes.    Patient is aware that his aptiom was discontinued during the hospital stay and seemed concerned that his seizures might get worse.  We discussed that the physician at the hospital reported an improvement in the EEG with the medication discontinuation, and that he has the lorazepam Intensol for rescue if needed.    We discussed that he could continue to observe the sensation, and if it worsens or becomes more intense he should call us, or if overt seizure activity happens he should contact us  Patient expressed understanding of this.    He will give us an update next week if he still feels he has concerns.

## 2021-03-19 NOTE — TELEPHONE ENCOUNTER
Patient would like a call back to discuss some different feelings he is having right now, felt like when when he is going to have a seizure and when he is the hospital getting inpatient EEG done.

## 2021-03-23 NOTE — PROGRESS NOTES
ATTENDING  EPILEPTOLOGIST  DISCHARGE  NOTE:         I reviewed the results of video-EEG monitoring with Mr. Amrit Padilla in preparation for discharge.  I told him that we recorded 9 epileptic seizures during this admission.    These electroclinical seizures showed bifrontal EEG activities at onset, usually with higher amplitude initial activity over the right frontal region compared to the left.  Ictal manifestations variably included tonic stiffening of his upper body, kicking of his legs, vocalizations, and non-purposeful movements of surrounding objects.  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  The data are reported in detail in the VEEG procedure reports.           He was discharged home on divalproex, lamotrigine and zonisamide, at the same doses as on admission.           He remains interested in evaluation for epilepsy surgery, which he has discussed with his primary epileptologist, Dr. Dr. Norman Dukes.           He will be scheduled for a follow-up telephone call with an Epilepsy nurse and for a clinic visit with Dr. Dukes on 04/12/2021.   Amrit Temple M.D., Professor of Neurology

## 2021-03-26 ENCOUNTER — TELEPHONE (OUTPATIENT)
Dept: NEUROLOGY | Facility: CLINIC | Age: 26
End: 2021-03-26

## 2021-03-26 NOTE — TELEPHONE ENCOUNTER
"Patient called in to report abnormal feeling. He states that when he looks at something his \"mind will go deep into thought about that object\". This is a constant feeling that he is experiencing. Sometimes it gets better for about 15 minutes, but then returns. Recent EEG capture multiple abnormal sensations without EEG correlate. Clear complex partial seizures were also recorded on that EEG.    Discussed with patient that I have a low suspicion that these feelings are seizure related. He should continue to monitor it for now. If he develops clear seizures he should contact us. I also encouraged patient to reach out to Dr Dukes's team on Monday.     Maurizio Sibley MD   of Neurology  [facility]      "

## 2021-03-30 ENCOUNTER — TELEPHONE (OUTPATIENT)
Dept: NEUROLOGY | Facility: CLINIC | Age: 26
End: 2021-03-30

## 2021-03-30 DIAGNOSIS — G40.909 RECURRENT SEIZURES (H): ICD-10-CM

## 2021-03-31 RX ORDER — LORAZEPAM 2 MG/ML
CONCENTRATE ORAL
Qty: 30 ML | Refills: 1 | Status: SHIPPED | OUTPATIENT
Start: 2021-03-31 | End: 2022-09-08

## 2021-04-12 ENCOUNTER — VIRTUAL VISIT (OUTPATIENT)
Dept: NEUROLOGY | Facility: CLINIC | Age: 26
End: 2021-04-12
Payer: COMMERCIAL

## 2021-04-12 DIAGNOSIS — G40.909 RECURRENT SEIZURES (H): Primary | ICD-10-CM

## 2021-04-12 ASSESSMENT — PATIENT HEALTH QUESTIONNAIRE - PHQ9: SUM OF ALL RESPONSES TO PHQ QUESTIONS 1-9: 20

## 2021-04-12 NOTE — LETTER
Date:April 17, 2021      Patient was self referred, no letter generated. Do not send.        [facility] Health Information

## 2021-04-12 NOTE — PROGRESS NOTES
Amrit is a 25 year old who is being evaluated via a billable video visit.      How would you like to obtain your AVS? MyChart  If the video visit is dropped, the invitation should be resent by: Send to e-mail at: [email or web address]  Will anyone else be joining your video visit? Yes: Mother will be joining today . How would they like to receive their invitation? Other e-mail: N/A       Video Start Time: 9:50 AM  Video-Visit Details    Type of service:  Video Visit    Video End Time: 10:30 AM    Originating Location (pt. Location): Home    Distant Location (provider location):  [facility] EPILEPSY CARE     Platform used for Video Visit: AmWell and [employer]

## 2021-04-12 NOTE — PROGRESS NOTES
Depression Response    Patient completed the PHQ-9 assessment for depression and scored >9? Yes  Question 9 on the PHQ-9 was positive for suicidality? Yes  Does patient have current mental health provider? Yes; psychiatrist [facility] in [place], [place].     Is this a virtual visit? Yes   Does patient have suicidal ideation (positive question 9)? Yes - transfer to Red Flag Triage ([phone]) Patient declined triage.  Notify provider.     I personally notified the following: visit provider

## 2021-04-12 NOTE — LETTER
2021       RE: Amrit Padilla  : 1995   MRN: [id number]      Dear Colleague,    Thank you for referring your patient, Amrit Padilla, to the [facility] EPILEPSY CARE at [facility]. Please see a copy of my visit note below.    Amrit is a 25 year old who is being evaluated via a billable video visit.      How would you like to obtain your AVS? MyChart  If the video visit is dropped, the invitation should be resent by: Send to e-mail at: [email or web address]  Will anyone else be joining your video visit? Yes: Mother will be joining today . How would they like to receive their invitation? Other e-mail: N/A       Video Start Time: 9:50 AM  Video-Visit Details    Type of service:  Video Visit    Video End Time: 10:30 AM    Originating Location (pt. Location): Home    Distant Location (provider location):  [facility] EPILEPSY CARE     Platform used for Video Visit: AmWell and D[employer]imity      Depression Response    Patient completed the PHQ-9 assessment for depression and scored >9? Yes  Question 9 on the PHQ-9 was positive for suicidality? Yes  Does patient have current mental health provider? Yes; psychiatrist at [facility] in [place], [place].     Is this a virtual visit? Yes   Does patient have suicidal ideation (positive question 9)? Yes - transfer to Red Flag Triage ([phone]) Patient declined triage.  Notify provider.     I personally notified the following: visit provider               CHIEF COMPLAINT:  Follow up for seizures.      HISTORY OF PRESENT ILLNESS:  Video call for follow up. 25-year-old right-handed male with history of epilepsy, Asperger's disorder, depression.  He was previously seen by Dr. Morales at [facility] for his epilepsy.    He had VEEG monitoring for presurgical evaluation in 2021. Total of 9 epileptic seizures were recorded during this admission. These electroclinical seizures showed bifrontal EEG activities at onset, usually with higher  amplitude initial activity over the right frontal region compared to the left.  Ictal manifestations variably included tonic stiffening of his upper body, kicking of his legs, vocalizations, and non-purposeful movements of surrounding objects.  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  Aptiom was discontinued at discharge.     No plan for suicide or self injury    Since the hospital discharge, he continue to have frequent seizures, probably several times per week.    He is currently taking aptiom 1200 mg qhs,  zonegran 300-400, Lamictal 150 mg bid, Depakote 500 - 1000. He is also taking CBD oil for anxiety.  Double vision resolved after aptiom was discontinued.     He was complaining of vertigo spells, he was seen in the ED.  He was told that he has vestibular problems.       He continue to have small twitches that last for a spit of a second, some of them in clusters. No LOC with these twitches. These likely are not seizures.      Patient tried Peoples diet, but did not continue.     He is seeing Dr. Ben Barros for his depression.  He is also seeing Deo Shipman for therapy. He is complaining of fatigue and sleep over 8-10 hours a day. He is very concerned about the fatigue and is questioning what other options we have for treating his seizures.      They are now open to VEEG monitoring for surgical evaluation, resection or neuromodulations. However, they are still not sure if they want to move forward to the surgical treatment.     Mood improved a lot. No suicidal thoughts, no thoughts of hurting himself.     The patient started to have seizures when he was 16 years old and he was managed at the [facility] for the past few years for his seizures.  He is currently taking Aptiom 1200 mg q.h.s. and the Zonegran 300 mg b.i.d. for his epilepsy.  He was recently admitted to the hospital twice because of 2 clusters of seizures, one in January when he was admitted to [facility] and one was in February  "when [place] [facility]  On both occasions, he had 4-5 back to back generalized tonic-clonic seizures within a short period of time.      According to the mother, the patient has 2 different types of seizures.  Type #1 is described as \"mild seizures,\" during which he will have some facial twitching.  He will have repetitive hand/arm jerking movement.  He will become unresponsive for less than a minute.  Postictally, he was having some confusion and headaches and fatigue.  Before the seizures, he will have an aura of \"seeing bees populating the earth with eggs.\"  This type of seizure is relatively infrequent, average once per month.      Type #2 are generalized tonic-clonic seizures which the patient will have generalized convulsions of the body and his body will be stiff.  It usually will last for 1-2 minutes followed by postictal fatigue and hallucinations.  This happens usually once every 3 months, but sometimes it occurs in clusters with 2-5 seizures back to back.      The patient had tried Keppra and Depakote in the past which did not work.  He had EEGs done in the past which were reported negative.      The patient had severe depression and requested a lot of behavioral health and counseling in the past and at certain time, he was suicidal, but that improved.  At the present time, he is not suicidal.  He does not think about hurting himself.      TRIGGERS FOR SEIZURES:  Depression.      RISK FACTORS FOR SEIZURES:  He had no history of head trauma with loss of consciousness.  No history of febrile convulsions.  No history of CNS infections.         PAST AEDs: Keppra, Depakote. Vimpat caused suicidal ideations.     PAST MEDICAL HISTORY:  Asperger's disorder, depression, epilepsy.      PAST SURGICAL HISTORY:  None.      FAMILY HISTORY:  No family history of seizures.  Both parents have hypertension.  Maternal grandmother had heart disease.      SOCIAL HISTORY:  He is single, living with his mother.  He had " some college education but did not finish because of depression.  He is currently working at [employer].  No smoking, no alcohol, no drug abuse.      REVIEW OF SYSTEMS:  Positive for fatigue, depression.  The rest of the 8-point review of systems are negative.      ALLERGIES:  Sertraline and Zyprexa.      PHYSICAL EXAMINATION:       AAO x 3, speech fluent and appreopriate. No facial weakness, hearing grossly normal.        PREVIOUS DIAGNOSTIC TESTING:  CT scan of the head on 02/27/2018 was reported negative from [facility].  He had EEGs in the past which were reported negative.      IMPRESSION:   1.  Epilepsy.    He had VEEG monitoring for presurgical evaluation in March 2021. Total of 9 epileptic seizures were recorded during this admission. These electroclinical seizures showed bifrontal EEG activities at onset, usually with higher amplitude initial activity over the right frontal region compared to the left.  Ictal manifestations variably included tonic stiffening of his upper body, kicking of his legs, vocalizations, and non-purposeful movements of surrounding objects.  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  Aptiom was discontinued at discharge.     Since the hospital discharge, he continue to have frequent seizures, probably several times per week.    He is currently taking aptiom 1200 mg qhs,  zonegran 300-400, Lamictal 150 mg bid, Depakote 500 - 1000. He is also taking CBD oil for anxiety.      I had extensive discussions in the past with the patient and mother regarding different treatment options for epilepsy, including meds, surgery, neuromodulation and diet treatment.     2.  Asperger's syndrome.   3.  Depression.  He is followed by Dr. Ben Barros and Deo Shipman for therapy.  No plan for suicide or self injury.     PLAN:   1.  Continue and Zonegran 300-400 mg, Lamictal 150 mg bid. Depakote -1000.  2.  Continue to follow up with Dr. Barros for depression. Continue with  therapy.  3.  Continue CBD.  4.  PET CT as outpatient.  5.  Neuropsych testing scheduled on 5/19/2021.  6.  Nurse education regarding epilepsy surgery.  7.  Will discuss surgical option at [facility] after neuropsych testing.      50 min total time was spent on this visit.      40 min was spent on face to face time  5 min was spent on preparation of visit to review charts and labs, ordering medications and tests  5 min was spent on documentation of clinical information      Again, thank you for allowing me to participate in the care of your patient.      Sincerely,    Norman Dukes MD

## 2021-04-13 NOTE — PROGRESS NOTES
CHIEF COMPLAINT:  Follow up for seizures.      HISTORY OF PRESENT ILLNESS:  Video call for follow up. 25-year-old right-handed male with history of epilepsy, Asperger's disorder, depression.  He was previously seen by Dr. Morales at [facility] for his epilepsy.    He had VEEG monitoring for presurgical evaluation in March 2021. Total of 9 epileptic seizures were recorded during this admission. These electroclinical seizures showed bifrontal EEG activities at onset, usually with higher amplitude initial activity over the right frontal region compared to the left.  Ictal manifestations variably included tonic stiffening of his upper body, kicking of his legs, vocalizations, and non-purposeful movements of surrounding objects.  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  Aptiom was discontinued at discharge.     No plan for suicide or self injury    Since the hospital discharge, he continue to have frequent seizures, probably several times per week.    He is currently taking aptiom 1200 mg qhs,  zonegran 300-400, Lamictal 150 mg bid, Depakote 500 - 1000. He is also taking CBD oil for anxiety.  Double vision resolved after aptiom was discontinued.     He was complaining of vertigo spells, he was seen in the ED.  He was told that he has vestibular problems.       He continue to have small twitches that last for a spit of a second, some of them in clusters. No LOC with these twitches. These likely are not seizures.      Patient tried Peoples diet, but did not continue.     He is seeing Dr. Ben Barros for his depression.  He is also seeing Deo Shipman for therapy. He is complaining of fatigue and sleep over 8-10 hours a day. He is very concerned about the fatigue and is questioning what other options we have for treating his seizures.      They are now open to VEEG monitoring for surgical evaluation, resection or neuromodulations. However, they are still not sure if they want to move forward to the surgical  "treatment.     Mood improved a lot. No suicidal thoughts, no thoughts of hurting himself.     The patient started to have seizures when he was 16 years old and he was managed at the [facility] for the past few years for his seizures.  He is currently taking Aptiom 1200 mg q.h.s. and the Zonegran 300 mg b.i.d. for his epilepsy.  He was recently admitted to the hospital twice because of 2 clusters of seizures, one in January when he was admitted to [facility] and one was in February when he was admitted to [facility].  On both occasions, he had 4-5 back to back generalized tonic-clonic seizures within a short period of time.      According to the mother, the patient has 2 different types of seizures.  Type #1 is described as \"mild seizures,\" during which he will have some facial twitching.  He will have repetitive hand/arm jerking movement.  He will become unresponsive for less than a minute.  Postictally, he was having some confusion and headaches and fatigue.  Before the seizures, he will have an aura of \"seeing bees populating the earth with eggs.\"  This type of seizure is relatively infrequent, average once per month.      Type #2 are generalized tonic-clonic seizures which the patient will have generalized convulsions of the body and his body will be stiff.  It usually will last for 1-2 minutes followed by postictal fatigue and hallucinations.  This happens usually once every 3 months, but sometimes it occurs in clusters with 2-5 seizures back to back.      The patient had tried Keppra and Depakote in the past which did not work.  He had EEGs done in the past which were reported negative.      The patient had severe depression and requested a lot of behavioral health and counseling in the past and at certain time, he was suicidal, but that improved.  At the present time, he is not suicidal.  He does not think about hurting himself.      TRIGGERS FOR SEIZURES:  Depression.      RISK FACTORS FOR SEIZURES:  He had no " history of head trauma with loss of consciousness.  No history of febrile convulsions.  No history of CNS infections.         PAST AEDs: Keppra, Depakote. Vimpat caused suicidal ideations.     PAST MEDICAL HISTORY:  Asperger's disorder, depression, epilepsy.      PAST SURGICAL HISTORY:  None.      FAMILY HISTORY:  No family history of seizures.  Both parents have hypertension.  Maternal grandmother had heart disease.      SOCIAL HISTORY:  He is single, living with his mother.  He had some college education but did not finish because of depression.  He is currently working at [employer].  No smoking, no alcohol, no drug abuse.      REVIEW OF SYSTEMS:  Positive for fatigue, depression.  The rest of the 8-point review of systems are negative.      ALLERGIES:  Sertraline and Zyprexa.      PHYSICAL EXAMINATION:       AAO x 3, speech fluent and appreopriate. No facial weakness, hearing grossly normal.        PREVIOUS DIAGNOSTIC TESTING:  CT scan of the head on 02/27/2018 was reported negative from [facility].  He had EEGs in the past which were reported negative.      IMPRESSION:   1.  Epilepsy.    He had VEEG monitoring for presurgical evaluation in March 2021. Total of 9 epileptic seizures were recorded during this admission. These electroclinical seizures showed bifrontal EEG activities at onset, usually with higher amplitude initial activity over the right frontal region compared to the left.  Ictal manifestations variably included tonic stiffening of his upper body, kicking of his legs, vocalizations, and non-purposeful movements of surrounding objects.  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  Aptiom was discontinued at discharge.     Since the hospital discharge, he continue to have frequent seizures, probably several times per week.    He is currently taking aptiom 1200 mg qhs,  zonegran 300-400, Lamictal 150 mg bid, Depakote 500 - 1000. He is also taking CBD oil for anxiety.      I  had extensive discussions in the past with the patient and mother regarding different treatment options for epilepsy, including meds, surgery, neuromodulation and diet treatment.     2.  Asperger's syndrome.   3.  Depression.  He is followed by Dr. Ben Barros and Deo Shipman for therapy.  No plan for suicide or self injury.     PLAN:   1.  Continue and Zonegran 300-400 mg, Lamictal 150 mg bid. Depakote -1000.  2.  Continue to follow up with Dr. Barros for depression. Continue with therapy.  3.  Continue CBD.  4.  PET CT as outpatient.  5.  Neuropsych testing scheduled on 5/19/2021.  6.  Nurse education regarding epilepsy surgery.  7.  Will discuss surgical option at [facility] after neuropsych testing.      50 min total time was spent on this visit.      40 min was spent on face to face time  5 min was spent on preparation of visit to review charts and labs, ordering medications and tests  5 min was spent on documentation of clinical information

## 2021-04-19 ENCOUNTER — TELEPHONE (OUTPATIENT)
Dept: NEUROLOGY | Facility: CLINIC | Age: 26
End: 2021-04-19

## 2021-04-19 NOTE — TELEPHONE ENCOUNTER
"What is the concern that needs to be addressed by a nurse? Patient reports that he's been experiencing gashes in his cheeks, weird thoughts when he was half awake and doing some weird things, sick to his stomach \"in my head\", headaches and spinning when he wakes up.  Patient states that half the time he knows what room he is, but doesn't know where he is in the room.     May a detailed message be left on voicemail? Yes    Date of last office visit: 4/12/21    Message routed to: [facility] RN Pool    "

## 2021-04-19 NOTE — TELEPHONE ENCOUNTER
Patient calls the office to offer Dr. Dukes more information than he provided at his office visit last week.     Double vision has returned, despite the discontinuation of aptiom.  When he is at home, has episodes of disorientation, where he knows what room he is in but doesn't know where in the room he is. He has episodes of upset stomach, with gashes inside his mouth; no blood in his mouth. He has woken up with a scratch on his arm without knowing where it came from.     He scheduled his PET scan this morning.

## 2021-04-25 ENCOUNTER — HEALTH MAINTENANCE LETTER (OUTPATIENT)
Age: 26
End: 2021-04-25

## 2021-04-26 ENCOUNTER — TELEPHONE (OUTPATIENT)
Dept: NEUROLOGY | Facility: CLINIC | Age: 26
End: 2021-04-26

## 2021-05-07 ENCOUNTER — ALLIED HEALTH/NURSE VISIT (OUTPATIENT)
Dept: NEUROLOGY | Facility: CLINIC | Age: 26
End: 2021-05-07
Payer: COMMERCIAL

## 2021-05-07 DIAGNOSIS — G40.219 PARTIAL SYMPTOMATIC EPILEPSY WITH COMPLEX PARTIAL SEIZURES, INTRACTABLE, WITHOUT STATUS EPILEPTICUS (H): Primary | ICD-10-CM

## 2021-05-10 NOTE — PROGRESS NOTES
Epilepsy Surgery Education visit      Name:  Amrit Padilla  Date: 2021  :   1995  MRN:  [id number]    Time Spent:  Face-to-face time 90 minutes    Amrit Padilla comes into clinic today at the request of Dr. Dukes Ordering Provider for Pt Teaching of Epilepsy Surgery.    I met with the patient and his mother Sarah for discussion on epilepsy surgery options.     Goals of epilepsy surgery: return to higher education, have shortened postictal period, have to take less time off of work due to seizures.    The process of surgical decision was discussed with the patient, including case management conference, presurgical testing, and how the information will be delivered to him.     We focused on the process for determining which surgical option is best for him. We discussed the MRI, PET scan, WADA test, neuropsychology testing, and case management.    The RNS was discussed.  We reviewed that the RNS can be used if two or less ictal onset zones are identified. It is a palliative device, no cure is expected. The effectiveness improves for more than 7 years post implantation. I explained that the device would initially be turned on for monitoring only, no stimulation, so that data could be gathered. Based on that data,  would program the device to stimulate when specific EEG parameters were met. The goal of this is seizure interruption / cessation before it is clinically present.  To gather the data, the patient was advised to interrogate and download data to the device laptop twice daily and after any clinical seizure, and then upload to the patient  system (PDMS) once weekly.   I explained that the memory of the RNS device is limited, and with a few exceptions, old data is overwritten by new data. Frequent download would permit maximum data retrieval.  I explained that a proximity wand is used to interrogate the device, and that upload from the laptop to the central cloud needs to be  performed either by a landline phone, or by a wired internet connection. Patient is able to provide wired internet.  4-6 weeks after the implantation, the patient will need to return to clinic for initiation of responsive stimulation.     We also discussed the DBS.   We reviewed that the DBS is generally the option used if his intracranial monitoring reveals more than 2 epileptogenic onset zones. It is a palliative device, not curative. This device is implanted over two surgeries and turned on about a month after surgery. He would return to clinic every 3 months after initiation of stimulation to refine device stimulation settings. We discussed patient management of the device, less upkeep needed than the RNS, would manage a handheld .     We also discussed surgical resection of the epileptogenic zone. We discussed that this surgery offers a chance for cure, but it is not guaranteed.     We discussed that with any epilepsy surgery, he would need to continue anticonvulsants for at least two years and has a possibility of requiring less medication that preoperatively at that time.     The patient and his mother were both engaged in the education session. Sergio had some difficulty staying on track with the conversation and took more notice than necessary on some of the less meaningful points in the conversation. Sarah assisted this nurse in translating the information through communication that the patient understood.  If patient pursues a surgical option, further education should be provided for in depth focused information on the surgery chosen.     This service provided today was under the supervising provider of the day Dr. Temple, who was available if needed.    Yu Moss RN  Epilepsy Nurse Coordinator

## 2021-05-19 ENCOUNTER — OFFICE VISIT (OUTPATIENT)
Dept: NEUROLOGY | Facility: CLINIC | Age: 26
End: 2021-05-19
Attending: PSYCHIATRY & NEUROLOGY
Payer: COMMERCIAL

## 2021-05-19 DIAGNOSIS — F33.2 SEVERE RECURRENT MAJOR DEPRESSION WITHOUT PSYCHOTIC FEATURES (H): ICD-10-CM

## 2021-05-19 DIAGNOSIS — R41.844 FRONTAL LOBE AND EXECUTIVE FUNCTION DEFICIT: ICD-10-CM

## 2021-05-19 DIAGNOSIS — G40.919 EPILEPSY WITH ALTERED CONSCIOUSNESS WITH INTRACTABLE EPILEPSY (H): Primary | ICD-10-CM

## 2021-05-19 DIAGNOSIS — F06.8 OTHER SPECIFIED MENTAL DISORDERS DUE TO KNOWN PHYSIOLOGICAL CONDITION: ICD-10-CM

## 2021-05-19 DIAGNOSIS — F41.9 ANXIETY: ICD-10-CM

## 2021-05-19 NOTE — LETTER
"2021       RE: Amrit Padilla  : 1995   MRN: [id number]      Dear Colleague,    Thank you for referring your patient, Amrit Padilla, to the [facility] at [facility]. Please see a copy of my visit note below.    Patient was seen for neuropsychological evaluation at the request of Dr. Norman Dukes, for the purposes of diagnostic clarification and treatment planning. 2 hrs 36 min of test administration and scoring were provided by this writer, Flower Littlejohn.  Please see Dr. Mao Portillo's report for a full interpretation of the findings.      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 He was unaccompanied to the evaluation.      BACKGROUND INFORMATION / INTERVIEW FINDINGS    Records indicate that Mr. Padilla began suffering from seizures at age 16.  He has two seizure types.  The first seizure type are \"mild\" seizures that are characterized by facial twitching, repetitive hand and arm jerking, and unresponsiveness.  He has also had generalized tonic-clonic seizures.  The generalized tonic-clonic seizures have clustered in the past.  Please see Dr. Norman Dukes's notes, most recently from 2021, for more details and background information about his seizures and history.  His seizures have been medically intractable, and there is now discussion of surgical candidacy.  Fifteen days of video EEG monitoring from  through 2021 were read as abnormal and documented 9 complex partial seizures with bifrontal onset.  These seizures had higher amplitude over the right frontal region.  The most recent MRI of his brain on 2020 documented no acute findings, and no enhancement.  He also had an MRI of his brain on " "April 4, 2018 that documented a solitary T2/FLAIR hyperintense lesion in the upper posterior right cerebellar hemisphere measuring up to 7 mm.  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  No mesial temporal sclerosis or acute findings were noted in the study.  He is scheduled to complete an FDG-PET study later this week.  His other medical history includes depression, conduct disturbance, insomnia, tic disorder, pervasive developmental disorder, and allergic rhinitis.  The current evaluation was requested by Dr. Dukes, in this context.    On interview, Mr. Padilla confirmed the above history.  He reported that he first began noticing seizures around age 18.  He stated that he is not entirely sure of the frequency of his seizures, as some are obvious, but some occur in his sleep.  He indicated that his \"obvious\" seizures only occur every 9 or 12 months.  He stated that exertion seems to trigger these seizures, and his last of these seizures was about 15 months ago.  He noted that he had one other event where he was yelling after waking up since then.  Additionally, he has events that occur during his sleep that are suspicious for seizures.  He wakes up, and finds that he has bitten his tongue or his cheek.  He stated that these nighttime events occur approximately twice per week.  He reported that the last of these suspected seizures occurred 2 or 3 days ago.    We next discussed his development and academic history.  Mr. Padilla reported that he is not entirely sure about his early development.  He stated that he struggled with acting out behaviors in elementary school, and was close to being expelled from school in his later elementary school years.  She stated that he always had a paraprofessional with him in school.  He was diagnosed with ADHD in the fourth grade, and also diagnosed with Asperger's disorder at age 7 or 8.  He started receiving additional accommodations in " "school in the fifth or sixth grade.  He had a note taking service, and was able to take tests in a quiet area.  He stated that in the eighth grade, he completed a portion of the year in a program for children with higher functioning autism spectrum disorder.  He reported that in the eighth grade, he had blackout episodes where he would engage in violent behaviors in class.  He reported that he has no recollection of these events.  He stated that he did not have to participate in gym class in middle school.  He returned to [employer] in the ninth grade, but had some additional programming for students with special needs.  He continued to receive academic accommodations including note taking and isolated testing environments.  He completed some college skills courses in high school.  He ultimately graduated from high school.  He went on to earn an associate s degree from [employer].  He stated that he hopes to return to school at the [employer] to study paleontology or archaeology.  He is not currently enrolled in school.    Regarding cognition, I asked Mr. Padilla several different times about his thinking skills.  He stated that he has always had some degree of difficulty with attention, and was diagnosed with ADHD.  I asked him if he had had any changes in his thinking, and he struggled to address this question.  He did note that he has struggled with social skills, and has had trouble making friends.  He stated that he has had one friend these days, but they have not been able to see each other because of the COVID-19 pandemic.  He also reported that he has less energy these days, which he speculated may be due to medication effects.    With respect to mental health, Mr. Padilla reported that he sometimes wakes up and has \"absolutely no mood.\"  He noted that he is still depressed and anxious.  He follows with Dr. Reyes Barros for psychiatric care.  He sees Dr. Barros " about once a month.  He also has a therapist that he sees approximately once per week.  He is in group therapy as well.  He indicated that he had a single suicide attempt in January, 2018.  He noted that he still has some passive thoughts of death, but adamantly denied intention or having a plan.  He denied past hallucinations.    With regard to other medical background, Mr. Padilla reported that he may have had a concussion in the past when he stood up and struck his head on a shelf.  He did not lose consciousness with this injury, but did have a laceration on his head.  He denied prior stroke.  He stated that his sleep is not good, but he has trouble staying awake during the day.  He indicated that he wakes up a lot at night.  He stated that he also has to urinate frequently during the night.  He naps during the day.  He sleeps about 10 hours throughout the course of the day, and slept 8 hours the night before the exam.  He reported that he does not remember if he has ever had a sleep study.  He noted that he has a little bit of a headache, but otherwise denied pain.  Per records, his current medications include cetirizine, divalproex, fluoxetine, hemp oil, lamotrigine, lorazepam, melatonin, and zonisamide.  He denies alcohol, tobacco, or illicit drug use.  He denied known family neurologic history.    Mr. Padilla lives at home with his parents.  He manages his own basic daily activities and his own medications.  He shares management of his finances and meal preparation with his parents.  He does not drive.    By way of background, Mr. Padilla has never been  and does not have children.  His parents remain .  He has a younger brother and a younger sister.  His educational background is as described above.  As noted, he has an associate s degree.  Professionally, he works part-time as a brennan at a grocery store.  He has held this position for 2.5 years.  He also receives Social Security benefits.   In the past, he worked at [employer], in retail positions, at a different grocery store, and in a restaurant.    BEHAVIORAL OBSERVATIONS  Mr. Padilla was polite and cooperative with the exam.  He had a slight tremor of his hands that was present in action.  His eye contact was within normal limits.  He engaged in limited spontaneous conversation.  His speech was normal. His comprehension was normal. His thought processes were concrete and notable for mild distractibility, and mild slowing.  He had reduced confidence in his ability on testing, and was noted to be hard on himself.  He required reassurance from the examiner.  His mood was anxious and depressed with congruent affect. His effort was good. The current results are felt to be an accurate depiction of his cognitive functioning.      RESULTS OF EXAM  His performances on standardized measures of neuropsychological functioning were as follows:    He was oriented to time, place, and various aspects of personal information.  Performance on a measure of single word reading was average.  He obtained passing scores on stand-alone and embedded metrics of cognitive performance validity.  Auditory attention for digits was high average.  Mental calculations were average.  Visual attention for spatial sequences was average.  Learning of words in a list format was superior.  Delayed recall of list words was high average, and performed without error.  Percent retention of list words was average.  Delayed recognition of list words was average.  Learning of simple geometric shapes and their spatial locations was average.  Delayed recall of the shapes and their locations was high average.  Percent retention of the shapes was normal.  Delayed recognition of the shapes was normal, and performed without error.  Visual perceptual matching of faces was performed within normal limits.  Visual problem-solving with blocks was average.  His drawing of a complicated geometric figure  was performed below expectation, and was notable for some degree of inattention to the figure s spatial relations.  Comprehension of phrases and short stories was borderline impaired.  Verbal associative fluency was low average.  Animal fluency was average.  Naming to confrontation was low average.  Verbal abstract reasoning was average.  Fund of knowledge was high average.  Speeded visual sequencing under focused attention was low average.  A similar measure with a divided attention component was low average.  Speeded word reading was low average.  Speeded color naming was average.  Speeded inhibition of an over-learned response was average.  Speeded visual motor coding was average.  Speeded fine motor dexterity was borderline impaired for the dominant, right hand, and impaired for the left hand.    He endorsed items consistent with severe symptoms of depression, and mild symptoms of anxiety on self-report measures.    IMPRESSIONS  Mr. Padilla demonstrated a pattern of weaknesses that is consistent with mild bilateral frontal and subcortical system dysfunction.  The weaknesses in this exam are likely multifactorial, with contributions from his known seizure disorder, as well as from medication toxicity.  Additionally, individuals with autism spectrum disorder often have difficulties with frontal lobe mediated cognitive abilities. In this exam, weaknesses were identified in verbal fluency, verbal working memory, some aspects of executive function, cognitive speed, and bilateral psychomotor speed.  Other cognitive abilities, including anterograde memory for both verbal and nonverbal information, were intact and performed in keeping with his above average range cognitive baseline.  He is also reporting severe symptoms of depression, and mild symptoms of anxiety.  It may be the case that these psychological factors contributed to some degree of variability in his thinking, although I do not think that we can attribute  all of his cognitive weaknesses to psychological factors.    RECOMMENDATIONS  Preliminary results and recommendations were provided to the patient on the date of the evaluation, and all questions were answered.     1.  Continued psychiatric and psychotherapeutic treatment of his mental health is recommended.  In spite of treatment, he is still reporting severely elevated symptoms of depression, and mildly elevated symptoms of anxiety.    2.  There is some evidence to suggest medication toxicity effects.  If medically indicated, consideration might be given to modify treatment of his epilepsy.  Zonisamide in particular is a medication that has been known cognitive side effects.    3.  If he is being considered for respective epilepsy surgery, a [place] exam is recommended.    4.  I think it would be worthwhile to get a letter from his psychiatrist, Dr. Ben Barros, to get his perspective on the patient's suitability for epilepsy surgery.    5. If medically indicated, consideration might be given to a sleep study. He reported that he wakes up often in the night and also naps during the day.    6.  If he completes surgery for his epilepsy, follow-up neuropsychological evaluation is recommended 6 months after this procedure in order to assess and update recommendations as appropriate.  The current results can be used as a baseline at that time.    Mao Portillo, Ph.D., L.P., ABPP  Board Certified in Clinical Neuropsychology   / Licensed Psychologist [id number]    Time spent: One unit (60 minutes) neurobehavioral status exam including interview, clinical assessment of thinking, reasoning, and judgment by licensed and board-certified neuropsychologist (CPT 91324). One unit (60 minutes) neuropsychological testing evaluation by licensed and board-certified neuropsychologist, including integration of patient data, interpretation of standardized test results and clinical data, clinical decision-making,  treatment planning, report, and interactive feedback to the patient, first hour (CPT 86962). Two units (100 minutes) of neuropsychological testing evaluation by licensed and board-certified neuropsychologist, including integration of patient data, interpretation of standardized test results and clinical data, clinical decision-making, consulting with colleagues, treatment planning, report, and interactive feedback to the patient, subsequent hours (CPT 54669). One unit (30 minutes) of psychological and neuropsychological test administration and scoring by technician, first 30 minutes (CPT 28967). Four units (126 minutes) psychological or neuropsychological test administration and scoring by technician, subsequent 30 minutes (CPT 61104). Diagnoses: G40.919, R41.844, F06.8, F33.2, F41.9.          Again, thank you for allowing me to participate in the care of your patient.      Sincerely,    Mao Portillo, PhD LP

## 2021-05-19 NOTE — PROGRESS NOTES
"Name: Amrit Padilla  MR#: [id number]  YOB: 1995  Date of Exam: May 19, 2021    NEUROPSYCHOLOGICAL EVALUATION    IDENTIFYING INFORMATION  Amrit Padilla is a 25 year old year old, right handed, grocery , with 14 years of formal education. He was unaccompanied to the evaluation.      BACKGROUND INFORMATION / INTERVIEW FINDINGS    Records indicate that Mr. Padilla began suffering from seizures at age 16.  He has two seizure types.  The first seizure type are \"mild\" seizures that are characterized by facial twitching, repetitive hand and arm jerking, and unresponsiveness.  He has also had generalized tonic-clonic seizures.  The generalized tonic-clonic seizures have clustered in the past.  Please see Dr. Norman Dukes's notes, most recently from April 12, 2021, for more details and background information about his seizures and history.  His seizures have been medically intractable, and there is now discussion of surgical candidacy.  Fifteen days of video EEG monitoring from March 2 through March 16, 2021 were read as abnormal and documented 9 complex partial seizures with bifrontal onset.  These seizures had higher amplitude over the right frontal region.  The most recent MRI of his brain on November 5, 2020 documented no acute findings, and no enhancement.  He also had an MRI of his brain on April 4, 2018 that documented a solitary T2/FLAIR hyperintense lesion in the upper posterior right cerebellar hemisphere measuring up to 7 mm.  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  No mesial temporal sclerosis or acute findings were noted in the study.  He is scheduled to complete an FDG-PET study later this week.  His other medical history includes depression, conduct disturbance, insomnia, tic disorder, pervasive developmental disorder, and allergic rhinitis.  The current evaluation was requested by Dr. Dukes, in this context.    On interview, Mr. Padilla " "confirmed the above history.  He reported that he first began noticing seizures around age 18.  He stated that he is not entirely sure of the frequency of his seizures, as some are obvious, but some occur in his sleep.  He indicated that his \"obvious\" seizures only occur every 9 or 12 months.  He stated that exertion seems to trigger these seizures, and his last of these seizures was about 15 months ago.  He noted that he had one other event where he was yelling after waking up since then.  Additionally, he has events that occur during his sleep that are suspicious for seizures.  He wakes up, and finds that he has bitten his tongue or his cheek.  He stated that these nighttime events occur approximately twice per week.  He reported that the last of these suspected seizures occurred 2 or 3 days ago.    We next discussed his development and academic history.  Mr. Padilla reported that he is not entirely sure about his early development.  He stated that he struggled with acting out behaviors in elementary school, and was close to being expelled from school in his later elementary school years.  She stated that he always had a paraprofessional with him in school.  He was diagnosed with ADHD in the fourth grade, and also diagnosed with Asperger's disorder at age 7 or 8.  He started receiving additional accommodations in school in the fifth or sixth grade.  He had a note taking service, and was able to take tests in a quiet area.  He stated that in the eighth grade, he completed a portion of the year in a program for children with higher functioning autism spectrum disorder.  He reported that in the eighth grade, he had blackout episodes where he would engage in violent behaviors in class.  He reported that he has no recollection of these events.  He stated that he did not have to participate in gym class in middle school.  He returned to [employer] in the ninth grade, but had some additional programming for " "students with special needs.  He continued to receive academic accommodations including note taking and isolated testing environments.  He completed some college skills courses in high school.  He ultimately graduated from high school.  He went on to earn an associate s degree from [employer].  He stated that he hopes to return to school at the [employer] to study paleontology or archaeology.  He is not currently enrolled in school.    Regarding cognition, I asked Mr. Padilla several different times about his thinking skills.  He stated that he has always had some degree of difficulty with attention, and was diagnosed with ADHD.  I asked him if he had had any changes in his thinking, and he struggled to address this question.  He did note that he has struggled with social skills, and has had trouble making friends.  He stated that he has had one friend these days, but they have not been able to see each other because of the COVID-19 pandemic.  He also reported that he has less energy these days, which he speculated may be due to medication effects.    With respect to mental health, Mr. Padilla reported that he sometimes wakes up and has \"absolutely no mood.\"  He noted that he is still depressed and anxious.  He follows with Dr. Reyes Barros for psychiatric care.  He sees Dr. Barros about once a month.  He also has a therapist that he sees approximately once per week.  He is in group therapy as well.  He indicated that he had a single suicide attempt in January, 2018.  He noted that he still has some passive thoughts of death, but adamantly denied intention or having a plan.  He denied past hallucinations.    With regard to other medical background, Mr. Padilla reported that he may have had a concussion in the past when he stood up and struck his head on a shelf.  He did not lose consciousness with this injury, but did have a laceration on his head.  He denied prior stroke.  He " stated that his sleep is not good, but he has trouble staying awake during the day.  He indicated that he wakes up a lot at night.  He stated that he also has to urinate frequently during the night.  He naps during the day.  He sleeps about 10 hours throughout the course of the day, and slept 8 hours the night before the exam.  He reported that he does not remember if he has ever had a sleep study.  He noted that he has a little bit of a headache, but otherwise denied pain.  Per records, his current medications include cetirizine, divalproex, fluoxetine, hemp oil, lamotrigine, lorazepam, melatonin, and zonisamide.  He denies alcohol, tobacco, or illicit drug use.  He denied known family neurologic history.    Mr. Padilla lives at home with his parents.  He manages his own basic daily activities and his own medications.  He shares management of his finances and meal preparation with his parents.  He does not drive.    By way of background, Mr. Padilla has never been  and does not have children.  His parents remain .  He has a younger brother and a younger sister.  His educational background is as described above.  As noted, he has an associate s degree.  Professionally, he works part-time as a brennan at a grocery store.  He has held this position for 2.5 years.  He also receives Social Security benefits.  In the past, he worked at [employer], in retail positions, at a different grocery store, and in a restaurant.    BEHAVIORAL OBSERVATIONS  Mr. Padilla was polite and cooperative with the exam.  He had a slight tremor of his hands that was present in action.  His eye contact was within normal limits.  He engaged in limited spontaneous conversation.  His speech was normal. His comprehension was normal. His thought processes were concrete and notable for mild distractibility, and mild slowing.  He had reduced confidence in his ability on testing, and was noted to be hard on himself.  He required  reassurance from the examiner.  His mood was anxious and depressed with congruent affect. His effort was good. The current results are felt to be an accurate depiction of his cognitive functioning.      RESULTS OF EXAM  His performances on standardized measures of neuropsychological functioning were as follows:    He was oriented to time, place, and various aspects of personal information.  Performance on a measure of single word reading was average.  He obtained passing scores on stand-alone and embedded metrics of cognitive performance validity.  Auditory attention for digits was high average.  Mental calculations were average.  Visual attention for spatial sequences was average.  Learning of words in a list format was superior.  Delayed recall of list words was high average, and performed without error.  Percent retention of list words was average.  Delayed recognition of list words was average.  Learning of simple geometric shapes and their spatial locations was average.  Delayed recall of the shapes and their locations was high average.  Percent retention of the shapes was normal.  Delayed recognition of the shapes was normal, and performed without error.  Visual perceptual matching of faces was performed within normal limits.  Visual problem-solving with blocks was average.  His drawing of a complicated geometric figure was performed below expectation, and was notable for some degree of inattention to the figure s spatial relations.  Comprehension of phrases and short stories was borderline impaired.  Verbal associative fluency was low average.  Animal fluency was average.  Naming to confrontation was low average.  Verbal abstract reasoning was average.  Fund of knowledge was high average.  Speeded visual sequencing under focused attention was low average.  A similar measure with a divided attention component was low average.  Speeded word reading was low average.  Speeded color naming was average.  Speeded  inhibition of an over-learned response was average.  Speeded visual motor coding was average.  Speeded fine motor dexterity was borderline impaired for the dominant, right hand, and impaired for the left hand.    He endorsed items consistent with severe symptoms of depression, and mild symptoms of anxiety on self-report measures.    IMPRESSIONS  Mr. Padilla demonstrated a pattern of weaknesses that is consistent with mild bilateral frontal and subcortical system dysfunction.  The weaknesses in this exam are likely multifactorial, with contributions from his known seizure disorder, as well as from medication toxicity.  Additionally, individuals with autism spectrum disorder often have difficulties with frontal lobe mediated cognitive abilities. In this exam, weaknesses were identified in verbal fluency, verbal working memory, some aspects of executive function, cognitive speed, and bilateral psychomotor speed.  Other cognitive abilities, including anterograde memory for both verbal and nonverbal information, were intact and performed in keeping with his above average range cognitive baseline.  He is also reporting severe symptoms of depression, and mild symptoms of anxiety.  It may be the case that these psychological factors contributed to some degree of variability in his thinking, although I do not think that we can attribute all of his cognitive weaknesses to psychological factors.    RECOMMENDATIONS  Preliminary results and recommendations were provided to the patient on the date of the evaluation, and all questions were answered.     1.  Continued psychiatric and psychotherapeutic treatment of his mental health is recommended.  In spite of treatment, he is still reporting severely elevated symptoms of depression, and mildly elevated symptoms of anxiety.    2.  There is some evidence to suggest medication toxicity effects.  If medically indicated, consideration might be given to modify treatment of his epilepsy.   Zonisamide in particular is a medication that has been known cognitive side effects.    3.  If he is being considered for respective epilepsy surgery, a [place] exam is recommended.    4.  I think it would be worthwhile to get a letter from his psychiatrist, Dr. Ben Barros, to get his perspective on the patient's suitability for epilepsy surgery.    5. If medically indicated, consideration might be given to a sleep study. He reported that he wakes up often in the night and also naps during the day.    6.  If he completes surgery for his epilepsy, follow-up neuropsychological evaluation is recommended 6 months after this procedure in order to assess and update recommendations as appropriate.  The current results can be used as a baseline at that time.    Mao Portillo, Ph.D., L.P., ABPP  Board Certified in Clinical Neuropsychology   / Licensed Psychologist [id number]    Time spent: One unit (60 minutes) neurobehavioral status exam including interview, clinical assessment of thinking, reasoning, and judgment by licensed and board-certified neuropsychologist (CPT 21641). One unit (60 minutes) neuropsychological testing evaluation by licensed and board-certified neuropsychologist, including integration of patient data, interpretation of standardized test results and clinical data, clinical decision-making, treatment planning, report, and interactive feedback to the patient, first hour (CPT 69276). Two units (100 minutes) of neuropsychological testing evaluation by licensed and board-certified neuropsychologist, including integration of patient data, interpretation of standardized test results and clinical data, clinical decision-making, consulting with colleagues, treatment planning, report, and interactive feedback to the patient, subsequent hours (CPT 67229). One unit (30 minutes) of psychological and neuropsychological test administration and scoring by technician, first 30 minutes (CPT 99841). Four  units (126 minutes) psychological or neuropsychological test administration and scoring by technician, subsequent 30 minutes (CPT 01556). Diagnoses: G40.919, R41.844, F06.8, F33.2, F41.9.

## 2021-05-19 NOTE — PROGRESS NOTES
Patient was seen for neuropsychological evaluation at the request of Dr. Norman Dukes, for the purposes of diagnostic clarification and treatment planning. 2 hrs 36 min of test administration and scoring were provided by this writer, Flower Littlejohn.  Please see Dr. Mao Portillo's report for a full interpretation of the findings.

## 2021-05-20 ENCOUNTER — ANCILLARY PROCEDURE (OUTPATIENT)
Dept: NEUROLOGY | Facility: CLINIC | Age: 26
End: 2021-05-20
Attending: PSYCHIATRY & NEUROLOGY
Payer: COMMERCIAL

## 2021-05-20 NOTE — PROGRESS NOTES
Name: Amrit Padilla MRN: [id number]  : 1995 REA: 2021  Staff: MIKE Tech: NAVDEEP Age: 25  Sex: Male Hand: Right Educ: 13-15  Vision: 20/20 ?with correction / ?without correction    ORIENTATION     Time  -1     Place       Personal info         WAIS-IV     WMI: 77         Raw SSa     Similarities  24 10     Information  17 12     Block Design  47 10     Digit Span  34 13      Arithmetic  12 9     Coding  68 9    MARISABEL-O    Raw    T %ile     Copy    31.0     ?1     Time to Copy  580      ?1    WRAT 5   SS      Word Reading  104     COWAT (FAS)   Raw: 36   T: 41    Semantic Fluency/Animals   Raw:  23  T-score:  49    BOSTON NAMING TEST   Raw: 54   T: 42     COMPLEX IDEATIONAL MATERIAL   Raw: 10/12 T: 34    TRAILS  Raw  Err T    A 31  1 38   B 74  1 41    STROOP Raw T   Word 86 39   Color  77 48       Color/Word  47 52    PÉREZ FACIAL RECOGNITION   Raw: 41  Interp: WNL    GROOVED PEGBOARD    Raw  T Drops   RH  81  32 1     24 0    BDI-II  Raw:  40  Interpretation: Severe    MERRY  Raw:  12  Interpretation: Mild  WMS-III       Raw SS / %ile     Spatial Span 15 8    HVLT-R Form 1     Trial 1 2 3      11 12 12  Raw T     Total Learning (1-3) 35 66     Delayed Recall  12 60     Percent Retention 100 53     Recognition Hits/FP 12/1 51    BVMT-R Form 1     Trial 1 2 3      4 9 12  Raw T / %ile     Total Learning (1-3) 25 44     Delayed Recall  12 61     Percent Retention 100 >16th     Recognition Hits/FP 6/0 >16th    DCT E-score: 9

## 2021-05-21 ENCOUNTER — ANCILLARY PROCEDURE (OUTPATIENT)
Dept: PET IMAGING | Facility: CLINIC | Age: 26
End: 2021-05-21
Attending: PSYCHIATRY & NEUROLOGY
Payer: COMMERCIAL

## 2021-05-21 ENCOUNTER — TELEPHONE (OUTPATIENT)
Dept: NEUROLOGY | Facility: CLINIC | Age: 26
End: 2021-05-21

## 2021-05-21 DIAGNOSIS — G40.909 RECURRENT SEIZURES (H): Primary | ICD-10-CM

## 2021-05-21 DIAGNOSIS — G40.909 SEIZURE DISORDER (H): Primary | ICD-10-CM

## 2021-05-21 LAB — GLUCOSE SERPL-MCNC: 96 MG/DL (ref 70–99)

## 2021-05-21 NOTE — TELEPHONE ENCOUNTER
Voicemail left for patient (consent to communicate is signed and on file) explaining that upon review of his chart it was decided that he needs a repeat MRI. I asked him to call back to discuss where he could have this done.

## 2021-05-24 ENCOUNTER — VIRTUAL VISIT (OUTPATIENT)
Dept: NEUROLOGY | Facility: CLINIC | Age: 26
End: 2021-05-24
Payer: COMMERCIAL

## 2021-05-24 DIAGNOSIS — G40.909 SEIZURE DISORDER (H): Primary | ICD-10-CM

## 2021-05-27 ENCOUNTER — TELEPHONE (OUTPATIENT)
Dept: NEUROLOGY | Facility: CLINIC | Age: 26
End: 2021-05-27

## 2021-05-27 NOTE — TELEPHONE ENCOUNTER
LVM to follow up on MRI order, Left message with scheduling number for [facility], order faxed to them as well

## 2021-06-09 ENCOUNTER — TELEPHONE (OUTPATIENT)
Dept: NEUROLOGY | Facility: CLINIC | Age: 26
End: 2021-06-09

## 2021-07-06 ENCOUNTER — TELEPHONE (OUTPATIENT)
Dept: NEUROLOGY | Facility: CLINIC | Age: 26
End: 2021-07-06

## 2021-07-06 NOTE — TELEPHONE ENCOUNTER
Outreach call placed to patient to remind him to have his MRI completed. I was able to reach his mother whom took the phone number for [facility] to schedule.

## 2021-08-05 ENCOUNTER — TRANSFERRED RECORDS (OUTPATIENT)
Dept: HEALTH INFORMATION MANAGEMENT | Facility: CLINIC | Age: 26
End: 2021-08-05

## 2021-08-10 ENCOUNTER — TELEPHONE (OUTPATIENT)
Dept: NEUROLOGY | Facility: CLINIC | Age: 26
End: 2021-08-10

## 2021-08-10 NOTE — TELEPHONE ENCOUNTER
Discussed with Dr. Ben Barros at [facility], who has been Patient's psychiatrist for many years, regarding if patient can tolerate intracranial monitoring for his epilepsy as presurgical evaluation.  Dr. Barros thought that patient should have no problem tolerating the procedure. Although patient had Asberger's syndrome, he is quite motivated for surgical treatment for his epilepsy.

## 2021-08-10 NOTE — TELEPHONE ENCOUNTER
"[facility] 8/11/0221    Attendees:  Neurologists: Dr. Temple, Dr. Etienne, Dr. Mcleod,  Dr. Freeman, Dr. Blake, Dr. Dukes, Neurosurgeons: Dr. Mar, Neuropsychologist: Dr. Portillo, Physician Assistant: Annia Iglesias,  Nurses: Marek Mccabe, Yu Alvarado, EEG Technologists.        HISTORY OF PRESENT ILLNESS:  25-year-old right-handed male with long history of epilepsy, Asperger's disorder, depression.  He was previously seen by Dr. Morales at [facility] for his epilepsy.    Seizures started when he was 16 years old and he was managed at the [facility] in the past for his seizures. However, seizures were poorly controlled with multiple AEDs.  He is currently taking Zonegran 300-400 mg, Lamictal 150 mg bid. Depakote -1000.  History of seizure clusters and was admitted to hospital for this multiple times, including [facility] [place] [facility] [place]  On both occasions, he had 4-5 back to back generalized tonic-clonic seizures within a short period of time.      The patient has 2 different types of seizures.  Type #1: no warning, \"mild seizures,\" during which he will have some facial twitching and head jerking.  He will have fist clench and repetitive hand/arm jerking movement.  He will become unresponsive for less than a minute.  Postictally, he was having some confusion and headaches and fatigue. This occurs weekly.     Type #2 are generalized tonic-clonic seizures which the patient will have generalized convulsions of the body and his body will be stiff.  It usually will last for 1-2 minutes followed by postictal fatigue and hallucinations.  It may occurs in clusters with 2-5 seizures back to back. These happens maybe once a week.     The patient had history of severe depression and requested a lot of behavioral health and counseling in the past and at certain time, he was suicidal, but that improved.  At the present time, he is not suicidal.  He does not think about hurting himself.  He is seeing Dr. Ben Barros for his " depression.  He is also seeing Deo Shipman for therapy.  Dr. Barros felt that patient should have no problem tolerating intracranial recording and epilepsy surgery.     TRIGGERS FOR SEIZURES:  Maybe stress.      RISK FACTORS FOR SEIZURES:  He had no history of head trauma with loss of consciousness.  No history of febrile convulsions.  No history of CNS infections.  Diagnosed with Aspergers in 4th grade     ALLERGIES:  Sertraline and Zyprexa.    Current AEDs: Zonegran 300-400 mg, Lamictal 150 mg bid. Depakote -1000. CBD oil (not medical cannabis).    Other Meds: ProZac 80 mg every day, Melatonin, CBD oil    PAST AEDs: Keppra, Aptiom, Vimpat caused suicidal ideations.     PAST MEDICAL HISTORY:  Asperger's disorder, depression, epilepsy.      PAST SURGICAL HISTORY:  None.      FAMILY HISTORY:  No family history of seizures.  Both parents have hypertension. Maternal grandmother had heart disease.      SOCIAL HISTORY:  He is single, living with his mother in [place].  He had some college education but did not finish because of depression.  He is currently working at [employer]. Mother is his main social support.  No smoking, no alcohol, no drug abuse.      REVIEW OF SYSTEMS:  Positive for fatigue, depression.     PHYSICAL EXAMINATION:       Non-focal.     PREVIOUS DIAGNOSTIC TESTING:      MRI Brain (4/4/2018):  Non-specific, solitary T2/FLAIR hyperintensity lesion in the upper posterior right cerebellar hemisphere measuring up to 7 mm.  No MTS or other lesions.     MRI (8/5/2021):  1. Stable appearance of a 7 mm T2 hyperintense focus in the right posterior lateral cerebellar hemisphere. This may represent a hamartomatous lesion, and less likely a small glioma given minimal disruption of the cerebellar architecture.  2. No supratentorial lesions or significant hippocampal asymmetry/evidence for pathology.    PET (5/21/2021)  Subthreshold metabolic deficits in the inferior frontal and temporal  lobes bilaterally.  Given history of bifrontal epileptic activity,  these deficits may represent interictal hypometabolism in  epileptogenic regions.    Neuropsych Testing:  Mr. Padilla demonstrated a pattern of weaknesses that is consistent with mild bilateral frontal and subcortical system dysfunction.  The weaknesses in this exam are likely multifactorial, with contributions from his known seizure disorder, as well as from medication toxicity.  Additionally, individuals with autism spectrum disorder often have difficulties with frontal lobe mediated cognitive abilities. In this exam, weaknesses were identified in verbal fluency, verbal working memory, some aspects of executive function, cognitive speed, and bilateral psychomotor speed.  Other cognitive abilities, including anterograde memory for both verbal and nonverbal information, were intact and performed in keeping with his above average range cognitive baseline.  He is also reporting severe symptoms of depression, and mild symptoms of anxiety.  It may be the case that these psychological factors contributed to some degree of variability in his thinking, although I do not think that we can attribute all of his cognitive weaknesses to psychological factors.     VEEG monitoring:     He had VEEG monitoring for presurgical evaluation in March 2021. Total of 9 epileptic seizures were recorded during this admission. These electroclinical seizures showed bifrontal EEG activities at onset, usually with higher amplitude initial activity over the right frontal region compared to the left.  Ictal manifestations variably included tonic stiffening of his upper body, kicking of his legs, vocalizations, and non-purposeful movements of surrounding objects.  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       SUMMARY OF 15 DAYS OF VIDEO-EEG MONITORING:  The patient's home medications were gradually tapered during his inpatient stay, with all four anticonvulsants being discontinued  as of Day 10 of video EEG monitoring. During 15 days of video EEG monitoring the background showed intermittent theta slowing during waking consistent with a mild generalized encephalopathy. Additionally, intermittent midline slowing was seen which was of uncertain significance.    Occasional epileptiform discharges were seen with maximum negativity at Fp2-F4, and F8-T4.     A total of 9 focal impaired (complex partial) seizures were recorded during 15 days of VEEG. These seizures involved tonic stiffening of his upper body as well as kicking of his legs. Vocalizations were present in some seizures, and non-purposeful movements of surrounding objects were seen with many of the seizures. One of these seizures occurred while the leads were not hooked up. The 8 seizures which were seen on EEG involved bifrontal onset followed closely by significant muscle artifact. While onset was bifrontal, multiple seizures involved higher amplitude initial activity over the right frontal region compared to the left. These included the second seizure on Day 2 of recording, the second seizure on Day 10 of recording, the first seizure on Day 12, and the first seizure on Day 13. The seizure activity quickly spread over bilateral hemispheres. Greater amplitude slowing was often seen during seizures when muscle artifact cleared. Overall, the patient's seizure semiology is most consistent with a frontal lobe semiology.  Additionally, seizure onset supports a bifrontal onset, with numerous seizures supporting right frontal onset. Multiple events without ictal correlate were seen involving a range of symptoms from spacing out to feeling jittery to feeling as though someone else were controlling him.    IMPRESSION:   1.  Epilepsy.      He is currently taking aptiom 1200 mg qhs,  zonegran 300-400, Lamictal 150 mg bid, Depakote 500 - 1000. He is also taking CBD oil for anxiety.      I had extensive discussions in the past with the patient and  mother regarding different treatment options for epilepsy, including meds, surgery, neuromodulation and diet treatment.     2.  Asperger's syndrome.   3.  Depression.  He is followed by Dr. Ben Barros and Deo Shipman for therapy.  No plan for suicide or self injury.    Consensus:  Patient should be able to tolerate intracranial monitoring.  The lateralization and localization are not clear based on scalp EEG monitoring. Temporal lobe epilepsy is possible. We will have invasive depth electrodes, 8 symmetric electrodes on each side:    Amygdala: 1  Anterior and posterior hippocampus: 3  Cingulate and prefrontal: 2  Orbital frontal:1  Insula: 1      During of CMC:  60 min

## 2021-08-11 ENCOUNTER — TEAM CONFERENCE (OUTPATIENT)
Dept: NEUROLOGY | Facility: CLINIC | Age: 26
End: 2021-08-11
Payer: COMMERCIAL

## 2021-08-11 DIAGNOSIS — G40.909 RECURRENT SEIZURES (H): Primary | ICD-10-CM

## 2021-08-16 ENCOUNTER — TELEPHONE (OUTPATIENT)
Dept: NEUROLOGY | Facility: CLINIC | Age: 26
End: 2021-08-16

## 2021-08-16 NOTE — TELEPHONE ENCOUNTER
What is the concern that needs to be addressed by a nurse? Patient called and said he did MRI but not a complete one. He is wondering if he should set up his follow up with Dr. Dukes now or if he should try to have complete MRI done before scheduling with Ernst?    May a detailed message be left on voicemail? Yes    Date of last office visit: 4/12/21    Message routed to: [facility] RN pool

## 2021-08-17 ENCOUNTER — TELEPHONE (OUTPATIENT)
Dept: NEUROLOGY | Facility: CLINIC | Age: 26
End: 2021-08-17

## 2021-08-17 DIAGNOSIS — G40.909 RECURRENT SEIZURES (H): Primary | ICD-10-CM

## 2021-08-17 NOTE — TELEPHONE ENCOUNTER
Called patient over the phone. His mother was also on the phone conversation.  Advised patient about the results of the CMC.  We will proceed with intracranial recording with bilateral depth electrodes placement, trying to localize the seizure onset. Explained that we may or may not be able to localized the seizure onset. Resection is possible if we can localize the onset that can be removed. Other possibility is to do neuromodulation with DBS or RNS if we cannot localize or the onset is in the eloquent cortex.    Patient and his mother voiced understanding of this and agreed to proceed.    Patient had some questions about the MRI he had last week. He had some movement during the MRI.  He does not need to repeat the MRI.

## 2021-08-23 ENCOUNTER — TELEPHONE (OUTPATIENT)
Dept: NEUROLOGY | Facility: CLINIC | Age: 26
End: 2021-08-23

## 2021-08-23 NOTE — TELEPHONE ENCOUNTER
[facility] Call Center    Phone Message    May a detailed message be left on voicemail: yes     Reason for Call: Patient is being referred for recurrent seizures. It appears that patient is already being seen by clinic. Sending TE message for clinic review and follow-up with patient.     NOTE: Patients Mother is requesting some more education about the intracranial recording and requesting some reading materials.      Action Taken: Message routed to:  [facility]    Travel Screening: Not Applicable

## 2021-09-01 ENCOUNTER — TELEPHONE (OUTPATIENT)
Dept: NEUROLOGY | Facility: CLINIC | Age: 26
End: 2021-09-01

## 2021-09-01 NOTE — TELEPHONE ENCOUNTER
What is the concern that needs to be addressed by a nurse? Patient would like to discuss medications. Feels they are not working as well anymore and he has had an increase in headaches and dizziness.    May a detailed message be left on voicemail? Yes    Date of last office visit: 5/19/21    Message routed to: [facility] RN pool

## 2021-09-02 NOTE — TELEPHONE ENCOUNTER
Pt called back, please return call. Doesn't think he will be available tomorrow, please try calling. Should be available Tuesday morning, please try calling second time then.

## 2021-09-08 ENCOUNTER — TELEPHONE (OUTPATIENT)
Dept: NEUROLOGY | Facility: CLINIC | Age: 26
End: 2021-09-08

## 2021-09-08 DIAGNOSIS — G40.919 EPILEPSY WITH ALTERED CONSCIOUSNESS WITH INTRACTABLE EPILEPSY (H): Primary | ICD-10-CM

## 2021-09-08 NOTE — TELEPHONE ENCOUNTER
Returned call to patient, but not able to call on day he specified.  Left another phone message indicating I would call back again.    Michael sTang RN

## 2021-09-08 NOTE — TELEPHONE ENCOUNTER
[facility] Call Center    Phone Message    May a detailed message be left on voicemail: yes     Reason for Call: Other: Pt calling in returning missed call from Michael, please call back to discuss further, the best time to call tomorrow would be from 11-12 , and 10am as well, please call back to discuss further      Action Taken: Message routed to:  [facility] neurology.    Travel Screening: Not Applicable

## 2021-09-09 NOTE — TELEPHONE ENCOUNTER
[facility] Call Center    Phone Message    May a detailed message be left on voicemail: yes     Reason for Call: Other: Pt calling in again returning missed call, please call back      Action Taken: Message routed to:  [facility] neuro     Travel Screening: Not Applicable

## 2021-09-10 NOTE — TELEPHONE ENCOUNTER
[facility] Call Center    Phone Message    May a detailed message be left on voicemail: yes     Reason for Call: Other: Patient calling to check status of hearing back from care team. Patient wondering what is going on.     Please advise and call patient back at your earliest convenience     Action Taken: Other: [facility]    Travel Screening: Not Applicable

## 2021-09-13 NOTE — TELEPHONE ENCOUNTER
"Call patient again and was able to connect with him.  Patient reports that he thinks his meds are not working because on a couple of occasions \"In the last month or so\" he has woke up from sleep with a bad head ache and has \"weird thought going through his head\".  He also endorses confusion like he is wondering what is happening.  Also states that he is awakening with more sores on his tongue.     Patient states that his sleep is unchanged, stress might be slightly higher, but that it is always high, denies any alcohol use and that caffeine use is unchanged.  Further indicates that he does not think that any of his meds have changed or are new.    Patient will go into his home clinic - [facility] [place] or lab draws.  "

## 2021-09-13 NOTE — TELEPHONE ENCOUNTER
[facility] Call Center    Phone Message    May a detailed message be left on voicemail: yes----pt reporting that he has not been called back since last week and he wants to receive a call asap, please    Reason for Call: Medication Question or concern regarding medication   Prescription Clarification  Name of Medication: pt does not know IF any of his medications are working---he is reporting that he does not know what is going on with his medication, in the first place--if it is working or not    Prescribing Provider: Dr. Dukes   Pharmacy: [id number] [place] [id number]   What on the order needs clarification? Pt told writer that Michael, Dr. Dukes's nurse, told pt to call the [facility] location for any issues. Pt sees Dr. Dukes at the [place] location. Pt is very concerned that Michael has not called him back yet. Pt does not understand what is going on and pt does NOT know if any of his medications are working. Please have Michael call this pt asap. Thank you.          Action Taken: Message routed to:  [facility]    Travel Screening: Not Applicable

## 2021-09-14 ENCOUNTER — TELEPHONE (OUTPATIENT)
Dept: NEUROLOGY | Facility: CLINIC | Age: 26
End: 2021-09-14

## 2021-09-14 NOTE — TELEPHONE ENCOUNTER
What is the concern that needs to be addressed by a nurse? Patient would like a call back today because he was told by  he needed to get an inpatient EEG done but also he would like to take a class at the [facility] and would like to know if this would interfere or he can just do it next semester, patient has a virtual tomorrow with making a decision on proceeding with this class and would like to have  input on this today.    May a detailed message be left on Veotag? yes    Date of last office visit: 04/12/2021    Message routed to: [facility] RN POOL

## 2021-09-15 ENCOUNTER — TELEPHONE (OUTPATIENT)
Dept: NEUROSURGERY | Facility: CLINIC | Age: 26
End: 2021-09-15

## 2021-09-15 NOTE — TELEPHONE ENCOUNTER
Writer SHAY for pt to call back and schedule a consult with Dr. Adler.    Please schedule a new, virtual visit with Dr. Adler for next available.    Jaky Barajas

## 2021-09-17 NOTE — TELEPHONE ENCOUNTER
Patient asked if his upcoming workup for epilepsy surgery would interfere in his studies at college if he were to attend this semester. We discussed that he may not feel his best and would be distracted during his ICU stay but that perhaps the professor would allow accommodations. They asked for follow up on neurosurgery scheduling and if the invasive EEG would likely be before the end of the year. Per NSG team, the admission would be this semester unless COVID cases increase and the hospital has to stop elective surgeries. This was communicated to the patient by voicemail. The neurosurgery team is in contact now with patient for scheduling his consult.

## 2021-09-23 ENCOUNTER — TELEPHONE (OUTPATIENT)
Dept: NEUROSURGERY | Facility: CLINIC | Age: 26
End: 2021-09-23

## 2021-10-06 ENCOUNTER — TELEPHONE (OUTPATIENT)
Dept: NEUROSURGERY | Facility: CLINIC | Age: 26
End: 2021-10-06

## 2021-10-06 NOTE — TELEPHONE ENCOUNTER
[facility] Call Center    Phone Message    May a detailed message be left on voicemail: yes     Reason for Call: Appointment Intake    Referring Provider Name: Norman Dukes MD   Diagnosis and/or Symptoms: Recurrent seizures     Pt called to schedule appt with Dr. Adler. Per notes form Mariya, it was to be via video, but Pt wants in person.     He also wants to now if he will be able to have his surgery prior to th end of the year.     Please call Pt back to advise if this can be in person and if surgery timing question can be answered prior to seeing doctor.        Action Taken: Message routed to:  [facility] Neurosurgery    Travel Screening: Not Applicable

## 2021-10-06 NOTE — TELEPHONE ENCOUNTER
Writer LVM for pt      Please schedule a visit with Dr. Adler, in person or video for next available.     Writer explained that it is hard to determine when surgery would be prior to seeing the pt but Dr. Adler is willing to work with the pt    Jaky Barajas

## 2021-10-09 ENCOUNTER — HEALTH MAINTENANCE LETTER (OUTPATIENT)
Age: 26
End: 2021-10-09

## 2021-10-12 NOTE — TELEPHONE ENCOUNTER
FUTURE VISIT INFORMATION      FUTURE VISIT INFORMATION:    Date: 11/2/2021    Time: 12pm    Location: [facility] [place] [facility]  REFERRAL INFORMATION:    Referring provider:  Dr. Dukes    [facility]     Reason for visit/diagnosis  Recurrent Seizures     RECORDS REQUESTED FROM:       Clinic name Comments Records Status Imaging Status   Internal Dr. Dukes-4/12/2021 Epic N/A          [facility]  Scanned to Chart Requested to PACS          [facility] MR Brain-11/5/2020    [facility] Head[facility]Neck-11/5/2020 Care Everywhere Requested to PACS           10/12/2021-Request for images faxed to [facility]-MR @ 1137am    10/26/2021-[facility] images now in PACS, request for images faxed to [facility]-MR @ 659am    11/1/2021-[facility] images now in PACS-MR @ 517am

## 2021-11-02 ENCOUNTER — PRE VISIT (OUTPATIENT)
Dept: NEUROSURGERY | Facility: CLINIC | Age: 26
End: 2021-11-02

## 2021-11-02 ENCOUNTER — OFFICE VISIT (OUTPATIENT)
Dept: NEUROSURGERY | Facility: CLINIC | Age: 26
End: 2021-11-02
Payer: COMMERCIAL

## 2021-11-02 VITALS
BODY MASS INDEX: 25.73 KG/M2 | HEART RATE: 63 BPM | HEIGHT: 72 IN | DIASTOLIC BLOOD PRESSURE: 77 MMHG | SYSTOLIC BLOOD PRESSURE: 113 MMHG | OXYGEN SATURATION: 98 % | WEIGHT: 190 LBS | RESPIRATION RATE: 16 BRPM

## 2021-11-02 DIAGNOSIS — G40.219 PARTIAL SYMPTOMATIC EPILEPSY WITH COMPLEX PARTIAL SEIZURES, INTRACTABLE, WITHOUT STATUS EPILEPTICUS (H): Primary | ICD-10-CM

## 2021-11-02 PROCEDURE — 99204 OFFICE O/P NEW MOD 45 MIN: CPT | Performed by: NEUROLOGICAL SURGERY

## 2021-11-02 ASSESSMENT — MIFFLIN-ST. JEOR: SCORE: 1869.83

## 2021-11-02 ASSESSMENT — PAIN SCALES - GENERAL: PAINLEVEL: NO PAIN (0)

## 2021-11-02 NOTE — LETTER
"11/2/2021       RE: Amrit Padilla  [address]  [place] [address]     Dear Colleague,    Thank you for referring your patient, Amrit Padilla, to the [facility] NEUROSURGERY CLINIC [place] at [facility]. Please see a copy of my visit note below.    Neurosurgery Attending Epilepsy Consult Note    HPI: Amrit Padilla is a 27 y/o gentleman with medically refractory focal epilepsy, depression, Asperger syndrome who presents for neurosurgical consultation regarding his options. He is here with both of his parents today. His seizures began about 10 years ago which have become less frequent over time, but he has never been seizure-free. Estimates he has been on about 6-8 AEDs since he began having seizures without any time period of seizure freedom. He is currently on Aptiom, Depakote, Zonegran and Lamictal though he is tapering off the Aptiom as he has had some side effects involving double vision. He and his parents note that it took about 2 weeks of monitoring for him to have enough of his typical seizures recorded during his EMU stay for vEEG.    Seizure types:  Type 1: He will have repetitive face/hand/arm jerking movement, become unresponsive for a minute or so. Postictal confusion, headaches, parents describe him as \"not right\" sometimes for days afterwards.   Type 2: GTCs which he and his parents are unsure about. They seem to think he has not had any of these in a while, but the patient describes waking up with tongue and mouth bites once or twice/week, feeling very stiff and so it seems that these may be GTCs.    Seizure frequency:   Type 1: Most of his seizures are unwitnessed and at night and so difficult for his parents to estimate frequency but seems about twice/week.  Type 2: As above.    Medications:   Current Outpatient Medications:      cetirizine (ZYRTEC) 10 MG tablet, Take 10 mg by mouth daily, Disp: , Rfl:      divalproex sodium " extended-release (DEPAKOTE ER) 500 MG 24 hr tablet, TAKE 1 TABLET in the morning, and 2 tabs at night., Disp: 270 tablet, Rfl: 3     FLUoxetine (PROZAC) 40 MG capsule, Take 80 mg by mouth daily, Disp: , Rfl:      HEMP OIL OR EXTRACT OR OTHER CBD CANNABINOID, NOT MEDICAL CANNABIS,, Take 15 mg by mouth every evening , Disp: , Rfl:      lamoTRIgine (LAMICTAL) 150 MG tablet, Take 1 tablet (150 mg) by mouth 2 times daily, Disp: 180 tablet, Rfl: 3     LORazepam (LORAZEPAM INTENSOL) 2 MG/ML (HIGH CONC) solution, Take 1 ml (2mg) for 2 focal seizures in 4 hours or for any generalized tonic-clonic seizure. Max 2 ml (4mg) in 24 hours., Disp: 30 mL, Rfl: 1     MELATONIN PO, Take 10 mg by mouth nightly as needed OTC, Disp: , Rfl:      zonisamide (ZONEGRAN) 100 MG capsule, Take 3 caps (300 mg) each AM and 4 caps (400 mg) each HS, Disp: 630 capsule, Rfl: 3    EEG: vEEG in March of this year showed 9 of his typical seizures. Bifrontal ictal onset, R>L, semiology included included tonic stiffening of his upper body, kicking of his legs, vocalizations, and non-purposeful movements of surrounding objects.    MRI: No clear abnormalities, FCDs or other lesions seen. No MTS or hippocampal atrophy noted    PET: Inferior frontal gyrus hypometabolism bilaterally along with inferior temporal gyri hypometabolism bilaterally. Normal mesial temporal metabolic uptake.    Language testing: No Wada testing. Neuropsych testing demonstrated weaknesses were in verbal fluency, verbal working memory, some aspects of executive function, cognitive speed, and bilateral psychomotor speed.  Other cognitive abilities, including anterograde memory for both verbal and nonverbal information, were intact and performed in keeping with his above average range cognitive baseline    Exam:  Awake, alert, oriented x 3  Attends  Follows  [facility]  [facility]  [facility]  FS  TML  BUE/BLE 5/5, no drift    A/P: 26 year old with focal medically refractory epilepsy. He and his parents  asked a number of questions regarding to invasive monitoring, the approach, the length of time required for monitoring and possible subsequent steps. We discussed the risks of surgery in detail, which are mainly related to hemorrhage. He was more hesitant after discussing the risks of surgery and we discussed in detail that no surgery is risk-less and that there are risks to proceeding with continued medical management as well. Only he can decide if he wants to proceed given the risks and benefits as laid out during our discussion today. After our discussion, he and his parents agreed to proceed with obtaining the necessary imaging for an invasive evaluation. They want to continue to think about moving forward with surgery and will let us know once his imaging has been obtained. All of his and his family's questions were answered and he expressed understanding.    - Epilepsy protocol MRI  - CTA head  - Surgical planning afterwards      Again, thank you for allowing me to participate in the care of your patient.      Sincerely,    Tello Adler MD

## 2021-11-02 NOTE — NURSING NOTE
Chief Complaint   Patient presents with     Consult     [facility] New - Recurrent seizures     Breezy Cardona

## 2021-11-02 NOTE — LETTER
Date:January 1, 2022      Patient was self referred, no letter generated. Do not send.        [facility] Health Information

## 2021-11-04 ENCOUNTER — TELEPHONE (OUTPATIENT)
Dept: NEUROSURGERY | Facility: CLINIC | Age: 26
End: 2021-11-04

## 2021-11-04 DIAGNOSIS — G40.909 EPILEPTIC SEIZURE (H): Primary | ICD-10-CM

## 2021-11-04 NOTE — TELEPHONE ENCOUNTER
Writer LVM for pt asking them to schedule CTA and MRI that has been ordered by Dr. Adler. Writer provided imaging scheduling line    Jaky Barajas

## 2021-11-10 ENCOUNTER — TELEPHONE (OUTPATIENT)
Dept: NEUROLOGY | Facility: CLINIC | Age: 26
End: 2021-11-10
Payer: COMMERCIAL

## 2021-11-10 NOTE — TELEPHONE ENCOUNTER
Voicemail left for patient with request that he schedule an appointment with Dr. Dukes to discuss his symptom. Per chart review, he hasn't seen Dr. Dukes for 5 months. Currently, there are openings for appointments this Friday morning in person at the [facility] or virtual.

## 2021-11-10 NOTE — TELEPHONE ENCOUNTER
What is the concern that needs to be addressed by a nurse? Pt would heena a referral for a provider to evaluate pt's hands shaking uncontrollably. Call pt back.     May a detailed message be left on voicemail? yes    Date of last office visit: 4/12/21    Message routed to: mincep rn pool

## 2021-11-15 ENCOUNTER — OFFICE VISIT (OUTPATIENT)
Dept: NEUROLOGY | Facility: CLINIC | Age: 26
End: 2021-11-15
Payer: COMMERCIAL

## 2021-11-15 VITALS
HEART RATE: 68 BPM | TEMPERATURE: 97.1 F | SYSTOLIC BLOOD PRESSURE: 117 MMHG | WEIGHT: 198.8 LBS | DIASTOLIC BLOOD PRESSURE: 69 MMHG | BODY MASS INDEX: 26.96 KG/M2

## 2021-11-15 DIAGNOSIS — G40.909 RECURRENT SEIZURES (H): Primary | ICD-10-CM

## 2021-11-15 ASSESSMENT — PAIN SCALES - GENERAL: PAINLEVEL: MODERATE PAIN (5)

## 2021-11-15 NOTE — PROGRESS NOTES
CHIEF COMPLAINT:  Follow up for seizures.      HISTORY OF PRESENT ILLNESS:  In person visit for follow up. This is a 26-year-old right-handed male with history of epilepsy, Asperger's disorder, depression.  He was previously seen by Dr. Morales at [facility] for his epilepsy.  He was last seem a few months ago. [facility] was doen in Aug., consensus was that patient should proceed with invasive monitoring. He should be able to tolerate intracranial monitoring.  The lateralization and localization are not clear based on scalp EEG monitoring. Temporal lobe epilepsy is possible. We will have invasive depth electrodes, 8 symmetric electrodes on each side:     Amygdala: 1  Anterior and posterior hippocampus: 3  Cingulate and prefrontal: 2  Orbital frontal:1  Insula: 1    He saw Dr. Adler on 11/2/2021 for consultation for invasive monitoring. He is scheduled for F-Gator MRI and CTA on Dec 12/3/2021.    For the past several months, he was having about 2 seizures per week, mostly nocturnal seizures, about 80-90%. He is currently taking aptiom 1200 mg qhs,  zonegran 300-400, Lamictal 150 mg bid, Depakote 500 - 1000. He is also taking CBD oil for anxiety.  Double vision resolved after aptiom was discontinued.    He is complaining of double vision, blurred vision.  He also complains of hand tremor. He had a really bad tremor day about one day ago.    He had VEEG monitoring for presurgical evaluation in March 2021. Total of 9 epileptic seizures were recorded during this admission. These electroclinical seizures showed bifrontal EEG activities at onset, usually with higher amplitude initial activity over the right frontal region compared to the left.  Ictal manifestations variably included tonic stiffening of his upper body, kicking of his legs, vocalizations, and non-purposeful movements of surrounding objects.  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  Aptiom was discontinued at discharge.     No plan for suicide  "or self injury    Since the hospital discharge, he continue to have frequent seizures, probably several times per week.     He was complaining of vertigo spells, he was seen in the ED.  He was told that he has vestibular problems.       He continue to have small twitches that last for a spit of a second, some of them in clusters. No LOC with these twitches. These likely are not seizures.      Patient tried Peoples diet, but did not continue.     He is seeing Dr. Ben Barros for his depression.  He is also seeing Deo Shipman for therapy. He is complaining of fatigue and sleep over 8-10 hours a day. He is very concerned about the fatigue and is questioning what other options we have for treating his seizures.      They are now open to VEEG monitoring for surgical evaluation, resection or neuromodulations. However, they are still not sure if they want to move forward to the surgical treatment.     Mood improved a lot. No suicidal thoughts, no thoughts of hurting himself.     The patient started to have seizures when he was 16 years old and he was managed at the [facility] for the past few years for his seizures.  He is currently taking Aptiom 1200 mg q.h.s. and the Zonegran 300 mg b.i.d. for his epilepsy.  He was recently admitted to the hospital twice because of 2 clusters of seizures, one in January when he was admitted to [facility] and one was in February when he was admitted to [facility].  On both occasions, he had 4-5 back to back generalized tonic-clonic seizures within a short period of time.      According to the mother, the patient has 2 different types of seizures.  Type #1 is described as \"mild seizures,\" during which he will have some facial twitching.  He will have repetitive hand/arm jerking movement.  He will become unresponsive for less than a minute.  Postictally, he was having some confusion and headaches and fatigue.  Before the seizures, he will have an aura of \"seeing bees populating the earth with " "eggs.\"  This type of seizure is relatively infrequent, average once per month.      Type #2 are generalized tonic-clonic seizures which the patient will have generalized convulsions of the body and his body will be stiff.  It usually will last for 1-2 minutes followed by postictal fatigue and hallucinations.  This happens usually once every 3 months, but sometimes it occurs in clusters with 2-5 seizures back to back.      The patient had tried Keppra and Depakote in the past which did not work.  He had EEGs done in the past which were reported negative.      The patient had severe depression and requested a lot of behavioral health and counseling in the past and at certain time, he was suicidal, but that improved.  At the present time, he is not suicidal.  He does not think about hurting himself.      TRIGGERS FOR SEIZURES:  Depression.      RISK FACTORS FOR SEIZURES:  He had no history of head trauma with loss of consciousness.  No history of febrile convulsions.  No history of CNS infections.         PAST AEDs: Keppra, Depakote. Vimpat caused suicidal ideations.     PAST MEDICAL HISTORY:  Asperger's disorder, depression, epilepsy.      PAST SURGICAL HISTORY:  None.      FAMILY HISTORY:  No family history of seizures.  Both parents have hypertension.  Maternal grandmother had heart disease.      SOCIAL HISTORY:  He is single, living with his mother.  He had some college education but did not finish because of depression.  He is currently working at [employer].  No smoking, no alcohol, no drug abuse.      REVIEW OF SYSTEMS:  Positive for fatigue, depression.  The rest of the 8-point review of systems are negative.      ALLERGIES:  Sertraline and Zyprexa.      PHYSICAL EXAMINATION:       Blood pressure 117/69, pulse 68, temperature 97.1  F (36.2  C), temperature source Temporal, weight 198 lb 12.8 oz (90.2 kg).    General exam: General Appearance: No acute distress. HEENT: Normocephalic, atraumatic. Neck: Supple.  " Extremities: No edema, no clubbing, no cyanosis.     Neurologic Exam: Alert and oriented x3. Speech fluent, appropriate. Normal attention. Cranial Nerves: Pupils are equal, round, reactive to light and accomodation. Extraocular movement intact. No facial weakness or asymmetry. Hearing normal. Motor Exam: Normal. Coordination:no ataxia.  Gait and Station: normal.       PREVIOUS DIAGNOSTIC TESTING:  CT scan of the head on 02/27/2018 was reported negative from [facility].  He had EEGs in the past which were reported negative.      IMPRESSION:   1.  Epilepsy.    He was last seem a few months ago. [facility] was doen in Aug., consensus was that patient should proceed with invasive monitoring. He should be able to tolerate intracranial monitoring.  The lateralization and localization are not clear based on scalp EEG monitoring. Temporal lobe epilepsy is possible. We will have invasive depth electrodes, 8 symmetric electrodes on each side.    He saw Dr. Adler on 11/2/2021 for consultation for invasive monitoring. He is scheduled for F-Gator MRI and CTA on Dec 12/3/2021.    For the past several months, he was having about 2 seizures per week, mostly nocturnal seizures, about 80-90%. He is currently taking aptiom 1200 mg qhs,  zonegran 300-400, Lamictal 150 mg bid, Depakote 500 - 1000. He is also taking CBD oil for anxiety.  Double vision resolved after aptiom was discontinued.    He is complaining of double vision, blurred vision.  He also complains of hand tremor. He had a really bad tremor day about one day ago.       2.  Asperger's syndrome.   3.  Depression.  He is followed by Dr. Ben Barros and Deo Shipman for therapy.  No plan for suicide or self injury.  4.  Tremors. Secondary to Depakote? Psychological?     PLAN:   1.  Continue and Zonegran 300-400 mg, Lamictal 150 mg bid. Depakote -1000.  2.  Continue to follow up with Dr. Barros for depression. Continue with therapy.  3.  Continue CBD.  4.  PET CT as  outpatient.  5.  MRI with F-Gator and CTA in Dec.  6.  Follow up with Dr. Adler for invasive monitoring.  7.  RTC in 3 months after invasive monitoring.      45 min total time was spent on the day of this visit.      33 min was spent on face to face time  5 min was spent on preparation of visit to review charts and labs, ordering medications and tests  7 min was spent on documentation of clinical information

## 2021-11-15 NOTE — LETTER
Date:November 23, 2021      Patient was self referred, no letter generated. Do not send.        [facility] Health Information

## 2021-11-15 NOTE — LETTER
11/15/2021       RE: Amrit Padilla  : 1995   MRN: [id number]      Dear Colleague,    Thank you for referring your patient, Amrit Padilla, to the [facility] EPILEPSY CARE at [facility]. Please see a copy of my visit note below.    CHIEF COMPLAINT:  Follow up for seizures.      HISTORY OF PRESENT ILLNESS:  In person visit for follow up. This is a 26-year-old right-handed male with history of epilepsy, Asperger's disorder, depression.  He was previously seen by Dr. Morales at [facility] for his epilepsy.  He was last seem a few months ago. [facility] was doen in Aug., consensus was that patient should proceed with invasive monitoring. He should be able to tolerate intracranial monitoring.  The lateralization and localization are not clear based on scalp EEG monitoring. Temporal lobe epilepsy is possible. We will have invasive depth electrodes, 8 symmetric electrodes on each side:     Amygdala: 1  Anterior and posterior hippocampus: 3  Cingulate and prefrontal: 2  Orbital frontal:1  Insula: 1    He saw Dr. Adler on 2021 for consultation for invasive monitoring. He is scheduled for F-Gator MRI and CTA on Dec 12/3/2021.    For the past several months, he was having about 2 seizures per week, mostly nocturnal seizures, about 80-90%. He is currently taking aptiom 1200 mg qhs,  zonegran 300-400, Lamictal 150 mg bid, Depakote 500 - 1000. He is also taking CBD oil for anxiety.  Double vision resolved after aptiom was discontinued.    He is complaining of double vision, blurred vision.  He also complains of hand tremor. He had a really bad tremor day about one day ago.    He had VEEG monitoring for presurgical evaluation in 2021. Total of 9 epileptic seizures were recorded during this admission. These electroclinical seizures showed bifrontal EEG activities at onset, usually with higher amplitude initial activity over the right frontal region compared to the left.  " Ictal manifestations variably included tonic stiffening of his upper body, kicking of his legs, vocalizations, and non-purposeful movements of surrounding objects.  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  Aptiom was discontinued at discharge.     No plan for suicide or self injury    Since the hospital discharge, he continue to have frequent seizures, probably several times per week.     He was complaining of vertigo spells, he was seen in the ED.  He was told that he has vestibular problems.       He continue to have small twitches that last for a spit of a second, some of them in clusters. No LOC with these twitches. These likely are not seizures.      Patient tried Peoples diet, but did not continue.     He is seeing Dr. Ben Barros for his depression.  He is also seeing Deo Shipman for therapy. He is complaining of fatigue and sleep over 8-10 hours a day. He is very concerned about the fatigue and is questioning what other options we have for treating his seizures.      They are now open to VEEG monitoring for surgical evaluation, resection or neuromodulations. However, they are still not sure if they want to move forward to the surgical treatment.     Mood improved a lot. No suicidal thoughts, no thoughts of hurting himself.     The patient started to have seizures when he was 16 years old and he was managed at the [facility] for the past few years for his seizures.  He is currently taking Aptiom 1200 mg q.h.s. and the Zonegran 300 mg b.i.d. for his epilepsy.  He was recently admitted to the hospital twice because of 2 clusters of seizures, one in January when he was admitted to [facility] and one was in February when he was admitted to [facility].  On both occasions, he had 4-5 back to back generalized tonic-clonic seizures within a short period of time.      According to the mother, the patient has 2 different types of seizures.  Type #1 is described as \"mild seizures,\" during which he will " "have some facial twitching.  He will have repetitive hand/arm jerking movement.  He will become unresponsive for less than a minute.  Postictally, he was having some confusion and headaches and fatigue.  Before the seizures, he will have an aura of \"seeing bees populating the earth with eggs.\"  This type of seizure is relatively infrequent, average once per month.      Type #2 are generalized tonic-clonic seizures which the patient will have generalized convulsions of the body and his body will be stiff.  It usually will last for 1-2 minutes followed by postictal fatigue and hallucinations.  This happens usually once every 3 months, but sometimes it occurs in clusters with 2-5 seizures back to back.      The patient had tried Keppra and Depakote in the past which did not work.  He had EEGs done in the past which were reported negative.      The patient had severe depression and requested a lot of behavioral health and counseling in the past and at certain time, he was suicidal, but that improved.  At the present time, he is not suicidal.  He does not think about hurting himself.      TRIGGERS FOR SEIZURES:  Depression.      RISK FACTORS FOR SEIZURES:  He had no history of head trauma with loss of consciousness.  No history of febrile convulsions.  No history of CNS infections.         PAST AEDs: Keppra, Depakote. Vimpat caused suicidal ideations.     PAST MEDICAL HISTORY:  Asperger's disorder, depression, epilepsy.      PAST SURGICAL HISTORY:  None.      FAMILY HISTORY:  No family history of seizures.  Both parents have hypertension.  Maternal grandmother had heart disease.      SOCIAL HISTORY:  He is single, living with his mother.  He had some college education but did not finish because of depression.  He is currently working at [employer].  No smoking, no alcohol, no drug abuse.      REVIEW OF SYSTEMS:  Positive for fatigue, depression.  The rest of the 8-point review of systems are negative.      ALLERGIES: "  Sertraline and Zyprexa.      PHYSICAL EXAMINATION:       Blood pressure 117/69, pulse 68, temperature 97.1  F (36.2  C), temperature source Temporal, weight 198 lb 12.8 oz (90.2 kg).    General exam: General Appearance: No acute distress. HEENT: Normocephalic, atraumatic. Neck: Supple.  Extremities: No edema, no clubbing, no cyanosis.     Neurologic Exam: Alert and oriented x3. Speech fluent, appropriate. Normal attention. Cranial Nerves: Pupils are equal, round, reactive to light and accomodation. Extraocular movement intact. No facial weakness or asymmetry. Hearing normal. Motor Exam: Normal. Coordination:no ataxia.  Gait and Station: normal.       PREVIOUS DIAGNOSTIC TESTING:  CT scan of the head on 02/27/2018 was reported negative from [facility].  He had EEGs in the past which were reported negative.      IMPRESSION:   1.  Epilepsy.    He was last seem a few months ago. [facility] was doen in Aug., consensus was that patient should proceed with invasive monitoring. He should be able to tolerate intracranial monitoring.  The lateralization and localization are not clear based on scalp EEG monitoring. Temporal lobe epilepsy is possible. We will have invasive depth electrodes, 8 symmetric electrodes on each side.    He saw Dr. Adler on 11/2/2021 for consultation for invasive monitoring. [facility] is [facility] and CTA on Dec 12/3/2021.    For the past several months, he was having about 2 seizures per week, mostly nocturnal seizures, about 80-90%. He is currently taking aptiom 1200 mg qhs,  zonegran 300-400, Lamictal 150 mg bid, Depakote 500 - 1000. He is also taking CBD oil for anxiety.  Double vision resolved after aptiom was discontinued.    He is complaining of double vision, blurred vision.  He also complains of hand tremor. He had a really bad tremor day about one day ago.       2.  Asperger's syndrome.   3.  Depression.  He is followed by Dr. Ben Barros and Deo Shipman for therapy.  No plan for  suicide or self injury.  4.  Tremors. Secondary to Depakote? Psychological?     PLAN:   1.  Continue and Zonegran 300-400 mg, Lamictal 150 mg bid. Depakote -1000.  2.  Continue to follow up with Dr. Barros for depression. Continue with therapy.  3.  Continue CBD.  4.  PET CT as outpatient.  5.  MRI with F-Gator and CTA in Dec.  6.  Follow up with Dr. Adler for invasive monitoring.  7.  RTC in 3 months after invasive monitoring.      45 min total time was spent on the day of this visit.      33 min was spent on face to face time  5 min was spent on preparation of visit to review charts and labs, ordering medications and tests  7 min was spent on documentation of clinical information      Again, thank you for allowing me to participate in the care of your patient.      Sincerely,    Norman Dukes MD

## 2021-11-16 NOTE — PROGRESS NOTES
"Neurosurgery Attending Epilepsy Consult Note    HPI: Amrit Padilla is a 25 y/o gentleman with medically refractory focal epilepsy, depression, Asperger syndrome who presents for neurosurgical consultation regarding his options. He is here with both of his parents today. His seizures began about 10 years ago which have become less frequent over time, but he has never been seizure-free. Estimates he has been on about 6-8 AEDs since he began having seizures without any time period of seizure freedom. He is currently on Aptiom, Depakote, Zonegran and Lamictal though he is tapering off the Aptiom as he has had some side effects involving double vision. He and his parents note that it took about 2 weeks of monitoring for him to have enough of his typical seizures recorded during his EMU stay for vEEG.    Seizure types:  Type 1: He will have repetitive face/hand/arm jerking movement, become unresponsive for a minute or so. Postictal confusion, headaches, parents describe him as \"not right\" sometimes for days afterwards.   Type 2: GTCs which he and his parents are unsure about. They seem to think he has not had any of these in a while, but the patient describes waking up with tongue and mouth bites once or twice/week, feeling very stiff and so it seems that these may be GTCs.    Seizure frequency:   Type 1: Most of his seizures are unwitnessed and at night and so difficult for his parents to estimate frequency but seems about twice/week.  Type 2: As above.    Medications:   Current Outpatient Medications:      cetirizine (ZYRTEC) 10 MG tablet, Take 10 mg by mouth daily, Disp: , Rfl:      divalproex sodium extended-release (DEPAKOTE ER) 500 MG 24 hr tablet, TAKE 1 TABLET in the morning, and 2 tabs at night., Disp: 270 tablet, Rfl: 3     FLUoxetine (PROZAC) 40 MG capsule, Take 80 mg by mouth daily, Disp: , Rfl:      HEMP OIL OR EXTRACT OR OTHER CBD CANNABINOID, NOT MEDICAL CANNABIS,, Take 15 mg by mouth every evening , Disp: " , Rfl:      lamoTRIgine (LAMICTAL) 150 MG tablet, Take 1 tablet (150 mg) by mouth 2 times daily, Disp: 180 tablet, Rfl: 3     LORazepam (LORAZEPAM INTENSOL) 2 MG/ML (HIGH CONC) solution, Take 1 ml (2mg) for 2 focal seizures in 4 hours or for any generalized tonic-clonic seizure. Max 2 ml (4mg) in 24 hours., Disp: 30 mL, Rfl: 1     MELATONIN PO, Take 10 mg by mouth nightly as needed OTC, Disp: , Rfl:      zonisamide (ZONEGRAN) 100 MG capsule, Take 3 caps (300 mg) each AM and 4 caps (400 mg) each HS, Disp: 630 capsule, Rfl: 3    EEG: vEEG in March of this year showed 9 of his typical seizures. Bifrontal ictal onset, R>L, semiology included included tonic stiffening of his upper body, kicking of his legs, vocalizations, and non-purposeful movements of surrounding objects.    MRI: No clear abnormalities, FCDs or other lesions seen. No MTS or hippocampal atrophy noted    PET: Inferior frontal gyrus hypometabolism bilaterally along with inferior temporal gyri hypometabolism bilaterally. Normal mesial temporal metabolic uptake.    Language testing: No Wada testing. Neuropsych testing demonstrated weaknesses were in verbal fluency, verbal working memory, some aspects of executive function, cognitive speed, and bilateral psychomotor speed.  Other cognitive abilities, including anterograde memory for both verbal and nonverbal information, were intact and performed in keeping with his above average range cognitive baseline    Exam:  Awake, alert, oriented x 3  Attends  Follows  [facility]  PERRL  EOMI  FS  TML  BUE/BLE 5/5, no drift    A/P: 26 year old with focal medically refractory epilepsy. He and his parents asked a number of questions regarding to invasive monitoring, the approach, the length of time required for monitoring and possible subsequent steps. We discussed the risks of surgery in detail, which are mainly related to hemorrhage. He was more hesitant after discussing the risks of surgery and we discussed in detail  that no surgery is risk-less and that there are risks to proceeding with continued medical management as well. Only he can decide if he wants to proceed given the risks and benefits as laid out during our discussion today. After our discussion, he and his parents agreed to proceed with obtaining the necessary imaging for an invasive evaluation. They want to continue to think about moving forward with surgery and will let us know once his imaging has been obtained. All of his and his family's questions were answered and he expressed understanding.    - Epilepsy protocol MRI  - CTA head  - Surgical planning afterwards

## 2021-12-03 ENCOUNTER — ANCILLARY PROCEDURE (OUTPATIENT)
Dept: MRI IMAGING | Facility: CLINIC | Age: 26
End: 2021-12-03
Attending: NEUROLOGICAL SURGERY
Payer: COMMERCIAL

## 2021-12-03 ENCOUNTER — ANCILLARY PROCEDURE (OUTPATIENT)
Dept: CT IMAGING | Facility: CLINIC | Age: 26
End: 2021-12-03
Attending: NEUROLOGICAL SURGERY
Payer: COMMERCIAL

## 2021-12-03 DIAGNOSIS — G40.909 EPILEPTIC SEIZURE (H): ICD-10-CM

## 2021-12-03 PROCEDURE — A9585 GADOBUTROL INJECTION: HCPCS | Performed by: RADIOLOGY

## 2021-12-03 PROCEDURE — 70496 CT ANGIOGRAPHY HEAD: CPT | Mod: GC | Performed by: RADIOLOGY

## 2021-12-03 PROCEDURE — 70553 MRI BRAIN STEM W/O & W/DYE: CPT | Mod: GC | Performed by: RADIOLOGY

## 2021-12-03 RX ORDER — GADOBUTROL 604.72 MG/ML
10 INJECTION INTRAVENOUS ONCE
Status: COMPLETED | OUTPATIENT
Start: 2021-12-03 | End: 2021-12-03

## 2021-12-03 RX ORDER — IOPAMIDOL 755 MG/ML
75 INJECTION, SOLUTION INTRAVASCULAR ONCE
Status: COMPLETED | OUTPATIENT
Start: 2021-12-03 | End: 2021-12-03

## 2021-12-03 RX ADMIN — IOPAMIDOL 75 ML: 755 INJECTION, SOLUTION INTRAVASCULAR at 15:02

## 2021-12-03 RX ADMIN — GADOBUTROL 9 ML: 604.72 INJECTION INTRAVENOUS at 14:40

## 2021-12-10 DIAGNOSIS — G40.909 RECURRENT SEIZURES (H): ICD-10-CM

## 2021-12-13 NOTE — TELEPHONE ENCOUNTER
DEPAKOTE  MG     Last Written Prescription Date:  11/16/20  Last Fill Quantity: 270,   # refills: 3  Last Office Visit : 11/15/21  Future Office visit:  2/14/21  Routing refill request to provider for review/approval because:  MED OVER RIDE   *AED  9/24/21   AST  3/2/21

## 2021-12-14 DIAGNOSIS — G40.909 RECURRENT SEIZURES (H): ICD-10-CM

## 2021-12-15 RX ORDER — DIVALPROEX SODIUM 500 MG/1
TABLET, EXTENDED RELEASE ORAL
Qty: 270 TABLET | Refills: 1 | Status: SHIPPED | OUTPATIENT
Start: 2021-12-15 | End: 2022-04-20

## 2021-12-15 NOTE — TELEPHONE ENCOUNTER
"Last visit with  11/15/2021  Plan   \"PLAN:   1.  Continue and Zonegran 300-400 mg, Lamictal 150 mg bid. Depakote -1000.\"    Med refilled per MD plan  "

## 2021-12-17 RX ORDER — LAMOTRIGINE 150 MG/1
150 TABLET ORAL 2 TIMES DAILY
Qty: 180 TABLET | Refills: 3 | Status: SHIPPED | OUTPATIENT
Start: 2021-12-17 | End: 2022-06-06

## 2021-12-17 NOTE — TELEPHONE ENCOUNTER
lamoTRIgine (LAMICTAL) 150 MG tablet  Last Written Prescription Date:  11/16/2020  Last Fill Quantity: 180,   # refills: 3  Last Office Visit :  11/15/2021  Future Office visit:  2/14/2022  180 Tabs, 3 Refills sent to pharm 12/17/2021      Sarah Mueller RN  Central Triage Red Flags/Med Refills      Warnings Override History for lamoTRIgine (LAMICTAL) 150 MG tablet [[id number]]    Overridden by Marek Mccabe RN on Dec 15, 2021 1:01 PM   Drug-Drug   1. VALPROIC ACID / LAMOTRIGINE [Level: Major] [Reason: Will monitor drug levels/drug effects ]   Other Orders: divalproex sodium extended-release (DEPAKOTE ER) 500 MG 24 hr tablet         Overridden by Annia Alex PA on Mar 16, 2021 9:59 AM   Drug-Drug   1. VALPROIC ACID / LAMOTRIGINE [Level: Major] [Reason: Tolerated medication/side effects in past]   Other Orders: divalproex sodium extended-release (DEPAKOTE ER) 500 MG 24 hr tablet         Overridden by Norman Dukes MD on Nov 16, 2020 1:37 PM   Drug-Drug   1. VALPROIC ACID / LAMOTRIGINE [Level: Major]   Other Orders: divalproex sodium extended-release (DEPAKOTE ER) 500 MG 24 hr tablet      2. VALPROIC ACID / LAMOTRIGINE [Level: Major]   Other Orders: divalproex sodium extended-release (DEPAKOTE ER) 500 MG 24 hr tablet

## 2021-12-28 ENCOUNTER — VIRTUAL VISIT (OUTPATIENT)
Dept: NEUROSURGERY | Facility: CLINIC | Age: 26
End: 2021-12-28
Payer: COMMERCIAL

## 2021-12-28 DIAGNOSIS — G40.219 PARTIAL SYMPTOMATIC EPILEPSY WITH COMPLEX PARTIAL SEIZURES, INTRACTABLE, WITHOUT STATUS EPILEPTICUS (H): Primary | ICD-10-CM

## 2021-12-28 PROCEDURE — 99213 OFFICE O/P EST LOW 20 MIN: CPT | Mod: 95 | Performed by: NEUROLOGICAL SURGERY

## 2021-12-28 RX ORDER — PROPRANOLOL HYDROCHLORIDE 10 MG/1
10 TABLET ORAL PRN
COMMUNITY
Start: 2021-12-21

## 2021-12-28 NOTE — LETTER
12/28/2021       RE: Amrit Padilla  [address]  [place] [address]     Dear Colleague,    Thank you for referring your patient, Amrit Padilla, to the [facility] NEUROSURGERY CLINIC [place] at [facility]. Please see a copy of my visit note below.    Neurosurgery Attending Epilepsy Follow-Up Note    Note: Due to technical difficulties, this was switched to a telephone visit from a video visit     HPI: Amrit Padilla is a 25 y/o gentleman with medically refractory focal epilepsy, depression, Asperger syndrome who presents for neurosurgical follow-up. Since his last visit, he obtained his CTA and MRI which look adequate for SEEG targeting.     Exam (telephone visit):   Awake, alert, oriented x 3  Attends  Follows  Fluent     A/P: 26 year old with focal medically refractory epilepsy.This was a follow up session designed to answer any lingering questions he and his parents had about surgery but they are all clear on wanting to move forward with SEEG. We will schedule him for a robotic-assisted SEEG when available.      - Robotic assisted frontotemporal SEEG implant    Amrit is a 26 year old who is being evaluated via a billable video visit.      How would you like to obtain your AVS? MyChart  If the video visit is dropped, the invitation should be resent by: Send to e-mail at: [email or web address]  Will anyone else be joining your video visit? No      Video Start Time: 240  Video-Visit Details    Type of service:  Telephone Visit    Video End Time:3:00    Originating Location (pt. Location): Home    Distant Location (provider location):  [facility] NEUROSURGERY [facility]     Platform used for Video Visit: Pham        Again, thank you for allowing me to participate in the care of your patient.      Sincerely,    Tello Adler MD

## 2021-12-28 NOTE — PROGRESS NOTES
Neurosurgery Attending Epilepsy Follow-Up Note    Note: Due to technical difficulties, this was switched to a telephone visit from a video visit     HPI: Amrit Padilla is a 27 y/o gentleman with medically refractory focal epilepsy, depression, Asperger syndrome who presents for neurosurgical follow-up. Since his last visit, he obtained his CTA and MRI which look adequate for SEEG targeting.     Exam (telephone visit):   Awake, alert, oriented x 3  Attends  Follows  Fluent     A/P: 26 year old with focal medically refractory epilepsy.This was a follow up session designed to answer any lingering questions he and his parents had about surgery but they are all clear on wanting to move forward with SEEG. We will schedule him for a robotic-assisted SEEG when available.      - Robotic assisted frontotemporal SEEG implant    Amrit is a 26 year old who is being evaluated via a billable video visit.      How would you like to obtain your AVS? MyChart  If the video visit is dropped, the invitation should be resent by: Send to e-mail at: [email or web address]  Will anyone else be joining your video visit? No      Video Start Time: 240  Video-Visit Details    Type of service:  Telephone Visit    Video End Time:3:00    Originating Location (pt. Location): Home    Distant Location (provider location):  [facility] NEUROSURGERY CLINIC [place]     Platform used for Video Visit: [place]

## 2022-01-11 DIAGNOSIS — G40.219 PARTIAL SYMPTOMATIC EPILEPSY WITH COMPLEX PARTIAL SEIZURES, INTRACTABLE, WITHOUT STATUS EPILEPTICUS (H): Primary | ICD-10-CM

## 2022-01-17 ENCOUNTER — TELEPHONE (OUTPATIENT)
Dept: NEUROSURGERY | Facility: CLINIC | Age: 27
End: 2022-01-17
Payer: COMMERCIAL

## 2022-01-17 DIAGNOSIS — G40.219 PARTIAL SYMPTOMATIC EPILEPSY WITH COMPLEX PARTIAL SEIZURES, INTRACTABLE, WITHOUT STATUS EPILEPTICUS (H): Primary | ICD-10-CM

## 2022-01-17 NOTE — TELEPHONE ENCOUNTER
[facility] Call Center    Phone Message    May a detailed message be left on voicemail: yes     Reason for Call: Other: Please call pt to have a conversation about scheduling these two issues. Thank you.     Action Taken: Message routed to:  [facility] ([facility]): [facility]    Travel Screening: Not Applicable

## 2022-01-17 NOTE — TELEPHONE ENCOUNTER
AMANUEL for patient to schedule surgery with Dr.McGovern Anna C. Schoenecker on 1/17/2022 at 11:22 AM

## 2022-01-19 RX ORDER — ZONISAMIDE 100 MG/1
CAPSULE ORAL
Qty: 630 CAPSULE | Refills: 0 | Status: SHIPPED | OUTPATIENT
Start: 2022-01-19 | End: 2022-04-21

## 2022-01-20 ENCOUNTER — TELEPHONE (OUTPATIENT)
Dept: NEUROSURGERY | Facility: CLINIC | Age: 27
End: 2022-01-20
Payer: COMMERCIAL

## 2022-01-20 NOTE — TELEPHONE ENCOUNTER
Called patient to schedule surgery with Dr. Adler. Patients covid test has been scheduled [facility] and pre op will be taken care of with PAC via video in the 30 day time frame requirement. Patient is aware that pre op nurse will be calling 2-3 days prior to confirm arrival time and instructions. Packet to be sent out within next few days.    Surgeon: Dr. Adler  Date of Surgery: 4/4/2022  Location of surgery: [place] OR  Pre-Op H&P: 3/30/2022  Post-Op Appt Date: To be determined  Imaging needed:  No  Discussed COVID-19 testing:  Yes  Pre-cert/Authorization completed:  Yes  Patient aware that pre-op RN will call 2-3 days prior to surgery with arrival time and instructions Yes  Packet sent out: No 01/20/22  Patient was instructed to review packet and call back with any questions or concerns.       Joshua Quiñonez on 1/20/2022 at 1:16 PM

## 2022-01-20 NOTE — TELEPHONE ENCOUNTER
[facility] for patient to schedule surgery    Anna C. Schoenecker on 1/20/2022 at 9:10 AM

## 2022-01-21 NOTE — TELEPHONE ENCOUNTER
FUTURE VISIT INFORMATION      SURGERY INFORMATION:    Date: 22    Location: uu or    Surgeon:  Tello Adler MD    Anesthesia Type:  general    Procedure: JAYDEN-assisted stereotactic placement of bilateral depth electrodes for invasive video electroencephalography monitoring    RECORDS REQUESTED FROM:       Primary Care Provider: Maurizio Rubio MD - [facility]    Most recent EKG+ Tracin20- [facility]

## 2022-01-28 NOTE — TELEPHONE ENCOUNTER
FUTURE VISIT INFORMATION        SURGERY INFORMATION:    Date: 22    Location: uu or    Surgeon:  Tello Adler MD    Anesthesia Type:  general    Procedure: JAYDEN-assisted stereotactic placement of bilateral depth electrodes for invasive video electroencephalography monitoring     RECORDS REQUESTED FROM:         Primary Care Provider: Maurizio Rubio MD - [facility]     Most recent EKG+ Tracin20- [facility]

## 2022-02-05 NOTE — PROGRESS NOTES
Adult Mental Health Outpatient Group Therapy Progress Note     Client Initial Individualized Goals for Treatment: Use mindful breathing for 2 minutes to manage anxiety.       Treatment Goals:  1.  Client will notify staff when needing assistance to develop or implement a coping plan to manage suicidal or self injurious urges.      2.  Will report on symptoms and identify skills to use to manage negative self-talk, anxiety symptoms, dissociation.   Sergio will also explore ways to increase social connection and strengthen his support network. Sergio will also process his thoughts and feelings regarding his longer-term goals with respect to employment.  Progress will be measured by subjective report.      3.  Sergio will use OT time to explore what is helpful and what is not with respect to managing his time and activity levels.  The client will explore and identify which of his / her routines or lifestyle issues that need to change in order to reduce stress.      4.  Will improve wellness related behaviors by finding a psychiatry provider and individual therapist.     Area of Treatment Focus:  Symptom Management, Personal Safety and Develop / Improve Independent Living Skills    Therapeutic Interventions/Treatment Strategies:  Support, Feedback, Safety Assessments and Structured Activity    Response to Treatment Strategies:  Accepted Feedback, Gave Feedback, Listened, Accepted Support and Alert    Name of Group:  OT Clinic     Description and Outcome:  Sergio  attended and participated in a structured occupational therapy group where intervention focused on coping through structured hands-on activities.  Sergio worked in a self directed, goal oriented manner on his ongoing paper pencil puzzle task with fair concentration.  He was quiet throughout the group.  Mood seemed even. Client demonstrated understanding of session content by practicing mindfulness through engagement in focused task work.     Is this a Weekly Review of the  Initial Clinical Review    Admission: Date/Time/Statement:   Admission Orders (From admission, onward)     Ordered        02/05/22 0037  Place in Observation  Once                      02/05/22 1630  Inpatient Admission  Once        Transfer Service: Hospitalist       Question Answer Comment   Level of Care Med Surg    Estimated length of stay More than 2 Midnights    Certification I certify that inpatient services are medically necessary for this patient for a duration of greater than two midnights  See H&P and MD Progress Notes for additional information about the patient's course of treatment  02/05/22 1629   OBSERVATION  2/5  2  0037 CHANGED TO IP ADMISSION 2/5 @  1630    Will change patient's status from observation to inpatient as he requires further diagnostic workup and treatment as above       ED Arrival Information     Expected Arrival Acuity    - 2/4/2022 20:53 Urgent         Means of arrival Escorted by Service Admission type    Ambulance [facility] Hospitalist Urgent         Arrival complaint    Shortness of Breath        Chief Complaint   Patient presents with    Medical Problem     diagnosed with covid 2 weeks, states he weak and feels sick        Initial Presentation: 64  Y O male presents to ED via  EMS from home with chest pain and intermittent shortness of breath  Tested positive for COVID  23,  Was admitted   1/28 - 1/29 for COVID pneumonia, had no O2 requirements and discharged  Feels weak since then  Additional PMH is  Lyme disease and CAD  Appears ill  CT scan unremarkable  EKG  Shows   NSR  Labs reveal elevated D dimer and lactic acidosis  Admit  Observation with Chest pain, COVID 19 virus infection, Elevated D dimer  And lactic acidosis and plan is  PT/OT, incentive spirometry,  Monitor labs and respiratory consult, tele, IVF and continue home meds  2/6      Complains of generalized weakness, poor appetite  Some improvement in chest pain  IVF  Now  D/c  Lactic acidosis resolved  Continue PT/OT  Elevated D dimer may be  Due to COVID  19   Work up negative for PE  Continue to monitor respiratory status  No O2 requirements since admission  Wait 2 DE  Cardiology consult pending  ED Triage Vitals [02/04/22 2054]   Temperature Pulse Respirations Blood Pressure SpO2   97 6 °F (36 4 °C) 92 18 (!) 183/89 100 %      Temp Source Heart Rate Source Patient Position - Orthostatic VS BP Location FiO2 (%)   Temporal Monitor Sitting Left arm --      Pain Score       No Pain          Wt Readings from Last 1 Encounters:   02/05/22 52 kg (114 lb 10 2 oz)     Additional Vital Signs:    Temp Pulse Resp BP MAP (mmHg) SpO2 O2 Device Patient Position - Orthostatic VS   02/05/22 07:43:10 97 8 °F (36 6 °C) 67 16 171/91 Abnormal  118 99 % None (Room air) Sitting   02/05/22 0419 -- 62 16 -- -- 99 % None (Room air) --   02/05/22 0317 -- -- -- -- -- -- None (Room air) --   02/05/22 02:26:09 97 4 °F (36 3 °C) Abnormal  64 -- 176/91 Abnormal  119 99 % -- --   02/05/22 02:25:06 -- 63 17 176/91 Abnormal  119 99 % -- --   02/04/22 2200 -- 62 22 145/79 105 97 % None (Room air) Lying   02/04/22 2106 -- -- -- -- -- -- None (Room air) --   02/04/22 2054 97 6 °F (36 4 °C) 92 18 183/89 Abnormal  -- 100 % None (Room air) Sitting       Pertinent Labs/Diagnostic Test Results:   CT abd/pelvis ( 2/4)    No evidence of pulmonary embolism  2   No focal consolidation of the lungs  3   Coronary artery calcifications     4   Enlarged prostate  CXR ( 2/5)  NAD  EKG  ( 2/4)  NSR  HR   83    Nonspecific T wave  changes      Results from last 7 days   Lab Units 02/05/22 0433 02/04/22 2115   WBC Thousand/uL 11 82* 12 20*   HEMOGLOBIN g/dL 13 7 16 0   HEMATOCRIT % 41 2 47 2   PLATELETS Thousands/uL 273 354   BANDS PCT %  --  3         Results from last 7 days   Lab Units 02/05/22 0433 02/04/22 2115   SODIUM mmol/L 135* 135*   POTASSIUM mmol/L 3 8 4 1   CHLORIDE mmol/L 100 96*   CO2 mmol/L 28 28   ANION Progress on the Treatment Plan?  No   GAP mmol/L 7 11   BUN mg/dL 25 29*   CREATININE mg/dL 0 86 1 18   EGFR ml/min/1 73sq m 83 58   CALCIUM mg/dL 8 1* 9 3   MAGNESIUM mg/dL 2 0 2 1   PHOSPHORUS mg/dL 3 3  --      Results from last 7 days   Lab Units 02/05/22  0433 02/04/22 2115   AST U/L 21 28   ALT U/L 27 44   ALK PHOS U/L 48 63   TOTAL PROTEIN g/dL 5 9* 7 7   ALBUMIN g/dL 2 7* 3 6   TOTAL BILIRUBIN mg/dL 0 84 0 91   BILIRUBIN DIRECT mg/dL  --  0 19         Results from last 7 days   Lab Units 02/05/22  0433 02/04/22 2115   GLUCOSE RANDOM mg/dL 117 133           Results from last 7 days   Lab Units 02/05/22  0158 02/04/22 2352 02/04/22 2115   HS TNI 0HR ng/L  --   --  5   HS TNI 2HR ng/L  --  5  --    HSTNI D2 ng/L  --  0  --    HS TNI 4HR ng/L 5  --   --    HSTNI D4 ng/L 0  --   --      Results from last 7 days   Lab Units 02/04/22 2115   D-DIMER QUANTITATIVE ug/ml FEU 2 69*     Results from last 7 days   Lab Units 02/04/22 2115 01/29/22  1145   PROTIME seconds 12 9  --    INR  1 02  --    PTT seconds 23 >210*     Results from last 7 days   Lab Units 02/04/22  2115   TSH 3RD GENERATON uIU/mL 1 920         Results from last 7 days   Lab Units 02/05/22  0605 02/05/22  0433 02/05/22  0158 02/04/22 2352 02/04/22 2115   LACTIC ACID mmol/L 1 7 2 0 2 2* 2 4* 3 4*             Results from last 7 days   Lab Units 02/04/22 2115   NT-PRO BNP pg/mL 282                             Results from last 7 days   Lab Units 02/04/22 2123   CLARITY UA  Cloudy   COLOR UA  Yellow   SPEC GRAV UA  1 020   PH UA  8 0   GLUCOSE UA mg/dl Negative   KETONES UA mg/dl Negative   BLOOD UA  Small*   PROTEIN UA mg/dl Negative   NITRITE UA  Negative   BILIRUBIN UA  Negative   UROBILINOGEN UA E U /dl 2 0*   LEUKOCYTES UA  Negative   WBC UA /hpf 0-1   RBC UA /hpf 4-10*   BACTERIA UA /hpf Moderate*   EPITHELIAL CELLS WET PREP /hpf Occasional             ED Treatment:   Medication Administration from 02/04/2022 2053 to 02/05/2022 0221       Date/Time Order Dose Route Action Comments     02/04/2022 2117 fentanyl citrate (PF) 100 MCG/2ML 25 mcg 25 mcg Intravenous Given      02/04/2022 2120 ondansetron (ZOFRAN-ODT) dispersible tablet 4 mg 4 mg Oral Given      02/04/2022 2117 LORazepam (ATIVAN) injection 0 5 mg 0 5 mg Intravenous Given      02/04/2022 2306 lactated ringers bolus 1,000 mL 0 mL Intravenous Stopped      02/04/2022 2206 lactated ringers bolus 1,000 mL 1,000 mL Intravenous New Bag      02/04/2022 2243 iohexol (OMNIPAQUE) 350 MG/ML injection (SINGLE-DOSE) 100 mL 100 mL Intravenous Given         Present on Admission:   Dyslipidemia      Admitting Diagnosis: Shortness of breath [R06 02]  Lactic acidosis [E87 2]  Nausea [R11 0]  Chest pain [R07 9]  Dyspnea [R06 00]  COVID-19 virus infection [U07 1]  Age/Sex: 64 y o  male  Admission Orders:  Scheduled Medications:  ascorbic acid, 1,000 mg, Oral, Daily  aspirin, 81 mg, Oral, Daily  cholecalciferol, 2,000 Units, Oral, Daily  heparin (porcine), 5,000 Units, Subcutaneous, Q8H LIVIA  lidocaine, 1 patch, Topical, Daily  NIFEdipine, 10 mg, Oral, Q8H LIVIA  pantoprazole, 40 mg, Intravenous, Q24H [address]      Continuous IV Infusions:  sodium chloride, 75 mL/hr, Intravenous, Continuous      PRN Meds:  acetaminophen, 650 mg, Oral, Q6H PRN  albuterol, 2 puff, Inhalation, Q4H PRN  hydrocodone-chlorpheniramine polistirex, 5 mL, Oral, Q12H PRN  ondansetron, 4 mg, Intravenous, Q6H PRN        IP CONSULT TO CASE MANAGEMENT    Network Utilization Review Department  ATTENTION: Please call with any questions or concerns to [phone] and carefully listen to the prompts so that you are directed to the right person  All voicemails are confidential   Christopher Vann all requests for admission clinical reviews, approved or denied determinations and any other requests to dedicated fax number below belonging to the campus where the patient is receiving treatment   List of dedicated fax numbers for the Facilities:  FACILITY NAME UR FAX NUMBER   ADMISSION DENIALS (Administrative/Medical Necessity) [phone]   [address] (Maternity/NICU/Pediatrics) 261 [facility],[address] Floor [facility] [address] [facility]  [phone]   Peter Reed [address] [place] [address] [address] [place] [address] [address] [facility] [place] [address] [phone]

## 2022-02-09 NOTE — TELEPHONE ENCOUNTER
Patient was rescheduled for 4/26/2022    Pre op Assessment: 4/11/2022    Covid test: 4/22/2022    Post op Care: 5/10/2022      Joshua Quiñonez on 2/9/2022 at 11:38 AM

## 2022-02-09 NOTE — TELEPHONE ENCOUNTER
Patient was rescheduled for 4/26/2022 with Dr. Adler. Please see surgery telephone encounter in chart.      Joshua Quiñonez on 2/9/2022 at 11:36 AM

## 2022-02-14 ENCOUNTER — OFFICE VISIT (OUTPATIENT)
Dept: NEUROLOGY | Facility: CLINIC | Age: 27
End: 2022-02-14
Payer: COMMERCIAL

## 2022-02-14 VITALS — WEIGHT: 199 LBS | BODY MASS INDEX: 26.95 KG/M2 | HEIGHT: 72 IN

## 2022-02-14 DIAGNOSIS — G40.909 RECURRENT SEIZURES (H): Primary | ICD-10-CM

## 2022-02-14 ASSESSMENT — MIFFLIN-ST. JEOR: SCORE: 1910.66

## 2022-02-14 NOTE — LETTER
Date:February 16, 2022      Patient was self referred, no letter generated. Do not send.        [facility] Health Information

## 2022-02-14 NOTE — LETTER
2022       RE: Amrit Padilla  : 1995   MRN: [id number]      Dear Colleague,    Thank you for referring your patient, Amrit Padilla, to the [facility] EPILE[facility]ARE at [facility]. Please see a copy of my visit note below.    CHIEF COMPLAINT:  Follow up for seizures.      HISTORY OF PRESENT ILLNESS:  In person visit for follow up. He is accompanied by his mother in the clinic today. This is a 26-year-old right-handed male with history of epilepsy, Asperger's disorder, depression.  He was previously seen by Dr. Morales at [facility] for his epilepsy.  He was last seem 3 months ago. According to his mother, she is not witnessing daytime seizures for the past 3 months. Patient felt that he is probably having 1-2 seizures a month that he is aware of. Most of his seizures are nocturnal now.     No plan to hurt himself. Followed by Dr. Barros for depression.    CMC was done in Aug. 2021, consensus was that patient should proceed with invasive monitoring. He should be able to tolerate intracranial monitoring.  The lateralization and localization are not clear based on scalp EEG monitoring. Temporal lobe epilepsy is possible. We will have invasive depth electrodes, 8 symmetric electrodes on each side:     Amygdala: 1  Anterior and posterior hippocampus: 3  Cingulate and prefrontal: 2  Orbital frontal:1  Insula: 1    He saw Dr. Adler on 2021 for consultation for invasive monitoring. He is scheduled for F-Gator MRI and CTA on Dec 12/3/2021.    For the past several months, he was having about 2 seizures per week, mostly nocturnal seizures, about 80-90%. He is currently taking aptiom 1200 mg qhs,  zonegran 300-400, Lamictal 150 mg bid, Depakote 500 - 1000. He is also taking CBD oil for anxiety.  Double vision resolved after aptiom was discontinued.    He is complaining of double vision, blurred vision.  He also complains of hand tremor. He had a really bad tremor  day about one day ago.    He had VEEG monitoring for presurgical evaluation in March 2021. Total of 9 epileptic seizures were recorded during this admission. These electroclinical seizures showed bifrontal EEG activities at onset, usually with higher amplitude initial activity over the right frontal region compared to the left.  Ictal manifestations variably included tonic stiffening of his upper body, kicking of his legs, vocalizations, and non-purposeful movements of surrounding objects.  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  Aptiom was discontinued at discharge.     No plan for suicide or self injury    Since the hospital discharge, he continue to have frequent seizures, probably several times per week.     He was complaining of vertigo spells, he was seen in the ED.  He was told that he has vestibular problems.       He continue to have small twitches that last for a spit of a second, some of them in clusters. No LOC with these twitches. These likely are not seizures.      Patient tried Peoples diet, but did not continue.     He is seeing Dr. Ben Barros for his depression.  He is also seeing Deo Shipman for therapy. He is complaining of fatigue and sleep over 8-10 hours a day. He is very concerned about the fatigue and is questioning what other options we have for treating his seizures.      They are now open to VEEG monitoring for surgical evaluation, resection or neuromodulations. However, they are still not sure if they want to move forward to the surgical treatment.     Mood improved a lot. No suicidal thoughts, no thoughts of hurting himself.     The patient started to have seizures when he was 16 years old and he was managed at the [facility] for the past few years for his seizures.  He is currently taking Aptiom 1200 mg q.h.s. and the Zonegran 300 mg b.i.d. for his epilepsy.  He was recently admitted to the hospital twice because of 2 clusters of seizures, one in January when he was  "admitted to [facility] and one was in February when he was admitted to [facility].  On both occasions, he had 4-5 back to back generalized tonic-clonic seizures within a short period of time.      According to the mother, the patient has 2 different types of seizures.  Type #1 is described as \"mild seizures,\" during which he will have some facial twitching.  He will have repetitive hand/arm jerking movement.  He will become unresponsive for less than a minute.  Postictally, he was having some confusion and headaches and fatigue.  Before the seizures, he will have an aura of \"seeing bees populating the earth with eggs.\"  This type of seizure is relatively infrequent, average once per month.      Type #2 are generalized tonic-clonic seizures which the patient will have generalized convulsions of the body and his body will be stiff.  It usually will last for 1-2 minutes followed by postictal fatigue and hallucinations.  This happens usually once every 3 months, but sometimes it occurs in clusters with 2-5 seizures back to back.      The patient had tried Keppra and Depakote in the past which did not work.  He had EEGs done in the past which were reported negative.      The patient had severe depression and requested a lot of behavioral health and counseling in the past and at certain time, he was suicidal, but that improved.  At the present time, he is not suicidal.  He does not think about hurting himself.      TRIGGERS FOR SEIZURES:  Depression.      RISK FACTORS FOR SEIZURES:  He had no history of head trauma with loss of consciousness.  No history of febrile convulsions.  No history of CNS infections.         PAST AEDs: Keppra, Depakote. Vimpat caused suicidal ideations.     PAST MEDICAL HISTORY:  Asperger's disorder, depression, epilepsy.      PAST SURGICAL HISTORY:  None.      FAMILY HISTORY:  No family history of seizures.  Both parents have hypertension.  Maternal grandmother had heart disease.      SOCIAL HISTORY: "  He is single, living with his mother.  He had some college education but did not finish because of depression.  He is currently working at [employer].  No smoking, no alcohol, no drug abuse.      REVIEW OF SYSTEMS:  Positive for tremors, depression, fatigue.  The rest of the 8-point review of systems are negative.      ALLERGIES:  Sertraline and Zyprexa.      PHYSICAL EXAMINATION:       Height 6' (182.9 cm), weight 199 lb (90.3 kg).    General exam: General Appearance: No acute distress. HEENT: Normocephalic, atraumatic. Neck: Supple.  Extremities: No edema, no clubbing, no cyanosis.     Neurologic Exam: Alert and oriented x3. Speech fluent, appropriate. Normal attention. Cranial Nerves: Pupils are equal, round, reactive to light and accomodation. Extraocular movement intact. No facial weakness or asymmetry. Hearing normal. Motor Exam: Normal. Coordination:no ataxia.  Gait and Station: normal.       PREVIOUS DIAGNOSTIC TESTING:  CT scan of the head on 02/27/2018 was reported negative from [facility].  He had EEGs in the past which were reported negative.      IMPRESSION:   1.  Epilepsy.     He was last seem 3 months ago. According to his mother, she is not witnessing daytime seizures for the past 3 months. Patient felt that he is probably having 1-2 seizures a month that he is aware of. Most of his seizures are nocturnal now.     CMC was done in Aug. 2021, consensus was that patient should proceed with invasive monitoring.     He saw Dr. Adler on 11/2/2021 for consultation for invasive monitoring. He is scheduled for intracranial monitoring on 4/26/2022.      2.  Asperger's syndrome.   3.  Depression.  He is followed by Dr. Ben Barros and Deo Shipman for therapy.  No plan for suicide or self injury.  4.  Tremors. Secondary to Depakote? Psychological?     PLAN:   1.  Continue and Zonegran 300-400 mg, Lamictal 150 mg bid. Depakote -1000.  2.  Continue to follow up with Dr. Barros for depression. Continue  with therapy.  3.  Continue CBD.  4.  Invasive monitoring scheduled with Dr. Adler on 4/26/2022.  5.  CMC discuss on 4/20/2022.      48 min total time was spent on the day of this visit.      36 min was spent on face to face time  6 min was spent on preparation of visit to review charts and labs, ordering medications and tests  6 min was spent on documentation of clinical information      Again, thank you for allowing me to participate in the care of your patient.      Sincerely,    Norman Dukes MD

## 2022-02-14 NOTE — PROGRESS NOTES
CHIEF COMPLAINT:  Follow up for seizures.      HISTORY OF PRESENT ILLNESS:  In person visit for follow up. He is accompanied by his mother in the clinic today. This is a 26-year-old right-handed male with history of epilepsy, Asperger's disorder, depression.  He was previously seen by Dr. Morales at [facility] for his epilepsy.  He was last seem 3 months ago. According to his mother, she is not witnessing daytime seizures for the past 3 months. Patient felt that he is probably having 1-2 seizures a month that he is aware of. Most of his seizures are nocturnal now.     No plan to hurt himself. Followed by Dr. Barros for depression.    CMC was done in Aug. 2021, consensus was that patient should proceed with invasive monitoring. He should be able to tolerate intracranial monitoring.  The lateralization and localization are not clear based on scalp EEG monitoring. Temporal lobe epilepsy is possible. We will have invasive depth electrodes, 8 symmetric electrodes on each side:     Amygdala: 1  Anterior and posterior hippocampus: 3  Cingulate and prefrontal: 2  Orbital frontal:1  Insula: 1    He saw Dr. Adler on 11/2/2021 for consultation for invasive monitoring. He is scheduled for F-Gator MRI and CTA on Dec 12/3/2021.    For the past several months, he was having about 2 seizures per week, mostly nocturnal seizures, about 80-90%. He is currently taking aptiom 1200 mg qhs,  zonegran 300-400, Lamictal 150 mg bid, Depakote 500 - 1000. He is also taking CBD oil for anxiety.  Double vision resolved after aptiom was discontinued.    He is complaining of double vision, blurred vision.  He also complains of hand tremor. He had a really bad tremor day about one day ago.    He had VEEG monitoring for presurgical evaluation in March 2021. Total of 9 epileptic seizures were recorded during this admission. These electroclinical seizures showed bifrontal EEG activities at onset, usually with higher amplitude initial activity over  the right frontal region compared to the left.  Ictal manifestations variably included tonic stiffening of his upper body, kicking of his legs, vocalizations, and non-purposeful movements of surrounding objects.  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  Aptiom was discontinued at discharge.     No plan for suicide or self injury    Since the hospital discharge, he continue to have frequent seizures, probably several times per week.     He was complaining of vertigo spells, he was seen in the ED.  He was told that he has vestibular problems.       He continue to have small twitches that last for a spit of a second, some of them in clusters. No LOC with these twitches. These likely are not seizures.      Patient tried Peoples diet, but did not continue.     He is seeing Dr. Ben Barros for his depression.  He is also seeing Deo Shipman for therapy. He is complaining of fatigue and sleep over 8-10 hours a day. He is very concerned about the fatigue and is questioning what other options we have for treating his seizures.      They are now open to VEEG monitoring for surgical evaluation, resection or neuromodulations. However, they are still not sure if they want to move forward to the surgical treatment.     Mood improved a lot. No suicidal thoughts, no thoughts of hurting himself.     The patient started to have seizures when he was 16 years old and he was managed at the [facility] for the past few years for his seizures.  He is currently taking Aptiom 1200 mg q.h.s. and the Zonegran 300 mg b.i.d. for his epilepsy.  He was recently admitted to the hospital twice because of 2 clusters of seizures, one in January when he was admitted to [facility] and one was in February when he was admitted to [facility].  On both occasions, he had 4-5 back to back generalized tonic-clonic seizures within a short period of time.      According to the mother, the patient has 2 different types of seizures.  Type #1 is  "described as \"mild seizures,\" during which he will have some facial twitching.  He will have repetitive hand/arm jerking movement.  He will become unresponsive for less than a minute.  Postictally, he was having some confusion and headaches and fatigue.  Before the seizures, he will have an aura of \"seeing bees populating the earth with eggs.\"  This type of seizure is relatively infrequent, average once per month.      Type #2 are generalized tonic-clonic seizures which the patient will have generalized convulsions of the body and his body will be stiff.  It usually will last for 1-2 minutes followed by postictal fatigue and hallucinations.  This happens usually once every 3 months, but sometimes it occurs in clusters with 2-5 seizures back to back.      The patient had tried Keppra and Depakote in the past which did not work.  He had EEGs done in the past which were reported negative.      The patient had severe depression and requested a lot of behavioral health and counseling in the past and at certain time, he was suicidal, but that improved.  At the present time, he is not suicidal.  He does not think about hurting himself.      TRIGGERS FOR SEIZURES:  Depression.      RISK FACTORS FOR SEIZURES:  He had no history of head trauma with loss of consciousness.  No history of febrile convulsions.  No history of CNS infections.         PAST AEDs: Keppra, Depakote. Vimpat caused suicidal ideations.     PAST MEDICAL HISTORY:  Asperger's disorder, depression, epilepsy.      PAST SURGICAL HISTORY:  None.      FAMILY HISTORY:  No family history of seizures.  Both parents have hypertension.  Maternal grandmother had heart disease.      SOCIAL HISTORY:  He is single, living with his mother.  He had some college education but did not finish because of depression.  He is currently working at [employer].  No smoking, no alcohol, no drug abuse.      REVIEW OF SYSTEMS:  Positive for tremors, depression, fatigue.  The rest of the " 8-point review of systems are negative.      ALLERGIES:  Sertraline and Zyprexa.      PHYSICAL EXAMINATION:       Height 6' (182.9 cm), weight 199 lb (90.3 kg).    General exam: General Appearance: No acute distress. HEENT: Normocephalic, atraumatic. Neck: Supple.  Extremities: No edema, no clubbing, no cyanosis.     Neurologic Exam: Alert and oriented x3. Speech fluent, appropriate. Normal attention. Cranial Nerves: Pupils are equal, round, reactive to light and accomodation. Extraocular movement intact. No facial weakness or asymmetry. Hearing normal. Motor Exam: Normal. Coordination:no ataxia.  Gait and Station: normal.       PREVIOUS DIAGNOSTIC TESTING:  CT scan of the head on 02/27/2018 was reported negative from [facility].  He had EEGs in the past which were reported negative.      IMPRESSION:   1.  Epilepsy.     He was last seem 3 months ago. According to his mother, she is not witnessing daytime seizures for the past 3 months. Patient felt that he is probably having 1-2 seizures a month that he is aware of. Most of his seizures are nocturnal now.     CMC was done in Aug. 2021, consensus was that patient should proceed with invasive monitoring.     He saw Dr. Adler on 11/2/2021 for consultation for invasive monitoring. He is scheduled for intracranial monitoring on 4/26/2022.      2.  Asperger's syndrome.   3.  Depression.  He is followed by Dr. Ben Barros and Deo Shipman for therapy.  No plan for suicide or self injury.  4.  Tremors. Secondary to Depakote? Psychological?     PLAN:   1.  Continue and Zonegran 300-400 mg, Lamictal 150 mg bid. Depakote -1000.  2.  Continue to follow up with Dr. Barros for depression. Continue with therapy.  3.  Continue CBD.  4.  Invasive monitoring scheduled with Dr. Adler on 4/26/2022.  5.  CMC discuss on 4/20/2022.      48 min total time was spent on the day of this visit.      36 min was spent on face to face time  6 min was spent on preparation of visit to  review charts and labs, ordering medications and tests  6 min was spent on documentation of clinical information

## 2022-02-14 NOTE — PATIENT INSTRUCTIONS
Times of Days am pm        Medication Tablet Size Number of Tablets/Capsules Total Daily Dosage                 Depakote 500 1 2        1500 mg    Zonegran 100 3 4        700 mg    Lamictal 100 1.5 1.5        300 mg                                                                             Carry this with you at all times.  CONTINUE TAKING YOUR OTHER MEDICATIONS AS PREVIOUSLY DIRECTED.      * * *Do not store medications in the bathroom.  Keep medications away from children!* * *

## 2022-02-15 ASSESSMENT — PATIENT HEALTH QUESTIONNAIRE - PHQ9: SUM OF ALL RESPONSES TO PHQ QUESTIONS 1-9: 21

## 2022-03-04 ENCOUNTER — TELEPHONE (OUTPATIENT)
Dept: NEUROSURGERY | Facility: CLINIC | Age: 27
End: 2022-03-04
Payer: COMMERCIAL

## 2022-03-04 NOTE — TELEPHONE ENCOUNTER
[facility] for patient to call writter back to discuss surgery dates    Anna C. Schoenecker on 3/4/2022 at 8:31 AM

## 2022-03-08 ENCOUNTER — TELEPHONE (OUTPATIENT)
Dept: NEUROSURGERY | Facility: CLINIC | Age: 27
End: 2022-03-08
Payer: COMMERCIAL

## 2022-03-08 NOTE — TELEPHONE ENCOUNTER
Was able to reach patient and confirm with him that his surgery date changed from 4/26 to 4/25. Patient aware he can keep his pre op dates and covid appt the same.  He will await further instruction from RN to go over more in depth questions    Anna C. Schoenecker on 3/8/2022 at 11:38 AM

## 2022-03-12 DIAGNOSIS — Z11.59 ENCOUNTER FOR SCREENING FOR OTHER VIRAL DISEASES: Primary | ICD-10-CM

## 2022-03-30 ENCOUNTER — PRE VISIT (OUTPATIENT)
Dept: SURGERY | Facility: CLINIC | Age: 27
End: 2022-03-30
Payer: COMMERCIAL

## 2022-04-11 ENCOUNTER — ANESTHESIA EVENT (OUTPATIENT)
Dept: SURGERY | Facility: CLINIC | Age: 27
End: 2022-04-11

## 2022-04-11 ENCOUNTER — VIRTUAL VISIT (OUTPATIENT)
Dept: SURGERY | Facility: CLINIC | Age: 27
End: 2022-04-11
Payer: COMMERCIAL

## 2022-04-11 ENCOUNTER — PRE VISIT (OUTPATIENT)
Dept: SURGERY | Facility: CLINIC | Age: 27
End: 2022-04-11
Payer: COMMERCIAL

## 2022-04-11 DIAGNOSIS — Z01.818 PREOP EXAMINATION: Primary | ICD-10-CM

## 2022-04-11 DIAGNOSIS — G40.211 PARTIAL SYMPTOMATIC EPILEPSY WITH COMPLEX PARTIAL SEIZURES, INTRACTABLE, WITH STATUS EPILEPTICUS (H): ICD-10-CM

## 2022-04-11 DIAGNOSIS — G40.219 PARTIAL SYMPTOMATIC EPILEPSY WITH COMPLEX PARTIAL SEIZURES, INTRACTABLE, WITHOUT STATUS EPILEPTICUS (H): Primary | ICD-10-CM

## 2022-04-11 PROCEDURE — 99205 OFFICE O/P NEW HI 60 MIN: CPT | Mod: 95 | Performed by: NURSE PRACTITIONER

## 2022-04-11 RX ORDER — LIDOCAINE 40 MG/G
CREAM TOPICAL
Status: CANCELLED | OUTPATIENT
Start: 2022-04-11

## 2022-04-11 RX ORDER — SODIUM CHLORIDE, SODIUM LACTATE, POTASSIUM CHLORIDE, CALCIUM CHLORIDE 600; 310; 30; 20 MG/100ML; MG/100ML; MG/100ML; MG/100ML
INJECTION, SOLUTION INTRAVENOUS CONTINUOUS
Status: CANCELLED | OUTPATIENT
Start: 2022-04-11

## 2022-04-11 ASSESSMENT — PAIN SCALES - GENERAL: PAINLEVEL: NO PAIN (0)

## 2022-04-11 ASSESSMENT — LIFESTYLE VARIABLES: TOBACCO_USE: 0

## 2022-04-11 ASSESSMENT — ENCOUNTER SYMPTOMS: SEIZURES: 1

## 2022-04-11 ASSESSMENT — PATIENT HEALTH QUESTIONNAIRE - PHQ9: SUM OF ALL RESPONSES TO PHQ QUESTIONS 1-9: 19

## 2022-04-11 NOTE — H&P
Pre-Operative H & P     CC:  Preoperative exam to assess for increased cardiopulmonary risk while undergoing surgery and anesthesia.    Date of Encounter: 4/11/2022  Primary Care Physician:  No Ref-Primary, Physician     Reason for visit:   Encounter Diagnoses   Name Primary?     Preop examination Yes     Partial symptomatic epilepsy with complex partial seizures, intractable, with status epilepticus (H)        RAGINI Padilla is a 26 year old male who presents for pre-operative H & P in preparation for  Procedure Information     Date/Time: 4/25/22     Procedure: JAYDEN-assisted stereotactic placement of bilateral depth electrodes for invasive video electroencephalography monitoring    Anesthesia type: general    Pre-op diagnosis: Partial symptomatic epilepsy with complex partial seizures, intractable, without status epilepticus (H)         Location: [facility]    Providers: Vitor        Amrit Padilla has a history of  depression, and Asperger syndrome.  He is followed inmedically refractory focal epilepsy, the neurological surgery clinic by Dr. Adler.  He was most recently seen by Dr. Adler on 12/28/21 to further discuss robotic-assisted SEEG.  Dr. Adler had reviewed Mr. Padilla s CTA and MRI.   Mr. Padilla was deemed an acceptable candidate for the above procedure.  Mr. Padilla presents to the PAC virtually today with his mom, Sarah, and Dad, Ed, in preparation for the above surgical procedure.        PAC referral for risk assessment and optimization of anesthesia with comorbid conditions of depression, seizure disorder, Asperger's, migraines and seasonal allergies.       History is obtained from the patient, electronic health record, patient's father and patient's mother and chart review    Hx of abnormal bleeding or anti-platelet use: Excedrin as needed      Past Medical History  Past Medical History:   Diagnosis Date     Depressive  disorder      Headaches, cluster      Seizures (H)        Past Surgical History  Past Surgical History:   Procedure Laterality Date     BACK SURGERY         Prior to Admission Medications  Current Outpatient Medications   Medication Sig Dispense Refill     aspirin-acetaminophen-caffeine (EXCEDRIN MIGRAINE) 250-250-65 MG tablet Take 1 tablet by mouth every 6 hours as needed for headaches       divalproex sodium extended-release (DEPAKOTE ER) 500 MG 24 hr tablet Take 1 tablet (500 mg) by mouth every morning AND 2 tablets (1,000 mg) every evening. 270 tablet 1     FLUoxetine (PROZAC) 40 MG capsule Take 80 mg by mouth every morning       HEMP OIL OR EXTRACT OR OTHER CBD CANNABINOID, NOT MEDICAL CANNABIS, Take 15 mg by mouth every evening        lamoTRIgine (LAMICTAL) 150 MG tablet Take 1 tablet (150 mg) by mouth 2 times daily 180 tablet 3     LORazepam (LORAZEPAM INTENSOL) 2 MG/ML (HIGH CONC) solution Take 1 ml (2mg) for 2 focal seizures in 4 hours or for any generalized tonic-clonic seizure. Max 2 ml (4mg) in 24 hours. (Patient taking differently: every 4 hours as needed Take 1 ml (2mg) for 2 focal seizures in 4 hours or for any generalized tonic-clonic seizure. Max 2 ml (4mg) in 24 hours.) 30 mL 1     MELATONIN PO Take 10 mg by mouth At Bedtime OTC       propranolol (INDERAL) 10 MG tablet as needed       zonisamide (ZONEGRAN) 100 MG capsule TAKE 3 CAPS (300 MG) EACH MORNING AND 4 CAPS (400 MG) EACH NIGHT (Patient taking differently: 2 times daily TAKE 3 CAPS (300 MG) EACH MORNING AND 4 CAPS (400 MG) EACH NIGHT) 630 capsule 0     cetirizine (ZYRTEC) 10 MG tablet Take 10 mg by mouth daily as needed          Allergies  Allergies   Allergen Reactions     [facility] Cough, Difficulty breathing and Shortness Of Breath     Hydroxyzine      Other reaction(s): Seizures     Unknown [No Clinical Screening - See Comments] Rash     Reaction to a seizure med that he can not recall.     Dust Mites      Molds & Smuts      Other  reaction(s): Unknown     Sertraline Other (See Comments)     mood swings     Zyprexa [Olanzapine]      Seizure.        Social History  Social History     Socioeconomic History     Marital status: Single     Spouse name: Not on file     Number of children: Not on file     Years of education: Not on file     Highest education level: Not on file   Occupational History     Not on file   Tobacco Use     Smoking status: Never Smoker     Smokeless tobacco: Never Used   Substance and Sexual Activity     Alcohol use: No     Drug use: No     Sexual activity: Not Currently   Other Topics Concern     Parent/sibling w/ CABG, MI or angioplasty before 65F 55M? No   Social History Narrative     Not on file     Social Determinants of Health     Financial Resource Strain: Not on file   Food Insecurity: Not on file   Transportation Needs: Not on file   Physical Activity: Not on file   Stress: Not on file   Social Connections: Not on file   Intimate Partner Violence: Not on file   Housing Stability: Not on file       Family History  Family History   Problem Relation Age of Onset     Hypertension Mother      Hypertension Father      Asthma Father        Review of Systems  The complete review of systems is negative other than noted in the HPI or here.   Anesthesia Evaluation   Pt has had prior anesthetic. Type: MAC.    No history of anesthetic complications ([place] teeth extraction)       ROS/MED HX  ENT/Pulmonary:     (+) KARO risk factors, snores loudly, allergic rhinitis (seasonal allergies ),  (-) tobacco use   Neurologic:     (+) migraines (Tx Excedrin), seizures (Currently follows with Dr. Dukes for medically management. ), last seizure: Within the last week. , features: Typically occur in the night or early morning.  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    Hematologic:  - neg  "hematologic  ROS     Musculoskeletal:  - neg musculoskeletal ROS     GI/Hepatic:  - neg GI/hepatic ROS     Renal/Genitourinary:  - neg Renal ROS     Endo:  - neg endo ROS     Psychiatric/Substance Use: Comment: Discussed with patient the results of his PHQ9 with \"yes\" to self harm.  He says he does not have a plan with no intentions of doing anything. He follows with a therapist and psychiatrist.   The patient's parents are also on the visit. According to the patient's mom, there is concern about antidepressant medication interfering with his anti seizure medication. He has been able to take prozac.         Panic attacks tx with propranolol    (+) psychiatric history depression, other (comment) and anxiety (Asperger's autism (+))  (-) alcohol abuse history and chronic opioid use history   Infectious Disease: Comment: Completed COVID vaccine and booster.      - neg infectious disease ROS     Malignancy:  - neg malignancy ROS     Other:  - neg other ROS          Virtual visit -  No vitals were obtained    Physical Exam  Constitutional: Awake, alert, cooperative, no apparent distress, and appears stated age.  Eyes: Pupils equal  HENT: Normocephalic  Respiratory: non labored breathing   Neurologic: Awake, alert, oriented to name, place and time.   Neuropsychiatric: Calm, cooperative. Normal affect.      Prior Labs/Diagnostic Studies   All labs and imaging personally reviewed   Lab Results   Component Value Date    WBC 6.0 03/02/2021     Lab Results   Component Value Date    RBC 4.16 03/02/2021     Lab Results   Component Value Date    HGB 13.2 03/02/2021     Lab Results   Component Value Date    HCT 40.1 03/02/2021     Lab Results   Component Value Date    MCV 96 03/02/2021     Lab Results   Component Value Date    MCH 31.7 03/02/2021     Lab Results   Component Value Date    MCHC 32.9 03/02/2021     Lab Results   Component Value Date    RDW 12.6 03/02/2021     Lab Results   Component Value Date     03/02/2021 "       Last Comprehensive Metabolic Panel:  Sodium   Date Value Ref Range Status   03/02/2021 139 133 - 144 mmol/L Final     Potassium   Date Value Ref Range Status   03/02/2021 4.4 3.4 - 5.3 mmol/L Final     Chloride   Date Value Ref Range Status   03/02/2021 110 (H) 94 - 109 mmol/L Final     Carbon Dioxide   Date Value Ref Range Status   03/02/2021 24 20 - 32 mmol/L Final     Anion Gap   Date Value Ref Range Status   03/02/2021 5 3 - 14 mmol/L Final     Glucose   Date Value Ref Range Status   05/21/2021 96 70 - 99 mg/dL Final     Urea Nitrogen   Date Value Ref Range Status   03/02/2021 17 7 - 30 mg/dL Final     Creatinine   Date Value Ref Range Status   03/02/2021 0.91 0.66 - 1.25 mg/dL Final     GFR Estimate   Date Value Ref Range Status   03/02/2021 >90 >60 mL/min/[1.73_m2] Final     Comment:     Non  GFR Calc  Starting 12/18/2018, serum creatinine based estimated GFR (eGFR) will be   calculated using the Chronic Kidney Disease Epidemiology Collaboration   (CKD-EPI) equation.       Calcium   Date Value Ref Range Status   03/02/2021 8.7 8.5 - 10.1 mg/dL Final       Procedures     CTA HEAD WITH CONTRAST 12/3/2021 3:13 PM                                                              Impression:       1. Head CT demonstrates no acute intracranial pathology.     2. Head CTA demonstrates no aneurysm or stenosis of the major     intracranial arteries.           I have personally reviewed the examination and initial interpretation     and I agree with the findings.           DARNELL GIFFORD MD          Brain MR 12/3/22                                                                   Impression: Subtle 8 mm focus of FLAIR hyperintensity in the superior     right cerebellar hemisphere which is stable since at least 11/5/2020.     Limited imaging primarily for the purposes of stereotactic     localization.           I have personally reviewed the examination and initial interpretation     and I agree with the  findings.           DARNELL GIFFORD MD         The patient's records and results personally reviewed by this provider.     Outside records reviewed from: Care Everywhere      Assessment      Amrit Padilla is a 26 year old male seen as a PAC referral for risk assessment and optimization for anesthesia.    Plan/Recommendations  Pt will be optimized for the proposed procedure.  See below for details on the assessment, risk, and preoperative recommendations    NEUROLOGY  - medically refractory focal epilepsy,with above procedure planned   ~ Follows with Dr. Dukes   ~ treated with Lamictal, Depakote and Zonegran.      ~ Takes ativan for break through seizures.    ~ Denies needing ativan recently.    ~ Most recent seizure on 4/5/2022.   ~ Typically has seizures in the night/early morning hours.  Patient can only tell if he has had a seizure if he wakes up twisted in his sheets or if there are bite marks in his mouth.    ~ preoperative labs ordered for DOS as patient will not be at the [facility] before 4/25/22.  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     ~ Last dose >7 days ago.   ~ Instructed to not take Excedrin within 7 days of above procedure.     ENT  - No current airway concerns.  Will need to be reassessed day of surgery.   Limited exam given video visit  Mallampati: Unable to assess  TM: Unable to assess    CARDIAC  - No history of CAD, HTN or HLD.  Denies cardiac symptoms.   - METS (Metabolic Equivalents) >4  - RCRI 0.4 %    PULMONARY  - [place] hx of Obstructive Sleep Apnea  KARO Low Risk            Total Score: 2    KARO: Snores loudly    KRAO: Male      - Denies asthma or inhaler use  - Tobacco History  History   Smoking Status     Never Smoker   Smokeless Tobacco     Never Used       GI  - denies GERD  PONV Low Risk  Total Score: 1           1 AN PONV: Patient is not a current smoker        /RENAL  - Baseline  Creatinine  0.91    ENDOCRINE    - BMI: Estimated body mass index is 26.99 kg/m  as calculated from the following:    Height as of 2/14/22: 1.829 m (6').    Weight as of 2/14/22: 90.3 kg (199 lb).  Overweight (BMI 25.0-29.9)  - No history of Diabetes Mellitus    HEME  VTE Low Risk 0.5%            Total Score: 2    VTE: Male      - No history of abnormal bleeding or antiplatelet use.      PSYCH  - Depression    ~ treated with Prozac   ~ PHQ9=19 with (+) for self harm.   ~ Talked with patient and parents.  Follows with therapist and psychiatrist.  Patient denies having a plan. The above procedure is giving him hope with his seizures. Limited as to what he can take re: antidepressants as many interfere with his seizure medication. Encouraged to seek EMS if need be.  - Panic disorder   ~ take propanolol as needed.     ID  - Completed Covid vaccine  - Has not had COVID.     The patient is optimized for their procedure. AVS with information on surgery time/arrival time, meds and NPO status given by nursing staff. No further diagnostic testing indicated.    Please refer to the physical examination documented by the anesthesiologist in the anesthesia record on the day of surgery.    Video-Visit Details    Type of service:  Video Visit    Patient verbally consented to video service today: YES    Video Start Time: 12:40  Video End Time (time video stopped): 13:01    Originating Location (pt. Location): Home    Distant Location (provider location):  [facility] PREOPERATIVE ASSESSMENT CENTER     Mode of Communication:  Video Conference via [employer]  On the day of service:     Prep time: 12 minutes  Visit time: 21 minutes  Documentation time: 20 minutes  ------------------------------------------  Total time: 61 minutes      BEBETO Harrell CNP  Preoperative Assessment Center  [facility]  Clinic and Surgery Center  Phone: [phone]  Fax: [phone]

## 2022-04-11 NOTE — PATIENT INSTRUCTIONS
Preparing for Your Surgery      Name:  Amrit Padilla   MRN:  [id number]   :  1995   Today's Date:  2022       Arriving for surgery:  Surgery date:  22  Arrival time:  8:55AM-possibly earlier for a CT scan before surgery    Restrictions due to COVID 19       Effective 22 [facility] is implementing the following visitor policy:     1 person may accompany the patient through the Pre-Op process.      That same person may wait in the Surgery Waiting room, provided there is enough room to social distance         Inpatients are allowed 2 visitors per day for the duration of their stay.        Visitors must wear a mask.      Visitors must not be ill.      Visiting hours are 8 am to 8 pm.    [employer] parking is available for anyone with mobility limitations or disabilities.  ([place]  24 hours/ 7 days a week; [facility]  7 am- 3:30 pm, Mon- Fri)    Please come to:     [facility] [address] [place], [place] [id number]    -   Parking is available in the Patient Visitor Ramp on [facility].     -   When entering the hospital you will be asked COVID screening questions, you will then be directed to Registration.  Registration will direct you to the 3rd floor Surgery waiting room.     -   Please ask if you need an escort or a wheelchair to the Surgery Waiting Room.  Preop number- [phone]      What can I eat or drink?  -  You may eat and drink normally for up to 8 hours before your surgery. (Until 22, 2:55AM)  -  You may have clear liquids until 2 hours before surgery. (Until 22, 8:55AM)    Examples of clear liquids:  Water  Clear broth  Juices (apple, white grape, white cranberry  and cider) without pulp  Noncarbonated, powder based beverages  (lemonade and Luca-Aid)  Sodas (Sprite, 7-Up, ginger ale and seltzer)  Coffee or tea (without milk or cream)  Gatorade    -  No Alcohol for at least 24 hours  before surgery     Which medicines can I take?    Hold Aspirin for 7 days before surgery.   Hold Multivitamins for 7 days before surgery.  Hold Supplements for 7 days before surgery.  Hold Ibuprofen (Advil, Motrin) for 1 day before surgery--unless otherwise directed by surgeon.  Hold Naproxen (Aleve) for 4 days before surgery.    -  DO NOT take these medications the day of surgery:   Hemp Oil or CBD Oil        -  PLEASE TAKE these medications the day of surgery:    Divalproex(Depakote)   Fluoxetine(Prozac)    Lamotrigine(Lamictal)   Zonisamide(Zonegran)    Lorazepam Intensol as needed    Cetirizine(Zyrtec) as needed    How do I prepare myself?  - Please take 2 showers before surgery using Scrubcare or Hibiclens soap.    Use this soap only from the neck to your toes.     Leave the soap on your skin for one minute--then rinse thoroughly.      You may use your own shampoo and conditioner; no other hair products.   - Please remove all jewelry and body piercings.  - No lotions, deodorants or fragrance.  - Bring your ID and insurance card.    -If you have a Deep Brain Stimulator, Spinal Cord Stimulator or any neuro stimulator device---you must bring the remote control to the hospital     - All patients are required to have a Covid-19 test within 4 days of surgery/procedure.      -Patients will be contacted by the [facility] scheduling team within 1 week of surgery to make an appointment.      - Patients may call the Scheduling team at [phone] if they have not been scheduled within 4 days of  surgery.        Questions or Concerns:    - For any questions regarding the day of surgery or your hospital stay, please contact the Pre Admission Nursing Office at [phone].       - If you have health changes between today and your surgery please call your surgeon.       For questions after surgery please call your surgeons office.

## 2022-04-11 NOTE — PROGRESS NOTES
Amrit is a 26 year old who is being evaluated via a billable video visit.      How would you like to obtain your AVS? Grace [employer] Gmail- [email or web address]  If the video visit is dropped, the invitation should be resent by: Text to cell phone: [phone]       HPI         Review of Systems         Objective    Vitals - Patient Reported  Pain Score: No Pain (0)        Physical Exam

## 2022-04-17 DIAGNOSIS — G40.219 PARTIAL SYMPTOMATIC EPILEPSY WITH COMPLEX PARTIAL SEIZURES, INTRACTABLE, WITHOUT STATUS EPILEPTICUS (H): ICD-10-CM

## 2022-04-18 ENCOUNTER — MYC MEDICAL ADVICE (OUTPATIENT)
Dept: NEUROLOGY | Facility: CLINIC | Age: 27
End: 2022-04-18
Payer: COMMERCIAL

## 2022-04-19 NOTE — TELEPHONE ENCOUNTER
It was communicated to the patient's mother Sarah that Dr. Dukes would like to have 7T MRI done before surgery next week. She is agreeable to this. She had a few questions on what to bring to the intracranial monitoring test so we touched base on those. Dr. Dukes was notified.

## 2022-04-20 ENCOUNTER — TELEPHONE (OUTPATIENT)
Dept: NEUROLOGY | Facility: CLINIC | Age: 27
End: 2022-04-20
Payer: COMMERCIAL

## 2022-04-20 ENCOUNTER — TELEPHONE (OUTPATIENT)
Dept: NEUROLOGY | Facility: CLINIC | Age: 27
End: 2022-04-20

## 2022-04-20 NOTE — TELEPHONE ENCOUNTER
ZONISAMIDE 100 MG CAPSULE  Last Written Prescription Date:   1/19/2022  Last Fill Quantity: 630,   # refills: 0  Last Office Visit :  2/14/2022  Future Office visit:   4/20/2022    Routing refill request to provider for review/approval because:  Pt has visits today with Provider.  Refer to Provider for review and refills per Providers for Pt care.       Sarah Mueller RN  Central Triage Red Flags/Med Refills

## 2022-04-20 NOTE — TELEPHONE ENCOUNTER
What is the concern that needs to be addressed by a nurse? Pt was returning call to dr. Dukes about changing medication, please call back.     May a detailed message be left on voicemail? yes    Date of last office visit: 2/14/22    Message routed to: mojgan waggoner, Dr. Dukes

## 2022-04-21 ENCOUNTER — TELEPHONE (OUTPATIENT)
Dept: NEUROLOGY | Facility: CLINIC | Age: 27
End: 2022-04-21
Payer: COMMERCIAL

## 2022-04-21 DIAGNOSIS — G40.219 PARTIAL SYMPTOMATIC EPILEPSY WITH COMPLEX PARTIAL SEIZURES, INTRACTABLE, WITHOUT STATUS EPILEPTICUS (H): ICD-10-CM

## 2022-04-21 DIAGNOSIS — G40.909 RECURRENT SEIZURES (H): ICD-10-CM

## 2022-04-21 DIAGNOSIS — G40.909 RECURRENT SEIZURES (H): Primary | ICD-10-CM

## 2022-04-21 NOTE — TELEPHONE ENCOUNTER
Got a call from aranza at [facility] that insurance want peer to peer with Dr. Dukes to approve the MRI he ordered. She is sending him a seperate in basket with Info

## 2022-04-21 NOTE — TELEPHONE ENCOUNTER
Medication Refill request received for   Pending Prescriptions:                       Disp   Refills    zonisamide (ZONEGRAN) 100 MG capsule      630 ca*3            Sig: TAKE 3 CAPS (300 MG) EACH MORNING AND 4 CAPS (400           MG) EACH NIGHT      Last Written Prescription Date:  1/19/22  Last Fill Quantity: 630,   # refills: 0 Length of last prescription in time: 3 months  Last Office Visit : 2/14/2022  Future Office visit:  Not yet scheduled due to current surgery track.     [facility] Medication Refill Protocol requirements:     Latest Reference Range & Units 03/02/21 16:11   Zonisamide Level Quant 10 - 40 ug/mL 22 [1]   [1] (Note)   INTERPRETIVE INFORMATION: Zonisamide   Therapeutic range: Not well established.   Toxic: Greater than 80 ug/mL   The proposed therapeutic range for seizure control is 10-40    ug/mL. Toxic concentrations may cause coma, seizures and    cardiac abnormalities. Pharmacokinetics varies widely,    particularly with co-medications and/or compromised renal    function.   Performed By: [facility]   [address] [place] [id number]   : Mayra Madison MD     Refill request was forwarded to the provider because prescription is due for a yearly MD signature

## 2022-04-21 NOTE — TELEPHONE ENCOUNTER
I received a call from Amrit's mother about a prescription for Keppra Amrit was supposed to start tonight. I can see the Keppra prescription in the system and it was sent electronically to the correct pharmacy,     I called pharmacy and they report the rx was never received. I gave a verbal order for Keppra 500 mg BID # 60, 0 refills.     Can the prescription be resent electronically tomorrow please?

## 2022-04-22 ENCOUNTER — TELEPHONE (OUTPATIENT)
Dept: NEUROLOGY | Facility: CLINIC | Age: 27
End: 2022-04-22

## 2022-04-22 ENCOUNTER — ANESTHESIA EVENT (OUTPATIENT)
Dept: SURGERY | Facility: CLINIC | Age: 27
End: 2022-04-22
Payer: COMMERCIAL

## 2022-04-22 ENCOUNTER — ANCILLARY PROCEDURE (OUTPATIENT)
Dept: MRI IMAGING | Facility: CLINIC | Age: 27
End: 2022-04-22
Attending: PSYCHIATRY & NEUROLOGY
Payer: COMMERCIAL

## 2022-04-22 ENCOUNTER — LAB (OUTPATIENT)
Dept: URGENT CARE | Facility: URGENT CARE | Age: 27
End: 2022-04-22
Attending: NEUROLOGICAL SURGERY
Payer: COMMERCIAL

## 2022-04-22 DIAGNOSIS — Z11.59 ENCOUNTER FOR SCREENING FOR OTHER VIRAL DISEASES: ICD-10-CM

## 2022-04-22 PROCEDURE — U0003 INFECTIOUS AGENT DETECTION BY NUCLEIC ACID (DNA OR RNA); SEVERE ACUTE RESPIRATORY SYNDROME CORONAVIRUS 2 (SARS-COV-2) (CORONAVIRUS DISEASE [COVID-19]), AMPLIFIED PROBE TECHNIQUE, MAKING USE OF HIGH THROUGHPUT TECHNOLOGIES AS DESCRIBED BY CMS-2020-01-R: HCPCS

## 2022-04-22 PROCEDURE — U0005 INFEC AGEN DETEC AMPLI PROBE: HCPCS

## 2022-04-22 RX ORDER — LEVETIRACETAM 500 MG/1
500 TABLET ORAL 2 TIMES DAILY
Qty: 60 TABLET | Refills: 6 | Status: SHIPPED | OUTPATIENT
Start: 2022-04-22 | End: 2022-11-30

## 2022-04-22 RX ORDER — GADOBUTROL 604.72 MG/ML
10 INJECTION INTRAVENOUS ONCE
Status: COMPLETED | OUTPATIENT
Start: 2022-04-22 | End: 2022-04-22

## 2022-04-22 RX ORDER — ZONISAMIDE 100 MG/1
CAPSULE ORAL
Qty: 630 CAPSULE | Refills: 0 | OUTPATIENT
Start: 2022-04-22

## 2022-04-22 RX ORDER — ZONISAMIDE 100 MG/1
CAPSULE ORAL
Qty: 630 CAPSULE | Refills: 3 | Status: SHIPPED | OUTPATIENT
Start: 2022-04-22 | End: 2023-05-02

## 2022-04-22 RX ADMIN — GADOBUTROL 4.5 ML: 604.72 INJECTION INTRAVENOUS at 10:00

## 2022-04-22 NOTE — TELEPHONE ENCOUNTER
Writer routed to Neurosurgery Surgery Scheduler  *04/25/2022 Procedure. Patient Mom needs call back to confirm.     Nicki Miller LPN  Neurosurgery

## 2022-04-23 LAB — SARS-COV-2 RNA RESP QL NAA+PROBE: NEGATIVE

## 2022-04-25 ENCOUNTER — APPOINTMENT (OUTPATIENT)
Dept: NEUROLOGY | Facility: CLINIC | Age: 27
End: 2022-04-25
Attending: NEUROLOGICAL SURGERY
Payer: COMMERCIAL

## 2022-04-25 ENCOUNTER — APPOINTMENT (OUTPATIENT)
Dept: GENERAL RADIOLOGY | Facility: CLINIC | Age: 27
End: 2022-04-25
Attending: NEUROLOGICAL SURGERY
Payer: COMMERCIAL

## 2022-04-25 ENCOUNTER — ANESTHESIA (OUTPATIENT)
Dept: SURGERY | Facility: CLINIC | Age: 27
End: 2022-04-25
Payer: COMMERCIAL

## 2022-04-25 ENCOUNTER — APPOINTMENT (OUTPATIENT)
Dept: MRI IMAGING | Facility: CLINIC | Age: 27
End: 2022-04-25
Attending: STUDENT IN AN ORGANIZED HEALTH CARE EDUCATION/TRAINING PROGRAM
Payer: COMMERCIAL

## 2022-04-25 ENCOUNTER — APPOINTMENT (OUTPATIENT)
Dept: GENERAL RADIOLOGY | Facility: CLINIC | Age: 27
End: 2022-04-25
Attending: STUDENT IN AN ORGANIZED HEALTH CARE EDUCATION/TRAINING PROGRAM
Payer: COMMERCIAL

## 2022-04-25 ENCOUNTER — HOSPITAL ENCOUNTER (OUTPATIENT)
Dept: CT IMAGING | Facility: CLINIC | Age: 27
Discharge: HOME | End: 2022-04-25
Attending: NEUROLOGICAL SURGERY | Admitting: NEUROLOGICAL SURGERY
Payer: COMMERCIAL

## 2022-04-25 ENCOUNTER — APPOINTMENT (OUTPATIENT)
Dept: CT IMAGING | Facility: CLINIC | Age: 27
End: 2022-04-25
Attending: STUDENT IN AN ORGANIZED HEALTH CARE EDUCATION/TRAINING PROGRAM
Payer: COMMERCIAL

## 2022-04-25 ENCOUNTER — HOSPITAL ENCOUNTER (INPATIENT)
Facility: CLINIC | Age: 27
LOS: 11 days | Discharge: HOME | End: 2022-05-06
Attending: NEUROLOGICAL SURGERY | Admitting: NEUROLOGICAL SURGERY
Payer: COMMERCIAL

## 2022-04-25 DIAGNOSIS — S06.5XAA SUBDURAL HEMATOMA (H): ICD-10-CM

## 2022-04-25 DIAGNOSIS — G40.909 NONINTRACTABLE EPILEPSY WITHOUT STATUS EPILEPTICUS, UNSPECIFIED EPILEPSY TYPE (H): Primary | ICD-10-CM

## 2022-04-25 DIAGNOSIS — G40.219 PARTIAL SYMPTOMATIC EPILEPSY WITH COMPLEX PARTIAL SEIZURES, INTRACTABLE, WITHOUT STATUS EPILEPTICUS (H): ICD-10-CM

## 2022-04-25 LAB
ANION GAP SERPL CALCULATED.3IONS-SCNC: 6 MMOL/L (ref 3–14)
APTT PPP: 32 SECONDS (ref 22–38)
APTT PPP: 32 SECONDS (ref 22–38)
BUN SERPL-MCNC: 18 MG/DL (ref 7–30)
CALCIUM SERPL-MCNC: 9.2 MG/DL (ref 8.5–10.1)
CHLORIDE BLD-SCNC: 108 MMOL/L (ref 94–109)
CO2 SERPL-SCNC: 27 MMOL/L (ref 20–32)
CREAT SERPL-MCNC: 1.02 MG/DL (ref 0.66–1.25)
ERYTHROCYTE [DISTWIDTH] IN BLOOD BY AUTOMATED COUNT: 12.4 % (ref 10–15)
ERYTHROCYTE [DISTWIDTH] IN BLOOD BY AUTOMATED COUNT: 12.5 % (ref 10–15)
GFR SERPL CREATININE-BSD FRML MDRD: >90 ML/MIN/1.73M2
GLUCOSE BLD-MCNC: 88 MG/DL (ref 70–99)
GLUCOSE BLDC GLUCOMTR-MCNC: 88 MG/DL (ref 70–99)
HCT VFR BLD AUTO: 41.8 % (ref 40–53)
HCT VFR BLD AUTO: 44.4 % (ref 40–53)
HGB BLD-MCNC: 14.2 G/DL (ref 13.3–17.7)
HGB BLD-MCNC: 15.3 G/DL (ref 13.3–17.7)
INR PPP: 0.98 (ref 0.85–1.15)
INR PPP: 1.08 (ref 0.85–1.15)
MAGNESIUM SERPL-MCNC: 1.8 MG/DL (ref 1.6–2.3)
MCH RBC QN AUTO: 32 PG (ref 26.5–33)
MCH RBC QN AUTO: 32.6 PG (ref 26.5–33)
MCHC RBC AUTO-ENTMCNC: 34 G/DL (ref 31.5–36.5)
MCHC RBC AUTO-ENTMCNC: 34.5 G/DL (ref 31.5–36.5)
MCV RBC AUTO: 94 FL (ref 78–100)
MCV RBC AUTO: 95 FL (ref 78–100)
PHOSPHATE SERPL-MCNC: 4 MG/DL (ref 2.5–4.5)
PLATELET # BLD AUTO: 234 10E3/UL (ref 150–450)
PLATELET # BLD AUTO: 241 10E3/UL (ref 150–450)
POTASSIUM BLD-SCNC: 4.1 MMOL/L (ref 3.4–5.3)
POTASSIUM BLD-SCNC: 4.3 MMOL/L (ref 3.4–5.3)
RADIOLOGIST FLAGS: ABNORMAL
RBC # BLD AUTO: 4.44 10E6/UL (ref 4.4–5.9)
RBC # BLD AUTO: 4.7 10E6/UL (ref 4.4–5.9)
SODIUM SERPL-SCNC: 141 MMOL/L (ref 133–144)
WBC # BLD AUTO: 7.2 10E3/UL (ref 4–11)
WBC # BLD AUTO: 9.7 10E3/UL (ref 4–11)

## 2022-04-25 PROCEDURE — 70260 X-RAY EXAM OF SKULL: CPT

## 2022-04-25 PROCEDURE — 258N000003 HC RX IP 258 OP 636: Performed by: STUDENT IN AN ORGANIZED HEALTH CARE EDUCATION/TRAINING PROGRAM

## 2022-04-25 PROCEDURE — 83735 ASSAY OF MAGNESIUM: CPT | Performed by: STUDENT IN AN ORGANIZED HEALTH CARE EDUCATION/TRAINING PROGRAM

## 2022-04-25 PROCEDURE — 70450 CT HEAD/BRAIN W/O DYE: CPT | Mod: 77

## 2022-04-25 PROCEDURE — 84132 ASSAY OF SERUM POTASSIUM: CPT | Performed by: STUDENT IN AN ORGANIZED HEALTH CARE EDUCATION/TRAINING PROGRAM

## 2022-04-25 PROCEDURE — 85730 THROMBOPLASTIN TIME PARTIAL: CPT | Performed by: NURSE PRACTITIONER

## 2022-04-25 PROCEDURE — 95829 SURGERY ELECTROCORTICOGRAM: CPT

## 2022-04-25 PROCEDURE — 272N000001 HC OR GENERAL SUPPLY STERILE: Performed by: NEUROLOGICAL SURGERY

## 2022-04-25 PROCEDURE — 250N000009 HC RX 250: Performed by: STUDENT IN AN ORGANIZED HEALTH CARE EDUCATION/TRAINING PROGRAM

## 2022-04-25 PROCEDURE — 85610 PROTHROMBIN TIME: CPT | Performed by: NURSE PRACTITIONER

## 2022-04-25 PROCEDURE — 370N000017 HC ANESTHESIA TECHNICAL FEE, PER MIN: Performed by: NEUROLOGICAL SURGERY

## 2022-04-25 PROCEDURE — 250N000025 HC SEVOFLURANE, PER MIN: Performed by: NEUROLOGICAL SURGERY

## 2022-04-25 PROCEDURE — 250N000009 HC RX 250: Performed by: ANESTHESIOLOGY

## 2022-04-25 PROCEDURE — 70450 CT HEAD/BRAIN W/O DYE: CPT

## 2022-04-25 PROCEDURE — 36415 COLL VENOUS BLD VENIPUNCTURE: CPT | Performed by: NURSE PRACTITIONER

## 2022-04-25 PROCEDURE — 85027 COMPLETE CBC AUTOMATED: CPT | Performed by: STUDENT IN AN ORGANIZED HEALTH CARE EDUCATION/TRAINING PROGRAM

## 2022-04-25 PROCEDURE — 61760 IMPLANT BRAIN ELECTRODES: CPT | Mod: GC | Performed by: NEUROLOGICAL SURGERY

## 2022-04-25 PROCEDURE — 250N000011 HC RX IP 250 OP 636: Performed by: STUDENT IN AN ORGANIZED HEALTH CARE EDUCATION/TRAINING PROGRAM

## 2022-04-25 PROCEDURE — 70551 MRI BRAIN STEM W/O DYE: CPT

## 2022-04-25 PROCEDURE — 710N000010 HC RECOVERY PHASE 1, LEVEL 2, PER MIN: Performed by: NEUROLOGICAL SURGERY

## 2022-04-25 PROCEDURE — 85610 PROTHROMBIN TIME: CPT | Performed by: STUDENT IN AN ORGANIZED HEALTH CARE EDUCATION/TRAINING PROGRAM

## 2022-04-25 PROCEDURE — 95829 SURGERY ELECTROCORTICOGRAM: CPT | Mod: 26 | Performed by: PSYCHIATRY & NEUROLOGY

## 2022-04-25 PROCEDURE — 84100 ASSAY OF PHOSPHORUS: CPT | Performed by: STUDENT IN AN ORGANIZED HEALTH CARE EDUCATION/TRAINING PROGRAM

## 2022-04-25 PROCEDURE — 999N000179 XR SURGERY CARM FLUORO LESS THAN 5 MIN W STILLS: Mod: TC

## 2022-04-25 PROCEDURE — 70551 MRI BRAIN STEM W/O DYE: CPT | Mod: 26 | Performed by: RADIOLOGY

## 2022-04-25 PROCEDURE — 278N000051 HC OR IMPLANT GENERAL: Performed by: NEUROLOGICAL SURGERY

## 2022-04-25 PROCEDURE — 70450 CT HEAD/BRAIN W/O DYE: CPT | Mod: 26 | Performed by: RADIOLOGY

## 2022-04-25 PROCEDURE — 8E090CZ ROBOTIC ASSISTED PROCEDURE OF HEAD AND NECK REGION, OPEN APPROACH: ICD-10-PCS | Performed by: NEUROLOGICAL SURGERY

## 2022-04-25 PROCEDURE — C1713 ANCHOR/SCREW BN/BN,TIS/BN: HCPCS | Performed by: NEUROLOGICAL SURGERY

## 2022-04-25 PROCEDURE — 250N000011 HC RX IP 250 OP 636: Performed by: ANESTHESIOLOGY

## 2022-04-25 PROCEDURE — 272N000002 HC OR SUPPLY OTHER OPNP: Performed by: NEUROLOGICAL SURGERY

## 2022-04-25 PROCEDURE — 36415 COLL VENOUS BLD VENIPUNCTURE: CPT | Performed by: STUDENT IN AN ORGANIZED HEALTH CARE EDUCATION/TRAINING PROGRAM

## 2022-04-25 PROCEDURE — 360N000087 HC SURGERY LEVEL 7 W/ FLUORO, PER MIN: Performed by: NEUROLOGICAL SURGERY

## 2022-04-25 PROCEDURE — 70260 X-RAY EXAM OF SKULL: CPT | Mod: 26 | Performed by: RADIOLOGY

## 2022-04-25 PROCEDURE — 200N000002 HC R&B ICU UMMC

## 2022-04-25 PROCEDURE — 250N000011 HC RX IP 250 OP 636: Performed by: NEUROLOGICAL SURGERY

## 2022-04-25 PROCEDURE — 00H00MZ INSERTION OF NEUROSTIMULATOR LEAD INTO BRAIN, OPEN APPROACH: ICD-10-PCS | Performed by: NEUROLOGICAL SURGERY

## 2022-04-25 PROCEDURE — 250N000009 HC RX 250: Performed by: NEUROLOGICAL SURGERY

## 2022-04-25 PROCEDURE — 80048 BASIC METABOLIC PNL TOTAL CA: CPT | Performed by: NURSE PRACTITIONER

## 2022-04-25 PROCEDURE — 999N000141 HC STATISTIC PRE-PROCEDURE NURSING ASSESSMENT: Performed by: NEUROLOGICAL SURGERY

## 2022-04-25 PROCEDURE — 85730 THROMBOPLASTIN TIME PARTIAL: CPT | Performed by: STUDENT IN AN ORGANIZED HEALTH CARE EDUCATION/TRAINING PROGRAM

## 2022-04-25 PROCEDURE — 250N000013 HC RX MED GY IP 250 OP 250 PS 637: Performed by: STUDENT IN AN ORGANIZED HEALTH CARE EDUCATION/TRAINING PROGRAM

## 2022-04-25 PROCEDURE — 85027 COMPLETE CBC AUTOMATED: CPT | Performed by: NURSE PRACTITIONER

## 2022-04-25 DEVICE — IMPLANTABLE DEVICE: Type: IMPLANTABLE DEVICE | Site: BRAIN | Status: FUNCTIONAL

## 2022-04-25 DEVICE — IMPLANTABLE DEVICE: Type: IMPLANTABLE DEVICE | Site: CRANIAL | Status: FUNCTIONAL

## 2022-04-25 RX ORDER — CEFAZOLIN SODIUM/WATER 2 G/20 ML
2 SYRINGE (ML) INTRAVENOUS SEE ADMIN INSTRUCTIONS
Status: DISCONTINUED | OUTPATIENT
Start: 2022-04-25 | End: 2022-04-25 | Stop reason: HOSPADM

## 2022-04-25 RX ORDER — HYDRALAZINE HYDROCHLORIDE 20 MG/ML
2.5-5 INJECTION INTRAMUSCULAR; INTRAVENOUS EVERY 10 MIN PRN
Status: DISCONTINUED | OUTPATIENT
Start: 2022-04-25 | End: 2022-04-25 | Stop reason: HOSPADM

## 2022-04-25 RX ORDER — SODIUM CHLORIDE, SODIUM LACTATE, POTASSIUM CHLORIDE, CALCIUM CHLORIDE 600; 310; 30; 20 MG/100ML; MG/100ML; MG/100ML; MG/100ML
INJECTION, SOLUTION INTRAVENOUS CONTINUOUS
Status: DISCONTINUED | OUTPATIENT
Start: 2022-04-25 | End: 2022-04-25 | Stop reason: HOSPADM

## 2022-04-25 RX ORDER — CETIRIZINE HYDROCHLORIDE 10 MG/1
10 TABLET ORAL DAILY PRN
Status: DISCONTINUED | OUTPATIENT
Start: 2022-04-25 | End: 2022-05-06 | Stop reason: HOSPADM

## 2022-04-25 RX ORDER — LIDOCAINE 40 MG/G
CREAM TOPICAL
Status: DISCONTINUED | OUTPATIENT
Start: 2022-04-25 | End: 2022-05-06 | Stop reason: HOSPADM

## 2022-04-25 RX ORDER — FENTANYL CITRATE 50 UG/ML
INJECTION, SOLUTION INTRAMUSCULAR; INTRAVENOUS PRN
Status: DISCONTINUED | OUTPATIENT
Start: 2022-04-25 | End: 2022-04-25

## 2022-04-25 RX ORDER — DEXAMETHASONE SODIUM PHOSPHATE 4 MG/ML
INJECTION, SOLUTION INTRA-ARTICULAR; INTRALESIONAL; INTRAMUSCULAR; INTRAVENOUS; SOFT TISSUE PRN
Status: DISCONTINUED | OUTPATIENT
Start: 2022-04-25 | End: 2022-04-25

## 2022-04-25 RX ORDER — ONDANSETRON 2 MG/ML
4 INJECTION INTRAMUSCULAR; INTRAVENOUS EVERY 6 HOURS PRN
Status: DISCONTINUED | OUTPATIENT
Start: 2022-04-25 | End: 2022-05-06 | Stop reason: HOSPADM

## 2022-04-25 RX ORDER — ONDANSETRON 2 MG/ML
INJECTION INTRAMUSCULAR; INTRAVENOUS PRN
Status: DISCONTINUED | OUTPATIENT
Start: 2022-04-25 | End: 2022-04-25

## 2022-04-25 RX ORDER — DIVALPROEX SODIUM 500 MG/1
1000 TABLET, EXTENDED RELEASE ORAL DAILY
Status: DISCONTINUED | OUTPATIENT
Start: 2022-04-25 | End: 2022-04-26

## 2022-04-25 RX ORDER — ACETAMINOPHEN 325 MG/1
975 TABLET ORAL EVERY 8 HOURS
Status: COMPLETED | OUTPATIENT
Start: 2022-04-25 | End: 2022-04-28

## 2022-04-25 RX ORDER — FENTANYL CITRATE 50 UG/ML
50 INJECTION, SOLUTION INTRAMUSCULAR; INTRAVENOUS EVERY 5 MIN PRN
Status: DISCONTINUED | OUTPATIENT
Start: 2022-04-25 | End: 2022-04-25 | Stop reason: HOSPADM

## 2022-04-25 RX ORDER — ONDANSETRON 2 MG/ML
4 INJECTION INTRAMUSCULAR; INTRAVENOUS EVERY 30 MIN PRN
Status: DISCONTINUED | OUTPATIENT
Start: 2022-04-25 | End: 2022-04-25 | Stop reason: HOSPADM

## 2022-04-25 RX ORDER — PROPOFOL 10 MG/ML
INJECTION, EMULSION INTRAVENOUS PRN
Status: DISCONTINUED | OUTPATIENT
Start: 2022-04-25 | End: 2022-04-25

## 2022-04-25 RX ORDER — LABETALOL HYDROCHLORIDE 5 MG/ML
10 INJECTION, SOLUTION INTRAVENOUS
Status: DISCONTINUED | OUTPATIENT
Start: 2022-04-25 | End: 2022-04-25 | Stop reason: HOSPADM

## 2022-04-25 RX ORDER — SODIUM CHLORIDE, SODIUM LACTATE, POTASSIUM CHLORIDE, CALCIUM CHLORIDE 600; 310; 30; 20 MG/100ML; MG/100ML; MG/100ML; MG/100ML
INJECTION, SOLUTION INTRAVENOUS CONTINUOUS PRN
Status: DISCONTINUED | OUTPATIENT
Start: 2022-04-25 | End: 2022-04-25

## 2022-04-25 RX ORDER — AMOXICILLIN 250 MG
1 CAPSULE ORAL 2 TIMES DAILY PRN
Status: DISCONTINUED | OUTPATIENT
Start: 2022-04-25 | End: 2022-05-06 | Stop reason: HOSPADM

## 2022-04-25 RX ORDER — OXYCODONE HYDROCHLORIDE 10 MG/1
10 TABLET ORAL EVERY 4 HOURS PRN
Status: DISCONTINUED | OUTPATIENT
Start: 2022-04-25 | End: 2022-05-06 | Stop reason: HOSPADM

## 2022-04-25 RX ORDER — LIDOCAINE HYDROCHLORIDE 20 MG/ML
5 SOLUTION OROPHARYNGEAL EVERY 6 HOURS PRN
Status: DISCONTINUED | OUTPATIENT
Start: 2022-04-25 | End: 2022-05-06 | Stop reason: HOSPADM

## 2022-04-25 RX ORDER — LORAZEPAM 2 MG/ML
2 CONCENTRATE ORAL EVERY 4 HOURS PRN
Status: DISCONTINUED | OUTPATIENT
Start: 2022-04-25 | End: 2022-05-06 | Stop reason: HOSPADM

## 2022-04-25 RX ORDER — HYDROMORPHONE HCL IN WATER/PF 6 MG/30 ML
0.2 PATIENT CONTROLLED ANALGESIA SYRINGE INTRAVENOUS
Status: DISCONTINUED | OUTPATIENT
Start: 2022-04-25 | End: 2022-05-06 | Stop reason: HOSPADM

## 2022-04-25 RX ORDER — ONDANSETRON 4 MG/1
4 TABLET, ORALLY DISINTEGRATING ORAL EVERY 30 MIN PRN
Status: DISCONTINUED | OUTPATIENT
Start: 2022-04-25 | End: 2022-04-25 | Stop reason: HOSPADM

## 2022-04-25 RX ORDER — DIVALPROEX SODIUM 500 MG/1
500 TABLET, EXTENDED RELEASE ORAL DAILY
Status: DISCONTINUED | OUTPATIENT
Start: 2022-04-26 | End: 2022-04-26

## 2022-04-25 RX ORDER — DIVALPROEX SODIUM 125 MG/1
125 CAPSULE, COATED PELLETS ORAL 2 TIMES DAILY
Status: ON HOLD | COMMUNITY
End: 2022-05-06

## 2022-04-25 RX ORDER — DIVALPROEX SODIUM 125 MG/1
125 CAPSULE, COATED PELLETS ORAL 2 TIMES DAILY
Status: DISCONTINUED | OUTPATIENT
Start: 2022-04-25 | End: 2022-04-25

## 2022-04-25 RX ORDER — LEVETIRACETAM 500 MG/1
500 TABLET ORAL 2 TIMES DAILY
Status: DISCONTINUED | OUTPATIENT
Start: 2022-04-25 | End: 2022-04-27

## 2022-04-25 RX ORDER — OXYCODONE HYDROCHLORIDE 5 MG/1
5 TABLET ORAL EVERY 4 HOURS PRN
Status: DISCONTINUED | OUTPATIENT
Start: 2022-04-25 | End: 2022-05-06 | Stop reason: HOSPADM

## 2022-04-25 RX ORDER — LIDOCAINE HYDROCHLORIDE 20 MG/ML
INJECTION, SOLUTION INFILTRATION; PERINEURAL PRN
Status: DISCONTINUED | OUTPATIENT
Start: 2022-04-25 | End: 2022-04-25

## 2022-04-25 RX ORDER — PROCHLORPERAZINE MALEATE 10 MG
10 TABLET ORAL EVERY 6 HOURS PRN
Status: DISCONTINUED | OUTPATIENT
Start: 2022-04-25 | End: 2022-05-06 | Stop reason: HOSPADM

## 2022-04-25 RX ORDER — AMOXICILLIN 250 MG
1 CAPSULE ORAL 2 TIMES DAILY
Status: DISCONTINUED | OUTPATIENT
Start: 2022-04-25 | End: 2022-05-04

## 2022-04-25 RX ORDER — HYDROMORPHONE HCL IN WATER/PF 6 MG/30 ML
0.4 PATIENT CONTROLLED ANALGESIA SYRINGE INTRAVENOUS
Status: DISCONTINUED | OUTPATIENT
Start: 2022-04-25 | End: 2022-05-06 | Stop reason: HOSPADM

## 2022-04-25 RX ORDER — HYDROMORPHONE HYDROCHLORIDE 1 MG/ML
0.5 INJECTION, SOLUTION INTRAMUSCULAR; INTRAVENOUS; SUBCUTANEOUS EVERY 5 MIN PRN
Status: DISCONTINUED | OUTPATIENT
Start: 2022-04-25 | End: 2022-04-25 | Stop reason: HOSPADM

## 2022-04-25 RX ORDER — ONDANSETRON 4 MG/1
4 TABLET, ORALLY DISINTEGRATING ORAL EVERY 6 HOURS PRN
Status: DISCONTINUED | OUTPATIENT
Start: 2022-04-25 | End: 2022-05-06 | Stop reason: HOSPADM

## 2022-04-25 RX ORDER — POLYETHYLENE GLYCOL 3350 17 G/17G
17 POWDER, FOR SOLUTION ORAL DAILY
Status: DISCONTINUED | OUTPATIENT
Start: 2022-04-26 | End: 2022-05-04

## 2022-04-25 RX ORDER — SODIUM CHLORIDE 9 MG/ML
INJECTION, SOLUTION INTRAVENOUS CONTINUOUS
Status: ACTIVE | OUTPATIENT
Start: 2022-04-25 | End: 2022-04-26

## 2022-04-25 RX ORDER — ACETAMINOPHEN 325 MG/1
650 TABLET ORAL EVERY 4 HOURS PRN
Status: DISCONTINUED | OUTPATIENT
Start: 2022-04-28 | End: 2022-05-06 | Stop reason: HOSPADM

## 2022-04-25 RX ORDER — CEFAZOLIN SODIUM/WATER 2 G/20 ML
2 SYRINGE (ML) INTRAVENOUS
Status: DISCONTINUED | OUTPATIENT
Start: 2022-04-25 | End: 2022-04-25 | Stop reason: HOSPADM

## 2022-04-25 RX ORDER — ZONISAMIDE 100 MG/1
400 CAPSULE ORAL AT BEDTIME
Status: DISCONTINUED | OUTPATIENT
Start: 2022-04-25 | End: 2022-04-29

## 2022-04-25 RX ORDER — LORAZEPAM 2 MG/ML
2 INJECTION INTRAMUSCULAR EVERY 4 HOURS PRN
Status: DISCONTINUED | OUTPATIENT
Start: 2022-04-25 | End: 2022-05-06 | Stop reason: HOSPADM

## 2022-04-25 RX ORDER — LAMOTRIGINE 150 MG/1
150 TABLET ORAL 2 TIMES DAILY
Status: DISCONTINUED | OUTPATIENT
Start: 2022-04-25 | End: 2022-04-28

## 2022-04-25 RX ORDER — ZONISAMIDE 100 MG/1
300 CAPSULE ORAL DAILY
Status: DISCONTINUED | OUTPATIENT
Start: 2022-04-26 | End: 2022-04-30

## 2022-04-25 RX ORDER — OXYCODONE HYDROCHLORIDE 5 MG/1
5 TABLET ORAL EVERY 4 HOURS PRN
Status: DISCONTINUED | OUTPATIENT
Start: 2022-04-25 | End: 2022-04-25 | Stop reason: HOSPADM

## 2022-04-25 RX ORDER — BUPIVACAINE HYDROCHLORIDE 2.5 MG/ML
INJECTION, SOLUTION INFILTRATION; PERINEURAL PRN
Status: DISCONTINUED | OUTPATIENT
Start: 2022-04-25 | End: 2022-04-25 | Stop reason: HOSPADM

## 2022-04-25 RX ORDER — AMOXICILLIN 250 MG
2 CAPSULE ORAL 2 TIMES DAILY PRN
Status: DISCONTINUED | OUTPATIENT
Start: 2022-04-25 | End: 2022-05-06 | Stop reason: HOSPADM

## 2022-04-25 RX ORDER — BISACODYL 10 MG
10 SUPPOSITORY, RECTAL RECTAL DAILY PRN
Status: DISCONTINUED | OUTPATIENT
Start: 2022-04-25 | End: 2022-05-06 | Stop reason: HOSPADM

## 2022-04-25 RX ORDER — LIDOCAINE 40 MG/G
CREAM TOPICAL
Status: DISCONTINUED | OUTPATIENT
Start: 2022-04-25 | End: 2022-04-25 | Stop reason: HOSPADM

## 2022-04-25 RX ORDER — ALBUTEROL SULFATE 0.83 MG/ML
2.5 SOLUTION RESPIRATORY (INHALATION) EVERY 4 HOURS PRN
Status: DISCONTINUED | OUTPATIENT
Start: 2022-04-25 | End: 2022-04-25 | Stop reason: HOSPADM

## 2022-04-25 RX ORDER — HALOPERIDOL 5 MG/ML
1 INJECTION INTRAMUSCULAR
Status: DISCONTINUED | OUTPATIENT
Start: 2022-04-25 | End: 2022-04-25 | Stop reason: HOSPADM

## 2022-04-25 RX ORDER — LIDOCAINE HYDROCHLORIDE AND EPINEPHRINE 10; 10 MG/ML; UG/ML
INJECTION, SOLUTION INFILTRATION; PERINEURAL PRN
Status: DISCONTINUED | OUTPATIENT
Start: 2022-04-25 | End: 2022-04-25 | Stop reason: HOSPADM

## 2022-04-25 RX ORDER — CEFAZOLIN SODIUM 2 G/100ML
2 INJECTION, SOLUTION INTRAVENOUS EVERY 8 HOURS
Status: DISCONTINUED | OUTPATIENT
Start: 2022-04-26 | End: 2022-05-06 | Stop reason: HOSPADM

## 2022-04-25 RX ADMIN — SODIUM CHLORIDE, PRESERVATIVE FREE: 5 INJECTION INTRAVENOUS at 19:41

## 2022-04-25 RX ADMIN — ACETAMINOPHEN 975 MG: 325 TABLET ORAL at 19:41

## 2022-04-25 RX ADMIN — DIVALPROEX SODIUM 1000 MG: 500 TABLET, FILM COATED, EXTENDED RELEASE ORAL at 20:43

## 2022-04-25 RX ADMIN — LAMOTRIGINE 150 MG: 150 TABLET ORAL at 20:43

## 2022-04-25 RX ADMIN — LIDOCAINE HYDROCHLORIDE 100 MG: 20 INJECTION, SOLUTION INFILTRATION; PERINEURAL at 11:03

## 2022-04-25 RX ADMIN — ROCURONIUM BROMIDE 5 MG: 50 INJECTION, SOLUTION INTRAVENOUS at 15:46

## 2022-04-25 RX ADMIN — FENTANYL CITRATE 50 MCG: 50 INJECTION, SOLUTION INTRAMUSCULAR; INTRAVENOUS at 13:49

## 2022-04-25 RX ADMIN — FENTANYL CITRATE 25 MCG: 50 INJECTION, SOLUTION INTRAMUSCULAR; INTRAVENOUS at 17:23

## 2022-04-25 RX ADMIN — Medication 2 G: at 12:08

## 2022-04-25 RX ADMIN — SODIUM CHLORIDE, SODIUM LACTATE, POTASSIUM CHLORIDE, CALCIUM CHLORIDE: 600; 310; 30; 20 INJECTION, SOLUTION INTRAVENOUS at 11:30

## 2022-04-25 RX ADMIN — ZONISAMIDE 400 MG: 100 CAPSULE ORAL at 21:59

## 2022-04-25 RX ADMIN — HYDROMORPHONE HYDROCHLORIDE 0.2 MG: 0.2 INJECTION, SOLUTION INTRAMUSCULAR; INTRAVENOUS; SUBCUTANEOUS at 23:05

## 2022-04-25 RX ADMIN — Medication 2 G: at 16:08

## 2022-04-25 RX ADMIN — ROCURONIUM BROMIDE 10 MG: 50 INJECTION, SOLUTION INTRAVENOUS at 15:15

## 2022-04-25 RX ADMIN — LEVETIRACETAM 500 MG: 500 TABLET, FILM COATED ORAL at 19:41

## 2022-04-25 RX ADMIN — OXYCODONE HYDROCHLORIDE 5 MG: 5 TABLET ORAL at 21:59

## 2022-04-25 RX ADMIN — PROPOFOL 50 MG: 10 INJECTION, EMULSION INTRAVENOUS at 11:25

## 2022-04-25 RX ADMIN — ROCURONIUM BROMIDE 30 MG: 50 INJECTION, SOLUTION INTRAVENOUS at 11:45

## 2022-04-25 RX ADMIN — ROCURONIUM BROMIDE 30 MG: 50 INJECTION, SOLUTION INTRAVENOUS at 13:34

## 2022-04-25 RX ADMIN — PROPOFOL 200 MG: 10 INJECTION, EMULSION INTRAVENOUS at 11:03

## 2022-04-25 RX ADMIN — ROCURONIUM BROMIDE 10 MG: 50 INJECTION, SOLUTION INTRAVENOUS at 14:09

## 2022-04-25 RX ADMIN — HYDROMORPHONE HYDROCHLORIDE 0.5 MG: 1 INJECTION, SOLUTION INTRAMUSCULAR; INTRAVENOUS; SUBCUTANEOUS at 15:51

## 2022-04-25 RX ADMIN — ONDANSETRON 4 MG: 2 INJECTION INTRAMUSCULAR; INTRAVENOUS at 16:04

## 2022-04-25 RX ADMIN — FENTANYL CITRATE 50 MCG: 50 INJECTION, SOLUTION INTRAMUSCULAR; INTRAVENOUS at 13:04

## 2022-04-25 RX ADMIN — FENTANYL CITRATE 150 MCG: 50 INJECTION, SOLUTION INTRAMUSCULAR; INTRAVENOUS at 11:03

## 2022-04-25 RX ADMIN — ROCURONIUM BROMIDE 50 MG: 50 INJECTION, SOLUTION INTRAVENOUS at 11:03

## 2022-04-25 RX ADMIN — ROCURONIUM BROMIDE 5 MG: 50 INJECTION, SOLUTION INTRAVENOUS at 16:05

## 2022-04-25 RX ADMIN — ROCURONIUM BROMIDE 10 MG: 50 INJECTION, SOLUTION INTRAVENOUS at 14:27

## 2022-04-25 RX ADMIN — ROCURONIUM BROMIDE 20 MG: 50 INJECTION, SOLUTION INTRAVENOUS at 12:11

## 2022-04-25 RX ADMIN — SENNOSIDES AND DOCUSATE SODIUM 1 TABLET: 50; 8.6 TABLET ORAL at 19:41

## 2022-04-25 RX ADMIN — FENTANYL CITRATE 25 MCG: 50 INJECTION, SOLUTION INTRAMUSCULAR; INTRAVENOUS at 17:13

## 2022-04-25 RX ADMIN — SUGAMMADEX 200 MG: 100 INJECTION, SOLUTION INTRAVENOUS at 16:44

## 2022-04-25 RX ADMIN — ROCURONIUM BROMIDE 20 MG: 50 INJECTION, SOLUTION INTRAVENOUS at 12:48

## 2022-04-25 RX ADMIN — DEXAMETHASONE SODIUM PHOSPHATE 8 MG: 4 INJECTION, SOLUTION INTRA-ARTICULAR; INTRALESIONAL; INTRAMUSCULAR; INTRAVENOUS; SOFT TISSUE at 11:29

## 2022-04-25 RX ADMIN — ONDANSETRON 4 MG: 2 INJECTION INTRAMUSCULAR; INTRAVENOUS at 21:59

## 2022-04-25 RX ADMIN — SODIUM CHLORIDE, POTASSIUM CHLORIDE, SODIUM LACTATE AND CALCIUM CHLORIDE: 600; 310; 30; 20 INJECTION, SOLUTION INTRAVENOUS at 10:55

## 2022-04-25 RX ADMIN — FENTANYL CITRATE 50 MCG: 50 INJECTION, SOLUTION INTRAMUSCULAR; INTRAVENOUS at 17:36

## 2022-04-25 RX ADMIN — CEFAZOLIN SODIUM 2 G: 2 INJECTION, SOLUTION INTRAVENOUS at 23:55

## 2022-04-25 ASSESSMENT — ACTIVITIES OF DAILY LIVING (ADL)
ADLS_ACUITY_SCORE: 4
ADLS_ACUITY_SCORE: 4
ADLS_ACUITY_SCORE: 6
ADLS_ACUITY_SCORE: 4
ADLS_ACUITY_SCORE: 6
ADLS_ACUITY_SCORE: 4
ADLS_ACUITY_SCORE: 4
ADLS_ACUITY_SCORE: 5
ADLS_ACUITY_SCORE: 4

## 2022-04-25 ASSESSMENT — VISUAL ACUITY
OU: BASELINE
OU: DOUBLE VISION/DIPLOPIA
OU: BASELINE

## 2022-04-25 NOTE — ANESTHESIA PREPROCEDURE EVALUATION
Anesthesia Pre-Procedure Evaluation    Patient: Amrit Padilla   MRN: [id number] : 1995        Procedure : Procedure(s):  JAYDEN-assisted stereotactic placement of bilateral depth electrodes for invasive video electroencephalography monitoring          Past Medical History:   Diagnosis Date     Depressive disorder      Headaches, cluster      Seizures (H)       Past Surgical History:   Procedure Laterality Date     BACK SURGERY        Allergies   Allergen Reactions     [place] Cough, Difficulty breathing and Shortness Of Breath     Hydroxyzine      Other reaction(s): Seizures     Unknown [No Clinical Screening - See Comments] Rash     Reaction to a seizure med that he can not recall.     Dust Mites      Molds & Smuts      Other reaction(s): Unknown     Sertraline Other (See Comments)     mood swings     Zyprexa [Olanzapine]      Seizure.       Social History     Tobacco Use     Smoking status: Never Smoker     Smokeless tobacco: Never Used   Substance Use Topics     Alcohol use: No      Wt Readings from Last 1 Encounters:   22 95.1 kg (209 lb 10.5 oz)              OUTSIDE LABS:  CBC:   Lab Results   Component Value Date    WBC 6.0 2021    HGB 13.2 (L) 2021    HCT 40.1 2021     2021     BMP:   Lab Results   Component Value Date     2021     2019    POTASSIUM 4.4 2021    CHLORIDE 110 (H) 2021    CO2 24 2021    BUN 17 2021    CR 0.91 2021    GLC 96 2021    GLC 90 2021     COAGS: No results found for: PTT, INR, FIBR  POC: No results found for: BGM, HCG, HCGS  HEPATIC:   Lab Results   Component Value Date    ALBUMIN 3.5 2021    PROTTOTAL 6.3 (L) 2021    ALT 53 2021    AST 25 2021    ALKPHOS 74 2021    BILITOTAL 0.6 2021     OTHER:   Lab Results   Component Value Date    ZAIDA 8.7 2021       Anesthesia Evaluation  Pt has had prior anesthetic. Type: MAC.    No history of  "anesthetic complications ([place] teeth extraction)         ROS/MED HX  ENT/Pulmonary:     (+) KARO risk factors, snores loudly, allergic rhinitis (seasonal allergies ),  (-) tobacco use   Neurologic:     (+) migraines (Tx Excedrin), seizures (Currently follows with Dr. Dukes for medically management. ), last seizure: Within the last week. , features: Typically occur in the night or early morning.  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    Hematologic:  - neg hematologic  ROS     Musculoskeletal:  - neg musculoskeletal ROS     GI/Hepatic:  - neg GI/hepatic ROS     Renal/Genitourinary:  - neg Renal ROS     Endo:  - neg endo ROS     Psychiatric/Substance Use: Comment: Discussed with patient the results of his PHQ9 with \"yes\" to self harm.  He says he does not have a plan with no intentions of doing anything. He follows with a therapist and psychiatrist.   The patient's parents are also on the visit. According to the patient's mom, there is concern about antidepressant medication interfering with his anti seizure medication. He has been able to take prozac.         Panic attacks tx with propranolol    (+) psychiatric history depression, other (comment) and anxiety (Asperger's autism (+))  (-) alcohol abuse history and chronic opioid use history   Infectious Disease: Comment: Completed COVID vaccine and booster.     - neg infectious disease ROS     Malignancy:  - neg malignancy ROS     Other:  - neg other ROS              Anesthesia Plan    ASA Status:  2   NPO Status:  NPO Appropriate    Anesthesia Type: General.     - Airway: ETT   Induction: Intravenous.   Maintenance: Inhalation.        Consents    Anesthesia Plan(s) and associated risks, benefits, and realistic alternatives discussed. Questions answered and patient/representative(s) expressed understanding.    - " Discussed:     - Discussed with:  Patient         Postoperative Care    Pain management: IV analgesics, Oral pain medications.   PONV prophylaxis: Ondansetron (or other 5HT-3), Dexamethasone or Solumedrol     Comments:                Juanita French MD

## 2022-04-25 NOTE — PROGRESS NOTES
Neurosurgery Pre-operative Assessment Note    Amrit Padilla is a 27yo male with a history of medically refractory epilepsy, presenting today for JAYDEN-assisted stereotactic placement of bilateral depth electrodes for invasive video encephalography monitoring. All questions were answered and he provided consent for the above mentioned procedure.     Neurological exam:  Alert, oriented x4. Following commands.   Speech fluent, no aphasia or dysarthria.   PEERL, EOMI, facial sensation intact to light touch, face symmetrical,  tongue midline/  Strength 5/5 throughout all 4 extremities  Sensation intact to light touch  Reflexes 2+, no Babinski or clonus  Mild resting tremor in bilateral hands.     Verona Marshall MD  Neurosurgery PGY2    Please contact neurosurgery resident on call with questions.    Dial * * [phone], enter [phone] when prompted.

## 2022-04-25 NOTE — BRIEF OP NOTE
[facility]    Brief Operative Note    Pre-operative diagnosis: Partial symptomatic epilepsy with complex partial seizures, intractable, without status epilepticus (H) [G40.219]  Post-operative diagnosis Same as pre-operative diagnosis    Procedure: Procedure(s):  JAYDEN-assisted stereotactic placement of bilateral depth electrodes for invasive video electroencephalography monitoring  Surgeon: Surgeon(s) and Role:     * Tello Adler MD - Primary     * Verona Garcia MD - Resident - Assisting  Anesthesia: General   Estimated Blood Loss: 20 mL    Drains: None  Specimens: * No specimens in log *  Findings:   16 leads placed, confirmed with adequate EEG recordings intraop.  Complications: None.  Implants:   Implant Name Type Inv. Item Serial No.  Lot No. LRB No. Used Action   BOLT STRTC 25MM [employer] ANCH - [id number] Metallic Hardware/[place] BOL[employer] STRTC 25MM [employer]  [employer] [id number] Left 5 Implanted   BOL[employer]TC 25MM [employer]CH     [employer] [id number] Left 3 Implanted   BOL[employer]TC 25MM [employer] - [id number] Metallic Hardware/[place] BOL[employer] STRTC 25MM [employer]  [employer] [id number] Right 7 Implanted   BOLT STRTC 25MM [employer] - [id number] Metallic Hardware/[place] BOL[employer] STRTC 25MM [employer]  [employer] [id number] Right 1 Implanted   Platinum, 14 contact, mini connector EEG depthalon   [id number]  [id number] Left 1 Implanted   Platinum, 16 contact, mini connector EEG depthalon    [employer] [id number] Left 1 Implanted   Platinum, 12 contact, mini connector,    [employer] [id number] Left 1 Implanted   Platinum, 16 contact, mini connector,  EEG Depthalon    [employer] [id number] Left 1 Implanted   Platinum, 16 contact, mini connector EEG Depthalon    [employer] [id number] Left 1 Implanted   Platinum, 14 contact, mini connector, EEG Depthalon    [employer] [id number] Left 1  Implanted   Platinum, 14 contact, mini connector,  EEG depthalon    [employer] [id number] Left 1 Implanted   Platinum, 14 contact, mini connector,  EEG depthalon    [employer] [id number] Left 1 Implanted   Platinum, 16 contact, mini connector,      [id number] Right 1 Implanted   Platinum, 14 contact, mini connector     [id number] Right 1 Implanted   Platinum, 14 contact, mini connector,      [id number] Right 1 Implanted   Platinum, 12 contact, mini connector,      [id number] Right 1 Implanted   Platinum, 12 contact, mini connector,  EEG Depthalon    [employer] [id number] Right 1 Implanted   Platinum, 14 contact, mini connector,  EEG depthalon    [employer] [id number] Right 1 Implanted   Platinum, 14 contact, mini connector EEG Depthalon    [employer] [id number] Right 1 Implanted   Platinum, 12 contact, mini connector,  EEG depthalon    [employer] [id number] Right 1 Implanted   Subdural Strip Electrode     [employer] [id number] Right 1 Implanted   Subdural strip electrode     [id number] Right 1 Implanted       Verona Marshall MD  Neurosurgery PGY2    Please contact neurosurgery resident on call with questions.    Dial * * [phone], enter [phone] when prompted.

## 2022-04-25 NOTE — ANESTHESIA PROCEDURE NOTES
Airway       Patient location during procedure: OR       Procedure Start/Stop Times: 4/25/2022 11:06 AM  Staff -        CRNA: Von Eddy APRN CRNA       Performed By: CRNA  Consent for Airway        Urgency: elective  Indications and Patient Condition       Indications for airway management: shayna-procedural       Induction type:intravenous       Mask difficulty assessment: 1 - vent by mask    Final Airway Details       Final airway type: endotracheal airway       Successful airway: ETT - single  Endotracheal Airway Details        ETT size (mm): 8.0       Cuffed: yes       Successful intubation technique: direct laryngoscopy       DL Blade Type: Abreu 2       Grade View of Cords: 1 (cords open and clear)       Adjucts: stylet       Position: Right       Measured from: lips       Secured at (cm): 24       Bite block used: None    Post intubation assessment        Placement verified by: capnometry, equal breath sounds and chest rise        Number of attempts at approach: 1       Number of other approaches attempted: 0       Secured with: silk tape       Ease of procedure: easy       Dentition: Intact and Unchanged    Medication(s) Administered   Medication Administration Time: 4/25/2022 11:06 AM

## 2022-04-25 NOTE — OR NURSING
Neurosurgery at bedside. Would like CT and Xray on way up to 4A. MRI can be done later tonight after transferred to the floor.

## 2022-04-25 NOTE — ANESTHESIA POSTPROCEDURE EVALUATION
Patient: Amrit Padilla    Procedure: Procedure(s):  JAYDEN-assisted stereotactic placement of bilateral depth electrodes for invasive video electroencephalography monitoring       Anesthesia Type:  General    Note:  Disposition: Admission   Postop Pain Control: Uneventful            Sign Out: Well controlled pain   PONV:    Neuro/Psych: Uneventful            Sign Out: Acceptable/Baseline neuro status   Airway/Respiratory: Uneventful            Sign Out: Acceptable/Baseline resp. status   CV/Hemodynamics: Uneventful            Sign Out: Acceptable CV status; No obvious hypovolemia; No obvious fluid overload   Other NRE:    DID A NON-ROUTINE EVENT OCCUR?            Last vitals:  Vitals Value Taken Time   BP     Temp     Pulse 101 04/25/22 1708   Resp 9 04/25/22 1708   SpO2 98 % 04/25/22 1708   Vitals shown include unvalidated device data.    Electronically Signed By: Octavio Carl MD  April 25, 2022  5:08 PM

## 2022-04-25 NOTE — ANESTHESIA CARE TRANSFER NOTE
Patient: Amrit Padilla    Procedure: Procedure(s):  JAYDEN-assisted stereotactic placement of bilateral depth electrodes for invasive video electroencephalography monitoring       Diagnosis: Partial symptomatic epilepsy with complex partial seizures, intractable, without status epilepticus (H) [G40.219]  Diagnosis Additional Information: No value filed.    Anesthesia Type:   General     Note:      Level of Consciousness: drowsy and awake  Oxygen Supplementation: nasal cannula    Independent Airway: airway patency satisfactory and stable  Dentition: dentition unchanged  Vital Signs Stable: post-procedure vital signs reviewed and stable  Report to RN Given: handoff report given  Patient transferred to: PACU    Handoff Report: Identifed the Patient, Identified the Reponsible Provider, Reviewed the pertinent medical history, Discussed the surgical course, Reviewed Intra-OP anesthesia mangement and issues during anesthesia, Set expectations for post-procedure period and Allowed opportunity for questions and acknowledgement of understanding      Vitals:  Vitals Value Taken Time    68    Temp     Pulse 97    Resp     SpO2 99        Electronically Signed By: BEBETO Osborne CRNA  April 25, 2022  5:06 PM

## 2022-04-26 ENCOUNTER — APPOINTMENT (OUTPATIENT)
Dept: PHYSICAL THERAPY | Facility: CLINIC | Age: 27
End: 2022-04-26
Attending: STUDENT IN AN ORGANIZED HEALTH CARE EDUCATION/TRAINING PROGRAM
Payer: COMMERCIAL

## 2022-04-26 ENCOUNTER — APPOINTMENT (OUTPATIENT)
Dept: CT IMAGING | Facility: CLINIC | Age: 27
End: 2022-04-26
Attending: NEUROLOGICAL SURGERY
Payer: COMMERCIAL

## 2022-04-26 ENCOUNTER — APPOINTMENT (OUTPATIENT)
Dept: NEUROLOGY | Facility: CLINIC | Age: 27
End: 2022-04-26
Attending: STUDENT IN AN ORGANIZED HEALTH CARE EDUCATION/TRAINING PROGRAM
Payer: COMMERCIAL

## 2022-04-26 LAB
ANION GAP SERPL CALCULATED.3IONS-SCNC: 6 MMOL/L (ref 3–14)
BUN SERPL-MCNC: 14 MG/DL (ref 7–30)
CALCIUM SERPL-MCNC: 8.3 MG/DL (ref 8.5–10.1)
CHLORIDE BLD-SCNC: 108 MMOL/L (ref 94–109)
CO2 SERPL-SCNC: 24 MMOL/L (ref 20–32)
CREAT SERPL-MCNC: 0.78 MG/DL (ref 0.66–1.25)
ERYTHROCYTE [DISTWIDTH] IN BLOOD BY AUTOMATED COUNT: 12.7 % (ref 10–15)
GFR SERPL CREATININE-BSD FRML MDRD: >90 ML/MIN/1.73M2
GLUCOSE BLD-MCNC: 125 MG/DL (ref 70–99)
HCT VFR BLD AUTO: 41.2 % (ref 40–53)
HGB BLD-MCNC: 14 G/DL (ref 13.3–17.7)
MAGNESIUM SERPL-MCNC: 1.9 MG/DL (ref 1.6–2.3)
MCH RBC QN AUTO: 32.6 PG (ref 26.5–33)
MCHC RBC AUTO-ENTMCNC: 34 G/DL (ref 31.5–36.5)
MCV RBC AUTO: 96 FL (ref 78–100)
PHOSPHATE SERPL-MCNC: 3.9 MG/DL (ref 2.5–4.5)
PLATELET # BLD AUTO: 221 10E3/UL (ref 150–450)
POTASSIUM BLD-SCNC: 3.6 MMOL/L (ref 3.4–5.3)
RBC # BLD AUTO: 4.29 10E6/UL (ref 4.4–5.9)
SODIUM SERPL-SCNC: 138 MMOL/L (ref 133–144)
WBC # BLD AUTO: 13 10E3/UL (ref 4–11)

## 2022-04-26 PROCEDURE — 99232 SBSQ HOSP IP/OBS MODERATE 35: CPT | Mod: 25 | Performed by: PHYSICIAN ASSISTANT

## 2022-04-26 PROCEDURE — 250N000013 HC RX MED GY IP 250 OP 250 PS 637: Performed by: STUDENT IN AN ORGANIZED HEALTH CARE EDUCATION/TRAINING PROGRAM

## 2022-04-26 PROCEDURE — 36415 COLL VENOUS BLD VENIPUNCTURE: CPT | Performed by: STUDENT IN AN ORGANIZED HEALTH CARE EDUCATION/TRAINING PROGRAM

## 2022-04-26 PROCEDURE — 36415 COLL VENOUS BLD VENIPUNCTURE: CPT | Performed by: PHYSICIAN ASSISTANT

## 2022-04-26 PROCEDURE — 95720 EEG PHY/QHP EA INCR W/VEEG: CPT | Mod: GC | Performed by: PSYCHIATRY & NEUROLOGY

## 2022-04-26 PROCEDURE — 70450 CT HEAD/BRAIN W/O DYE: CPT

## 2022-04-26 PROCEDURE — 95714 VEEG EA 12-26 HR UNMNTR: CPT

## 2022-04-26 PROCEDURE — 80175 DRUG SCREEN QUAN LAMOTRIGINE: CPT | Performed by: PHYSICIAN ASSISTANT

## 2022-04-26 PROCEDURE — 250N000011 HC RX IP 250 OP 636: Performed by: STUDENT IN AN ORGANIZED HEALTH CARE EDUCATION/TRAINING PROGRAM

## 2022-04-26 PROCEDURE — 80048 BASIC METABOLIC PNL TOTAL CA: CPT | Performed by: STUDENT IN AN ORGANIZED HEALTH CARE EDUCATION/TRAINING PROGRAM

## 2022-04-26 PROCEDURE — 97161 PT EVAL LOW COMPLEX 20 MIN: CPT | Mod: GP

## 2022-04-26 PROCEDURE — 80177 DRUG SCRN QUAN LEVETIRACETAM: CPT | Performed by: PHYSICIAN ASSISTANT

## 2022-04-26 PROCEDURE — 999N000111 HC STATISTIC OT IP EVAL DEFER

## 2022-04-26 PROCEDURE — 84100 ASSAY OF PHOSPHORUS: CPT | Performed by: STUDENT IN AN ORGANIZED HEALTH CARE EDUCATION/TRAINING PROGRAM

## 2022-04-26 PROCEDURE — 83735 ASSAY OF MAGNESIUM: CPT | Performed by: STUDENT IN AN ORGANIZED HEALTH CARE EDUCATION/TRAINING PROGRAM

## 2022-04-26 PROCEDURE — 97110 THERAPEUTIC EXERCISES: CPT | Mod: GP

## 2022-04-26 PROCEDURE — 70450 CT HEAD/BRAIN W/O DYE: CPT | Mod: 26 | Performed by: RADIOLOGY

## 2022-04-26 PROCEDURE — 85027 COMPLETE CBC AUTOMATED: CPT | Performed by: STUDENT IN AN ORGANIZED HEALTH CARE EDUCATION/TRAINING PROGRAM

## 2022-04-26 PROCEDURE — 200N000002 HC R&B ICU UMMC

## 2022-04-26 PROCEDURE — 80203 DRUG SCREEN QUANT ZONISAMIDE: CPT | Performed by: PHYSICIAN ASSISTANT

## 2022-04-26 RX ORDER — POTASSIUM CHLORIDE 750 MG/1
20 TABLET, EXTENDED RELEASE ORAL ONCE
Status: COMPLETED | OUTPATIENT
Start: 2022-04-26 | End: 2022-04-26

## 2022-04-26 RX ORDER — POTASSIUM CHLORIDE 750 MG/1
20 TABLET, EXTENDED RELEASE ORAL ONCE
Status: DISCONTINUED | OUTPATIENT
Start: 2022-04-26 | End: 2022-04-26

## 2022-04-26 RX ORDER — MAGNESIUM SULFATE HEPTAHYDRATE 40 MG/ML
2 INJECTION, SOLUTION INTRAVENOUS ONCE
Status: COMPLETED | OUTPATIENT
Start: 2022-04-26 | End: 2022-04-26

## 2022-04-26 RX ADMIN — CEFAZOLIN SODIUM 2 G: 2 INJECTION, SOLUTION INTRAVENOUS at 23:57

## 2022-04-26 RX ADMIN — POLYETHYLENE GLYCOL 3350 17 G: 17 POWDER, FOR SOLUTION ORAL at 08:52

## 2022-04-26 RX ADMIN — LAMOTRIGINE 150 MG: 150 TABLET ORAL at 09:08

## 2022-04-26 RX ADMIN — LAMOTRIGINE 150 MG: 150 TABLET ORAL at 20:17

## 2022-04-26 RX ADMIN — HYDROMORPHONE HYDROCHLORIDE 0.4 MG: 0.2 INJECTION, SOLUTION INTRAMUSCULAR; INTRAVENOUS; SUBCUTANEOUS at 22:17

## 2022-04-26 RX ADMIN — HYDROMORPHONE HYDROCHLORIDE 0.4 MG: 0.2 INJECTION, SOLUTION INTRAMUSCULAR; INTRAVENOUS; SUBCUTANEOUS at 01:00

## 2022-04-26 RX ADMIN — ZONISAMIDE 300 MG: 100 CAPSULE ORAL at 08:52

## 2022-04-26 RX ADMIN — SENNOSIDES AND DOCUSATE SODIUM 1 TABLET: 50; 8.6 TABLET ORAL at 08:52

## 2022-04-26 RX ADMIN — OXYCODONE HYDROCHLORIDE 10 MG: 10 TABLET ORAL at 21:21

## 2022-04-26 RX ADMIN — ZONISAMIDE 400 MG: 100 CAPSULE ORAL at 21:21

## 2022-04-26 RX ADMIN — MAGNESIUM SULFATE HEPTAHYDRATE 2 G: 40 INJECTION, SOLUTION INTRAVENOUS at 06:19

## 2022-04-26 RX ADMIN — FLUOXETINE 80 MG: 20 CAPSULE ORAL at 08:52

## 2022-04-26 RX ADMIN — SENNOSIDES AND DOCUSATE SODIUM 1 TABLET: 50; 8.6 TABLET ORAL at 20:15

## 2022-04-26 RX ADMIN — OXYCODONE HYDROCHLORIDE 10 MG: 10 TABLET ORAL at 06:19

## 2022-04-26 RX ADMIN — POTASSIUM CHLORIDE 20 MEQ: 750 TABLET, EXTENDED RELEASE ORAL at 06:19

## 2022-04-26 RX ADMIN — OXYCODONE HYDROCHLORIDE 10 MG: 10 TABLET ORAL at 01:59

## 2022-04-26 RX ADMIN — ACETAMINOPHEN 975 MG: 325 TABLET ORAL at 13:15

## 2022-04-26 RX ADMIN — CEFAZOLIN SODIUM 2 G: 2 INJECTION, SOLUTION INTRAVENOUS at 08:53

## 2022-04-26 RX ADMIN — LEVETIRACETAM 500 MG: 500 TABLET, FILM COATED ORAL at 08:52

## 2022-04-26 RX ADMIN — HYDROMORPHONE HYDROCHLORIDE 0.4 MG: 0.2 INJECTION, SOLUTION INTRAMUSCULAR; INTRAVENOUS; SUBCUTANEOUS at 16:54

## 2022-04-26 RX ADMIN — ACETAMINOPHEN 975 MG: 325 TABLET ORAL at 20:14

## 2022-04-26 RX ADMIN — CEFAZOLIN SODIUM 2 G: 2 INJECTION, SOLUTION INTRAVENOUS at 15:53

## 2022-04-26 RX ADMIN — DIVALPROEX SODIUM 500 MG: 500 TABLET, FILM COATED, EXTENDED RELEASE ORAL at 09:07

## 2022-04-26 RX ADMIN — OXYCODONE HYDROCHLORIDE 10 MG: 10 TABLET ORAL at 16:12

## 2022-04-26 RX ADMIN — LEVETIRACETAM 500 MG: 500 TABLET, FILM COATED ORAL at 20:18

## 2022-04-26 RX ADMIN — ACETAMINOPHEN 975 MG: 325 TABLET ORAL at 03:58

## 2022-04-26 ASSESSMENT — ACTIVITIES OF DAILY LIVING (ADL)
ADLS_ACUITY_SCORE: 6
DEPENDENT_IADLS:: INDEPENDENT
ADLS_ACUITY_SCORE: 6

## 2022-04-26 ASSESSMENT — VISUAL ACUITY
OU: BASELINE

## 2022-04-26 NOTE — PROVIDER NOTIFICATION
Per EEG tech, unable to connect to EEG overnight due to nobody on call after pt returned from MRI. Dr. Rosenberg updated and aware of no EEG overnight. Plan to closely monitor for signs of seizures. 1:1 bedside attendant in place.    Shayy Coon RN

## 2022-04-26 NOTE — PROGRESS NOTES
04/26/22 1000   Quick Adds   Type of Visit Initial PT Evaluation   Living Environment   People in Home parent(s);sibling(s)   Current Living Arrangements house   Home Accessibility stairs to enter home;stairs within home   Number of Stairs, Main Entrance 2   Stair Railings, Main Entrance none   Number of Stairs, Within Home, Primary greater than 10 stairs   Stair Railings, Within Home, Primary railings safe and in good condition;railing on right side (ascending)   Transportation Anticipated family or friend will provide   Living Environment Comments Pt lives in two level house w/ supportive family members (parents and sister). 2 MYRNA front door and garage, 16 steps to second level w/ railing on the R side. Pt reports that he does not drive.   Self-Care   Usual Activity Tolerance good   Current Activity Tolerance moderate   Regular Exercise No   Equipment Currently Used at Home none   Fall history within last six months no   Activity/Exercise/Self-Care Comment Pt is IND w/ all mobility w/o AD at baseline, does not drive or cook for himself at baseline due to epilepsy, IND w/ all other ADLs. Works at grocery store.   General Information   Onset of Illness/Injury or Date of Surgery 04/25/22   Patient/Family Therapy Goals Statement (PT) Return home   Pertinent History of Current Problem (include personal factors and/or comorbidities that impact the POC) Amrit Padilla is a 26 year old male with history of about 10 years of medically refractory epilepsy on PTA Depakote 1500mg daily, lamotrigine 150mg BID, Keppra 500 BID, and zonisamide 300mg-400mg.  He has two typical seizure types, typically at night while he is sleeping: they can occur GTCs or repetitive face/hand/arm jerking movements followed by unresponsiveness.  Past medical history is also notable for depression and Asperger syndrome.  He underwent stereotactic EEG placement of 16 electrodes with 2 ground electrodes on 4/25 by Dr. Adler.   Existing  Precautions/Restrictions seizures   Cognition   Orientation Status (Cognition) oriented x 4   Pain Assessment   Patient Currently in Pain Yes, see Vital Sign flowsheet   Integumentary/Edema   Integumentary/Edema no deficits were identifed   Posture    Posture Forward head position   Range of Motion (ROM)   ROM Comment BLE WFL   Strength (Manual Muscle Testing)   Strength Comments BLE WFL   Bed Mobility   Comment, (Bed Mobility) Supine<>sit IND, able to boost IND   Transfers   Comment, (Transfers) Sit<>stand SBA-CGA due to increased dizziness   Gait/Stairs (Locomotion)   Comment, (Gait/Stairs) Pt amb 80 ft w/o AD, SBA-CGA, steady throughout   Balance   Balance Comments Romberg and tandem stance w/ eyes closed no LOB, normal postural sway. Amb in straight line w/ head turns - no LOB   Sensory Examination   Sensory Perception patient reports no sensory changes   Coordination   Coordination no deficits were identified   Clinical Impression   Criteria for Skilled Therapeutic Intervention Yes, treatment indicated   PT Diagnosis (PT) Impaired functional mobility   Influenced by the following impairments decreased activity tolerance due to medication for epilepsy, EEG in place, pain   Functional limitations due to impairments ambulation, stairs, recreational activities   Clinical Presentation (PT Evaluation Complexity) Stable/Uncomplicated   Clinical Presentation Rationale clinical judgement   Clinical Decision Making (Complexity) low complexity   Planned Therapy Interventions (PT) balance training;home exercise program;neuromuscular re-education;patient/family education;stair training;strengthening;stretching;transfer training;progressive activity/exercise;risk factor education;home program guidelines   Risk & Benefits of therapy have been explained evaluation/treatment results reviewed;care plan/treatment goals reviewed;risks/benefits reviewed;current/potential barriers reviewed;participants voiced agreement with care  plan;participants included;patient   PT Discharge Planning   PT Discharge Recommendation (DC Rec) home with assist   PT Rationale for DC Rec Pt is mobilizing near baseline levels at this time, anticipate safe return home w/ family assist once medically stable per MD.   PT Brief overview of current status IND bed mob, transfers. SBA-CGA amb   Plan of Care Review   Plan of Care Reviewed With patient   Total Evaluation Time   Total Evaluation Time (Minutes) 15   Physical Therapy Goals   PT Frequency 2x/week   PT Predicted Duration/Target Date for Goal Attainment 05/10/22   PT Goals Transfers;Gait;Stairs;Aerobic Activity   PT: Transfers Independent;Sit to/from stand;Bed to/from chair;Within precautions   PT: Gait Independent;Greater than 200 feet   PT: Stairs Independent;Greater than 10 stairs;Rail on right   PT: Perform aerobic activity with stable cardiovascular response continuous activity;15 minutes;ambulation;treadmill

## 2022-04-26 NOTE — PLAN OF CARE
Goal Outcome Evaluation:      Problem: Seizure, Active Management  Goal: Absence of Seizure/Seizure-Related Injury  Outcome: Ongoing, Progressing  Intervention: Prevent Seizure-Related Injury  Recent Flowsheet Documentation  Taken 4/26/2022 1600 by Nkechi Duran RN  Seizure Precautions:    side rails padded    clutter-free environment maintained    emergency equipment at bedside    activity supervised  Taken 4/26/2022 0800 by Nkechi Duran RN  Seizure Precautions:    side rails padded    clutter-free environment maintained    emergency equipment at bedside    activity supervised    D: Invasive video EEG monitoring established today (4/26) at 1100. Patient's neurological status remains intact and unchanged throughout shift. Pt. complained of severe head pain at 1600.  I: PRN oxycodone and diuladid given for reported head pain. Seizure medications held this evening per provider order.   A: Patient appears to be sleeping/resting comfortably now.   P: Will continue to monitor patient's neuro status, EEG activity, and possible signs/symptoms of seizures. Will continue to implement seizure precautions per protocol.    See flowsheets for more details.

## 2022-04-26 NOTE — CONSULTS
Care Management Initial Consult    General Information  Assessment completed with: Parents,    Type of CM/SW Visit: Initial Assessment    Communication Assessment  Patient's communication style: spoken language (English or Bilingual)    Hearing Difficulty or Deaf: no   Wear Glasses or Blind: yes    Cognitive  Cognitive/Neuro/Behavioral: WDL  Level of Consciousness: alert  Arousal Level: opens eyes spontaneously  Orientation: oriented x 4  Mood/Behavior: cooperative, calm  Best Language: 0 - No aphasia  Speech: clear, logical    Living Environment:   People in home: parent(s)     Current living Arrangements: house      Able to return to prior arrangements:  Yes       Family/Social Support:  Care provided by: self  Provides care for: no one  Marital Status: Single  Parent(s)          Description of Support System: Supportive, Involved       Current Resources:   Patient receiving home care services: No     Community Resources: None  Equipment currently used at home: none  Supplies currently used at home: None    Employment/Financial:  Employment Status: employed part-time        Financial Concerns: No concerns identified      Lifestyle & Psychosocial Needs:  Social Determinants of Health     Tobacco Use: Low Risk      Smoking Tobacco Use: Never Smoker     Smokeless Tobacco Use: Never Used   Alcohol Use: Not on file   Financial Resource Strain: Not on file   Food Insecurity: Not on file   Transportation Needs: Not on file   Physical Activity: Not on file   Stress: Not on file   Social Connections: Not on file   Intimate Partner Violence: Not on file   Depression: At risk     PHQ-2 Score: 4   Housing Stability: Not on file       Functional Status:  Prior to admission patient needed assistance:   Dependent ADLs:: Independent  Dependent IADLs:: Independen          Additional Information:  Pt had placement of  bilateral depth electrodes for invasive video electroencephalography monitoring done yesterday, 4/25/22 and  transferred to ICU.    RNCC visited pt and parents to introduce RNCC role and complete assessment.  Pt was busy with other providers at time of my visit and visited mostly with pt parents.  RNCC role discussed and contact info provided.  Per discussion with pt parents, pt lives with parents.  Pt was ind. with all cares, mobility and working part time prior hospitalization.  Pt doesn't drive due to his seizure.  pt parents stated pt was here in the past for about 10 days for seizure monitoring.  Pt parents stated they understand the length of pt hospital stay is not know at this time.   Anticipated pt will be discharge to home with parents.  RNCC will cont to follow plan of care.      Barbra Clay RN, PHN, BSN  4A and 4E/ ICU  Care Coordinator  Phone: [phone]  Pager: [phone]    To get in touch with weekend & Holiday on call RN Care Coordinator  Page [phone] or Care Coordinator Job code/pager- [phone]

## 2022-04-26 NOTE — PLAN OF CARE
Shift Summary: Remains off EEG overnight (see previous note). MRI and head CT done. 1:1 sitter remains at bedside. K and Mg replaced per protocol. HA present throughout shift, PRN and schedule pain medications given (see MAR).    Assessment:  Neuro: A/Ox4, following commands with equal strength in all extremities. PERRL. Pt denies numbness or tingling. No signs of seizure activity noted.  Resp: RA  Cardiac: NSR. BP WDL.  GI: NPO. No BM  : Lira removed for MRI (temp lira). Pt voiding post lira removal.  Skin: No new skin concerns  Line: PIV x2  Gtt: MIV    For full assessment and vitals, please see flowsheets.    Plan:  Monitor for seizure activity and neurological status.    Shayy Coon RN

## 2022-04-26 NOTE — PROGRESS NOTES
[facility], [place]   Epilepsy Service Daily Note      Interval History:   OR yesterday for JAYDEN-assisted stereotactic EEG placement of bilateral depth electrodes for invasive video EEG monitoring. Postop imaging was notable for stable right convexity SDH. Patient will be connected to EEG this morning. No seizures noted thus far.     Patient reports he was instructed by Dr. Dukes to stop divalproex last week and replace it with levetiracetam. He confirms he did this and reports he hasn't taken divalproex since last Thursday or Friday.     Previous video EEG recorded 9 seizures with ictal manifestations variably included tonic stiffening of his upper body, kicking of his legs, vocalizations, and non-purposeful movements of surrounding objects. Seizures occurred out of sleep.     Review of System:   Tiredness, head pain, jaw/chewing discomfort. No nausea, No vomiting, no dizziness, no chest pain.     Medications:   Antiepileptic Medications Home Doses: levetiracetam 500-500, lamotrigine 150-150, zonisamide 300-400  Antiepileptic Medications Current Doses:  levetiracetam 500-500, lamotrigine 150-150, zonisamide 300-400    Exam: Blood pressure 139/87, pulse 67, temperature 98.1  F (36.7  C), resp. rate (!) 0, height 1.829 m (6'), weight 93.9 kg (207 lb 0.2 oz), SpO2 95 %.   General: NAD  Head: head wrap in place  Neuro: Alert and oriented. Speech fluent. Hearing intact to normal conversation. PERRL, EOM's intact, visual fields full. Strong shoulder shrug bilaterally. Strength 5/5 bilaterally. No drift or pronation. Intact FNF. Sensation intact to light touch.     Assessment and Plan: patient seen and discussed with Dr. Pete Ríos.  Patient is a 26 year old, right-handed male with a history of depression, migraines, asperger syndrome and intractable focal epilepsy who underwent stereotactic EEG placement of bilateral depth electrodes for invasive video EEG monitoring on 4/25. Postop imaging  notable for stable right convexity SDH.    -start video EEG recording today  -continue PTA levetiracetam, lamotrigine and zonisamide   -stop divalproex as this medication was stopped PTA last week and replaced with levetiracetam   -antiepileptic drug levels       Annia Iglesias PA-C      Total time: 25 minute was spent in the care of this patient. The patient agrees with the above mentioned plan of care. I answered all the patient's questions and addressed immediate concerns. More than 50% of time spent consisted of counseling and coordinating care, including discussion of the diagnostic significance of EEG findings, anti-seizure medication management, and planning for discharge home

## 2022-04-26 NOTE — PROGRESS NOTES
Admitted/transferred from: PACU   Reason for admission/transfer: Grid monitoring  Patient status upon admission/transfer: stable, intact, VSS  Interventions: none  Plan: watch for seizure activity  2 RN skin assessment: completed by Jonathan GAMEZ  Result of skin assessment and interventions/actions: intact except incisions  Height, weight, drug calc weight: done  Patient belongings: with pt on unit  MDRO education (if applicable): n/a

## 2022-04-26 NOTE — PROGRESS NOTES
[facility], [place]   Neurosurgery Progress Note:    Assessment: Amrit Padilla is a 26 year old male with history of about 10 years of medically refractory epilepsy on PTA Depakote 1500mg daily, lamotrigine 150mg BID, Keppra 500 BID, and zonisamide 300mg-400mg.  He has two typical seizure types, typically at night while he is sleeping: they can occur GTCs or repetitive face/hand/arm jerking movements followed by unresponsiveness.  Past medical history is also notable for depression and Asperger syndrome.  He underwent stereotactic EEG placement of 16 electrodes with 2 ground electrodes on 4/25 by Dr. Adler.  He was admitted to  postoperatively and is doing well.  Postoperative head CT is notable for thin right convexity subdural hematoma, stable on repeat imaging.  He continues on PTA antiepileptics for now.    Plan:  - Frequent neuro checks  - PTA antiepileptic wean coordination by Epileptology  - EEG to begin today  - Continue 1:1 for seizure monitoring; seizure precautions  - Pain control  - HOB > 30 degrees  - Advance diet as tolerated  - Bowel regimen  - PRN antiemetics  - IVF until taking adequate PO  - SCDs for DVT proph    -----------------------------------  Jemal Rosenberg MD  Neurosurgery PGY-3    Interval History/Subjective: OR as above.  Postop imaging notable for stable right convexity SDH.  Neuro exam nonfocal.    Objective:   Temp:  [96.7  F (35.9  C)-98.7  F (37.1  C)] 98.6  F (37  C)  Pulse:  [63-98] 66  Resp:  [8-16] 8  BP: (117-142)/() 124/81  Cuff Mean (mmHg):  [] 94  SpO2:  [93 %-98 %] 94 %  I/O last 3 completed shifts:  In: 1531.67 [I.V.:1531.67]  Out: 1170 [Urine:1165; Blood:5]    Gen: Appears comfortable, NAD  Wound: headwrap in place  Neurologic:  - Alert & Oriented to person, place, time, and situation  - Follows commands briskly  - Speech fluent, spontaneous. No aphasia or dysarthria.  - No gaze preference. No apparent hemineglect.  - PERRL,  EOMI  - Strong brow raise, eye closure, and smile  - Face symmetric with sensation intact to light touch  - Trapezii and sternocleidomastoid muscles 5/5 bilaterally  - No pronator drift     Del Tr Bi WE WF Gr   R 5 5 5 5 5 5   L 5 5 5 5 5 5    HF KE KF DF PF EHL   R 5 5 5 5 5 5   L 5 5 5 5 5 5     Reflexes symmetric    Sensation intact and symmetric to light touch throughout    LABS  Recent Labs   Lab Test 04/25/22  1923 04/25/22  0900 03/02/21  1611   WBC 9.7 7.2 6.0   HGB 14.2 15.3 13.2*   MCV 94 95 96    234 208       Recent Labs   Lab Test 04/25/22  1908 04/25/22 0900 04/25/22  0818 05/21/21  0710 03/02/21  1611 03/02/21  1611 01/22/19  1642   NA  --  141  --   --   --  139 138   POTASSIUM 4.3 4.1  --   --   --  4.4  --    CHLORIDE  --  108  --   --   --  110*  --    CO2  --  27  --   --   --  24  --    BUN  --  18  --   --   --  17  --    CR  --  1.02  --   --   --  0.91  --    ANIONGAP  --  6  --   --   --  5  --    ZAIDA  --  9.2  --   --   --  8.7  --    GLC  --  88 88 96   < > 90  --     < > = values in this interval not displayed.       IMAGING  Recent Results (from the past 24 hour(s))   CT Head without contrast [[id number]]    Narrative    Stealth CT imaging for purposes of stereotactic evaluation    Provided History: Seizure, nontraumatic (Age 18-40y); Partial  symptomatic epilepsy with complex partial seizures, intractable,  without status epilepticus (H)  ICD-10: Partial symptomatic epilepsy with complex partial seizures,  intractable, without status epilepticus (H)  Comparison: CT 12/3/2021 and most recent MRI from 4/20/2022    Technique: CT imaging performed with axial, sagittal, and coronal  reconstructed images obtained without intravenous contrast.    Findings: Limited imaging for stereotactic localization demonstrates  no overt mass effect, midline shift, or acute intracranial hemorrhage.  The ventricles are not enlarged, and there is no evidence of  hydrocephalus.      Impression     Impression: No acute intracranial pathology. Limited imaging performed  primarily for the purposes of stereotactic localization.     I have personally reviewed the examination and initial interpretation  and I agree with the findings.    MAT LEE MD         SYSTEM ID:  [id number]   XR Surgery TANG Fluoro L/T 5 Min w Stills    Narrative    This exam was marked as non-reportable because it will not be read by a   radiologist or a [facility] non-radiologist provider.         CT Head w/o Contrast   Result Value    Radiologist flags Extra axial and intraparenchymal hemorrhage (Urgent)    Narrative    CT HEAD W/O CONTRAST 4/25/2022 6:16 PM    History: Cerebral hemorrhage suspected; postop depth electrode  placement x16     Comparison: 4/25/2022    Technique: Using multidetector thin collimation helical acquisition  technique, axial, coronal and sagittal CT images from the skull base  to the vertex were obtained without intravenous contrast.   (topogram) image(s) also obtained and reviewed.    Findings: Post surgical changes with numerous bilateral electrodes.  Small focus of pneumocephalus anterior to the left frontal lobe. New  cerebral convexity subdural hematoma measuring 6 mm with 3 mm of  associated leftward midline shift. Mild effacement of the right  cerebral hemisphere  sulci. Possible intraparenchymal hemorrhage  within the right frontal lobe just superior to the superior most  transversely oriented probe. The gray-white matter differentiation  posterior cerebral hemispheres is preserved. The ventricles are  proportionate to the cerebral sulci. The basilar cisterns are clear.     The bony calvaria and the bones of the skull base are normal. The  visualized portions of the paranasal sinuses and mastoid air cells are  clear.      Impression    Impression:  1. Right cerebral convexity subdural hematoma measuring 6 mm with  associated 3 mm leftward midline shift.  2. Questionable intraparenchymal hemorrhage  within the right frontal  lobe just superior to one of the electrodes. Note that streak artifact  from the proximal limits evaluation.    [Urgent Result: Extra axial and intraparenchymal hemorrhage]    Finding was identified on 4/25/2022 6:25 PM.     Jonathan Oh RN  was contacted by Dr. Padilla at 4/25/2022 6:29 PM and  verbalized understanding of the urgent finding.     I have personally reviewed the examination and initial interpretation  and I agree with the findings.    DARNELL GIFFORD MD         SYSTEM ID:  [id number]   XR Skull G/E 4 Views    Narrative    Exam: XR SKULL G/E 4 VW, 4/25/2022 6:47 PM    Indication: Postop depth electrode placement    Comparison: CT 4/25/2022 at 6:05 PM    Findings:   5 radiographs of the skull. Numerous electrodes course into the  intracranial space bilaterally. Shunt projects over the right frontal  region. No acute osseous abnormality. Soft tissue is within normal  limits.      Impression    Impression: Postoperative changes of electrode replacement.    I have personally reviewed the examination and initial interpretation  and I agree with the findings.    DARNELL GIFFORD MD         SYSTEM ID:  [id number]   MR Brain w/o Contrast    Narrative    MR BRAIN W/O CONTRAST 4/25/2022 9:43 PM    Provided History: s/p depth electrode placement x16. Obtain T1 thin  cuts (1mm)    Comparison:  MRI 4/22/2022 . CT 4/25/2020.     Technique: Sagittal T1-weighted, coronal T2-weighted, axial T2 FLAIR,  axial susceptibility weighted, and axial diffusion-weighted with ADC  map images of the brain were obtained without intravenous contrast.    Findings: Postsurgical changes of the electrode placements in the  bilateral cerebral hemispheres. Redemonstration of right cerebral  convexity subdural hematoma measuring 6 mm, unchanged from prior with  associated 2 mm leftward midline shift. Small amount of pneumocephalus  anterior to the frontal lobes. No intracranial mass lesion. The  ventricles and sulci are  normal for age. Small cortical infarct along  the lateral margin of the left superior frontal gyrus, axial images 75  and 76. Normal intravascular flow voids. Right superior scalp  hematoma.         Impression    Impression:  1. Postsurgical changes of intracranial electrode placement. Stable 6  mm right cerebral convexity subdural hematoma with associated 2 mm  leftward midline shift. No definite intraparenchymal hemorrhage. Small  amount of subarachnoid hemorrhage over the right frontoparietal  convexity, best seen on susceptibility-weighted images. No definite  intracranial hemorrhage.  2. Small focus of restricted diffusion along the lateral margin of the  left superior frontal sulcus near an electrode.    I have personally reviewed the examination and initial interpretation  and I agree with the findings.    DARNELL GIFFORD MD         SYSTEM ID:  [id number]

## 2022-04-26 NOTE — CARE PLAN
OT 4A: Defer - OT consult received. Per discussion with PT and chart review, pt with no acute OT needs. Pt near baseline ADL I. Will complete orders.

## 2022-04-27 ENCOUNTER — APPOINTMENT (OUTPATIENT)
Dept: NEUROLOGY | Facility: CLINIC | Age: 27
End: 2022-04-27
Attending: STUDENT IN AN ORGANIZED HEALTH CARE EDUCATION/TRAINING PROGRAM
Payer: COMMERCIAL

## 2022-04-27 ENCOUNTER — TEAM CONFERENCE (OUTPATIENT)
Dept: NEUROLOGY | Facility: CLINIC | Age: 27
End: 2022-04-27
Payer: COMMERCIAL

## 2022-04-27 LAB
ANION GAP SERPL CALCULATED.3IONS-SCNC: 5 MMOL/L (ref 3–14)
BUN SERPL-MCNC: 13 MG/DL (ref 7–30)
CALCIUM SERPL-MCNC: 8.7 MG/DL (ref 8.5–10.1)
CHLORIDE BLD-SCNC: 111 MMOL/L (ref 94–109)
CO2 SERPL-SCNC: 26 MMOL/L (ref 20–32)
CREAT SERPL-MCNC: 0.87 MG/DL (ref 0.66–1.25)
ERYTHROCYTE [DISTWIDTH] IN BLOOD BY AUTOMATED COUNT: 12.8 % (ref 10–15)
GFR SERPL CREATININE-BSD FRML MDRD: >90 ML/MIN/1.73M2
GLUCOSE BLD-MCNC: 107 MG/DL (ref 70–99)
HCT VFR BLD AUTO: 40.3 % (ref 40–53)
HGB BLD-MCNC: 13.6 G/DL (ref 13.3–17.7)
LAMOTRIGINE SERPL-MCNC: 7 UG/ML
LEVETIRACETAM SERPL-MCNC: 13 UG/ML
MAGNESIUM SERPL-MCNC: 2.2 MG/DL (ref 1.6–2.3)
MCH RBC QN AUTO: 32.2 PG (ref 26.5–33)
MCHC RBC AUTO-ENTMCNC: 33.7 G/DL (ref 31.5–36.5)
MCV RBC AUTO: 96 FL (ref 78–100)
PHOSPHATE SERPL-MCNC: 3.4 MG/DL (ref 2.5–4.5)
PLATELET # BLD AUTO: 214 10E3/UL (ref 150–450)
POTASSIUM BLD-SCNC: 4.1 MMOL/L (ref 3.4–5.3)
RBC # BLD AUTO: 4.22 10E6/UL (ref 4.4–5.9)
SODIUM SERPL-SCNC: 142 MMOL/L (ref 133–144)
WBC # BLD AUTO: 8.9 10E3/UL (ref 4–11)

## 2022-04-27 PROCEDURE — 84100 ASSAY OF PHOSPHORUS: CPT | Performed by: STUDENT IN AN ORGANIZED HEALTH CARE EDUCATION/TRAINING PROGRAM

## 2022-04-27 PROCEDURE — 200N000002 HC R&B ICU UMMC

## 2022-04-27 PROCEDURE — 36415 COLL VENOUS BLD VENIPUNCTURE: CPT | Performed by: STUDENT IN AN ORGANIZED HEALTH CARE EDUCATION/TRAINING PROGRAM

## 2022-04-27 PROCEDURE — 82310 ASSAY OF CALCIUM: CPT | Performed by: STUDENT IN AN ORGANIZED HEALTH CARE EDUCATION/TRAINING PROGRAM

## 2022-04-27 PROCEDURE — 99232 SBSQ HOSP IP/OBS MODERATE 35: CPT | Mod: 25 | Performed by: PHYSICIAN ASSISTANT

## 2022-04-27 PROCEDURE — 95720 EEG PHY/QHP EA INCR W/VEEG: CPT | Mod: GC | Performed by: PSYCHIATRY & NEUROLOGY

## 2022-04-27 PROCEDURE — 250N000013 HC RX MED GY IP 250 OP 250 PS 637: Performed by: STUDENT IN AN ORGANIZED HEALTH CARE EDUCATION/TRAINING PROGRAM

## 2022-04-27 PROCEDURE — 83735 ASSAY OF MAGNESIUM: CPT | Performed by: STUDENT IN AN ORGANIZED HEALTH CARE EDUCATION/TRAINING PROGRAM

## 2022-04-27 PROCEDURE — 85027 COMPLETE CBC AUTOMATED: CPT | Performed by: STUDENT IN AN ORGANIZED HEALTH CARE EDUCATION/TRAINING PROGRAM

## 2022-04-27 PROCEDURE — 95714 VEEG EA 12-26 HR UNMNTR: CPT

## 2022-04-27 PROCEDURE — 250N000011 HC RX IP 250 OP 636: Performed by: STUDENT IN AN ORGANIZED HEALTH CARE EDUCATION/TRAINING PROGRAM

## 2022-04-27 RX ADMIN — ACETAMINOPHEN 975 MG: 325 TABLET ORAL at 12:56

## 2022-04-27 RX ADMIN — LEVETIRACETAM 500 MG: 500 TABLET, FILM COATED ORAL at 09:11

## 2022-04-27 RX ADMIN — LAMOTRIGINE 150 MG: 150 TABLET ORAL at 21:16

## 2022-04-27 RX ADMIN — LAMOTRIGINE 150 MG: 150 TABLET ORAL at 09:10

## 2022-04-27 RX ADMIN — ACETAMINOPHEN 975 MG: 325 TABLET ORAL at 19:30

## 2022-04-27 RX ADMIN — ZONISAMIDE 300 MG: 100 CAPSULE ORAL at 09:09

## 2022-04-27 RX ADMIN — ACETAMINOPHEN 975 MG: 325 TABLET ORAL at 04:01

## 2022-04-27 RX ADMIN — FLUOXETINE 80 MG: 20 CAPSULE ORAL at 09:23

## 2022-04-27 RX ADMIN — CEFAZOLIN SODIUM 2 G: 2 INJECTION, SOLUTION INTRAVENOUS at 17:00

## 2022-04-27 RX ADMIN — OXYCODONE HYDROCHLORIDE 5 MG: 5 TABLET ORAL at 17:05

## 2022-04-27 RX ADMIN — HYDROMORPHONE HYDROCHLORIDE 0.4 MG: 0.2 INJECTION, SOLUTION INTRAMUSCULAR; INTRAVENOUS; SUBCUTANEOUS at 01:05

## 2022-04-27 RX ADMIN — HYDROMORPHONE HYDROCHLORIDE 0.4 MG: 0.2 INJECTION, SOLUTION INTRAMUSCULAR; INTRAVENOUS; SUBCUTANEOUS at 20:17

## 2022-04-27 RX ADMIN — OXYCODONE HYDROCHLORIDE 5 MG: 5 TABLET ORAL at 09:26

## 2022-04-27 RX ADMIN — CEFAZOLIN SODIUM 2 G: 2 INJECTION, SOLUTION INTRAVENOUS at 09:10

## 2022-04-27 RX ADMIN — ZONISAMIDE 400 MG: 100 CAPSULE ORAL at 21:16

## 2022-04-27 RX ADMIN — OXYCODONE HYDROCHLORIDE 10 MG: 10 TABLET ORAL at 22:52

## 2022-04-27 ASSESSMENT — ACTIVITIES OF DAILY LIVING (ADL)
ADLS_ACUITY_SCORE: 6

## 2022-04-27 NOTE — PLAN OF CARE
D/I/A: AO x 4, tremors present, and following commands. Patient complaining of incision tenderness and generalized headache NSG aware PRNs given. LS clear. BS active and flatus present. Tolerating PO well. Urine output adequate see I&O. No BM this shift. VSS and afebrile    P: DVPRS goal 4. Q hour neuro. Fall risk precautions. Bedside sitter present. Continuous EEG present.    Goal Outcome Evaluation:    Plan of Care Reviewed With: patient     Overall Patient Progress: no change

## 2022-04-27 NOTE — PROGRESS NOTES
[facility], [place]   Epilepsy Service Daily Note      Interval History:   No seizures recorded thus far. He reports he is unaware when seizures occur and they usually happen in sleep. He can't recall when his last witnessed GTC seizure was.     Previous video EEG recorded 9 seizures with ictal manifestations variably included tonic stiffening of his upper body, kicking of his legs, vocalizations, and non-purposeful movements of surrounding objects. Seizures occurred out of sleep.     Review of System:   Tiredness, head pain, jaw/chewing discomfort. No nausea, No vomiting, no dizziness, no chest pain.     Medications:   Antiepileptic Medications Home Doses: levetiracetam 500-500, lamotrigine 150-150, zonisamide 300-400  Antiepileptic Medications Current Doses:  levetiracetam 500-500, lamotrigine 150-150, zonisamide 300-400    Exam: Blood pressure 129/81, pulse 61, temperature 98.3  F (36.8  C), temperature source Oral, resp. rate 12, height 1.829 m (6'), weight 93.9 kg (207 lb 0.2 oz), SpO2 95 %.   General: NAD  Head: head wrap in place  Neuro: Alert and oriented. Speech fluent. Hearing intact to normal conversation. PERRL, EOM's intact, Strong shoulder shrug bilaterally. Strength 5/5 bilaterally. No drift or pronation. Intact FNF. Sensation intact to light touch.   Labs: antiepileptic drugs levels pending   Assessment and Plan: patient seen and discussed with Dr. Pete Ríos.  Patient is a 26 year old, right-handed male with a history of depression, migraines, asperger syndrome and intractable focal epilepsy who underwent stereotactic EEG placement of bilateral depth electrodes for invasive video EEG monitoring on 4/25. Postop imaging notable for stable right convexity SDH.    -start video EEG recording today  -divalproex stopped as this medication patient stopped PTA last week and replaced with levetiracetam in preparation for surgery   -antiepileptic drug levels pending   -stop  levetiracetam       Annia Iglesias PA-C      Total time: 25 minute was spent in the care of this patient. The patient agrees with the above mentioned plan of care. I answered all the patient's questions and addressed immediate concerns. More than 50% of time spent consisted of counseling and coordinating care, including discussion of the diagnostic significance of EEG findings, anti-seizure medication management, and planning for discharge home

## 2022-04-27 NOTE — OP NOTE
Operative Note    Name:     BRIDGET KNIGHT  MRN:      [id number]        Account:        [id number]   :      1995           Procedure Date: 2022       Procedure Date: 2022    PREOPERATIVE DIAGNOSIS:  Partial symptomatic epilepsy with complex partial seizures, intractable, without status epilepticus.    POSTOPERATIVE DIAGNOSIS:  Partial symptomatic epilepsy with complex partial seizures, intractable, without status epilepticus.    PROCEDURE PERFORMED:  JAYDEN-assisted stereotactic placement of bilateral depth electrodes for invasive video electroencephalography monitoring.    PRIMARY SURGEON:  Tello Adler MD    RESIDENT ASSISTING:  Verona Marshall MD    ANESTHESIA:  General.    ESTIMATED BLOOD LOSS:  20 mL.    DRAINS:  None.     SPECIMENS:  None.    FINDINGS:  16 depth electrode leads placed, confirmed with adequate EEG recordings. 2 ground leads placed.     INTRAOPERATIVE COMPLICATIONS:  None.    IMPLANTS:   Implant Name Type Inv. Item Serial No.  Lot No. LRB No. Used Action   BOLT STRTC 25MM PMT [employer] ANCH - [id number] Metallic Hardware/[place] BOLT STRTC 25MM PMT [employer] ANCH   [employer] [id number] Left 5 Implanted   BOLT STRTC 25MM PMT [employer] ANCH        P[employer] [id number] Left 3 Implanted   BOLT STRTC 25MM [employer]T [employer] ANCH - [id number] Metallic Hardware/[place] BOLT STRTC 25MM PMT [employer] ANCH   [employer] [id number] Right 7 Implanted   BOLT STRTC 25MM PMT [employer] ANCH - [id number] Metallic Hardware/[place] BOLT STRTC 25MM [employer]T [employer] ANCH   [employer] [id number] Right 1 Implanted   Platinum, 14 contact, mini connector EEG depthalon     [id number]   [id number] Left 1 Implanted   Platinum, 16 contact, mini connector EEG depthalon       [employer] [id number] Left 1 Implanted   Platinum, 12 contact, mini connector,       [employer] [id number] Left 1 Implanted   Platinum, 16 contact, mini connector,  EEG Depthalon       [employer] [id number] Left  1 Implanted   Platinum, 16 contact, mini connector EEG Depthalon       [employer] [id number] Left 1 Implanted   Platinum, 14 contact, mini connector, EEG Depthalon       [employer] [id number] Left 1 Implanted   Platinum, 14 contact, mini connector,  EEG depthalon       [employer] [id number] Left 1 Implanted   Platinum, 14 contact, mini connector,  EEG depthalon       [employer] [id number] Left 1 Implanted   Platinum, 16 contact, mini connector,          [id number] Right 1 Implanted   Platinum, 14 contact, mini connector         [id number] Right 1 Implanted   Platinum, 14 contact, mini connector,          [id number] Right 1 Implanted   Platinum, 12 contact, mini connector,          [id number] Right 1 Implanted   Platinum, 12 contact, mini connector,  EEG Depthalon       [employer] [id number] Right 1 Implanted   Platinum, 14 contact, mini connector,  EEG depthalon       [employer] [id number] Right 1 Implanted   Platinum, 14 contact, mini connector EEG Depthalon       [employer] [id number] Right 1 Implanted   Platinum, 12 contact, mini connector,  EEG depthalon       [employer] [id number] Right 1 Implanted   Subdural Strip Electrode        [employer] [id number] Right 1 Implanted   Subdural strip electrode         [id number] Right 1 Implanted             INDICATIONS FOR PROCEDURE:  Amrit Padilla is a 26-year-old right-handed male with a history of epilepsy, Asperger's disorder and depression.  For the past several months, he has been having about 2 seizures per week, mostly nocturnal, and he is currently on 4 antiepileptic agents.  During the epilepsy consensus meeting, it was deemed appropriate to proceed with invasive electroencephalography monitoring for seizure localization that will help guide treatment.  The risks and benefits were discussed with the patient, and he provided consent for the above-mentioned procedure.    DESCRIPTION OF PROCEDURE:    The patient was identified in the preoperative area and brought into the  operating room by our Anesthesia colleagues.  He underwent general anesthesia and endotracheal intubation.  He was placed supine in the operating room bed.  His head was placed on a CRW frame with 4 points of fixation, and his head was fixed to the JAYDEN stereotactic robot .  Care was taken to ensure that all pressure points were padded.  Next, the right frontal pin site was removed for unencumbered access for registration and electrode placement.  Next, the JAYDEN robot was registered to the patient's anatomy with satisfactory accuracy  Next, the area of the intended entry points on the scalp was shaved with surgical clippers, and the head was widely prepped and draped in the standard surgical fashion.  A brief timeout was held, confirming the patient's identity, intended procedure, site of procedure, and preoperative antibiotics.      The intended targets for depth electrodes were the following:   - Left and right amygdala (2)  - Left and right cingulate gyrus (2)  - Left and right prefrontal gyrus (2)  - Left and right orbital frontal gyrus (2)  - Left and right insula (2)  - Left and right anterior, medial and posterior hippocampus (6)  This was a total of 16 target locations.       We then proceeded to place a total of 16 JAYDEN-guided bolts in the same series of steps as described below:    The intended trajectory was selected in the JAYDEN robot.  The JAYDEN arm was targeted at the entry point and aligned with the JAYDEN cannula abutting the skin.  A handheld drill was then brought into the field, and the estimated depth from the JAYDEN arm through the thickness of the skull was measured on the drill.  Next, the drill was used to create a small bur hole along the JAYDEN trajectory until the inner table was breached.  Next, a K-wire at a slightly longer length as the drill was used to perforate the dura.  After this, the depth EEG bolt was secured on bone along the JAYDEN trajectory.  The distance from the end of the bolt was  measured with the JAYDEN arm.  At this point, the JAYDEN arm was removed, and we proceeded with the subsequent trajectories across all 16 points.      Once all 16 bolts in the intended trajectories were secured, we proceeded to place depth electrodes in the the same series of steps as described below:    For each target, the length from the entry of the bolt to target was measured on the depth electrode stylet.  The stylet was inserted within the bolt of each trajectory to create a path through the brain.  Next, the intended depth electrodes were measured at the same length as the stylet (bolt-to-target length), each measured up for each target location.  The depth electrodes were inserted with no resistance, and the electrode screw cap was secured to the bolt.  Next, the stylet within the electrode was removed, and the cap was further secured onto the bolt.  This was completed for all 16 electrodes.      Next, we used a #10 blade to make two 2 cm incisions along the right vertex.  These incisions were carried down to bone, and a pocket anteriorly and posteriorly from each incision was created to allow tunneling of 2 electrode grounds.  Each ground with 2 flat electrodes each was inserted under the galea.The ground wire was tunneled posteriorly at the anterior incision and anteriorly at the posterior incision, exiting at the scalp.  We next proceeded to approximate the skin at the ground incisions with 3-0 Monocryl running suture.      All depth electrodes and the grounds were connected to EEG machine to confirm adequate recording from each electrode.      Once this was confirmed, we proceeded with placing sterile dressings.  Dressings were placed in the following fashion: Around each depth electrode and each ground, a thin strip of Xeroform was wrapped around the base of it, followed by a strip of Betadine-soaked sponge, which was also wrapped around the depth electrode.  Next, sponges were placed around the electrode  head to avoid kinking of the wires.  All wires were brought together at the vertex of the head and wrapped with a Kerlix bandage to include full coverage of the head in the fashion of a turban.  Next, a Surgilast dressing was placed over the head to allow securing of the bandage.  The electrode connectors were placed in a specimen bag and taped to the patient's head to avoid accidental removal of electrodes.      All counts for sutures, needles, sponges, and cautery tips were correct x 2.      The CRW frame was removed for from the patient's head, and the patient was extubated without any complications.        Dr. Tello Adler was present for the entirety of the procedure.    Tello Adler M.D.    As Dictated by HIMA CHESTER MD    Physician Attestation   I was present for the entire procedure between opening and closing.    Tello Adler MD  Date of Service (when I saw the patient): 22        D: 2022   T: 2022   MT: [facility]    Name:     BRIDGET KNIGHT  MRN:      [id number]        Account:        [id number]   :      1995           Procedure Date: 2022     Document: [id number]

## 2022-04-27 NOTE — PLAN OF CARE
Major Shift Events:    Neuro: Intact. A/O x4. Follows commands, Pupils 3 mm, round, brisk, equal. Strength +5 all extremities. No seizure activity noted overnight. Complained for headache, given schedule tylenol, PRN Oxycodone and Dilaudid. q2h neuro  CV: NSR. HR 60-70s, afebrile.   Resp: RA, LS clear.   GI: Reg diet. No BM overnight.   : Voiding spontaneously, using urinal.   Skin: Intact, no concerns.   IV/Drips: L PIV and R PIV, both SL.   Plan: Monitor q2h neuro, sitter at bedside for monitoring. Adequate pain control.   For vital signs and complete assessments, please see documentation flowsheets.

## 2022-04-27 NOTE — PROGRESS NOTES
[facility], [place]   Neurosurgery Progress Note:    Assessment: Amrit Padilla is a 26 year old male with history of about 10 years of medically refractory epilepsy on PTA Depakote 1500mg daily, lamotrigine 150mg BID, Keppra 500 BID, and zonisamide 300mg-400mg.  He has two typical seizure types, typically at night while he is sleeping: they can occur GTCs or repetitive face/hand/arm jerking movements followed by unresponsiveness.  Past medical history is also notable for depression and Asperger syndrome.  He underwent stereotactic EEG placement of 16 electrodes with 2 ground electrodes on 4/25 by Dr. Adler.  He was admitted to  postoperatively, and postoperative head CT was notable for thin right convexity subdural hematoma, stable on repeat imaging.  vEEG was started on 4/27 AM, and PTA Depakote was discontinued.    Plan:  - Frequent neuro checks  - PTA antiepileptic wean coordination by Epileptology   - PTA Depakote discontinued yesterday  - EEG to begin today  - Continue 1:1 for seizure monitoring; seizure precautions  - Pain control  - HOB > 30 degrees  - Advance diet as tolerated  - Bowel regimen  - PRN antiemetics  - SCDs for DVT proph    -----------------------------------  Jemal Rosenberg MD  Neurosurgery PGY-3    Interval History/Subjective: vEEG started yesterday AM. PTA Depakote held. No clinical signs of seizure.    Objective:   Temp:  [98  F (36.7  C)-98.6  F (37  C)] 98  F (36.7  C)  Pulse:  [63-88] 68  Resp:  [7-16] 10  BP: (113-139)/(70-87) 123/75  Cuff Mean (mmHg):  [91] 91  SpO2:  [93 %-97 %] 95 %  I/O last 3 completed shifts:  In: 2010 [P.O.:960; I.V.:1050]  Out: 1100 [Urine:1100]    Gen: Appears comfortable, NAD  Wound: headwrap in place  Neurologic:  - Alert & Oriented to person, place, time, and situation  - Follows commands briskly  - Speech fluent, spontaneous. No aphasia or dysarthria.  - No gaze preference. No apparent hemineglect.  - PERRL, EOMI  - Strong  brow raise, eye closure, and smile  - Face symmetric with sensation intact to light touch  - Trapezii and sternocleidomastoid muscles 5/5 bilaterally  - No pronator drift     Del Tr Bi WE WF Gr   R 5 5 5 5 5 5   L 5 5 5 5 5 5    HF KE KF DF PF EHL   R 5 5 5 5 5 5   L 5 5 5 5 5 5     Reflexes symmetric    Sensation intact and symmetric to light touch throughout    LABS  Recent Labs   Lab Test 04/26/22  0440 04/25/22  1923 04/25/22  0900   WBC 13.0* 9.7 7.2   HGB 14.0 14.2 15.3   MCV 96 94 95    241 234       Recent Labs   Lab Test 04/26/22  0440 04/25/22  1908 04/25/22  0900 04/25/22  0818 05/21/21  0710 03/02/21  1611     --  141  --   --  139   POTASSIUM 3.6 4.3 4.1  --   --  4.4   CHLORIDE 108  --  108  --   --  110*   CO2 24  --  27  --   --  24   BUN 14  --  18  --   --  17   CR 0.78  --  1.02  --   --  0.91   ANIONGAP 6  --  6  --   --  5   ZAIDA 8.3*  --  9.2  --   --  8.7   *  --  88 88   < > 90    < > = values in this interval not displayed.       IMAGING  Recent Results (from the past 24 hour(s))   CT Head w/o Contrast    Narrative    CT HEAD W/O CONTRAST 4/26/2022 4:23 AM    History: f/u SDH and questionable IPH     Comparison: Jm 4/25/2022. CT 4/25/2022.    Technique: Using multidetector thin collimation helical acquisition  technique, axial, coronal and sagittal CT images from the skull base  to the vertex were obtained without intravenous contrast.   (topogram) image(s) also obtained and reviewed.    Findings: Postsurgical changes of cranial electrode placement in the  bilateral cerebral hemispheres. Redemonstration of subdural hematoma  along the right cerebral convexity measuring up to 6 mm in thickness.  Stable 2 to 3 mm of right-to-left midline shift. Small amount of  pneumocephalus overlying the bifrontal lobes. Previously visualized  small amount of subarachnoid hemorrhage overlying the right vertebral  convexity is not well demonstrated on this exam. Known  cortical  infarction along the lateral margin of the left superior frontal gyrus  is not well demonstrated on this exam. Ventricles are proportionate to  the cerebral sulci. The basal cisterns are clear.    Right superior scalp hematoma. The bony calvaria and the bones of the  skull base are normal. The visualized portions of the paranasal  sinuses and mastoid air cells are clear. The orbits are grossly  unremarkable.      Impression    Impression:  1. Post surgical changes of intracranial electrode placement with  stable 6 mm right cerebral convexity subdural hematoma and associated  to 3 mm of right-to-left midline shift. Stable small amount of  pneumocephalus. No new intracranial hemorrhage.  2. Previously visualized small amount of subarachnoid hemorrhage  overlying the right frontoparietal convexity and infarction along the  left superior frontal gyrus are not well demonstrated on this exam.     I have personally reviewed the examination and initial interpretation  and I agree with the findings.    LINDY MARTINEZ MD         SYSTEM ID:  [id number]

## 2022-04-28 ENCOUNTER — APPOINTMENT (OUTPATIENT)
Dept: NEUROLOGY | Facility: CLINIC | Age: 27
End: 2022-04-28
Attending: STUDENT IN AN ORGANIZED HEALTH CARE EDUCATION/TRAINING PROGRAM
Payer: COMMERCIAL

## 2022-04-28 LAB
ANION GAP SERPL CALCULATED.3IONS-SCNC: 5 MMOL/L (ref 3–14)
BUN SERPL-MCNC: 14 MG/DL (ref 7–30)
CALCIUM SERPL-MCNC: 9.1 MG/DL (ref 8.5–10.1)
CHLORIDE BLD-SCNC: 111 MMOL/L (ref 94–109)
CO2 SERPL-SCNC: 24 MMOL/L (ref 20–32)
CREAT SERPL-MCNC: 0.8 MG/DL (ref 0.66–1.25)
ERYTHROCYTE [DISTWIDTH] IN BLOOD BY AUTOMATED COUNT: 12.3 % (ref 10–15)
GFR SERPL CREATININE-BSD FRML MDRD: >90 ML/MIN/1.73M2
GLUCOSE BLD-MCNC: 122 MG/DL (ref 70–99)
HCT VFR BLD AUTO: 39.6 % (ref 40–53)
HGB BLD-MCNC: 13.5 G/DL (ref 13.3–17.7)
MAGNESIUM SERPL-MCNC: 2.1 MG/DL (ref 1.6–2.3)
MCH RBC QN AUTO: 32.3 PG (ref 26.5–33)
MCHC RBC AUTO-ENTMCNC: 34.1 G/DL (ref 31.5–36.5)
MCV RBC AUTO: 95 FL (ref 78–100)
PHOSPHATE SERPL-MCNC: 3.2 MG/DL (ref 2.5–4.5)
PLATELET # BLD AUTO: 226 10E3/UL (ref 150–450)
POTASSIUM BLD-SCNC: 4.1 MMOL/L (ref 3.4–5.3)
RBC # BLD AUTO: 4.18 10E6/UL (ref 4.4–5.9)
SODIUM SERPL-SCNC: 140 MMOL/L (ref 133–144)
WBC # BLD AUTO: 9.2 10E3/UL (ref 4–11)
ZONISAMIDE SERPL-MCNC: 22 UG/ML

## 2022-04-28 PROCEDURE — 999N000128 HC STATISTIC PERIPHERAL IV START W/O US GUIDANCE

## 2022-04-28 PROCEDURE — 95720 EEG PHY/QHP EA INCR W/VEEG: CPT | Mod: GC | Performed by: PSYCHIATRY & NEUROLOGY

## 2022-04-28 PROCEDURE — 36415 COLL VENOUS BLD VENIPUNCTURE: CPT | Performed by: STUDENT IN AN ORGANIZED HEALTH CARE EDUCATION/TRAINING PROGRAM

## 2022-04-28 PROCEDURE — 83735 ASSAY OF MAGNESIUM: CPT | Performed by: NEUROLOGICAL SURGERY

## 2022-04-28 PROCEDURE — 85027 COMPLETE CBC AUTOMATED: CPT | Performed by: STUDENT IN AN ORGANIZED HEALTH CARE EDUCATION/TRAINING PROGRAM

## 2022-04-28 PROCEDURE — 250N000013 HC RX MED GY IP 250 OP 250 PS 637: Performed by: PHYSICIAN ASSISTANT

## 2022-04-28 PROCEDURE — 80048 BASIC METABOLIC PNL TOTAL CA: CPT | Performed by: STUDENT IN AN ORGANIZED HEALTH CARE EDUCATION/TRAINING PROGRAM

## 2022-04-28 PROCEDURE — 95714 VEEG EA 12-26 HR UNMNTR: CPT

## 2022-04-28 PROCEDURE — 250N000011 HC RX IP 250 OP 636: Performed by: STUDENT IN AN ORGANIZED HEALTH CARE EDUCATION/TRAINING PROGRAM

## 2022-04-28 PROCEDURE — 99231 SBSQ HOSP IP/OBS SF/LOW 25: CPT | Mod: 25 | Performed by: PHYSICIAN ASSISTANT

## 2022-04-28 PROCEDURE — 84100 ASSAY OF PHOSPHORUS: CPT | Performed by: NEUROLOGICAL SURGERY

## 2022-04-28 PROCEDURE — 250N000013 HC RX MED GY IP 250 OP 250 PS 637: Performed by: STUDENT IN AN ORGANIZED HEALTH CARE EDUCATION/TRAINING PROGRAM

## 2022-04-28 PROCEDURE — 200N000002 HC R&B ICU UMMC

## 2022-04-28 RX ORDER — LAMOTRIGINE 25 MG/1
75 TABLET ORAL 2 TIMES DAILY
Status: DISCONTINUED | OUTPATIENT
Start: 2022-04-28 | End: 2022-04-29

## 2022-04-28 RX ADMIN — HYDROMORPHONE HYDROCHLORIDE 0.4 MG: 0.2 INJECTION, SOLUTION INTRAMUSCULAR; INTRAVENOUS; SUBCUTANEOUS at 23:33

## 2022-04-28 RX ADMIN — LAMOTRIGINE 150 MG: 150 TABLET ORAL at 07:43

## 2022-04-28 RX ADMIN — ZONISAMIDE 400 MG: 100 CAPSULE ORAL at 21:11

## 2022-04-28 RX ADMIN — HYDROMORPHONE HYDROCHLORIDE 0.2 MG: 0.2 INJECTION, SOLUTION INTRAMUSCULAR; INTRAVENOUS; SUBCUTANEOUS at 21:11

## 2022-04-28 RX ADMIN — ACETAMINOPHEN 975 MG: 325 TABLET ORAL at 11:17

## 2022-04-28 RX ADMIN — CEFAZOLIN SODIUM 2 G: 2 INJECTION, SOLUTION INTRAVENOUS at 16:01

## 2022-04-28 RX ADMIN — ACETAMINOPHEN 650 MG: 325 TABLET ORAL at 21:11

## 2022-04-28 RX ADMIN — FLUOXETINE 80 MG: 20 CAPSULE ORAL at 07:43

## 2022-04-28 RX ADMIN — ACETAMINOPHEN 975 MG: 325 TABLET ORAL at 04:04

## 2022-04-28 RX ADMIN — CEFAZOLIN SODIUM 2 G: 2 INJECTION, SOLUTION INTRAVENOUS at 07:43

## 2022-04-28 RX ADMIN — CEFAZOLIN SODIUM 2 G: 2 INJECTION, SOLUTION INTRAVENOUS at 23:29

## 2022-04-28 RX ADMIN — HYDROMORPHONE HYDROCHLORIDE 0.4 MG: 0.2 INJECTION, SOLUTION INTRAMUSCULAR; INTRAVENOUS; SUBCUTANEOUS at 00:04

## 2022-04-28 RX ADMIN — ZONISAMIDE 300 MG: 100 CAPSULE ORAL at 07:42

## 2022-04-28 RX ADMIN — CEFAZOLIN SODIUM 2 G: 2 INJECTION, SOLUTION INTRAVENOUS at 00:04

## 2022-04-28 RX ADMIN — LAMOTRIGINE 75 MG: 25 TABLET ORAL at 21:10

## 2022-04-28 ASSESSMENT — ACTIVITIES OF DAILY LIVING (ADL)
ADLS_ACUITY_SCORE: 6

## 2022-04-28 ASSESSMENT — VISUAL ACUITY: OU: BASELINE

## 2022-04-28 NOTE — PROGRESS NOTES
[facility], [place]   Neurosurgery Progress Note:    Assessment: Amrit Padilla is a 26 year old male with history of about 10 years of medically refractory epilepsy on PTA Depakote 1500mg daily, lamotrigine 150mg BID, Keppra 500 BID, and zonisamide 300mg-400mg.  He has two typical seizure types, typically at night while he is sleeping: they can occur GTCs or repetitive face/hand/arm jerking movements followed by unresponsiveness.  Past medical history is also notable for depression and Asperger syndrome.  He underwent stereotactic EEG placement of 16 electrodes with 2 ground electrodes on 4/25 by Dr. Adler.  He was admitted to  postoperatively, and postoperative head CT was notable for thin right convexity subdural hematoma, stable on repeat imaging.  vEEG was started on 4/26 AM, and PTA Depakote was discontinued.  Keppra was discontinued on 4/27.     Coding query clarification:  4/25 CT showing 6mm right subdural hematoma with 2mm midline shift: Brain compression    Plan:  - Frequent neuro checks  - PTA antiepileptic wean coordination by Epileptology   - PTA Depakote discontinued 4/26   - PTA Keppra discontinued 4/27  - EEG to begin today  - Continue 1:1 for seizure monitoring; seizure precautions  - Pain control  - HOB > 30 degrees  - Advance diet as tolerated  - Bowel regimen  - PRN antiemetics  - SCDs for DVT proph    -----------------------------------  Jemal Rosenberg MD  Neurosurgery PGY-3    Interval History/Subjective: No acute events overnight.  PTA Keppra discontinued yesterday.    Objective:   Temp:  [98  F (36.7  C)-98.5  F (36.9  C)] 98.2  F (36.8  C)  Pulse:  [56-81] 74  Resp:  [10-18] 18  BP: (122-134)/(73-94) 131/80  SpO2:  [93 %-97 %] 94 %  I/O last 3 completed shifts:  In: 1170 [P.O.:970; I.V.:200]  Out: 1550 [Urine:1550]    Gen: Appears comfortable, NAD  Wound: headwrap in place  Neurologic:  - Alert & Oriented to person, place, time, and situation  - Follows  commands briskly  - Speech fluent, spontaneous. No aphasia or dysarthria.  - No gaze preference. No apparent hemineglect.  - PERRL, EOMI  - Strong brow raise, eye closure, and smile  - Face symmetric with sensation intact to light touch  - Trapezii and sternocleidomastoid muscles 5/5 bilaterally  - No pronator drift     Del Tr Bi WE WF Gr   R 5 5 5 5 5 5   L 5 5 5 5 5 5    HF KE KF DF PF EHL   R 5 5 5 5 5 5   L 5 5 5 5 5 5     Reflexes symmetric    Sensation intact and symmetric to light touch throughout    LABS  Recent Labs   Lab Test 04/27/22 0437 04/26/22  0440 04/25/22  1923   WBC 8.9 13.0* 9.7   HGB 13.6 14.0 14.2   MCV 96 96 94    221 241       Recent Labs   Lab Test 04/27/22 0437 04/26/22  0440 04/25/22  1908 04/25/22  0900    138  --  141   POTASSIUM 4.1 3.6 4.3 4.1   CHLORIDE 111* 108  --  108   CO2 26 24  --  27   BUN 13 14  --  18   CR 0.87 0.78  --  1.02   ANIONGAP 5 6  --  6   ZAIDA 8.7 8.3*  --  9.2   * 125*  --  88       IMAGING  No results found for this or any previous visit (from the past 24 hour(s)).

## 2022-04-28 NOTE — PLAN OF CARE
Major Shift Events: No recorded or witnessed seizures this shift. Lamotrigine dose decreased starting tonight. Off Keppra. Neuros remain the same. Flat, oriented x4. Mild tremors in BUE. Up to void via urinal x3. Minimal appetite. No BM yet.    Plan: Continue to monitor for seizures. Q2 neuros.  For vital signs and complete assessments, please see documentation flowsheets.

## 2022-04-28 NOTE — PROGRESS NOTES
[facility], [place]   Epilepsy Service Daily Note      Interval History:   No seizures recorded thus far. Nursing staff notes a lot of twitching and tremors.  Continues with headache.     Previous video EEG recorded 9 seizures with ictal manifestations variably included tonic stiffening of his upper body, kicking of his legs, vocalizations, and non-purposeful movements of surrounding objects. Seizures occurred out of sleep.     Review of System:   Tiredness, head pain, jaw/chewing discomfort. No nausea, No vomiting, no dizziness, no chest pain.     Medications:   Antiepileptic Medications Home Doses: levetiracetam 500-500, lamotrigine 150-150, zonisamide 300-400  Antiepileptic Medications Current Doses:  levetiracetam dc'd, lamotrigine 150-150, zonisamide 300-400    Exam: Blood pressure 134/87, pulse 61, temperature 98.3  F (36.8  C), temperature source Axillary, resp. rate 10, height 1.829 m (6'), weight 93.9 kg (207 lb 0.2 oz), SpO2 94 %.   General: NAD  Head: head wrap in place  Neuro: Alert and oriented. Speech fluent. Hearing intact to normal conversation. PERRL, EOM's intact, Strong shoulder shrug bilaterally. Strength 5/5 bilaterally. No drift or pronation. Intact FNF. Sensation intact to light touch.   Labs: zonisamide pending    Latest Reference Range & Units 04/26/22 13:57   Keppra (Levetiracetam) Level 10 - 40 ug/mL 13 [1]   Lamotrigine 3.0 - 15.0 ug/mL 7.0 [2]     EEG: No electrographic seizures   Assessment and Plan: patient seen and discussed with Dr. Freeman   1.  Patient is a 26 year old, right-handed male with a history of depression, migraines, asperger syndrome and intractable focal epilepsy who underwent stereotactic EEG placement of bilateral depth electrodes for invasive video EEG monitoring on 4/25. Postop imaging notable for stable right convexity SDH. No electrographic seizures recorded thus far.     -continue video EEG   -divalproex stopped 4/26 as this medication  patient stopped PTA last week and replaced with levetiracetam in preparation for surgery   -PTA levetiracetam dc'd 4/27  -decrease PTA lamotrigine to 75-75   -If patient having seizures and not returning to baseline, please page epilepsy attending: Dr. Freeman [id number]      Annia Iglesias, PALindsayC  [id number]    Total time: 20 minute was spent in the care of this patient. The patient agrees with the above mentioned plan of care. I answered all the patient's questions and addressed immediate concerns. More than 50% of time spent consisted of counseling and coordinating care, including discussion of the diagnostic significance of EEG findings, anti-seizure medication management, and planning for discharge home

## 2022-04-28 NOTE — PLAN OF CARE
Major Shift Events:    Neuro: Intact. A/O x4. Follows commands, Pupils 3 mm, round, brisk, equal. Strength +5 all extremities. No seizure activity noted overnight. Tremors noted overnight, did have tremors during the day. No seizures noted or reported from sitter. Complained for headache, varied from intermittent to constant and R side of his head given schedule tylenol, PRN Oxycodone and Dilaudid. Ice pack also given, did provide some relief. q2h neuro  CV: NSR. HR 60-70s, afebrile.   Resp: RA, LS clear.   GI: Reg diet. No BM overnight.   : Voiding spontaneously, using urinal.   Skin: Intact, no concerns.   IV/Drips: L PIV and R PIV, both SL.   Plan: Monitor q2h neuro, sitter at bedside for monitoring. Adequate pain control.   For vital signs and complete assessments, please see documentation flowsheets.

## 2022-04-29 ENCOUNTER — APPOINTMENT (OUTPATIENT)
Dept: NEUROLOGY | Facility: CLINIC | Age: 27
End: 2022-04-29
Attending: STUDENT IN AN ORGANIZED HEALTH CARE EDUCATION/TRAINING PROGRAM
Payer: COMMERCIAL

## 2022-04-29 ENCOUNTER — APPOINTMENT (OUTPATIENT)
Dept: CT IMAGING | Facility: CLINIC | Age: 27
End: 2022-04-29
Attending: STUDENT IN AN ORGANIZED HEALTH CARE EDUCATION/TRAINING PROGRAM
Payer: COMMERCIAL

## 2022-04-29 LAB
ANION GAP SERPL CALCULATED.3IONS-SCNC: 4 MMOL/L (ref 3–14)
BUN SERPL-MCNC: 14 MG/DL (ref 7–30)
CALCIUM SERPL-MCNC: 9.1 MG/DL (ref 8.5–10.1)
CHLORIDE BLD-SCNC: 110 MMOL/L (ref 94–109)
CO2 SERPL-SCNC: 26 MMOL/L (ref 20–32)
CREAT SERPL-MCNC: 0.83 MG/DL (ref 0.66–1.25)
ERYTHROCYTE [DISTWIDTH] IN BLOOD BY AUTOMATED COUNT: 12.5 % (ref 10–15)
GFR SERPL CREATININE-BSD FRML MDRD: >90 ML/MIN/1.73M2
GLUCOSE BLD-MCNC: 114 MG/DL (ref 70–99)
HCT VFR BLD AUTO: 40 % (ref 40–53)
HGB BLD-MCNC: 13.4 G/DL (ref 13.3–17.7)
MAGNESIUM SERPL-MCNC: 2 MG/DL (ref 1.6–2.3)
MCH RBC QN AUTO: 31.6 PG (ref 26.5–33)
MCHC RBC AUTO-ENTMCNC: 33.5 G/DL (ref 31.5–36.5)
MCV RBC AUTO: 94 FL (ref 78–100)
PHOSPHATE SERPL-MCNC: 3.2 MG/DL (ref 2.5–4.5)
PLATELET # BLD AUTO: 250 10E3/UL (ref 150–450)
POTASSIUM BLD-SCNC: 3.7 MMOL/L (ref 3.4–5.3)
RBC # BLD AUTO: 4.24 10E6/UL (ref 4.4–5.9)
SODIUM SERPL-SCNC: 140 MMOL/L (ref 133–144)
WBC # BLD AUTO: 7.4 10E3/UL (ref 4–11)

## 2022-04-29 PROCEDURE — 95720 EEG PHY/QHP EA INCR W/VEEG: CPT | Performed by: PSYCHIATRY & NEUROLOGY

## 2022-04-29 PROCEDURE — 250N000013 HC RX MED GY IP 250 OP 250 PS 637: Performed by: NEUROLOGICAL SURGERY

## 2022-04-29 PROCEDURE — 250N000013 HC RX MED GY IP 250 OP 250 PS 637: Performed by: PHYSICIAN ASSISTANT

## 2022-04-29 PROCEDURE — 84100 ASSAY OF PHOSPHORUS: CPT | Performed by: NEUROLOGICAL SURGERY

## 2022-04-29 PROCEDURE — 200N000002 HC R&B ICU UMMC

## 2022-04-29 PROCEDURE — 85027 COMPLETE CBC AUTOMATED: CPT | Performed by: STUDENT IN AN ORGANIZED HEALTH CARE EDUCATION/TRAINING PROGRAM

## 2022-04-29 PROCEDURE — 36415 COLL VENOUS BLD VENIPUNCTURE: CPT | Performed by: STUDENT IN AN ORGANIZED HEALTH CARE EDUCATION/TRAINING PROGRAM

## 2022-04-29 PROCEDURE — 99231 SBSQ HOSP IP/OBS SF/LOW 25: CPT | Mod: 25 | Performed by: PHYSICIAN ASSISTANT

## 2022-04-29 PROCEDURE — 250N000013 HC RX MED GY IP 250 OP 250 PS 637: Performed by: STUDENT IN AN ORGANIZED HEALTH CARE EDUCATION/TRAINING PROGRAM

## 2022-04-29 PROCEDURE — 70450 CT HEAD/BRAIN W/O DYE: CPT | Mod: 26 | Performed by: RADIOLOGY

## 2022-04-29 PROCEDURE — 95714 VEEG EA 12-26 HR UNMNTR: CPT

## 2022-04-29 PROCEDURE — 250N000011 HC RX IP 250 OP 636: Performed by: STUDENT IN AN ORGANIZED HEALTH CARE EDUCATION/TRAINING PROGRAM

## 2022-04-29 PROCEDURE — 70450 CT HEAD/BRAIN W/O DYE: CPT

## 2022-04-29 PROCEDURE — 83735 ASSAY OF MAGNESIUM: CPT | Performed by: NEUROLOGICAL SURGERY

## 2022-04-29 PROCEDURE — 82310 ASSAY OF CALCIUM: CPT | Performed by: STUDENT IN AN ORGANIZED HEALTH CARE EDUCATION/TRAINING PROGRAM

## 2022-04-29 RX ORDER — MAGNESIUM OXIDE 400 MG/1
400 TABLET ORAL 2 TIMES DAILY
Status: COMPLETED | OUTPATIENT
Start: 2022-04-29 | End: 2022-04-30

## 2022-04-29 RX ORDER — POTASSIUM CHLORIDE 750 MG/1
20 TABLET, EXTENDED RELEASE ORAL ONCE
Status: COMPLETED | OUTPATIENT
Start: 2022-04-29 | End: 2022-04-29

## 2022-04-29 RX ORDER — ZONISAMIDE 100 MG/1
300 CAPSULE ORAL AT BEDTIME
Status: DISCONTINUED | OUTPATIENT
Start: 2022-04-29 | End: 2022-04-30

## 2022-04-29 RX ADMIN — POTASSIUM CHLORIDE 20 MEQ: 750 TABLET, EXTENDED RELEASE ORAL at 08:56

## 2022-04-29 RX ADMIN — Medication 400 MG: at 20:01

## 2022-04-29 RX ADMIN — ACETAMINOPHEN 650 MG: 325 TABLET ORAL at 18:08

## 2022-04-29 RX ADMIN — CEFAZOLIN SODIUM 2 G: 2 INJECTION, SOLUTION INTRAVENOUS at 16:05

## 2022-04-29 RX ADMIN — FLUOXETINE 80 MG: 20 CAPSULE ORAL at 08:55

## 2022-04-29 RX ADMIN — ZONISAMIDE 300 MG: 100 CAPSULE ORAL at 08:55

## 2022-04-29 RX ADMIN — ACETAMINOPHEN 650 MG: 325 TABLET ORAL at 13:29

## 2022-04-29 RX ADMIN — OXYCODONE HYDROCHLORIDE 5 MG: 5 TABLET ORAL at 16:05

## 2022-04-29 RX ADMIN — ZONISAMIDE 300 MG: 100 CAPSULE ORAL at 22:16

## 2022-04-29 RX ADMIN — Medication 400 MG: at 08:56

## 2022-04-29 RX ADMIN — CEFAZOLIN SODIUM 2 G: 2 INJECTION, SOLUTION INTRAVENOUS at 08:55

## 2022-04-29 RX ADMIN — LAMOTRIGINE 75 MG: 25 TABLET ORAL at 08:59

## 2022-04-29 ASSESSMENT — ACTIVITIES OF DAILY LIVING (ADL)
ADLS_ACUITY_SCORE: 6

## 2022-04-29 NOTE — PROGRESS NOTES
[facility], [place]   Neurosurgery Progress Note:    Assessment: Amrit Padilla is a 26 year old male with history of about 10 years of medically refractory epilepsy on PTA Depakote 1500mg daily, lamotrigine 150mg BID, Keppra 500 BID, and zonisamide 300mg-400mg.  He has two typical seizure types, typically at night while he is sleeping: they can occur GTCs or repetitive face/hand/arm jerking movements followed by unresponsiveness.  Past medical history is also notable for depression and Asperger syndrome.  He underwent stereotactic EEG placement of 16 electrodes with 2 ground electrodes on 4/25 by Dr. Adler.  He was admitted to  postoperatively, and postoperative head CT was notable for thin right convexity subdural hematoma, stable on repeat imaging.  vEEG was started on 4/26 AM, and PTA Depakote was discontinued.  Keppra was discontinued on 4/27.     Coding query clarification:  4/25 CT showing 6mm right subdural hematoma with 2mm midline shift: Brain compression    Plan:  - Frequent neuro checks  - PTA antiepileptic wean coordination by Epileptology   - PTA Depakote discontinued (4/26)   - PTA Keppra discontinued (4/27)   - PTA Lamictal weaned from 150-150 to 75-75 (4/28)  - EEG to begin today  - Continue 1:1 for seizure monitoring; seizure precautions  - Pain control  - HOB > 30 degrees  - Advance diet as tolerated  - Bowel regimen  - PRN antiemetics  - SCDs for DVT proph    -----------------------------------  Jemal Rosenberg MD  Neurosurgery PGY-3    Interval History/Subjective: No acute overnight events.  No clinical seizure activity.    Objective:   Temp:  [97.9  F (36.6  C)-98.6  F (37  C)] 97.9  F (36.6  C)  Pulse:  [55-67] 56  Resp:  [10-18] 12  BP: (119-145)/(79-98) 123/82  SpO2:  [94 %-98 %] 95 %  I/O last 3 completed shifts:  In: 200 [P.O.:100; I.V.:100]  Out: 1900 [Urine:1900]    Gen: Appears comfortable, NAD  Wound: headwrap in place  Neurologic:  - Alert & Oriented to  person, place, time, and situation  - Follows commands briskly  - Speech fluent, spontaneous. No aphasia or dysarthria.  - No gaze preference. No apparent hemineglect.  - PERRL, EOMI  - Strong brow raise, eye closure, and smile  - Face symmetric with sensation intact to light touch  - Trapezii and sternocleidomastoid muscles 5/5 bilaterally  - No pronator drift     Del Tr Bi WE WF Gr   R 5 5 5 5 5 5   L 5 5 5 5 5 5    HF KE KF DF PF EHL   R 5 5 5 5 5 5   L 5 5 5 5 5 5     Reflexes symmetric    Sensation intact and symmetric to light touch throughout    LABS  Recent Labs   Lab Test 04/28/22 0437 04/27/22  0437 04/26/22  0440   WBC 9.2 8.9 13.0*   HGB 13.5 13.6 14.0   MCV 95 96 96    214 221       Recent Labs   Lab Test 04/28/22 0437 04/27/22  0437 04/26/22  0440    142 138   POTASSIUM 4.1 4.1 3.6   CHLORIDE 111* 111* 108   CO2 24 26 24   BUN 14 13 14   CR 0.80 0.87 0.78   ANIONGAP 5 5 6   ZAIDA 9.1 8.7 8.3*   * 107* 125*       IMAGING  No results found for this or any previous visit (from the past 24 hour(s)).

## 2022-04-29 NOTE — PROGRESS NOTES
[facility], [place]   Epilepsy Service Daily Note      Interval History:   No seizures. Continues with lots of twitching. Continue with headache. Neurosurgery plans to repeat head CT today.     Previous video EEG recorded 9 seizures with ictal manifestations variably included tonic stiffening of his upper body, kicking of his legs, vocalizations, and non-purposeful movements of surrounding objects. Seizures occurred out of sleep.     Review of System:   Tiredness, head pain, jaw/chewing discomfort. No nausea, No vomiting, no dizziness, no chest pain.     Medications:   Antiepileptic Medications Home Doses: levetiracetam 500-500, lamotrigine 150-150, zonisamide 300-400  Antiepileptic Medications Current Doses:  levetiracetam dc'd, lamotrigine 75-75, zonisamide 300-400    Exam: Blood pressure 126/85, pulse 58, temperature 98  F (36.7  C), temperature source Axillary, resp. rate 14, height 1.829 m (6'), weight 92.9 kg (204 lb 12.9 oz), SpO2 95 %.   General: NAD  Head: head wrap in place  Neuro: Alert and oriented. Speech fluent. Hearing intact to normal conversation. PERRL, EOM's intact, Strong shoulder shrug bilaterally. Strength 5/5 bilaterally. No drift or pronation. Intact FNF. Sensation intact to light touch.   Labs:     Latest Reference Range & Units 04/26/22 13:57   Keppra (Levetiracetam) Level 10 - 40 ug/mL 13 [1]   Lamotrigine 3.0 - 15.0 ug/mL 7.0 [2]   Zonisamide Level Quant 10 - 40 ug/mL 22 [3]     EEG: No electrographic seizures   Assessment and Plan: patient seen and discussed with Dr. Pete Ríos.  Patient is a 26 year old, right-handed male with a history of depression, migraines, asperger syndrome and intractable focal epilepsy who underwent stereotactic EEG placement of bilateral depth electrodes for invasive video EEG monitoring on 4/25. Postop imaging notable for stable right convexity SDH. No electrographic seizures recorded thus far.     -continue video EEG   -divalproex  stopped 4/26 as this medication patient stopped PTA last week and replaced with levetiracetam in preparation for surgery   -PTA levetiracetam dc'd 4/27  -stop lamotrigine   -decrease zonisamide to 300-300  -If patient having seizures and not returning to baseline, please page epilepsy attending: Dr. Freeman [id number]      Annia Iglesias, PA-C  [id number]    Total time: 20 minute was spent in the care of this patient. The patient agrees with the above mentioned plan of care. I answered all the patient's questions and addressed immediate concerns. More than 50% of time spent consisted of counseling and coordinating care, including discussion of the diagnostic significance of EEG findings, anti-seizure medication management, and planning for discharge home

## 2022-04-29 NOTE — PLAN OF CARE
Major Shift Events:      Complains of headache; PRN dilaudid and tylenol given with some relief. No seizures t/o shift.     For vital signs and complete assessments, please see documentation flowsheets.     Problem: Pain Acute  Goal: Acceptable Pain Control and Functional Ability  Outcome: Ongoing, Not Progressing  Intervention: Develop Pain Management Plan  Recent Flowsheet Documentation  Taken 4/28/2022 2330 by Jesus Bear, RN  Pain Management Interventions: medication (see MAR)  Intervention: Prevent or Manage Pain  Recent Flowsheet Documentation  Taken 4/29/2022 0400 by Jesus Bear, RN  Medication Review/Management: medications reviewed  Taken 4/29/2022 0000 by Jesus Bear, RN  Medication Review/Management: medications reviewed  Taken 4/28/2022 2000 by Jesus Bear, RN  Medication Review/Management: medications reviewed      Phone call patient  Advised of below recommendations, rx sent to [facility] patient to appts to schedule follow up visit

## 2022-04-29 NOTE — PROGRESS NOTES
Spoke with Dr. Diaz regarding STAT CT Head order. Research team at bedside with patient, ok to delay CT at this time.

## 2022-04-29 NOTE — PLAN OF CARE
Major Shift Events: Repeat CT Head=unchanged SDH. No recorded seizures this shift. Neuro exam remains unchanged. Flat, oriented x4. Mild tremors in extremities bilat. Pain control with tylonol/oxycodone PRN. Hemodynamically stable. Up to void via urinal x 3. No BM yet, passing flatus.     Plan: Continue to monitor for seizures, wean anti-seizure meds  For vital signs and complete assessments, please see documentation flowsheets.

## 2022-04-30 ENCOUNTER — APPOINTMENT (OUTPATIENT)
Dept: NEUROLOGY | Facility: CLINIC | Age: 27
End: 2022-04-30
Attending: STUDENT IN AN ORGANIZED HEALTH CARE EDUCATION/TRAINING PROGRAM
Payer: COMMERCIAL

## 2022-04-30 LAB
ANION GAP SERPL CALCULATED.3IONS-SCNC: 8 MMOL/L (ref 3–14)
BUN SERPL-MCNC: 17 MG/DL (ref 7–30)
CALCIUM SERPL-MCNC: 9.4 MG/DL (ref 8.5–10.1)
CHLORIDE BLD-SCNC: 107 MMOL/L (ref 94–109)
CO2 SERPL-SCNC: 24 MMOL/L (ref 20–32)
CREAT SERPL-MCNC: 0.89 MG/DL (ref 0.66–1.25)
ERYTHROCYTE [DISTWIDTH] IN BLOOD BY AUTOMATED COUNT: 12.4 % (ref 10–15)
GFR SERPL CREATININE-BSD FRML MDRD: >90 ML/MIN/1.73M2
GLUCOSE BLD-MCNC: 90 MG/DL (ref 70–99)
HCT VFR BLD AUTO: 41.3 % (ref 40–53)
HGB BLD-MCNC: 13.7 G/DL (ref 13.3–17.7)
MAGNESIUM SERPL-MCNC: 2.2 MG/DL (ref 1.6–2.3)
MCH RBC QN AUTO: 31.6 PG (ref 26.5–33)
MCHC RBC AUTO-ENTMCNC: 33.2 G/DL (ref 31.5–36.5)
MCV RBC AUTO: 95 FL (ref 78–100)
PHOSPHATE SERPL-MCNC: 3.3 MG/DL (ref 2.5–4.5)
PLATELET # BLD AUTO: 270 10E3/UL (ref 150–450)
POTASSIUM BLD-SCNC: 4 MMOL/L (ref 3.4–5.3)
RBC # BLD AUTO: 4.34 10E6/UL (ref 4.4–5.9)
SODIUM SERPL-SCNC: 139 MMOL/L (ref 133–144)
WBC # BLD AUTO: 7.5 10E3/UL (ref 4–11)

## 2022-04-30 PROCEDURE — 36415 COLL VENOUS BLD VENIPUNCTURE: CPT | Performed by: STUDENT IN AN ORGANIZED HEALTH CARE EDUCATION/TRAINING PROGRAM

## 2022-04-30 PROCEDURE — 80048 BASIC METABOLIC PNL TOTAL CA: CPT | Performed by: STUDENT IN AN ORGANIZED HEALTH CARE EDUCATION/TRAINING PROGRAM

## 2022-04-30 PROCEDURE — 84100 ASSAY OF PHOSPHORUS: CPT | Performed by: NEUROLOGICAL SURGERY

## 2022-04-30 PROCEDURE — 36415 COLL VENOUS BLD VENIPUNCTURE: CPT | Performed by: NEUROLOGICAL SURGERY

## 2022-04-30 PROCEDURE — 95714 VEEG EA 12-26 HR UNMNTR: CPT

## 2022-04-30 PROCEDURE — 250N000011 HC RX IP 250 OP 636: Performed by: STUDENT IN AN ORGANIZED HEALTH CARE EDUCATION/TRAINING PROGRAM

## 2022-04-30 PROCEDURE — 95720 EEG PHY/QHP EA INCR W/VEEG: CPT | Mod: GC | Performed by: PSYCHIATRY & NEUROLOGY

## 2022-04-30 PROCEDURE — 85027 COMPLETE CBC AUTOMATED: CPT | Performed by: STUDENT IN AN ORGANIZED HEALTH CARE EDUCATION/TRAINING PROGRAM

## 2022-04-30 PROCEDURE — 83735 ASSAY OF MAGNESIUM: CPT | Performed by: NEUROLOGICAL SURGERY

## 2022-04-30 PROCEDURE — 250N000013 HC RX MED GY IP 250 OP 250 PS 637: Performed by: NEUROLOGICAL SURGERY

## 2022-04-30 PROCEDURE — 99231 SBSQ HOSP IP/OBS SF/LOW 25: CPT | Mod: 25 | Performed by: PSYCHIATRY & NEUROLOGY

## 2022-04-30 PROCEDURE — 250N000013 HC RX MED GY IP 250 OP 250 PS 637: Performed by: STUDENT IN AN ORGANIZED HEALTH CARE EDUCATION/TRAINING PROGRAM

## 2022-04-30 PROCEDURE — 200N000002 HC R&B ICU UMMC

## 2022-04-30 PROCEDURE — 250N000013 HC RX MED GY IP 250 OP 250 PS 637: Performed by: PSYCHIATRY & NEUROLOGY

## 2022-04-30 RX ORDER — ZONISAMIDE 100 MG/1
200 CAPSULE ORAL DAILY
Status: DISCONTINUED | OUTPATIENT
Start: 2022-05-01 | End: 2022-05-01

## 2022-04-30 RX ORDER — ZONISAMIDE 100 MG/1
200 CAPSULE ORAL AT BEDTIME
Status: DISCONTINUED | OUTPATIENT
Start: 2022-04-30 | End: 2022-05-01

## 2022-04-30 RX ADMIN — ACETAMINOPHEN 650 MG: 325 TABLET ORAL at 17:19

## 2022-04-30 RX ADMIN — OXYCODONE HYDROCHLORIDE 5 MG: 5 TABLET ORAL at 15:02

## 2022-04-30 RX ADMIN — ZONISAMIDE 200 MG: 100 CAPSULE ORAL at 21:57

## 2022-04-30 RX ADMIN — CEFAZOLIN SODIUM 2 G: 2 INJECTION, SOLUTION INTRAVENOUS at 08:46

## 2022-04-30 RX ADMIN — ZONISAMIDE 300 MG: 100 CAPSULE ORAL at 08:46

## 2022-04-30 RX ADMIN — ACETAMINOPHEN 650 MG: 325 TABLET ORAL at 23:26

## 2022-04-30 RX ADMIN — ACETAMINOPHEN 650 MG: 325 TABLET ORAL at 06:16

## 2022-04-30 RX ADMIN — FLUOXETINE 80 MG: 20 CAPSULE ORAL at 08:46

## 2022-04-30 RX ADMIN — Medication 400 MG: at 19:49

## 2022-04-30 RX ADMIN — ACETAMINOPHEN 650 MG: 325 TABLET ORAL at 00:28

## 2022-04-30 RX ADMIN — CEFAZOLIN SODIUM 2 G: 2 INJECTION, SOLUTION INTRAVENOUS at 16:12

## 2022-04-30 RX ADMIN — CEFAZOLIN SODIUM 2 G: 2 INJECTION, SOLUTION INTRAVENOUS at 23:26

## 2022-04-30 RX ADMIN — Medication 400 MG: at 08:45

## 2022-04-30 RX ADMIN — ACETAMINOPHEN 650 MG: 325 TABLET ORAL at 13:12

## 2022-04-30 RX ADMIN — OXYCODONE HYDROCHLORIDE 5 MG: 5 TABLET ORAL at 02:07

## 2022-04-30 RX ADMIN — CEFAZOLIN SODIUM 2 G: 2 INJECTION, SOLUTION INTRAVENOUS at 00:18

## 2022-04-30 ASSESSMENT — ACTIVITIES OF DAILY LIVING (ADL)
ADLS_ACUITY_SCORE: 6

## 2022-04-30 NOTE — PROGRESS NOTES
[facility], [place]   Neurosurgery Progress Note:    Assessment: Amrit Padilla is a 26 year old male with history of about 10 years of medically refractory epilepsy on PTA Depakote 1500mg daily, lamotrigine 150mg BID, Keppra 500 BID, and zonisamide 300mg-400mg.  He has two typical seizure types, typically at night while he is sleeping: they can occur GTCs or repetitive face/hand/arm jerking movements followed by unresponsiveness.  Past medical history is also notable for depression and Asperger syndrome.  He underwent stereotactic EEG placement of 16 electrodes with 2 ground electrodes on 4/25 by Dr. Adler.  He was admitted to  postoperatively, and postoperative head CT was notable for thin right convexity subdural hematoma, stable on repeat imaging.  vEEG was started on 4/26 AM, and PTA Depakote was discontinued.  Keppra was discontinued on 4/27. Lamictal was weaned 4/28, then discontinued 4/29.  Zonisamide was weaned 4/29.    Coding query clarification:  4/25 CT showing 6mm right subdural hematoma with 2mm midline shift: Brain compression    Plan:  - Frequent neuro checks  - PTA antiepileptic wean coordination by Epileptology   - PTA Depakote discontinued (4/26)   - PTA Keppra discontinued (4/27)   - PTA Lamictal weaned from 150-150 to 75-75 (4/28), then discontinued (4/29)   - PTA zonisamide weaned from 500 BID to 300 BID (4/29)  - EEG to begin today  - Continue 1:1 for seizure monitoring; seizure precautions  - Pain control  - HOB > 30 degrees  - Advance diet as tolerated  - Bowel regimen  - PRN antiemetics  - SCDs for DVT proph    -----------------------------------  Jemal Rosenberg MD  Neurosurgery PGY-3    Interval History/Subjective: Shaking of BUE and LLE overnight, possibly tremor vs seizure activity.    Objective:   Temp:  [98  F (36.7  C)-98.3  F (36.8  C)] 98  F (36.7  C)  Pulse:  [] 53  Resp:  [12-17] 12  BP: (115-143)/() 130/82  SpO2:  [93 %-98 %] 96 %  I/O  last 3 completed shifts:  In: 605 [P.O.:240; I.V.:365]  Out: 2600 [Urine:2600]    Gen: Appears comfortable, NAD  Wound: headwrap in place  Neurologic:  - Alert & Oriented to person, place, time, and situation  - Follows commands briskly  - Speech fluent, spontaneous. No aphasia or dysarthria.  - No gaze preference. No apparent hemineglect.  - PERRL, EOMI  - Strong brow raise, eye closure, and smile  - Face symmetric with sensation intact to light touch  - Trapezii and sternocleidomastoid muscles 5/5 bilaterally  - No pronator drift     Del Tr Bi WE WF Gr   R 5 5 5 5 5 5   L 5 5 5 5 5 5    HF KE KF DF PF EHL   R 5 5 5 5 5 5   L 5 5 5 5 5 5     Reflexes symmetric    Sensation intact and symmetric to light touch throughout    LABS  Recent Labs   Lab Test 04/29/22 0433 04/28/22 0437 04/27/22 0437   WBC 7.4 9.2 8.9   HGB 13.4 13.5 13.6   MCV 94 95 96    226 214       Recent Labs   Lab Test 04/29/22  0433 04/28/22 0437 04/27/22 0437    140 142   POTASSIUM 3.7 4.1 4.1   CHLORIDE 110* 111* 111*   CO2 26 24 26   BUN 14 14 13   CR 0.83 0.80 0.87   ANIONGAP 4 5 5   ZAIDA 9.1 9.1 8.7   * 122* 107*       IMAGING  Recent Results (from the past 24 hour(s))   CT Head w/o Contrast    Narrative    CT HEAD W/O CONTRAST 4/29/2022 2:15 PM    Provided History: r/o worsening SDH    Comparison: CT of 4/26/2022.    Technique: Using multidetector thin collimation helical acquisition  technique, axial, coronal and sagittal CT images from the skull base  to the vertex were obtained without intravenous contrast.     Findings:    Postsurgical changes of intracranial electrode placement in bilateral  cerebral hemispheres. Grossly stable right cerebral convexity subdural  hematoma, measuring up to 5 to 6 mm (although slightly decreased along  the superior convexity on coronal view); stable 2-3 mm right-to-left  midline shift. The subdural hematoma may be slightly layering on the  right tentorium cerebelli, however there is  significant motion and  metal streak artifact, making interpretation somewhat limited in this  region. Previously visualized small subarachnoid hemorrhage overlying  the right frontoparietal convexity and infarction along the left  superior frontal gyrus, are not well demonstrated on this exam, nor on  the most recent prior. No hydrocephalus or significant mass effect.  Preservation of gray-white matter differentiation. Basal cisterns are  patent. Mild decrease in the right posterior scalp hematoma/soft  tissue edema.    No acute osseous abnormality. The visualized paranasal sinuses are  clear. The mastoid air cells are clear.       Impression    Impression:   Postsurgical changes of intracranial electrode placement with stable  5-6 mm right cerebral convexity subdural hematoma, with stable 2-3 mm  leftward midline shift.    I have personally reviewed the examination and initial interpretation  and I agree with the findings.    LINDY MARTINEZ MD         SYSTEM ID:  [id number]

## 2022-04-30 NOTE — PROGRESS NOTES
[facility], [place]   Epilepsy Service Daily Note      Interval History:   No seizures. Continues with lots of twitching. Continue with headache. CT completed.     Review of System:   Tiredness, head pain, jaw/chewing discomfort. No nausea, No vomiting, no dizziness, no chest pain.     Medications:   Antiepileptic Medications Home Doses: levetiracetam 500-500, lamotrigine 150-150, zonisamide 300-400  Antiepileptic Medications Current Doses:  levetiracetam dc'd, lamotrigine 75-75, zonisamide 300-300    Exam: Blood pressure 119/85, pulse 58, temperature 97.7  F (36.5  C), temperature source Oral, resp. rate 14, height 1.829 m (6'), weight 92.9 kg (204 lb 12.9 oz), SpO2 94 %.   General: NAD  Head: head wrap in place  Neuro: Alert and oriented. Speech fluent. Hearing intact to normal conversation. PERRL, EOM's intact, Strong shoulder shrug bilaterally. Strength 5/5 bilaterally. No drift or pronation. Intact FNF. Sensation intact to light touch.   Labs:     Latest Reference Range & Units 04/26/22 13:57   Keppra (Levetiracetam) Level 10 - 40 ug/mL 13 [1]   Lamotrigine 3.0 - 15.0 ug/mL 7.0 [2]   Zonisamide Level Quant 10 - 40 ug/mL 22 [3]     EEG: No electrographic seizures   Assessment and Plan: patient seen and discussed with Dr. Pete Ríos.  Patient is a 26 year old, right-handed male with a history of depression, migraines, asperger syndrome and intractable focal epilepsy who underwent stereotactic EEG placement of bilateral depth electrodes for invasive video EEG monitoring on 4/25. Postop imaging notable for stable right convexity SDH. No electrographic seizures recorded thus far.     -continue video EEG   -divalproex stopped 4/26 as this medication patient stopped PTA last week and replaced with levetiracetam in preparation for surgery   -PTA levetiracetam dc'd 4/27  -stopped lamotrigine 4/29/2022   -decrease zonisamide to 200-200, lower to 100 mg twice a day 5/1/2022 and then stop  following day  -If patient having seizures and not returning to baseline, please page epilepsy attending: Dr. Freeman [id number]      Rosario Freeman MD   Total time: 15 minute was spent in the care of this patient. The patient agrees with the above mentioned plan of care. I answered all the patient's questions and addressed immediate concerns. More than 50% of time spent consisted of counseling and coordinating care, including discussion of the diagnostic significance of EEG findings, anti-seizure medication management, and planning for discharge home

## 2022-04-30 NOTE — PLAN OF CARE
ICU End of Shift Summary. See flowsheets for vital signs and detailed assessment.    Changes this shift:   No acute changes overnight. Oriented, continues to c/o headache tylenol and oxycodone given PRN. Slight tremors bilat UE.    Goal Outcome Evaluation:    Plan of Care Reviewed With: patient     Overall Patient Progress: no change    Outcome Evaluation: Continuing to monitor patient for possible seizures.

## 2022-04-30 NOTE — PLAN OF CARE
Major Shift Events:  Neuros intact ex baseline tremors in BUE, and LLE. Grid in place with vEEG.  No seizure events during shift. Pt endorses intermittent headache, managed with tylenol and oxycodone. SB/SR, afebrile. Small BM during shift, declined scheduled stool softeners. Voiding spontaneously. Skin CDI ex head wrap, dressing changed by EEG tech today. Mom and sister at bedside majority of afternoon. No acute events during shift.     Plan: Continue to monitor for seizures and weaning zonisamide starting tonight.    For vital signs and complete assessments, please see documentation flowsheets.       Goal Outcome Evaluation:    Plan of Care Reviewed With: patient

## 2022-05-01 ENCOUNTER — APPOINTMENT (OUTPATIENT)
Dept: NEUROLOGY | Facility: CLINIC | Age: 27
End: 2022-05-01
Attending: STUDENT IN AN ORGANIZED HEALTH CARE EDUCATION/TRAINING PROGRAM
Payer: COMMERCIAL

## 2022-05-01 LAB
ANION GAP SERPL CALCULATED.3IONS-SCNC: 7 MMOL/L (ref 3–14)
BUN SERPL-MCNC: 20 MG/DL (ref 7–30)
CALCIUM SERPL-MCNC: 8.9 MG/DL (ref 8.5–10.1)
CHLORIDE BLD-SCNC: 110 MMOL/L (ref 94–109)
CO2 SERPL-SCNC: 22 MMOL/L (ref 20–32)
CREAT SERPL-MCNC: 0.91 MG/DL (ref 0.66–1.25)
ERYTHROCYTE [DISTWIDTH] IN BLOOD BY AUTOMATED COUNT: 12.3 % (ref 10–15)
GFR SERPL CREATININE-BSD FRML MDRD: >90 ML/MIN/1.73M2
GLUCOSE BLD-MCNC: 114 MG/DL (ref 70–99)
GLUCOSE BLDC GLUCOMTR-MCNC: 143 MG/DL (ref 70–99)
HCT VFR BLD AUTO: 41.2 % (ref 40–53)
HGB BLD-MCNC: 14.1 G/DL (ref 13.3–17.7)
MAGNESIUM SERPL-MCNC: 2.1 MG/DL (ref 1.6–2.3)
MCH RBC QN AUTO: 32.3 PG (ref 26.5–33)
MCHC RBC AUTO-ENTMCNC: 34.2 G/DL (ref 31.5–36.5)
MCV RBC AUTO: 94 FL (ref 78–100)
PHOSPHATE SERPL-MCNC: 3.8 MG/DL (ref 2.5–4.5)
PLATELET # BLD AUTO: 293 10E3/UL (ref 150–450)
POTASSIUM BLD-SCNC: 3.7 MMOL/L (ref 3.4–5.3)
RBC # BLD AUTO: 4.37 10E6/UL (ref 4.4–5.9)
SODIUM SERPL-SCNC: 139 MMOL/L (ref 133–144)
WBC # BLD AUTO: 7.8 10E3/UL (ref 4–11)

## 2022-05-01 PROCEDURE — 999N000128 HC STATISTIC PERIPHERAL IV START W/O US GUIDANCE

## 2022-05-01 PROCEDURE — 84100 ASSAY OF PHOSPHORUS: CPT | Performed by: NEUROLOGICAL SURGERY

## 2022-05-01 PROCEDURE — 95720 EEG PHY/QHP EA INCR W/VEEG: CPT | Mod: GC | Performed by: PSYCHIATRY & NEUROLOGY

## 2022-05-01 PROCEDURE — 85027 COMPLETE CBC AUTOMATED: CPT | Performed by: STUDENT IN AN ORGANIZED HEALTH CARE EDUCATION/TRAINING PROGRAM

## 2022-05-01 PROCEDURE — 250N000011 HC RX IP 250 OP 636

## 2022-05-01 PROCEDURE — 250N000013 HC RX MED GY IP 250 OP 250 PS 637: Performed by: NEUROLOGICAL SURGERY

## 2022-05-01 PROCEDURE — 80048 BASIC METABOLIC PNL TOTAL CA: CPT | Performed by: STUDENT IN AN ORGANIZED HEALTH CARE EDUCATION/TRAINING PROGRAM

## 2022-05-01 PROCEDURE — 250N000013 HC RX MED GY IP 250 OP 250 PS 637: Performed by: STUDENT IN AN ORGANIZED HEALTH CARE EDUCATION/TRAINING PROGRAM

## 2022-05-01 PROCEDURE — 83735 ASSAY OF MAGNESIUM: CPT | Performed by: NEUROLOGICAL SURGERY

## 2022-05-01 PROCEDURE — 200N000002 HC R&B ICU UMMC

## 2022-05-01 PROCEDURE — 36415 COLL VENOUS BLD VENIPUNCTURE: CPT | Performed by: STUDENT IN AN ORGANIZED HEALTH CARE EDUCATION/TRAINING PROGRAM

## 2022-05-01 PROCEDURE — 250N000013 HC RX MED GY IP 250 OP 250 PS 637: Performed by: PSYCHIATRY & NEUROLOGY

## 2022-05-01 PROCEDURE — 99231 SBSQ HOSP IP/OBS SF/LOW 25: CPT | Mod: 25 | Performed by: PSYCHIATRY & NEUROLOGY

## 2022-05-01 PROCEDURE — 95714 VEEG EA 12-26 HR UNMNTR: CPT

## 2022-05-01 PROCEDURE — 250N000011 HC RX IP 250 OP 636: Performed by: STUDENT IN AN ORGANIZED HEALTH CARE EDUCATION/TRAINING PROGRAM

## 2022-05-01 RX ORDER — POTASSIUM CHLORIDE 750 MG/1
20 TABLET, EXTENDED RELEASE ORAL ONCE
Status: COMPLETED | OUTPATIENT
Start: 2022-05-01 | End: 2022-05-01

## 2022-05-01 RX ORDER — LORAZEPAM 2 MG/ML
INJECTION INTRAMUSCULAR
Status: DISCONTINUED
Start: 2022-05-01 | End: 2022-05-01 | Stop reason: HOSPADM

## 2022-05-01 RX ORDER — ZONISAMIDE 100 MG/1
100 CAPSULE ORAL DAILY
Status: DISCONTINUED | OUTPATIENT
Start: 2022-05-02 | End: 2022-05-05

## 2022-05-01 RX ORDER — ZONISAMIDE 100 MG/1
100 CAPSULE ORAL AT BEDTIME
Status: DISCONTINUED | OUTPATIENT
Start: 2022-05-01 | End: 2022-05-05

## 2022-05-01 RX ORDER — LORAZEPAM 2 MG/ML
4 INJECTION INTRAMUSCULAR ONCE
Status: COMPLETED | OUTPATIENT
Start: 2022-05-01 | End: 2022-05-01

## 2022-05-01 RX ADMIN — HYDROMORPHONE HYDROCHLORIDE 0.2 MG: 0.2 INJECTION, SOLUTION INTRAMUSCULAR; INTRAVENOUS; SUBCUTANEOUS at 12:24

## 2022-05-01 RX ADMIN — ACETAMINOPHEN 650 MG: 325 TABLET ORAL at 19:30

## 2022-05-01 RX ADMIN — OXYCODONE HYDROCHLORIDE 5 MG: 5 TABLET ORAL at 23:34

## 2022-05-01 RX ADMIN — FLUOXETINE 80 MG: 20 CAPSULE ORAL at 07:56

## 2022-05-01 RX ADMIN — CEFAZOLIN SODIUM 2 G: 2 INJECTION, SOLUTION INTRAVENOUS at 23:34

## 2022-05-01 RX ADMIN — CEFAZOLIN SODIUM 2 G: 2 INJECTION, SOLUTION INTRAVENOUS at 16:21

## 2022-05-01 RX ADMIN — POTASSIUM CHLORIDE 20 MEQ: 750 TABLET, EXTENDED RELEASE ORAL at 07:56

## 2022-05-01 RX ADMIN — OXYCODONE HYDROCHLORIDE 5 MG: 5 TABLET ORAL at 00:02

## 2022-05-01 RX ADMIN — HYDROMORPHONE HYDROCHLORIDE 0.2 MG: 0.2 INJECTION, SOLUTION INTRAMUSCULAR; INTRAVENOUS; SUBCUTANEOUS at 12:07

## 2022-05-01 RX ADMIN — ZONISAMIDE 100 MG: 100 CAPSULE ORAL at 21:54

## 2022-05-01 RX ADMIN — ACETAMINOPHEN 650 MG: 325 TABLET ORAL at 07:56

## 2022-05-01 RX ADMIN — CEFAZOLIN SODIUM 2 G: 2 INJECTION, SOLUTION INTRAVENOUS at 07:56

## 2022-05-01 RX ADMIN — OXYCODONE HYDROCHLORIDE 10 MG: 10 TABLET ORAL at 10:42

## 2022-05-01 RX ADMIN — LORAZEPAM 4 MG: 2 INJECTION INTRAMUSCULAR at 04:17

## 2022-05-01 RX ADMIN — ACETAMINOPHEN 650 MG: 325 TABLET ORAL at 12:24

## 2022-05-01 RX ADMIN — ZONISAMIDE 200 MG: 100 CAPSULE ORAL at 07:56

## 2022-05-01 ASSESSMENT — ACTIVITIES OF DAILY LIVING (ADL)
ADLS_ACUITY_SCORE: 6
ADLS_ACUITY_SCORE: 9
ADLS_ACUITY_SCORE: 6
ADLS_ACUITY_SCORE: 9
ADLS_ACUITY_SCORE: 6
ADLS_ACUITY_SCORE: 6
ADLS_ACUITY_SCORE: 9
ADLS_ACUITY_SCORE: 6
ADLS_ACUITY_SCORE: 9
ADLS_ACUITY_SCORE: 9

## 2022-05-01 NOTE — PROGRESS NOTES
"Brief Cross Cover Note    At 0219, patient had an event. Bedside RN called at 0225 to report that he had been confused, diffusely tremulous, and unable to answer questions appropriately. Patient could be heard in the background stating repeatedly that he did not have a seizure. RN reports that patient had already essentially returned to baseline. No medications were given.    At 0359, patient had another episode which was longer than the first. I was called at approximately 0405 and went to bedside immediately. Patient had coarse tremor of upper and lower extremities, dazed expression, and was again unable to respond appropriately to questions. He was answering all questions (\"what is your name?\" \"do you know where you are?\") with the same monotone, one-word response \"what?\" each time. He shortly thereafter became difficult to redirect and required physical restraints due to agitation. He was given Ativan 2 mg x 2 doses approximately 5 minutes apart and then became subdued and drowsy.       These events were discussed overnight with epilepsy attending, Dr. Rosario Freeman.    Lia Staley MD  Neurology Resident PGY-4  "

## 2022-05-01 NOTE — PROGRESS NOTES
[facility], [place]   Epilepsy Service Daily Note      Interval History:   He had two seizure last night. He has headache this afternoon, he did sit in chair yesterday. I reviewed plan of care with family member.     Review of System:   Tiredness, head pain, jaw/chewing discomfort. No nausea, No vomiting, no dizziness, no chest pain.     Medications:   Antiepileptic Medications Home Doses: levetiracetam 500-500, lamotrigine 150-150, zonisamide 300-400  Antiepileptic Medications Current Doses:  levetiracetam dc'd, lamotrigine dc'd, zonisamide 200-200    Exam: Blood pressure (!) 130/91, pulse 59, temperature 98.3  F (36.8  C), temperature source Oral, resp. rate 10, height 1.829 m (6'), weight 92.9 kg (204 lb 12.9 oz), SpO2 95 %.   General: NAD  Head: head wrap in place  Neuro: Alert and oriented. Speech fluent. Hearing intact to normal conversation. PERRL, EOM's intact, Strong shoulder shrug bilaterally. Strength 5/5 bilaterally. No drift or pronation. Intact FNF. Sensation intact to light touch.   Labs:     Latest Reference Range & Units 04/26/22 13:57   Keppra (Levetiracetam) Level 10 - 40 ug/mL 13 [1]   Lamotrigine 3.0 - 15.0 ug/mL 7.0 [2]   Zonisamide Level Quant 10 - 40 ug/mL 22 [3]     EEG: two seizure on 5/1/2022    Assessment and Plan: patient seen and discussed with Dr. Freeman   1.  Patient is a 26 year old, right-handed male with a history of depression, migraines, asperger syndrome and intractable focal epilepsy who underwent stereotactic EEG placement of bilateral depth electrodes for invasive video EEG monitoring on 4/25. Postop imaging notable for stable right convexity SDH. Two seizure on 5/1/2022 - arm extension and trembling with loss of awareness are focal seizures are this habitual seizures. We need more Video EEG data for seizures.     -continue video EEG   -divalproex stopped 4/26 as this medication patient stopped PTA last week and replaced with levetiracetam in  preparation for surgery   -PTA levetiracetam dc'd 4/27  -stopped lamotrigine 4/29/2022   -decrease zonisamide to 100 mg twice a day   -If patient having seizures and not returning to baseline, please page epilepsy attending: Dr. Freeman [id number].   - Seizure rescue may give levetiracetam  mg if he has seizure cluster (more than 2 seizure in 4 hours or large GTC, please note seizure with arm extension and trembling with loss of awareness are focal seizures)      Rosario Freeman MD   Total time: 15 minute was spent in the care of this patient. The patient agrees with the above mentioned plan of care. I answered all the patient's questions and addressed immediate concerns. More than 50% of time spent consisted of counseling and coordinating care, including discussion of the diagnostic significance of EEG findings, anti-seizure medication management, and planning for discharge home

## 2022-05-01 NOTE — PROGRESS NOTES
"0219 Called to room by bedside attendant. Pt noted with rigid tremors to upper extremities. Torso and lower extremities rigid. Rigidity lasted approximately 3 min. Eyes initially covered with washcloth while sleeping, removed and pt staring straight ahead. Called patient's name and asked if he remembered where he was, with no response noted. Repeated attempts to reorient patient where he was and patient started repeating \"what\" to everything said. Attempted to try and pull at lines, initially unable to redirect him verbally, not following commands. While updating neurocrit resident on phone patient repeatedly saying he did not have a seizure. Repeat neuro assessment done at 230. Patient oriented x4, following commands, equal strength noted, no pronator drift present, finger to nose and heel to shin test smooth/accurate, facial symmetry noted, midline tongue, able to shrug shoulders and turn head side to side, denies numbness/tingling.  brooke WHITAKER. Blood sugar 143 at 0246    0400 Called to room by bedside attendant. Patient noted with rigid tremors to upper and lower extremities lasting approx 2-3 min. Again noted to be staring straight ahead. No response to questions or reorientation. Neurosurg resident called to bedside. Pt disoriented to time and situation, repeating himself, again repeating \"what\", tracking and responding to some commands to lift arms, tremors noted to bilat upper extremities, pt swallowing frequently and tremor noted to jaw. 2 mg ativan IV given per verbal order from neuro surg resident. Pt then suddenly became agitated, PIV dislodged by patient, unable to redirect/reorient, attempting to sit forward in bed, continued tremoring. Additional 2 mg ativan IV given per verbal order from neuro surg resident. Mitts applied. Pt slowly calmed with verbal reassurance and redirection.   0430 patient remained disoriented to time, place, situation. Following some commands, LOPEZ. Easily agitated when " asking him to follow simple commands. Blood sugar 114.  0500 post seizure ROAR: R: N/a, O: oriented to self only, A: patient states he cannot remember anything precipitating seizure episode. R: Pt only able to read half of sentence.

## 2022-05-01 NOTE — PROGRESS NOTES
"ICU End of Shift Summary. See flowsheets for vital signs and detailed assessment.    Changes this shift: Possible seizure episode x2. Total of 4 mg ativan given. Events described in previous note. 0600 R: unable to remember word/object, O: pt disoriented to time A: states he \"feels different but can't tell what it is\", unaware that he had a possible seizure and cannot remember how he felt prior to episode. R: able to read entire sentence. PERRL, eyes conjugate, facial symmetry noted, disoriented to time, following commands, equal strength noted in bilat extremities, no drift observed, slow and deliberate movement noted in finger to nose test. Denies numbness, tingling, nausea. Vital signs stable. Voiding per urinal.     Plan: Continue to monitor for seizure activity.     "

## 2022-05-01 NOTE — PROGRESS NOTES
[facility], [place]   Neurosurgery Progress Note:    Assessment: Amrit Padilla is a 26 year old male with history of about 10 years of medically refractory epilepsy on PTA Depakote 1500mg daily, lamotrigine 150mg BID, Keppra 500 BID, and zonisamide 300mg-400mg.  He has two typical seizure types, typically at night while he is sleeping: they can occur GTCs or repetitive face/hand/arm jerking movements followed by unresponsiveness.  Past medical history is also notable for depression and Asperger syndrome.  He underwent stereotactic EEG placement of 16 electrodes with 2 ground electrodes on 4/25 by Dr. Adler.  He was admitted to  postoperatively, and postoperative head CT was notable for thin right convexity subdural hematoma, stable on repeat imaging.  vEEG was started on 4/26 AM, and PTA Depakote was discontinued.  Keppra was discontinued on 4/27. Lamictal was weaned 4/28, then discontinued 4/29.  Zonisamide was weaned 4/29.    Coding query clarification:  4/25 CT showing 6mm right subdural hematoma with 2mm midline shift: Brain compression    Plan:  - Frequent neuro checks  - PTA antiepileptic wean coordination by Epileptology   - PTA Depakote discontinued (4/26)   - PTA Keppra discontinued (4/27)   - PTA Lamictal weaned from 150-150 to 75-75 (4/28), then discontinued (4/29)   - PTA zonisamide weaned from 500 BID to 300 BID (4/29) to 200 twice daily on 4/30)  - EEG to begin today  - Continue 1:1 for seizure monitoring; seizure precautions  - Pain control  - HOB > 30 degrees  - Advance diet as tolerated  - Bowel regimen  - PRN antiemetics  - SCDs for DVT proph    -----------------------------------  Ciro Diaz MD  Neurosurgery Resident, PGY-2      Interval History/Subjective: At least 2 episodes of seizures overnight.  Requiring Ativan 2 mg x 2 for seizure control.  During third event, became agitated requiring several aides to hold him down.  Current exam remains  intact    Objective:   Temp:  [97.2  F (36.2  C)-98.7  F (37.1  C)] 98.1  F (36.7  C)  Pulse:  [55-87] 64  Resp:  [10-18] 10  BP: (102-143)/() 130/91  SpO2:  [90 %-100 %] 95 %  I/O last 3 completed shifts:  In: 870 [P.O.:560; I.V.:310]  Out: 1275 [Urine:1275]    Gen: Appears comfortable, NAD  Wound: headwrap in place  Neurologic:  - Alert & Oriented to person, place, time, and situation  - Follows commands briskly  - Speech fluent, spontaneous. No aphasia or dysarthria.  - No gaze preference. No apparent hemineglect.  - PERRL, EOMI  - Strong brow raise, eye closure, and smile  - Face symmetric with sensation intact to light touch  - Trapezii and sternocleidomastoid muscles 5/5 bilaterally  - No pronator drift     Del Tr Bi WE WF Gr   R 5 5 5 5 5 5   L 5 5 5 5 5 5    HF KE KF DF PF EHL   R 5 5 5 5 5 5   L 5 5 5 5 5 5     Reflexes symmetric    Sensation intact and symmetric to light touch throughout    LABS  Recent Labs   Lab Test 05/01/22  0430 04/30/22  0437 04/29/22  0433   WBC 7.8 7.5 7.4   HGB 14.1 13.7 13.4   MCV 94 95 94    270 250       Recent Labs   Lab Test 05/01/22  0430 05/01/22  0246 04/30/22  0437 04/29/22  0433     --  139 140   POTASSIUM 3.7  --  4.0 3.7   CHLORIDE 110*  --  107 110*   CO2 22  --  24 26   BUN 20  --  17 14   CR 0.91  --  0.89 0.83   ANIONGAP 7  --  8 4   ZAIDA 8.9  --  9.4 9.1   * 143* 90 114*       IMAGING  No results found for this or any previous visit (from the past 24 hour(s)).

## 2022-05-01 NOTE — CARE PLAN
Major shift events: No major events this shift. No noted seizure activity, standing at bedside to use urinal. States weakness is improving after seizures early AM. Complaining of headache, PRNs utilized. Vitals stable, afebrile.

## 2022-05-02 ENCOUNTER — APPOINTMENT (OUTPATIENT)
Dept: PHYSICAL THERAPY | Facility: CLINIC | Age: 27
End: 2022-05-02
Attending: NEUROLOGICAL SURGERY
Payer: COMMERCIAL

## 2022-05-02 ENCOUNTER — APPOINTMENT (OUTPATIENT)
Dept: NEUROLOGY | Facility: CLINIC | Age: 27
End: 2022-05-02
Attending: STUDENT IN AN ORGANIZED HEALTH CARE EDUCATION/TRAINING PROGRAM
Payer: COMMERCIAL

## 2022-05-02 LAB
ANION GAP SERPL CALCULATED.3IONS-SCNC: 7 MMOL/L (ref 3–14)
BUN SERPL-MCNC: 20 MG/DL (ref 7–30)
CALCIUM SERPL-MCNC: 9.2 MG/DL (ref 8.5–10.1)
CHLORIDE BLD-SCNC: 108 MMOL/L (ref 94–109)
CO2 SERPL-SCNC: 22 MMOL/L (ref 20–32)
CREAT SERPL-MCNC: 0.88 MG/DL (ref 0.66–1.25)
ERYTHROCYTE [DISTWIDTH] IN BLOOD BY AUTOMATED COUNT: 12.3 % (ref 10–15)
GFR SERPL CREATININE-BSD FRML MDRD: >90 ML/MIN/1.73M2
GLUCOSE BLD-MCNC: 97 MG/DL (ref 70–99)
HCT VFR BLD AUTO: 43.2 % (ref 40–53)
HGB BLD-MCNC: 14.7 G/DL (ref 13.3–17.7)
MCH RBC QN AUTO: 32 PG (ref 26.5–33)
MCHC RBC AUTO-ENTMCNC: 34 G/DL (ref 31.5–36.5)
MCV RBC AUTO: 94 FL (ref 78–100)
PLATELET # BLD AUTO: 306 10E3/UL (ref 150–450)
POTASSIUM BLD-SCNC: 3.8 MMOL/L (ref 3.4–5.3)
RBC # BLD AUTO: 4.59 10E6/UL (ref 4.4–5.9)
SARS-COV-2 RNA RESP QL NAA+PROBE: NEGATIVE
SODIUM SERPL-SCNC: 137 MMOL/L (ref 133–144)
WBC # BLD AUTO: 8.9 10E3/UL (ref 4–11)

## 2022-05-02 PROCEDURE — 97110 THERAPEUTIC EXERCISES: CPT | Mod: GP

## 2022-05-02 PROCEDURE — 85027 COMPLETE CBC AUTOMATED: CPT | Performed by: STUDENT IN AN ORGANIZED HEALTH CARE EDUCATION/TRAINING PROGRAM

## 2022-05-02 PROCEDURE — 250N000011 HC RX IP 250 OP 636: Performed by: STUDENT IN AN ORGANIZED HEALTH CARE EDUCATION/TRAINING PROGRAM

## 2022-05-02 PROCEDURE — 250N000013 HC RX MED GY IP 250 OP 250 PS 637: Performed by: PSYCHIATRY & NEUROLOGY

## 2022-05-02 PROCEDURE — 80051 ELECTROLYTE PANEL: CPT | Performed by: STUDENT IN AN ORGANIZED HEALTH CARE EDUCATION/TRAINING PROGRAM

## 2022-05-02 PROCEDURE — 200N000002 HC R&B ICU UMMC

## 2022-05-02 PROCEDURE — 99231 SBSQ HOSP IP/OBS SF/LOW 25: CPT | Mod: 25 | Performed by: PHYSICIAN ASSISTANT

## 2022-05-02 PROCEDURE — 95720 EEG PHY/QHP EA INCR W/VEEG: CPT | Mod: GC | Performed by: PSYCHIATRY & NEUROLOGY

## 2022-05-02 PROCEDURE — 95714 VEEG EA 12-26 HR UNMNTR: CPT

## 2022-05-02 PROCEDURE — 250N000013 HC RX MED GY IP 250 OP 250 PS 637: Performed by: STUDENT IN AN ORGANIZED HEALTH CARE EDUCATION/TRAINING PROGRAM

## 2022-05-02 PROCEDURE — U0003 INFECTIOUS AGENT DETECTION BY NUCLEIC ACID (DNA OR RNA); SEVERE ACUTE RESPIRATORY SYNDROME CORONAVIRUS 2 (SARS-COV-2) (CORONAVIRUS DISEASE [COVID-19]), AMPLIFIED PROBE TECHNIQUE, MAKING USE OF HIGH THROUGHPUT TECHNOLOGIES AS DESCRIBED BY CMS-2020-01-R: HCPCS | Performed by: STUDENT IN AN ORGANIZED HEALTH CARE EDUCATION/TRAINING PROGRAM

## 2022-05-02 PROCEDURE — 36415 COLL VENOUS BLD VENIPUNCTURE: CPT | Performed by: STUDENT IN AN ORGANIZED HEALTH CARE EDUCATION/TRAINING PROGRAM

## 2022-05-02 RX ORDER — HEPARIN SODIUM 5000 [USP'U]/.5ML
5000 INJECTION, SOLUTION INTRAVENOUS; SUBCUTANEOUS EVERY 8 HOURS
Status: DISCONTINUED | OUTPATIENT
Start: 2022-05-02 | End: 2022-05-05

## 2022-05-02 RX ADMIN — HYDROMORPHONE HYDROCHLORIDE 0.2 MG: 0.2 INJECTION, SOLUTION INTRAMUSCULAR; INTRAVENOUS; SUBCUTANEOUS at 01:08

## 2022-05-02 RX ADMIN — FLUOXETINE 80 MG: 20 CAPSULE ORAL at 08:03

## 2022-05-02 RX ADMIN — CEFAZOLIN SODIUM 2 G: 2 INJECTION, SOLUTION INTRAVENOUS at 15:59

## 2022-05-02 RX ADMIN — ACETAMINOPHEN 650 MG: 325 TABLET ORAL at 22:33

## 2022-05-02 RX ADMIN — ACETAMINOPHEN 650 MG: 325 TABLET ORAL at 06:08

## 2022-05-02 RX ADMIN — CEFAZOLIN SODIUM 2 G: 2 INJECTION, SOLUTION INTRAVENOUS at 08:03

## 2022-05-02 RX ADMIN — HYDROMORPHONE HYDROCHLORIDE 0.4 MG: 0.2 INJECTION, SOLUTION INTRAMUSCULAR; INTRAVENOUS; SUBCUTANEOUS at 06:09

## 2022-05-02 RX ADMIN — HYDROMORPHONE HYDROCHLORIDE 0.2 MG: 0.2 INJECTION, SOLUTION INTRAMUSCULAR; INTRAVENOUS; SUBCUTANEOUS at 01:33

## 2022-05-02 RX ADMIN — ZONISAMIDE 100 MG: 100 CAPSULE ORAL at 22:33

## 2022-05-02 RX ADMIN — HEPARIN SODIUM 5000 UNITS: 5000 INJECTION, SOLUTION INTRAVENOUS; SUBCUTANEOUS at 20:43

## 2022-05-02 RX ADMIN — ZONISAMIDE 100 MG: 100 CAPSULE ORAL at 08:03

## 2022-05-02 RX ADMIN — ACETAMINOPHEN 650 MG: 325 TABLET ORAL at 00:34

## 2022-05-02 RX ADMIN — HYDROMORPHONE HYDROCHLORIDE 0.4 MG: 0.2 INJECTION, SOLUTION INTRAMUSCULAR; INTRAVENOUS; SUBCUTANEOUS at 18:08

## 2022-05-02 RX ADMIN — HYDROMORPHONE HYDROCHLORIDE 0.4 MG: 0.2 INJECTION, SOLUTION INTRAMUSCULAR; INTRAVENOUS; SUBCUTANEOUS at 08:56

## 2022-05-02 RX ADMIN — OXYCODONE HYDROCHLORIDE 10 MG: 10 TABLET ORAL at 05:31

## 2022-05-02 ASSESSMENT — ACTIVITIES OF DAILY LIVING (ADL)
ADLS_ACUITY_SCORE: 9
ADLS_ACUITY_SCORE: 8
ADLS_ACUITY_SCORE: 8
ADLS_ACUITY_SCORE: 9
ADLS_ACUITY_SCORE: 8
ADLS_ACUITY_SCORE: 8
ADLS_ACUITY_SCORE: 9
ADLS_ACUITY_SCORE: 6
ADLS_ACUITY_SCORE: 9
ADLS_ACUITY_SCORE: 6
ADLS_ACUITY_SCORE: 9

## 2022-05-02 NOTE — PROGRESS NOTES
[facility], [place]   Neurosurgery Progress Note:    Assessment: Amrit Padilla is a 26 year old male with history of about 10 years of medically refractory epilepsy on PTA Depakote 1500mg daily, lamotrigine 150mg BID, Keppra 500 BID, and zonisamide 300mg-400mg.  He has two typical seizure types, typically at night while he is sleeping: they can occur GTCs or repetitive face/hand/arm jerking movements followed by unresponsiveness.  Past medical history is also notable for depression and Asperger syndrome.  He underwent stereotactic EEG placement of 16 electrodes with 2 ground electrodes on 4/25 by Dr. Adler.  He was admitted to  postoperatively, and postoperative head CT was notable for thin right convexity subdural hematoma, stable on repeat imaging.  vEEG was started on 4/26 AM, and PTA Depakote was discontinued.  Keppra was discontinued on 4/27. Lamictal was weaned 4/28, then discontinued 4/29.  Zonisamide was weaned 4/29.  Seizure activity noted 4/30 into 5/1, resolved with Ativan.    Coding query clarification:  4/25 CT showing 6mm right subdural hematoma with 2mm midline shift: Brain compression    Plan:  - Frequent neuro checks  - PTA antiepileptic wean coordination by Epileptology   - PTA Depakote discontinued (4/26)   - PTA Keppra discontinued (4/27)   - PTA Lamictal weaned from 150-150 to 75-75 (4/28), then discontinued (4/29)   - PTA zonisamide weaned from 500 BID to 300 BID (4/29) to 200 twice daily (4/30)  - Continuous vEEG  - Continue 1:1 for seizure monitoring; seizure precautions  - Pain control  - HOB > 30 degrees  - Advance diet as tolerated  - Bowel regimen  - PRN antiemetics  - SCDs for DVT proph    -----------------------------------  Jemal Rosenberg M.D.  Neurosurgery Resident, PGY-3    Please contact neurosurgery resident on call with questions.    Dial * * [phone], enter [phone] when prompted.       Interval History/Subjective: No acute events overnight. No  changes in seizure meds yesterday given 2 noted episodes of seizure activity over weekend.    Objective:   Temp:  [97.2  F (36.2  C)-98.7  F (37.1  C)] 98.7  F (37.1  C)  Pulse:  [55-86] 60  Resp:  [10-18] 10  BP: (107-143)/() 129/87  SpO2:  [90 %-100 %] 95 %  I/O last 3 completed shifts:  In: 950 [P.O.:640; I.V.:310]  Out: 1825 [Urine:1825]    Gen: Appears comfortable, NAD  Wound: headwrap in place  Neurologic:  - Alert & Oriented to person, place, time, and situation  - Follows commands briskly  - Speech fluent, spontaneous. No aphasia or dysarthria.  - No gaze preference. No apparent hemineglect.  - PERRL, EOMI  - Strong brow raise, eye closure, and smile  - Face symmetric with sensation intact to light touch  - Trapezii and sternocleidomastoid muscles 5/5 bilaterally  - No pronator drift     Del Tr Bi WE WF Gr   R 5 5 5 5 5 5   L 5 5 5 5 5 5    HF KE KF DF PF EHL   R 5 5 5 5 5 5   L 5 5 5 5 5 5     Reflexes symmetric    Sensation intact and symmetric to light touch throughout    LABS  Recent Labs   Lab Test 05/01/22  0430 04/30/22  0437 04/29/22  0433   WBC 7.8 7.5 7.4   HGB 14.1 13.7 13.4   MCV 94 95 94    270 250       Recent Labs   Lab Test 05/01/22  0430 05/01/22  0246 04/30/22  0437 04/29/22  0433     --  139 140   POTASSIUM 3.7  --  4.0 3.7   CHLORIDE 110*  --  107 110*   CO2 22  --  24 26   BUN 20  --  17 14   CR 0.91  --  0.89 0.83   ANIONGAP 7  --  8 4   ZAIDA 8.9  --  9.4 9.1   * 143* 90 114*       IMAGING  No results found for this or any previous visit (from the past 24 hour(s)).

## 2022-05-02 NOTE — PROGRESS NOTES
CLINICAL NUTRITION SERVICES    Reviewed nutrition risk factors due to LOS. Pt is tolerating diet, eating well per nursing documentation (100% meals so far this admit per RN flowsheets). Pt reports appetite is not so great since yesterday, but he relates that to pain which is now under control. He was eating well prior to this. No nutrition issues identified at this time - pt denies any questions at this time. RD will follow via rounds at this time, unless consulted.  No significant wt loss noted below.  Wt Readings from Last 20 Encounters:   05/02/22 93 kg (205 lb 0.4 oz)   02/14/22 90.3 kg (199 lb)   11/15/21 90.2 kg (198 lb 12.8 oz)   11/02/21 86.2 kg (190 lb)   03/02/21 91.2 kg (201 lb)   01/30/20 87.2 kg (192 lb 3.2 oz)   10/03/19 84.4 kg (186 lb)   07/08/19 88 kg (194 lb)   04/22/19 88 kg (194 lb)   01/24/19 85.7 kg (189 lb)   09/06/18 83.3 kg (183 lb 9.6 oz)   06/28/18 83.2 kg (183 lb 6.4 oz)   06/11/18 80.5 kg (177 lb 6.4 oz)   05/11/18 82.2 kg (181 lb 3.2 oz)   03/29/18 85.5 kg (188 lb 6.4 oz)   12/19/17 83 kg (183 lb)     Nirali Daugherty RD, LD  Pager: [phone]

## 2022-05-02 NOTE — PLAN OF CARE
Major Shift Events:  VSS. Constant HA.  IV dilaudid given prn. Pt encouraged to let staff know if experiencing increased pain. Up with sba. Noted to have a 10 sec episode of insensible words/ not responding to staff. Button pushed and MD notified. Voiding spontaneously. Regular diet. Able to make needs known. Bedside attendant present at bedside.   Plan: Continue with current plan of care. Notify MD with acute changes.   For vital signs and complete assessments, please see documentation flowsheets.           Goal Outcome Evaluation:

## 2022-05-02 NOTE — PLAN OF CARE
Major Shift Events:  No acute events overnight. Consistent headache throughout shift treated with PRN Tylenol, Oxycodone, and Dilaudid. No seizures noted overnight. Pt able to ambulate to bathroom with 1 assist.   Plan: Continue with Grid monitoring, and adequate pain control. Attendant at beside.  For vital signs and complete assessments, please see documentation flowsheets.

## 2022-05-02 NOTE — PROGRESS NOTES
Major Shift Events:  No major eents or nuro changes for my shift [id number]  Plan: continue current plan  For vital signs and complete assessments, please see documentation flowsheets. '

## 2022-05-03 ENCOUNTER — APPOINTMENT (OUTPATIENT)
Dept: NEUROLOGY | Facility: CLINIC | Age: 27
End: 2022-05-03
Attending: STUDENT IN AN ORGANIZED HEALTH CARE EDUCATION/TRAINING PROGRAM
Payer: COMMERCIAL

## 2022-05-03 LAB
ABO/RH(D): NORMAL
ANION GAP SERPL CALCULATED.3IONS-SCNC: 8 MMOL/L (ref 3–14)
ANTIBODY SCREEN: NEGATIVE
BUN SERPL-MCNC: 21 MG/DL (ref 7–30)
CALCIUM SERPL-MCNC: 9.8 MG/DL (ref 8.5–10.1)
CHLORIDE BLD-SCNC: 107 MMOL/L (ref 94–109)
CO2 SERPL-SCNC: 21 MMOL/L (ref 20–32)
CREAT SERPL-MCNC: 0.86 MG/DL (ref 0.66–1.25)
ERYTHROCYTE [DISTWIDTH] IN BLOOD BY AUTOMATED COUNT: 12.1 % (ref 10–15)
GFR SERPL CREATININE-BSD FRML MDRD: >90 ML/MIN/1.73M2
GLUCOSE BLD-MCNC: 99 MG/DL (ref 70–99)
HCT VFR BLD AUTO: 43.3 % (ref 40–53)
HGB BLD-MCNC: 14.6 G/DL (ref 13.3–17.7)
MAGNESIUM SERPL-MCNC: 2 MG/DL (ref 1.6–2.3)
MCH RBC QN AUTO: 31.4 PG (ref 26.5–33)
MCHC RBC AUTO-ENTMCNC: 33.7 G/DL (ref 31.5–36.5)
MCV RBC AUTO: 93 FL (ref 78–100)
PHOSPHATE SERPL-MCNC: 3.9 MG/DL (ref 2.5–4.5)
PLATELET # BLD AUTO: 347 10E3/UL (ref 150–450)
POTASSIUM BLD-SCNC: 3.7 MMOL/L (ref 3.4–5.3)
RBC # BLD AUTO: 4.65 10E6/UL (ref 4.4–5.9)
SODIUM SERPL-SCNC: 136 MMOL/L (ref 133–144)
SPECIMEN EXPIRATION DATE: NORMAL
WBC # BLD AUTO: 8.7 10E3/UL (ref 4–11)

## 2022-05-03 PROCEDURE — 250N000013 HC RX MED GY IP 250 OP 250 PS 637: Performed by: PSYCHIATRY & NEUROLOGY

## 2022-05-03 PROCEDURE — 95720 EEG PHY/QHP EA INCR W/VEEG: CPT | Performed by: PSYCHIATRY & NEUROLOGY

## 2022-05-03 PROCEDURE — 95714 VEEG EA 12-26 HR UNMNTR: CPT

## 2022-05-03 PROCEDURE — 250N000011 HC RX IP 250 OP 636: Performed by: STUDENT IN AN ORGANIZED HEALTH CARE EDUCATION/TRAINING PROGRAM

## 2022-05-03 PROCEDURE — 85014 HEMATOCRIT: CPT | Performed by: STUDENT IN AN ORGANIZED HEALTH CARE EDUCATION/TRAINING PROGRAM

## 2022-05-03 PROCEDURE — 86901 BLOOD TYPING SEROLOGIC RH(D): CPT | Performed by: STUDENT IN AN ORGANIZED HEALTH CARE EDUCATION/TRAINING PROGRAM

## 2022-05-03 PROCEDURE — 250N000013 HC RX MED GY IP 250 OP 250 PS 637: Performed by: STUDENT IN AN ORGANIZED HEALTH CARE EDUCATION/TRAINING PROGRAM

## 2022-05-03 PROCEDURE — 86850 RBC ANTIBODY SCREEN: CPT | Performed by: STUDENT IN AN ORGANIZED HEALTH CARE EDUCATION/TRAINING PROGRAM

## 2022-05-03 PROCEDURE — 84100 ASSAY OF PHOSPHORUS: CPT | Performed by: NEUROLOGICAL SURGERY

## 2022-05-03 PROCEDURE — 83735 ASSAY OF MAGNESIUM: CPT | Performed by: NEUROLOGICAL SURGERY

## 2022-05-03 PROCEDURE — 200N000002 HC R&B ICU UMMC

## 2022-05-03 PROCEDURE — 36415 COLL VENOUS BLD VENIPUNCTURE: CPT | Performed by: STUDENT IN AN ORGANIZED HEALTH CARE EDUCATION/TRAINING PROGRAM

## 2022-05-03 PROCEDURE — 99231 SBSQ HOSP IP/OBS SF/LOW 25: CPT | Mod: 25 | Performed by: PHYSICIAN ASSISTANT

## 2022-05-03 PROCEDURE — 80048 BASIC METABOLIC PNL TOTAL CA: CPT | Performed by: STUDENT IN AN ORGANIZED HEALTH CARE EDUCATION/TRAINING PROGRAM

## 2022-05-03 RX ORDER — CYCLOBENZAPRINE HCL 5 MG
5 TABLET ORAL EVERY 8 HOURS PRN
Status: DISCONTINUED | OUTPATIENT
Start: 2022-05-03 | End: 2022-05-06 | Stop reason: HOSPADM

## 2022-05-03 RX ADMIN — CEFAZOLIN SODIUM 2 G: 2 INJECTION, SOLUTION INTRAVENOUS at 09:11

## 2022-05-03 RX ADMIN — HYDROMORPHONE HYDROCHLORIDE 0.4 MG: 0.2 INJECTION, SOLUTION INTRAMUSCULAR; INTRAVENOUS; SUBCUTANEOUS at 00:35

## 2022-05-03 RX ADMIN — CEFAZOLIN SODIUM 2 G: 2 INJECTION, SOLUTION INTRAVENOUS at 16:08

## 2022-05-03 RX ADMIN — CYCLOBENZAPRINE HYDROCHLORIDE 5 MG: 5 TABLET, FILM COATED ORAL at 09:10

## 2022-05-03 RX ADMIN — OXYCODONE HYDROCHLORIDE 10 MG: 10 TABLET ORAL at 03:05

## 2022-05-03 RX ADMIN — ZONISAMIDE 100 MG: 100 CAPSULE ORAL at 21:56

## 2022-05-03 RX ADMIN — FLUOXETINE 80 MG: 20 CAPSULE ORAL at 09:22

## 2022-05-03 RX ADMIN — HEPARIN SODIUM 5000 UNITS: 5000 INJECTION, SOLUTION INTRAVENOUS; SUBCUTANEOUS at 20:48

## 2022-05-03 RX ADMIN — ZONISAMIDE 100 MG: 100 CAPSULE ORAL at 09:10

## 2022-05-03 RX ADMIN — HEPARIN SODIUM 5000 UNITS: 5000 INJECTION, SOLUTION INTRAVENOUS; SUBCUTANEOUS at 12:17

## 2022-05-03 RX ADMIN — CEFAZOLIN SODIUM 2 G: 2 INJECTION, SOLUTION INTRAVENOUS at 00:35

## 2022-05-03 RX ADMIN — ACETAMINOPHEN 650 MG: 325 TABLET ORAL at 16:43

## 2022-05-03 RX ADMIN — CEFAZOLIN SODIUM 2 G: 2 INJECTION, SOLUTION INTRAVENOUS at 23:51

## 2022-05-03 RX ADMIN — HYDROMORPHONE HYDROCHLORIDE 0.4 MG: 0.2 INJECTION, SOLUTION INTRAMUSCULAR; INTRAVENOUS; SUBCUTANEOUS at 16:43

## 2022-05-03 RX ADMIN — HEPARIN SODIUM 5000 UNITS: 5000 INJECTION, SOLUTION INTRAVENOUS; SUBCUTANEOUS at 03:13

## 2022-05-03 RX ADMIN — CYCLOBENZAPRINE HYDROCHLORIDE 5 MG: 5 TABLET, FILM COATED ORAL at 22:06

## 2022-05-03 RX ADMIN — HYDROMORPHONE HYDROCHLORIDE 0.4 MG: 0.2 INJECTION, SOLUTION INTRAMUSCULAR; INTRAVENOUS; SUBCUTANEOUS at 04:39

## 2022-05-03 ASSESSMENT — ACTIVITIES OF DAILY LIVING (ADL)
ADLS_ACUITY_SCORE: 6

## 2022-05-03 NOTE — PLAN OF CARE
Major Shift Events  Overnight pt remained in stable condition w/ issues of pain management. Pt describes a constant HA overnight that has focus behind R-Eye, but is also generalized. Attempted to treat HA w/ tylenol 650 mg PRN, Oxycodone 10 mg PRN, and Dilaudid 0.4 mg. Of these interventions only dilaudid saw good effect reducing the pain from 8/10 -> 2-3/10. MD informed of these findings and added on flexeril to treat any muscle related pain from procedure.    No witnessed or reported (sitter) seizure activity overnight.    Neuro: A&Ox4, following all commands, PERRLA, MSK: 5/5, sensation intact, baseline tremors, standby assist  Cv: SR (60-70's), SYS BP < 170 w/o medications, afebrile, pulses +2  Resp: RA, LS: clear bilaterally, SpO2 > 92 w/o difficulty  GI/: Voids independently, BM -, BS +, Flatus +, regular diet   Skin: Head incision site BARTOLOME d/t EEG head wrap     Drips: None  Sedation: None     Plan: Follow POC. Monitor closely, notify MD of acute changes.  For vital signs and complete assessments, please see documentation flowsheets.

## 2022-05-03 NOTE — PROGRESS NOTES
[facility], [place]   Neurosurgery Progress Note:    Assessment: Amrit Padilla is a 26 year old male with history of about 10 years of medically refractory epilepsy on PTA Depakote 1500mg daily, lamotrigine 150mg BID, Keppra 500 BID, and zonisamide 300mg-400mg.  He has two typical seizure types, typically at night while he is sleeping: they can occur GTCs or repetitive face/hand/arm jerking movements followed by unresponsiveness.  Past medical history is also notable for depression and Asperger syndrome.  He underwent stereotactic EEG placement of 16 electrodes with 2 ground electrodes on 4/25 by Dr. Adler.  He was admitted to  postoperatively, and postoperative head CT was notable for thin right convexity subdural hematoma, stable on repeat imaging.  vEEG was started on 4/26 AM, and PTA Depakote was discontinued.  Keppra was discontinued on 4/27. Lamictal was weaned 4/28, then discontinued 4/29.  Zonisamide was weaned 4/29.  Seizure activity noted 4/30 into 5/1, resolved with Ativan.    Coding query clarification:  4/25 CT showing 6mm right subdural hematoma with 2mm midline shift: Brain compression    Plan:  - Frequent neuro checks  - PTA antiepileptic wean coordination by Epileptology   - PTA Depakote discontinued (4/26)   - PTA Keppra discontinued (4/27)   - PTA Lamictal weaned from 150-150 to 75-75 (4/28), then discontinued (4/29)   - PTA zonisamide weaned from 500 BID to 300 BID (4/29) to 200 twice daily (4/30) to 100 twice daily (5/2)  - Continuous vEEG  - Continue 1:1 for seizure monitoring; seizure precautions  - Pain control  - HOB > 30 degrees  - Advance diet as tolerated  - Bowel regimen  - PRN antiemetics  - SCDs for DVT proph    -----------------------------------  Jemal Rosenberg M.D.  Neurosurgery Resident, PGY-3    Please contact neurosurgery resident on call with questions.    Dial * * [phone], enter [phone] when prompted.       Interval History/Subjective: No new  seizure activity.    Objective:   Temp:  [97.3  F (36.3  C)-98.5  F (36.9  C)] 98.4  F (36.9  C)  Pulse:  [47-71] 66  Resp:  [11-16] 16  BP: (104-144)/() 133/95  SpO2:  [91 %-97 %] 94 %  I/O last 3 completed shifts:  In: 620 [P.O.:320; I.V.:300]  Out: 950 [Urine:950]    Gen: Appears comfortable, NAD  Wound: headwrap in place  Neurologic:  - Alert & Oriented to person, place, time, and situation  - Follows commands briskly  - Speech fluent, spontaneous. No aphasia or dysarthria.  - No gaze preference. No apparent hemineglect.  - PERRL, EOMI  - Strong brow raise, eye closure, and smile  - Face symmetric with sensation intact to light touch  - Trapezii and sternocleidomastoid muscles 5/5 bilaterally  - No pronator drift     Del Tr Bi WE WF Gr   R 5 5 5 5 5 5   L 5 5 5 5 5 5    HF KE KF DF PF EHL   R 5 5 5 5 5 5   L 5 5 5 5 5 5     Reflexes symmetric    Sensation intact and symmetric to light touch throughout    LABS  Recent Labs   Lab Test 05/02/22  0445 05/01/22  0430 04/30/22  0437   WBC 8.9 7.8 7.5   HGB 14.7 14.1 13.7   MCV 94 94 95    293 270       Recent Labs   Lab Test 05/02/22  0445 05/01/22  0430 05/01/22  0246 04/30/22  0437    139  --  139   POTASSIUM 3.8 3.7  --  4.0   CHLORIDE 108 110*  --  107   CO2 22 22  --  24   BUN 20 20  --  17   CR 0.88 0.91  --  0.89   ANIONGAP 7 7  --  8   ZAIDA 9.2 8.9  --  9.4   GLC 97 114* 143* 90       IMAGING  No results found for this or any previous visit (from the past 24 hour(s)).

## 2022-05-04 ENCOUNTER — APPOINTMENT (OUTPATIENT)
Dept: NEUROLOGY | Facility: CLINIC | Age: 27
End: 2022-05-04
Attending: STUDENT IN AN ORGANIZED HEALTH CARE EDUCATION/TRAINING PROGRAM
Payer: COMMERCIAL

## 2022-05-04 ENCOUNTER — TEAM CONFERENCE (OUTPATIENT)
Dept: NEUROLOGY | Facility: CLINIC | Age: 27
End: 2022-05-04
Payer: COMMERCIAL

## 2022-05-04 LAB
ANION GAP SERPL CALCULATED.3IONS-SCNC: 8 MMOL/L (ref 3–14)
BUN SERPL-MCNC: 21 MG/DL (ref 7–30)
CALCIUM SERPL-MCNC: 9.7 MG/DL (ref 8.5–10.1)
CHLORIDE BLD-SCNC: 108 MMOL/L (ref 94–109)
CO2 SERPL-SCNC: 22 MMOL/L (ref 20–32)
CREAT SERPL-MCNC: 0.78 MG/DL (ref 0.66–1.25)
ERYTHROCYTE [DISTWIDTH] IN BLOOD BY AUTOMATED COUNT: 12 % (ref 10–15)
GFR SERPL CREATININE-BSD FRML MDRD: >90 ML/MIN/1.73M2
GLUCOSE BLD-MCNC: 106 MG/DL (ref 70–99)
GLUCOSE BLDC GLUCOMTR-MCNC: 114 MG/DL (ref 70–99)
HCT VFR BLD AUTO: 41.8 % (ref 40–53)
HGB BLD-MCNC: 14.5 G/DL (ref 13.3–17.7)
MAGNESIUM SERPL-MCNC: 2.1 MG/DL (ref 1.6–2.3)
MCH RBC QN AUTO: 32.1 PG (ref 26.5–33)
MCHC RBC AUTO-ENTMCNC: 34.7 G/DL (ref 31.5–36.5)
MCV RBC AUTO: 93 FL (ref 78–100)
PHOSPHATE SERPL-MCNC: 4.2 MG/DL (ref 2.5–4.5)
PLATELET # BLD AUTO: 337 10E3/UL (ref 150–450)
POTASSIUM BLD-SCNC: 3.6 MMOL/L (ref 3.4–5.3)
RBC # BLD AUTO: 4.52 10E6/UL (ref 4.4–5.9)
SODIUM SERPL-SCNC: 138 MMOL/L (ref 133–144)
WBC # BLD AUTO: 9.1 10E3/UL (ref 4–11)

## 2022-05-04 PROCEDURE — 84100 ASSAY OF PHOSPHORUS: CPT | Performed by: NEUROLOGICAL SURGERY

## 2022-05-04 PROCEDURE — 95720 EEG PHY/QHP EA INCR W/VEEG: CPT | Mod: GC | Performed by: PSYCHIATRY & NEUROLOGY

## 2022-05-04 PROCEDURE — 250N000013 HC RX MED GY IP 250 OP 250 PS 637: Performed by: NEUROLOGICAL SURGERY

## 2022-05-04 PROCEDURE — 250N000013 HC RX MED GY IP 250 OP 250 PS 637: Performed by: PSYCHIATRY & NEUROLOGY

## 2022-05-04 PROCEDURE — 95714 VEEG EA 12-26 HR UNMNTR: CPT

## 2022-05-04 PROCEDURE — 83735 ASSAY OF MAGNESIUM: CPT | Performed by: NEUROLOGICAL SURGERY

## 2022-05-04 PROCEDURE — 80048 BASIC METABOLIC PNL TOTAL CA: CPT | Performed by: STUDENT IN AN ORGANIZED HEALTH CARE EDUCATION/TRAINING PROGRAM

## 2022-05-04 PROCEDURE — 200N000002 HC R&B ICU UMMC

## 2022-05-04 PROCEDURE — 85014 HEMATOCRIT: CPT | Performed by: STUDENT IN AN ORGANIZED HEALTH CARE EDUCATION/TRAINING PROGRAM

## 2022-05-04 PROCEDURE — 36415 COLL VENOUS BLD VENIPUNCTURE: CPT | Performed by: STUDENT IN AN ORGANIZED HEALTH CARE EDUCATION/TRAINING PROGRAM

## 2022-05-04 PROCEDURE — 250N000013 HC RX MED GY IP 250 OP 250 PS 637: Performed by: STUDENT IN AN ORGANIZED HEALTH CARE EDUCATION/TRAINING PROGRAM

## 2022-05-04 PROCEDURE — 250N000011 HC RX IP 250 OP 636: Performed by: STUDENT IN AN ORGANIZED HEALTH CARE EDUCATION/TRAINING PROGRAM

## 2022-05-04 PROCEDURE — 99231 SBSQ HOSP IP/OBS SF/LOW 25: CPT | Mod: 25 | Performed by: PHYSICIAN ASSISTANT

## 2022-05-04 RX ORDER — POLYETHYLENE GLYCOL 3350 17 G/17G
17 POWDER, FOR SOLUTION ORAL DAILY PRN
Status: DISCONTINUED | OUTPATIENT
Start: 2022-05-04 | End: 2022-05-06 | Stop reason: HOSPADM

## 2022-05-04 RX ORDER — POTASSIUM CHLORIDE 750 MG/1
20 TABLET, EXTENDED RELEASE ORAL ONCE
Status: COMPLETED | OUTPATIENT
Start: 2022-05-04 | End: 2022-05-04

## 2022-05-04 RX ORDER — AMOXICILLIN 250 MG
1 CAPSULE ORAL 2 TIMES DAILY PRN
Status: DISCONTINUED | OUTPATIENT
Start: 2022-05-04 | End: 2022-05-04

## 2022-05-04 RX ADMIN — POTASSIUM CHLORIDE 20 MEQ: 750 TABLET, EXTENDED RELEASE ORAL at 05:42

## 2022-05-04 RX ADMIN — CEFAZOLIN SODIUM 2 G: 2 INJECTION, SOLUTION INTRAVENOUS at 23:49

## 2022-05-04 RX ADMIN — HEPARIN SODIUM 5000 UNITS: 5000 INJECTION, SOLUTION INTRAVENOUS; SUBCUTANEOUS at 11:57

## 2022-05-04 RX ADMIN — HEPARIN SODIUM 5000 UNITS: 5000 INJECTION, SOLUTION INTRAVENOUS; SUBCUTANEOUS at 04:35

## 2022-05-04 RX ADMIN — ACETAMINOPHEN 650 MG: 325 TABLET ORAL at 01:09

## 2022-05-04 RX ADMIN — LORAZEPAM 2 MG: 2 INJECTION INTRAMUSCULAR; INTRAVENOUS at 09:17

## 2022-05-04 RX ADMIN — HYDROMORPHONE HYDROCHLORIDE 0.2 MG: 0.2 INJECTION, SOLUTION INTRAMUSCULAR; INTRAVENOUS; SUBCUTANEOUS at 08:49

## 2022-05-04 RX ADMIN — HYDROMORPHONE HYDROCHLORIDE 0.2 MG: 0.2 INJECTION, SOLUTION INTRAMUSCULAR; INTRAVENOUS; SUBCUTANEOUS at 15:38

## 2022-05-04 RX ADMIN — ACETAMINOPHEN 650 MG: 325 TABLET ORAL at 20:26

## 2022-05-04 RX ADMIN — HEPARIN SODIUM 5000 UNITS: 5000 INJECTION, SOLUTION INTRAVENOUS; SUBCUTANEOUS at 20:24

## 2022-05-04 RX ADMIN — FLUOXETINE 80 MG: 20 CAPSULE ORAL at 08:49

## 2022-05-04 RX ADMIN — HEPARIN SODIUM 5000 UNITS: 5000 INJECTION, SOLUTION INTRAVENOUS; SUBCUTANEOUS at 04:39

## 2022-05-04 RX ADMIN — ZONISAMIDE 100 MG: 100 CAPSULE ORAL at 21:38

## 2022-05-04 RX ADMIN — CEFAZOLIN SODIUM 2 G: 2 INJECTION, SOLUTION INTRAVENOUS at 08:43

## 2022-05-04 RX ADMIN — CEFAZOLIN SODIUM 2 G: 2 INJECTION, SOLUTION INTRAVENOUS at 16:36

## 2022-05-04 RX ADMIN — HYDROMORPHONE HYDROCHLORIDE 0.2 MG: 0.2 INJECTION, SOLUTION INTRAMUSCULAR; INTRAVENOUS; SUBCUTANEOUS at 02:21

## 2022-05-04 RX ADMIN — ZONISAMIDE 100 MG: 100 CAPSULE ORAL at 08:48

## 2022-05-04 ASSESSMENT — ACTIVITIES OF DAILY LIVING (ADL)
ADLS_ACUITY_SCORE: 6
ADLS_ACUITY_SCORE: 6
ADLS_ACUITY_SCORE: 8
ADLS_ACUITY_SCORE: 6
ADLS_ACUITY_SCORE: 8

## 2022-05-04 ASSESSMENT — VISUAL ACUITY: OU: BASELINE

## 2022-05-04 NOTE — PROGRESS NOTES
[facility], [place]   Epilepsy Service Daily Note      Interval History:   Target seizure at 0404 and again at 0908. He got ativan 2mg at 0917. Feeling tired and denies pain.     Review of System:   Tired. Denies pain.  No nausea, No vomiting, no dizziness, no chest pain.     Medications:   Antiepileptic Medications Home Doses: levetiracetam 500-500, lamotrigine 150-150, zonisamide 300-400  Antiepileptic Medications Current Doses:  levetiracetam dc'd, lamotrigine dc'd, zonisamide 100-100    Exam: Blood pressure (!) 134/97, pulse 79, temperature 99.4  F (37.4  C), temperature source Oral, resp. rate 12, height 1.829 m (6'), weight 93 kg (205 lb 0.4 oz), SpO2 95 %.   General: NAD  Head: head wrap in place  Extremities: no LE tenderness   Neuro: Sleeping, wakes to voice. Speech fluent. Hearing intact to normal conversation. PERRL, EOM's intact. Face symmetric, tongue midline. Moves all limbs freely.   Labs:     Latest Reference Range & Units 22 13:57   Keppra (Levetiracetam) Level 10 - 40 ug/mL 13 [1]   Lamotrigine 3.0 - 15.0 ug/mL 7.0 [2]   Zonisamide Level Quant 10 - 40 ug/mL 22 [3]     EE seizures on 2022, 2 seizures on     Assessment and Plan: patient seen and discussed with Dr. Ernst Ríos.  Patient is a 26 year old, right-handed male with a history of depression, migraines, asperger syndrome and intractable focal epilepsy who underwent stereotactic EEG placement of bilateral depth electrodes for invasive video EEG monitoring on . Postop imaging notable for stable right convexity SDH. Two seizures on 2022 and two seizures on  for a total of 4 seizures thus far.     -continue video EEG   -divalproex stopped  as this medication patient stopped PTA last week and replaced with levetiracetam in preparation for surgery   -PTA levetiracetam dc'd   -PTA lamotrigine dc'd   -continue lowered zonisamide at 100-100  -If patient having seizures and not returning  to baseline, please page epilepsy attending: Dr. Dukes [id number]  -Ativan 2 mg IV per seizure protocol (2 focal seizures in 4 hours or any GTC)      Annia Iglesias, PA-C  [id number]    Total time: 15 minute was spent in the care of this patient. The patient agrees with the above mentioned plan of care. I answered all the patient's questions and addressed immediate concerns. More than 50% of time spent consisted of counseling and coordinating care, including discussion of the diagnostic significance of EEG findings, anti-seizure medication management, and planning for discharge home

## 2022-05-04 NOTE — PLAN OF CARE
1 episode of seizure activity occurred at 0404. Neurosurg notified. Pt stable. A&Ox4, pupils ERR, follows commands in all extremities with baseline tremors in upper R and L. Sinus rhythm, afebrile. On room air. LS clear bilaterally. Voiding independently. 1 PRN Flexaril, Tylenol, and Dilaudid given this shift for generalized headache. Regular diet. 1 BM this shift. Head cover in place. Potassium placed per protocol.     Annia Owusu RN on 5/4/2022 at 6:35 AM

## 2022-05-04 NOTE — PROGRESS NOTES
[facility], [place]   Neurosurgery Progress Note:    Assessment: Amrit Padilla is a 26 year old male with history of about 10 years of medically refractory epilepsy on PTA Depakote 1500mg daily, lamotrigine 150mg BID, Keppra 500 BID, and zonisamide 300mg-400mg.  He has two typical seizure types, typically at night while he is sleeping: they can occur GTCs or repetitive face/hand/arm jerking movements followed by unresponsiveness.  Past medical history is also notable for depression and Asperger syndrome.  He underwent stereotactic EEG placement of 16 electrodes with 2 ground electrodes on 4/25 by Dr. Adler.  He was admitted to  postoperatively, and postoperative head CT was notable for thin right convexity subdural hematoma, stable on repeat imaging.  vEEG was started on 4/26 AM, and PTA Depakote was discontinued.  Keppra was discontinued on 4/27. Lamictal was weaned 4/28, then discontinued 4/29.  Zonisamide was weaned 4/29, 4/30, and 5/2.  Seizure activity noted 4/30 into 5/1, resolved with Ativan.    Coding query clarification:  4/25 CT showing 6mm right subdural hematoma with 2mm midline shift: Brain compression    Plan:  - Frequent neuro checks  - PTA antiepileptic wean coordination by Epileptology   - PTA Depakote discontinued (4/26)   - PTA Keppra discontinued (4/27)   - PTA Lamictal weaned from 150-150 to 75-75 (4/28), then discontinued (4/29)   - PTA zonisamide weaned from 500 BID to 300 BID (4/29) to 200 twice daily (4/30) to 100 twice daily (5/2)  - Continuous vEEG  - Continue 1:1 for seizure monitoring; seizure precautions  - Pain control  - HOB > 30 degrees  - Advance diet as tolerated  - Bowel regimen  - PRN antiemetics  - SCDs for DVT proph    -----------------------------------  Jemal Rosenberg M.D.  Neurosurgery Resident, PGY-3    Please contact neurosurgery resident on call with questions.    Dial * * *[phone], enter [phone] when prompted.       Interval  History/Subjective: Generalized clinical seizure activity witnessed around 0430 this AM; spontaneously resolved without Ativan.  No changes in zonisamide yesterday.    Objective:   Temp:  [97.1  F (36.2  C)-99.1  F (37.3  C)] 98.6  F (37  C)  Pulse:  [57-90] 63  Resp:  [14-16] 16  BP: (118-142)/() 139/90  SpO2:  [93 %-96 %] 94 %  I/O last 3 completed shifts:  In: 1070 [P.O.:770; I.V.:300]  Out: 700 [Urine:700]    Gen: complaining of headache, NAD  Wound: headwrap in place  Neurologic:  - Alert & Oriented to person, place, time, and situation  - Follows commands briskly  - Speech fluent, spontaneous. No aphasia or dysarthria.  - No gaze preference. No apparent hemineglect.  - PERRL, EOMI  - Strong brow raise, eye closure, and smile  - Face symmetric with sensation intact to light touch  - Trapezii and sternocleidomastoid muscles 5/5 bilaterally  - No pronator drift     Del Tr Bi WE WF Gr   R 5 5 5 5 5 5   L 5 5 5 5 5 5    HF KE KF DF PF EHL   R 5 5 5 5 5 5   L 5 5 5 5 5 5     Reflexes symmetric    Sensation intact and symmetric to light touch throughout    LABS  Recent Labs   Lab Test 05/03/22  0435 05/02/22  0445 05/01/22  0430   WBC 8.7 8.9 7.8   HGB 14.6 14.7 14.1   MCV 93 94 94    306 293       Recent Labs   Lab Test 05/03/22  0426 05/02/22  0445 05/01/22  0430    137 139   POTASSIUM 3.7 3.8 3.7   CHLORIDE 107 108 110*   CO2 21 22 22   BUN 21 20 20   CR 0.86 0.88 0.91   ANIONGAP 8 7 7   ZAIDA 9.8 9.2 8.9   GLC 99 97 114*       IMAGING  No results found for this or any previous visit (from the past 24 hour(s)).

## 2022-05-04 NOTE — PLAN OF CARE
Major Shift Events:  Seizures x2 this AM.  Very lethargic post ictal, started to return to baseline neuros at 12:00 assessment, and neuros intact at 16:00. VSS on RA, afebrile. Voiding in urinal, no BM this shift, regular diet. Mom updated via phone. Dilaudid x2 for headache.     Plan: Monitor for seizure activity.    For vital signs and complete assessments, please see documentation flowsheets.       Goal Outcome Evaluation:    Plan of Care Reviewed With: patient

## 2022-05-04 NOTE — PLAN OF CARE
No seizure events noted. Hemodynamically stable. Pain control with dilaudid/tylenol/flexeril PRN. Plan for continued Q4 neuro checks overnight, Grid EEG monitoring w/1:1 attendant.

## 2022-05-05 ENCOUNTER — ANESTHESIA (OUTPATIENT)
Dept: SURGERY | Facility: CLINIC | Age: 27
End: 2022-05-05
Payer: COMMERCIAL

## 2022-05-05 ENCOUNTER — ANESTHESIA EVENT (OUTPATIENT)
Dept: SURGERY | Facility: CLINIC | Age: 27
End: 2022-05-05
Payer: COMMERCIAL

## 2022-05-05 ENCOUNTER — APPOINTMENT (OUTPATIENT)
Dept: NEUROLOGY | Facility: CLINIC | Age: 27
End: 2022-05-05
Attending: STUDENT IN AN ORGANIZED HEALTH CARE EDUCATION/TRAINING PROGRAM
Payer: COMMERCIAL

## 2022-05-05 LAB
ABO/RH(D): NORMAL
ANION GAP SERPL CALCULATED.3IONS-SCNC: 7 MMOL/L (ref 3–14)
ANTIBODY SCREEN: NEGATIVE
APTT PPP: 36 SECONDS (ref 22–38)
BUN SERPL-MCNC: 20 MG/DL (ref 7–30)
CALCIUM SERPL-MCNC: 9.2 MG/DL (ref 8.5–10.1)
CHLORIDE BLD-SCNC: 108 MMOL/L (ref 94–109)
CO2 SERPL-SCNC: 22 MMOL/L (ref 20–32)
CREAT SERPL-MCNC: 0.79 MG/DL (ref 0.66–1.25)
ERYTHROCYTE [DISTWIDTH] IN BLOOD BY AUTOMATED COUNT: 12 % (ref 10–15)
GFR SERPL CREATININE-BSD FRML MDRD: >90 ML/MIN/1.73M2
GLUCOSE BLD-MCNC: 97 MG/DL (ref 70–99)
HCT VFR BLD AUTO: 42.2 % (ref 40–53)
HGB BLD-MCNC: 14.4 G/DL (ref 13.3–17.7)
INR PPP: 1.07 (ref 0.85–1.15)
MAGNESIUM SERPL-MCNC: 2.1 MG/DL (ref 1.6–2.3)
MCH RBC QN AUTO: 31.4 PG (ref 26.5–33)
MCHC RBC AUTO-ENTMCNC: 34.1 G/DL (ref 31.5–36.5)
MCV RBC AUTO: 92 FL (ref 78–100)
PHOSPHATE SERPL-MCNC: 3.8 MG/DL (ref 2.5–4.5)
PLATELET # BLD AUTO: 370 10E3/UL (ref 150–450)
POTASSIUM BLD-SCNC: 3.8 MMOL/L (ref 3.4–5.3)
RBC # BLD AUTO: 4.58 10E6/UL (ref 4.4–5.9)
SODIUM SERPL-SCNC: 137 MMOL/L (ref 133–144)
SPECIMEN EXPIRATION DATE: NORMAL
WBC # BLD AUTO: 9.5 10E3/UL (ref 4–11)

## 2022-05-05 PROCEDURE — 250N000011 HC RX IP 250 OP 636: Performed by: STUDENT IN AN ORGANIZED HEALTH CARE EDUCATION/TRAINING PROGRAM

## 2022-05-05 PROCEDURE — 83735 ASSAY OF MAGNESIUM: CPT | Performed by: NEUROLOGICAL SURGERY

## 2022-05-05 PROCEDURE — 85610 PROTHROMBIN TIME: CPT | Performed by: STUDENT IN AN ORGANIZED HEALTH CARE EDUCATION/TRAINING PROGRAM

## 2022-05-05 PROCEDURE — 250N000013 HC RX MED GY IP 250 OP 250 PS 637: Performed by: NEUROLOGICAL SURGERY

## 2022-05-05 PROCEDURE — 85730 THROMBOPLASTIN TIME PARTIAL: CPT | Performed by: STUDENT IN AN ORGANIZED HEALTH CARE EDUCATION/TRAINING PROGRAM

## 2022-05-05 PROCEDURE — 250N000013 HC RX MED GY IP 250 OP 250 PS 637: Performed by: STUDENT IN AN ORGANIZED HEALTH CARE EDUCATION/TRAINING PROGRAM

## 2022-05-05 PROCEDURE — 99232 SBSQ HOSP IP/OBS MODERATE 35: CPT | Mod: 25 | Performed by: PHYSICIAN ASSISTANT

## 2022-05-05 PROCEDURE — 250N000009 HC RX 250: Performed by: NEUROLOGICAL SURGERY

## 2022-05-05 PROCEDURE — 82310 ASSAY OF CALCIUM: CPT | Performed by: STUDENT IN AN ORGANIZED HEALTH CARE EDUCATION/TRAINING PROGRAM

## 2022-05-05 PROCEDURE — 370N000017 HC ANESTHESIA TECHNICAL FEE, PER MIN: Performed by: NEUROLOGICAL SURGERY

## 2022-05-05 PROCEDURE — 272N000001 HC OR GENERAL SUPPLY STERILE: Performed by: NEUROLOGICAL SURGERY

## 2022-05-05 PROCEDURE — 00P00MZ REMOVAL OF NEUROSTIMULATOR LEAD FROM BRAIN, OPEN APPROACH: ICD-10-PCS | Performed by: NEUROLOGICAL SURGERY

## 2022-05-05 PROCEDURE — 250N000013 HC RX MED GY IP 250 OP 250 PS 637: Performed by: PHYSICIAN ASSISTANT

## 2022-05-05 PROCEDURE — 85027 COMPLETE CBC AUTOMATED: CPT | Performed by: STUDENT IN AN ORGANIZED HEALTH CARE EDUCATION/TRAINING PROGRAM

## 2022-05-05 PROCEDURE — 710N000011 HC RECOVERY PHASE 1, LEVEL 3, PER MIN: Performed by: NEUROLOGICAL SURGERY

## 2022-05-05 PROCEDURE — 36415 COLL VENOUS BLD VENIPUNCTURE: CPT | Performed by: STUDENT IN AN ORGANIZED HEALTH CARE EDUCATION/TRAINING PROGRAM

## 2022-05-05 PROCEDURE — 360N000078 HC SURGERY LEVEL 5, PER MIN: Performed by: NEUROLOGICAL SURGERY

## 2022-05-05 PROCEDURE — 999N000141 HC STATISTIC PRE-PROCEDURE NURSING ASSESSMENT: Performed by: NEUROLOGICAL SURGERY

## 2022-05-05 PROCEDURE — 250N000011 HC RX IP 250 OP 636: Performed by: NEUROLOGICAL SURGERY

## 2022-05-05 PROCEDURE — 86850 RBC ANTIBODY SCREEN: CPT | Performed by: STUDENT IN AN ORGANIZED HEALTH CARE EDUCATION/TRAINING PROGRAM

## 2022-05-05 PROCEDURE — 258N000003 HC RX IP 258 OP 636: Performed by: STUDENT IN AN ORGANIZED HEALTH CARE EDUCATION/TRAINING PROGRAM

## 2022-05-05 PROCEDURE — 250N000013 HC RX MED GY IP 250 OP 250 PS 637: Performed by: PSYCHIATRY & NEUROLOGY

## 2022-05-05 PROCEDURE — 200N000002 HC R&B ICU UMMC

## 2022-05-05 PROCEDURE — 250N000025 HC SEVOFLURANE, PER MIN: Performed by: NEUROLOGICAL SURGERY

## 2022-05-05 PROCEDURE — 250N000009 HC RX 250: Performed by: STUDENT IN AN ORGANIZED HEALTH CARE EDUCATION/TRAINING PROGRAM

## 2022-05-05 PROCEDURE — 95714 VEEG EA 12-26 HR UNMNTR: CPT

## 2022-05-05 PROCEDURE — 84100 ASSAY OF PHOSPHORUS: CPT | Performed by: NEUROLOGICAL SURGERY

## 2022-05-05 PROCEDURE — 95718 EEG PHYS/QHP 2-12 HR W/VEEG: CPT | Mod: GC | Performed by: PSYCHIATRY & NEUROLOGY

## 2022-05-05 PROCEDURE — 86901 BLOOD TYPING SEROLOGIC RH(D): CPT | Performed by: STUDENT IN AN ORGANIZED HEALTH CARE EDUCATION/TRAINING PROGRAM

## 2022-05-05 RX ORDER — SODIUM CHLORIDE 9 MG/ML
INJECTION, SOLUTION INTRAVENOUS CONTINUOUS PRN
Status: DISCONTINUED | OUTPATIENT
Start: 2022-05-05 | End: 2022-05-05

## 2022-05-05 RX ORDER — LEVETIRACETAM 500 MG/1
500 TABLET ORAL 2 TIMES DAILY
Status: DISCONTINUED | OUTPATIENT
Start: 2022-05-05 | End: 2022-05-06 | Stop reason: HOSPADM

## 2022-05-05 RX ORDER — LIDOCAINE HYDROCHLORIDE AND EPINEPHRINE 10; 10 MG/ML; UG/ML
INJECTION, SOLUTION INFILTRATION; PERINEURAL PRN
Status: DISCONTINUED | OUTPATIENT
Start: 2022-05-05 | End: 2022-05-05 | Stop reason: HOSPADM

## 2022-05-05 RX ORDER — CEFAZOLIN SODIUM 2 G/100ML
2 INJECTION, SOLUTION INTRAVENOUS SEE ADMIN INSTRUCTIONS
Status: DISCONTINUED | OUTPATIENT
Start: 2022-05-05 | End: 2022-05-05

## 2022-05-05 RX ORDER — DEXMEDETOMIDINE HYDROCHLORIDE 4 UG/ML
INJECTION, SOLUTION INTRAVENOUS PRN
Status: DISCONTINUED | OUTPATIENT
Start: 2022-05-05 | End: 2022-05-05

## 2022-05-05 RX ORDER — LAMOTRIGINE 150 MG/1
150 TABLET ORAL 2 TIMES DAILY
Status: DISCONTINUED | OUTPATIENT
Start: 2022-05-05 | End: 2022-05-06 | Stop reason: HOSPADM

## 2022-05-05 RX ORDER — FENTANYL CITRATE 50 UG/ML
INJECTION, SOLUTION INTRAMUSCULAR; INTRAVENOUS PRN
Status: DISCONTINUED | OUTPATIENT
Start: 2022-05-05 | End: 2022-05-05

## 2022-05-05 RX ORDER — ZONISAMIDE 100 MG/1
300 CAPSULE ORAL DAILY
Status: DISCONTINUED | OUTPATIENT
Start: 2022-05-06 | End: 2022-05-06 | Stop reason: HOSPADM

## 2022-05-05 RX ORDER — LIDOCAINE HYDROCHLORIDE 20 MG/ML
INJECTION, SOLUTION INFILTRATION; PERINEURAL PRN
Status: DISCONTINUED | OUTPATIENT
Start: 2022-05-05 | End: 2022-05-05

## 2022-05-05 RX ORDER — ONDANSETRON 2 MG/ML
INJECTION INTRAMUSCULAR; INTRAVENOUS PRN
Status: DISCONTINUED | OUTPATIENT
Start: 2022-05-05 | End: 2022-05-05

## 2022-05-05 RX ORDER — POTASSIUM CHLORIDE 750 MG/1
20 TABLET, EXTENDED RELEASE ORAL ONCE
Status: COMPLETED | OUTPATIENT
Start: 2022-05-05 | End: 2022-05-05

## 2022-05-05 RX ORDER — PROPOFOL 10 MG/ML
INJECTION, EMULSION INTRAVENOUS CONTINUOUS PRN
Status: DISCONTINUED | OUTPATIENT
Start: 2022-05-05 | End: 2022-05-05

## 2022-05-05 RX ORDER — PROPOFOL 10 MG/ML
INJECTION, EMULSION INTRAVENOUS PRN
Status: DISCONTINUED | OUTPATIENT
Start: 2022-05-05 | End: 2022-05-05

## 2022-05-05 RX ORDER — SODIUM CHLORIDE, SODIUM LACTATE, POTASSIUM CHLORIDE, CALCIUM CHLORIDE 600; 310; 30; 20 MG/100ML; MG/100ML; MG/100ML; MG/100ML
INJECTION, SOLUTION INTRAVENOUS CONTINUOUS PRN
Status: DISCONTINUED | OUTPATIENT
Start: 2022-05-05 | End: 2022-05-05

## 2022-05-05 RX ORDER — ZONISAMIDE 100 MG/1
200 CAPSULE ORAL ONCE
Status: COMPLETED | OUTPATIENT
Start: 2022-05-05 | End: 2022-05-05

## 2022-05-05 RX ORDER — BUPIVACAINE HYDROCHLORIDE 2.5 MG/ML
INJECTION, SOLUTION INFILTRATION; PERINEURAL PRN
Status: DISCONTINUED | OUTPATIENT
Start: 2022-05-05 | End: 2022-05-05 | Stop reason: HOSPADM

## 2022-05-05 RX ORDER — HYDRALAZINE HYDROCHLORIDE 20 MG/ML
10-20 INJECTION INTRAMUSCULAR; INTRAVENOUS EVERY 10 MIN PRN
Status: DISCONTINUED | OUTPATIENT
Start: 2022-05-05 | End: 2022-05-06 | Stop reason: HOSPADM

## 2022-05-05 RX ORDER — ZONISAMIDE 100 MG/1
400 CAPSULE ORAL AT BEDTIME
Status: DISCONTINUED | OUTPATIENT
Start: 2022-05-05 | End: 2022-05-06 | Stop reason: HOSPADM

## 2022-05-05 RX ORDER — CEFAZOLIN SODIUM 2 G/100ML
2 INJECTION, SOLUTION INTRAVENOUS
Status: DISCONTINUED | OUTPATIENT
Start: 2022-05-05 | End: 2022-05-05

## 2022-05-05 RX ADMIN — ZONISAMIDE 100 MG: 100 CAPSULE ORAL at 08:04

## 2022-05-05 RX ADMIN — LEVETIRACETAM 500 MG: 500 TABLET, FILM COATED ORAL at 22:46

## 2022-05-05 RX ADMIN — ACETAMINOPHEN 650 MG: 325 TABLET ORAL at 02:56

## 2022-05-05 RX ADMIN — FENTANYL CITRATE 50 MCG: 50 INJECTION, SOLUTION INTRAMUSCULAR; INTRAVENOUS at 18:25

## 2022-05-05 RX ADMIN — CYCLOBENZAPRINE HYDROCHLORIDE 5 MG: 5 TABLET, FILM COATED ORAL at 23:22

## 2022-05-05 RX ADMIN — LIDOCAINE HYDROCHLORIDE 100 MG: 20 INJECTION, SOLUTION INFILTRATION; PERINEURAL at 18:25

## 2022-05-05 RX ADMIN — PROPOFOL 85 MCG/KG/MIN: 10 INJECTION, EMULSION INTRAVENOUS at 18:37

## 2022-05-05 RX ADMIN — LAMOTRIGINE 150 MG: 150 TABLET ORAL at 12:16

## 2022-05-05 RX ADMIN — SUGAMMADEX 200 MG: 100 INJECTION, SOLUTION INTRAVENOUS at 20:05

## 2022-05-05 RX ADMIN — SODIUM CHLORIDE, POTASSIUM CHLORIDE, SODIUM LACTATE AND CALCIUM CHLORIDE: 600; 310; 30; 20 INJECTION, SOLUTION INTRAVENOUS at 18:33

## 2022-05-05 RX ADMIN — ROCURONIUM BROMIDE 80 MG: 50 INJECTION, SOLUTION INTRAVENOUS at 18:25

## 2022-05-05 RX ADMIN — MIDAZOLAM 2 MG: 1 INJECTION INTRAMUSCULAR; INTRAVENOUS at 18:20

## 2022-05-05 RX ADMIN — ZONISAMIDE 200 MG: 100 CAPSULE ORAL at 12:16

## 2022-05-05 RX ADMIN — ZONISAMIDE 400 MG: 100 CAPSULE ORAL at 22:46

## 2022-05-05 RX ADMIN — Medication 8 MCG: at 19:24

## 2022-05-05 RX ADMIN — HYDROMORPHONE HYDROCHLORIDE 0.2 MG: 0.2 INJECTION, SOLUTION INTRAMUSCULAR; INTRAVENOUS; SUBCUTANEOUS at 02:56

## 2022-05-05 RX ADMIN — CEFAZOLIN SODIUM 2 G: 2 INJECTION, SOLUTION INTRAVENOUS at 16:44

## 2022-05-05 RX ADMIN — LAMOTRIGINE 150 MG: 150 TABLET ORAL at 22:46

## 2022-05-05 RX ADMIN — CEFAZOLIN SODIUM 2 G: 2 INJECTION, SOLUTION INTRAVENOUS at 23:22

## 2022-05-05 RX ADMIN — HYDROMORPHONE HYDROCHLORIDE 0.4 MG: 0.2 INJECTION, SOLUTION INTRAMUSCULAR; INTRAVENOUS; SUBCUTANEOUS at 09:19

## 2022-05-05 RX ADMIN — ACETAMINOPHEN 650 MG: 325 TABLET ORAL at 22:45

## 2022-05-05 RX ADMIN — OXYCODONE HYDROCHLORIDE 10 MG: 10 TABLET ORAL at 08:04

## 2022-05-05 RX ADMIN — HEPARIN SODIUM 5000 UNITS: 5000 INJECTION, SOLUTION INTRAVENOUS; SUBCUTANEOUS at 03:00

## 2022-05-05 RX ADMIN — SODIUM CHLORIDE: 9 INJECTION, SOLUTION INTRAVENOUS at 18:17

## 2022-05-05 RX ADMIN — PROPOFOL 200 MG: 10 INJECTION, EMULSION INTRAVENOUS at 18:25

## 2022-05-05 RX ADMIN — HYDROMORPHONE HYDROCHLORIDE 0.2 MG: 0.2 INJECTION, SOLUTION INTRAMUSCULAR; INTRAVENOUS; SUBCUTANEOUS at 23:22

## 2022-05-05 RX ADMIN — LEVETIRACETAM 500 MG: 500 TABLET, FILM COATED ORAL at 12:16

## 2022-05-05 RX ADMIN — FENTANYL CITRATE 50 MCG: 50 INJECTION, SOLUTION INTRAMUSCULAR; INTRAVENOUS at 18:49

## 2022-05-05 RX ADMIN — Medication 8 MCG: at 19:10

## 2022-05-05 RX ADMIN — POTASSIUM CHLORIDE 20 MEQ: 750 TABLET, EXTENDED RELEASE ORAL at 08:03

## 2022-05-05 RX ADMIN — CEFAZOLIN SODIUM 2 G: 2 INJECTION, SOLUTION INTRAVENOUS at 09:21

## 2022-05-05 RX ADMIN — Medication 4 MCG: at 18:55

## 2022-05-05 RX ADMIN — FLUOXETINE 80 MG: 20 CAPSULE ORAL at 09:54

## 2022-05-05 RX ADMIN — ACETAMINOPHEN 650 MG: 325 TABLET ORAL at 08:04

## 2022-05-05 RX ADMIN — ONDANSETRON 4 MG: 2 INJECTION INTRAMUSCULAR; INTRAVENOUS at 18:45

## 2022-05-05 RX ADMIN — Medication 8 MCG: at 18:49

## 2022-05-05 RX ADMIN — Medication 8 MCG: at 18:45

## 2022-05-05 ASSESSMENT — ACTIVITIES OF DAILY LIVING (ADL)
ADLS_ACUITY_SCORE: 8

## 2022-05-05 ASSESSMENT — ENCOUNTER SYMPTOMS: SEIZURES: 1

## 2022-05-05 NOTE — ANESTHESIA PROCEDURE NOTES
Airway    Staff -        Anesthesiologist:  Sidra Dueñas MD       Resident/Fellow: Sarah Vail       Performed By: residentIndications and Patient Condition       Induction type:intravenous       Mask difficulty assessment: 1 - vent by mask    Final Airway Details       Final airway type: endotracheal airway       Successful airway: ETT - single  Endotracheal Airway Details        ETT size (mm): 7.5       Cuffed: yes       Successful intubation technique: direct laryngoscopy       DL Blade Type: MAC 4       Grade View of Cords: 1       Adjucts: stylet       Position: Right       Measured from: gums/teeth       Secured at (cm): 22       Bite block used: Soft    Post intubation assessment        Placement verified by: capnometry and chest rise        Number of attempts at approach: 1       Number of other approaches attempted: 0       Secured with: silk tape       Ease of procedure: easy       Dentition: Unchanged

## 2022-05-05 NOTE — PROGRESS NOTES
[facility], [place]   Epilepsy Service Daily Note      Interval History:   No further seizures since yesterday morning. Reports continued head pain and trouble sleeping at night because he can't get comfortable. Epileptologist's discussed case this morning and feel enough seizures recorded. Will plan to restart on antiepileptic drugs.     Review of System:   Tired. Head pain. No nausea, No vomiting, no dizziness, no chest pain.     Medications:   Antiepileptic Medications Home Doses: levetiracetam 500-500, lamotrigine 150-150, zonisamide 300-400  Antiepileptic Medications Current Doses:  levetiracetam dc'd, lamotrigine dc'd, zonisamide 100-100    Exam: Blood pressure (!) 137/103, pulse 57, temperature 98.4  F (36.9  C), temperature source Axillary, resp. rate 16, height 1.829 m (6'), weight 93.6 kg (206 lb 5.6 oz), SpO2 95 %.   General: NAD  Head: head wrap in place  Extremities: no LE tenderness   Neuro: Alert and oriented. Speech fluent. Hearing intact to normal conversation. PERRL, EOM's intact. Strong shoulder shrug bilaterally. Face symmetric, tongue midline. Strength 5/5 bilaterally. No drift or pronation. Intact FNF. Sensation intact to light touch.   Labs:     Latest Reference Range & Units 22 13:57   Keppra (Levetiracetam) Level 10 - 40 ug/mL 13 [1]   Lamotrigine 3.0 - 15.0 ug/mL 7.0 [2]   Zonisamide Level Quant 10 - 40 ug/mL 22 [3]     EE seizures on 2022, 2 seizures on     Assessment and Plan: patient seen and discussed with Dr. Ernst Ríos.  Patient is a 26 year old, right-handed male with a history of depression, migraines, asperger syndrome and intractable focal epilepsy who underwent stereotactic EEG placement of bilateral depth electrodes for invasive video EEG monitoring on . Postop imaging notable for stable right convexity SDH. Two seizures on 2022 and two seizures on  for a total of 4 seizures thus far. Adequate seizures recorded and will plan  to restart antiepileptic drugs.     -continue video EEG until patient goes to OR for electrode removal   -divalproex stopped 4/26 as this medication patient stopped PTA last week and replaced with levetiracetam in preparation for surgery   -restart PTA levetiracetam 500-500  -restart PTA lamotrigine 150-150  -give zonisamide 200 mg now and tonight restart -400  -If patient having seizures and not returning to baseline, please page epilepsy attending: Dr. Dukes [id number]  -Ativan 2 mg IV per seizure protocol (2 focal seizures in 4 hours or any GTC)      RAEGAN WrightC  6150    Total time: 25 minute was spent in the care of this patient. The patient agrees with the above mentioned plan of care. I answered all the patient's questions and addressed immediate concerns. More than 50% of time spent consisted of counseling and coordinating care, including discussion of the diagnostic significance of EEG findings, anti-seizure medication management, and planning for discharge home

## 2022-05-05 NOTE — ANESTHESIA PREPROCEDURE EVALUATION
Anesthesia Pre-Procedure Evaluation    Patient: Amrit Padilla   MRN: [id number] : 1995        Procedure : Procedure(s):  Stereo EEG depth electrode removal          Past Medical History:   Diagnosis Date     Depressive disorder      Headaches, cluster      Seizures (H)       Past Surgical History:   Procedure Laterality Date     BACK SURGERY       JAYDEN ASSISTED PLACEMENT DEPTH ELECTRODE Bilateral 2022    Procedure: JAYDEN-assisted stereotactic placement of bilateral depth electrodes for invasive video electroencephalography monitoring;  Surgeon: Tello Adler MD;  Location: [facility] OR      Allergies   Allergen Reactions     [place] Cough, Difficulty breathing and Shortness Of Breath     Hydroxyzine      Other reaction(s): Seizures     Unknown [No Clinical Screening - See Comments] Rash     Reaction to a seizure med that he can not recall.     Dust Mites      Molds & Smuts      Other reaction(s): Unknown     Sertraline Other (See Comments)     mood swings     Zyprexa [Olanzapine]      Seizure.       Social History     Tobacco Use     Smoking status: Never Smoker     Smokeless tobacco: Never Used   Substance Use Topics     Alcohol use: No      Wt Readings from Last 1 Encounters:   22 93.6 kg (206 lb 5.6 oz)        Anesthesia Evaluation            ROS/MED HX  ENT/Pulmonary:       Neurologic:     (+) seizures, last seizure: 22,     Cardiovascular:       METS/Exercise Tolerance:     Hematologic:       Musculoskeletal:       GI/Hepatic:       Renal/Genitourinary:       Endo:       Psychiatric/Substance Use:     (+) psychiatric history depression     Infectious Disease:       Malignancy:       Other:            Physical Exam    Airway        Mallampati: II   TM distance: > 3 FB   Neck ROM: full   Mouth opening: > 3 cm    Respiratory Devices and Support         Dental  no notable dental history         Cardiovascular          Rhythm and rate: regular     Pulmonary           (+) decreased breath  sounds           OUTSIDE LABS:  CBC:   Lab Results   Component Value Date    WBC 9.5 05/05/2022    WBC 9.1 05/04/2022    HGB 14.4 05/05/2022    HGB 14.5 05/04/2022    HCT 42.2 05/05/2022    HCT 41.8 05/04/2022     05/05/2022     05/04/2022     BMP:   Lab Results   Component Value Date     05/05/2022     05/04/2022    POTASSIUM 3.8 05/05/2022    POTASSIUM 3.6 05/04/2022    CHLORIDE 108 05/05/2022    CHLORIDE 108 05/04/2022    CO2 22 05/05/2022    CO2 22 05/04/2022    BUN 20 05/05/2022    BUN 21 05/04/2022    CR 0.79 05/05/2022    CR 0.78 05/04/2022    GLC 97 05/05/2022     (H) 05/04/2022     COAGS:   Lab Results   Component Value Date    PTT 36 05/05/2022    INR 1.07 05/05/2022     POC: No results found for: BGM, HCG, HCGS  HEPATIC:   Lab Results   Component Value Date    ALBUMIN 3.5 03/02/2021    PROTTOTAL 6.3 (L) 03/02/2021    ALT 53 03/02/2021    AST 25 03/02/2021    ALKPHOS 74 03/02/2021    BILITOTAL 0.6 03/02/2021     OTHER:   Lab Results   Component Value Date    ZAIDA 9.2 05/05/2022    PHOS 3.8 05/05/2022    MAG 2.1 05/05/2022       Anesthesia Plan    ASA Status:  2      Anesthesia Type: General.   Induction: Intravenous.           Consents            Postoperative Care    Pain management: Multi-modal analgesia.   PONV prophylaxis: Ondansetron (or other 5HT-3), Dexamethasone or Solumedrol     Comments:    Other Comments: Patient is a 26 year old, right-handed male with a history of depression, migraines, asperger syndrome and intractable focal epilepsy who underwent stereotactic EEG placement of bilateral depth electrodes for invasive video EEG monitoring on 4/25. Postop imaging notable for stable right convexity SDH. Two seizures on 5/1/2022 and two seizures on 5/4 for a total of 4 seizures thus far. Adequate seizures recorded and will plan to restart antiepileptic drugs.             Sidra Dueñas MD

## 2022-05-05 NOTE — PROGRESS NOTES
[facility], [place]   Neurosurgery Progress Note:    Assessment: Amrit Padilla is a 26 year old male with history of about 10 years of medically refractory epilepsy on PTA Depakote 1500mg daily, lamotrigine 150mg BID, Keppra 500 BID, and zonisamide 300mg-400mg.  He has two typical seizure types, typically at night while he is sleeping: they can occur GTCs or repetitive face/hand/arm jerking movements followed by unresponsiveness.  Past medical history is also notable for depression and Asperger syndrome.  He underwent stereotactic EEG placement of 16 electrodes with 2 ground electrodes on 4/25 by Dr. Adler.  He was admitted to  postoperatively, and postoperative head CT was notable for thin right convexity subdural hematoma, stable on repeat imaging.  vEEG was started on 4/26 AM, and PTA Depakote was discontinued.  Keppra was discontinued on 4/27. Lamictal was weaned 4/28, then discontinued 4/29.  Zonisamide was weaned 4/29, 4/30, and 5/2.  Seizure activity noted 4/30 into 5/1, resolved with Ativan.  Monitoring continued, and he was again noted to have two seizures in the AM of 5/4.    Coding query clarification:  4/25 CT showing 6mm right subdural hematoma with 2mm midline shift: Brain compression    Plan:  - Frequent neuro checks  - PTA antiepileptic wean coordination by Epileptology   - PTA Depakote discontinued (4/26)   - PTA Keppra discontinued (4/27)   - PTA Lamictal weaned from 150-150 to 75-75 (4/28), then discontinued (4/29)   - PTA zonisamide weaned from 500 BID to 300 BID (4/29) to 200 twice daily (4/30) to 100 twice daily (5/2)  - Continuous vEEG  - Continue 1:1 for seizure monitoring; seizure precautions  - Pain control  - HOB > 30 degrees  - Advance diet as tolerated  - Bowel regimen  - PRN antiemetics  - SCDs for DVT proph    -----------------------------------  Jemal Rosenberg M.D.  Neurosurgery Resident, PGY-3    Please contact neurosurgery resident on call with  questions.    Dial * * [phone], enter [phone] when prompted.       Interval History/Subjective: Two clinical seizures yesterday; total of 4 seizures so far.  Given Ativan following second seizure.    Objective:   Temp:  [98  F (36.7  C)-99.8  F (37.7  C)] 98.4  F (36.9  C)  Pulse:  [55-87] 61  Resp:  [10-16] 14  BP: (117-153)/() 128/84  SpO2:  [92 %-96 %] 95 %  I/O last 3 completed shifts:  In: 680 [P.O.:480; I.V.:200]  Out: 925 [Urine:925]    Gen: complaining of headache, NAD  Wound: headwrap in place  Neurologic:  - Alert & Oriented to person, place, time, and situation  - Follows commands briskly  - Speech fluent, spontaneous. No aphasia or dysarthria.  - No gaze preference. No apparent hemineglect.  - PERRL, EOMI  - Strong brow raise, eye closure, and smile  - Face symmetric with sensation intact to light touch  - Trapezii and sternocleidomastoid muscles 5/5 bilaterally  - No pronator drift     Del Tr Bi WE WF Gr   R 5 5 5 5 5 5   L 5 5 5 5 5 5    HF KE KF DF PF EHL   R 5 5 5 5 5 5   L 5 5 5 5 5 5     Reflexes symmetric    Sensation intact and symmetric to light touch throughout    LABS  Recent Labs   Lab Test 05/04/22  0400 05/03/22  0435 05/02/22  0445   WBC 9.1 8.7 8.9   HGB 14.5 14.6 14.7   MCV 93 93 94    347 306       Recent Labs   Lab Test 05/04/22  0535 05/04/22  0400 05/03/22  0426 05/02/22  0445   NA  --  138 136 137   POTASSIUM  --  3.6 3.7 3.8   CHLORIDE  --  108 107 108   CO2  --  22 21 22   BUN  --  21 21 20   CR  --  0.78 0.86 0.88   ANIONGAP  --  8 8 7   ZAIDA  --  9.7 9.8 9.2   * 106* 99 97       IMAGING  No results found for this or any previous visit (from the past 24 hour(s)).

## 2022-05-05 NOTE — CARE PLAN
Major Shift Events: No seizure activity noted. Complains of severe headache, given PRN Tylenol and Dilaudid. Sleeping between cares.

## 2022-05-06 ENCOUNTER — APPOINTMENT (OUTPATIENT)
Dept: CT IMAGING | Facility: CLINIC | Age: 27
End: 2022-05-06
Attending: STUDENT IN AN ORGANIZED HEALTH CARE EDUCATION/TRAINING PROGRAM
Payer: COMMERCIAL

## 2022-05-06 VITALS
RESPIRATION RATE: 23 BRPM | BODY MASS INDEX: 27.95 KG/M2 | DIASTOLIC BLOOD PRESSURE: 73 MMHG | TEMPERATURE: 98.4 F | OXYGEN SATURATION: 98 % | HEART RATE: 52 BPM | HEIGHT: 72 IN | SYSTOLIC BLOOD PRESSURE: 115 MMHG | WEIGHT: 206.35 LBS

## 2022-05-06 LAB
ABO/RH(D): NORMAL
ANION GAP SERPL CALCULATED.3IONS-SCNC: 7 MMOL/L (ref 3–14)
ANTIBODY SCREEN: NEGATIVE
BUN SERPL-MCNC: 18 MG/DL (ref 7–30)
CALCIUM SERPL-MCNC: 9.5 MG/DL (ref 8.5–10.1)
CHLORIDE BLD-SCNC: 106 MMOL/L (ref 94–109)
CO2 SERPL-SCNC: 26 MMOL/L (ref 20–32)
CREAT SERPL-MCNC: 0.89 MG/DL (ref 0.66–1.25)
ERYTHROCYTE [DISTWIDTH] IN BLOOD BY AUTOMATED COUNT: 11.9 % (ref 10–15)
GFR SERPL CREATININE-BSD FRML MDRD: >90 ML/MIN/1.73M2
GLUCOSE BLD-MCNC: 98 MG/DL (ref 70–99)
HCT VFR BLD AUTO: 42.9 % (ref 40–53)
HGB BLD-MCNC: 14.5 G/DL (ref 13.3–17.7)
MCH RBC QN AUTO: 31.7 PG (ref 26.5–33)
MCHC RBC AUTO-ENTMCNC: 33.8 G/DL (ref 31.5–36.5)
MCV RBC AUTO: 94 FL (ref 78–100)
PLATELET # BLD AUTO: 335 10E3/UL (ref 150–450)
POTASSIUM BLD-SCNC: 3.7 MMOL/L (ref 3.4–5.3)
RBC # BLD AUTO: 4.57 10E6/UL (ref 4.4–5.9)
SODIUM SERPL-SCNC: 139 MMOL/L (ref 133–144)
SPECIMEN EXPIRATION DATE: NORMAL
WBC # BLD AUTO: 9.6 10E3/UL (ref 4–11)

## 2022-05-06 PROCEDURE — 80048 BASIC METABOLIC PNL TOTAL CA: CPT | Performed by: STUDENT IN AN ORGANIZED HEALTH CARE EDUCATION/TRAINING PROGRAM

## 2022-05-06 PROCEDURE — 250N000011 HC RX IP 250 OP 636: Performed by: STUDENT IN AN ORGANIZED HEALTH CARE EDUCATION/TRAINING PROGRAM

## 2022-05-06 PROCEDURE — 99231 SBSQ HOSP IP/OBS SF/LOW 25: CPT | Mod: 24 | Performed by: PHYSICIAN ASSISTANT

## 2022-05-06 PROCEDURE — 36415 COLL VENOUS BLD VENIPUNCTURE: CPT | Performed by: STUDENT IN AN ORGANIZED HEALTH CARE EDUCATION/TRAINING PROGRAM

## 2022-05-06 PROCEDURE — 70450 CT HEAD/BRAIN W/O DYE: CPT

## 2022-05-06 PROCEDURE — 250N000013 HC RX MED GY IP 250 OP 250 PS 637: Performed by: STUDENT IN AN ORGANIZED HEALTH CARE EDUCATION/TRAINING PROGRAM

## 2022-05-06 PROCEDURE — 85014 HEMATOCRIT: CPT | Performed by: STUDENT IN AN ORGANIZED HEALTH CARE EDUCATION/TRAINING PROGRAM

## 2022-05-06 PROCEDURE — 70450 CT HEAD/BRAIN W/O DYE: CPT | Mod: 26 | Performed by: RADIOLOGY

## 2022-05-06 PROCEDURE — 86901 BLOOD TYPING SEROLOGIC RH(D): CPT | Performed by: STUDENT IN AN ORGANIZED HEALTH CARE EDUCATION/TRAINING PROGRAM

## 2022-05-06 PROCEDURE — 86850 RBC ANTIBODY SCREEN: CPT | Performed by: STUDENT IN AN ORGANIZED HEALTH CARE EDUCATION/TRAINING PROGRAM

## 2022-05-06 RX ORDER — POLYETHYLENE GLYCOL 3350 17 G/17G
17 POWDER, FOR SOLUTION ORAL DAILY
Qty: 510 G | Refills: 0 | Status: SHIPPED | OUTPATIENT
Start: 2022-05-06 | End: 2022-05-13

## 2022-05-06 RX ORDER — AMOXICILLIN 250 MG
1 CAPSULE ORAL 2 TIMES DAILY PRN
Qty: 14 TABLET | Refills: 0 | Status: SHIPPED | OUTPATIENT
Start: 2022-05-06 | End: 2022-05-13

## 2022-05-06 RX ORDER — OXYCODONE HYDROCHLORIDE 5 MG/1
5 TABLET ORAL EVERY 4 HOURS PRN
Qty: 12 TABLET | Refills: 0 | Status: SHIPPED | OUTPATIENT
Start: 2022-05-06 | End: 2022-05-09

## 2022-05-06 RX ADMIN — OXYCODONE HYDROCHLORIDE 10 MG: 10 TABLET ORAL at 05:19

## 2022-05-06 RX ADMIN — HYDROMORPHONE HYDROCHLORIDE 0.4 MG: 0.2 INJECTION, SOLUTION INTRAMUSCULAR; INTRAVENOUS; SUBCUTANEOUS at 01:19

## 2022-05-06 RX ADMIN — ACETAMINOPHEN 650 MG: 325 TABLET ORAL at 09:34

## 2022-05-06 RX ADMIN — FLUOXETINE 80 MG: 20 CAPSULE ORAL at 07:39

## 2022-05-06 RX ADMIN — OXYCODONE HYDROCHLORIDE 10 MG: 10 TABLET ORAL at 09:34

## 2022-05-06 RX ADMIN — CEFAZOLIN SODIUM 2 G: 2 INJECTION, SOLUTION INTRAVENOUS at 07:40

## 2022-05-06 RX ADMIN — LAMOTRIGINE 150 MG: 150 TABLET ORAL at 07:39

## 2022-05-06 RX ADMIN — LEVETIRACETAM 500 MG: 500 TABLET, FILM COATED ORAL at 07:39

## 2022-05-06 RX ADMIN — ZONISAMIDE 300 MG: 100 CAPSULE ORAL at 07:38

## 2022-05-06 ASSESSMENT — ACTIVITIES OF DAILY LIVING (ADL)
ADLS_ACUITY_SCORE: 8

## 2022-05-06 NOTE — PROGRESS NOTES
[facility], [place]   Epilepsy Service Daily Note      Interval History:   No further seizures since yesterday morning. Had electrodes removed yesterday.     Review of System:   Tired. Head pain. No nausea, No vomiting, no dizziness, no chest pain.     Medications:   Antiepileptic Medications Home Doses: levetiracetam 500-500, lamotrigine 150-150, zonisamide 300-400  Antiepileptic Medications Current Doses: levetiracetam 500-500, lamotrigine 150-150, zonisamide 300-400    Exam: Blood pressure 115/73, pulse 52, temperature 98.4  F (36.9  C), temperature source Oral, resp. rate 23, height 1.829 m (6'), weight 93.6 kg (206 lb 5.6 oz), SpO2 98 %.   General: NAD  Head: head wrap in place  Extremities: no LE tenderness   Neuro: Alert and oriented. Speech fluent. Hearing intact to normal conversation. PERRL, EOM's intact. Strong shoulder shrug bilaterally. Face symmetric, tongue midline. Strength 5/5 bilaterally. No drift or pronation. Intact FNF. Sensation intact to light touch.   Labs:     Latest Reference Range & Units 22 13:57   Keppra (Levetiracetam) Level 10 - 40 ug/mL 13 [1]   Lamotrigine 3.0 - 15.0 ug/mL 7.0 [2]   Zonisamide Level Quant 10 - 40 ug/mL 22 [3]     EE seizures on 2022, 2 seizures on     Assessment and Plan: patient seen and discussed with Dr. Dukes  1.  Patient is a 26 year old, right-handed male with a history of depression, migraines, asperger syndrome and intractable focal epilepsy who underwent stereotactic EEG placement of bilateral depth electrodes for invasive video EEG monitoring on . Postop imaging notable for stable right convexity SDH. Two seizures on 2022 and two seizures on  for a total of 4 seizures thus far. Adequate seizures recorded and will plan to restart antiepileptic drugs.     -plan to discharge on levetiracetam 500-500, lamotrigine 150-150 and zonisamide 300-400. He should NOT be taking divalproex.   -Follow-up with Dr. Dukes  5/16/22  -[facility] nurse will call in 2-3 days       Annia Iglesias PA-C  [id number]    Total time: 15 minute was spent in the care of this patient. The patient agrees with the above mentioned plan of care. I answered all the patient's questions and addressed immediate concerns. More than 50% of time spent consisted of counseling and coordinating care, including discussion of the diagnostic significance of EEG findings, anti-seizure medication management, and planning for discharge home

## 2022-05-06 NOTE — ANESTHESIA CARE TRANSFER NOTE
Patient: Amrit Padilla    Procedure: Procedure(s):  Stereo EEG depth electrode removal       Diagnosis: Nonintractable epilepsy without status epilepticus, unspecified epilepsy type (H) [G40.909]  Diagnosis Additional Information: No value filed.    Anesthesia Type:   General     Note:    Oropharynx: oropharynx clear of all foreign objects and spontaneously breathing  Level of Consciousness: drowsy  Oxygen Supplementation: face mask  Level of Supplemental Oxygen (L/min / FiO2): 8  Independent Airway: airway patency satisfactory and stable    Vital Signs Stable: post-procedure vital signs reviewed and stable  Report to RN Given: handoff report given  Patient transferred to: PACU    Handoff Report: Identifed the Patient, Identified the Reponsible Provider, Reviewed the pertinent medical history, Discussed the surgical course, Reviewed Intra-OP anesthesia mangement and issues during anesthesia, Set expectations for post-procedure period and Allowed opportunity for questions and acknowledgement of understanding      Vitals:  Vitals Value Taken Time   /80 05/05/22 2016   Temp 36.6  C (97.8  F) 05/05/22 2014   Pulse 58 05/05/22 2019   Resp 13 05/05/22 2019   SpO2 98 % 05/05/22 2019   Vitals shown include unvalidated device data.    Electronically Signed By: Sarah Vail  May 5, 2022  8:21 PM

## 2022-05-06 NOTE — PLAN OF CARE
VSS. Oxycodone and tylenol prn. Up ad kimberley. Reports mild headache. Voiding spontaneously. Tolerating regular diet. Neuro's intact. Reviewed discharge paperwork and medications  with patient and patient mother via telephone. Patient discharge home at 1300.

## 2022-05-06 NOTE — PLAN OF CARE
Major Shift Events: Patient in OR for EEG depth electrode removal. Antiepileptic medications resumed today. No seizures witnessed today. A&Ox4, some baseline tremors to extremities. VSS on RA, afebrile. Voiding via bathroom or urinal, no BM this shift. HA managed with PRN Oxycodone and Dilaudid x1    Plan: Continue to monitor and follow POC    For vital signs and complete assessments, please see documentation flowsheets.

## 2022-05-06 NOTE — DISCHARGE INSTRUCTIONS
Antiepileptic drugs:    Levetiracetam 500-500  Lamotrigine 150-150  Zonisamide 300-400    You should NOT be taking divalproex

## 2022-05-06 NOTE — BRIEF OP NOTE
[facility]    Brief Operative Note    Pre-operative diagnosis: Nonintractable epilepsy without status epilepticus, unspecified epilepsy type (H) [G40.909]  Post-operative diagnosis Same as pre-operative diagnosis    Procedure: Procedure(s):  Stereo EEG depth electrode removal  Surgeon: Surgeon(s) and Role:     * Jairon Mar MD - Primary     * Ciro Diaz MD - Resident - Assisting  Anesthesia: General   Estimated Blood Loss: 3cc    Drains: None  Specimens: * No specimens in log *  Findings:   None.  Complications: None.  Implants: * No implants in log *      Plan:  Admit back to 4A  CT in AM  SBP < 140 until AM  Pain control  Home tomorrow if pain is under control

## 2022-05-06 NOTE — CARE PLAN
Significant events: Post-op grid removal. Neuros intact, complaining of headache, seizure medications resumed.

## 2022-05-06 NOTE — PROGRESS NOTES
[facility], [place]   Neurosurgery Progress Note:    Assessment: Amrit Padilla is a 26 year old male with history of about 10 years of medically refractory epilepsy on PTA Depakote 1500mg daily, lamotrigine 150mg BID, Keppra 500 BID, and zonisamide 300mg-400mg.  He has two typical seizure types, typically at night while he is sleeping: they can occur GTCs or repetitive face/hand/arm jerking movements followed by unresponsiveness.  Past medical history is also notable for depression and Asperger syndrome.  He underwent stereotactic EEG placement of 16 electrodes with 2 ground electrodes on 4/25 by Dr. Adler.  He was admitted to  postoperatively, and postoperative head CT was notable for thin right convexity subdural hematoma, stable on repeat imaging.  vEEG was started on 4/26 AM, and PTA Depakote was discontinued.  Keppra was discontinued on 4/27. Lamictal was weaned 4/28, then discontinued 4/29.  Zonisamide was weaned 4/29, 4/30, and 5/2.  Seizure activity noted 4/30 into 5/1, resolved with Ativan.  Monitoring continued, and he was again noted to have two seizures in the AM of 5/4.  He returned to the OR and underwent removal of sEEG leads on 5/5 and returned to  posopteratively for close monitoring.  5/6 AM head CT showed no acute changes with stable right subdural heamtoma.    Coding query clarification:  4/25 CT showing 6mm right subdural hematoma with 2mm midline shift: Brain compression    Plan:  - Frequent neuro checks  - PTA antiepileptic wean coordination by Epileptology   - PTA Depakote discontinued (4/26)   - PTA Keppra discontinued (4/27)   - PTA Lamictal weaned from 150-150 to 75-75 (4/28), then discontinued (4/29)   - PTA zonisamide weaned from 500 BID to 300 BID (4/29) to 200 twice daily (4/30) to 100 twice daily (5/2)   - All PTA AEDs restarted 5/5  - Continuous vEEG  - Continue 1:1 for seizure monitoring; seizure precautions  - Pain control  - HOB > 30 degrees  -  Advance diet as tolerated  - Bowel regimen  - PRN antiemetics  - SCDs for DVT proph    -----------------------------------  Jemal Rosenberg M.D.  Neurosurgery Resident, PGY-3    Please contact neurosurgery resident on call with questions.    Dial * * [phone], enter [phone] when prompted.       Interval History/Subjective: OR for sEEG removal last night.  Doing well overnight, complaining of ongoing headache.  Head CT this AM stable.    Objective:   Temp:  [97.8  F (36.6  C)-99.1  F (37.3  C)] 98.2  F (36.8  C)  Pulse:  [48-71] 64  Resp:  [10-26] 12  BP: (118-144)/() 136/90  SpO2:  [91 %-98 %] 98 %  I/O last 3 completed shifts:  In: 1040 [P.O.:240; I.V.:800]  Out: 1153 [Urine:1150; Blood:3]    Gen: complaining of headache, NAD  Wound: headwrap in place  Neurologic:  - Alert & Oriented to person, place, time, and situation  - Follows commands briskly  - Speech fluent, spontaneous. No aphasia or dysarthria.  - No gaze preference. No apparent hemineglect.  - PERRL, EOMI  - Strong brow raise, eye closure, and smile  - Face symmetric with sensation intact to light touch  - Trapezii and sternocleidomastoid muscles 5/5 bilaterally  - No pronator drift     Del Tr Bi WE WF Gr   R 5 5 5 5 5 5   L 5 5 5 5 5 5    HF KE KF DF PF EHL   R 5 5 5 5 5 5   L 5 5 5 5 5 5     Reflexes symmetric    Sensation intact and symmetric to light touch throughout    LABS  Recent Labs   Lab Test 05/05/22 0413 05/04/22  0400 05/03/22  0435   WBC 9.5 9.1 8.7   HGB 14.4 14.5 14.6   MCV 92 93 93    337 347       Recent Labs   Lab Test 05/05/22 0413 05/04/22  0535 05/04/22  0400 05/03/22  0426     --  138 136   POTASSIUM 3.8  --  3.6 3.7   CHLORIDE 108  --  108 107   CO2 22  --  22 21   BUN 20  --  21 21   CR 0.79  --  0.78 0.86   ANIONGAP 7  --  8 8   ZAIDA 9.2  --  9.7 9.8   GLC 97 114* 106* 99       IMAGING  No results found for this or any previous visit (from the past 24 hour(s)).

## 2022-05-06 NOTE — ANESTHESIA POSTPROCEDURE EVALUATION
Patient: Amrit Padilla    Procedure: Procedure(s):  Stereo EEG depth electrode removal       Anesthesia Type:  General    Note:  Disposition: Inpatient   Postop Pain Control: Uneventful            Sign Out: Well controlled pain   PONV: No   Neuro/Psych: Uneventful            Sign Out: Acceptable/Baseline neuro status   Airway/Respiratory: Uneventful            Sign Out: Acceptable/Baseline resp. status   CV/Hemodynamics: Uneventful            Sign Out: Acceptable CV status; No obvious hypovolemia; No obvious fluid overload   Other NRE: NONE   DID A NON-ROUTINE EVENT OCCUR? No           Last vitals:  Vitals Value Taken Time   /80 05/05/22 2016   Temp 36.6  C (97.8  F) 05/05/22 2014   Pulse 55 05/05/22 2025   Resp 12 05/05/22 2025   SpO2 98 % 05/05/22 2025   Vitals shown include unvalidated device data.    Electronically Signed By: Sidra Dueñas MD  May 5, 2022  8:26 PM

## 2022-05-06 NOTE — DISCHARGE SUMMARY
[facility] Discharge Summary and Instructions    Amrit Padilla MRN# [id number]   Age: 26 year old YOB: 1995     Date of Admission:  4/25/2022  Date of Discharge::  5/6/2022  1:05 PM  Admitting Physician:  Tello Adler MD  Discharge Physician:  Tello Adler MD          Admission Diagnoses:   Partial symptomatic epilepsy with complex partial seizures, intractable, without status epilepticus (H) [G40.219]          Discharge Diagnosis:     Partial symptomatic epilepsy with complex partial seizures, intractable, without status epilepticus (H) [G40.219]     In addition to the assessment of diagnoses detailed above, this 26 year old male  patient admitted has the following conditions contributing to the complexity of their medical care:         Procedures:   4/25: JAYDEN-assisted stereotactic placement of bilateral depth electrodes for invasive video electroencephalography monitoring  5/5: Stereo EEG depth electrode removal           Brief History of Illness:    Amrit Padilla is a 25 y/o gentleman with medically refractory focal epilepsy, depression, Asperger syndrome .His seizures began about 10 years ago which have become less frequent over time, but he has never been seizure-free. Estimates he has been on about 6-8 AEDs since he began having seizures without any time period of seizure freedom. He and his parents note that it took about 2 weeks of monitoring for him to have enough of his typical seizures recorded during his EMU stay for vEEG. Amrit Padilla has elected to undergo above-mentioned procedure.           Hospital Course:   He underwent stereotactic EEG placement of 16 electrodes with 2 ground electrodes on 4/25 by Dr. Adler.  He was admitted to  postoperatively, and postoperative head CT was notable for thin right convexity subdural hematoma, stable on repeat imaging.  vEEG was started on 4/26 AM, and PTA Depakote was discontinued.  Keppra was discontinued on 4/27.  Lamictal was weaned 4/28, then discontinued 4/29.  Zonisamide was weaned 4/29, 4/30, and 5/2.  Seizure activity noted 4/30 into 5/1, resolved with Ativan.  Monitoring continued, and he was again noted to have two seizures in the AM of 5/4.  At this point it was deemed that there was sufficient electrograhic data for localizing. He returned to the OR and underwent removal of sEEG leads on 5/5 and returned to  posopteratively for close monitoring.  5/6 AM head CT showed no acute changes with stable right subdural heamtoma.     On hospital day 12 , he was ambulating, voiding without a lira, eating a regular diet, pain was well controlled and therefore he was discharged.          Discharge Medications:     Current Discharge Medication List      START taking these medications    Details   oxyCODONE (ROXICODONE) 5 MG tablet Take 1 tablet (5 mg) by mouth every 4 hours as needed for breakthrough pain  Qty: 12 tablet, Refills: 0    Associated Diagnoses: Nonintractable epilepsy without status epilepticus, unspecified epilepsy type (H)      polyethylene glycol (MIRALAX) 17 GM/Dose powder Take 17 g by mouth daily for 7 days  Qty: 510 g, Refills: 0    Associated Diagnoses: Nonintractable epilepsy without status epilepticus, unspecified epilepsy type (H)      senna-docusate (SENOKOT-S/PERICOLACE) 8.6-50 MG tablet Take 1 tablet by mouth 2 times daily as needed for constipation  Qty: 14 tablet, Refills: 0    Associated Diagnoses: Nonintractable epilepsy without status epilepticus, unspecified epilepsy type (H)         CONTINUE these medications which have NOT CHANGED    Details   cetirizine (ZYRTEC) 10 MG tablet Take 10 mg by mouth daily as needed       FLUoxetine (PROZAC) 40 MG capsule Take 80 mg by mouth daily Reported per pt      HEMP OIL OR EXTRACT OR OTHER CBD CANNABINOID, NOT MEDICAL CANNABIS, Take 15 mg by mouth every evening       lamoTRIgine (LAMICTAL) 150 MG tablet Take 1 tablet (150 mg) by mouth 2 times daily  Qty:  180 tablet, Refills: 3    Associated Diagnoses: Recurrent seizures (H)      levETIRAcetam (KEPPRA) 500 MG tablet Take 1 tablet (500 mg) by mouth 2 times daily  Qty: 60 tablet, Refills: 6    Associated Diagnoses: Recurrent seizures (H)      LORazepam (LORAZEPAM INTENSOL) 2 MG/ML (HIGH CONC) solution Take 1 ml (2mg) for 2 focal seizures in 4 hours or for any generalized tonic-clonic seizure. Max 2 ml (4mg) in 24 hours.  Qty: 30 mL, Refills: 1    Associated Diagnoses: Recurrent seizures (H)      MELATONIN PO Take 10 mg by mouth At Bedtime OTC      propranolol (INDERAL) 10 MG tablet as needed      zonisamide (ZONEGRAN) 100 MG capsule TAKE 3 CAPS (300 MG) EACH MORNING AND 4 CAPS (400 MG) EACH NIGHT  Qty: 630 capsule, Refills: 3    Associated Diagnoses: Partial symptomatic epilepsy with complex partial seizures, intractable, without status epilepticus (H)         STOP taking these medications       aspirin-acetaminophen-caffeine (EXCEDRIN MIGRAINE) 250-250-65 MG tablet Comments:   Reason for Stopping:                  Discharge Instructions and Follow-Up:     Discharge diet: Regular   Discharge activity: You may advance activity as tolerated. No strenuous exercise or heay lifting greater than 10 lbs for 4 weeks or until seen and cleared in clinic.   Discharge follow-up: Follow-up with Neurosurgery JOHN or Dr. Adler in 2 weeks for wound check/post-hospital evaluation and suture removal, with a repeat head CT.     Wound care: Ok to shower,however no scrubbing of the wound and no soaking of the wound, meaning no bathtubs or swimming pools. Pat dry only. Leave wound open to air.  Patient to have wound checked 2 weeks following surgery.    Wound location: scalp  Closure technique: primary closure with nylon  Dressing needs: can keep open  Post-op care at follow-up: Keep dry and clean     Please call if you have:  1. increased pain, redness, drainage, swelling at your incision  2. fevers > 101.5 F degrees  3. with any  questions or concerns.  You may reach the Neurosurgery clinic at [phone] during regular work hours. ER at [phone].    and ask for the Neurosurgery Resident on call at [phone], during off hours or weekends.         Discharge Disposition:     Discharged to home        Avery Armstrong, MS4  [employer]          Verona Marshall MD  Neurosurgery PGY2    Please contact neurosurgery resident on call with questions.    Dial * * [phone], enter [phone] when prompted.

## 2022-05-09 ENCOUNTER — VIRTUAL VISIT (OUTPATIENT)
Dept: NEUROLOGY | Facility: CLINIC | Age: 27
End: 2022-05-09
Payer: COMMERCIAL

## 2022-05-09 DIAGNOSIS — G40.219 PARTIAL SYMPTOMATIC EPILEPSY WITH COMPLEX PARTIAL SEIZURES, INTRACTABLE, WITHOUT STATUS EPILEPTICUS (H): Primary | ICD-10-CM

## 2022-05-09 NOTE — PROGRESS NOTES
"Discharge phone call:    Patient was admitted to [facility] from 4/25 to 5/6 for characterization of seizures.    Admission diagnosis:  Focal epilepsy    Discharge diagnosis:  Multifocal epilepsy    Patient's understanding of the discharge diagnosis: Dr. Dukes said there is a possibility that the seizures are coming from more than one spot. He might go with a DBS.     Hospital course:  \"He underwent stereotactic EEG placement of 16 electrodes with 2 ground electrodes on 4/25 by Dr. Adler.  He was admitted to  postoperatively, and postoperative head CT was notable for thin right convexity subdural hematoma, stable on repeat imaging.  vEEG was started on 4/26 AM, and PTA Depakote was discontinued.  Keppra was discontinued on 4/27. Lamictal was weaned 4/28, then discontinued 4/29.  Zonisamide was weaned 4/29, 4/30, and 5/2.  Seizure activity noted 4/30 into 5/1, resolved with Ativan.  Monitoring continued, and he was again noted to have two seizures in the AM of 5/4.  At this point it was deemed that there was sufficient electrograhic data for localizing. He returned to the OR and underwent removal of sEEG leads on 5/5 and returned to  posopteratively for close monitoring.  5/6 AM head CT showed no acute changes with stable right subdural heamtoma.      On hospital day 12 , he was ambulating, voiding without a lira, eating a regular diet, pain was well controlled and therefore he was discharged.\"    Medications confirmed: on keppra until further notice, lamotrigine, zonisamide.     Questions regarding admission or discharge instructions: Can he restart melatonin and CBD?     Confirmed follow up scheduled:  5/16/22 with Dr. Dukes; 5/17 with Dr. Adler.     Pérez Moss RN  Epilepsy Nurse Coordinator      "

## 2022-05-09 NOTE — PLAN OF CARE
Physical Therapy Discharge Summary    Reason for therapy discharge:    Discharged to home.  All goals and outcomes met, no further needs identified.    Progress towards therapy goal(s). See goals on Care Plan in [facility] electronic health record for goal details.  Goals met    Therapy recommendation(s):    No further therapy is recommended.

## 2022-05-10 NOTE — OP NOTE
PATIENT NAME: AMRIT KNIGHT  YOB: 1995  MRN:   [id number]  ACCOUNT:  [id number]      DATE OF PROCEDURE:  05/05/2022    PREOPERATIVE DIAGNOSIS:    1.  Partial symptomatic epilepsy with complex partial seizures, intractable, without status epilepticus  2.  Status post JAYDEN assisted placement of 16 depth electrodes bilaterally on 4/25/2022  3.  Completion of video EEG monitoring    POSTOPERATIVE DIAGNOSIS:  1.  Partial symptomatic epilepsy with complex partial seizures, intractable, without status epilepticus  2.  Status post JAYDEN assisted placement of 16 depth electrodes bilaterally on 4/25/2022  3.  Completion of video EEG monitoring    PROCEDURE PERFORMED:    1.  Removal of sixteen bilateral depth electrodes, eight from the right side and eight from the left side  2.  Removal of two subgaleal ground electrodes    ATTENDING SURGEON:  Jairon Mar MD, PhD     RESIDENT SURGEON:  Ciro Diaz MD      ANESTHESIA:  General endotracheal.       ESTIMATED BLOOD LOSS:  3 mL.       COMPLICATIONS:  None.    EXPLANTS:  Sixteen depth electrodes along with sixteen anchoring bolts and sixteen caps and two subgaleal strip ground electrodes.    FINDINGS:  Eight right side depth electrodes removed as intact electrodes.  Eight left side depth electrodes removed as intact electrodes.  Two grounding strip electrodes were removed, both as intact electrodes.  No evidence of infection noted.    INDICATION FOR PROCEDURE:  Mr. Amrit Knight is a 26 year old right handed male with a history of epilepsy, Asperger's disorder and depression.  For the past several months, he has been having about 2 seizures per week, mostly nocturnal, and he is currently on 4 antiepileptic agents.  His case was discussed at the [facility] Case Management Conference.  He was considered to be a good candidate for invasive video EEG monitoring.  He recently underwent JAYDEN assisted stereotactic placement of sixteen bilateral depth electrodes and two  subgaleal ground electrodes on 4/25/2022.  He tolerated the procedure well and there were no complications.  However, his postoperative imaging did show a subdural hematoma on the right temporal area.  He underwent video EEG monitoring in the neuro-ICU.  With his seizures recorded, his case was again discussed at the [facility] Case Management Conference.  It was determined that he had adequate number of seizures recorded and the neurology team requested that the electrodes be removed.  Dr. Tello Adler, the attending neurosurgeon, was not available so the removal surgery was scheduled with me, as it was a removal procedure.  Risks, benefits and alternative therapies were discussed with the patient, including but not limited to bleeding, infection.  Risks associated with general anesthesia, such as heart attack, pneumonia, stroke and death were also discussed.  All questions were answered and he expressed understanding.  Dr. Adler also discussed the plan with the patient's mother who was in agreement.    DESCRIPTION OF PROCEDURE:  The patient was brought to the operating room by Anesthesia colleagues.  He was laid supine on the operative bed.  With the placement of the endotracheal tube, general anesthesia was obtained.  The patient was positioned supine on the bed with his head on the donut foam, with his head in the neutral position.  All pressure points were padded.  His head wrap was removed.  The dressings were then taken off.  The electrodes were then all trimmed, leaving behind approximately 1 cm tails for each electrodes.  The patient's head and the electrodes and hardware were cleaned with betadine.  Then, the anchoring 3-0 Nylon sutures, Xeroform dressing and Biopatches were removed.  All the 3-0 Nylon sutures that were used to reapproximate the incisions were also removed.  The bilateral implant sites were then cleaned, prepped and draped in routine surgical fashion.  Time out was then performed  confirming the patient's identity, procedure to be performed, site and side of the surgery, imaging corresponding with the patient and the administration of preoperative prophylactic antibiotics.  The incision sites for the ground electrodes were injected with local anesthetic, 1% Lidocaine with epinephrine mixed with 1/4% Marcaine plain, 50:50 mix.  Total of 3 mL were used.    The two strip ground electrode insertion sites were re-opened using a blunt dissection technique.  There were two independent sites, with the wires being tunneled out through a separate stab incision.  The previous closing sutures were cut and removed.  Then, two 1 x 2 strip electrodes which were used as ground electrodes were pulled out of the subgaleal space.  Both electrodes came out with ease.  It was inspected to be complete.  There were no active bleeding noted.  There were no signs of infection.  With the electrodes removed, the area was again generously irrigated.  Meticulous hemostasis was obtained.  The two linear incisions were closed in a following manner.  The galeal layer was reapproximated using 3-0 Vicryl sutures in an inverted interrupted fashion.  The skin was reapproximated using 3-0 Nylon suture in running fashion.  The two wire exit wounds were closed using a 3-0 Nylon suture in a figure of eight fashion.    Attention was then turned to the removal of the 16 bilateral electrodes, 8 from the right side and 8 from the left side.  All the electrodes were removed in a similar fashion as below.    The plastic cap was loosened and disconnected from the anchor bolt post.  Then, the electrode was pulled out.  It came out with ease.  There were no active bleeding noted.  The electrode tip was visualized, confirming that the electrode was removed completely.  Then, using a mosquito, the anchor bolt post held and was then unscrewed from the bone and removed.  The electrode insertion site looked clean with no signs of infection.  The  electrode insertion site was closed with 3-0 Nylon suture for water tight closure, after it was generously irrigated and dried.  Meticulous hemostasis was also obtained.    The above procedure was repeated for all 16 sites.  The following were removed and confirmed to be complete at the end of the procedure.    Four 12 contact depth electrodes  Eight 14 contact depth electrodes  Four 16 contact depth electrodes  Sixteen anchor bolts, 25 mm length  Sixteen caps  Two 1 x 2 subdural strip electrodes as grounds.    All of the electrode insertion sites and incisions were then cleaned and re-prepped and dry sterile dressing was applied.  Then, the drapes were taken down.    Patient was further awakened, extubated and taken to the recovery room in a stable condition.  At the end of the case, all counts including needle, sponge and instrument counts were correct x 2.  There were no complications.     IJaquan M.D., Ph.D., Neurosurgery Attending, was present and scrubbed for the entire case and performed the key and critical portions of the case.       JAQUAN EARLY MD, PHD

## 2022-05-16 ENCOUNTER — OFFICE VISIT (OUTPATIENT)
Dept: NEUROLOGY | Facility: CLINIC | Age: 27
End: 2022-05-16
Payer: COMMERCIAL

## 2022-05-16 VITALS
HEIGHT: 72 IN | SYSTOLIC BLOOD PRESSURE: 113 MMHG | HEART RATE: 68 BPM | WEIGHT: 200.2 LBS | TEMPERATURE: 97.3 F | BODY MASS INDEX: 27.12 KG/M2 | DIASTOLIC BLOOD PRESSURE: 78 MMHG

## 2022-05-16 DIAGNOSIS — G40.909 RECURRENT SEIZURES (H): Primary | ICD-10-CM

## 2022-05-16 NOTE — PROGRESS NOTES
CHIEF COMPLIANT:  Follow-up intracranial monitoring for epilepsy surgical evaluation.     HISTORY OF PRESENT ILLNESS:  In person visit today who is accompanied by his father, Amina Marcus is a 26-year-old right handed man with history of epilepsy, Asperger's syndrome and depression who was recently admitted for intracranial monitoring with depth electrodes in May surgical evaluation for intractable epilepsy.  He was discharged from the hospital on 5/6/2022 and he has been recovering at home for the past 10 days.     Intracranial monitoring was complicated by the development of a right-sided subdural hematoma which is stable with repeat CT head scheduled for tomorrow.  Clinically headaches are improving although he is endorsing scalp discomfort from the stitches which he hopes to have out tomorrow.  He has follow-up scheduled with Dr. Adler.  He is taking approximately 1000 mg acetaminophen twice daily for headache pain.  He describes headache pain as a 5/10 and relieved to about a 4/10 with Tylenol.  He was advised not to use Excedrin or other medications with antiplatelet effect in the setting of subdural hematoma at time of discharge.    Patient was monitored for 10 days with intracranial electrodes during which time he had 4 electroclinical seizures.  All of the seizures occurred out of sleep which are consistent with his semiology.  He was found to have bilateral upper extremity tonic extension elevation at seizure onset which would followed by generalized activity.  Depth electrodes were in unable to fully localize seizure onset and monitoring was terminated after 4 electroclinical seizures as further data was felt to be unnecessary to determine treatment course.  Dr. Dukes discussed deep brain stimulation (DBS) with patient during hospitalization and today's visit is to further discuss treatment options after intracranial monitoring.    Since discharge from the hospital no known electroclinical seizures, all  of his seizures out of sleep and he is not always aware of seizures with that said no postictal confusion appreciated by dad and Amrit has not had any cuts or abrasions in the mouth to suggest a seizure.    Needs to follow-up with Dr. Ben Barros for psychiatric care.  He expressed feeling overwhelmed today by the discussion of epilepsy treatment and surgical options.    Intracranial monitoring 4/25/2022 - 5/5/2022 - 4  seizures were captured, but there was concern that we were not capturing the seizure onset zone and early rapid spread as well as multifocal regions of seizure propagation are possibilities. These suggested that a localized surgical approach would be unlikely to provide seizure freedom. Additionally, it was felt that further seizure recording would not clarify or .     CMC was done in Aug. 2021, consensus was that patient should proceed with invasive monitoring. He should be able to tolerate intracranial monitoring.  The lateralization and localization are not clear based on scalp EEG monitoring. Temporal lobe epilepsy is possible. We will have invasive depth electrodes, 8 symmetric electrodes on each side:     Amygdala: 1  Anterior and posterior hippocampus: 3  Cingulate and prefrontal: 2  Orbital frontal:1  Insula: 1     He saw Dr. Adler on 11/2/2021 for consultation for invasive monitoring. He is scheduled for F-Gator MRI and CTA on Dec 12/3/2021.     For the past several months, he was having about 2 seizures per week, mostly nocturnal seizures, about 80-90%. He is currently taking aptiom 1200 mg qhs,  zonegran 300-400, Lamictal 150 mg bid, Depakote 500 - 1000. He is also taking CBD oil for anxiety.  Double vision resolved after aptiom was discontinued.     He is complaining of double vision, blurred vision.  He also complains of hand tremor. He had a really bad tremor day about one day ago.     He had VEEG monitoring for presurgical evaluation in March 2021. Total of 9  epileptic seizures were recorded during this admission. These electroclinical seizures showed bifrontal EEG activities at onset, usually with higher amplitude initial activity over the right frontal region compared to the left.  Ictal manifestations variably included tonic stiffening of his upper body, kicking of his legs, vocalizations, and non-purposeful movements of surrounding objects.  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  Aptiom was discontinued at discharge.      No plan for suicide or self injury     Since the hospital discharge, he continue to have frequent seizures, probably several times per week.     He was complaining of vertigo spells, he was seen in the ED.  He was told that he has vestibular problems.       He continue to have small twitches that last for a spit of a second, some of them in clusters. No LOC with these twitches. These likely are not seizures.      Patient tried Peoples diet, but did not continue.     He is seeing Dr. Ben Barros for his depression.  He is also seeing Deo Shipman for therapy. He is complaining of fatigue and sleep over 8-10 hours a day. He is very concerned about the fatigue and is questioning what other options we have for treating his seizures.      They are now open to VEEG monitoring for surgical evaluation, resection or neuromodulations. However, they are still not sure if they want to move forward to the surgical treatment.     Mood improved a lot. No suicidal thoughts, no thoughts of hurting himself.     The patient started to have seizures when he was 16 years old and he was managed at the [facility] for the past few years for his seizures.  He is currently taking Aptiom 1200 mg q.h.s. and the Zonegran 300 mg b.i.d. for his epilepsy.  He was recently admitted to the hospital twice because of 2 clusters of seizures, one in January when he was admitted to [facility] and one was in February when he was admitted to [facility].  On both occasions,  "he had 4-5 back to back generalized tonic-clonic seizures within a short period of time.      According to the mother, the patient has 2 different types of seizures.  Type #1 is described as \"mild seizures,\" during which he will have some facial twitching.  He will have repetitive hand/arm jerking movement.  He will become unresponsive for less than a minute.  Postictally, he was having some confusion and headaches and fatigue.  Before the seizures, he will have an aura of \"seeing bees populating the earth with eggs.\"  This type of seizure is relatively infrequent, average once per month.      Type #2 are generalized tonic-clonic seizures which the patient will have generalized convulsions of the body and his body will be stiff.  It usually will last for 1-2 minutes followed by postictal fatigue and hallucinations.  This happens usually once every 3 months, but sometimes it occurs in clusters with 2-5 seizures back to back.      The patient had tried Keppra and Depakote in the past which did not work.  He had EEGs done in the past which were reported negative.      The patient had severe depression and requested a lot of behavioral health and counseling in the past and at certain time, he was suicidal, but that improved.  At the present time, he is not suicidal.  He does not think about hurting himself.     TRIGGERS FOR SEIZURES:  Depression.      RISK FACTORS FOR SEIZURES:  He had no history of head trauma with loss of consciousness.  No history of febrile convulsions.  No history of CNS infections.      PAST AEDs: Keppra, Depakote. Vimpat caused suicidal ideations. Depakote discontinued due to risk of platelet dysfunction during intracranial monitoring, and patient resumed Keppra.      PAST MEDICAL HISTORY:  Asperger's disorder, depression, epilepsy.      PAST SURGICAL HISTORY:  Depth electrode intracranial placement for epilepsy surgical evaluation      FAMILY HISTORY:  No family history of seizures.  Both " parents have hypertension.  Maternal grandmother had heart disease.      SOCIAL HISTORY:  He is single, living with his mother.  He had some college education but did not finish because of depression; he is hoping to return to college soon.  He is currently working at [employer] and plans to return to work soon.  No smoking, no alcohol, no drug abuse.      REVIEW OF SYSTEMS:  Targeted ROS per HPI>      ALLERGIES:  Sertraline and Zyprexa.     PHYSICAL EVALUATION:   /78   Pulse 68   Temp 97.3  F (36.3  C) (Temporal)   Ht 1.829 m (6')   Wt 90.8 kg (200 lb 3.2 oz)   BMI 27.15 kg/m     General - No acute distress.   HEENT - Sutures over scalp all intact.   Neurologic   Mental status - Alert, attentive, oriented. Speech is fluent and comprehension intact.   Cranial nerves - Visual fields intact. EOMI, face symmetric, tongue midline. No dysarthria. Hearing intact to voice.   Motor - No pronator drift. Moving all extremities equally. Symmetric finger tapping.   Coordination - FNF intact.   Gait - Steady, casual gait.     DIAGNOSTIC TESTING: CT scan of the head on 02/27/2018 was reported negative from [facility].  He had EEGs in the past which were reported negative    MRI brain with and without contrast 8/5/2021 [facility] - No supratentorial lesions or significant hippocampal asymmetric/evidence for pathology. Stable cerebellar 7 mm T2 hypertense lesion.     MRI 7 Ernestine 4/22/2022 [facility] -   Impression: Motion degraded exam.  1. No definite temporal lobe abnormality, mass, or congenital lesion  identified as a cause of the patient's seizures.  2. No evidence of abnormal enhancement intracranially.   3. Previously seen abnormal T2 signal in the right cerebellum is not  identified in today's exam, likely obscured secondary to  susceptibility artifacts in the posterior fossa, a less likely  possibility is that the lesion could have resolved.    IMPRESSION:   1. Epilepsy   Patient with recent  intracranial monitoring for epilepsy surgery consideration.  We did not capture the precise onset zone of habitual seizures during invasive monitoring.  At time of discharge we initiated conversation regarding next steps and recommendations for deep brain stimulation (DBS).  Today visit predominantly around education and follow-up after hospitalization.  We reviewed that there are 2 main surgical approaches for epilepsy surgery with one being resected surgery and a second neuromodulation.  Since we are unable to localize precise seizure onset zone surgical resection is not an option.  We then discussed neuromodulation including vagus nerve stimulation, responsive neurostimulation, and deep brain stimulation.  Of these 3 vagus nerve stimulation and deep brain stimulation are options for the patient as RNS also requires a precise localization.  We are recommending deep brain stimulation for treatment of intractable epilepsy.  We discussed risk factors with DBS with the most notable being mood and depression related side effects that can be seen in up to approximately 13% of patients.  Other risk factors were discussed as well.  Mood related side effects are notable for Amrit as he has a history of depression and we discussed that this in some detail.  We also talked about him returning to school and we feel that there is no barrier as long as it does not put him under undue stress in the short-term.  There is also consideration for timing of DBS surgery if they proceed in that direction.  We went over the outline of the schedule which involves DBS placement followed by device being turned on 1 to 2 months later and every 3-month programming visit initially.  Amrit and his father asked good questions related to DBS treatment.  We did discuss that DBS is unlikely to result in seizure freedom but that there is increasing benefit over time with seizure during reduction with benefits improving over multiple years based  on most recent literature.  Patient is good to meet with Dr. Adler tomorrow and review more details related to be DBS with him.    2. Asperger's syndrome   3.  Depression he follows with Dr. Reyes Barros and Deo Shipman for therapy.   4 tremors.  Secondary to Depakote or psychological?    PLAN:   1.  Continue Zonegran 300 mg - 400 mg, Lamictal 150 mg twice daily, Keppra 500 mg twice daily  2.  Follow-up with Dr. Adler tomorrow  3.  Continue follow-up with Dr. Barros for depression.  Continue therapy.  4.  No restriction to resuming CBD and melatonin  5.  Discussed returning to school and work.  Weight limit restrictions per Dr. Adler.  6.  Platelet restrictions per Dr. Adler  7.  Follow-up in person in 2 months.    Patient was seen and evaluated with attending, Dr. Dukes.     Anais Cross MD  Epilepsy Fellow           Neurology Attending Note:    I have seen and examined the patient with Dr. Carvalho.  I have reviewed the labs and imaging results available.  I agree with the assessment and plan.    Norman Dukes MD            I have spent 45 min total time on the day of this visit.      36 min was spent on face to face time  7 min was spent on preparation of visit to review charts and labs, ordering medications and tests  2 min was spent on documentation of clinical information

## 2022-05-16 NOTE — TELEPHONE ENCOUNTER
"[facility] 5/4/2022     Attendees:  Neurologists: Dr. Temple, Dr. Etienne, Dr. Mcleod,  Dr. Freeman, Dr. Blake, Dr. Dukes, Neurosurgeons: Dr. Infante, Dr. Adler, Neuropsychologist: Dr. Portillo, Physician Assistant: Annia Iglesias,  Nurses: Marek Mccabe, Yu Alvarado, EEG Technologists.         HISTORY OF PRESENT ILLNESS:  25-year-old right-handed male with long history of epilepsy, Asperger's disorder, depression.  He was previously seen by Dr. Morales at [facility] for his epilepsy.     Seizures started when he was 16 years old and he was managed at the [facility] in the past for his seizures. However, seizures were poorly controlled with multiple AEDs.  He is currently taking Zonegran 300-400 mg, Lamictal 150 mg bid. Depakote -1000.  History of seizure clusters and was admitted to hospital for this multiple times, including [facility] and [facility]  On both occasions, he had 4-5 back to back generalized tonic-clonic seizures within a short period of time.      The patient has 2 different types of seizures.  Type #1: no warning, \"mild seizures,\" during which he will have some facial twitching and head jerking.  He will have fist clench and repetitive hand/arm jerking movement.  He will become unresponsive for less than a minute.  Postictally, he was having some confusion and headaches and fatigue. This occurs weekly.     Type #2 are generalized tonic-clonic seizures which the patient will have generalized convulsions of the body and his body will be stiff.  It usually will last for 1-2 minutes followed by postictal fatigue and hallucinations.  It may occurs in clusters with 2-5 seizures back to back. These happens maybe once a week.     The patient had history of severe depression and requested a lot of behavioral health and counseling in the past and at certain time, he was suicidal, but that improved.  At the present time, he is not suicidal.  He does not think about hurting himself.  He is seeing Dr. Ben Barros for " his depression.  He is also seeing Deo Shipman for therapy.  Dr. Barros felt that patient should have no problem tolerating intracranial recording and epilepsy surgery.     TRIGGERS FOR SEIZURES:  Maybe stress.      RISK FACTORS FOR SEIZURES:  He had no history of head trauma with loss of consciousness.  No history of febrile convulsions.  No history of CNS infections.  Diagnosed with Aspergers in 4th grade     ALLERGIES:  Sertraline and Zyprexa.     Current AEDs: Zonegran 300-400 mg, Lamictal 150 mg bid. Depakote -1000. CBD oil (not medical cannabis).     Other Meds: ProZac 80 mg every day, Melatonin, CBD oil     PAST AEDs: Keppra, Aptiom, Vimpat caused suicidal ideations.     PAST MEDICAL HISTORY:  Asperger's disorder, depression, epilepsy.      PAST SURGICAL HISTORY:  None.      FAMILY HISTORY:  No family history of seizures.  Both parents have hypertension. Maternal grandmother had heart disease.      SOCIAL HISTORY:  He is single, living with his mother in [place].  He had some college education but did not finish because of depression.  He is currently working at [employer]. Mother is his main social support.  No smoking, no alcohol, no drug abuse.      REVIEW OF SYSTEMS:  Positive for fatigue, depression.     PHYSICAL EXAMINATION:       Non-focal.     PREVIOUS DIAGNOSTIC TESTING:       MRI Brain (4/4/2018):  Non-specific, solitary T2/FLAIR hyperintensity lesion in the upper posterior right cerebellar hemisphere measuring up to 7 mm.  No MTS or other lesions.     MRI (8/5/2021):  1. Stable appearance of a 7 mm T2 hyperintense focus in the right posterior lateral cerebellar hemisphere. This may represent a hamartomatous lesion, and less likely a small glioma given minimal disruption of the cerebellar architecture.  2. No supratentorial lesions or significant hippocampal asymmetry/evidence for pathology.     PET (5/21/2021)  Subthreshold metabolic deficits in the inferior frontal and temporal  lobes  bilaterally. Given history of bifrontal epileptic activity,  these deficits may represent interictal hypometabolism in  epileptogenic regions.     Neuropsych Testing:  Mr. Padilla demonstrated a pattern of weaknesses that is consistent with mild bilateral frontal and subcortical system dysfunction.  The weaknesses in this exam are likely multifactorial, with contributions from his known seizure disorder, as well as from medication toxicity.  Additionally, individuals with autism spectrum disorder often have difficulties with frontal lobe mediated cognitive abilities. In this exam, weaknesses were identified in verbal fluency, verbal working memory, some aspects of executive function, cognitive speed, and bilateral psychomotor speed.  Other cognitive abilities, including anterograde memory for both verbal and nonverbal information, were intact and performed in keeping with his above average range cognitive baseline.  He is also reporting severe symptoms of depression, and mild symptoms of anxiety.  It may be the case that these psychological factors contributed to some degree of variability in his thinking, although I do not think that we can attribute all of his cognitive weaknesses to psychological factors.     VEEG monitoring:      He had VEEG monitoring for presurgical evaluation in March 2021. Total of 9 epileptic seizures were recorded during this admission. These electroclinical seizures showed bifrontal EEG activities at onset, usually with higher amplitude initial activity over the right frontal region compared to the left.  Ictal manifestations variably included tonic stiffening of his upper body, kicking of his legs, vocalizations, and non-purposeful movements of surrounding objects.  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         SUMMARY OF 15 DAYS OF VIDEO-EEG MONITORING:  The patient's home medications were gradually tapered during his inpatient stay, with all four anticonvulsants  being discontinued as of Day 10 of video EEG monitoring. During 15 days of video EEG monitoring the background showed intermittent theta slowing during waking consistent with a mild generalized encephalopathy. Additionally, intermittent midline slowing was seen which was of uncertain significance.     Occasional epileptiform discharges were seen with maximum negativity at Fp2-F4, and F8-T4.      A total of 9 focal impaired (complex partial) seizures were recorded during 15 days of VEEG. These seizures involved tonic stiffening of his upper body as well as kicking of his legs. Vocalizations were present in some seizures, and non-purposeful movements of surrounding objects were seen with many of the seizures. One of these seizures occurred while the leads were not hooked up. The 8 seizures which were seen on EEG involved bifrontal onset followed closely by significant muscle artifact. While onset was bifrontal, multiple seizures involved higher amplitude initial activity over the right frontal region compared to the left. These included the second seizure on Day 2 of recording, the second seizure on Day 10 of recording, the first seizure on Day 12, and the first seizure on Day 13. The seizure activity quickly spread over bilateral hemispheres. Greater amplitude slowing was often seen during seizures when muscle artifact cleared. Overall, the patient's seizure semiology is most consistent with a frontal lobe semiology.  Additionally, seizure onset supports a bifrontal onset, with numerous seizures supporting right frontal onset. Multiple events without ictal correlate were seen involving a range of symptoms from spacing out to feeling jittery to feeling as though someone else were controlling him.     IMPRESSION:   1.  Epilepsy.       He is currently taking aptiom 1200 mg qhs,  zonegran 300-400, Lamictal 150 mg bid, Depakote 500 - 1000. He is also taking CBD oil for anxiety.      I had extensive discussions in the  past with the patient and mother regarding different treatment options for epilepsy, including meds, surgery, neuromodulation and diet treatment.     2.  Asperger's syndrome.   3.  Depression.  He is followed by Dr. Ben Barros and Deo Shipman for therapy.  No plan for suicide or self injury.     Consensus: (8/11/2021)  Patient should be able to tolerate intracranial monitoring.  The lateralization and localization are not clear based on scalp EEG monitoring. Temporal lobe epilepsy is possible. We will have invasive depth electrodes, 8 symmetric electrodes on each side:     Amygdala: 1  Anterior and posterior hippocampus: 3  Cingulate and prefrontal: 2  Orbital frontal:1  Insula: 1     Consensus: (4/20/202)  Patient should be able to tolerate intracranial monitoring.  The lateralization and localization are not clear based on scalp EEG monitoring. Temporal lobe epilepsy is possible. We will have invasive depth electrodes, 8 symmetric electrodes on each side:     Amygdala: 1, superior approach  Anterior and posterior hippocampus: 3, Lateal approach  Cingulate and prefrontal: 2, Lateral approach  Orbital frontal:1, Lateral approach  Insula: 1, superior approach     Patient is currently on Depakote, which may impact platelet function during surgery. He should get off Depakote, and start keppra instead.     Consensus: (5/4/202)

## 2022-05-16 NOTE — LETTER
2022     RE: Amrit Padilla  : 1995   MRN: [id number]      Dear Colleague,    Thank you for referring your patient, Amrit Padilla, to the [facility] EPILEPS[facility] at [facility]. Please see a copy of my visit note below.    CHIEF COMPLIANT:  Follow-up intracranial monitoring for epilepsy surgical evaluation.     HISTORY OF PRESENT ILLNESS:  In person visit today who is accompanied by his father, Amina Marcus is a 26-year-old right handed man with history of epilepsy, Asperger's syndrome and depression who was recently admitted for intracranial monitoring with depth electrodes in May surgical evaluation for intractable epilepsy.  He was discharged from the hospital on 2022 and he has been recovering at home for the past 10 days.     Intracranial monitoring was complicated by the development of a right-sided subdural hematoma which is stable with repeat CT head scheduled for tomorrow.  Clinically headaches are improving although he is endorsing scalp discomfort from the stitches which he hopes to have out tomorrow.  He has follow-up scheduled with Dr. Adler.  He is taking approximately 1000 mg acetaminophen twice daily for headache pain.  He describes headache pain as a 5/10 and relieved to about a 4/10 with Tylenol.  He was advised not to use Excedrin or other medications with antiplatelet effect in the setting of subdural hematoma at time of discharge.    Patient was monitored for 10 days with intracranial electrodes during which time he had 4 electroclinical seizures.  All of the seizures occurred out of sleep which are consistent with his semiology.  He was found to have bilateral upper extremity tonic extension elevation at seizure onset which would followed by generalized activity.  Depth electrodes were in unable to fully localize seizure onset and monitoring was terminated after 4 electroclinical seizures as further data was felt to be unnecessary  to determine treatment course.  Dr. Dukes discussed deep brain stimulation (DBS) with patient during hospitalization and today's visit is to further discuss treatment options after intracranial monitoring.    Since discharge from the hospital no known electroclinical seizures, all of his seizures out of sleep and he is not always aware of seizures with that said no postictal confusion appreciated by dad and Amrit has not had any cuts or abrasions in the mouth to suggest a seizure.    Needs to follow-up with Dr. Ben Barros for psychiatric care.  He expressed feeling overwhelmed today by the discussion of epilepsy treatment and surgical options.    Intracranial monitoring 4/25/2022 - 5/5/2022 - 4  seizures were captured, but there was concern that we were not capturing the seizure onset zone and early rapid spread as well as multifocal regions of seizure propagation are possibilities. These suggested that a localized surgical approach would be unlikely to provide seizure freedom. Additionally, it was felt that further seizure recording would not clarify or .     CMC was done in Aug. 2021, consensus was that patient should proceed with invasive monitoring. He should be able to tolerate intracranial monitoring.  The lateralization and localization are not clear based on scalp EEG monitoring. Temporal lobe epilepsy is possible. We will have invasive depth electrodes, 8 symmetric electrodes on each side:     Amygdala: 1  Anterior and posterior hippocampus: 3  Cingulate and prefrontal: 2  Orbital frontal:1  Insula: 1     He saw Dr. Adler on 11/2/2021 for consultation for invasive monitoring. He is scheduled for F-Gator MRI and CTA on Dec 12/3/2021.     For the past several months, he was having about 2 seizures per week, mostly nocturnal seizures, about 80-90%. He is currently taking aptiom 1200 mg qhs,  zonegran 300-400, Lamictal 150 mg bid, Depakote 500 - 1000. He is also taking CBD oil for  anxiety.  Double vision resolved after aptiom was discontinued.     He is complaining of double vision, blurred vision.  He also complains of hand tremor. He had a really bad tremor day about one day ago.     He had VEEG monitoring for presurgical evaluation in March 2021. Total of 9 epileptic seizures were recorded during this admission. These electroclinical seizures showed bifrontal EEG activities at onset, usually with higher amplitude initial activity over the right frontal region compared to the left.  Ictal manifestations variably included tonic stiffening of his upper body, kicking of his legs, vocalizations, and non-purposeful movements of surrounding objects.  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  Aptiom was discontinued at discharge.      No plan for suicide or self injury     Since the hospital discharge, he continue to have frequent seizures, probably several times per week.     He was complaining of vertigo spells, he was seen in the ED.  He was told that he has vestibular problems.       He continue to have small twitches that last for a spit of a second, some of them in clusters. No LOC with these twitches. These likely are not seizures.      Patient tried Peoples diet, but did not continue.     He is seeing Dr. Ben Barros for his depression.  He is also seeing Deo Shipman for therapy. He is complaining of fatigue and sleep over 8-10 hours a day. He is very concerned about the fatigue and is questioning what other options we have for treating his seizures.      They are now open to VEEG monitoring for surgical evaluation, resection or neuromodulations. However, they are still not sure if they want to move forward to the surgical treatment.     Mood improved a lot. No suicidal thoughts, no thoughts of hurting himself.     The patient started to have seizures when he was 16 years old and he was managed at the [facility] for the past few years for his seizures.  He is currently  "taking Aptiom 1200 mg q.h.s. and the Zonegran 300 mg b.i.d. for his epilepsy.  He was recently admitted to the hospital twice because of 2 clusters of seizures, one in January when he was admitted to [facility] and one was in February when he was admitted to [facility].  On both occasions, he had 4-5 back to back generalized tonic-clonic seizures within a short period of time.      According to the mother, the patient has 2 different types of seizures.  Type #1 is described as \"mild seizures,\" during which he will have some facial twitching.  He will have repetitive hand/arm jerking movement.  He will become unresponsive for less than a minute.  Postictally, he was having some confusion and headaches and fatigue.  Before the seizures, he will have an aura of \"seeing bees populating the earth with eggs.\"  This type of seizure is relatively infrequent, average once per month.      Type #2 are generalized tonic-clonic seizures which the patient will have generalized convulsions of the body and his body will be stiff.  It usually will last for 1-2 minutes followed by postictal fatigue and hallucinations.  This happens usually once every 3 months, but sometimes it occurs in clusters with 2-5 seizures back to back.      The patient had tried Keppra and Depakote in the past which did not work.  He had EEGs done in the past which were reported negative.      The patient had severe depression and requested a lot of behavioral health and counseling in the past and at certain time, he was suicidal, but that improved.  At the present time, he is not suicidal.  He does not think about hurting himself.     TRIGGERS FOR SEIZURES:  Depression.      RISK FACTORS FOR SEIZURES:  He had no history of head trauma with loss of consciousness.  No history of febrile convulsions.  No history of CNS infections.      PAST AEDs: Keppra, Depakote. Vimpat caused suicidal ideations. Depakote discontinued due to risk of platelet dysfunction during " intracranial monitoring, and patient resumed Keppra.      PAST MEDICAL HISTORY:  Asperger's disorder, depression, epilepsy.      PAST SURGICAL HISTORY:  Depth electrode intracranial placement for epilepsy surgical evaluation      FAMILY HISTORY:  No family history of seizures.  Both parents have hypertension.  Maternal grandmother had heart disease.      SOCIAL HISTORY:  He is single, living with his mother.  He had some college education but did not finish because of depression; he is hoping to return to college soon.  He is currently working at [employer] and plans to return to work soon.  No smoking, no alcohol, no drug abuse.      REVIEW OF SYSTEMS:  Targeted ROS per HPI>      ALLERGIES:  Sertraline and Zyprexa.     PHYSICAL EVALUATION:   /78   Pulse 68   Temp 97.3  F (36.3  C) (Temporal)   Ht 1.829 m (6')   Wt 90.8 kg (200 lb 3.2 oz)   BMI 27.15 kg/m     General - No acute distress.   HEENT - Sutures over scalp all intact.   Neurologic   Mental status - Alert, attentive, oriented. Speech is fluent and comprehension intact.   Cranial nerves - Visual fields intact. EOMI, face symmetric, tongue midline. No dysarthria. Hearing intact to voice.   Motor - No pronator drift. Moving all extremities equally. Symmetric finger tapping.   Coordination - FNF intact.   Gait - Steady, casual gait.     DIAGNOSTIC TESTING: CT scan of the head on 02/27/2018 [facility]  He had EEGs in the past which were reported negative    MRI brain with and without contrast 8/5/2021 [facility] - No supratentorial lesions or significant hippocampal asymmetric/evidence for pathology. Stable cerebellar 7 mm T2 hypertense lesion.     MRI 7 Ernestine 4/22/2022 [facility] -   Impression: Motion degraded exam.  1. No definite temporal lobe abnormality, mass, or congenital lesion  identified as a cause of the patient's seizures.  2. No evidence of abnormal enhancement intracranially.   3. Previously  seen abnormal T2 signal in the right cerebellum is not  identified in today's exam, likely obscured secondary to  susceptibility artifacts in the posterior fossa, a less likely  possibility is that the lesion could have resolved.    IMPRESSION:   1. Epilepsy   Patient with recent intracranial monitoring for epilepsy surgery consideration.  We did not capture the precise onset zone of habitual seizures during invasive monitoring.  At time of discharge we initiated conversation regarding next steps and recommendations for deep brain stimulation (DBS).  Today visit predominantly around education and follow-up after hospitalization.  We reviewed that there are 2 main surgical approaches for epilepsy surgery with one being resected surgery and a second neuromodulation.  Since we are unable to localize precise seizure onset zone surgical resection is not an option.  We then discussed neuromodulation including vagus nerve stimulation, responsive neurostimulation, and deep brain stimulation.  Of these 3 vagus nerve stimulation and deep brain stimulation are options for the patient as RNS also requires a precise localization.  We are recommending deep brain stimulation for treatment of intractable epilepsy.  We discussed risk factors with DBS with the most notable being mood and depression related side effects that can be seen in up to approximately 13% of patients.  Other risk factors were discussed as well.  Mood related side effects are notable for Amrit as he has a history of depression and we discussed that this in some detail.  We also talked about him returning to school and we feel that there is no barrier as long as it does not put him under undue stress in the short-term.  There is also consideration for timing of DBS surgery if they proceed in that direction.  We went over the outline of the schedule which involves DBS placement followed by device being turned on 1 to 2 months later and every 3-month programming  visit initially.  Amrit and his father asked good questions related to DBS treatment.  We did discuss that DBS is unlikely to result in seizure freedom but that there is increasing benefit over time with seizure during reduction with benefits improving over multiple years based on most recent literature.  Patient is good to meet with Dr. Adler tomorrow and review more details related to be DBS with him.    2. Asperger's syndrome   3.  Depression he follows with Dr. Reyes Barros and Deo Shipman for therapy.   4 tremors.  Secondary to Depakote or psychological?    PLAN:   1.  Continue Zonegran 300 mg - 400 mg, Lamictal 150 mg twice daily, Keppra 500 mg twice daily  2.  Follow-up with Dr. Adler tomorrow  3.  Continue follow-up with Dr. Barros for depression.  Continue therapy.  4.  No restriction to resuming CBD and melatonin  5.  Discussed returning to school and work.  Weight limit restrictions per Dr. Adler.  6.  Platelet restrictions per Dr. Adler  7.  Follow-up in person in 2 months.    Patient was seen and evaluated with attending, Dr. Dukes.     Anais Cross MD  Epilepsy Fellow           Neurology Attending Note:    I have seen and examined the patient with Dr. Carvalho.  I have reviewed the labs and imaging results available.  I agree with the assessment and plan.    Norman Dukes MD            I have spent 45 min total time on the day of this visit.      36 min was spent on face to face time  7 min was spent on preparation of visit to review charts and labs, ordering medications and tests  2 min was spent on documentation of clinical information        Again, thank you for allowing me to participate in the care of your patient.      Sincerely,    Norman Dukes MD

## 2022-05-16 NOTE — PATIENT INSTRUCTIONS
Times of Days am pm        Medication Tablet Size Number of Tablets/Capsules Total Daily Dosage                 Depakote 500 1 2        1500 mg    Zonegran 100 3 4        700 mg    Lamictal 100 1.5 1.5        300 mg                                                                             Carry this with you at all times.  CONTINUE TAKING YOUR OTHER MEDICATIONS AS PREVIOUSLY DIRECTED.      * * *Do not store medications in the bathroom.  Keep medications away from children!* * *         Appointment with Dr. Adler on 5/17/2022

## 2022-05-17 ENCOUNTER — TELEPHONE (OUTPATIENT)
Dept: NEUROLOGY | Facility: CLINIC | Age: 27
End: 2022-05-17

## 2022-05-17 ENCOUNTER — ANCILLARY PROCEDURE (OUTPATIENT)
Dept: CT IMAGING | Facility: CLINIC | Age: 27
End: 2022-05-17
Attending: STUDENT IN AN ORGANIZED HEALTH CARE EDUCATION/TRAINING PROGRAM
Payer: COMMERCIAL

## 2022-05-17 ENCOUNTER — OFFICE VISIT (OUTPATIENT)
Dept: NEUROSURGERY | Facility: CLINIC | Age: 27
End: 2022-05-17
Payer: COMMERCIAL

## 2022-05-17 VITALS
RESPIRATION RATE: 16 BRPM | HEART RATE: 66 BPM | SYSTOLIC BLOOD PRESSURE: 129 MMHG | WEIGHT: 200 LBS | DIASTOLIC BLOOD PRESSURE: 88 MMHG | OXYGEN SATURATION: 98 % | BODY MASS INDEX: 27.12 KG/M2

## 2022-05-17 DIAGNOSIS — G40.219 PARTIAL SYMPTOMATIC EPILEPSY WITH COMPLEX PARTIAL SEIZURES, INTRACTABLE, WITHOUT STATUS EPILEPTICUS (H): Primary | ICD-10-CM

## 2022-05-17 DIAGNOSIS — G40.909 NONINTRACTABLE EPILEPSY WITHOUT STATUS EPILEPTICUS, UNSPECIFIED EPILEPSY TYPE (H): ICD-10-CM

## 2022-05-17 DIAGNOSIS — S06.5XAA SUBDURAL HEMATOMA (H): ICD-10-CM

## 2022-05-17 PROCEDURE — 70450 CT HEAD/BRAIN W/O DYE: CPT | Mod: GC | Performed by: STUDENT IN AN ORGANIZED HEALTH CARE EDUCATION/TRAINING PROGRAM

## 2022-05-17 PROCEDURE — 99024 POSTOP FOLLOW-UP VISIT: CPT | Performed by: NEUROLOGICAL SURGERY

## 2022-05-17 ASSESSMENT — PAIN SCALES - GENERAL: PAINLEVEL: MILD PAIN (3)

## 2022-05-17 NOTE — LETTER
"5/17/2022       RE: Amrit Padilla  [address]  [place] [facility] [address]     Dear Colleague,    Thank you for referring your patient, Amrit Padilla, to the [facility] NEUROSURGERY CLINIC [facility]. Please see a copy of my visit note below.    Neurosurgery Attending Epilepsy Follow-Up Note    HPI: 26 year old right handed gentleman with focal medically refractory epilepsy. He underwent an extensive bilateral frontotemporal SEEG evaluation a couple of weeks ago which unfortunately could not localize or lateralize his seizure focus adequately. He and his father are at the visit today. They met with Dr. Dukes yesterday who went through his remaining surgical options which are essentially VNS vs. DBS. We went thoroughly through the risks and benefits of ANT-DBS today including the fact that its main benefit is seizure reduction and not seizure freedom unfortunately. His dad asked many detailed questions while Amrit seemed a bit withdrawn but understanding of the risks, benefits and alternatives to DBS. He wants to discuss it with both his mother and father in some more detail and repeatedly said that \"it's a lot to think about.\" He has had moderately severe headaches since surgery, potentially related to his stable SDH. He has been taken about 1000 mg acetaminophen twice daily without too much effect.     Exam:  Awake, alert, oriented x 3  Attends, follows, fluent  PERRL  EOMI  FS  TML  BUE/BLE 5/5  Wounds are all c/d/i, some mild erythema, no swelling, fluctuance, tenderness or drainage. Nylons all removed at the bedside today    CT head: Shows now chronic small right parietal SDH, essentially identical to before except for chronic rather than acute/subacute nature. Similarly, right temporal SDH looks identical to slightly smaller in size, now chronic rather than acute in nature.    A/P: 27 y/o gentleman with medically refractory epilepsy and negative " SEEG evaluation. Planning for ANT-DBS. He and his father want to discuss it with his mother as a family and then come to a decision.    - Schedule for ANT-DBS if/when he and his family decide to move forward  - CT head in another two weeks  - No NSAIDs/ASA for headache given SDH  - Will try short dex taper to see if this helps ARCHER      Again, thank you for allowing me to participate in the care of your patient.      Sincerely,    Tello Adler MD

## 2022-05-17 NOTE — TELEPHONE ENCOUNTER
LVM for check out to schedule return appt w Dr. Dukes, follow up in 2 months from last appt, provided clinic number for call back.

## 2022-05-17 NOTE — LETTER
Date:May 25, 2022      Provider requested that no letter be sent. Do not send.       [facility]

## 2022-05-17 NOTE — PROGRESS NOTES
"Neurosurgery Attending Epilepsy Follow-Up Note    HPI: 26 year old right handed gentleman with focal medically refractory epilepsy. He underwent an extensive bilateral frontotemporal SEEG evaluation a couple of weeks ago which unfortunately could not localize or lateralize his seizure focus adequately. He and his father are at the visit today. They met with Dr. Dukes yesterday who went through his remaining surgical options which are essentially VNS vs. DBS. We went thoroughly through the risks and benefits of ANT-DBS today including the fact that its main benefit is seizure reduction and not seizure freedom unfortunately. His dad asked many detailed questions while Amrit seemed a bit withdrawn but understanding of the risks, benefits and alternatives to DBS. He wants to discuss it with both his mother and father in some more detail and repeatedly said that \"it's a lot to think about.\" He has had moderately severe headaches since surgery, potentially related to his stable SDH. He has been taken about 1000 mg acetaminophen twice daily without too much effect.     Exam:  Awake, alert, oriented x 3  Attends, follows, fluent  PERRL  EOMI  FS  TML  BUE/BLE 5/5  Wounds are all c/d/i, some mild erythema, no swelling, fluctuance, tenderness or drainage. Nylons all removed at the bedside today    CT head: Shows now chronic small right parietal SDH, essentially identical to before except for chronic rather than acute/subacute nature. Similarly, right temporal SDH looks identical to slightly smaller in size, now chronic rather than acute in nature.    A/P: 25 y/o gentleman with medically refractory epilepsy and negative SEEG evaluation. Planning for ANT-DBS. He and his father want to discuss it with his mother as a family and then come to a decision.    - Schedule for ANT-DBS if/when he and his family decide to move forward  - CT head in another two weeks  - No NSAIDs/ASA for headache given SDH  - Will try short dex taper to " see if this helps HA

## 2022-05-20 ENCOUNTER — TELEPHONE (OUTPATIENT)
Dept: NEUROSURGERY | Facility: CLINIC | Age: 27
End: 2022-05-20
Payer: COMMERCIAL

## 2022-05-20 DIAGNOSIS — G40.219 PARTIAL SYMPTOMATIC EPILEPSY WITH COMPLEX PARTIAL SEIZURES, INTRACTABLE, WITHOUT STATUS EPILEPTICUS (H): Primary | ICD-10-CM

## 2022-05-20 RX ORDER — DEXAMETHASONE 2 MG/1
TABLET ORAL
Qty: 14 TABLET | Refills: 0
Start: 2022-05-20 | End: 2022-08-12

## 2022-05-20 NOTE — TELEPHONE ENCOUNTER
Per Dr Adler complete a Decadron taper,   Decadron 4mg every 6 hours for 24 hours, then taper to 2mg every 6 hours for 24 hours, then taper to 1mg every 6 hours for 24 hours.    Call to ruel Marcus voice mail Dr Quintanilla has ordered a Decadron medication taper for 3 days and sent into your [facility] pharmacy in your Chart in [place] on [address].    Please call Swapna RN to verify medication instructions.  [phone]

## 2022-05-21 ENCOUNTER — HEALTH MAINTENANCE LETTER (OUTPATIENT)
Age: 27
End: 2022-05-21

## 2022-06-01 DIAGNOSIS — S06.5XAA SUBDURAL HEMATOMA (H): Primary | ICD-10-CM

## 2022-06-06 DIAGNOSIS — G40.909 RECURRENT SEIZURES (H): ICD-10-CM

## 2022-06-06 RX ORDER — LAMOTRIGINE 150 MG/1
150 TABLET ORAL 2 TIMES DAILY
Qty: 180 TABLET | Refills: 0 | Status: SHIPPED | OUTPATIENT
Start: 2022-06-06 | End: 2022-09-07

## 2022-06-06 NOTE — TELEPHONE ENCOUNTER
Patient will be out of medication in 2 days, patient  Was suppose to also schedule a 2 months follow , I offer to schedule but he will call back.

## 2022-06-10 ENCOUNTER — ANCILLARY PROCEDURE (OUTPATIENT)
Dept: CT IMAGING | Facility: CLINIC | Age: 27
End: 2022-06-10
Attending: NEUROLOGICAL SURGERY
Payer: COMMERCIAL

## 2022-06-10 DIAGNOSIS — S06.5XAA SUBDURAL HEMATOMA (H): ICD-10-CM

## 2022-06-10 PROCEDURE — 70450 CT HEAD/BRAIN W/O DYE: CPT | Performed by: RADIOLOGY

## 2022-06-14 DIAGNOSIS — G40.909 RECURRENT SEIZURES (H): ICD-10-CM

## 2022-06-16 RX ORDER — DIVALPROEX SODIUM 500 MG/1
TABLET, EXTENDED RELEASE ORAL
Qty: 270 TABLET | Refills: 1 | OUTPATIENT
Start: 2022-06-16

## 2022-07-05 DIAGNOSIS — G40.219 PARTIAL SYMPTOMATIC EPILEPSY WITH COMPLEX PARTIAL SEIZURES, INTRACTABLE, WITHOUT STATUS EPILEPTICUS (H): Primary | ICD-10-CM

## 2022-07-14 ENCOUNTER — TELEPHONE (OUTPATIENT)
Dept: NEUROSURGERY | Facility: CLINIC | Age: 27
End: 2022-07-14

## 2022-07-14 NOTE — TELEPHONE ENCOUNTER
[facility] Call Center    Phone Message    May a detailed message be left on voicemail: yes     Reason for Call: Other: Patient is calling for the third time and requesting someone to give him a call back to give him an update on the DBS order from Dr. Conner. Please call patient back asap at # [phone]     Action Taken: Message routed to:  [facility] Neurosurgery     Travel Screening: Not Applicable

## 2022-07-14 NOTE — TELEPHONE ENCOUNTER
Writer routed to Neurosurgery Surgery Scheduling.   Dr. Huerta   *Patient requesting surgery information.     Nicki Miller LPN  Neurosurgery

## 2022-07-25 NOTE — TELEPHONE ENCOUNTER
Writer routed to Neurosurgery Surgery Scheduling   *Patient requesting surgery date   DBS     Nicki Miller LPN  Neurosurgery

## 2022-07-25 NOTE — TELEPHONE ENCOUNTER
[facility] Call Center    Phone Message    May a detailed message be left on voicemail: yes     Reason for Call: Other: Sergio calling again to request a call back due to wanting to move forward with the DBS surgery. He has been trying to get someone to call him back to discuss scheduling since 7/5/22. Please call Sergio at your earliest convenience to discuss.     Action Taken: Message routed to:  [facility] NEUROSURGERY    Travel Screening: Not Applicable

## 2022-07-27 ENCOUNTER — TELEPHONE (OUTPATIENT)
Dept: NEUROSURGERY | Facility: CLINIC | Age: 27
End: 2022-07-27

## 2022-07-27 NOTE — TELEPHONE ENCOUNTER
Called patient to schedule surgery. Left a voicemail and call back number.    Joshua Quiñonez on 7/27/2022 at 10:43 AM

## 2022-07-28 ENCOUNTER — TELEPHONE (OUTPATIENT)
Dept: NEUROSURGERY | Facility: CLINIC | Age: 27
End: 2022-07-28

## 2022-07-28 DIAGNOSIS — G40.219 PARTIAL SYMPTOMATIC EPILEPSY WITH COMPLEX PARTIAL SEIZURES, INTRACTABLE, WITHOUT STATUS EPILEPTICUS (H): Primary | ICD-10-CM

## 2022-07-28 NOTE — TELEPHONE ENCOUNTER
Called in regards to surgery with Dr. Adler, but patient did not answer. Left a voicemail and call back.    Joshua Quiñonez on 7/28/2022 at 11:21 AM

## 2022-08-01 ENCOUNTER — TELEPHONE (OUTPATIENT)
Dept: NEUROSURGERY | Facility: CLINIC | Age: 27
End: 2022-08-01

## 2022-08-01 NOTE — TELEPHONE ENCOUNTER
I called patient's family again, but was unable to leave a voicemail.      Joshua Quiñonez on 8/1/2022 at 8:00 AM

## 2022-08-03 ENCOUNTER — TELEPHONE (OUTPATIENT)
Dept: NEUROSURGERY | Facility: CLINIC | Age: 27
End: 2022-08-03

## 2022-08-03 NOTE — TELEPHONE ENCOUNTER
I called the patient in regards to scheduling surgery with Dr. Adler. Patient has a covid test scheduled at [facility] and pre op has been taken care of with PAC via video. Patient is aware that the nursing team will be reaching out within the next few days. I did tell patient that a nurse will reach out within 2-3 days prior to surgery with arrival time and instructions.    Surgeon: Dr. Adler  Date of Surgery: 8/22 and 8/29   Location of surgery: [facility]  Pre-Op H&P: 8/15/2022  Post-Op Appt Date: 9/20/2022   Imaging needed:  Yes  Discussed COVID-19 testing:  Yes  Pre-cert/Authorization completed:  Yes  Patient aware that pre-op RN will call 2-3 days prior to surgery with arrival time and instructions Yes  Packet sent out: No 08/03/22  Patient was instructed to review packet and call back with any questions or concerns.       Joshua Quiñonez on 8/3/2022 at 8:53 AM

## 2022-08-03 NOTE — TELEPHONE ENCOUNTER
FUTURE VISIT INFORMATION      SURGERY INFORMATION:    Date: 22    Location: [facility] or    Surgeon:  Tello Adler MD    Anesthesia Type:  general    Procedure: Intraoperative Magnetic resonance imaging/Stealth assisted Bilateral deep brain stimulator placement, phase I, placement of bilateral deep brain stimulator electrode, target anterior nucleus of thalamus, WITHOUT microelectrode recording    RECORDS REQUESTED FROM:       Primary Care Provider: [facility]    Most recent EKG+ Tracin20- [facility]

## 2022-08-08 RX ORDER — CEFAZOLIN SODIUM/WATER 2 G/20 ML
2 SYRINGE (ML) INTRAVENOUS
Status: CANCELLED | OUTPATIENT
Start: 2022-08-08

## 2022-08-08 RX ORDER — CEFAZOLIN SODIUM/WATER 2 G/20 ML
2 SYRINGE (ML) INTRAVENOUS SEE ADMIN INSTRUCTIONS
Status: CANCELLED | OUTPATIENT
Start: 2022-08-08

## 2022-08-09 ENCOUNTER — TELEPHONE (OUTPATIENT)
Dept: NEUROSURGERY | Facility: CLINIC | Age: 27
End: 2022-08-09

## 2022-08-09 DIAGNOSIS — G40.219 PARTIAL SYMPTOMATIC EPILEPSY WITH COMPLEX PARTIAL SEIZURES, INTRACTABLE, WITHOUT STATUS EPILEPTICUS (H): Primary | ICD-10-CM

## 2022-08-09 NOTE — TELEPHONE ENCOUNTER
Writer LVM for pt explaining that Dr. Adler has ordered an Mri to complete prior to surgery.    Please help pt to schedule MRI before 8/22    Jaky Barajas

## 2022-08-12 NOTE — PROGRESS NOTES
Preoperative Assessment Center Medication History Note    Medication history completed on August 12, 2022 by this writer. See Epic admission navigator for prior to admission medications. Operating room staff will still need to confirm medications and last dose information on day of surgery.     Medication history interview sources  Patient interview: Yes  Care Everywhere records: Yes  Surescripts pharmacy refill records: Yes    Changes made to PTA medication list  Added: none  Deleted:   -dexamethasone taper - pt completed this >1 month ago and Flagged For Removal  Changed: none    Additional medication history information (including reliability of information, actions taken by pharmacist):  -pt manages his own meds and is a reliable historian  -divalproex (500 mg qAM, 1000 mg qPM) dc'd 6/14/22 per chart review    -- No recent (within 30 days) course of antibiotics  -- No recent (within 30 days) course of systemic steroids  -- Reports not being on blood thinning medications    -- Declines being on any other prescription or over-the-counter medications    Prior to Admission medications    Medication Sig Last Dose Taking? Auth Provider Long Term End Date   cetirizine (ZYRTEC) 10 MG tablet Take 10 mg by mouth daily as needed  Taking Yes Reported, Patient     FLUoxetine (PROZAC) 40 MG capsule Take 80 mg by mouth daily Reported per pt Taking Yes Ben Barros MD Yes    HEMP OIL OR EXTRACT OR OTHER CBD CANNABINOID, NOT MEDICAL CANNABIS, Take 15 mg by mouth every evening  Taking Yes Reported, Patient     lamoTRIgine (LAMICTAL) 150 MG tablet Take 1 tablet (150 mg) by mouth 2 times daily Taking Yes Annia Alex PA Yes    levETIRAcetam (KEPPRA) 500 MG tablet Take 1 tablet (500 mg) by mouth 2 times daily Taking Yes Norman Dukes MD Yes    LORazepam (LORAZEPAM INTENSOL) 2 MG/ML (HIGH CONC) solution Take 1 ml (2mg) for 2 focal seizures in 4 hours or for any generalized tonic-clonic seizure. Max 2 ml (4mg) in 24  hours.  Patient taking differently: every 4 hours as needed Take 1 ml (2mg) for 2 focal seizures in 4 hours or for any generalized tonic-clonic seizure. Max 2 ml (4mg) in 24 hours. Taking Yes Norman Dukes MD     MELATONIN PO Take 10 mg by mouth At Bedtime OTC Taking Yes Reported, Patient     propranolol (INDERAL) 10 MG tablet Take 10 mg by mouth as needed (before work) Taking Yes Reported, Patient Yes    zonisamide (ZONEGRAN) 100 MG capsule TAKE 3 CAPS (300 MG) EACH MORNING AND 4 CAPS (400 MG) EACH NIGHT Taking Yes Norman Dukes MD Yes         Medication history completed by:  Luis A Barboza, PharmD  PAC Pharmacist  [phone]

## 2022-08-15 ENCOUNTER — PRE VISIT (OUTPATIENT)
Dept: SURGERY | Facility: CLINIC | Age: 27
End: 2022-08-15

## 2022-08-15 ENCOUNTER — VIRTUAL VISIT (OUTPATIENT)
Dept: SURGERY | Facility: CLINIC | Age: 27
End: 2022-08-15
Payer: COMMERCIAL

## 2022-08-15 ENCOUNTER — TELEPHONE (OUTPATIENT)
Dept: NEUROSURGERY | Facility: CLINIC | Age: 27
End: 2022-08-15

## 2022-08-15 ENCOUNTER — ANESTHESIA EVENT (OUTPATIENT)
Dept: SURGERY | Facility: CLINIC | Age: 27
End: 2022-08-15
Payer: COMMERCIAL

## 2022-08-15 DIAGNOSIS — Z01.818 PRE-OP EXAMINATION: Primary | ICD-10-CM

## 2022-08-15 PROCEDURE — 99215 OFFICE O/P EST HI 40 MIN: CPT | Mod: 95 | Performed by: NURSE PRACTITIONER

## 2022-08-15 RX ORDER — LIDOCAINE 40 MG/G
CREAM TOPICAL
Status: CANCELLED | OUTPATIENT
Start: 2022-08-15

## 2022-08-15 RX ORDER — SODIUM CHLORIDE, SODIUM LACTATE, POTASSIUM CHLORIDE, CALCIUM CHLORIDE 600; 310; 30; 20 MG/100ML; MG/100ML; MG/100ML; MG/100ML
INJECTION, SOLUTION INTRAVENOUS CONTINUOUS
Status: CANCELLED | OUTPATIENT
Start: 2022-08-15

## 2022-08-15 ASSESSMENT — PAIN SCALES - GENERAL: PAINLEVEL: NO PAIN (0)

## 2022-08-15 ASSESSMENT — LIFESTYLE VARIABLES: TOBACCO_USE: 0

## 2022-08-15 ASSESSMENT — ENCOUNTER SYMPTOMS: SEIZURES: 1

## 2022-08-15 NOTE — H&P
Pre-Operative H & P     CC:  Preoperative exam to assess for increased cardiopulmonary risk while undergoing surgery and anesthesia.    Date of Encounter: 8/15/2022  Primary Care Physician:  No Ref-Primary, Physician     Reason for visit:   Encounter Diagnosis   Name Primary?     Pre-op examination Yes       HPI  Amrit Padilla is a 27 year old male who presents for pre-operative H & P in preparation for  Procedure Information     Case: [id number] Date/Time: 08/22/22 0730    Procedure: Intraoperative Magnetic resonance imaging/Stealth assisted Bilateral deep brain stimulator placement, phase I, placement of bilateral deep brain stimulator electrode, target anterior nucleus of thalamus, WITHOUT microelectrode recording (Bilateral Head)    Anesthesia type: General    Diagnosis: Partial symptomatic epilepsy with complex partial seizures, intractable, without status epilepticus (H) [G40.219]    Pre-op diagnosis: Partial symptomatic epilepsy with complex partial seizures, intractable, without status epilepticus (H) [G40.219]    Location:  OR [address] [place] [address] OR    Providers: Tello Adler MD        Amrit Padilla has a past medical history for depression, seizure disorder, Asperger's , migraines and seasonal allergies.       Mr. Padilla followed up with Dr. Adler in the neurosurgical clinic on 5/17/22 for his focal medically refractory epilepsy.  Records indicate that Mr. Padilla underwent an extensive bilateral frontotemporal SEEG evaluation this past spring which unfortunately could not localize or lateralize his seizure focus adequately. When Mr. Padilla and his dad followed up with Dr. Adler this past May, they were counseled on Mr. Padilla s diagnosis and treatment options.  Mr. Padilla, his mom and dad presents to the PAC virtually today in preparation for the above procedure.         History is obtained from the patient and chart review    Hx of abnormal bleeding or anti-platelet use: denies      Past  Medical History  Past Medical History:   Diagnosis Date     Depressive disorder      Headaches, cluster      Seizures (H)        Past Surgical History  Past Surgical History:   Procedure Laterality Date     BACK SURGERY       REMOVE DEPTH ELECTRODES N/A 5/5/2022    Procedure: Stereo EEG depth electrode removal;  Surgeon: Jairon Mar MD;  Location: [facility] OR     JAYDEN ASSISTED PLACEMENT DEPTH ELECTRODE Bilateral 4/25/2022    Procedure: JAYDEN-assisted stereotactic placement of bilateral depth electrodes for invasive video electroencephalography monitoring;  Surgeon: Tello Adler MD;  Location: [facility] OR       Prior to Admission Medications  Current Outpatient Medications   Medication Sig Dispense Refill     cetirizine (ZYRTEC) 10 MG tablet Take 10 mg by mouth daily as needed        FLUoxetine (PROZAC) 40 MG capsule Take 80 mg by mouth daily Reported per pt       HEMP OIL OR EXTRACT OR OTHER CBD CANNABINOID, NOT MEDICAL CANNABIS, Take 15 mg by mouth every evening        lamoTRIgine (LAMICTAL) 150 MG tablet Take 1 tablet (150 mg) by mouth 2 times daily 180 tablet 0     levETIRAcetam (KEPPRA) 500 MG tablet Take 1 tablet (500 mg) by mouth 2 times daily 60 tablet 6     LORazepam (LORAZEPAM INTENSOL) 2 MG/ML (HIGH CONC) solution Take 1 ml (2mg) for 2 focal seizures in 4 hours or for any generalized tonic-clonic seizure. Max 2 ml (4mg) in 24 hours. (Patient taking differently: every 4 hours as needed Take 1 ml (2mg) for 2 focal seizures in 4 hours or for any generalized tonic-clonic seizure. Max 2 ml (4mg) in 24 hours.) 30 mL 1     MELATONIN PO Take 10 mg by mouth At Bedtime OTC       propranolol (INDERAL) 10 MG tablet Take 10 mg by mouth as needed (before work)       zonisamide (ZONEGRAN) 100 MG capsule TAKE 3 CAPS (300 MG) EACH MORNING AND 4 CAPS (400 MG) EACH NIGHT 630 capsule 3       Allergies  Allergies   Allergen Reactions     [facility] Cough, Difficulty breathing and Shortness Of Breath     Hydroxyzine       Other reaction(s): Seizures     Mixed Grasses Cough, Other (See Comments) and Shortness Of Breath     Unknown [No Clinical Screening - See Comments] Rash     Reaction to a seizure med that he can not recall.     Dust Mites      Molds & Smuts      Other reaction(s): Unknown     Sertraline Other (See Comments)     mood swings     Zyprexa [Olanzapine]      Seizure.        Social History  Social History     Socioeconomic History     Marital status: Single     Spouse name: Not on file     Number of children: Not on file     Years of education: Not on file     Highest education level: Not on file   Occupational History     Not on file   Tobacco Use     Smoking status: Never Smoker     Smokeless tobacco: Never Used   Substance and Sexual Activity     Alcohol use: No     Drug use: No     Sexual activity: Not Currently   Other Topics Concern     Parent/sibling w/ CABG, MI or angioplasty before 65F 55M? No   Social History Narrative     Not on file     Social Determinants of Health     Financial Resource Strain: Not on file   Food Insecurity: Not on file   Transportation Needs: Not on file   Physical Activity: Not on file   Stress: Not on file   Social Connections: Not on file   Intimate Partner Violence: Not on file   Housing Stability: Not on file       Family History  Family History   Problem Relation Age of Onset     Hypertension Mother      Hypertension Father      Asthma Father      Asthma Brother      Hereditary Spherocytosis Brother      Asthma Sister      Anxiety Disorder Sister        Review of Systems  The complete review of systems is negative other than noted in the HPI or here.   Anesthesia Evaluation   Pt has had prior anesthetic. Type: General and MAC (Reports headache posterative).    No history of anesthetic complications       ROS/MED HX  ENT/Pulmonary:     (+) KARO risk factors, daytime somnolence,  (-) tobacco use and asthma   Neurologic:     (+) migraines, seizures, last seizure: This past week -  typically weekly, features: Nocturnal seizures,     Cardiovascular: Comment: Denies cardiac symptoms including chest pain, SOB, palpitations, syncope, REA, orthopnea, or PND.        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 - neg infectious disease ROS     Malignancy:  - neg malignancy ROS     Other:  - neg other ROS          Virtual visit -  No vitals were obtained    Physical Exam  Constitutional: Awake, alert, cooperative, no apparent distress, and appears stated age.  Eyes: Pupils equal  HENT: Normocephalic  Respiratory: non labored breathing   Neurologic: Awake, alert, oriented to name, place and time.   Neuropsychiatric: Calm, cooperative. Normal affect.      Prior Labs/Diagnostic Studies   All labs and imaging personally reviewed   Lab Results   Component Value Date    WBC 9.6 05/06/2022    WBC 6.0 03/02/2021     Lab Results   Component Value Date    RBC 4.57 05/06/2022    RBC 4.16 03/02/2021     Lab Results   Component Value Date    HGB 14.5 05/06/2022    HGB 13.2 03/02/2021     Lab Results   Component Value Date    HCT 42.9 05/06/2022    HCT 40.1 03/02/2021     Lab Results   Component Value Date    MCV 94 05/06/2022    MCV 96 03/02/2021     Lab Results   Component Value Date    MCH 31.7 05/06/2022    MCH 31.7 03/02/2021     Lab Results   Component Value Date    MCHC 33.8 05/06/2022    MCHC 32.9 03/02/2021     Lab Results   Component Value Date    RDW 11.9 05/06/2022    RDW 12.6 03/02/2021     Lab Results   Component Value Date     05/06/2022      03/02/2021     Last Comprehensive Metabolic Panel:  Sodium   Date Value Ref Range Status   05/06/2022 139 133 - 144 mmol/L Final   03/02/2021 139 133 - 144 mmol/L Final     Potassium   Date Value Ref Range Status   05/06/2022 3.7 3.4 - 5.3 mmol/L Final   03/02/2021 4.4 3.4 - 5.3 mmol/L Final     Chloride   Date Value Ref Range Status   05/06/2022 106 94 - 109 mmol/L Final   03/02/2021 110 (H) 94 - 109 mmol/L Final     Carbon Dioxide   Date Value Ref Range Status   03/02/2021 24 20 - 32 mmol/L Final     Carbon Dioxide (CO2)   Date Value Ref Range Status   05/06/2022 26 20 - 32 mmol/L Final     Anion Gap   Date Value Ref Range Status   05/06/2022 7 3 - 14 mmol/L Final   03/02/2021 5 3 - 14 mmol/L Final     Glucose   Date Value Ref Range Status   05/06/2022 98 70 - 99 mg/dL Final   05/21/2021 96 70 - 99 mg/dL Final     Urea Nitrogen   Date Value Ref Range Status   05/06/2022 18 7 - 30 mg/dL Final   03/02/2021 17 7 - 30 mg/dL Final     Creatinine   Date Value Ref Range Status   05/06/2022 0.89 0.66 - 1.25 mg/dL Final   03/02/2021 0.91 0.66 - 1.25 mg/dL Final     GFR Estimate   Date Value Ref Range Status   05/06/2022 >90 >60 mL/min/1.73m2 Final     Comment:     Effective December 21, 2021 eGFRcr in adults is calculated using the 2021 CKD-EPI creatinine equation which includes age and gender (Maria Elena et al., NEJM, DOI: 10.1056/HVUXaw4120122)   03/02/2021 >90 >60 mL/min/[1.73_m2] Final     Comment:     Non  GFR Calc  Starting 12/18/2018, serum creatinine based estimated GFR (eGFR) will be   calculated using the Chronic Kidney Disease Epidemiology Collaboration   (CKD-EPI) equation.       Calcium   Date Value Ref Range Status   05/06/2022 9.5 8.5 - 10.1 mg/dL Final   03/02/2021 8.7 8.5 - 10.1 mg/dL Final       aPTT   Date Value Ref Range Status   05/05/2022 36 22 - 38 Seconds Final        INR   Date Value Ref Range Status   05/05/2022 1.07 0.85 - 1.15 Final        PROCEDURES     CT HEAD W/O CONTRAST 6/10/2022      Impression:     Resolution of prior right subdural hematoma.       FILOMENA YOON MD          CTA Health w/ contrast 12/2021     Impression:       1. Head CT demonstrates no acute intracranial pathology.     2. Head CTA demonstrates no aneurysm or stenosis of the major     intracranial arteries.     I have personally reviewed the examination and initial interpretation     and I agree with the findings.     DARNELL GIFFORD MD          EKG 2020      Sinus bradycardia      Possible Left atrial enlargement      Borderline ECG      When compared with ECG of 15-AUG-2016 12:19,      No significant change was found      Confirmed by LINDY BOWERS ([id number]) on 11/4/2020 11:49:11 PM       The patient's records and results personally reviewed by this provider.     Outside records reviewed from: Care Everywhere      AssessmentThlai Padilla is a 27 year old male seen as a PAC referral for risk assessment and optimization for anesthesia.    Plan/Recommendations  Pt will be optimized for the proposed procedure.  See below for details on the assessment, risk, and preoperative recommendations    NEUROLOGY  - Ffocal medically refractory epilepsy on Lamictal, Keppra and lorazepam for breakthrough seizures.  Seizures typically occur at night about once a week.  Above procedure planned. Virtual visit so preoperative labs to be drawn DOS.  Orders placed.   -Post Op delirium risk factors:  No risk identified    ENT  - Seasonal allergies managed with generic Zyrtec  - No current airway concerns.  Will need to be reassessed day of surgery.  Mallampati: Unable to assess  TM: Unable to assess    CARDIAC  - Denies known coronary artery disease.  Denies cardiac symptoms.  - METS (Metabolic Equivalents) >4 without symptoms  - RCRI-Very low risk: Class 1 0.4% complication rate            Total Score: 0        PULMONARY    KARO Low Risk            Total Score: 2    KARO: Often tired    KARO: Male      - Denies asthma or inhaler use  -  Tobacco History   History   Smoking Status     Never Smoker   Smokeless Tobacco     Never Used       GI  - Denies GERD  PONV Medium Risk  Total Score: 2           1 AN PONV: Patient is not a current smoker    1 AN PONV: Intended Post Op Opioids        /RENAL  - Baseline Creatinine  As above     ENDOCRINE    - BMI: Estimated body mass index is 27.12 kg/m  as calculated from the following:    Height as of 5/16/22: 1.829 m (6').    Weight as of 5/17/22: 90.7 kg (200 lb).  Overweight (BMI 25.0-29.9)  - No history of Diabetes Mellitus    HEME  VTE Low Risk 0.5%            Total Score: 2    VTE: Male      - No history of abnormal bleeding or antiplatelet use.      PSYCH  - Depression stable on Prozac.     ~ Takes propranolol as needed      The patient is optimized for their procedure. AVS with information on surgery time/arrival time, meds and NPO status given by nursing staff. No further diagnostic testing indicated.    Please refer to the physical examination documented by the anesthesiologist in the anesthesia record on the day of surgery.    Video-Visit Details    Type of service:  Video Visit    Patient verbally consented to video service today: YES    Video Start Time: 14:27  Video End Time (time video stopped): 14:44    Originating Location (pt. Location): Home    Distant Location (provider location):  [facility] PREOPERATIVE ASSESS[facility]     Mode of Communication:  Video Conference via coComment  On the day of service:     Prep time: 12 minutes  Visit time: 17 minutes  Documentation time: 16 minutes  ------------------------------------------  Total time: 45 minutes      BEBETO Harrell CNP  Preoperative Assessment Center  [facility]  Clinic and Surgery Center  Phone: [phone]  Fax: [phone]

## 2022-08-15 NOTE — TELEPHONE ENCOUNTER
Writer received call from  imaging dept informing that the MRI scheduled for 8/17 cannot be performed at their location. Writer had imaging schedule next available MRI at [facility] for 9/2.    Writer spoke with pt and explained that MRI had to be moved to 9/2, call then disconnected. Writer called pt mother and continued conversation with her and pt. Writer explained that the next available MRI on campus is 9/2 which would delay 8/22 surgery. Writer explained they will follow up with the MRI department tomorrow to see if MRI can be scheduled earlier and reach back out.   Pt mother agreed to plan and had no further questions.    Jaky Barajas

## 2022-08-15 NOTE — PATIENT INSTRUCTIONS
Preparing for Your Surgery      Name:  Amrit Padilla   MRN:  [id number]   :  1995   Today's Date:  8/15/2022       Arriving for surgery:  Surgery date:  22  Arrival time:  5:00 AM    Restrictions due to COVID 19       Effective 08/15/22 [facility] is implementing the following visitor policy:     1 person may accompany the patient through the Pre-Op process.      That same person may wait in the Surgery Waiting room, provided there is enough room to social distance           Visitors must wear a mask.      Visitors must not be ill.        Inpatients are allowed 2 visitors per day for the duration of their stay.      Children age 5 and older may visit       Visiting hours are 8 am to 8 pm.        For everyone's health and safety visitors are requested to remain in patients room as much as possible        Please come to:     [facility] [address]  [address] [place] [address]    - [email or web address] parking is available in front of the hospital      -   Parking is available in the Patient Visitor Ramp on [facility].     -   When entering the hospital you will be asked COVID screening questions, you will then be directed to Registration.  Registration will direct you to the 3rd floor Surgery waiting room.     -   Please ask if you need an escort or a wheelchair to the Surgery Waiting Room.  Preop number- [phone]      What can I eat or drink?  -  You may eat and drink normally for up to 8 hours before your surgery. (Until 22, 11:30PM)  -  You may have clear liquids until 2 1/2 hours before surgery. (Until 22, 5:00AM)    Examples of clear liquids:  Water  Clear broth  Juices (apple, white grape, white cranberry  and cider) without pulp  Noncarbonated, powder based beverages  (lemonade and Luca-Aid)  Sodas (Sprite, 7-Up, ginger ale and seltzer)  Coffee or tea (without milk or cream)  Gatorade    -  No Alcohol  for at least 24 hours before surgery     Which medicines can I take?    Hold Aspirin for 7 days before surgery.   Hold Multivitamins for 7 days before surgery.  Hold Supplements for 7 days before surgery.  Hold Ibuprofen (Advil, Motrin) for 1 day before surgery--unless otherwise directed by surgeon.  Hold Naproxen (Aleve) for 4 days before surgery.    -  DO NOT take these medications the day of surgery:    Hemp or CBD oil    -  PLEASE TAKE these medications the day of surgery:    Cetirizine(Zyrtec) as needed   Fluoxetine(Prozac)    Lamotrigine(Lamictal)    Levetiracetam(Keppra)    Zonisamide(Zonegran)   Lorazepam as needed    Propranolol(Inderal) as needed    How do I prepare myself?  - Please take 2 showers before surgery using Scrubcare or Hibiclens soap.    Use this soap only from the neck to your toes.     Leave the soap on your skin for one minute--then rinse thoroughly.      You may use your own shampoo and conditioner; no other hair products.   - Please remove all jewelry and body piercings.  - No lotions, deodorants or fragrance.  - Bring your ID and insurance card.    -If you have a Deep Brain Stimulator, Spinal Cord Stimulator or any neuro stimulator device---you must bring the remote control to the hospital       ALL PATIENTS GOING HOME THE SAME DAY OF SURGERY ARE REQUIRED TO HAVE A RESPONSIBLE ADULT TO DRIVE AND BE IN ATTENDANCE WITH THEM FOR 24 HOURS FOLLOWING SURGERY.      Questions or Concerns:    - For any questions regarding the day of surgery or your hospital stay, please contact the Pre Admission Nursing Office at [phone].       - If you have health changes between today and your surgery please call your surgeon.       For questions after surgery please call your surgeons office.

## 2022-08-15 NOTE — TELEPHONE ENCOUNTER
Writer spoke with pt on 8/15 prior to pre op appt and explained that an MRI is needed prior to surgery. Writer provided pt with imaging scheduling phone number and encouraged pt to call today to schedule MRI to avoid having  to reschedule surgery    Jaky Barajas

## 2022-08-15 NOTE — PROGRESS NOTES
Sergio is a 27 year old who is being evaluated via a billable video visit.      How would you like to obtain your AVS? MyChart  If the video visit is dropped, the invitation should be resent by: Text to cell phone: [phone]     HPI         Review of Systems         Objective    Vitals - Patient Reported  Pain Score: No Pain (0)        Physical Exam     .  ..

## 2022-08-15 NOTE — TELEPHONE ENCOUNTER
Patients mother called me this afternoon regarding a pre procedure MRI and the pre procedure covid test. Patients mother is wanting covid orders faxed to a clinic in [place] since they will be leaving for out of town Thursday evening for the [place]  game this weekend. I let them know that I was not sure what the turn around time would be from a clinic out that way. She also was asking about the pre procedure MRI and as to why it was not scheduled. I stated that the  tried contacting several times and a [employer] message was also sent and the phone number for imaging was sent a long with the message. Patients mother states that he tried calling last week and sat on hold forever. I let them know that Dr. Adler needed the MRI for planning of the surgery.            Joshua Quiñonez on 8/15/2022 at 3:31 PM

## 2022-08-16 ENCOUNTER — TELEPHONE (OUTPATIENT)
Dept: NEUROSURGERY | Facility: CLINIC | Age: 27
End: 2022-08-16

## 2022-08-16 NOTE — TELEPHONE ENCOUNTER
I called patient and patients mother a few times regarding MRI and covid test. I left voicemail.      Joshua Quiñonez on 8/16/2022 at 5:45 PM

## 2022-08-16 NOTE — TELEPHONE ENCOUNTER
Writer LVM for pt mother offering an MRI on 8/19 at 1pm. Writer explained this is the only option for MRI prior to pt having surgery on 8/22. Please send TE if pt or pt mother calls back to confirm time and writer will reschedule MRI    Jaky Barajas

## 2022-08-16 NOTE — TELEPHONE ENCOUNTER
I have attempted to get into contact with both patient (Amrit) and mother (Sarah) multiple times regarding surgery with Dr. Adler on 8/22. MRI for surgery planning was approved to happen morning of surgery by Huy Wang, Telol Adler MD, and [employer] rep. I was able to accommodate families request for MRI for surgery and get them rescheduled for covid test. If the following appointments do not work family is aware that they will have to reschedule surgery. I will try contacting again tomorrow morning.       Joshua Quiñonez on 8/16/2022 at 5:52 PM

## 2022-08-16 NOTE — TELEPHONE ENCOUNTER
I called mother and left a voicemail and call back number.     Joshua Quiñonez on 8/16/2022 at 3:49 PM

## 2022-08-16 NOTE — TELEPHONE ENCOUNTER
Writer spoke with pt mother and offered MRI at 1pm on 8/19 but visit was declined. Pt mother asked for a call back to discuss rescheduling surgery. Writer notified surgery scheduler who will reach out    Jaky Barajas

## 2022-08-17 ENCOUNTER — TELEPHONE (OUTPATIENT)
Dept: NEUROSURGERY | Facility: CLINIC | Age: 27
End: 2022-08-17

## 2022-08-17 NOTE — TELEPHONE ENCOUNTER
I called patients mother in regards to surgery start time change. Patients surgery time went from 0730 to 1055 start. Family is aware of time change and Huy Wang is also aware and updated MRI start times.        Joshua Quiñonez on 8/17/2022 at 2:19 PM

## 2022-08-18 ENCOUNTER — LAB (OUTPATIENT)
Dept: LAB | Facility: CLINIC | Age: 27
End: 2022-08-18
Payer: COMMERCIAL

## 2022-08-18 DIAGNOSIS — Z20.822 ENCOUNTER FOR LABORATORY TESTING FOR COVID-19 VIRUS: ICD-10-CM

## 2022-08-18 LAB — SARS-COV-2 RNA RESP QL NAA+PROBE: NEGATIVE

## 2022-08-18 PROCEDURE — U0005 INFEC AGEN DETEC AMPLI PROBE: HCPCS

## 2022-08-18 PROCEDURE — U0003 INFECTIOUS AGENT DETECTION BY NUCLEIC ACID (DNA OR RNA); SEVERE ACUTE RESPIRATORY SYNDROME CORONAVIRUS 2 (SARS-COV-2) (CORONAVIRUS DISEASE [COVID-19]), AMPLIFIED PROBE TECHNIQUE, MAKING USE OF HIGH THROUGHPUT TECHNOLOGIES AS DESCRIBED BY CMS-2020-01-R: HCPCS

## 2022-08-22 ENCOUNTER — HOSPITAL ENCOUNTER (OUTPATIENT)
Dept: MRI IMAGING | Facility: CLINIC | Age: 27
Discharge: HOME | End: 2022-08-22
Attending: NEUROLOGICAL SURGERY | Admitting: NEUROLOGICAL SURGERY
Payer: COMMERCIAL

## 2022-08-22 ENCOUNTER — APPOINTMENT (OUTPATIENT)
Dept: CT IMAGING | Facility: CLINIC | Age: 27
End: 2022-08-22
Attending: STUDENT IN AN ORGANIZED HEALTH CARE EDUCATION/TRAINING PROGRAM
Payer: COMMERCIAL

## 2022-08-22 ENCOUNTER — HOSPITAL ENCOUNTER (INPATIENT)
Facility: CLINIC | Age: 27
LOS: 1 days | Discharge: HOME | End: 2022-08-23
Attending: NEUROLOGICAL SURGERY | Admitting: NEUROLOGICAL SURGERY
Payer: COMMERCIAL

## 2022-08-22 ENCOUNTER — APPOINTMENT (OUTPATIENT)
Dept: MRI IMAGING | Facility: CLINIC | Age: 27
End: 2022-08-22
Attending: NEUROLOGICAL SURGERY
Payer: COMMERCIAL

## 2022-08-22 ENCOUNTER — ANESTHESIA (OUTPATIENT)
Dept: SURGERY | Facility: CLINIC | Age: 27
End: 2022-08-22
Payer: COMMERCIAL

## 2022-08-22 ENCOUNTER — APPOINTMENT (OUTPATIENT)
Dept: GENERAL RADIOLOGY | Facility: CLINIC | Age: 27
End: 2022-08-22
Attending: NEUROLOGICAL SURGERY
Payer: COMMERCIAL

## 2022-08-22 DIAGNOSIS — G40.219 PARTIAL SYMPTOMATIC EPILEPSY WITH COMPLEX PARTIAL SEIZURES, INTRACTABLE, WITHOUT STATUS EPILEPTICUS (H): ICD-10-CM

## 2022-08-22 LAB
ABO/RH(D): NORMAL
ANION GAP SERPL CALCULATED.3IONS-SCNC: 10 MMOL/L (ref 7–15)
ANTIBODY SCREEN: NEGATIVE
APTT PPP: 31 SECONDS (ref 22–38)
BUN SERPL-MCNC: 14.3 MG/DL (ref 6–20)
CALCIUM SERPL-MCNC: 9.1 MG/DL (ref 8.6–10)
CHLORIDE SERPL-SCNC: 105 MMOL/L (ref 98–107)
CREAT SERPL-MCNC: 0.98 MG/DL (ref 0.67–1.17)
DEPRECATED HCO3 PLAS-SCNC: 25 MMOL/L (ref 22–29)
ERYTHROCYTE [DISTWIDTH] IN BLOOD BY AUTOMATED COUNT: 12.3 % (ref 10–15)
GFR SERPL CREATININE-BSD FRML MDRD: >90 ML/MIN/1.73M2
GLUCOSE BLDC GLUCOMTR-MCNC: 94 MG/DL (ref 70–99)
GLUCOSE SERPL-MCNC: 89 MG/DL (ref 70–99)
HCT VFR BLD AUTO: 42.9 % (ref 40–53)
HGB BLD-MCNC: 14.7 G/DL (ref 13.3–17.7)
INR PPP: 0.94 (ref 0.85–1.15)
MCH RBC QN AUTO: 30.9 PG (ref 26.5–33)
MCHC RBC AUTO-ENTMCNC: 34.3 G/DL (ref 31.5–36.5)
MCV RBC AUTO: 90 FL (ref 78–100)
PLATELET # BLD AUTO: 269 10E3/UL (ref 150–450)
POTASSIUM SERPL-SCNC: 3.9 MMOL/L (ref 3.4–5.3)
RBC # BLD AUTO: 4.76 10E6/UL (ref 4.4–5.9)
SODIUM SERPL-SCNC: 140 MMOL/L (ref 136–145)
SPECIMEN EXPIRATION DATE: NORMAL
WBC # BLD AUTO: 8.4 10E3/UL (ref 4–11)

## 2022-08-22 PROCEDURE — 70250 X-RAY EXAM OF SKULL: CPT | Mod: 26 | Performed by: RADIOLOGY

## 2022-08-22 PROCEDURE — 360N000080 HC SURGERY LEVEL 7, PER MIN: Performed by: NEUROLOGICAL SURGERY

## 2022-08-22 PROCEDURE — 250N000025 HC SEVOFLURANE, PER MIN: Performed by: NEUROLOGICAL SURGERY

## 2022-08-22 PROCEDURE — 258N000003 HC RX IP 258 OP 636: Performed by: NURSE ANESTHETIST, CERTIFIED REGISTERED

## 2022-08-22 PROCEDURE — 250N000009 HC RX 250: Performed by: NURSE ANESTHETIST, CERTIFIED REGISTERED

## 2022-08-22 PROCEDURE — 258N000003 HC RX IP 258 OP 636: Performed by: ANESTHESIOLOGY

## 2022-08-22 PROCEDURE — 85730 THROMBOPLASTIN TIME PARTIAL: CPT | Performed by: NURSE PRACTITIONER

## 2022-08-22 PROCEDURE — 250N000009 HC RX 250: Performed by: NEUROLOGICAL SURGERY

## 2022-08-22 PROCEDURE — 00H03MZ INSERTION OF NEUROSTIMULATOR LEAD INTO BRAIN, PERCUTANEOUS APPROACH: ICD-10-PCS | Performed by: NEUROLOGICAL SURGERY

## 2022-08-22 PROCEDURE — 8E09XBH COMPUTER ASSISTED PROCEDURE OF HEAD AND NECK REGION, WITH MAGNETIC RESONANCE IMAGING: ICD-10-PCS | Performed by: NEUROLOGICAL SURGERY

## 2022-08-22 PROCEDURE — 255N000002 HC RX 255 OP 636: Performed by: PREVENTIVE MEDICINE

## 2022-08-22 PROCEDURE — 250N000011 HC RX IP 250 OP 636: Performed by: NEUROLOGICAL SURGERY

## 2022-08-22 PROCEDURE — C1778 LEAD, NEUROSTIMULATOR: HCPCS | Performed by: NEUROLOGICAL SURGERY

## 2022-08-22 PROCEDURE — A9585 GADOBUTROL INJECTION: HCPCS | Performed by: PREVENTIVE MEDICINE

## 2022-08-22 PROCEDURE — 250N000024 HC ISOFLURANE, PER MIN: Performed by: NEUROLOGICAL SURGERY

## 2022-08-22 PROCEDURE — 85610 PROTHROMBIN TIME: CPT | Performed by: NURSE PRACTITIONER

## 2022-08-22 PROCEDURE — 999N000141 HC STATISTIC PRE-PROCEDURE NURSING ASSESSMENT: Performed by: NEUROLOGICAL SURGERY

## 2022-08-22 PROCEDURE — 86901 BLOOD TYPING SEROLOGIC RH(D): CPT | Performed by: NURSE PRACTITIONER

## 2022-08-22 PROCEDURE — 70450 CT HEAD/BRAIN W/O DYE: CPT

## 2022-08-22 PROCEDURE — 36415 COLL VENOUS BLD VENIPUNCTURE: CPT | Performed by: NURSE PRACTITIONER

## 2022-08-22 PROCEDURE — 278N000051 HC OR IMPLANT GENERAL: Performed by: NEUROLOGICAL SURGERY

## 2022-08-22 PROCEDURE — 272N000002 HC OR SUPPLY OTHER OPNP: Performed by: NEUROLOGICAL SURGERY

## 2022-08-22 PROCEDURE — 370N000017 HC ANESTHESIA TECHNICAL FEE, PER MIN: Performed by: NEUROLOGICAL SURGERY

## 2022-08-22 PROCEDURE — 272N000004 HC RX 272: Performed by: NEUROLOGICAL SURGERY

## 2022-08-22 PROCEDURE — 70553 MRI BRAIN STEM W/O & W/DYE: CPT

## 2022-08-22 PROCEDURE — 80048 BASIC METABOLIC PNL TOTAL CA: CPT | Performed by: NURSE PRACTITIONER

## 2022-08-22 PROCEDURE — 710N000010 HC RECOVERY PHASE 1, LEVEL 2, PER MIN: Performed by: NEUROLOGICAL SURGERY

## 2022-08-22 PROCEDURE — 999N000065 XR SKULL PORT 1/3 VIEWS

## 2022-08-22 PROCEDURE — 250N000011 HC RX IP 250 OP 636: Performed by: NURSE ANESTHETIST, CERTIFIED REGISTERED

## 2022-08-22 PROCEDURE — 250N000013 HC RX MED GY IP 250 OP 250 PS 637

## 2022-08-22 PROCEDURE — 70553 MRI BRAIN STEM W/O & W/DYE: CPT | Mod: 26 | Performed by: RADIOLOGY

## 2022-08-22 PROCEDURE — 258N000003 HC RX IP 258 OP 636

## 2022-08-22 PROCEDURE — 70450 CT HEAD/BRAIN W/O DYE: CPT | Mod: 26 | Performed by: RADIOLOGY

## 2022-08-22 PROCEDURE — 85014 HEMATOCRIT: CPT | Performed by: NURSE PRACTITIONER

## 2022-08-22 PROCEDURE — 61863 IMPLANT NEUROELECTRODE: CPT | Mod: 50 | Performed by: NEUROLOGICAL SURGERY

## 2022-08-22 PROCEDURE — 70551 MRI BRAIN STEM W/O DYE: CPT

## 2022-08-22 PROCEDURE — 120N000002 HC R&B MED SURG/OB UMMC

## 2022-08-22 PROCEDURE — 272N000001 HC OR GENERAL SUPPLY STERILE: Performed by: NEUROLOGICAL SURGERY

## 2022-08-22 RX ORDER — ONDANSETRON 2 MG/ML
4 INJECTION INTRAMUSCULAR; INTRAVENOUS EVERY 6 HOURS PRN
Status: DISCONTINUED | OUTPATIENT
Start: 2022-08-22 | End: 2022-08-23 | Stop reason: HOSPADM

## 2022-08-22 RX ORDER — OXYCODONE HYDROCHLORIDE 5 MG/1
5 TABLET ORAL EVERY 4 HOURS PRN
Status: DISCONTINUED | OUTPATIENT
Start: 2022-08-22 | End: 2022-08-23 | Stop reason: HOSPADM

## 2022-08-22 RX ORDER — VANCOMYCIN HYDROCHLORIDE 1 G/20ML
INJECTION, POWDER, LYOPHILIZED, FOR SOLUTION INTRAVENOUS PRN
Status: DISCONTINUED | OUTPATIENT
Start: 2022-08-22 | End: 2022-08-22 | Stop reason: HOSPADM

## 2022-08-22 RX ORDER — BISACODYL 10 MG
10 SUPPOSITORY, RECTAL RECTAL DAILY PRN
Status: DISCONTINUED | OUTPATIENT
Start: 2022-08-22 | End: 2022-08-23 | Stop reason: HOSPADM

## 2022-08-22 RX ORDER — ACETAMINOPHEN 325 MG/1
975 TABLET ORAL EVERY 8 HOURS
Status: DISCONTINUED | OUTPATIENT
Start: 2022-08-22 | End: 2022-08-23 | Stop reason: HOSPADM

## 2022-08-22 RX ORDER — FENTANYL CITRATE 50 UG/ML
25 INJECTION, SOLUTION INTRAMUSCULAR; INTRAVENOUS
Status: DISCONTINUED | OUTPATIENT
Start: 2022-08-22 | End: 2022-08-22 | Stop reason: HOSPADM

## 2022-08-22 RX ORDER — LAMOTRIGINE 150 MG/1
150 TABLET ORAL 2 TIMES DAILY
Status: DISCONTINUED | OUTPATIENT
Start: 2022-08-23 | End: 2022-08-23 | Stop reason: HOSPADM

## 2022-08-22 RX ORDER — CEFAZOLIN SODIUM 2 G/100ML
2 INJECTION, SOLUTION INTRAVENOUS EVERY 8 HOURS
Status: DISCONTINUED | OUTPATIENT
Start: 2022-08-23 | End: 2022-08-23 | Stop reason: HOSPADM

## 2022-08-22 RX ORDER — FENTANYL CITRATE 50 UG/ML
INJECTION, SOLUTION INTRAMUSCULAR; INTRAVENOUS PRN
Status: DISCONTINUED | OUTPATIENT
Start: 2022-08-22 | End: 2022-08-22

## 2022-08-22 RX ORDER — PROPOFOL 10 MG/ML
INJECTION, EMULSION INTRAVENOUS PRN
Status: DISCONTINUED | OUTPATIENT
Start: 2022-08-22 | End: 2022-08-22

## 2022-08-22 RX ORDER — POLYETHYLENE GLYCOL 3350 17 G/17G
17 POWDER, FOR SOLUTION ORAL DAILY
Status: DISCONTINUED | OUTPATIENT
Start: 2022-08-23 | End: 2022-08-23 | Stop reason: HOSPADM

## 2022-08-22 RX ORDER — MEPERIDINE HYDROCHLORIDE 25 MG/ML
12.5 INJECTION INTRAMUSCULAR; INTRAVENOUS; SUBCUTANEOUS
Status: DISCONTINUED | OUTPATIENT
Start: 2022-08-22 | End: 2022-08-22 | Stop reason: HOSPADM

## 2022-08-22 RX ORDER — CEFAZOLIN SODIUM 1 G/3ML
INJECTION, POWDER, FOR SOLUTION INTRAMUSCULAR; INTRAVENOUS PRN
Status: DISCONTINUED | OUTPATIENT
Start: 2022-08-22 | End: 2022-08-22

## 2022-08-22 RX ORDER — ONDANSETRON 2 MG/ML
INJECTION INTRAMUSCULAR; INTRAVENOUS PRN
Status: DISCONTINUED | OUTPATIENT
Start: 2022-08-22 | End: 2022-08-22

## 2022-08-22 RX ORDER — NALOXONE HYDROCHLORIDE 0.4 MG/ML
0.4 INJECTION, SOLUTION INTRAMUSCULAR; INTRAVENOUS; SUBCUTANEOUS
Status: DISCONTINUED | OUTPATIENT
Start: 2022-08-22 | End: 2022-08-23 | Stop reason: HOSPADM

## 2022-08-22 RX ORDER — AMOXICILLIN 250 MG
1 CAPSULE ORAL 2 TIMES DAILY
Status: DISCONTINUED | OUTPATIENT
Start: 2022-08-23 | End: 2022-08-23 | Stop reason: HOSPADM

## 2022-08-22 RX ORDER — LIDOCAINE 40 MG/G
CREAM TOPICAL
Status: DISCONTINUED | OUTPATIENT
Start: 2022-08-22 | End: 2022-08-23 | Stop reason: HOSPADM

## 2022-08-22 RX ORDER — LIDOCAINE HYDROCHLORIDE 20 MG/ML
INJECTION, SOLUTION INFILTRATION; PERINEURAL PRN
Status: DISCONTINUED | OUTPATIENT
Start: 2022-08-22 | End: 2022-08-22

## 2022-08-22 RX ORDER — ZONISAMIDE 100 MG/1
300 CAPSULE ORAL EVERY MORNING
Status: DISCONTINUED | OUTPATIENT
Start: 2022-08-23 | End: 2022-08-23 | Stop reason: HOSPADM

## 2022-08-22 RX ORDER — ZONISAMIDE 100 MG/1
400 CAPSULE ORAL EVERY EVENING
Status: DISCONTINUED | OUTPATIENT
Start: 2022-08-23 | End: 2022-08-23 | Stop reason: HOSPADM

## 2022-08-22 RX ORDER — SODIUM CHLORIDE 9 MG/ML
INJECTION, SOLUTION INTRAVENOUS CONTINUOUS
Status: ACTIVE | OUTPATIENT
Start: 2022-08-22 | End: 2022-08-23

## 2022-08-22 RX ORDER — HYDROMORPHONE HYDROCHLORIDE 1 MG/ML
0.2 INJECTION, SOLUTION INTRAMUSCULAR; INTRAVENOUS; SUBCUTANEOUS EVERY 5 MIN PRN
Status: DISCONTINUED | OUTPATIENT
Start: 2022-08-22 | End: 2022-08-22 | Stop reason: HOSPADM

## 2022-08-22 RX ORDER — NALOXONE HYDROCHLORIDE 0.4 MG/ML
0.2 INJECTION, SOLUTION INTRAMUSCULAR; INTRAVENOUS; SUBCUTANEOUS
Status: DISCONTINUED | OUTPATIENT
Start: 2022-08-22 | End: 2022-08-23 | Stop reason: HOSPADM

## 2022-08-22 RX ORDER — ONDANSETRON 2 MG/ML
4 INJECTION INTRAMUSCULAR; INTRAVENOUS EVERY 30 MIN PRN
Status: DISCONTINUED | OUTPATIENT
Start: 2022-08-22 | End: 2022-08-22 | Stop reason: HOSPADM

## 2022-08-22 RX ORDER — ACETAMINOPHEN 325 MG/1
650 TABLET ORAL EVERY 4 HOURS PRN
Status: DISCONTINUED | OUTPATIENT
Start: 2022-08-25 | End: 2022-08-23 | Stop reason: HOSPADM

## 2022-08-22 RX ORDER — LEVETIRACETAM 500 MG/1
500 TABLET ORAL 2 TIMES DAILY
Status: DISCONTINUED | OUTPATIENT
Start: 2022-08-23 | End: 2022-08-23 | Stop reason: HOSPADM

## 2022-08-22 RX ORDER — FENTANYL CITRATE 50 UG/ML
25 INJECTION, SOLUTION INTRAMUSCULAR; INTRAVENOUS EVERY 5 MIN PRN
Status: DISCONTINUED | OUTPATIENT
Start: 2022-08-22 | End: 2022-08-22 | Stop reason: HOSPADM

## 2022-08-22 RX ORDER — PROCHLORPERAZINE MALEATE 10 MG
10 TABLET ORAL EVERY 6 HOURS PRN
Status: DISCONTINUED | OUTPATIENT
Start: 2022-08-22 | End: 2022-08-23 | Stop reason: HOSPADM

## 2022-08-22 RX ORDER — LABETALOL HYDROCHLORIDE 5 MG/ML
10-40 INJECTION, SOLUTION INTRAVENOUS EVERY 10 MIN PRN
Status: DISCONTINUED | OUTPATIENT
Start: 2022-08-22 | End: 2022-08-23 | Stop reason: HOSPADM

## 2022-08-22 RX ORDER — GADOBUTROL 604.72 MG/ML
10 INJECTION INTRAVENOUS ONCE
Status: COMPLETED | OUTPATIENT
Start: 2022-08-22 | End: 2022-08-22

## 2022-08-22 RX ORDER — OXYCODONE HYDROCHLORIDE 10 MG/1
10 TABLET ORAL EVERY 4 HOURS PRN
Status: DISCONTINUED | OUTPATIENT
Start: 2022-08-22 | End: 2022-08-23 | Stop reason: HOSPADM

## 2022-08-22 RX ORDER — DEXAMETHASONE SODIUM PHOSPHATE 4 MG/ML
INJECTION, SOLUTION INTRA-ARTICULAR; INTRALESIONAL; INTRAMUSCULAR; INTRAVENOUS; SOFT TISSUE PRN
Status: DISCONTINUED | OUTPATIENT
Start: 2022-08-22 | End: 2022-08-22

## 2022-08-22 RX ORDER — SODIUM CHLORIDE, SODIUM LACTATE, POTASSIUM CHLORIDE, CALCIUM CHLORIDE 600; 310; 30; 20 MG/100ML; MG/100ML; MG/100ML; MG/100ML
INJECTION, SOLUTION INTRAVENOUS CONTINUOUS
Status: DISCONTINUED | OUTPATIENT
Start: 2022-08-22 | End: 2022-08-22 | Stop reason: HOSPADM

## 2022-08-22 RX ORDER — ACETAMINOPHEN 325 MG/1
975 TABLET ORAL ONCE
Status: DISCONTINUED | OUTPATIENT
Start: 2022-08-22 | End: 2022-08-22 | Stop reason: HOSPADM

## 2022-08-22 RX ORDER — ONDANSETRON 4 MG/1
4 TABLET, ORALLY DISINTEGRATING ORAL EVERY 6 HOURS PRN
Status: DISCONTINUED | OUTPATIENT
Start: 2022-08-22 | End: 2022-08-23 | Stop reason: HOSPADM

## 2022-08-22 RX ORDER — OXYCODONE HYDROCHLORIDE 5 MG/1
5 TABLET ORAL EVERY 4 HOURS PRN
Status: DISCONTINUED | OUTPATIENT
Start: 2022-08-22 | End: 2022-08-22 | Stop reason: HOSPADM

## 2022-08-22 RX ORDER — HYDRALAZINE HYDROCHLORIDE 20 MG/ML
10-20 INJECTION INTRAMUSCULAR; INTRAVENOUS EVERY 30 MIN PRN
Status: DISCONTINUED | OUTPATIENT
Start: 2022-08-22 | End: 2022-08-23 | Stop reason: HOSPADM

## 2022-08-22 RX ORDER — SODIUM CHLORIDE, SODIUM LACTATE, POTASSIUM CHLORIDE, CALCIUM CHLORIDE 600; 310; 30; 20 MG/100ML; MG/100ML; MG/100ML; MG/100ML
INJECTION, SOLUTION INTRAVENOUS CONTINUOUS PRN
Status: DISCONTINUED | OUTPATIENT
Start: 2022-08-22 | End: 2022-08-22

## 2022-08-22 RX ORDER — ONDANSETRON 4 MG/1
4 TABLET, ORALLY DISINTEGRATING ORAL EVERY 30 MIN PRN
Status: DISCONTINUED | OUTPATIENT
Start: 2022-08-22 | End: 2022-08-22 | Stop reason: HOSPADM

## 2022-08-22 RX ORDER — LIDOCAINE 40 MG/G
CREAM TOPICAL
Status: DISCONTINUED | OUTPATIENT
Start: 2022-08-22 | End: 2022-08-22 | Stop reason: HOSPADM

## 2022-08-22 RX ADMIN — HYDROMORPHONE HYDROCHLORIDE 0.5 MG: 1 INJECTION, SOLUTION INTRAMUSCULAR; INTRAVENOUS; SUBCUTANEOUS at 20:22

## 2022-08-22 RX ADMIN — SODIUM CHLORIDE, POTASSIUM CHLORIDE, SODIUM LACTATE AND CALCIUM CHLORIDE: 600; 310; 30; 20 INJECTION, SOLUTION INTRAVENOUS at 11:54

## 2022-08-22 RX ADMIN — DEXAMETHASONE SODIUM PHOSPHATE 6 MG: 4 INJECTION, SOLUTION INTRA-ARTICULAR; INTRALESIONAL; INTRAMUSCULAR; INTRAVENOUS; SOFT TISSUE at 12:20

## 2022-08-22 RX ADMIN — GADOBUTROL 10 ML: 604.72 INJECTION INTRAVENOUS at 10:36

## 2022-08-22 RX ADMIN — Medication 50 MG: at 12:13

## 2022-08-22 RX ADMIN — FENTANYL CITRATE 50 MCG: 50 INJECTION, SOLUTION INTRAMUSCULAR; INTRAVENOUS at 15:37

## 2022-08-22 RX ADMIN — Medication 30 MG: at 13:08

## 2022-08-22 RX ADMIN — PROPOFOL 120 MG: 10 INJECTION, EMULSION INTRAVENOUS at 12:11

## 2022-08-22 RX ADMIN — ONDANSETRON 4 MG: 2 INJECTION INTRAMUSCULAR; INTRAVENOUS at 19:38

## 2022-08-22 RX ADMIN — SODIUM CHLORIDE: 9 INJECTION, SOLUTION INTRAVENOUS at 22:59

## 2022-08-22 RX ADMIN — FENTANYL CITRATE 100 MCG: 50 INJECTION, SOLUTION INTRAMUSCULAR; INTRAVENOUS at 12:11

## 2022-08-22 RX ADMIN — CEFAZOLIN 2 G: 1 INJECTION, POWDER, FOR SOLUTION INTRAMUSCULAR; INTRAVENOUS at 17:34

## 2022-08-22 RX ADMIN — ACETAMINOPHEN 975 MG: 325 TABLET ORAL at 22:51

## 2022-08-22 RX ADMIN — FENTANYL CITRATE 50 MCG: 50 INJECTION, SOLUTION INTRAMUSCULAR; INTRAVENOUS at 20:15

## 2022-08-22 RX ADMIN — SODIUM CHLORIDE, POTASSIUM CHLORIDE, SODIUM LACTATE AND CALCIUM CHLORIDE: 600; 310; 30; 20 INJECTION, SOLUTION INTRAVENOUS at 21:16

## 2022-08-22 RX ADMIN — SUGAMMADEX 200 MG: 100 INJECTION, SOLUTION INTRAVENOUS at 20:49

## 2022-08-22 RX ADMIN — CEFAZOLIN 2 G: 1 INJECTION, POWDER, FOR SOLUTION INTRAMUSCULAR; INTRAVENOUS at 12:30

## 2022-08-22 RX ADMIN — LIDOCAINE HYDROCHLORIDE 100 MG: 20 INJECTION, SOLUTION INFILTRATION; PERINEURAL at 12:11

## 2022-08-22 RX ADMIN — SODIUM CHLORIDE, POTASSIUM CHLORIDE, SODIUM LACTATE AND CALCIUM CHLORIDE: 600; 310; 30; 20 INJECTION, SOLUTION INTRAVENOUS at 17:56

## 2022-08-22 RX ADMIN — FENTANYL CITRATE 50 MCG: 50 INJECTION, SOLUTION INTRAMUSCULAR; INTRAVENOUS at 19:38

## 2022-08-22 ASSESSMENT — ACTIVITIES OF DAILY LIVING (ADL)
ADLS_ACUITY_SCORE: 20

## 2022-08-22 ASSESSMENT — ENCOUNTER SYMPTOMS: SEIZURES: 1

## 2022-08-22 NOTE — ANESTHESIA PROCEDURE NOTES
Airway       Patient location during procedure: OR       Procedure Start/Stop Times: 8/22/2022 12:16 PM  Staff -        CRNA: Jocy Boucher APRN CRNA       Performed By: CRNA  Consent for Airway        Urgency: elective  Indications and Patient Condition       Indications for airway management: shayna-procedural       Induction type:intravenous       Mask difficulty assessment: 1 - vent by mask    Final Airway Details       Final airway type: endotracheal airway       Successful airway: ETT - single  Endotracheal Airway Details        ETT size (mm): 7.5       Cuffed: yes       Successful intubation technique: direct laryngoscopy       DL Blade Type: MAC 3       Grade View of Cords: 1       Adjucts: stylet       Position: Right       Measured from: gums/teeth       Bite block used: None    Post intubation assessment        Placement verified by: capnometry, equal breath sounds and chest rise        Number of attempts at approach: 1       Secured with: silk tape       Ease of procedure: easy       Dentition: Intact    Medication(s) Administered   Medication Administration Time: 8/22/2022 12:16 PM

## 2022-08-22 NOTE — PROGRESS NOTES
"Brief Pre-Operative Note    Amrit (\"Sergio\") Valerie is a pleasant 27 year old male with a history of epilepsy who is here for intraoperative MRI/stealth-assisted Bilateral DBS placement (phase 1), targeting the anterior nucleus of the thalamus without microelectrode recording.     The patient is doing well preoperatively, and has no concerns at this time.    On physical exam, he is:  Alert and oriented x 3  Cranial nerves II-XII intact   5/5 strength in upper and lower extremities  Appropriate sensation in all extremities    The patient has been marked and consented for the surgery as listed.     Hari Veronica, MS4  Department of Neurosurgery    "

## 2022-08-22 NOTE — ANESTHESIA PREPROCEDURE EVALUATION
Anesthesia Pre-Procedure Evaluation    Patient: Amrit Padilla   MRN: [id number] : 1995        Procedure : Procedure(s):  Intraoperative Magnetic resonance imaging/Stealth assisted Bilateral deep brain stimulator placement, phase I, placement of bilateral deep brain stimulator electrode, target anterior nucleus of thalamus, WITHOUT microelectrode recording          Past Medical History:   Diagnosis Date     Depressive disorder      Headaches, cluster      Seizures (H)       Past Surgical History:   Procedure Laterality Date     BACK SURGERY       REMOVE DEPTH ELECTRODES N/A 2022    Procedure: Stereo EEG depth electrode removal;  Surgeon: Jairon Mar MD;  Location: [facility] OR     JAYDEN ASSISTED PLACEMENT DEPTH ELECTRODE Bilateral 2022    Procedure: JAYDEN-assisted stereotactic placement of bilateral depth electrodes for invasive video electroencephalography monitoring;  Surgeon: Tello Adler MD;  Location: [facility] OR      Allergies   Allergen Reactions     [place] Cough, Difficulty breathing and Shortness Of Breath     Hydroxyzine      Other reaction(s): Seizures     Mixed Grasses Cough, Other (See Comments) and Shortness Of Breath     Unknown [No Clinical Screening - See Comments] Rash     Reaction to a seizure med that he can not recall.     Dust Mites      Molds & Smuts      Other reaction(s): Unknown     Sertraline Other (See Comments)     mood swings     Zyprexa [Olanzapine]      Seizure.       Social History     Tobacco Use     Smoking status: Never Smoker     Smokeless tobacco: Never Used   Substance Use Topics     Alcohol use: No      Wt Readings from Last 1 Encounters:   22 96.6 kg (212 lb 15.4 oz)        Anesthesia Evaluation   Pt has had prior anesthetic.     No history of anesthetic complications       ROS/MED HX  ENT/Pulmonary:  - neg pulmonary ROS     Neurologic:     (+) seizures,     Cardiovascular:  - neg cardiovascular ROS     METS/Exercise Tolerance:      Hematologic:  - neg hematologic  ROS     Musculoskeletal:  - neg musculoskeletal ROS     GI/Hepatic:  - neg GI/hepatic ROS     Renal/Genitourinary:  - neg Renal ROS     Endo:  - neg endo ROS     Psychiatric/Substance Use: Comment: Asperger  Depression        Infectious Disease:  - neg infectious disease ROS     Malignancy:  - neg malignancy ROS     Other:            Physical Exam    Airway        Mallampati: II   TM distance: > 3 FB   Neck ROM: full   Mouth opening: > 3 cm    Respiratory Devices and Support         Dental  no notable dental history         Cardiovascular   cardiovascular exam normal          Pulmonary   pulmonary exam normal                OUTSIDE LABS:  CBC:   Lab Results   Component Value Date    WBC 8.4 08/22/2022    WBC 9.6 05/06/2022    HGB 14.7 08/22/2022    HGB 14.5 05/06/2022    HCT 42.9 08/22/2022    HCT 42.9 05/06/2022     08/22/2022     05/06/2022     BMP:   Lab Results   Component Value Date     08/22/2022     05/06/2022    POTASSIUM 3.9 08/22/2022    POTASSIUM 3.7 05/06/2022    CHLORIDE 105 08/22/2022    CHLORIDE 106 05/06/2022    CO2 25 08/22/2022    CO2 26 05/06/2022    BUN 14.3 08/22/2022    BUN 18 05/06/2022    CR 0.98 08/22/2022    CR 0.89 05/06/2022    GLC 89 08/22/2022    GLC 94 08/22/2022     COAGS:   Lab Results   Component Value Date    PTT 31 08/22/2022    INR 0.94 08/22/2022     POC: No results found for: BGM, HCG, HCGS  HEPATIC:   Lab Results   Component Value Date    ALBUMIN 3.5 03/02/2021    PROTTOTAL 6.3 (L) 03/02/2021    ALT 53 03/02/2021    AST 25 03/02/2021    ALKPHOS 74 03/02/2021    BILITOTAL 0.6 03/02/2021     OTHER:   Lab Results   Component Value Date    ZAIDA 9.1 08/22/2022    PHOS 3.8 05/05/2022    MAG 2.1 05/05/2022       Anesthesia Plan    ASA Status:  2   NPO Status:  NPO Appropriate    Anesthesia Type: General.     - Airway: ETT   Induction: Intravenous.   Maintenance: Balanced.   Techniques and Equipment:     - Lines/Monitors: 2nd  IV     Consents    Anesthesia Plan(s) and associated risks, benefits, and realistic alternatives discussed. Questions answered and patient/representative(s) expressed understanding.    - Discussed:     - Discussed with:  Patient         Postoperative Care    Pain management: IV analgesics.   PONV prophylaxis: Ondansetron (or other 5HT-3), Promethazine or metoclopramide     Comments:    Other Comments: Combination general/MAC            Tello Jean MD

## 2022-08-22 NOTE — DISCHARGE SUMMARY
[facility] Discharge Summary and Instructions    Amrit Padilla MRN# [id number]   Age: 27 year old YOB: 1995     Date of Admission:  8/22/2022  Date of Discharge::  8/23/2022  Admitting Physician:  Tello Conner MD  Discharge Physician:  Tello Conner MD          Admission Diagnoses:   Partial symptomatic epilepsy with complex partial seizures, intractable, without status epilepticus (H) [G40.219]          Discharge Diagnosis:     Partial symptomatic epilepsy with complex partial seizures, intractable, without status epilepticus (H) [G40.219]          Procedures:   Intraoperative Magnetic resonance imaging/Stealth assisted Bilateral deep brain stimulator placement, phase I, placement of bilateral deep brain stimulator electrode, target anterior nucleus of thalamus, WITHOUT microelectrode recording         Brief History of Illness:   Sergio Padilla is a 27 year-old male with focal medically refractory epilepsy who has previously gone through extensive frontotemporal SEEG evaluation which could not localize/laterlize his seizures. His SEEG surgery was complicated with a mild SDH which was stable on the last imaging obtained. On 5/17/2022, the patient met with Dr. Conner in clinic to discuss surgical options to manage his epilepsy. It was decided that DBS was the best approach vs. VNS. Patient has elected to undergo above-mentioned procedure.           Hospital Course:   Patient underwent above-mentioned procedure on 8/22/2022. The operation was uncomplicated, and the patient was admitted to the floor for observation post-operatively. On post operative day 1, he was ambulating, voiding without a lira, eating a regular diet, pain was well controlled and therefore he/she was discharged home.    Exam on day of discharge:  General: sitting in bed, no acute distress  Pulm: non-labored breathing with symmetric chest rise  Neuro:  Alert and oriented to person, place, time and  situation  PERRL, EOMI, face symmetric  Strength 5/5 bilaterally in upper and lower extremities  Gait intact          Discharge Medications:     Current Discharge Medication List      CONTINUE these medications which have NOT CHANGED    Details   cetirizine (ZYRTEC) 10 MG tablet Take 10 mg by mouth daily as needed       FLUoxetine (PROZAC) 40 MG capsule Take 80 mg by mouth daily Reported per pt      HEMP OIL OR EXTRACT OR OTHER CBD CANNABINOID, NOT MEDICAL CANNABIS, Take 15 mg by mouth every evening       lamoTRIgine (LAMICTAL) 150 MG tablet Take 1 tablet (150 mg) by mouth 2 times daily  Qty: 180 tablet, Refills: 0    Associated Diagnoses: Recurrent seizures (H)      levETIRAcetam (KEPPRA) 500 MG tablet Take 1 tablet (500 mg) by mouth 2 times daily  Qty: 60 tablet, Refills: 6    Associated Diagnoses: Recurrent seizures (H)      MELATONIN PO Take 10 mg by mouth At Bedtime OTC      propranolol (INDERAL) 10 MG tablet Take 10 mg by mouth as needed (before work)      zonisamide (ZONEGRAN) 100 MG capsule TAKE 3 CAPS (300 MG) EACH MORNING AND 4 CAPS (400 MG) EACH NIGHT  Qty: 630 capsule, Refills: 3    Associated Diagnoses: Partial symptomatic epilepsy with complex partial seizures, intractable, without status epilepticus (H)      LORazepam (LORAZEPAM INTENSOL) 2 MG/ML (HIGH CONC) solution Take 1 ml (2mg) for 2 focal seizures in 4 hours or for any generalized tonic-clonic seizure. Max 2 ml (4mg) in 24 hours.  Qty: 30 mL, Refills: 1    Associated Diagnoses: Recurrent seizures (H)                   Discharge Instructions and Follow-Up:     Discharge diet: Regular   Discharge activity: You may advance activity as tolerated. No strenuous exercise or heay lifting greater than 10 lbs for 4 weeks or until seen and cleared in clinic.   Discharge follow-up: Present for next procedure on 8/29   Wound care: Ok to shower, however no scrubbing of the wound and no soaking of the wound, meaning no bathtubs or swimming pools. Pat dry only.  Leave wound open to air.       Please call if you have:  1. increased pain, redness, drainage, swelling at your incision  2. fevers > 101.5 F degrees  3. with any questions or concerns.  You may reach the Neurosurgery clinic at [phone] during regular work hours. ER at [phone].    and ask for the Neurosurgery Resident on call at [phone], during off hours or weekends.         Discharge Disposition:     Discharged to home          Agatha Mallory MD, PhD  PGY-2 Neurosurgery  Pager: [phone]

## 2022-08-23 ENCOUNTER — TELEPHONE (OUTPATIENT)
Dept: NEUROLOGY | Facility: CLINIC | Age: 27
End: 2022-08-23

## 2022-08-23 VITALS
OXYGEN SATURATION: 96 % | SYSTOLIC BLOOD PRESSURE: 129 MMHG | HEART RATE: 88 BPM | HEIGHT: 72 IN | BODY MASS INDEX: 28.85 KG/M2 | TEMPERATURE: 96.4 F | RESPIRATION RATE: 16 BRPM | DIASTOLIC BLOOD PRESSURE: 69 MMHG | WEIGHT: 212.96 LBS

## 2022-08-23 LAB
ANION GAP SERPL CALCULATED.3IONS-SCNC: 9 MMOL/L (ref 7–15)
BUN SERPL-MCNC: 14.7 MG/DL (ref 6–20)
CALCIUM SERPL-MCNC: 8.7 MG/DL (ref 8.6–10)
CHLORIDE SERPL-SCNC: 104 MMOL/L (ref 98–107)
CREAT SERPL-MCNC: 0.86 MG/DL (ref 0.67–1.17)
DEPRECATED HCO3 PLAS-SCNC: 23 MMOL/L (ref 22–29)
GFR SERPL CREATININE-BSD FRML MDRD: >90 ML/MIN/1.73M2
GLUCOSE SERPL-MCNC: 121 MG/DL (ref 70–99)
HOLD SPECIMEN: NORMAL
MAGNESIUM SERPL-MCNC: 1.5 MG/DL (ref 1.7–2.3)
MAGNESIUM SERPL-MCNC: 2.1 MG/DL (ref 1.7–2.3)
MAGNESIUM SERPL-MCNC: 2.6 MG/DL (ref 1.7–2.3)
PHOSPHATE SERPL-MCNC: 3.2 MG/DL (ref 2.5–4.5)
PHOSPHATE SERPL-MCNC: 4.2 MG/DL (ref 2.5–4.5)
POTASSIUM SERPL-SCNC: 3.8 MMOL/L (ref 3.4–5.3)
SODIUM SERPL-SCNC: 136 MMOL/L (ref 136–145)

## 2022-08-23 PROCEDURE — 83735 ASSAY OF MAGNESIUM: CPT | Performed by: NEUROLOGICAL SURGERY

## 2022-08-23 PROCEDURE — 84100 ASSAY OF PHOSPHORUS: CPT | Performed by: NEUROLOGICAL SURGERY

## 2022-08-23 PROCEDURE — 250N000013 HC RX MED GY IP 250 OP 250 PS 637

## 2022-08-23 PROCEDURE — 80048 BASIC METABOLIC PNL TOTAL CA: CPT

## 2022-08-23 PROCEDURE — 83735 ASSAY OF MAGNESIUM: CPT

## 2022-08-23 PROCEDURE — 84100 ASSAY OF PHOSPHORUS: CPT

## 2022-08-23 PROCEDURE — 250N000013 HC RX MED GY IP 250 OP 250 PS 637: Performed by: STUDENT IN AN ORGANIZED HEALTH CARE EDUCATION/TRAINING PROGRAM

## 2022-08-23 PROCEDURE — 36415 COLL VENOUS BLD VENIPUNCTURE: CPT | Performed by: NEUROLOGICAL SURGERY

## 2022-08-23 PROCEDURE — 250N000011 HC RX IP 250 OP 636: Performed by: NEUROLOGICAL SURGERY

## 2022-08-23 PROCEDURE — 250N000011 HC RX IP 250 OP 636

## 2022-08-23 PROCEDURE — 36415 COLL VENOUS BLD VENIPUNCTURE: CPT

## 2022-08-23 RX ORDER — OXYCODONE HYDROCHLORIDE 5 MG/1
5 TABLET ORAL EVERY 4 HOURS PRN
Qty: 20 TABLET | Refills: 0 | Status: SHIPPED | OUTPATIENT
Start: 2022-08-23 | End: 2022-09-08

## 2022-08-23 RX ORDER — ZONISAMIDE 100 MG/1
400 CAPSULE ORAL AT BEDTIME
Status: DISCONTINUED | OUTPATIENT
Start: 2022-08-23 | End: 2022-08-23

## 2022-08-23 RX ORDER — AMOXICILLIN 250 MG
1 CAPSULE ORAL 2 TIMES DAILY
Qty: 28 TABLET | Refills: 0 | Status: SHIPPED | OUTPATIENT
Start: 2022-08-23 | End: 2022-09-06

## 2022-08-23 RX ORDER — ZONISAMIDE 100 MG/1
300 CAPSULE ORAL EVERY MORNING
Status: DISCONTINUED | OUTPATIENT
Start: 2022-08-23 | End: 2022-08-23

## 2022-08-23 RX ORDER — ACETAMINOPHEN 325 MG/1
650 TABLET ORAL EVERY 4 HOURS PRN
Status: DISCONTINUED | OUTPATIENT
Start: 2022-08-23 | End: 2022-08-23 | Stop reason: HOSPADM

## 2022-08-23 RX ORDER — MAGNESIUM SULFATE HEPTAHYDRATE 40 MG/ML
4 INJECTION, SOLUTION INTRAVENOUS ONCE
Status: COMPLETED | OUTPATIENT
Start: 2022-08-23 | End: 2022-08-23

## 2022-08-23 RX ORDER — CEPHALEXIN 500 MG/1
500 CAPSULE ORAL 3 TIMES DAILY
Qty: 21 CAPSULE | Refills: 0 | Status: ON HOLD | OUTPATIENT
Start: 2022-08-23 | End: 2022-08-29

## 2022-08-23 RX ORDER — CETIRIZINE HYDROCHLORIDE 10 MG/1
10 TABLET ORAL DAILY PRN
Status: DISCONTINUED | OUTPATIENT
Start: 2022-08-23 | End: 2022-08-23 | Stop reason: HOSPADM

## 2022-08-23 RX ADMIN — ACETAMINOPHEN 650 MG: 325 TABLET ORAL at 02:27

## 2022-08-23 RX ADMIN — ZONISAMIDE 300 MG: 100 CAPSULE ORAL at 08:09

## 2022-08-23 RX ADMIN — CEFAZOLIN SODIUM 2 G: 2 INJECTION, SOLUTION INTRAVENOUS at 08:09

## 2022-08-23 RX ADMIN — CETIRIZINE HYDROCHLORIDE 10 MG: 10 TABLET, FILM COATED ORAL at 02:27

## 2022-08-23 RX ADMIN — LEVETIRACETAM 500 MG: 500 TABLET, FILM COATED ORAL at 08:09

## 2022-08-23 RX ADMIN — LEVETIRACETAM 500 MG: 500 TABLET, FILM COATED ORAL at 00:46

## 2022-08-23 RX ADMIN — OXYCODONE HYDROCHLORIDE 5 MG: 5 TABLET ORAL at 05:34

## 2022-08-23 RX ADMIN — ACETAMINOPHEN 975 MG: 325 TABLET ORAL at 06:55

## 2022-08-23 RX ADMIN — SENNOSIDES AND DOCUSATE SODIUM 1 TABLET: 50; 8.6 TABLET ORAL at 00:46

## 2022-08-23 RX ADMIN — ZONISAMIDE 400 MG: 100 CAPSULE ORAL at 00:51

## 2022-08-23 RX ADMIN — LAMOTRIGINE 150 MG: 150 TABLET ORAL at 08:09

## 2022-08-23 RX ADMIN — FLUOXETINE 80 MG: 20 CAPSULE ORAL at 08:09

## 2022-08-23 RX ADMIN — CEFAZOLIN SODIUM 2 G: 2 INJECTION, SOLUTION INTRAVENOUS at 00:45

## 2022-08-23 RX ADMIN — MAGNESIUM SULFATE HEPTAHYDRATE 4 G: 40 INJECTION, SOLUTION INTRAVENOUS at 05:22

## 2022-08-23 RX ADMIN — LAMOTRIGINE 150 MG: 150 TABLET ORAL at 00:46

## 2022-08-23 ASSESSMENT — ACTIVITIES OF DAILY LIVING (ADL)
ADLS_ACUITY_SCORE: 20
ADLS_ACUITY_SCORE: 26
ADLS_ACUITY_SCORE: 26
ADLS_ACUITY_SCORE: 20
ADLS_ACUITY_SCORE: 20

## 2022-08-23 NOTE — BRIEF OP NOTE
[facility]    Brief Operative Note    Pre-operative diagnosis: Partial symptomatic epilepsy with complex partial seizures, intractable, without status epilepticus (H) [G40.219]  Post-operative diagnosis Same as pre-operative diagnosis    Procedure: Procedure(s):  Intraoperative Magnetic resonance imaging/Stealth assisted Bilateral deep brain stimulator placement, phase I, placement of bilateral deep brain stimulator electrode, target anterior nucleus of thalamus, WITHOUT microelectrode recording  Surgeon: Surgeon(s) and Role:     * Tello Adler MD - Primary  Anesthesia: General   Estimated Blood Loss: 100 mL    Drains: None  Specimens: * No specimens in log *  Findings:   None.  Complications: None.  Implants:   Implant Name Type Inv. Item Serial No.  Lot No. LRB No. Used Action   Moi Hole Device Kit    [employer] [id number] Right 1 Implanted   moi hole device     [id number] Left 1 Implanted   lead   [id number]   Right 1 Implanted   lead   [id number]   Left 1 Implanted       Hari Veronica  MS4  Department of Neurosurgery    Candelario Garrett PGY2

## 2022-08-23 NOTE — ANESTHESIA CARE TRANSFER NOTE
Patient: Amrit Padilla    Procedure: Procedure(s):  Intraoperative Magnetic resonance imaging/Stealth assisted Bilateral deep brain stimulator placement, phase I, placement of bilateral deep brain stimulator electrode, target anterior nucleus of thalamus, WITHOUT microelectrode recording       Diagnosis: Partial symptomatic epilepsy with complex partial seizures, intractable, without status epilepticus (H) [G40.219]  Diagnosis Additional Information: No value filed.    Anesthesia Type:   General     Note:    Oropharynx: spontaneously breathing  Level of Consciousness: drowsy  Oxygen Supplementation: nasal cannula    Independent Airway: airway patency satisfactory and stable  Dentition: dentition unchanged  Vital Signs Stable: post-procedure vital signs reviewed and stable  Report to RN Given: handoff report given  Patient transferred to: PACU    Handoff Report: Identifed the Patient, Identified the Reponsible Provider, Reviewed the pertinent medical history, Discussed the surgical course, Reviewed Intra-OP anesthesia mangement and issues during anesthesia, Set expectations for post-procedure period and Allowed opportunity for questions and acknowledgement of understanding      Vitals:  Vitals Value Taken Time   /76 08/22/22 2110   Temp     Pulse 104 08/22/22 2111   Resp 10 08/22/22 2111   SpO2 95 % 08/22/22 2111   Vitals shown include unvalidated device data.    Electronically Signed By: BEBETO Ballard CRNA  August 22, 2022  9:12 PM

## 2022-08-23 NOTE — PLAN OF CARE
Status: POD #1 s/p DBS phase 1, bilateral electrode placement.   Vitals: VSS.  Neuros: Aox4. 5/5 all throughout. No N/T.   IV: L PIV infusing Mag replacement, will resume NS @100. R PIV SL.   Resp/Trach: RA.   Labs/Electrolytes: Mg low, replacement ordered. Mg draws for 1000 AM.  Resp/trach: RA.   Diet: Regular.  Bowel status: LBM 8/22. Bowel sounds active.   : Mcmahon cath removed. Due to void. Bladder scan 288 @0600.  Skin: Wound dressing on anterior head, drainage marked on dressing. Pin sites on sides and back of head.   Pain: 6/10, Tylenol Q8H and PRN tylenol, PRN Oxcy administered  Activity: SBA + GB.  Plan: Cont POC. Pt would like dad to be updated, number is on board, per report dad gets up around @0900.

## 2022-08-23 NOTE — ANESTHESIA POSTPROCEDURE EVALUATION
Patient: Amrit Padilla    Procedure: Procedure(s):  Intraoperative Magnetic resonance imaging/Stealth assisted Bilateral deep brain stimulator placement, phase I, placement of bilateral deep brain stimulator electrode, target anterior nucleus of thalamus, WITHOUT microelectrode recording       Anesthesia Type:  General    Note:  Disposition: Admission   Postop Pain Control: Uneventful            Sign Out: Well controlled pain   PONV: No   Neuro/Psych: Uneventful            Sign Out: Acceptable/Baseline neuro status   Airway/Respiratory: Uneventful            Sign Out: Acceptable/Baseline resp. status   CV/Hemodynamics: Uneventful            Sign Out: Acceptable CV status; No obvious hypovolemia; No obvious fluid overload   Other NRE: NONE   DID A NON-ROUTINE EVENT OCCUR? No           Last vitals:  Vitals Value Taken Time   /90 08/22/22 2210   Temp 37.2  C (98.9  F) 08/22/22 2145   Pulse 104 08/22/22 2211   Resp 9 08/22/22 2211   SpO2 97 % 08/22/22 2211   Vitals shown include unvalidated device data.    Electronically Signed By: Jamel Buckner MD  August 22, 2022  10:12 PM

## 2022-08-23 NOTE — PLAN OF CARE
Discharge time/date: 8/23/2022 1046  Walked or Wheelchair: Walked  PIV removed: Yes  Reviewed AVS with patient: Yes  Medication due times added to AVS in EPIC: Yes  Verbalized understanding of discharge with teachback: Yes  Medications retrieved from pharmacy: Yes  Supplies sent home: NA  Belongings from security with patient: NA    Status: POD #1 DBS phase I and bilateral electrode placement. Hx of epilepsy  Vitals: VSS on RA  Neuros: A&O x4. 5/5 throughout.   IV: PIV removed  Labs/Electrolytes: WNL  Resp/trach: WNL  Diet: Regular diet  Bowel status: LBM 8/22  : Voiding  Skin: Anterior head dressing w/ primapore and marked drainage.   Pain: Surgical pain. PRN tylenol & oxycodone used  Activity: SBA, GB  Social: Parents at bedside

## 2022-08-23 NOTE — OR NURSING
Pt is awake and stable. Pt oriented x4, follows commands and neuro status intact. Pt c/o a h/a. Tylenol given but no improvement yet. Vitals WNL's. Pt has met Pacu transfer criteria. Head xray done as was CT. Pt brought to 6A. 6A RN not available for a neuro check but writer asked NST to let 6A RN know to call me with questions or concerns.

## 2022-08-23 NOTE — H&P
[facility]         NEUROSURGERY H&P  Chief Complaint: DBS Phase I for Epilepsy    HPI:    Sergio Padilla is a 28 y/o M with a notable PMHx of epilepsy s/p inadequate SEEG localization, presenting s/p DBS Phase I placement, bilateral electrode placement in the anterior thalamic nucleus.     Prior to surgery, the patient denied recent fevers, chills, nausea, vomiting, chest pain, shortness of breath, and denies headaches, weakness, LOC, numbness/weakness/paresthesias in extremities, changes in sensation, taste, smell, nor trouble speaking or other neurologic symptoms.    PAST MEDICAL HISTORY:   Past Medical History:   Diagnosis Date     Depressive disorder      Headaches, cluster      Seizures (H)        PAST SURGICAL HISTORY:   Past Surgical History:   Procedure Laterality Date     BACK SURGERY       REMOVE DEPTH ELECTRODES N/A 5/5/2022    Procedure: Stereo EEG depth electrode removal;  Surgeon: Jairon Mar MD;  Location: [facility] OR     JAYDEN ASSISTED PLACEMENT DEPTH ELECTRODE Bilateral 4/25/2022    Procedure: JAYDEN-assisted stereotactic placement of bilateral depth electrodes for invasive video electroencephalography monitoring;  Surgeon: Tello Adler MD;  Location:  OR       FAMILY HISTORY:   Family History   Problem Relation Age of Onset     Hypertension Mother      Hypertension Father      Asthma Father      Asthma Brother      Hereditary Spherocytosis Brother      Asthma Sister      Anxiety Disorder Sister        SOCIAL HISTORY:   Social History     Tobacco Use     Smoking status: Never Smoker     Smokeless tobacco: Never Used   Substance Use Topics     Alcohol use: No       MEDICATIONS:  No current outpatient medications on file.       Allergies:  Allergies   Allergen Reactions     [facility] Cough, Difficulty breathing and Shortness Of Breath     Hydroxyzine      Other reaction(s): Seizures     Mixed Grasses Cough, Other (See Comments) and Shortness  Of Breath     Unknown [No Clinical Screening - See Comments] Rash     Reaction to a seizure med that he can not recall.     Dust Mites      Molds & Smuts      Other reaction(s): Unknown     Sertraline Other (See Comments)     mood swings     Zyprexa [Olanzapine]      Seizure.        ROS: 10 point ROS of systems including Constitutional, Eyes, Respiratory, Cardiovascular, Gastroenterology, Genitourinary, Integumentary, Muscularskeletal, Psychiatric were all negative except for pertinent positives noted in my HPI.    Physical exam:   Prior to surgery*  Blood pressure 136/85, pulse 68, temperature 98.3  F (36.8  C), temperature source Oral, resp. rate 17, height 1.829 m (6'), weight 96.6 kg (212 lb 15.4 oz), SpO2 97 %.  General: Well appearing, NAD  HEENT: atraumatic, no stridor  PULM: breathing appropriately on room air  MSK: atraumatic, without gross abnormalities  NEUROLOGIC:  -- Awake; Alert; oriented x 3  -- Follows commands briskly  -- +repetition, calculation, and naming  -- Speech fluent, spontaneous. No aphasia or dysarthria.  -- no gaze preference. No apparent hemineglect.  Cranial Nerves:  -- visual fields full to confrontation, PERRL 3-2mm bilat and brisk, extraocular movements intact  -- face symmetrical, tongue midline  -- sensory V1-V3 intact bilaterally  -- palate elevates symmetrically, uvula midline  -- hearing grossly intact bilat  -- Trapezii 5/5 strength bilat symmetric  -- Cerebellar: Finger nose finger without dysmetria, intact rapid alternating motions bilaterally    Motor:  Normal bulk / tone; no tremor, rigidity, or bradykinesia.  No muscle wasting or fasciculations  No Pronator Drift     Delt Bi Tri Hand Flexion/  Extension Iliopsoas Quadriceps Hamstrings Tibialis Anterior Gastroc    C5 C6 C7 C8/T1 L2 L3 L4-S1 L4 S1   R 5 5 5 5 5 5 5 5 5   L 5 5 5 5 5 5 5 5 5     Sensory:  intact to LT x 4 extremities    IMAGING:  Recent Results (from the past 24 hour(s))   MRI Brain with & without gadolinium  [FGT391]    Narrative    Brain MRI without and with contrast primarily for the purposes of  stereotactic evaluation    History: Partial symptomatic epilepsy with complex partial seizures,  intractable, without status epilepticus (H)  ICD-10: Partial symptomatic epilepsy with complex partial seizures,  intractable, without status epilepticus (H)    Comparison: CT head 6/10/2022    Technique: MR imaging performed with 3-dimensonally acquired axial  T1-weighted sequences performed with intravenous contrast.    Contrast: 10mL Gadavist    Findings: Limited imaging for stereotactic imaging demonstrates  no  mass effect, midline shift or hydrocephalus. The major intracranial  arterial structures are grossly patent. Enhancing nodular lesion in  the right temporal lobe measuring 6 mm (series 7, image 17). This is  likely postsurgical.      Impression    Impression: Limited imaging primarily for the purposes of stereotactic  localization.     SANDRO KNIGHT MD         SYSTEM ID:  [id number]           LABS:   Last Comprehensive Metabolic Panel:  Sodium   Date Value Ref Range Status   08/22/2022 140 136 - 145 mmol/L Final   03/02/2021 139 133 - 144 mmol/L Final     Potassium   Date Value Ref Range Status   08/22/2022 3.9 3.4 - 5.3 mmol/L Final   05/06/2022 3.7 3.4 - 5.3 mmol/L Final   03/02/2021 4.4 3.4 - 5.3 mmol/L Final     Chloride   Date Value Ref Range Status   08/22/2022 105 98 - 107 mmol/L Final   05/06/2022 106 94 - 109 mmol/L Final   03/02/2021 110 (H) 94 - 109 mmol/L Final     Carbon Dioxide   Date Value Ref Range Status   03/02/2021 24 20 - 32 mmol/L Final     Carbon Dioxide (CO2)   Date Value Ref Range Status   08/22/2022 25 22 - 29 mmol/L Final   05/06/2022 26 20 - 32 mmol/L Final     Anion Gap   Date Value Ref Range Status   08/22/2022 10 7 - 15 mmol/L Final   05/06/2022 7 3 - 14 mmol/L Final   03/02/2021 5 3 - 14 mmol/L Final     Glucose   Date Value Ref Range Status   08/22/2022 89 70 - 99 mg/dL Final    05/06/2022 98 70 - 99 mg/dL Final   05/21/2021 96 70 - 99 mg/dL Final     GLUCOSE BY METER POCT   Date Value Ref Range Status   08/22/2022 94 70 - 99 mg/dL Final     Urea Nitrogen   Date Value Ref Range Status   08/22/2022 14.3 6.0 - 20.0 mg/dL Final   05/06/2022 18 7 - 30 mg/dL Final   03/02/2021 17 7 - 30 mg/dL Final     Creatinine   Date Value Ref Range Status   08/22/2022 0.98 0.67 - 1.17 mg/dL Final   03/02/2021 0.91 0.66 - 1.25 mg/dL Final     GFR Estimate   Date Value Ref Range Status   08/22/2022 >90 >60 mL/min/1.73m2 Final     Comment:     Effective December 21, 2021 eGFRcr in adults is calculated using the 2021 CKD-EPI creatinine equation which includes age and gender (Maria Elena krueger al., [place], DOI: 10.1056/QUHPhd0710102)   03/02/2021 >90 >60 mL/min/[1.73_m2] Final     Comment:     Non  GFR Calc  Starting 12/18/2018, serum creatinine based estimated GFR (eGFR) will be   calculated using the Chronic Kidney Disease Epidemiology Collaboration   (CKD-EPI) equation.       Calcium   Date Value Ref Range Status   08/22/2022 9.1 8.6 - 10.0 mg/dL Final   03/02/2021 8.7 8.5 - 10.1 mg/dL Final     Lab Results   Component Value Date    WBC 8.4 08/22/2022    WBC 6.0 03/02/2021     Lab Results   Component Value Date    RBC 4.76 08/22/2022    RBC 4.16 03/02/2021     Lab Results   Component Value Date    HGB 14.7 08/22/2022    HGB 13.2 03/02/2021     Lab Results   Component Value Date    HCT 42.9 08/22/2022    HCT 40.1 03/02/2021     Lab Results   Component Value Date    MCV 90 08/22/2022    MCV 96 03/02/2021     Lab Results   Component Value Date    MCH 30.9 08/22/2022    MCH 31.7 03/02/2021     Lab Results   Component Value Date    MCHC 34.3 08/22/2022    MCHC 32.9 03/02/2021     Lab Results   Component Value Date    RDW 12.3 08/22/2022    RDW 12.6 03/02/2021     Lab Results   Component Value Date     08/22/2022     03/02/2021     INR   Date Value Ref Range Status   08/22/2022 0.94 0.85 - 1.15  Final    a  aPTT   Date Value Ref Range Status   08/22/2022 31 22 - 38 Seconds Final        ASSESSMENT:  Sergio Padilla is a 26 y/o M presenting with a notable PMHx of epilepsy now s/p DBS Phase I placement of bilateral electrodes in the ATN. The patient is recovering well from surgery.    PLAN:  Admit to 6A Floor on Neurosurgery service  Post-operative course of Ancef for antibiotic coverage  Obtain CBC/CMP in AM  Continue home seizure medications  Activity as tolerated  Monitor for fevers, signs of infection  Please let the neurosurgery team know if any concerns arise  SCDs for DVT prophylaxis         JERROD Verma  Department of Neurosurgery     I saw and examined this patient with JERROD Verma and agree with his assessment and plan. In brief, Sergio Padilla is a 26 y/o gentleman with medically refractory epilepsy who underwent invasive intracranial monitoring and was determined to be a candidate for ANT-DBS as he did not have a focal, resectable lesion or a suitable target for RNS. He is now s/p bilateral ANT-DBS implantation using the [employer] system with intraoperative MRI. Doing well. Plan as described above. Continue AEDs, CT/XR head prior to transfer to floor.

## 2022-08-25 ENCOUNTER — PATIENT OUTREACH (OUTPATIENT)
Dept: NEUROSURGERY | Facility: CLINIC | Age: 27
End: 2022-08-25

## 2022-08-25 NOTE — PROGRESS NOTES
Pt s/p bilateral deep brain stimulator placement, phase I, placement of bilateral deep brain stimulator electrode, target anterior nucleus of thalamus by Dr. Adler on 8/22/22, discharge on 8/23/22. Left VM checking on pt's postoperative status and requested a call back at pt's convenience. Will follow up.

## 2022-08-26 ENCOUNTER — LAB (OUTPATIENT)
Dept: LAB | Facility: CLINIC | Age: 27
End: 2022-08-26
Payer: COMMERCIAL

## 2022-08-26 DIAGNOSIS — Z20.822 ENCOUNTER FOR LABORATORY TESTING FOR COVID-19 VIRUS: ICD-10-CM

## 2022-08-26 LAB — SARS-COV-2 RNA RESP QL NAA+PROBE: NEGATIVE

## 2022-08-26 PROCEDURE — U0003 INFECTIOUS AGENT DETECTION BY NUCLEIC ACID (DNA OR RNA); SEVERE ACUTE RESPIRATORY SYNDROME CORONAVIRUS 2 (SARS-COV-2) (CORONAVIRUS DISEASE [COVID-19]), AMPLIFIED PROBE TECHNIQUE, MAKING USE OF HIGH THROUGHPUT TECHNOLOGIES AS DESCRIBED BY CMS-2020-01-R: HCPCS

## 2022-08-26 PROCEDURE — U0005 INFEC AGEN DETEC AMPLI PROBE: HCPCS

## 2022-08-26 NOTE — PROGRESS NOTES
Left VM for pt s/p DBS lead placement surgery on 8/22/22. He left me a VM letting me know he has had headache and has been tired since surgery, but that he feels good generally. I let pt know I would try reaching him again this afternoon.     Left 2nd VM to check on status and answer any questions re 8/29/22 surgery. Left direct number as well as the number for the on-call resident should pt have concerns/questions over the weekend.

## 2022-08-28 ENCOUNTER — ANESTHESIA EVENT (OUTPATIENT)
Dept: SURGERY | Facility: CLINIC | Age: 27
End: 2022-08-28
Payer: COMMERCIAL

## 2022-08-28 ASSESSMENT — ENCOUNTER SYMPTOMS: SEIZURES: 1

## 2022-08-29 ENCOUNTER — HOSPITAL ENCOUNTER (OUTPATIENT)
Facility: CLINIC | Age: 27
Discharge: HOME | End: 2022-08-29
Attending: NEUROLOGICAL SURGERY | Admitting: NEUROLOGICAL SURGERY
Payer: COMMERCIAL

## 2022-08-29 ENCOUNTER — ANESTHESIA (OUTPATIENT)
Dept: SURGERY | Facility: CLINIC | Age: 27
End: 2022-08-29
Payer: COMMERCIAL

## 2022-08-29 VITALS
BODY MASS INDEX: 28.07 KG/M2 | DIASTOLIC BLOOD PRESSURE: 92 MMHG | OXYGEN SATURATION: 98 % | WEIGHT: 207.23 LBS | RESPIRATION RATE: 16 BRPM | TEMPERATURE: 98 F | HEART RATE: 74 BPM | SYSTOLIC BLOOD PRESSURE: 133 MMHG | HEIGHT: 72 IN

## 2022-08-29 DIAGNOSIS — Z96.89 S/P DEEP BRAIN STIMULATOR PLACEMENT: Primary | ICD-10-CM

## 2022-08-29 LAB — GLUCOSE BLDC GLUCOMTR-MCNC: 107 MG/DL (ref 70–99)

## 2022-08-29 PROCEDURE — 272N000001 HC OR GENERAL SUPPLY STERILE: Performed by: NEUROLOGICAL SURGERY

## 2022-08-29 PROCEDURE — 250N000011 HC RX IP 250 OP 636

## 2022-08-29 PROCEDURE — 250N000011 HC RX IP 250 OP 636: Performed by: NEUROLOGICAL SURGERY

## 2022-08-29 PROCEDURE — 710N000012 HC RECOVERY PHASE 2, PER MINUTE: Performed by: NEUROLOGICAL SURGERY

## 2022-08-29 PROCEDURE — 250N000011 HC RX IP 250 OP 636: Performed by: ANESTHESIOLOGY

## 2022-08-29 PROCEDURE — 61886 IMPLANT NEUROSTIM ARRAYS: CPT | Mod: 58 | Performed by: NEUROLOGICAL SURGERY

## 2022-08-29 PROCEDURE — 258N000003 HC RX IP 258 OP 636

## 2022-08-29 PROCEDURE — C1820 GENERATOR NEURO RECHG BAT SY: HCPCS | Performed by: NEUROLOGICAL SURGERY

## 2022-08-29 PROCEDURE — C1713 ANCHOR/SCREW BN/BN,TIS/BN: HCPCS | Performed by: NEUROLOGICAL SURGERY

## 2022-08-29 PROCEDURE — 370N000017 HC ANESTHESIA TECHNICAL FEE, PER MIN: Performed by: NEUROLOGICAL SURGERY

## 2022-08-29 PROCEDURE — 999N000141 HC STATISTIC PRE-PROCEDURE NURSING ASSESSMENT: Performed by: NEUROLOGICAL SURGERY

## 2022-08-29 PROCEDURE — 272N000002 HC OR SUPPLY OTHER OPNP: Performed by: NEUROLOGICAL SURGERY

## 2022-08-29 PROCEDURE — 250N000013 HC RX MED GY IP 250 OP 250 PS 637: Performed by: ANESTHESIOLOGY

## 2022-08-29 PROCEDURE — 360N000076 HC SURGERY LEVEL 3, PER MIN: Performed by: NEUROLOGICAL SURGERY

## 2022-08-29 PROCEDURE — 710N000010 HC RECOVERY PHASE 1, LEVEL 2, PER MIN: Performed by: NEUROLOGICAL SURGERY

## 2022-08-29 PROCEDURE — 250N000025 HC SEVOFLURANE, PER MIN: Performed by: NEUROLOGICAL SURGERY

## 2022-08-29 PROCEDURE — 250N000009 HC RX 250: Performed by: NEUROLOGICAL SURGERY

## 2022-08-29 PROCEDURE — 250N000009 HC RX 250

## 2022-08-29 PROCEDURE — 278N000051 HC OR IMPLANT GENERAL: Performed by: NEUROLOGICAL SURGERY

## 2022-08-29 PROCEDURE — C1778 LEAD, NEUROSTIMULATOR: HCPCS | Performed by: NEUROLOGICAL SURGERY

## 2022-08-29 PROCEDURE — 82962 GLUCOSE BLOOD TEST: CPT

## 2022-08-29 DEVICE — IMP PLATE SYN STR DOG BONE LOW PROFILE 2H TI 421.502: Type: IMPLANTABLE DEVICE | Site: CHEST  WALL | Status: FUNCTIONAL

## 2022-08-29 DEVICE — IMPLANTABLE DEVICE: Type: IMPLANTABLE DEVICE | Site: CHEST  WALL | Status: FUNCTIONAL

## 2022-08-29 DEVICE — IMP SCR SYN MATRIX LOW PRO 1.5X04MM SELF DRILL 04.503.104.01: Type: IMPLANTABLE DEVICE | Site: CHEST  WALL | Status: FUNCTIONAL

## 2022-08-29 DEVICE — NEUROSTIMULATOR IMPLANTABLE 2CHANNEL PERCEPT 68X51MM B35200: Type: IMPLANTABLE DEVICE | Site: CHEST  WALL | Status: FUNCTIONAL

## 2022-08-29 RX ORDER — OXYCODONE HYDROCHLORIDE 5 MG/1
5 TABLET ORAL EVERY 6 HOURS PRN
Qty: 12 TABLET | Refills: 0 | Status: SHIPPED | OUTPATIENT
Start: 2022-08-29 | End: 2022-09-01

## 2022-08-29 RX ORDER — ONDANSETRON 2 MG/ML
INJECTION INTRAMUSCULAR; INTRAVENOUS PRN
Status: DISCONTINUED | OUTPATIENT
Start: 2022-08-29 | End: 2022-08-29

## 2022-08-29 RX ORDER — SODIUM CHLORIDE, SODIUM LACTATE, POTASSIUM CHLORIDE, CALCIUM CHLORIDE 600; 310; 30; 20 MG/100ML; MG/100ML; MG/100ML; MG/100ML
INJECTION, SOLUTION INTRAVENOUS CONTINUOUS
Status: DISCONTINUED | OUTPATIENT
Start: 2022-08-29 | End: 2022-08-29 | Stop reason: HOSPADM

## 2022-08-29 RX ORDER — HYDROMORPHONE HYDROCHLORIDE 1 MG/ML
.2-.4 INJECTION, SOLUTION INTRAMUSCULAR; INTRAVENOUS; SUBCUTANEOUS EVERY 10 MIN PRN
Status: DISCONTINUED | OUTPATIENT
Start: 2022-08-29 | End: 2022-08-29 | Stop reason: HOSPADM

## 2022-08-29 RX ORDER — PROPRANOLOL HYDROCHLORIDE 10 MG/1
10 TABLET ORAL
Status: ON HOLD | COMMUNITY
Start: 2022-06-07 | End: 2022-08-29

## 2022-08-29 RX ORDER — HYDRALAZINE HYDROCHLORIDE 20 MG/ML
2.5-5 INJECTION INTRAMUSCULAR; INTRAVENOUS EVERY 10 MIN PRN
Status: DISCONTINUED | OUTPATIENT
Start: 2022-08-29 | End: 2022-08-29 | Stop reason: HOSPADM

## 2022-08-29 RX ORDER — DEXAMETHASONE SODIUM PHOSPHATE 4 MG/ML
INJECTION, SOLUTION INTRA-ARTICULAR; INTRALESIONAL; INTRAMUSCULAR; INTRAVENOUS; SOFT TISSUE PRN
Status: DISCONTINUED | OUTPATIENT
Start: 2022-08-29 | End: 2022-08-29

## 2022-08-29 RX ORDER — CEFAZOLIN SODIUM/WATER 2 G/20 ML
2 SYRINGE (ML) INTRAVENOUS
Status: DISCONTINUED | OUTPATIENT
Start: 2022-08-29 | End: 2022-08-29 | Stop reason: HOSPADM

## 2022-08-29 RX ORDER — FENTANYL CITRATE 50 UG/ML
25-50 INJECTION, SOLUTION INTRAMUSCULAR; INTRAVENOUS
Status: DISCONTINUED | OUTPATIENT
Start: 2022-08-29 | End: 2022-08-29 | Stop reason: HOSPADM

## 2022-08-29 RX ORDER — SODIUM CHLORIDE, SODIUM LACTATE, POTASSIUM CHLORIDE, CALCIUM CHLORIDE 600; 310; 30; 20 MG/100ML; MG/100ML; MG/100ML; MG/100ML
INJECTION, SOLUTION INTRAVENOUS CONTINUOUS PRN
Status: DISCONTINUED | OUTPATIENT
Start: 2022-08-29 | End: 2022-08-29

## 2022-08-29 RX ORDER — CEFAZOLIN SODIUM/WATER 2 G/20 ML
2 SYRINGE (ML) INTRAVENOUS SEE ADMIN INSTRUCTIONS
Status: DISCONTINUED | OUTPATIENT
Start: 2022-08-29 | End: 2022-08-29 | Stop reason: HOSPADM

## 2022-08-29 RX ORDER — PROPOFOL 10 MG/ML
INJECTION, EMULSION INTRAVENOUS PRN
Status: DISCONTINUED | OUTPATIENT
Start: 2022-08-29 | End: 2022-08-29

## 2022-08-29 RX ORDER — ACETAMINOPHEN 325 MG/1
975 TABLET ORAL ONCE
Status: COMPLETED | OUTPATIENT
Start: 2022-08-29 | End: 2022-08-29

## 2022-08-29 RX ORDER — ONDANSETRON 2 MG/ML
4 INJECTION INTRAMUSCULAR; INTRAVENOUS EVERY 30 MIN PRN
Status: DISCONTINUED | OUTPATIENT
Start: 2022-08-29 | End: 2022-08-29 | Stop reason: HOSPADM

## 2022-08-29 RX ORDER — OXYCODONE HYDROCHLORIDE 5 MG/1
5 TABLET ORAL EVERY 6 HOURS PRN
Qty: 10 TABLET | Refills: 0 | Status: SHIPPED | OUTPATIENT
Start: 2022-08-29 | End: 2022-09-01

## 2022-08-29 RX ORDER — OXYCODONE HYDROCHLORIDE 5 MG/1
5 TABLET ORAL EVERY 4 HOURS PRN
Status: DISCONTINUED | OUTPATIENT
Start: 2022-08-29 | End: 2022-08-29 | Stop reason: HOSPADM

## 2022-08-29 RX ORDER — CEPHALEXIN 500 MG/1
500 CAPSULE ORAL 4 TIMES DAILY
Qty: 28 CAPSULE | Refills: 0 | Status: SHIPPED | OUTPATIENT
Start: 2022-08-29 | End: 2022-09-05

## 2022-08-29 RX ORDER — ONDANSETRON 4 MG/1
4 TABLET, ORALLY DISINTEGRATING ORAL EVERY 30 MIN PRN
Status: DISCONTINUED | OUTPATIENT
Start: 2022-08-29 | End: 2022-08-29 | Stop reason: HOSPADM

## 2022-08-29 RX ORDER — FENTANYL CITRATE 50 UG/ML
25-50 INJECTION, SOLUTION INTRAMUSCULAR; INTRAVENOUS EVERY 5 MIN PRN
Status: DISCONTINUED | OUTPATIENT
Start: 2022-08-29 | End: 2022-08-29 | Stop reason: HOSPADM

## 2022-08-29 RX ORDER — VANCOMYCIN HYDROCHLORIDE 1 G/20ML
INJECTION, POWDER, LYOPHILIZED, FOR SOLUTION INTRAVENOUS PRN
Status: DISCONTINUED | OUTPATIENT
Start: 2022-08-29 | End: 2022-08-29 | Stop reason: HOSPADM

## 2022-08-29 RX ORDER — LIDOCAINE 40 MG/G
CREAM TOPICAL
Status: DISCONTINUED | OUTPATIENT
Start: 2022-08-29 | End: 2022-08-29 | Stop reason: HOSPADM

## 2022-08-29 RX ORDER — MEPERIDINE HYDROCHLORIDE 25 MG/ML
12.5 INJECTION INTRAMUSCULAR; INTRAVENOUS; SUBCUTANEOUS
Status: DISCONTINUED | OUTPATIENT
Start: 2022-08-29 | End: 2022-08-29 | Stop reason: HOSPADM

## 2022-08-29 RX ORDER — FENTANYL CITRATE 50 UG/ML
INJECTION, SOLUTION INTRAMUSCULAR; INTRAVENOUS PRN
Status: DISCONTINUED | OUTPATIENT
Start: 2022-08-29 | End: 2022-08-29

## 2022-08-29 RX ADMIN — FENTANYL CITRATE 50 MCG: 50 INJECTION, SOLUTION INTRAMUSCULAR; INTRAVENOUS at 16:48

## 2022-08-29 RX ADMIN — PROPOFOL 50 MG: 10 INJECTION, EMULSION INTRAVENOUS at 17:08

## 2022-08-29 RX ADMIN — Medication 20 MG: at 16:08

## 2022-08-29 RX ADMIN — FENTANYL CITRATE 50 MCG: 50 INJECTION, SOLUTION INTRAMUSCULAR; INTRAVENOUS at 18:28

## 2022-08-29 RX ADMIN — PROPOFOL 200 MG: 10 INJECTION, EMULSION INTRAVENOUS at 15:25

## 2022-08-29 RX ADMIN — ONDANSETRON 4 MG: 2 INJECTION INTRAMUSCULAR; INTRAVENOUS at 17:43

## 2022-08-29 RX ADMIN — PROPOFOL 50 MG: 10 INJECTION, EMULSION INTRAVENOUS at 16:47

## 2022-08-29 RX ADMIN — FENTANYL CITRATE 50 MCG: 50 INJECTION, SOLUTION INTRAMUSCULAR; INTRAVENOUS at 18:11

## 2022-08-29 RX ADMIN — ACETAMINOPHEN 975 MG: 325 TABLET, FILM COATED ORAL at 11:55

## 2022-08-29 RX ADMIN — Medication 2 G: at 15:30

## 2022-08-29 RX ADMIN — SODIUM CHLORIDE, POTASSIUM CHLORIDE, SODIUM LACTATE AND CALCIUM CHLORIDE: 600; 310; 30; 20 INJECTION, SOLUTION INTRAVENOUS at 15:09

## 2022-08-29 RX ADMIN — Medication 10 MG: at 16:55

## 2022-08-29 RX ADMIN — DEXAMETHASONE SODIUM PHOSPHATE 4 MG: 4 INJECTION, SOLUTION INTRA-ARTICULAR; INTRALESIONAL; INTRAMUSCULAR; INTRAVENOUS; SOFT TISSUE at 15:42

## 2022-08-29 RX ADMIN — PHENYLEPHRINE HYDROCHLORIDE 100 MCG: 10 INJECTION INTRAVENOUS at 17:16

## 2022-08-29 RX ADMIN — SODIUM CHLORIDE, POTASSIUM CHLORIDE, SODIUM LACTATE AND CALCIUM CHLORIDE: 600; 310; 30; 20 INJECTION, SOLUTION INTRAVENOUS at 17:46

## 2022-08-29 RX ADMIN — Medication 50 MG: at 15:25

## 2022-08-29 RX ADMIN — FENTANYL CITRATE 50 MCG: 50 INJECTION, SOLUTION INTRAMUSCULAR; INTRAVENOUS at 18:37

## 2022-08-29 RX ADMIN — SUGAMMADEX 200 MG: 100 INJECTION, SOLUTION INTRAVENOUS at 18:10

## 2022-08-29 RX ADMIN — FENTANYL CITRATE 50 MCG: 50 INJECTION, SOLUTION INTRAMUSCULAR; INTRAVENOUS at 17:51

## 2022-08-29 RX ADMIN — FENTANYL CITRATE 150 MCG: 50 INJECTION, SOLUTION INTRAMUSCULAR; INTRAVENOUS at 15:25

## 2022-08-29 ASSESSMENT — ACTIVITIES OF DAILY LIVING (ADL)
ADLS_ACUITY_SCORE: 35

## 2022-08-29 NOTE — ANESTHESIA PREPROCEDURE EVALUATION
Anesthesia Pre-Procedure Evaluation    Patient: Amrit Padilla   MRN: [id number] : 1995        Procedure : Procedure(s):  Deep brain stimulator placement, phase II, placement of deep brain stimulator generator/battery over the right chest wall          Past Medical History:   Diagnosis Date     Depressive disorder      Headaches, cluster      Seizures (H)       Past Surgical History:   Procedure Laterality Date     BACK SURGERY       INSERTION, ELECTRODE LEADS, DEEP BRAIN STIMULATOR, BILATERAL, USING OPTICAL TRACKING SYSTEM, WITH MRI GUIDANCE Bilateral 2022    Procedure: Intraoperative Magnetic resonance imaging/Stealth assisted Bilateral deep brain stimulator placement, phase I, placement of bilateral deep brain stimulator electrode, target anterior nucleus of thalamus, WITHOUT microelectrode recording;  Surgeon: Tello Adler MD;  Location: [facility] OR     REMOVE DEPTH ELECTRODES N/A 2022    Procedure: Stereo EEG depth electrode removal;  Surgeon: Jairon Mar MD;  Location: [facility] OR     JAYDEN ASSISTED PLACEMENT DEPTH ELECTRODE Bilateral 2022    Procedure: JAYDEN-assisted stereotactic placement of bilateral depth electrodes for invasive video electroencephalography monitoring;  Surgeon: Tello Adler MD;  Location: [facility] OR      Allergies   Allergen Reactions     [place] Cough, Difficulty breathing and Shortness Of Breath     Hydroxyzine      Other reaction(s): Seizures     Mixed Grasses Cough, Other (See Comments) and Shortness Of Breath     Unknown [No Clinical Screening - See Comments] Rash     Reaction to a seizure med that he can not recall.     Dust Mites      Molds & Smuts      Other reaction(s): Unknown     Sertraline Other (See Comments)     mood swings     Zyprexa [Olanzapine]      Seizure.       Social History     Tobacco Use     Smoking status: Never Smoker     Smokeless tobacco: Never Used   Substance Use Topics     Alcohol use: No      Wt Readings from Last  1 Encounters:   08/22/22 96.6 kg (212 lb 15.4 oz)        Anesthesia Evaluation   Pt has had prior anesthetic.     No history of anesthetic complications       ROS/MED HX  ENT/Pulmonary:  - neg pulmonary ROS   (+) allergic rhinitis,     Neurologic:     (+) migraines, seizures, last seizure: 05/2022,     Cardiovascular:  - neg cardiovascular ROS     METS/Exercise Tolerance: >4 METS    Hematologic:  - neg hematologic  ROS     Musculoskeletal:  - neg musculoskeletal ROS     GI/Hepatic:  - neg GI/hepatic ROS     Renal/Genitourinary:  - neg Renal ROS     Endo:  - neg endo ROS     Psychiatric/Substance Use: Comment: Asperger  Depression      (+) psychiatric history depression and other (comment) (Asperger's, Panic attack (tx w propanol))     Infectious Disease:  - neg infectious disease ROS     Malignancy:  - neg malignancy ROS     Other:            Physical Exam    Airway        Mallampati: II   TM distance: > 3 FB   Neck ROM: full   Mouth opening: > 3 cm    Respiratory Devices and Support         Dental  no notable dental history         Cardiovascular          Rhythm and rate: regular and normal     Pulmonary           breath sounds clear to auscultation           OUTSIDE LABS:  CBC:   Lab Results   Component Value Date    WBC 8.4 08/22/2022    WBC 9.6 05/06/2022    HGB 14.7 08/22/2022    HGB 14.5 05/06/2022    HCT 42.9 08/22/2022    HCT 42.9 05/06/2022     08/22/2022     05/06/2022     BMP:   Lab Results   Component Value Date     08/23/2022     08/22/2022    POTASSIUM 3.8 08/23/2022    POTASSIUM 3.9 08/22/2022    CHLORIDE 104 08/23/2022    CHLORIDE 105 08/22/2022    CO2 23 08/23/2022    CO2 25 08/22/2022    BUN 14.7 08/23/2022    BUN 14.3 08/22/2022    CR 0.86 08/23/2022    CR 0.98 08/22/2022     (H) 08/23/2022    GLC 89 08/22/2022     COAGS:   Lab Results   Component Value Date    PTT 31 08/22/2022    INR 0.94 08/22/2022     POC: No results found for: BGM, HCG, HCGS  HEPATIC:   Lab  Results   Component Value Date    ALBUMIN 3.5 03/02/2021    PROTTOTAL 6.3 (L) 03/02/2021    ALT 53 03/02/2021    AST 25 03/02/2021    ALKPHOS 74 03/02/2021    BILITOTAL 0.6 03/02/2021     OTHER:   Lab Results   Component Value Date    ZAIDA 8.7 08/23/2022    PHOS 4.2 08/23/2022    MAG 2.6 (H) 08/23/2022       Anesthesia Plan    ASA Status:  2   NPO Status:  NPO Appropriate    Anesthesia Type: General.     - Airway: ETT   Induction: Intravenous.   Maintenance: Inhalation.        Consents    Anesthesia Plan(s) and associated risks, benefits, and realistic alternatives discussed. Questions answered and patient/representative(s) expressed understanding.    - Discussed:     - Discussed with:  Patient      - Extended Intubation/Ventilatory Support Discussed: No.      - Patient is DNR/DNI Status: No    Use of blood products discussed: No .     Postoperative Care    Pain management: IV analgesics.   PONV prophylaxis: Ondansetron (or other 5HT-3), Dexamethasone or Solumedrol     Comments:                Hernando Garzon MD

## 2022-08-29 NOTE — ANESTHESIA POSTPROCEDURE EVALUATION
Patient: Amrit Padilla    Procedure: Procedure(s):  Deep brain stimulator placement, phase II, placement of deep brain stimulator generator/battery over the right chest wall       Anesthesia Type:  General    Note:  Disposition: Outpatient   Postop Pain Control: Uneventful            Sign Out: Well controlled pain   PONV: No   Neuro/Psych: Uneventful            Sign Out: Acceptable/Baseline neuro status   Airway/Respiratory: Uneventful            Sign Out: Acceptable/Baseline resp. status   CV/Hemodynamics: Uneventful            Sign Out: Acceptable CV status; No obvious hypovolemia; No obvious fluid overload   Other NRE: NONE   DID A NON-ROUTINE EVENT OCCUR? No           Last vitals:  Vitals Value Taken Time   /89 08/29/22 1845   Temp 36.8  C (98.2  F) 08/29/22 1845   Pulse 75 08/29/22 1851   Resp 11 08/29/22 1851   SpO2 97 % 08/29/22 1851   Vitals shown include unvalidated device data.    Electronically Signed By: Jairon Abarca MD  August 29, 2022  6:52 PM

## 2022-08-29 NOTE — ANESTHESIA PROCEDURE NOTES
Airway       Patient location during procedure: OR       Procedure Start/Stop Times: 8/29/2022 3:27 PM  Staff -        CRNA: Ramu Hathaway APRN CRNA       Performed By: CRNA  Consent for Airway        Urgency: elective  Indications and Patient Condition       Indications for airway management: shayna-procedural       Induction type:intravenous       Mask difficulty assessment: 1 - vent by mask    Final Airway Details       Final airway type: endotracheal airway       Successful airway: ETT - single  Endotracheal Airway Details        ETT size (mm): 8.0       Cuffed: yes       Successful intubation technique: direct laryngoscopy       DL Blade Type: MAC 3       Grade View of Cords: 1       Adjucts: stylet       Position: Left       Measured from: gums/teeth       Secured at (cm): 23       Bite block used: None    Post intubation assessment        Placement verified by: capnometry, equal breath sounds and chest rise        Number of attempts at approach: 1       Secured with: silk tape       Ease of procedure: easy       Dentition: Intact and Unchanged    Medication(s) Administered   Medication Administration Time: 8/29/2022 3:27 PM

## 2022-08-29 NOTE — ANESTHESIA CARE TRANSFER NOTE
Patient: Amrit Padilla    Procedure: Procedure(s):  Deep brain stimulator placement, phase II, placement of deep brain stimulator generator/battery over the right chest wall       Diagnosis: Partial symptomatic epilepsy with complex partial seizures, intractable, without status epilepticus (H) [G40.219]  Diagnosis Additional Information: No value filed.    Anesthesia Type:   General     Note:    Oropharynx: oropharynx clear of all foreign objects and spontaneously breathing  Level of Consciousness: drowsy  Oxygen Supplementation: face mask  Level of Supplemental Oxygen (L/min / FiO2): 5  Independent Airway: airway patency satisfactory and stable  Dentition: dentition unchanged  Vital Signs Stable: post-procedure vital signs reviewed and stable  Report to RN Given: handoff report given  Patient transferred to: PACU    Handoff Report: Identifed the Patient, Identified the Reponsible Provider, Reviewed the pertinent medical history, Discussed the surgical course, Reviewed Intra-OP anesthesia mangement and issues during anesthesia, Set expectations for post-procedure period and Allowed opportunity for questions and acknowledgement of understanding      Vitals:  Vitals Value Taken Time   /91 08/29/22 1819   Temp     Pulse 78 08/29/22 1824   Resp 10 08/29/22 1824   SpO2 98 % 08/29/22 1824   Vitals shown include unvalidated device data.    Electronically Signed By: BEBETO Albrecht CRNA  August 29, 2022  6:25 PM

## 2022-08-29 NOTE — BRIEF OP NOTE
[facility]    Brief Operative Note    Pre-operative diagnosis: Partial symptomatic epilepsy with complex partial seizures, intractable, without status epilepticus (H) [G40.219]  Post-operative diagnosis Same as pre-operative diagnosis    Procedure: Procedure(s):  Deep brain stimulator placement, phase II, placement of deep brain stimulator generator/battery over the right chest wall  Surgeon: Surgeon(s) and Role:     * Tello Adler MD - Primary     * Agatha Mallory MD - Resident - Assisting  Anesthesia: General   Estimated Blood Loss: 50 mL from 8/29/2022  3:09 PM to 8/29/2022  6:16 PM      Drains: None  Specimens: * No specimens in log *  Findings:   Two previous leads identified and intact. All impedences within normal limits after final connection to generator.  Complications: None.  Implants:   Implant Name Type Inv. Item Serial No.  Lot No. LRB No. Used Action   SENSIGHT EXTENSION KIT   [id number] [employer]  Right 1 Implanted   SENSIGHT EXTENSION KIT.   [id number] [employer]  Right 1 Implanted   NEUROSTIMULATOR IMPLANTABLE 2CHANNEL PERCEPT [id number]  [id number] - [id number] Neurology device NEUROSTIMULATOR IMPLANTABLE 2CHANNEL PERCEPT [id number]  [id number] [employer]  Right 1 Implanted   IMP SCR SYN MATRIX LOW PRO 1.5X04MM SELF DRILL [id number] - [id number] Metallic Hardware/[place] IMP SCR SYN MATRIX LOW PRO 1.5X04MM SELF DRILL [id number]  [employer]AT[employer] NA Right 4 Implanted   IMP PLATE SYN STR DOG BONE LOW PROFILE 2H .502 - [id number] Metallic Hardware/[place] IMP PLATE SYN STR DOG BONE LOW PROFILE 2H .502  [employer] NA Right 2 Implanted       Agatha Mallory MD, PhD  PGY-2 Neurosurgery  Pager: [phone]

## 2022-08-30 NOTE — DISCHARGE INSTRUCTIONS
[facility]  Same-Day Surgery   Adult Discharge Orders & Instructions     For 24 hours after surgery    Get plenty of rest.  A responsible adult must stay with you for at least 24 hours after you leave the hospital.   Do not drive or use heavy equipment.  If you have weakness or tingling, don't drive or use heavy equipment until this feeling goes away.  Do not drink alcohol.  Avoid strenuous or risky activities.  Ask for help when climbing stairs.   You may feel lightheaded.  IF so, sit for a few minutes before standing.  Have someone help you get up.   If you have nausea (feel sick to your stomach): Drink only clear liquids such as apple juice, ginger ale, broth or 7-Up.  Rest may also help.  Be sure to drink enough fluids.  Move to a regular diet as you feel able.  You may have a slight fever. Call the doctor if your fever is over 100 F (37.7 C) (taken under the tongue) or lasts longer than 24 hours.  You may have a dry mouth, a sore throat, muscle aches or trouble sleeping.  These should go away after 24 hours.  Do not make important or legal decisions.   Call your doctor for any of the followin.  Signs of infection (fever, growing tenderness at the surgery site, a large amount of drainage or bleeding, severe pain, foul-smelling drainage, redness, swelling).    2. It has been over 8 to 10 hours since surgery and you are still not able to urinate (pass water).    3.  Headache for over 24 hours.    To contact a doctor, call [phone] or:  '   [phone] and ask for the resident on call for Neurosurgery (answered 24 hours a day)  '   Emergency Department:  [facility]: [phone]       (TTY for hearing impaired: [phone])

## 2022-08-30 NOTE — OP NOTE
PATIENT NAME: Amrit Padilla  YOB: 1995  MRN: [id number]         DATE OF PROCEDURE:  08/22/2022    PREOPERATIVE DIAGNOSIS:    1.  Partial epilepsy with impairment of consciousness, intractable    POSTOPERATIVE DIAGNOSIS:    1. Partial epilepsy with impairment of consciousness, intractable    PROCEDURES PERFORMED:   1.  Stereotactic neuronavigation planning MRI of the head using intraoperative MRI, IMRIS  2.  Placement of bilateral ClearPoint system  3.  Stereotactic neuronavigation using the ClearPoint neuronavigation system for surgical planning, targeting and approach: target is bilateral anterior nucleus of the thalamus (ANT)  4.  Left side and right side deep brain stimulator placement, phase I, placement of left side and right side deep brain stimulator electrodes, target left and right anterior nucleus of the thalamus (ANT)  5.  Use of intraoperative MRI, IMRIS    ATTENDING SURGEON:  Tello Adler MD  RESIDENT SURGEON:  None were available for this case  MEDICAL STUDENT: Hari Veronica, MS4    ANESTHESIA:  General anesthesia     ESTIMATED BLOOD LOSS:  100 mL.     COMPLICATIONS:  None.     FINDINGS:  All impedance values were found to be within normal for left and right side electrodes.    IMPLANTS:  Implants:           Implant Name Type Inv. Item Serial No.  Lot No. LRB No. Used Action   Oliver Hole Device Kit       MEDTRONIC [id number] Right 1 Implanted   moi hole device         [id number] Left 1 Implanted   lead     [id number]     Right 1 Implanted   lead     [id number]     Left 1 Implanted          INDICATIONS FOR PROCEDURE:  We discussed all the aspects of the DBS surgery for epilepsy.  We discussed both phase I and II of DBS surgery.  We discussed that during phase I, we would place the DBS electrodes bilaterally under general anesthesia.  Since this is DBS for epilepsy, the target is bilateral anterior nucleus of the thalamus and the electrodes are placed with  imaging/neuronavigation guidance.  No intraoperative recording is performed.  He would then return one week later for the phase II with placement of the DBS generator/battery over the right chest wall under general anesthesia.  Typically the right side is selected so that left chest area is available for future devices such as VNS or pacemaker.  The device option was also discussed.  Since the indication is epilepsy, only Medtronic is FDA approved.  Risks, benefits, alternative therapies were discussed with the patient, including but not limited to infection and bleeding (intracranial), injury to the brain, stroke, death, hardware failure and possible need for more surgeries.  Surgical procedure was discussed in detail.  I did explain that we will be using intraoperative MRI guided placement of the electrodes.  Informed consent was obtained.    DESCRIPTION OF PROCEDURE:      ClearPoint placement and stereotactic neuronavigation planning.      Informed consent was obtained.  The patient was brought to the operating room.  He was then transferred to the MRI compatible [facility] operative bed and was positioned supine.  With the placement of endotracheal tube, general anesthesia was obtained.  The bed was then rotated 180 degrees in preparation for the intraoperative MRI guided procedure.  Using a surgical clipper, we shaved the region of his presumed incisions and moi hole placement in the frontal region of his scalp around the coronal suture. His head was then positioned and fixed to the ClearPoint Multi-Positional Head Fixation Frame (MPHFF) with 4 pins for fixation.  His head was positioned in a slight flexed position and with room to tunnel the ends of the leads to the right.  Appropriate lines were placed by our anesthesia team.  Mcmahon catheter was also placed.  All pressure points were well padded.  We also confirmed that he would clear the MRI bore.  At this time, MRI and the coil was prepped and draped in  sterile fashion.     At this time, attention was turned to the neuronavigation.  Preoperatively, the Stealth neuronavigation device was loaded with the preoperative MRI, stereotactic protocol which was obtained well ahead of the surgery.  We used the Stealth MRI to assist with the localization and targeting of the left and right ANT.  Neuronavigation was used to stereotactically target the left and right ANT.  The technique used was AC-PC line localization technique as well as direct identification of ANT.  MRI FGATIR sequence was also used to identify the ideal target.  Transventricular approach was used for both left and right side.  The plan was to place the electrode so that the contacts 2 and 3 would be in the ANT.  The entry into the skull, as well as the trajectory of the electrode were checked with the probe's eye view to avoid any sulci, ventricle or vascular structures, especially the ventricular venous structures.    We then used the ClearPoint neuronavigation.  The same preoperative MRI, stereotactic protocol, was loaded to the program for localization and targeting of the left and right ANT.  Neuronavigation was used to stereotactically target the left and right ANT.  The technique used was AC-PC line localization technique as well as direct identification of ANT.  MRI FGATIR sequence was also used to identify the ideal target.  Transventricular approach was used for both left and right side.  The plan was to place the electrode so that the contact #3 would be in the ANT.  The entry into the skull, as well as the trajectory of the electrode were checked with the probe's eye view to avoid any sulci, ventricle or vascular structures, especially the ventricular venous structures.  The two plans were compared to be similar.  However, the trajectory and plan on the ClearPoint neuronavigation was used and these trajectories were further refined.    At this time, attention was turned back to the patient.  The  surgical area, most of the head, was prepped and draped in routine surgical fashion.  Sterile field was toweled off and Ioban applied.  Then, ClearPoint tubular drape designed for MRI was applied.  Timeout was then performed confirming the patient's identity, procedure to be performed, side and site of surgery identified, imaging corresponding with the patient and administration of preoperative prophylactic antibiotic.    Around the estimated bilateral frontal entry points, ClearPoint fiducial grids, 6 rows by 6 rows, were placed to localize the entry point on the scalp.  At this time, IMRIS MRI was brought in for scanning.  We obtained T1 volume scan with the grid.  We also obtained FGATIR sequence to better identify the target.  We confirmed the grid location on the MRI and appropriate segmentation for localization.  The IMRIS MRI was taken out of the room.    Bilateral moi hole placement    The MRI obtained with the fiducial grid was merged with the planning and the trajectory as well as the entry point was confirmed.  Attention was turned to the fiducial grid.  The top layer of the grid was peeled off.  For the left and right side, the entry point on the scalp localized on the grid was marked.  Using an awl, the center of the moi hole or the trajectory was marked.  The awl was pushed through the scalp and screwed into the skull thus marking the skull.  With the entry point marked, the two grids were then completely taken off.  The marked entry point was clearly visible.    Two C-shaped incisions were planned with the marked entry points being the center or the middle of each of the incisions.  Attention was first turned to the right side.  The area of the intended incision and the area posterior designated for the pocket were injected with 50:50 mixture of 1% Lidocaine with epinephrine and 0.25% Marcaine plain.  A #10 blade was used to incise the incision down to the pericranium.  Hemostasis was obtained using  the monopolar and bipolar cautery.  A thin layer of pericranium tissue was left behind on the skull.  The incision was retracted open using a self retaining retractor.  Using blunt dissection, a pocket was created posteriorly and towards the right parietal area for the tail of the electrode/connector placement later.  Attention was then turned to the left side.  The area of the intended incision and the area posterior designated for the pocket were injected with 50:50 mixture of 1% Lidocaine with epinephrine and 0.25% Marcaine plain.  A #10 blade was used to incise the incision down to the pericranium.  Hemostasis was obtained using the monopolar and bipolar cautery.  A thin layer of pericranium tissue was left behind on the skull.  The incision was retracted open using a self retaining retractor.  Using blunt dissection, a pocket was created posteriorly and towards the right parietal area for the tail of the electrode/connector placement later.  Care was taken to not overlap the pocket with the right side one and the pocket for the left side tail of the electrode/connector was located posterior to that of the right side one.    Attention was then turned to the right side again.  A drill with a 5 mm round cutting bit was used to make a moi hole at the entry point.  The area was vigorously irrigated and bone wax was used to control the bleeding.  A small curette followed by a Kerrison punch was used to clean up the remaining bone around the inner table of the moi hole.  The underlying dura was coagulated with the bipolar cautery.  The electrode fixation cover was affixed to the skull using two new screws.  Care was taken to overlap the pericranium over the edge of the cover to provide a smooth tissue transition.  Attention was then turned to the left side.  A drill with acorn bit was used to make a moi hole at the entry point.  The area was vigorously irrigated and bone wax was used to control the bleeding.  A  small curette followed by a Kerrison punch was used to clean up the remaining bone around the inner table of the moi hole.  The underlying dura was coagulated with the bipolar cautery.  The electrode fixation cover was affixed to the skull using two new screws.  Care was taken to overlap the pericranium over the edge of the cover to provide a smooth tissue transition.  To keep both of the incisions open without the retractors, 2-0 Vicryls were stitched to the underside galea of each side of the incisions and pull the incision apart.  This was done for both left and right side.    Mounting the ClearPoint towers    At this time, the ClearPoint tower was brought onto the field.  On the right side, the base of the tower was screwed into the scalp and into the skull using the six scalp screws.  Care was taken to place the base so that the moi hole was in the middle of the base and that the trajectory was on target with the moi hole.  The screws demonstrated good and strong purchase of the bone.  Then, using the four offset screws, the base was secured to the scalp/skull.  Care was taken so that the screws were not over tightened.  Then, the tower was engaged to the base.  It was locked in and the orange knob was used to fine tune the alignment of the trajectory to the moi hole.  The roll lock was then tightened and then loosened by one turn.  The robotic control for the knobs was connected to the tower.  Then, on the left side, the base of the tower was screwed into the scalp and into the skull using the six scalp screws.  Care was taken to place the base so that the moi hole was in the middle of the base and that the trajectory was on target with the moi hole.  The screws demonstrated good and strong purchase of the bone.  Then, using the four offset screws, the base was secured to the scalp/skull.  Care was taken so that the screws were not over tightened.  Then, the tower was engaged to the base.  It was locked  in and the orange know was used to fine tune the alignment of the trajectory to the moi hole.  The roll lock was then tightened and then loosened by one turn.  The robotic control for the knobs was connected to the tower.    At this time, IMRIS MRI was brought back into the room for more scans.  Care was taken to place the robotic control apparatus to come out of the other side of the MRI bore so that the tower and the trajectory could be adjusted.  Using the newly obtained scans, we adjusted the trajectory using the control knob to realign the tower to the planned trajectory so that the target would align with the plan.  Fine adjustments were made to the tower based on the series of imaging.  With each adjustment, new scan was obtained to confirm proper movement of the tower.  Ultimately, the trajectory and the target was satisfactory bilaterally such that alignment error was 0.0 mm on the left and 0.6 mm on the right.    The distance to the target was obtained from the navigation and the recent image.  This was marked on the ceramic stylet and the depth stop was carefully placed.  Care was taken to janey the ceramic stylet at both on top and bottom of the depth stop.  At this time, the peel away sheath and the stylet was prepared and secured to the lock and dock apparatus.  The ceramic stylet was placed inside the peel away sheath and both were placed into the lock and dock apparatus.  Then, the peel away sheath was opened and peeled while pulling within the lock and dock until the tip of the ceramic stylet was visible at the tip of the sheath.  The peel away sheath and the stylet was then finally secured.  This was done for both left and right side trajectory.    Insertion of the stylet and peel away sheath bilaterally    Attention was first turned to the right side.  The trajectory guide was in place.  Using a #11 blade and bipolar cautery, an opening was made in the dura as well as a small corticectomy.  No  active bleeding was noted at this point.  Then, with the sheath and the stylet in the trajectory guide, it was slowly inserted until the lock and dock was at its final position, indicating that the target has been reached.  No abnormal resistance was noted.  Gelfoam with thrombin was placed around the stylet and DuraSeal was used to seal the entry point to the dura to minimize the cerebrospinal fluid loss and air entry.    Attention was then turned to the left side.  The trajectory guide was in place.  Using a #11 blade and bipolar cautery, an opening was made in the dura as well as a small corticectomy.  No active bleeding was noted at this point.  Then, with the sheath and the stylet in the trajectory guide, it was slowly inserted until the lock and dock was at its final position, indicating that the target has been reached.  No abnormal resistance was noted.  Gelfoam with thrombin was placed around the stylet and DuraSeal was used to seal the entry point to the dura to minimize the cerebrospinal fluid loss and air entry.    Both robotic knob control apparatus was disengaged.  The IMRIS MRI was again brought into the room for confirmation of the trajectory and location of the sheath and the stylet.  The images confirmed that both trajectories were satisfactory and that the sheath and stylet were on target.  The plan was to place the bilateral DBS electrodes slightly deeper so that all four contacts would be in the ANT, based on the imaging and measurement.  The IMRIS MRI was again taken out of the room.    Insertion of the DBS electrodes into the bilateral ANT and securing of the DBS electrodes bilaterally    Medtronic SenSight electrode was brought in for the right side.  It was the marked electrode.  The electrode was tested in the normal saline and the impedance values were within normal.  The electrode was marked out to the appropriate depth.  The ceramic stylet was then pulled out.  No active bleeding was  noted.  Then, the SenSight DBS electrode was inserted into the sheath to the appropriate depth.  Then, another Medtronic SenSight electrode was brought in for the left side.  It was the unmarked electrode.  The electrode was tested in the normal saline and the impedance values were within normal.  The electrode was marked out to the appropriate depth.  The ceramic stylet was then pulled out.  No active bleeding was noted.  Then, the SenSight DBS electrode was inserted into the sheath to the appropriate depth.  The IMRIS MRI was then brought in again and scan was obtained to confirm proper placement of the bilateral electrodes.  The final radial error for the left side was 0.1 mm and 0.0 mm on the right side.  This was satisfactory.    The IMRIS MRI was then taken out of the room and the electrodes were secured.  Attention was first turned to the right side.  The peel away sheath was pulled while pulling.  Care was taken to make sure that the DBS electrode is not pulled out.  We made sure that the white knob was secure.  With the sheath removed, the trajectory guide was pulled up.  This provided some room to access the entry point of the electrode at the dura.  The DuraSeal and Gelfoam was removed carefully and the area was generously irrigated.  No active bleeding was noted.  Then, new Gelfoam was placed followed by DuraSeal.  Then, the electrode clamp was brought in and placed and activated to secure the electrode.  The white know was then loosened to release the electrode and the inner stylet of the DBS electrode was pulled out.  Again, care was taken to not disturb or pull out the electrode.  Now with the electrode secure, the tower was disengaged and pulled away.  The electrode was further secured by the placement of the moi hole cap.    Attention was then turned to the left side.  The peel away sheath was pulled while pulling.  Care was taken to make sure that the DBS electrode is not pulled out.  We made sure  that the white knob was secure.  With the sheath removed, the trajectory guide was pulled up.  This provided some room to access the entry point of the electrode at the dura.  The DuraSeal and Gelfoam was removed carefully and the area was generously irrigated.  No active bleeding was noted.  Then, new Gelfoam was placed followed by DuraSeal.  Then, the electrode clamp was brought in and placed and activated to secure the electrode.  The white know was then loosened to release the electrode and the inner stylet of the DBS electrode was pulled out.  Again, care was taken to not disturb or pull out the electrode.  Now with the electrode secure, the tower was disengaged and pulled away.  The electrode was further secured by the placement of the moi hole cap.    Removal of the tower bases    The attention was turned to the removal of the tower base.  These were removed by loosening the six scalp screws.  The offset screws were also loosened slightly.  The bases were removed without any difficulty.      The electrodes were tested and the impedance values were within normal.  The connecting portion of the electrodes were covered with a protective coverings and a dead-end connectors.  First the right side electrode was buried.  The connector portion of the electrode was inserted into the subgaleal space/pocket towards the right parietal area that was created at the beginning of the case.  The excess wire was also buried posteriorly in the previously created pocket.  Then, the left side electrode was buried.  The connector portion of the electrode was inserted into the subgaleal space/pocket towards the right parietal area that was created at the beginning of the case.  It was also relatively posterior to the right side burial site.  Therefore, the right side electrode connector was located anterior to the left side electrode connector.  The excess wire was also buried posteriorly in the previous pocket.  The wounds were  then generously irrigated.      Wound closure and end of procedure    We began closing the two bilateral frontal incisions.  They were both irrigated generously and meticulous hemostasis was obtained.  Both incisions were closed in similar fashion.  The galeal layer was reapproximated using 3-0 Vicryl sutures in an inverted interrupted fashion and the skin was reapproximated using 4-0 Monocryl suture in a running fashion.  Both incisions were cleaned and dried and prepped with ChoraPrep.  Then, a primipore sterile dressing was applied.    Then, the sterile drapes were removed.  Subsequently, the patient was taken out of the [facility] head fixation frame, [facility].  The four pin sites were clean and dry and no active bleeding was noted.  Antibiotic ointment and dry sterile dressing were applied to the pin sites.    Patient was further awakened, extubated and taken to the recovery room in a stable condition.  At the end of the case, all counts including needle, sponge and instrument counts were correct x 2.  There were no complications.    I, Tello Adler MD Neurosurgery Attending, was present and scrubbed for the entire case and performed the key and critical portions of the case.      Tello Adler MD

## 2022-08-31 ENCOUNTER — PATIENT OUTREACH (OUTPATIENT)
Dept: NEUROSURGERY | Facility: CLINIC | Age: 27
End: 2022-08-31

## 2022-08-31 NOTE — PROGRESS NOTES
Pt s/p DBS battery placement over the right chest wall on 8/29/22 by Dr. Adler. Left VM checking on pt's postoperative status. Left my direct number and requested a call back at pt's convenience. Will follow up.

## 2022-08-31 NOTE — OP NOTE
DATE OF PROCEDURE:  8/29/2022    PREOPERATIVE DIAGNOSIS:       Partial symptomatic epilepsy with complex partial seizures, intractable, without status epilepticus (H) [G40.219]    POSTOPERATIVE DIAGNOSIS:       Partial symptomatic epilepsy with complex partial seizures, intractable, without status epilepticus (H) [G40.219]       PROCEDURES PERFORMED     1.  Deep brain stimulator placement, phase II, placement of new deep brain stimulator generator/battery over the right chest wall.     2.  Placement of two extension wires and connection of bilateral deep brain stimulator electrodes to the new generator/battery.     3.  Electrical analysis of the deep brain stimulator system.       ATTENDING SURGEON: Tello Adler MD     RESIDENT SURGEON:  Agatha Mallory MD, PhD      ANESTHESIA:  General anesthesia.     ESTIMATED BLOOD LOSS:  50 mL.     COMPLICATIONS:  None.     IMPLANT:          Implant Name Type Inv. Item Serial No.  Lot No. LRB No. Used Action   SENSIGHT EXTENSION KIT     [id number] [employer]   Right 1 Implanted   SENSIGHT EXTENSION KIT.     [id number] [employer]   Right 1 Implanted   NEUROSTIMULATOR IMPLANTABLE 2CHANNEL PERCEPT [id number]  [id number] - [id number] Neurology device NEUROSTIMULATOR IMPLANTABLE 2CHANNEL PERCEPT [id number]  [id number] [employer]   Right 1 Implanted   IMP SCR SYN MATRIX LOW PRO 1.5X04MM SELF DRILL [id number] - [id number] [employer]/[place] IMP SCR SYN MATRIX LOW PRO 1.5X04MM SELF DRILL [id number]   SYNTHES-STRATEC NA Right 4 Implanted   IMP PLATE SYN STR DOG BONE LOW PROFILE 2H .502 - [id number] Met[employer]ware/[place] IMP PLATE SYN STR DOG BONE LOW PROFILE 2H .502   [employer] NA Right 2 Implanted        INDICATIONS FOR PROCEDURE:     Sergio Padilla is a 27 year-old male with focal medically refractory epilepsy who has previously gone through extensive frontotemporal SEEG evaluation which could not localize/laterlize his seizures. His SEEG  surgery was complicated with a mild SDH which was stable on the last imaging obtained. On 5/17/2022, the patient met with Dr. Conner in clinic to discuss surgical options to manage his epilepsy. It was decided that DBS was the best approach, and he is now status post Phase I bilateral DBS electrodes to anterior thalamic nuclei. He now presented for Phase II of procedure with placement of generator in right chest wall and elected to move forward with procedure after discussion of risks.     DESCRIPTION OF PROCEDURE:  The patient was taken to the operating theater and positioned supine on the operating room table.  All pressure points were appropriately padded.  With placement of the endotracheal tube, general endotracheal anesthesia was induced. His head was turned to the left side, thus exposing the right parietal area of the head.  The previously implanted connector portions of the stimulating electrodes from the left and right sides could be palpated on the right side of the head. A small area of hair was removed over these palpable connectors using a surgical hair clipper. Then the right side of the head, neck, and chest area was prepped and draped in normal sterile fashion. A total of 16cc of local anesthetic, 1% lidocaine with epinephrine mixed with 0.25% Marcaine plain in a 50:50 combination, was injected in the areas of intended incision at the right scalp and right chest wall. A timeout was performed confirming the patient's identity, procedure to be performed, site and side of surgery, and administration of preoperative prophylactic antibiotics.     Attention was turned to the head.  A #10 blade scalpel was used to make a 4 cm curvilinear skin incision near the distal end of the connectors that were palpated on the scalp.  Hemostasis was achieved with bipolar cautery.  The incision was carried down to the pericranium.  Monopolar cautery was used to make a linear opening in the pericranium to expose the  bone. A 5mm cutting mio was used to make a small trough in the bone to accept the extension wire connector pieces.  A shodded mosquito was used to hold the protective covers, and we gently pulled out the connectors.  Both connectors were found to be fully intact.  At that point, a pocket was made using a Alicia clamp down toward behind the ear into the nuchal line.  This was in preparation for tunneling of the extension wire.         Next, attention was turned to the right chest wall area.  A #10 blade scalpel was used to make a 6 cm incision on the right chest wall, three fingerbreadths below the clavicle.  Monopolar cautery was used to finish dissection down to the proper plane.  We found a nice dissecting plane superficial to the pectoralis muscle.  A combination of blunt dissection as well as the monopolar cautery was used to establish a subcutaneous pocket.  Hemostasis was achieved with bipolar cautery.  The pocket was copiously irrigated with saline and sponge was placed in the pocket.       At this point, a tunneler was brought into the field.  We tunneled a passage from the head incision to the chest wall incision, making sure to stay superficial, and the path of the tunneler was confirmed to be over the clavicle. The tunneler did not easily pass beyond a deep crease in the neck, and a small skip incision in the neck was required to complete tunneling down to chest wall.  Two extension wires were passed from the chest to the head incision.       Next, the protective covering was removed from the connector portion of the stimulating electrodes.  The contacts were inspected to be clean and without damage.  Then, the stimulating electrodes were inserted into the extension wire connections tunneled up from the chest wall. The connections were secured with screwdriver, and impedances were checked which confirmed good impedence values. The extension wires were gently pulled from the chest incision and the excess  wire length towards the head was also buried superiorly until the connectors were within the incision.  Then, the connectors were buried further superiorly in the incision until they were no longer directly under the incision.     At this time, generator was brought onto the field.  The extension wires were then cleaned at their contacts and inserted into the generator/battery portal.  These were secured with a screwdriver.  Therefore, two electrode arrays were connected to the new generator/battery. The previously placed sponge within the pocket was removed, and hemostasis was confirmed.  Then, the new generator/battery with the excess wires being placed posterior to the generator/battery was placed within the right chest wall pocket that was created previously.  The entire apparatus fit into the pocket comfortably. The system was tested electrically.  All the impedance values of both stimulating electrodes were within normal limits. No problems were found with the system.     With the satisfactory placement of the new system, we began closing the wound.  The right chest incision was closed in the following manner.  The deep pocket was reapproximated using 2-0 Vicryl sutures in an inverted interrupted fashion.  The subcutaneous fat layer was reapproximated using 2-0 Vicryl sutures in an inverted interrupted fashion.  The dermal layer was reapproximated using 3-0 Vicryl sutures in an inverted interrupted fashion.  The skin was reapproximated using 4-0 Monocryl suture in a subcuticular fashion.  The right parietal head incision was irrigated copiously.  2 dog-bones were used to affix the connectors within the trough bed in the bone. Meticulous hemostasis was obtained.  It was then closed in the following manner.  The galea was reapproximated over the implanted hardware using 3-0 Vicryl sutures in an inverted interrupted fashion.  This provided a protective layer.  The skin was then reapproximated using 3-0 Monocryl  suture in a running fashion. The neck incision was closed with a 3-0 Monocryl suture in a single figure of 8 stitch. All three wounds were cleaned and dried.  ChoraPrep was used to clean the incisions again.  Steristrips were applied over the incision of the chest wall covered with telfa, 4x4 and tegaderm. Primapore dressings were placed on the neck and scalp incisions.     At the end of the case, all counts including needle, sponge and instrument counts were correct x 2.  There were no complications.  Patient was extubated and taken to the recovery room in a stable condition.       Dr. Adler, Neurosurgery Attending, was present and scrubbed for the entire case and performed the key and critical portions of the case.     Agatha Mallory MD, PhD  PGY-2 Neurosurgery  Pager: [phone]    Physician Attestation   I was present for the entire procedure between opening and closing.    Tello Adler MD  Date of Service (when I saw the patient): 8/29/22

## 2022-09-03 DIAGNOSIS — G40.909 RECURRENT SEIZURES (H): ICD-10-CM

## 2022-09-07 RX ORDER — LAMOTRIGINE 150 MG/1
150 TABLET ORAL 2 TIMES DAILY
Qty: 180 TABLET | Refills: 0 | Status: SHIPPED | OUTPATIENT
Start: 2022-09-07 | End: 2022-12-05

## 2022-09-07 NOTE — PROGRESS NOTES
Neurosurgery Clinic Note    Chief Complaint: follow-up     DATE OF VISIT: 9/8/2022    HPI: Amrit Padilla is a pleasant 27 year old male who is s/p bilateral DBS and pulse generator placement by Dr. Tello Adler on 8/22/22 and 8/29/22. Per chart review, the patient did well following both procedures and was able to discharge home on POD #1 and #0 respectively.    Currently, patient complains of intermittent headache and incisional pain and pain over the chest wall where the battery is placed.  Unsure if he has experienced any seizures for these episodes typically occur at night.  Amrit is taking his anti-epileptic medications as prescribed.     Took oral antibiotic until completion.  Is not requiring oxycodone for pain, only using tylenol for pain.      Patient denies any fevers, chills, wound drainage, or other signs of infection.        Review of Systems   Negative except in HPI    Past Medical History:   Diagnosis Date     Depressive disorder      Headaches, cluster      Seizures (H)        Past Surgical History:   Procedure Laterality Date     BACK SURGERY       IMPLANT DEEP BRAIN STIMULATION GENERATOR / BATTERY Right 8/29/2022    Procedure: Deep brain stimulator placement, phase II, placement of deep brain stimulator generator/battery over the right chest wall;  Surgeon: Tello Adler MD;  Location: [facility] OR     INSERTION, ELECTRODE LEADS, DEEP BRAIN STIMULATOR, BILATERAL, USING OPTICAL TRACKING SYSTEM, WITH MRI GUIDANCE Bilateral 8/22/2022    Procedure: Intraoperative Magnetic resonance imaging/Stealth assisted Bilateral deep brain stimulator placement, phase I, placement of bilateral deep brain stimulator electrode, target anterior nucleus of thalamus, WITHOUT microelectrode recording;  Surgeon: Tello Adler MD;  Location: [facility] OR     REMOVE DEPTH ELECTRODES N/A 5/5/2022    Procedure: Stereo EEG depth electrode removal;  Surgeon: Jairon Mar MD;  Location: [facility] OR     JAYDEN  ASSISTED PLACEMENT DEPTH ELECTRODE Bilateral 4/25/2022    Procedure: JAYDEN-assisted stereotactic placement of bilateral depth electrodes for invasive video electroencephalography monitoring;  Surgeon: Tello Adler MD;  Location:  OR       Social History     Socioeconomic History     Marital status: Single   Tobacco Use     Smoking status: Never Smoker     Smokeless tobacco: Never Used   Substance and Sexual Activity     Alcohol use: No     Drug use: No     Sexual activity: Not Currently   Other Topics Concern     Parent/sibling w/ CABG, MI or angioplasty before 65F 55M? No       family history includes Anxiety Disorder in his sister; Asthma in his brother, father, and sister; Hereditary Spherocytosis in his brother; Hypertension in his father and mother.              Physical Exam   There were no vitals taken for this visit.  Constitutional: Oriented to person, place, and time. Appears well-developed and well-nourished. Cooperative. No distress.   HENT: Head normocephalic and atraumatic.     Incisions:  Clean, dry and intact.  No erythema, induration or fluctuance.  No drainage noted.  Nylon sutures to cranial left wound removed today.   Neurological: alert and oriented to person, place, and time. CN II-XII grossly  intact      STRENGTH LEFT RIGHT   Deltoid 5 5   Bicep 5 5   Wrist Extensor 5 5   Tricep 5 5   Finger flexion 5 5   Finger abduction 5 5    5 5       Hip Flexion     5     5   Knee Extension 5 5   Ankle Dorsiflexion 5 5   Extensor Hallucis Longus 5 5   Plantar Flexion 5 5   Foot eversion 5 5   Foot inversion 5 5       Skin: Skin is warm, dry and intact.   Psychiatric: Normal mood and affect. Speech is normal and behavior is normal.      ASSESSMENT:  Amrit Padilla is a 27 year old male s/p bilateral DBS and pulse generator placement by Dr. Tello Adler on 8/22/22 and 8/29/22. P    PLAN:    Follow-up with Dr. Adler on 9/20/22 and Neurology as scheduled on 10/3/22.       Do not  lift (greater than 10 pounds) for 4-6 weeks.   Avoid any activities that could result in trauma to the surgical wound.     You are allowed to take showers and get the wound wet but you may not scrub or soak the wound or keep it submerged under water. If you do happen to get the wound wet, be sure to pat dry it rather than scrubbing it with a towel.    If you develop fever, chills, or note drainage from your wounds, please contact Dr. Adler's office.            I spent 25 minutes in patient care with greater than 50% spent in counseling and/or coordination of care.     I performed independent visualization of radiographic imaging and entered my own interpretation, reviewed and/or ordered tests in radiology        BEBETO Rice, CNP  Department of Neurosurgery  Pager: [phone]

## 2022-09-07 NOTE — TELEPHONE ENCOUNTER
LAMOTRIGINE 150 MG TABLET  Last Written Prescription Date:   6/6/2022  Last Fill Quantity: 180,   # refills: 0  Last Office Visit :  5/16/2022  Future Office visit:   10/3/2022  180 Tabs sent to pharm to cover Pt until visit in October      Sarah Mueller RN  Central Triage Red Flags/Med Refills

## 2022-09-08 ENCOUNTER — OFFICE VISIT (OUTPATIENT)
Dept: NEUROSURGERY | Facility: CLINIC | Age: 27
End: 2022-09-08
Payer: COMMERCIAL

## 2022-09-08 VITALS
WEIGHT: 209 LBS | BODY MASS INDEX: 28.34 KG/M2 | OXYGEN SATURATION: 97 % | SYSTOLIC BLOOD PRESSURE: 122 MMHG | RESPIRATION RATE: 16 BRPM | DIASTOLIC BLOOD PRESSURE: 86 MMHG | HEART RATE: 83 BPM

## 2022-09-08 DIAGNOSIS — G40.219 PARTIAL SYMPTOMATIC EPILEPSY WITH COMPLEX PARTIAL SEIZURES, INTRACTABLE, WITHOUT STATUS EPILEPTICUS (H): Primary | ICD-10-CM

## 2022-09-08 PROCEDURE — 99024 POSTOP FOLLOW-UP VISIT: CPT | Performed by: NURSE PRACTITIONER

## 2022-09-08 RX ORDER — ACETAMINOPHEN 325 MG/1
325-650 TABLET ORAL EVERY 6 HOURS PRN
COMMUNITY

## 2022-09-08 ASSESSMENT — PAIN SCALES - GENERAL: PAINLEVEL: MILD PAIN (3)

## 2022-09-08 NOTE — PATIENT INSTRUCTIONS
Follow-up with Dr. Adler on 9/20/22 and Neurology as scheduled on 10/3/22.       Do not lift (greater than 10 pounds) for 4-6 weeks.   Avoid any activities that could result in trauma to the surgical wound.     You are allowed to take showers and get the wound wet but you may not scrub or soak the wound or keep it submerged under water. If you do happen to get the wound wet, be sure to pat dry it rather than scrubbing it with a towel.    If you develop fever, chills, or note drainage from your wounds, please contact Dr. Adler's office.

## 2022-09-08 NOTE — LETTER
9/8/2022       RE: Amrit Padilla  [address]  [place] [address]     Dear Colleague,    Thank you for referring your patient, Amrit Padilla, to the [facility] NEUROSURGERY CLINIC [place] at [facility]. Please see a copy of my visit note below.        Neurosurgery Clinic Note    Chief Complaint: follow-up     DATE OF VISIT: 9/8/2022    HPI: Amrit Padilla is a pleasant 27 year old male who is s/p bilateral DBS and pulse generator placement by Dr. Tello Adler on 8/22/22 and 8/29/22. Per chart review, the patient did well following both procedures and was able to discharge home on POD #1 and #0 respectively.    Currently, patient complains of intermittent headache and incisional pain and pain over the chest wall where the battery is placed.  Unsure if he has experienced any seizures for these episodes typically occur at night.  Amrit is taking his anti-epileptic medications as prescribed.     Took oral antibiotic until completion.  Is not requiring oxycodone for pain, only using tylenol for pain.      Patient denies any fevers, chills, wound drainage, or other signs of infection.        Review of Systems   Negative except in HPI    Past Medical History:   Diagnosis Date     Depressive disorder      Headaches, cluster      Seizures (H)        Past Surgical History:   Procedure Laterality Date     BACK SURGERY       IMPLANT DEEP BRAIN STIMULATION GENERATOR / BATTERY Right 8/29/2022    Procedure: Deep brain stimulator placement, phase II, placement of deep brain stimulator generator/battery over the right chest wall;  Surgeon: Tello Adler MD;  Location: [facility] OR     INSERTION, ELECTRODE LEADS, DEEP BRAIN STIMULATOR, BILATERAL, USING OPTICAL TRACKING SYSTEM, WITH MRI GUIDANCE Bilateral 8/22/2022    Procedure: Intraoperative Magnetic resonance imaging/Stealth assisted Bilateral deep brain stimulator placement, phase I, placement  of bilateral deep brain stimulator electrode, target anterior nucleus of thalamus, WITHOUT microelectrode recording;  Surgeon: Tello Adler MD;  Location: [facility] OR     REMOVE DEPTH ELECTRODES N/A 5/5/2022    Procedure: Stereo EEG depth electrode removal;  Surgeon: Jairon Mar MD;  Location: [facility] OR     JAYDEN ASSISTED PLACEMENT DEPTH ELECTRODE Bilateral 4/25/2022    Procedure: JAYDEN-assisted stereotactic placement of bilateral depth electrodes for invasive video electroencephalography monitoring;  Surgeon: Tello Adler MD;  Location: [facility] OR       Social History     Socioeconomic History     Marital status: Single   Tobacco Use     Smoking status: Never Smoker     Smokeless tobacco: Never Used   Substance and Sexual Activity     Alcohol use: No     Drug use: No     Sexual activity: Not Currently   Other Topics Concern     Parent/sibling w/ CABG, MI or angioplasty before 65F 55M? No       family history includes Anxiety Disorder in his sister; Asthma in his brother, father, and sister; Hereditary Spherocytosis in his brother; Hypertension in his father and mother.              Physical Exam   There were no vitals taken for this visit.  Constitutional: Oriented to person, place, and time. Appears well-developed and well-nourished. Cooperative. No distress.   HENT: Head normocephalic and atraumatic.     Incisions:  Clean, dry and intact.  No erythema, induration or fluctuance.  No drainage noted.  Nylon sutures to cranial left wound removed today.   Neurological: alert and oriented to person, place, and time. CN II-XII grossly  intact      STRENGTH LEFT RIGHT   Deltoid 5 5   Bicep 5 5   Wrist Extensor 5 5   Tricep 5 5   Finger flexion 5 5   Finger abduction 5 5    5 5       Hip Flexion     5     5   Knee Extension 5 5   Ankle Dorsiflexion 5 5   Extensor Hallucis Longus 5 5   Plantar Flexion 5 5   Foot eversion 5 5   Foot inversion 5 5       Skin: Skin is warm, dry and intact.   Psychiatric: Normal  mood and affect. Speech is normal and behavior is normal.      ASSESSMENT:  Amrit Padilla is a 27 year old male s/p bilateral DBS and pulse generator placement by Dr. Tello Adler on 8/22/22 and 8/29/22. P    PLAN:    Follow-up with Dr. Adler on 9/20/22 and Neurology as scheduled on 10/3/22.       Do not lift (greater than 10 pounds) for 4-6 weeks.   Avoid any activities that could result in trauma to the surgical wound.     You are allowed to take showers and get the wound wet but you may not scrub or soak the wound or keep it submerged under water. If you do happen to get the wound wet, be sure to pat dry it rather than scrubbing it with a towel.    If you develop fever, chills, or note drainage from your wounds, please contact Dr. Adler's office.            I spent 25 minutes in patient care with greater than 50% spent in counseling and/or coordination of care.     I performed independent visualization of radiographic imaging and entered my own interpretation, reviewed and/or ordered tests in radiology        BEBETO Rice, CNP  Department of Neurosurgery  Pager: [phone]

## 2022-09-17 ENCOUNTER — HEALTH MAINTENANCE LETTER (OUTPATIENT)
Age: 27
End: 2022-09-17

## 2022-09-19 ENCOUNTER — TELEPHONE (OUTPATIENT)
Dept: NEUROLOGY | Facility: CLINIC | Age: 27
End: 2022-09-19

## 2022-09-19 NOTE — TELEPHONE ENCOUNTER
Patient states he had an episode in his sleep on the night of 09/18/22 where he was having a hard time breathing during a dream. Patient states when he awoke he was breathing heavily. Patient states he is unsure if this was a seizure or not, would like a call back to discuss further.

## 2022-09-20 ENCOUNTER — OFFICE VISIT (OUTPATIENT)
Dept: NEUROSURGERY | Facility: CLINIC | Age: 27
End: 2022-09-20
Payer: COMMERCIAL

## 2022-09-20 VITALS — DIASTOLIC BLOOD PRESSURE: 83 MMHG | OXYGEN SATURATION: 95 % | SYSTOLIC BLOOD PRESSURE: 115 MMHG | HEART RATE: 76 BPM

## 2022-09-20 DIAGNOSIS — G40.219 PARTIAL SYMPTOMATIC EPILEPSY WITH COMPLEX PARTIAL SEIZURES, INTRACTABLE, WITHOUT STATUS EPILEPTICUS (H): Primary | ICD-10-CM

## 2022-09-20 PROCEDURE — 99024 POSTOP FOLLOW-UP VISIT: CPT | Performed by: NEUROLOGICAL SURGERY

## 2022-09-20 ASSESSMENT — PAIN SCALES - GENERAL: PAINLEVEL: MODERATE PAIN (5)

## 2022-09-20 NOTE — LETTER
9/20/2022       RE: Amrit Padilla  [address]  [facility] [address]     Dear Colleague,    Thank you for referring your patient, Amrit Padilla, to the [facility] NEUROSURGERY CLINIC [place] at [facility]. Please see a copy of my visit note below.    Neurosurgery Attending Epilepsy Follow-Up Note    HPI: Sergio Padilla is a 28 y/o gentleman with medically refractory epilepsy, negative SEEG evaluation who is now about a month s/p bilateral ANT-DBS. He has done well since surgery. He does notice very specific aches and pains related to his incisions, which has been a problem for him in the past. After his SEEG, in particular, he had very specific pains related to specific incisions as well as generalized headaches which we at least in part attributed to a small temporal subdural hematoma that developed after SEEG and has since resolved. DBS is not yet turned, they have an appointment with Dr. Dukes in a few weeks for initial programming. Not much change yet in seizure frequency from what they can tell.     Exam:  Awake, alert, oriented x 3  Attends, follows, fluent  PERRL  EOMI  FS  TML  BUE/BLE 5/5, no drift  Wounds healing well, cranial wounds well healed, c/d/i. Small scabs left in neck incision and chest wall incision, c/d/i. No erythema, swelling or tenderness to palpation anywhere    Pathology: n/a    Eduardo outcome: TBD    A/P: 28 y/o gentleman with focal medically refractory epilepsy s/p bilateral ANT-DBS, doing well.    - AED management per [facility]/Dr. Dukes  - Slowly increase physical activity, no heavy lifting until scabs fall off and wounds are fully healed  - RTC prn      Again, thank you for allowing me to participate in the care of your patient.      Sincerely,    Tello Adler MD

## 2022-09-20 NOTE — LETTER
Date:September 20, 2022      Provider requested that no letter be sent. Do not send.       [facility]

## 2022-09-20 NOTE — PROGRESS NOTES
Neurosurgery Attending Epilepsy Follow-Up Note    HPI: Sergio Padilla is a 26 y/o gentleman with medically refractory epilepsy, negative SEEG evaluation who is now about a month s/p bilateral ANT-DBS. He has done well since surgery. He does notice very specific aches and pains related to his incisions, which has been a problem for him in the past. After his SEEG, in particular, he had very specific pains related to specific incisions as well as generalized headaches which we at least in part attributed to a small temporal subdural hematoma that developed after SEEG and has since resolved. DBS is not yet turned, they have an appointment with Dr. Dukes in a few weeks for initial programming. Not much change yet in seizure frequency from what they can tell.     Exam:  Awake, alert, oriented x 3  Attends, follows, fluent  PERRL  EOMI  FS  TML  BUE/BLE 5/5, no drift  Wounds healing well, cranial wounds well healed, c/d/i. Small scabs left in neck incision and chest wall incision, c/d/i. No erythema, swelling or tenderness to palpation anywhere    Pathology: n/a    Eduardo outcome: TBD    A/P: 26 y/o gentleman with focal medically refractory epilepsy s/p bilateral ANT-DBS, doing well.    - AED management per [facility]/Dr. Dukes  - Slowly increase physical activity, no heavy lifting until scabs fall off and wounds are fully healed  - RTC prn

## 2022-09-26 NOTE — TELEPHONE ENCOUNTER
Patient returned call, apologized for taking so long to call back, asked for another call, if he doesn't  ok to leave detailed message

## 2022-09-27 NOTE — TELEPHONE ENCOUNTER
Patient returning call to clinic, doesn't know if he will be available much of today, okay to leave a detailed message.

## 2022-09-27 NOTE — TELEPHONE ENCOUNTER
Patient calls to discuss feelings of choking during sleep. Has not noticed any body aches, headaches, mouth wounds. He is not sure if he is having seizures or not. He can feel the choking in the dream and then awakens still feeling this way.     He is not aware of any definite seizures occurring since surgery. He is scheduled to see Dr. Dukes on Monday to have his DBS activated.

## 2022-10-02 NOTE — TELEPHONE ENCOUNTER
"Call placed to patient.    He notes that occasionally he can look at something and have a weird feeling (not scary, but hard to describe) almost like he is \"feeling the thought.\"  Sometimes he finds his mind is jumping from one thing to another, all related to the object he is looking, but it not in order    No clear factor bringing this on    Patient feels he had a seizure last night - he had woken and felt he was moving and shaking more than usual under his weighted blanket. He does not feel he had control over his movements.  He notes he has had a few canker sores over the past few weeks.  No other rash or blistering    Meds reviewed    Orders reviewed,  Patient is due to have blood work (ordered on discharge from the hospital)  Patient will go to [facility] [place] - will fax orders to them  "
Call returned, message left  
Pt returning call from nurse, please call back. Pt aware they will get a call back tomorrow, please call after 11am.   
What is the concern that needs to be addressed by a nurse? Pt is concerned about a change in his thoughts. When he looks at something his mind goes more into thought and he feels strange/ uncomfortable over simple objects.     May a detailed message be left on voicemail? Yes     Date of last office visit: 02/01/21    Message routed to: mincep rn pool     
01-Oct-2022 16:00

## 2022-10-03 ENCOUNTER — OFFICE VISIT (OUTPATIENT)
Dept: NEUROLOGY | Facility: CLINIC | Age: 27
End: 2022-10-03
Payer: COMMERCIAL

## 2022-10-03 DIAGNOSIS — G40.909 RECURRENT SEIZURES (H): Primary | ICD-10-CM

## 2022-10-03 NOTE — LETTER
10/3/2022     RE: Amrit Padilla  : 1995   MRN: [id number]      Dear Colleague,    Thank you for referring your patient, Amrit Padilla, to the [facility] EPILEPSY CARE at [facility]. Please see a copy of my visit note below.    CHIEF COMPLIANT:  Follow-up for epilepsy and DBS placement.     HISTORY OF PRESENT ILLNESS:  In person visit today.  He is accompanied by his mother.  Patient is a 26-year-old right handed man with history of epilepsy, Asperger's syndrome and depression who was recently admitted for intracranial monitoring with depth electrodes in May for surgical evaluation for intractable epilepsy.  He had DBS placement at the end of Aug. 2022.  Since the DBS placement, he did not notice any significant changes of his seizures.  Almost all his seizures are nocturnal. He complains of pain at the surgical sites. He had 2 occasions that he woke up feeling out of breaths during the last month.    Intracranial monitoring was complicated by the development of a right-sided subdural hematoma which is stable with repeat CT head scheduled for tomorrow.  Clinically headaches are improving although he is endorsing scalp discomfort from the stitches which he hopes to have out tomorrow.  He has follow-up scheduled with Dr. Adler.  He is taking approximately 1000 mg acetaminophen twice daily for headache pain.  He describes headache pain as a 5/10 and relieved to about a 4/10 with Tylenol.  He was advised not to use Excedrin or other medications with antiplatelet effect in the setting of subdural hematoma at time of discharge.    Patient was monitored for 10 days with intracranial electrodes during which time he had 4 electroclinical seizures.  All of the seizures occurred out of sleep which are consistent with his semiology.  He was found to have bilateral upper extremity tonic extension elevation at seizure onset which would followed by generalized  activity.  Depth electrodes were in unable to fully localize seizure onset and monitoring was terminated after 4 electroclinical seizures as further data was felt to be unnecessary to determine treatment course.  Dr. Dukes discussed deep brain stimulation (DBS) with patient during hospitalization and today's visit is to further discuss treatment options after intracranial monitoring.    Since discharge from the hospital no known electroclinical seizures, all of his seizures out of sleep and he is not always aware of seizures with that said no postictal confusion appreciated by dad and Amrit has not had any cuts or abrasions in the mouth to suggest a seizure.    Needs to follow-up with Dr. Ben Barros for psychiatric care.  He expressed feeling overwhelmed today by the discussion of epilepsy treatment and surgical options.    Intracranial monitoring 4/25/2022 - 5/5/2022 - 4  seizures were captured, but there was concern that we were not capturing the seizure onset zone and early rapid spread as well as multifocal regions of seizure propagation are possibilities. These suggested that a localized surgical approach would be unlikely to provide seizure freedom. Additionally, it was felt that further seizure recording would not clarify or .     CMC was done in Aug. 2021, consensus was that patient should proceed with invasive monitoring. He should be able to tolerate intracranial monitoring.  The lateralization and localization are not clear based on scalp EEG monitoring. Temporal lobe epilepsy is possible. We will have invasive depth electrodes, 8 symmetric electrodes on each side:     Amygdala: 1  Anterior and posterior hippocampus: 3  Cingulate and prefrontal: 2  Orbital frontal:1  Insula: 1     He saw Dr. Adler on 11/2/2021 for consultation for invasive monitoring. He is scheduled for [facility] MRI and CTA on Dec 12/3/2021.     For the past several months, he was having about 2 seizures per week,  mostly nocturnal seizures, about 80-90%. He is currently taking aptiom 1200 mg qhs,  zonegran 300-400, Lamictal 150 mg bid, Depakote 500 - 1000. He is also taking CBD oil for anxiety.  Double vision resolved after aptiom was discontinued.     He is complaining of double vision, blurred vision.  He also complains of hand tremor. He had a really bad tremor day about one day ago.     He had VEEG monitoring for presurgical evaluation in March 2021. Total of 9 epileptic seizures were recorded during this admission. These electroclinical seizures showed bifrontal EEG activities at onset, usually with higher amplitude initial activity over the right frontal region compared to the left.  Ictal manifestations variably included tonic stiffening of his upper body, kicking of his legs, vocalizations, and non-purposeful movements of surrounding objects.  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  Aptiom was discontinued at discharge.      No plan for suicide or self injury     Since the hospital discharge, he continue to have frequent seizures, probably several times per week.     He was complaining of vertigo spells, he was seen in the ED.  He was told that he has vestibular problems.       He continue to have small twitches that last for a spit of a second, some of them in clusters. No LOC with these twitches. These likely are not seizures.      Patient tried Peoples diet, but did not continue.     He is seeing Dr. Ben Barros for his depression.  He is also seeing Deo Shipman for therapy. He is complaining of fatigue and sleep over 8-10 hours a day. He is very concerned about the fatigue and is questioning what other options we have for treating his seizures.      They are now open to VEEG monitoring for surgical evaluation, resection or neuromodulations. However, they are still not sure if they want to move forward to the surgical treatment.     Mood improved a lot. No suicidal thoughts, no thoughts of hurting  "himself.     The patient started to have seizures when he was 16 years old and he was managed at the [facility] for the past few years for his seizures.  He is currently taking Aptiom 1200 mg q.h.s. and the Zonegran 300 mg b.i.d. for his epilepsy.  He was recently admitted to the hospital twice because of 2 clusters of seizures, one in January when he was admitted to [facility] and one was in February when he was admitted to [facility].  On both occasions, he had 4-5 back to back generalized tonic-clonic seizures within a short period of time.      According to the mother, the patient has 2 different types of seizures.  Type #1 is described as \"mild seizures,\" during which he will have some facial twitching.  He will have repetitive hand/arm jerking movement.  He will become unresponsive for less than a minute.  Postictally, he was having some confusion and headaches and fatigue.  Before the seizures, he will have an aura of \"seeing bees populating the earth with eggs.\"  This type of seizure is relatively infrequent, average once per month.      Type #2 are generalized tonic-clonic seizures which the patient will have generalized convulsions of the body and his body will be stiff.  It usually will last for 1-2 minutes followed by postictal fatigue and hallucinations.  This happens usually once every 3 months, but sometimes it occurs in clusters with 2-5 seizures back to back.      The patient had tried Keppra and Depakote in the past which did not work.  He had EEGs done in the past which were reported negative.      The patient had severe depression and requested a lot of behavioral health and counseling in the past and at certain time, he was suicidal, but that improved.  At the present time, he is not suicidal.  He does not think about hurting himself.     TRIGGERS FOR SEIZURES:  Depression.      RISK FACTORS FOR SEIZURES:  He had no history of head trauma with loss of consciousness.  No history of febrile " convulsions.  No history of CNS infections.      PAST AEDs: Keppra, Depakote. Vimpat caused suicidal ideations. Depakote discontinued due to risk of platelet dysfunction during intracranial monitoring, and patient resumed Keppra.      PAST MEDICAL HISTORY:  Asperger's disorder, depression, epilepsy.      PAST SURGICAL HISTORY:  Depth electrode intracranial placement for epilepsy surgical evaluation      FAMILY HISTORY:  No family history of seizures.  Both parents have hypertension.  Maternal grandmother had heart disease.      SOCIAL HISTORY:  He is single, living with his mother.  He had some college education but did not finish because of depression; he is hoping to return to college soon.  He is currently working at [employer] and plans to return to work soon.  No smoking, no alcohol, no drug abuse.      REVIEW OF SYSTEMS:  Targeted ROS per HPI>      ALLERGIES:  Sertraline and Zyprexa.     PHYSICAL EXAMINATIONS:    There were no vitals taken for this visit.    General exam: General Appearance: No acute distress. HEENT: Normocephalic, atraumatic. Neck: Supple.  Extremities: No edema.     Neurologic Exam: Alert and oriented x3. Speech fluent, appropriate. Normal attention. Cranial Nerves: Pupils are equal, round, reactive to light and accomodation. Extraocular movement intact. No facial weakness or asymmetry. Hearing normal. Motor Exam: Normal. Coordination:no ataxia.  Gait and Station: normal.      DIAGNOSTIC TESTING: CT scan of the head on 02/27/2018 was reported negative from [facility] [employer].  He had EEGs in the past which were reported negative    MRI brain with and without contrast 8/5/2021 [facility] - No supratentorial lesions or significant hippocampal asymmetric/evidence for pathology. Stable cerebellar 7 mm T2 hypertense lesion.     MRI 7 Ernestine 4/22/2022 [facility] -   Impression: Motion degraded exam.  1. No definite temporal lobe abnormality, mass, or congenital lesion  identified as a cause  of the patient's seizures.  2. No evidence of abnormal enhancement intracranially.   3. Previously seen abnormal T2 signal in the right cerebellum is not  identified in today's exam, likely obscured secondary to  susceptibility artifacts in the posterior fossa, a less likely  possibility is that the lesion could have resolved.    IMPRESSION:   1. Epilepsy     He had DBS placement at the end of Aug. 2022.  Since the DBS placement, he did not notice any significant changes of his seizures.  Almost all his seizures are nocturnal. He complains of pain at the surgical sites. He had 2 occasions that he woke up feeling out of breaths during the last month.    2.  Asperger's syndrome   3.  Depression he follows with Dr. Reyes Barros and Deo Shipman for therapy.   4.  Tremors.  Secondary to Depakote or psychological?    PLAN:   1.  Continue Zonegran 300 mg - 400 mg, Lamictal 150 mg twice daily, Keppra 500 mg twice daily  2.  Follow-up with Dr. Adler tomorrow  3.  Continue follow-up with Dr. Barros for depression.  Continue therapy.  4.  No restriction to resuming CBD and melatonin  5.  Turn on DBS at 0.5 mA. 145hz, 90 micro seconds.  6.  Follow-up in person in 3 months.    40 min total time was spent on the day of this visit.    28 min was spent on face to face time  8 min was spent on preparation of visit to review charts and labs, ordering medications and tests  6 min was spent on documentation of clinical information  20 min was spent on DBS interrogation and programming.  Setting are at 0.5 mA. 145hz, 90 micro seconds    Again, thank you for allowing me to participate in the care of your patient.      Sincerely,    Norman Dukes MD

## 2022-10-03 NOTE — PROGRESS NOTES
CHIEF COMPLIANT:  Follow-up for epilepsy and DBS placement.     HISTORY OF PRESENT ILLNESS:  In person visit today.  He is accompanied by his mother.  Patient is a 26-year-old right handed man with history of epilepsy, Asperger's syndrome and depression who was recently admitted for intracranial monitoring with depth electrodes in May for surgical evaluation for intractable epilepsy.  He had DBS placement at the end of Aug. 2022.  Since the DBS placement, he did not notice any significant changes of his seizures.  Almost all his seizures are nocturnal. He complains of pain at the surgical sites. He had 2 occasions that he woke up feeling out of breaths during the last month.    Intracranial monitoring was complicated by the development of a right-sided subdural hematoma which is stable with repeat CT head scheduled for tomorrow.  Clinically headaches are improving although he is endorsing scalp discomfort from the stitches which he hopes to have out tomorrow.  He has follow-up scheduled with Dr. Adler.  He is taking approximately 1000 mg acetaminophen twice daily for headache pain.  He describes headache pain as a 5/10 and relieved to about a 4/10 with Tylenol.  He was advised not to use Excedrin or other medications with antiplatelet effect in the setting of subdural hematoma at time of discharge.    Patient was monitored for 10 days with intracranial electrodes during which time he had 4 electroclinical seizures.  All of the seizures occurred out of sleep which are consistent with his semiology.  He was found to have bilateral upper extremity tonic extension elevation at seizure onset which would followed by generalized activity.  Depth electrodes were in unable to fully localize seizure onset and monitoring was terminated after 4 electroclinical seizures as further data was felt to be unnecessary to determine treatment course.  Dr. Dukes discussed deep brain stimulation (DBS) with patient during hospitalization  and today's visit is to further discuss treatment options after intracranial monitoring.    Since discharge from the hospital no known electroclinical seizures, all of his seizures out of sleep and he is not always aware of seizures with that said no postictal confusion appreciated by dad and Amrit has not had any cuts or abrasions in the mouth to suggest a seizure.    Needs to follow-up with Dr. Ben Barros for psychiatric care.  He expressed feeling overwhelmed today by the discussion of epilepsy treatment and surgical options.    Intracranial monitoring 4/25/2022 - 5/5/2022 - 4  seizures were captured, but there was concern that we were not capturing the seizure onset zone and early rapid spread as well as multifocal regions of seizure propagation are possibilities. These suggested that a localized surgical approach would be unlikely to provide seizure freedom. Additionally, it was felt that further seizure recording would not clarify or .     CMC was done in Aug. 2021, consensus was that patient should proceed with invasive monitoring. He should be able to tolerate intracranial monitoring.  The lateralization and localization are not clear based on scalp EEG monitoring. Temporal lobe epilepsy is possible. We will have invasive depth electrodes, 8 symmetric electrodes on each side:     Amygdala: 1  Anterior and posterior hippocampus: 3  Cingulate and prefrontal: 2  Orbital frontal:1  Insula: 1     He saw Dr. Adler on 11/2/2021 for consultation for invasive monitoring. [facility].     For the past several months, he was having about 2 seizures per week, mostly nocturnal seizures, about 80-90%. He is currently taking aptiom 1200 mg qhs,  zonegran 300-400, Lamictal 150 mg bid, Depakote 500 - 1000. He is also taking CBD oil for anxiety.  Double vision resolved after aptiom was discontinued.     He is complaining of double vision, blurred vision.  He  also complains of hand tremor. He had a really bad tremor day about one day ago.     He had VEEG monitoring for presurgical evaluation in March 2021. Total of 9 epileptic seizures were recorded during this admission. These electroclinical seizures showed bifrontal EEG activities at onset, usually with higher amplitude initial activity over the right frontal region compared to the left.  Ictal manifestations variably included tonic stiffening of his upper body, kicking of his legs, vocalizations, and non-purposeful movements of surrounding objects.  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  Aptiom was discontinued at discharge.      No plan for suicide or self injury     Since the hospital discharge, he continue to have frequent seizures, probably several times per week.     He was complaining of vertigo spells, he was seen in the ED.  He was told that he has vestibular problems.       He continue to have small twitches that last for a spit of a second, some of them in clusters. No LOC with these twitches. These likely are not seizures.      Patient tried Peoples diet, but did not continue.     He is seeing Dr. Ben Barros for his depression.  He is also seeing Deo Shipman for therapy. He is complaining of fatigue and sleep over 8-10 hours a day. He is very concerned about the fatigue and is questioning what other options we have for treating his seizures.      They are now open to VEEG monitoring for surgical evaluation, resection or neuromodulations. However, they are still not sure if they want to move forward to the surgical treatment.     Mood improved a lot. No suicidal thoughts, no thoughts of hurting himself.     The patient started to have seizures when he was 16 years old and he was managed at the [facility] for the past few years for his seizures.  He is currently taking Aptiom 1200 mg q.h.s. and the Zonegran 300 mg b.i.d. for his epilepsy.  He was recently admitted to the hospital twice  "because of 2 clusters of seizures, one in January when he was admitted to [facility] and one was in February when he was admitted to [facility].  On both occasions, he had 4-5 back to back generalized tonic-clonic seizures within a short period of time.      According to the mother, the patient has 2 different types of seizures.  Type #1 is described as \"mild seizures,\" during which he will have some facial twitching.  He will have repetitive hand/arm jerking movement.  He will become unresponsive for less than a minute.  Postictally, he was having some confusion and headaches and fatigue.  Before the seizures, he will have an aura of \"seeing bees populating the earth with eggs.\"  This type of seizure is relatively infrequent, average once per month.      Type #2 are generalized tonic-clonic seizures which the patient will have generalized convulsions of the body and his body will be stiff.  It usually will last for 1-2 minutes followed by postictal fatigue and hallucinations.  This happens usually once every 3 months, but sometimes it occurs in clusters with 2-5 seizures back to back.      The patient had tried Keppra and Depakote in the past which did not work.  He had EEGs done in the past which were reported negative.      The patient had severe depression and requested a lot of behavioral health and counseling in the past and at certain time, he was suicidal, but that improved.  At the present time, he is not suicidal.  He does not think about hurting himself.     TRIGGERS FOR SEIZURES:  Depression.      RISK FACTORS FOR SEIZURES:  He had no history of head trauma with loss of consciousness.  No history of febrile convulsions.  No history of CNS infections.      PAST AEDs: Keppra, Depakote. Vimpat caused suicidal ideations. Depakote discontinued due to risk of platelet dysfunction during intracranial monitoring, and patient resumed Keppra.      PAST MEDICAL HISTORY:  Asperger's disorder, depression, epilepsy. "      PAST SURGICAL HISTORY:  Depth electrode intracranial placement for epilepsy surgical evaluation      FAMILY HISTORY:  No family history of seizures.  Both parents have hypertension.  Maternal grandmother had heart disease.      SOCIAL HISTORY:  He is single, living with his mother.  He had some college education but did not finish because of depression; he is hoping to return to college soon.  He is currently working at [employer] and plans to return to work soon.  No smoking, no alcohol, no drug abuse.      REVIEW OF SYSTEMS:  Targeted ROS per HPI>      ALLERGIES:  Sertraline and Zyprexa.     PHYSICAL EXAMINATIONS:    There were no vitals taken for this visit.    General exam: General Appearance: No acute distress. HEENT: Normocephalic, atraumatic. Neck: Supple.  Extremities: No edema.     Neurologic Exam: Alert and oriented x3. Speech fluent, appropriate. Normal attention. Cranial Nerves: Pupils are equal, round, reactive to light and accomodation. Extraocular movement intact. No facial weakness or asymmetry. Hearing normal. Motor Exam: Normal. Coordination:no ataxia.  Gait and Station: normal.      DIAGNOSTIC TESTING: CT scan of the head on 02/27/2018 was reported negative from [facility].  He had EEGs in the past which were reported negative    MRI brain with and without contrast 8/5/2021 [facility] - No supratentorial lesions or significant hippocampal asymmetric/evidence for pathology. Stable cerebellar 7 mm T2 hypertense lesion.     MRI 7 Ernestine 4/22/2022 [facility] -   Impression: Motion degraded exam.  1. No definite temporal lobe abnormality, mass, or congenital lesion  identified as a cause of the patient's seizures.  2. No evidence of abnormal enhancement intracranially.   3. Previously seen abnormal T2 signal in the right cerebellum is not  identified in today's exam, likely obscured secondary to  susceptibility artifacts in the posterior fossa, a less likely  possibility is that the  lesion could have resolved.    IMPRESSION:   1. Epilepsy     He had DBS placement at the end of Aug. 2022.  Since the DBS placement, he did not notice any significant changes of his seizures.  Almost all his seizures are nocturnal. He complains of pain at the surgical sites. He had 2 occasions that he woke up feeling out of breaths during the last month.    2.  Asperger's syndrome   3.  Depression he follows with Dr. Reyes Barros and Deo Shipman for therapy.   4.  Tremors.  Secondary to Depakote or psychological?    PLAN:   1.  Continue Zonegran 300 mg - 400 mg, Lamictal 150 mg twice daily, Keppra 500 mg twice daily  2.  Follow-up with Dr. Adler tomorrow  3.  Continue follow-up with Dr. Barros for depression.  Continue therapy.  4.  No restriction to resuming CBD and melatonin  5.  Turn on DBS at 0.5 mA. 145hz, 90 micro seconds.  6.  Follow-up in person in 3 months.      40 min total time was spent on the day of this visit.      28 min was spent on face to face time  8 min was spent on preparation of visit to review charts and labs, ordering medications and tests  6 min was spent on documentation of clinical information      20 min was spent on DBS interrogation and programming.  Setting are at 0.5 mA. 145hz, 90 micro seconds

## 2022-10-28 ENCOUNTER — TELEPHONE (OUTPATIENT)
Dept: NEUROLOGY | Facility: CLINIC | Age: 27
End: 2022-10-28

## 2022-10-28 ASSESSMENT — PATIENT HEALTH QUESTIONNAIRE - PHQ9: SUM OF ALL RESPONSES TO PHQ QUESTIONS 1-9: 20

## 2022-10-28 NOTE — TELEPHONE ENCOUNTER
"Patient call warm transferred to nursing due to report of mood problem. Patient states to this nurse that he's having a lot of \"negative thoughts and feelings\". Sleep is baseline. Didn't go to work today. Having thoughts of self harm. He hasn't acted upon those \"for the past few months\". He took a knife out (), thought to himself if he would be better off dead but then put it back. His DBS was activated on 10/3/2022. He states his parents are home with him and aware of his thoughts; he states they are supportive and suggested he call the clinic. He also states he is in contact with his psychologist.     PHQ 10/28/2022   PHQ-9 Total Score 20   Q9: Thoughts of better off dead/self-harm past 2 weeks Nearly every day     Dr. Dukes was notified and states he plans to contact the patient this afternoon. Patient was advised to contact the clinic over the weekend if symptoms worsen. He states his family is with him and that he will not harm himself.      "

## 2022-10-31 ENCOUNTER — OFFICE VISIT (OUTPATIENT)
Dept: NEUROLOGY | Facility: CLINIC | Age: 27
End: 2022-10-31
Payer: COMMERCIAL

## 2022-10-31 VITALS
WEIGHT: 208.4 LBS | BODY MASS INDEX: 28.23 KG/M2 | HEART RATE: 79 BPM | HEIGHT: 72 IN | TEMPERATURE: 98 F | DIASTOLIC BLOOD PRESSURE: 81 MMHG | SYSTOLIC BLOOD PRESSURE: 125 MMHG

## 2022-10-31 DIAGNOSIS — G40.909 RECURRENT SEIZURES (H): Primary | ICD-10-CM

## 2022-10-31 NOTE — LETTER
10/31/2022       RE: Amrit Padilla  : 1995   MRN: [id number]      Dear Colleague,    Thank you for referring your patient, Amrit Padilla, to the [facility] EPILEPS[facility] at [facility]. Please see a copy of my visit note below.    CHIEF COMPLIANT:  Follow-up for epilepsy and DBS placement.     HISTORY OF PRESENT ILLNESS:  In person visit today.  He is accompanied by his father in the clinic.  Patient is a 27-year-old right handed man with history of epilepsy, Asperger's syndrome and depression who was recently admitted for intracranial monitoring with depth electrodes in May for surgical evaluation for intractable epilepsy.  He had DBS placement at the end of Aug. 2022. DBS was turned on at 0.5 mA. 145hz, 90 micro seconds on 10/3/2022. Since the DBS placement, he is not sure if there are any significant changes of his seizures.  Almost all his seizures are nocturnal. He complains of pain at the surgical sites. He feels that his depression is getting worse during the recent months, especially for the past one month.    Intracranial monitoring was complicated by the development of a right-sided subdural hematoma which is stable with repeat CT head scheduled for tomorrow.  Clinically headaches are improving although he is endorsing scalp discomfort from the stitches which he hopes to have out tomorrow.  He has follow-up scheduled with Dr. Adler.  He is taking approximately 1000 mg acetaminophen twice daily for headache pain.  He describes headache pain as a 5/10 and relieved to about a 4/10 with Tylenol.  He was advised not to use Excedrin or other medications with antiplatelet effect in the setting of subdural hematoma at time of discharge.    Patient was monitored for 10 days with intracranial electrodes during which time he had 4 electroclinical seizures.  All of the seizures occurred out of sleep which are consistent with his semiology.  He was  found to have bilateral upper extremity tonic extension elevation at seizure onset which would followed by generalized activity.  Depth electrodes were in unable to fully localize seizure onset and monitoring was terminated after 4 electroclinical seizures as further data was felt to be unnecessary to determine treatment course.  Dr. Dukes discussed deep brain stimulation (DBS) with patient during hospitalization and today's visit is to further discuss treatment options after intracranial monitoring.    Since discharge from the hospital no known electroclinical seizures, all of his seizures out of sleep and he is not always aware of seizures with that said no postictal confusion appreciated by dad and Amrit has not had any cuts or abrasions in the mouth to suggest a seizure.    Needs to follow-up with Dr. Ben Barros for psychiatric care.  He expressed feeling overwhelmed today by the discussion of epilepsy treatment and surgical options.    Intracranial monitoring 4/25/2022 - 5/5/2022 - 4  seizures were captured, but there was concern that we were not capturing the seizure onset zone and early rapid spread as well as multifocal regions of seizure propagation are possibilities. These suggested that a localized surgical approach would be unlikely to provide seizure freedom. Additionally, it was felt that further seizure recording would not clarify or .     CMC was done in Aug. 2021, consensus was that patient should proceed with invasive monitoring. He should be able to tolerate intracranial monitoring.  The lateralization and localization are not clear based on scalp EEG monitoring. Temporal lobe epilepsy is possible. We will have invasive depth electrodes, 8 symmetric electrodes on each side:     Amygdala: 1  Anterior and posterior hippocampus: 3  Cingulate and prefrontal: 2  Orbital frontal:1  Insula: 1     He saw Dr. Adler on 11/2/2021 for consultation for invasive monitoring. He is scheduled  for F-Gator MRI and CTA on Dec 12/3/2021.     For the past several months, he was having about 2 seizures per week, mostly nocturnal seizures, about 80-90%. He is currently taking aptiom 1200 mg qhs,  zonegran 300-400, Lamictal 150 mg bid, Depakote 500 - 1000. He is also taking CBD oil for anxiety.  Double vision resolved after aptiom was discontinued.     He is complaining of double vision, blurred vision.  He also complains of hand tremor. He had a really bad tremor day about one day ago.     He had VEEG monitoring for presurgical evaluation in March 2021. Total of 9 epileptic seizures were recorded during this admission. These electroclinical seizures showed bifrontal EEG activities at onset, usually with higher amplitude initial activity over the right frontal region compared to the left.  Ictal manifestations variably included tonic stiffening of his upper body, kicking of his legs, vocalizations, and non-purposeful movements of surrounding objects.  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  Aptiom was discontinued at discharge.      No plan for suicide or self injury     Since the hospital discharge, he continue to have frequent seizures, probably several times per week.     He was complaining of vertigo spells, he was seen in the ED.  He was told that he has vestibular problems.       He continue to have small twitches that last for a spit of a second, some of them in clusters. No LOC with these twitches. These likely are not seizures.      Patient tried Peoples diet, but did not continue.     He is seeing Dr. Ben Barros for his depression.  He is also seeing Deo Shipman for therapy. He is complaining of fatigue and sleep over 8-10 hours a day. He is very concerned about the fatigue and is questioning what other options we have for treating his seizures.      They are now open to VEEG monitoring for surgical evaluation, resection or neuromodulations. However, they are still not sure if they  "want to move forward to the surgical treatment.     Mood improved a lot. No suicidal thoughts, no thoughts of hurting himself.     The patient started to have seizures when he was 16 years old and he was managed at the [facility] for the past few years for his seizures.  He is currently taking Aptiom 1200 mg q.h.s. and the Zonegran 300 mg b.i.d. for his epilepsy.  He was recently admitted to the hospital twice because of 2 clusters of seizures, one in January when he was admitted to [facility] and one was in February when he was admitted to [facility].  On both occasions, he had 4-5 back to back generalized tonic-clonic seizures within a short period of time.      According to the mother, the patient has 2 different types of seizures.  Type #1 is described as \"mild seizures,\" during which he will have some facial twitching.  He will have repetitive hand/arm jerking movement.  He will become unresponsive for less than a minute.  Postictally, he was having some confusion and headaches and fatigue.  Before the seizures, he will have an aura of \"seeing bees populating the earth with eggs.\"  This type of seizure is relatively infrequent, average once per month.      Type #2 are generalized tonic-clonic seizures which the patient will have generalized convulsions of the body and his body will be stiff.  It usually will last for 1-2 minutes followed by postictal fatigue and hallucinations.  This happens usually once every 3 months, but sometimes it occurs in clusters with 2-5 seizures back to back.      The patient had tried Keppra and Depakote in the past which did not work.  He had EEGs done in the past which were reported negative.      The patient had severe depression and requested a lot of behavioral health and counseling in the past and at certain time, he was suicidal, but that improved.  At the present time, he is not suicidal.  He does not think about hurting himself.     TRIGGERS FOR SEIZURES:  Depression. "      RISK FACTORS FOR SEIZURES:  He had no history of head trauma with loss of consciousness.  No history of febrile convulsions.  No history of CNS infections.      PAST AEDs: Keppra, Depakote. Vimpat caused suicidal ideations. Depakote discontinued due to risk of platelet dysfunction during intracranial monitoring, and patient resumed Keppra.      PAST MEDICAL HISTORY:  Asperger's disorder, depression, epilepsy.      PAST SURGICAL HISTORY:  Depth electrode intracranial placement for epilepsy surgical evaluation      FAMILY HISTORY:  No family history of seizures.  Both parents have hypertension.  Maternal grandmother had heart disease.      SOCIAL HISTORY:  He is single, living with his mother.  He had some college education but did not finish because of depression; he is hoping to return to college soon.  He is currently working at [employer] and plans to return to work soon.  No smoking, no alcohol, no drug abuse.      REVIEW OF SYSTEMS:  Targeted ROS per HPI>      ALLERGIES:  Sertraline and Zyprexa.     PHYSICAL EXAMINATIONS:    Blood pressure 125/81, pulse 79, temperature 98  F (36.7  C), temperature source Temporal, height 6' (182.9 cm), weight 208 lb 6.4 oz (94.5 kg).    General exam: General Appearance: No acute distress. HEENT: Normocephalic, atraumatic. Neck: Supple.  Extremities: No edema.     Neurologic Exam: Alert and oriented x3. Speech fluent, appropriate. Normal attention. Cranial Nerves: Pupils are equal, round, reactive to light and accomodation. Extraocular movement intact. No facial weakness or asymmetry. Hearing normal. Motor Exam: Normal. Coordination:no ataxia.  Gait and Station: normal.      DIAGNOSTIC TESTING: CT scan of the head on 02/27/2018 was reported negative from [facility].  He had EEGs in the past which were reported negative    MRI brain with and without contrast 8/5/2021 [facility] - No supratentorial lesions or significant hippocampal asymmetric/evidence for pathology. Stable  cerebellar 7 mm T2 hypertense lesion.     MRI 7 Ernestine 4/22/2022 [facility] -   Impression: Motion degraded exam.  1. No definite temporal lobe abnormality, mass, or congenital lesion  identified as a cause of the patient's seizures.  2. No evidence of abnormal enhancement intracranially.   3. Previously seen abnormal T2 signal in the right cerebellum is not  identified in today's exam, likely obscured secondary to  susceptibility artifacts in the posterior fossa, a less likely  possibility is that the lesion could have resolved.    IMPRESSION:   1. Epilepsy     He had DBS placement at the end of Aug. 2022.  Since the DBS placement, he did not notice any significant changes of his seizures.  Almost all his seizures are nocturnal. He complains of pain at the surgical sites. He had 2 occasions that he woke up feeling out of breaths during the last month.    2.  Asperger's syndrome   3.  Depression he follows with Dr. Reyes Barros and Deo Shipman for therapy.   4.  Tremors.  Secondary to Depakote or psychological?    PLAN:   1.  Continue Zonegran 300 mg - 400 mg, Lamictal 150 mg twice daily, Keppra 500 mg twice daily  2.  Continue to follow-up with Dr. Adler.  3.  Continue follow-up with Dr. Barros for depression.  Continue therapy.  4.  No restriction to resuming CBD and melatonin  5.  Change DBS at 0.2 mA. 145hz, 90 micro seconds.  6.  [employer] consult.  7.  Follow-up in person in 3 months.      41 min total time was spent on the day of this visit.      30 min was spent on face to face time  6 min was spent on preparation of visit to review charts and labs, ordering medications and tests  5 min was spent on documentation of clinical information    20 min was spent on DBS interrogation and programming.  Setting are at 0.5 mA. 145hz, 90 micro seconds      Again, thank you for allowing me to participate in the care of your patient.      Sincerely,    Nroman Dukes MD

## 2022-10-31 NOTE — PROGRESS NOTES
CHIEF COMPLIANT:  Follow-up for epilepsy and DBS placement.     HISTORY OF PRESENT ILLNESS:  In person visit today.  He is accompanied by his father in the clinic.  Patient is a 27-year-old right handed man with history of epilepsy, Asperger's syndrome and depression who was recently admitted for intracranial monitoring with depth electrodes in May for surgical evaluation for intractable epilepsy.  He had DBS placement at the end of Aug. 2022. DBS was turned on at 0.5 mA. 145hz, 90 micro seconds on 10/3/2022. Since the DBS placement, he is not sure if there are any significant changes of his seizures.  Almost all his seizures are nocturnal. He complains of pain at the surgical sites. He feels that his depression is getting worse during the recent months, especially for the past one month.    Intracranial monitoring was complicated by the development of a right-sided subdural hematoma which is stable with repeat CT head scheduled for tomorrow.  Clinically headaches are improving although he is endorsing scalp discomfort from the stitches which he hopes to have out tomorrow.  He has follow-up scheduled with Dr. Adler.  He is taking approximately 1000 mg acetaminophen twice daily for headache pain.  He describes headache pain as a 5/10 and relieved to about a 4/10 with Tylenol.  He was advised not to use Excedrin or other medications with antiplatelet effect in the setting of subdural hematoma at time of discharge.    Patient was monitored for 10 days with intracranial electrodes during which time he had 4 electroclinical seizures.  All of the seizures occurred out of sleep which are consistent with his semiology.  He was found to have bilateral upper extremity tonic extension elevation at seizure onset which would followed by generalized activity.  Depth electrodes were in unable to fully localize seizure onset and monitoring was terminated after 4 electroclinical seizures as further data was felt to be unnecessary  to determine treatment course.  Dr. Dukes discussed deep brain stimulation (DBS) with patient during hospitalization and today's visit is to further discuss treatment options after intracranial monitoring.    Since discharge from the hospital no known electroclinical seizures, all of his seizures out of sleep and he is not always aware of seizures with that said no postictal confusion appreciated by dad and Amrit has not had any cuts or abrasions in the mouth to suggest a seizure.    Needs to follow-up with Dr. Ben Barros for psychiatric care.  He expressed feeling overwhelmed today by the discussion of epilepsy treatment and surgical options.    Intracranial monitoring 4/25/2022 - 5/5/2022 - 4  seizures were captured, but there was concern that we were not capturing the seizure onset zone and early rapid spread as well as multifocal regions of seizure propagation are possibilities. These suggested that a localized surgical approach would be unlikely to provide seizure freedom. Additionally, it was felt that further seizure recording would not clarify or .     CMC was done in Aug. 2021, consensus was that patient should proceed with invasive monitoring. He should be able to tolerate intracranial monitoring.  The lateralization and localization are not clear based on scalp EEG monitoring. Temporal lobe epilepsy is possible. We will have invasive depth electrodes, 8 symmetric electrodes on each side:     Amygdala: 1  Anterior and posterior hippocampus: 3  Cingulate and prefrontal: 2  Orbital frontal:1  Insula: 1     He saw Dr. Adler on 11/2/2021 for consultation for invasive monitoring. He is scheduled for F-Gator MRI and CTA on Dec 12/3/2021.     For the past several months, he was having about 2 seizures per week, mostly nocturnal seizures, about 80-90%. He is currently taking aptiom 1200 mg qhs,  zonegran 300-400, Lamictal 150 mg bid, Depakote 500 - 1000. He is also taking CBD oil for  anxiety.  Double vision resolved after aptiom was discontinued.     He is complaining of double vision, blurred vision.  He also complains of hand tremor. He had a really bad tremor day about one day ago.     He had VEEG monitoring for presurgical evaluation in March 2021. Total of 9 epileptic seizures were recorded during this admission. These electroclinical seizures showed bifrontal EEG activities at onset, usually with higher amplitude initial activity over the right frontal region compared to the left.  Ictal manifestations variably included tonic stiffening of his upper body, kicking of his legs, vocalizations, and non-purposeful movements of surrounding objects.  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  Aptiom was discontinued at discharge.      No plan for suicide or self injury     Since the hospital discharge, he continue to have frequent seizures, probably several times per week.     He was complaining of vertigo spells, he was seen in the ED.  He was told that he has vestibular problems.       He continue to have small twitches that last for a spit of a second, some of them in clusters. No LOC with these twitches. These likely are not seizures.      Patient tried Peoples diet, but did not continue.     He is seeing Dr. Ben Barros for his depression.  He is also seeing Deo Shipman for therapy. He is complaining of fatigue and sleep over 8-10 hours a day. He is very concerned about the fatigue and is questioning what other options we have for treating his seizures.      They are now open to VEEG monitoring for surgical evaluation, resection or neuromodulations. However, they are still not sure if they want to move forward to the surgical treatment.     Mood improved a lot. No suicidal thoughts, no thoughts of hurting himself.     The patient started to have seizures when he was 16 years old and he was managed at the [facility] for the past few years for his seizures.  He is currently  "taking Aptiom 1200 mg q.h.s. and the Zonegran 300 mg b.i.d. for his epilepsy.  He was recently admitted to the hospital twice because of 2 clusters of seizures, one in January when he was admitted to [facility] and one was in February when he was [place] [facility] [place]  On both occasions, he had 4-5 back to back generalized tonic-clonic seizures within a short period of time.      According to the mother, the patient has 2 different types of seizures.  Type #1 is described as \"mild seizures,\" during which he will have some facial twitching.  He will have repetitive hand/arm jerking movement.  He will become unresponsive for less than a minute.  Postictally, he was having some confusion and headaches and fatigue.  Before the seizures, he will have an aura of \"seeing bees populating the earth with eggs.\"  This type of seizure is relatively infrequent, average once per month.      Type #2 are generalized tonic-clonic seizures which the patient will have generalized convulsions of the body and his body will be stiff.  It usually will last for 1-2 minutes followed by postictal fatigue and hallucinations.  This happens usually once every 3 months, but sometimes it occurs in clusters with 2-5 seizures back to back.      The patient had tried Keppra and Depakote in the past which did not work.  He had EEGs done in the past which were reported negative.      The patient had severe depression and requested a lot of behavioral health and counseling in the past and at certain time, he was suicidal, but that improved.  At the present time, he is not suicidal.  He does not think about hurting himself.     TRIGGERS FOR SEIZURES:  Depression.      RISK FACTORS FOR SEIZURES:  He had no history of head trauma with loss of consciousness.  No history of febrile convulsions.  No history of CNS infections.      PAST AEDs: Keppra, Depakote. Vimpat caused suicidal ideations. Depakote discontinued due to risk of platelet dysfunction during " intracranial monitoring, and patient resumed Keppra.      PAST MEDICAL HISTORY:  Asperger's disorder, depression, epilepsy.      PAST SURGICAL HISTORY:  Depth electrode intracranial placement for epilepsy surgical evaluation      FAMILY HISTORY:  No family history of seizures.  Both parents have hypertension.  Maternal grandmother had heart disease.      SOCIAL HISTORY:  He is single, living with his mother.  He had some college education but did not finish because of depression; he is hoping to return to college soon.  He is currently working at [employer] and plans to return to work soon.  No smoking, no alcohol, no drug abuse.      REVIEW OF SYSTEMS:  Targeted ROS per HPI>      ALLERGIES:  Sertraline and Zyprexa.     PHYSICAL EXAMINATIONS:    Blood pressure 125/81, pulse 79, temperature 98  F (36.7  C), temperature source Temporal, height 6' (182.9 cm), weight 208 lb 6.4 oz (94.5 kg).    General exam: General Appearance: No acute distress. HEENT: Normocephalic, atraumatic. Neck: Supple.  Extremities: No edema.     Neurologic Exam: Alert and oriented x3. Speech fluent, appropriate. Normal attention. Cranial Nerves: Pupils are equal, round, reactive to light and accomodation. Extraocular movement intact. No facial weakness or asymmetry. Hearing normal. Motor Exam: Normal. Coordination:no ataxia.  Gait and Station: normal.      DIAGNOSTIC TESTING: CT scan of the head on 02/27/2018 was reported negative from [facility].  He had EEGs in the past which were reported negative    MRI brain with and without contrast 8/5/2021 [facility] - No supratentorial lesions or significant hippocampal asymmetric/evidence for pathology. Stable cerebellar 7 mm T2 hypertense lesion.     MRI 7 Ernestine 4/22/2022 [facility] -   Impression: Motion degraded exam.  1. No definite temporal lobe abnormality, mass, or congenital lesion  identified as a cause of the patient's seizures.  2. No evidence of abnormal enhancement  intracranially.   3. Previously seen abnormal T2 signal in the right cerebellum is not  identified in today's exam, likely obscured secondary to  susceptibility artifacts in the posterior fossa, a less likely  possibility is that the lesion could have resolved.    IMPRESSION:   1. Epilepsy     He had DBS placement at the end of Aug. 2022.  Since the DBS placement, he did not notice any significant changes of his seizures.  Almost all his seizures are nocturnal. He complains of pain at the surgical sites. He had 2 occasions that he woke up feeling out of breaths during the last month.    2.  Asperger's syndrome   3.  Depression he follows with Dr. Reyes Barros and Deo Shipman for therapy.   4.  Tremors.  Secondary to Depakote or psychological?    PLAN:   1.  Continue Zonegran 300 mg - 400 mg, Lamictal 150 mg twice daily, Keppra 500 mg twice daily  2.  Continue to follow-up with Dr. Adler.  3.  Continue follow-up with Dr. Barros for depression.  Continue therapy.  4.  No restriction to resuming CBD and melatonin  5.  Change DBS at 0.2 mA. 145hz, 90 micro seconds.  6.  [employer] consult.  7.  Follow-up in person in 3 months.      41 min total time was spent on the day of this visit.      30 min was spent on face to face time  6 min was spent on preparation of visit to review charts and labs, ordering medications and tests  5 min was spent on documentation of clinical information    20 min was spent on DBS interrogation and programming.  Setting are at 0.5 mA. 145hz, 90 micro seconds

## 2022-10-31 NOTE — PATIENT INSTRUCTIONS
Times of Days am pm        Medication Tablet Size Number of Tablets/Capsules Total Daily Dosage                 Keppra 500 1 1        1000 mg    Zonegran 100 3 4        700 mg    Lamictal 100 1.5 1.5        300 mg                                                                             Carry this with you at all times.  CONTINUE TAKING YOUR OTHER MEDICATIONS AS PREVIOUSLY DIRECTED.      * * *Do not store medications in the bathroom.  Keep medications away from children!* * *         Appointment with Dr. Adler on 5/17/2022

## 2022-11-17 ENCOUNTER — OFFICE VISIT (OUTPATIENT)
Dept: NEUROLOGY | Facility: CLINIC | Age: 27
End: 2022-11-17
Payer: COMMERCIAL

## 2022-11-17 VITALS
WEIGHT: 211.2 LBS | DIASTOLIC BLOOD PRESSURE: 83 MMHG | BODY MASS INDEX: 28.64 KG/M2 | HEART RATE: 90 BPM | TEMPERATURE: 97.3 F | SYSTOLIC BLOOD PRESSURE: 126 MMHG

## 2022-11-17 DIAGNOSIS — G40.909 RECURRENT SEIZURES (H): Primary | ICD-10-CM

## 2022-11-17 RX ORDER — VILAZODONE HYDROCHLORIDE 10 MG/1
10 TABLET ORAL DAILY
COMMUNITY
Start: 2022-11-02 | End: 2023-02-18

## 2022-11-17 ASSESSMENT — PAIN SCALES - GENERAL: PAINLEVEL: NO PAIN (0)

## 2022-11-17 NOTE — LETTER
2022       RE: Amrit Padilla  : 1995   MRN: [id number]      Dear Colleague,    Thank you for referring your patient, Amrit Padilla, to the [facility] EPILEPSY CARE at [facility]. Please see a copy of my visit note below.    CHIEF COMPLIANT:  Follow-up for epilepsy and DBS placement.     HISTORY OF PRESENT ILLNESS:  In person visit today.  He is accompanied by his father in the clinic.  Patient is a 27-year-old right handed man with history of epilepsy, Asperger's syndrome and depression who was recently admitted for intracranial monitoring with depth electrodes in May for surgical evaluation for intractable epilepsy.  He had DBS placement at the end of Aug. 2022. DBS was turned down at 0.2 mA. 145hz, 90 micro seconds on 10/31/2022. Since the DBS placement, he is not sure if there are any significant changes of his seizures.  His father had not seen any post-ictal stages since the DBS placement. Almost all his seizures are nocturnal. He complains of pain at the surgical sites. He feels that his depression is getting better since the DBS setting to 0.2 mA. She also made changes with his anti-depressant for the past few weeks.    Intracranial monitoring was complicated by the development of a right-sided subdural hematoma which is stable with repeat CT head scheduled for tomorrow.  Clinically headaches are improving although he is endorsing scalp discomfort from the stitches which he hopes to have out tomorrow.  He has follow-up scheduled with Dr. Adler.  He is taking approximately 1000 mg acetaminophen twice daily for headache pain.  He describes headache pain as a 5/10 and relieved to about a 4/10 with Tylenol.  He was advised not to use Excedrin or other medications with antiplatelet effect in the setting of subdural hematoma at time of discharge.    Patient was monitored for 10 days with intracranial electrodes during which time he had 4  electroclinical seizures.  All of the seizures occurred out of sleep which are consistent with his semiology.  He was found to have bilateral upper extremity tonic extension elevation at seizure onset which would followed by generalized activity.  Depth electrodes were in unable to fully localize seizure onset and monitoring was terminated after 4 electroclinical seizures as further data was felt to be unnecessary to determine treatment course.  Dr. Dukes discussed deep brain stimulation (DBS) with patient during hospitalization and today's visit is to further discuss treatment options after intracranial monitoring.    Since discharge from the hospital no known electroclinical seizures, all of his seizures out of sleep and he is not always aware of seizures with that said no postictal confusion appreciated by dad and Amrit has not had any cuts or abrasions in the mouth to suggest a seizure.    Needs to follow-up with Dr. Ben Barros for psychiatric care.  He expressed feeling overwhelmed today by the discussion of epilepsy treatment and surgical options.    Intracranial monitoring 4/25/2022 - 5/5/2022 - 4  seizures were captured, but there was concern that we were not capturing the seizure onset zone and early rapid spread as well as multifocal regions of seizure propagation are possibilities. These suggested that a localized surgical approach would be unlikely to provide seizure freedom. Additionally, it was felt that further seizure recording would not clarify or .     CMC was done in Aug. 2021, consensus was that patient should proceed with invasive monitoring. He should be able to tolerate intracranial monitoring.  The lateralization and localization are not clear based on scalp EEG monitoring. Temporal lobe epilepsy is possible. We will have invasive depth electrodes, 8 symmetric electrodes on each side:     Amygdala: 1  Anterior and posterior hippocampus: 3  Cingulate and prefrontal:  2  Orbital frontal:1  Insula: 1     He saw Dr. Adler on 11/2/2021 for consultation for invasive monitoring. He is scheduled for F-Gator MRI and CTA on Dec 12/3/2021.     For the past several months, he was having about 2 seizures per week, mostly nocturnal seizures, about 80-90%. He is currently taking aptiom 1200 mg qhs,  zonegran 300-400, Lamictal 150 mg bid, Depakote 500 - 1000. He is also taking CBD oil for anxiety.  Double vision resolved after aptiom was discontinued.     He is complaining of double vision, blurred vision.  He also complains of hand tremor. He had a really bad tremor day about one day ago.     He had VEEG monitoring for presurgical evaluation in March 2021. Total of 9 epileptic seizures were recorded during this admission. These electroclinical seizures showed bifrontal EEG activities at onset, usually with higher amplitude initial activity over the right frontal region compared to the left.  Ictal manifestations variably included tonic stiffening of his upper body, kicking of his legs, vocalizations, and non-purposeful movements of surrounding objects.  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  Aptiom was discontinued at discharge.      No plan for suicide or self injury     Since the hospital discharge, he continue to have frequent seizures, probably several times per week.     He was complaining of vertigo spells, he was seen in the ED.  He was told that he has vestibular problems.       He continue to have small twitches that last for a spit of a second, some of them in clusters. No LOC with these twitches. These likely are not seizures.      Patient tried Peoples diet, but did not continue.     He is seeing Dr. Ben Barros for his depression.  He is also seeing Deo Shipman for therapy. He is complaining of fatigue and sleep over 8-10 hours a day. He is very concerned about the fatigue and is questioning what other options we have for treating his seizures.      They are  "now open to VEEG monitoring for surgical evaluation, resection or neuromodulations. However, they are still not sure if they want to move forward to the surgical treatment.     Mood improved a lot. No suicidal thoughts, no thoughts of hurting himself.     The patient started to have seizures when he was 16 years old and he was managed at the [facility] for the past few years for his seizures.  He is currently taking Aptiom 1200 mg q.h.s. and the Zonegran 300 mg b.i.d. for his epilepsy.  He was recently admitted to the hospital twice because of 2 clusters of seizures, one in January when he was admitted to [facility] and one was in February when he was admitted to [facility].  On both occasions, he had 4-5 back to back generalized tonic-clonic seizures within a short period of time.      According to the mother, the patient has 2 different types of seizures.  Type #1 is described as \"mild seizures,\" during which he will have some facial twitching.  He will have repetitive hand/arm jerking movement.  He will become unresponsive for less than a minute.  Postictally, he was having some confusion and headaches and fatigue.  Before the seizures, he will have an aura of \"seeing bees populating the earth with eggs.\"  This type of seizure is relatively infrequent, average once per month.      Type #2 are generalized tonic-clonic seizures which the patient will have generalized convulsions of the body and his body will be stiff.  It usually will last for 1-2 minutes followed by postictal fatigue and hallucinations.  This happens usually once every 3 months, but sometimes it occurs in clusters with 2-5 seizures back to back.      The patient had tried Keppra and Depakote in the past which did not work.  He had EEGs done in the past which were reported negative.      The patient had severe depression and requested a lot of behavioral health and counseling in the past and at certain time, he was suicidal, but that improved.  At the " present time, he is not suicidal.  He does not think about hurting himself.     TRIGGERS FOR SEIZURES:  Depression.      RISK FACTORS FOR SEIZURES:  He had no history of head trauma with loss of consciousness.  No history of febrile convulsions.  No history of CNS infections.      PAST AEDs: Keppra, Depakote. Vimpat caused suicidal ideations. Depakote discontinued due to risk of platelet dysfunction during intracranial monitoring, and patient resumed Keppra.      PAST MEDICAL HISTORY:  Asperger's disorder, depression, epilepsy.      PAST SURGICAL HISTORY:  Depth electrode intracranial placement for epilepsy surgical evaluation      FAMILY HISTORY:  No family history of seizures.  Both parents have hypertension.  Maternal grandmother had heart disease.      SOCIAL HISTORY:  He is single, living with his mother.  He had some college education but did not finish because of depression; he is hoping to return to college soon.  He is currently working at [employer] and plans to return to work soon.  No smoking, no alcohol, no drug abuse.      REVIEW OF SYSTEMS:  Targeted ROS per HPI>      ALLERGIES:  Sertraline and Zyprexa.     PHYSICAL EXAMINATIONS:    Blood pressure 126/83, pulse 90, temperature 97.3  F (36.3  C), temperature source Temporal, weight 211 lb 3.2 oz (95.8 kg).    General exam: General Appearance: No acute distress. HEENT: Normocephalic, atraumatic. Neck: Supple.  Extremities: No edema.     Neurologic Exam: Alert and oriented x3. Speech fluent, appropriate. Normal attention. Cranial Nerves: Pupils are equal, round, reactive to light and accomodation. Extraocular movement intact. No facial weakness or asymmetry. Hearing normal. Motor Exam: Normal. Coordination:no ataxia.  Gait and Station: normal.      DIAGNOSTIC TESTING: CT scan of the head on 02/27/2018 was reported negative from [facility] [facility].  He had EEGs in the past which were reported negative    MRI brain with and without contrast 8/5/2021 [facility]  - No supratentorial lesions or significant hippocampal asymmetric/evidence for pathology. Stable cerebellar 7 mm T2 hypertense lesion.     MRI 7 Ernestine 4/22/2022 [facility] -   Impression: Motion degraded exam.  1. No definite temporal lobe abnormality, mass, or congenital lesion  identified as a cause of the patient's seizures.  2. No evidence of abnormal enhancement intracranially.   3. Previously seen abnormal T2 signal in the right cerebellum is not  identified in today's exam, likely obscured secondary to  susceptibility artifacts in the posterior fossa, a less likely  possibility is that the lesion could have resolved.    IMPRESSION:   1. Epilepsy     He had DBS placement at the end of Aug. 2022.  Since the DBS placement, he did not notice any significant changes of his seizures.  Almost all his seizures are nocturnal. He complains of pain at the surgical sites. His father had not seen any post-ictal stages since the DBS placement. He usually saw post-ictal stages once a month before DBS.    After DBS was turned down since the last visit, his mood improved.    2.  Asperger's syndrome   3.  Depression he follows with Dr. Reyes Barros and Deo Shipman for therapy.   4.  Tremors.  Secondary to Depakote or psychological?    PLAN:   1.  Continue Zonegran 300 mg - 400 mg, Lamictal 150 mg twice daily, Keppra 500 mg twice daily  2.  Continue to follow-up with Dr. Adler.  3.  Continue follow-up with Dr. Barros for depression.  Continue therapy.  4.  No restriction to resuming CBD and melatonin  5.  Continue DBS at 0.2 mA. 145hz, 90 micro seconds.  6.  [employer] consult scheduled on 12/8 with Dr. Esparza.  7.  Follow-up in person in 3 months.  Patient is wondering when he can start to decrease his AEDs. I advised that we need to wait at least one year before we will consider decreasing AEDs.      31 min total time was spent on the day of this visit.      20 min was spent on face to face time  6 min was spent  on preparation of visit to review charts and labs, ordering medications and tests  5 min was spent on documentation of clinical information    15 min was spent on DBS interrogation.  Setting are at 0.2 mA. 145hz, 90 micro seconds      Again, thank you for allowing me to participate in the care of your patient.      Sincerely,    Norman Dukes MD

## 2022-11-17 NOTE — LETTER
Date:November 18, 2022      Patient was self referred, no letter generated. Do not send.        [facility] Health Information

## 2022-11-17 NOTE — PROGRESS NOTES
CHIEF COMPLIANT:  Follow-up for epilepsy and DBS placement.     HISTORY OF PRESENT ILLNESS:  In person visit today.  He is accompanied by his father in the clinic.  Patient is a 27-year-old right handed man with history of epilepsy, Asperger's syndrome and depression who was recently admitted for intracranial monitoring with depth electrodes in May for surgical evaluation for intractable epilepsy.  He had DBS placement at the end of Aug. 2022. DBS was turned down at 0.2 mA. 145hz, 90 micro seconds on 10/31/2022. Since the DBS placement, he is not sure if there are any significant changes of his seizures.  His father had not seen any post-ictal stages since the DBS placement. Almost all his seizures are nocturnal. He complains of pain at the surgical sites. He feels that his depression is getting better since the DBS setting to 0.2 mA. She also made changes with his anti-depressant for the past few weeks.    Intracranial monitoring was complicated by the development of a right-sided subdural hematoma which is stable with repeat CT head scheduled for tomorrow.  Clinically headaches are improving although he is endorsing scalp discomfort from the stitches which he hopes to have out tomorrow.  He has follow-up scheduled with Dr. Adler.  He is taking approximately 1000 mg acetaminophen twice daily for headache pain.  He describes headache pain as a 5/10 and relieved to about a 4/10 with Tylenol.  He was advised not to use Excedrin or other medications with antiplatelet effect in the setting of subdural hematoma at time of discharge.    Patient was monitored for 10 days with intracranial electrodes during which time he had 4 electroclinical seizures.  All of the seizures occurred out of sleep which are consistent with his semiology.  He was found to have bilateral upper extremity tonic extension elevation at seizure onset which would followed by generalized activity.  Depth electrodes were in unable to fully localize  seizure onset and monitoring was terminated after 4 electroclinical seizures as further data was felt to be unnecessary to determine treatment course.  Dr. Dukes discussed deep brain stimulation (DBS) with patient during hospitalization and today's visit is to further discuss treatment options after intracranial monitoring.    Since discharge from the hospital no known electroclinical seizures, all of his seizures out of sleep and he is not always aware of seizures with that said no postictal confusion appreciated by dad and Amrit has not had any cuts or abrasions in the mouth to suggest a seizure.    Needs to follow-up with Dr. Ben Barros for psychiatric care.  He expressed feeling overwhelmed today by the discussion of epilepsy treatment and surgical options.    Intracranial monitoring 4/25/2022 - 5/5/2022 - 4  seizures were captured, but there was concern that we were not capturing the seizure onset zone and early rapid spread as well as multifocal regions of seizure propagation are possibilities. These suggested that a localized surgical approach would be unlikely to provide seizure freedom. Additionally, it was felt that further seizure recording would not clarify or .     CMC was done in Aug. 2021, consensus was that patient should proceed with invasive monitoring. He should be able to tolerate intracranial monitoring.  The lateralization and localization are not clear based on scalp EEG monitoring. Temporal lobe epilepsy is possible. We will have invasive depth electrodes, 8 symmetric electrodes on each side:     Amygdala: 1  Anterior and posterior hippocampus: 3  Cingulate and prefrontal: 2  Orbital frontal:1  Insula: 1     He saw Dr. Adler on 11/2/2021 for consultation for invasive monitoring. He is scheduled for F-Gator MRI and CTA on Dec 12/3/2021.     For the past several months, he was having about 2 seizures per week, mostly nocturnal seizures, about 80-90%. He is currently taking  aptiom 1200 mg qhs,  zonegran 300-400, Lamictal 150 mg bid, Depakote 500 - 1000. He is also taking CBD oil for anxiety.  Double vision resolved after aptiom was discontinued.     He is complaining of double vision, blurred vision.  He also complains of hand tremor. He had a really bad tremor day about one day ago.     He had VEEG monitoring for presurgical evaluation in March 2021. Total of 9 epileptic seizures were recorded during this admission. These electroclinical seizures showed bifrontal EEG activities at onset, usually with higher amplitude initial activity over the right frontal region compared to the left.  Ictal manifestations variably included tonic stiffening of his upper body, kicking of his legs, vocalizations, and non-purposeful movements of surrounding objects.  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  Aptiom was discontinued at discharge.      No plan for suicide or self injury     Since the hospital discharge, he continue to have frequent seizures, probably several times per week.     He was complaining of vertigo spells, he was seen in the ED.  He was told that he has vestibular problems.       He continue to have small twitches that last for a spit of a second, some of them in clusters. No LOC with these twitches. These likely are not seizures.      Patient tried Peoples diet, but did not continue.     He is seeing Dr. Ben Barros for his depression.  He is also seeing Deo Shipman for therapy. He is complaining of fatigue and sleep over 8-10 hours a day. He is very concerned about the fatigue and is questioning what other options we have for treating his seizures.      They are now open to VEEG monitoring for surgical evaluation, resection or neuromodulations. However, they are still not sure if they want to move forward to the surgical treatment.     Mood improved a lot. No suicidal thoughts, no thoughts of hurting himself.     The patient started to have seizures when he was  "16 years old and he was managed at the [facility] for the past few years for his seizures.  He is currently taking Aptiom 1200 mg q.h.s. and the Zonegran 300 mg b.i.d. for his epilepsy.  He was recently admitted to the hospital twice because of 2 clusters of seizures, one in January when he was admitted to [facility] and one was in February when he was admitted to [facility].  On both occasions, he had 4-5 back to back generalized tonic-clonic seizures within a short period of time.      According to the mother, the patient has 2 different types of seizures.  Type #1 is described as \"mild seizures,\" during which he will have some facial twitching.  He will have repetitive hand/arm jerking movement.  He will become unresponsive for less than a minute.  Postictally, he was having some confusion and headaches and fatigue.  Before the seizures, he will have an aura of \"seeing bees populating the earth with eggs.\"  This type of seizure is relatively infrequent, average once per month.      Type #2 are generalized tonic-clonic seizures which the patient will have generalized convulsions of the body and his body will be stiff.  It usually will last for 1-2 minutes followed by postictal fatigue and hallucinations.  This happens usually once every 3 months, but sometimes it occurs in clusters with 2-5 seizures back to back.      The patient had tried Keppra and Depakote in the past which did not work.  He had EEGs done in the past which were reported negative.      The patient had severe depression and requested a lot of behavioral health and counseling in the past and at certain time, he was suicidal, but that improved.  At the present time, he is not suicidal.  He does not think about hurting himself.     TRIGGERS FOR SEIZURES:  Depression.      RISK FACTORS FOR SEIZURES:  He had no history of head trauma with loss of consciousness.  No history of febrile convulsions.  No history of CNS infections.      PAST AEDs: Keppra, " Depakote. Vimpat caused suicidal ideations. Depakote discontinued due to risk of platelet dysfunction during intracranial monitoring, and patient resumed Keppra.      PAST MEDICAL HISTORY:  Asperger's disorder, depression, epilepsy.      PAST SURGICAL HISTORY:  Depth electrode intracranial placement for epilepsy surgical evaluation      FAMILY HISTORY:  No family history of seizures.  Both parents have hypertension.  Maternal grandmother had heart disease.      SOCIAL HISTORY:  He is single, living with his mother.  He had some college education but did not finish because of depression; he is hoping to return to college soon.  He is currently working at [employer] and plans to return to work soon.  No smoking, no alcohol, no drug abuse.      REVIEW OF SYSTEMS:  Targeted ROS per HPI>      ALLERGIES:  Sertraline and Zyprexa.     PHYSICAL EXAMINATIONS:    Blood pressure 126/83, pulse 90, temperature 97.3  F (36.3  C), temperature source Temporal, weight 211 lb 3.2 oz (95.8 kg).    General exam: General Appearance: No acute distress. HEENT: Normocephalic, atraumatic. Neck: Supple.  Extremities: No edema.     Neurologic Exam: Alert and oriented x3. Speech fluent, appropriate. Normal attention. Cranial Nerves: Pupils are equal, round, reactive to light and accomodation. Extraocular movement intact. No facial weakness or asymmetry. Hearing normal. Motor Exam: Normal. Coordination:no ataxia.  Gait and Station: normal.      DIAGNOSTIC TESTING: CT scan of the head on 02/27/2018 was reported negative from [facility].  He had EEGs in the past which were reported negative    MRI brain with and without contrast 8/5/2021 [facility] - No supratentorial lesions or significant hippocampal asymmetric/evidence for pathology. Stable cerebellar 7 mm T2 hypertense lesion.     MRI 7 Ernestine 4/22/2022 [facility] -   Impression: Motion degraded exam.  1. No definite temporal lobe abnormality, mass, or congenital  lesion  identified as a cause of the patient's seizures.  2. No evidence of abnormal enhancement intracranially.   3. Previously seen abnormal T2 signal in the right cerebellum is not  identified in today's exam, likely obscured secondary to  susceptibility artifacts in the posterior fossa, a less likely  possibility is that the lesion could have resolved.    IMPRESSION:   1. Epilepsy     He had DBS placement at the end of Aug. 2022.  Since the DBS placement, he did not notice any significant changes of his seizures.  Almost all his seizures are nocturnal. He complains of pain at the surgical sites. His father had not seen any post-ictal stages since the DBS placement. He usually saw post-ictal stages once a month before DBS.    After DBS was turned down since the last visit, his mood improved.    2.  Asperger's syndrome   3.  Depression he follows with Dr. Reyes Barros and Deo Shipman for therapy.   4.  Tremors.  Secondary to Depakote or psychological?    PLAN:   1.  Continue Zonegran 300 mg - 400 mg, Lamictal 150 mg twice daily, Keppra 500 mg twice daily  2.  Continue to follow-up with Dr. Adler.  3.  Continue follow-up with Dr. Barros for depression.  Continue therapy.  4.  No restriction to resuming CBD and melatonin  5.  Continue DBS at 0.2 mA. 145hz, 90 micro seconds.  6.  [employer] consult scheduled on 12/8 with Dr. Esparza.  7.  Follow-up in person in 3 months.  Patient is wondering when he can start to decrease his AEDs. I advised that we need to wait at least one year before we will consider decreasing AEDs.      31 min total time was spent on the day of this visit.      20 min was spent on face to face time  6 min was spent on preparation of visit to review charts and labs, ordering medications and tests  5 min was spent on documentation of clinical information    15 min was spent on DBS interrogation.  Setting are at 0.2 mA. 145hz, 90 micro seconds

## 2022-11-29 DIAGNOSIS — G40.909 RECURRENT SEIZURES (H): ICD-10-CM

## 2022-11-30 DIAGNOSIS — G40.909 RECURRENT SEIZURES (H): ICD-10-CM

## 2022-11-30 RX ORDER — LEVETIRACETAM 500 MG/1
500 TABLET ORAL 2 TIMES DAILY
Qty: 180 TABLET | Refills: 0 | OUTPATIENT
Start: 2022-11-30

## 2022-11-30 RX ORDER — LEVETIRACETAM 500 MG/1
500 TABLET ORAL 2 TIMES DAILY
Qty: 180 TABLET | Refills: 0 | Status: SHIPPED | OUTPATIENT
Start: 2022-11-30 | End: 2023-02-21

## 2022-11-30 NOTE — TELEPHONE ENCOUNTER
Amrit Padilla states that he is just about out of medication and is asking that it be sent to     [facility]/pharmacy #[id number] - [place], [place] - [address] AT [place] [address]  [address] [address]  Phone: [phone] Fax: [phone]

## 2022-12-01 ASSESSMENT — PATIENT HEALTH QUESTIONNAIRE - PHQ9
10. IF YOU CHECKED OFF ANY PROBLEMS, HOW DIFFICULT HAVE THESE PROBLEMS MADE IT FOR YOU TO DO YOUR WORK, TAKE CARE OF THINGS AT HOME, OR GET ALONG WITH OTHER PEOPLE: SOMEWHAT DIFFICULT
SUM OF ALL RESPONSES TO PHQ QUESTIONS 1-9: 18
SUM OF ALL RESPONSES TO PHQ QUESTIONS 1-9: 18

## 2022-12-05 RX ORDER — LAMOTRIGINE 150 MG/1
150 TABLET ORAL 2 TIMES DAILY
Qty: 180 TABLET | Refills: 3 | Status: SHIPPED | OUTPATIENT
Start: 2022-12-05 | End: 2023-11-29

## 2022-12-08 ENCOUNTER — VIRTUAL VISIT (OUTPATIENT)
Dept: NEUROLOGY | Facility: CLINIC | Age: 27
End: 2022-12-08
Payer: COMMERCIAL

## 2022-12-08 DIAGNOSIS — G40.909 RECURRENT SEIZURES (H): Primary | ICD-10-CM

## 2022-12-08 RX ORDER — VILAZODONE HYDROCHLORIDE 10 MG/1
TABLET ORAL
COMMUNITY
Start: 2022-11-07 | End: 2023-02-18

## 2022-12-08 RX ORDER — FLUOXETINE 40 MG/1
CAPSULE ORAL
COMMUNITY
Start: 2022-11-07 | End: 2023-02-18

## 2022-12-08 NOTE — LETTER
2022       RE: Amrit Padilla  : 1995   MRN: [id number]      Dear Colleague,    Thank you for referring your patient, Amrit Padilla, to the [facility] EPILEPSY CARE at [facility]. Please see a copy of my visit note below.    Sergio is a 27 year old who is being evaluated via a billable video visit.      How would you like to obtain your AVS? MyChart  If the video visit is dropped, the invitation should be resent by: Send to e-mail at: [email or web address]  Will anyone else be joining your video visit? Yes: Racquel's mom might. How would they like to receive their invitation? Send to e-mail at: [employer]       JAGRUTI Anthony    Depression Response    Patient completed the PHQ-9 assessment for depression and scored >9? Yes  Question 9 on the PHQ-9 was positive for suicidality? Yes  Does patient have current mental health provider? Yes    Is this a virtual visit? Yes   Does patient have suicidal ideation (positive question 9)? No - offer to place Mental Health Referral.  Patient declined referral/not needed    I personally notified the following: visit provider     Please be aware this was an Epilepsy MTM and patient has [facility] for his psychiatric care.               Video-Visit Details    Video Start Time: 1000 am    Type of service:  Video Visit    Video End Time:11:20 am    Originating Location (pt. Location): Home        Distant Location (provider location):  Off-site    Platform used for Video Visit: Premise    Service Date: 2022    VIDEO VISIT     M PHYSICIAN MEDICATION THERAPY FOR EPILEPSY     This was a video visit from the patient's home to my home via OLED-T due to COVID.  In case we would get disconnected, we would use the patient's e-mail Cent[employer] [email or web address] or [email or web address] [phone].    Starting time 10 a.m.  Ending time 11:20 a.m.    DICTATION IDENTIFIER:  NAME:  Amrit Padilla  :    VIDEO VISIT:   "12/08/2022    IDENTIFYING INFORMATION AND INTRODUCTION:  Sergio is a 27-year-old male seen on a video visit today for an epilepsy MTM consultation.    The patient lives in [place], [place], and the patient is a new adult to the Epilepsy MTM program.  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    Epileptologist is Dr. Norman Dukes, and he will see the patient on 02/06/2023 in person, and this was communicated to the patient.  Please be aware this patient is in the DBS programming.    CHIEF COMPLAINTS:  Possibly seizures.  The patient is waking up with heavy breathing and feeling \"out of it.\"  It is not a proven seizure, and he has those once every other week.  The patient also has headaches as well as severe increase in depression and some weight gain.    REASON FOR CONSULTATION:  Assessment of:  a.  AED options.   b.  Medication history.   c.  Pharmacotherapy plan.  d.  Compliance assessment.  e.  Drug interaction assessment.    Informants include the patient, the patient's mom and dad.    Adequate medical records are available going back to 2004, but did not have all important medical records at time of visit.    Time spent was 2 hours without the patient and 1 hour 20 minutes or 80 minutes with the patient.    MEDICATION INFORMATION:    1.  Current Anti-Seizure Medications:    a. Lamictal/lamotrigine 150 mg tablets as a total of 300 mg per day as 150 b.i.d.  b.  Levetiracetam 500 mg tablets 1000 mg a day divided as 500 mg b.i.d.  c.  Zonisamide 100 mg capsule 700 mg per day divided as 300 mg a.m. and 400 mg p.m.    2.  Rescue Drugs: Yes.    a.  Lorazepam Intensol:  The patient has not used it for the last 3 years.      3.  Concomitant Medications:    a.  Tylenol 500 mg every 6 hours.  The patient uses it approximately every 3 days since DBS was implanted.  b.  Vilazodone 20 mg per day was started.  This increase started 2 days ago.    c.  Fluoxetine was decreased to 40 mg every a.m. The patient is " currently tapered down from fluoxetine 80 mg per day and increase in vilazodone.  d.  Propranolol 10 mg p.r.n. for anxiety.  Before work, he will take that.  e.  CBD hemp oil extract 15 mg at bedtime to relax the patient.  f.  Cetirizine/Zyrtec 10 mg daily is not used in the wintertime, but more in the spring when everything is blooming.  g.  Melatonin 10 mg at bedtime.  h.  Benadryl/diphenhydramine 25 mg daily for extreme itching.      4. Drug/Drug Interactions:    A. Fluoxetine and vilazodone may result in increased risk of serotonin syndrome, but the patient is currently adjusting doses, Prozac is tapered down, and vilazodone is tapered up.  So far, no signs of it.      5.  Current Reported Side Effects:  Yes.    a.  Headache.   b.  Tiredness.   c.  Increase in depression  d.  Some weight gain.    6.  ALLERGIES:    a.  Sertraline caused mood swings and did not help with depression.  b.  Hydroxyzine:  The patient while on hydroxyzine had a seizure on 04/23/2018.    c.  The patient also had a seizure while on Zyprexa/olanzapine.    d.  The patient has from house dust mites, and American elm some cough or respiratory distress.    PAST MEDICAL HISTORY:    1.  Focal epilepsy started at age 16.  2.  History of ADHD.  3.  Major depression.  4.  Tic disorder.  5.  Asperger syndrome.  6.  History of migraines.  7.  Urinary hesitancy.  8.  Allergic rhinitis.  9.  Vestibular issues with episodic imbalance.  10.  Status post DBS placement  for seizures.    PSYCHIATRIC/PSYCHOLOGICAL:  Yes, the patient is seen by [facility].     Drugs tried:   a.  Concerta 36 mg.  b.  Strattera 40 mg.   c.  Zyprexa: Seizure.   d.  Sertraline did not help with mood swings.    e.  Fluoxetine:  max dose up to 80 mg per day.    f.  Hydroxyzine 75 mg in 2017 caused seizures.  g. Melatonin 5 mg for sleep is used.  h.  Vilazodone was started and increased to 20 mg currently.    SEIZURE HISTORY:    1.  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      2.  Seizure types/frequency:  a.  Auras:  \"Seeing bees populating the earth with eggs.\"  He has that currently once per month.  b.  Facial twitching with repetitive hand and arm jerking with loss of consciousness less than 1 minute.    c.  GTCs: None since his last clinic visit.      3.  There have been emergency room visit for epilepsy in the past 12 months.  There were hospitalization for epilepsy in the past 12 months.    4.  Seizure onset occurred at age 16. Treatment started at the same time    5.  Status epilepticus history is negative.    6.  With regard to seizure reporting, the patient is reliable.  He has a seizure calendar.    7.  Precipitating factors include:   a.  Stress before he had the implantation of DBS.    SOCIAL AND ENVIRONMENTAL ASPECTS:  The patient lives with his parents and his sister and his dog.    The patient is not driving.    REVIEW OF SYSTEMS:    GASTROINTESTINAL:  Nothing was mentioned.    PHARMACOTHERAPY INDICATORS:  A.  Pharmaceutical Aspects    1.  Economic assessment:  The patient has [employer] through his parents.  There is no co-payment.  The cost of obtaining prescribed medications does not interfere with compliance.    2.  Pharmacy:  [facility] at [place] [id number].      The patient is happy with the services there.    3.  Compliance:  The patient is independent in medication administration.  He does not use a pillbox.  He misses medication rarely, once per month.  The patient is capable of making medication adjustments.  Parents are available to assist the patient.      B.  Pharmacokinetic Aspects.  1.  Habits and Chemical Use  a.  Alcohol:  Never.  b.  Tobacco:  Never.  c.  Caffeine:  Chocolate.  d.  Recreational drugs:  CBD hemp oil, otherwise nothing.      2.  Demographic characteristics:  The patient was a video visit, so I do not have anything from today.    REVIEW OF ANTI-SEIZURE MEDICATIONS TRIED:    a.  " Aptiom/eslicarbazepine: From 5185-6660.  Max dose 1200 mg per day.  It looks like it did not improve seizure frequency, but I did not see any severe side effects during that time.    b.  Depakote/divalproex sodium: From 2142-3992 before surgery.  Max dose 1500 mg per day.  CP max 75 mg/L/5.9 mg/L.  Used in combination with ZONISAMIDE ,Aptiom / eslicarbazepine , LAMICTAL- LAMOTRIGINE  and Melatonin.   Side effects:  Hand tremors.    Depakote could actually be retried. If we need something for mood stabilization or for headaches, maybe we could retry it.  It is not contraindicated now after surgery, if it is not contraindicated for DBS.    c.  Keppra/levetiracetam:  The patient had 2 trials. The first trial was in 2015 max dose 1500 mg/d, and he was very ornery and mraquez, and the drug was discontinued because the patient also needed melatonin because of insomnia.  Now it was retried 2022 before surgery of DBS, and if the patient's depression is not improving, see if another anti-seizure medication could be tried and if the depression would improve.     d.  Lamictal/lamotrigine: From 06/2018 to present.  Max dose 400 mg per day.  CP max 11.6 mg/L.  Lamictal is a great drug to keep someone alert.  It is also a good drug for mood stabilization.  It can cause headaches, but I do not think headaches is the result of the lamotrigine.  Recommend to continue with the lamotrigine. So far, we have not found a better medication.    e.  Vimpat/lacosamide was tried in 10/2019.  I have not the dose, but I have that it was tried, and the patient's suicidal thoughts and suicidal ideation went up, and the drug was discontinued.    f.  Zonegran/zonisamide was tried from 2015 to present.  Max dose 700 mg per day with a CP max of 23 mg/L. So far, zonisamide is more or less efficacious without causing too many side effects.  Please make sure that the patient has enough water intake ( 7 glasses of H2O) to minimize  possibility of kidney  stones.    g.  Lorazepam Intensol: The patient has Lorazepam Intensol, but it was not used for the last 3 years according to patient.  Recommend: He always should have something at home for emergency purposes.      RECOMMENDATIONS:  1.  The patient currently complains about waking up at night with heavy breathing and feeling out of it.  We do not know if those are seizures ?  He has them once every other week on an average.    2.  The other issue the patient has a problem with headaches, and those are daily.  3.  A lot of problems are currently increase in depression, and the patient is on a crossover from Prozac 80-40 mg/d and vilazodone increased from 10-20 mg/d.    PLAN:    1.  Headaches.    a.  I recommended that the patient make a list of which foods he thinks he gets an increase in headache, like chocolate, yellow cheese.   b.  We could try gabapentin 100 mg at bedtime to see if it helps with pain, anxiety and sleep, and maybe if these are seizures at night, it should not aggravate those seizures.    2.  Depression is currently a severe issue.  The patient is on a crossover from Prozac to 20 mg vilazodone. If depression is not improving, then maybe we have to consider a Keppra taper, and we could retry maybe Depakote or Aptiom.  If Depakote is retried, it would also help with the headache, but weight gain could be an issue, and lamotrigine dose would have to be decreased. On the other hand, if Aptiom is retried, Lamictal might need increase in dose.  That is for retry options.      3.  For new anti-seizure drug options, all other anti-seizure drugs can cause severe sedation, can be causing mood issues and would have to be tried drug by drug and side effect profile, what would be a good choice.  It does not look like medications like Dilantin or phenobarbital are good choices and all the other drugs like Fycompa, which can cause severe psychiatric issues and severe sedation .    4.  GeneSight testing might be  an idea to see how the patient is metabolizing antidepressant medications.    5.  For more specific anti-seizure medication, let us wait how the patient is adjusting with the depression,.    Thanks for the opportunity to participate in the care of this patient.    Korina Esparza, PharmD  Pharmaceutical Care Coordinator    Korina Esparza, MatildeD        D: 2023   T: 2023   MT: caitlin    Name:     BRIDGET KNIGHTAndrew  MRN:      [id number]        Account:      [id number]   :      1995           Service Date: 2022       Document: [id number]

## 2022-12-08 NOTE — PROGRESS NOTES
Sergio is a 27 year old who is being evaluated via a billable video visit.      How would you like to obtain your AVS? MyChart  If the video visit is dropped, the invitation should be resent by: Send to e-mail at: [email or web address]  Will anyone else be joining your video visit? Yes: Emmapt's mom might. How would they like to receive their invitation? Send to e-mail at: [email or web address]       Joellen Melendez, JAGRUTI    Depression Response    Patient completed the PHQ-9 assessment for depression and scored >9? Yes  Question 9 on the PHQ-9 was positive for suicidality? Yes  Does patient have current mental health provider? Yes    Is this a virtual visit? Yes   Does patient have suicidal ideation (positive question 9)? No - offer to place Mental Health Referral.  Patient declined referral/not needed    I personally notified the following: visit provider     Please be aware this was an Epilepsy MTM and patient has [facility] for his psychiatric care.               Video-Visit Details    Video Start Time: 1000 am    Type of service:  Video Visit    Video End Time:11:20 am    Originating Location (pt. Location): Home        Distant Location (provider location):  Off-site    Platform used for Video Visit: [employer]

## 2022-12-08 NOTE — NURSING NOTE
Patient also takes Benadryl as needed.      Patient scored 18 on PHQ-9, #9 2=more than half the days.    Joellen Melendez, VF

## 2023-01-03 NOTE — PROGRESS NOTES
"Service Date: 2022    VIDEO VISIT     M PHYSICIAN MEDICATION THERAPY FOR EPILEPSY     This was a video visit from the patient's home to my home via [employer] due to COVID.  In case we would get disconnected, we would use the patient's e-mail [email or web address] or [email or web address] [phone].    Starting time 10 a.m.  Ending time 11:20 a.m.    DICTATION IDENTIFIER:  NAME:  Amrit Padilla  :    VIDEO VISIT:  2022    IDENTIFYING INFORMATION AND INTRODUCTION:  Sergio is a 27-year-old male seen on a video visit today for an epilepsy MTM consultation.    The patient lives in [place], [place], and the patient is a new adult to the Epilepsy MTM program.  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    Epileptologist is Dr. Norman Dukes, and he will see the patient on 2023 in person, and this was communicated to the patient.  Please be aware this patient is in the DBS programming.    CHIEF COMPLAINTS:  Possibly seizures.  The patient is waking up with heavy breathing and feeling \"out of it.\"  It is not a proven seizure, and he has those once every other week.  The patient also has headaches as well as severe increase in depression and some weight gain.    REASON FOR CONSULTATION:  Assessment of:  a.  AED options.   b.  Medication history.   c.  Pharmacotherapy plan.  d.  Compliance assessment.  e.  Drug interaction assessment.    Informants include the patient, the patient's mom and dad.    Adequate medical records are available going back to , but did not have all important medical records at time of visit.    Time spent was 2 hours without the patient and 1 hour 20 minutes or 80 minutes with the patient.    MEDICATION INFORMATION:    1.  Current Anti-Seizure Medications:    a. Lamictal/lamotrigine 150 mg tablets as a total of 300 mg per day as 150 b.i.d.  b.  Levetiracetam 500 mg tablets 1000 mg a day divided as 500 mg b.i.d.  c.  Zonisamide 100 mg capsule 700 mg per day divided as " 300 mg a.m. and 400 mg p.m.    2.  Rescue Drugs: Yes.    a.  Lorazepam Intensol:  The patient has not used it for the last 3 years.      3.  Concomitant Medications:    a.  Tylenol 500 mg every 6 hours.  The patient uses it approximately every 3 days since DBS was implanted.  b.  Vilazodone 20 mg per day was started.  This increase started 2 days ago.    c.  Fluoxetine was decreased to 40 mg every a.m. The patient is currently tapered down from fluoxetine 80 mg per day and increase in vilazodone.  d.  Propranolol 10 mg p.r.n. for anxiety.  Before work, he will take that.  e.  CBD hemp oil extract 15 mg at bedtime to relax the patient.  f.  Cetirizine/Zyrtec 10 mg daily is not used in the wintertime, but more in the spring when everything is blooming.  g.  Melatonin 10 mg at bedtime.  h.  Benadryl/diphenhydramine 25 mg daily for extreme itching.      4. Drug/Drug Interactions:    A. Fluoxetine and vilazodone may result in increased risk of serotonin syndrome, but the patient is currently adjusting doses, Prozac is tapered down, and vilazodone is tapered up.  So far, no signs of it.      5.  Current Reported Side Effects:  Yes.    a.  Headache.   b.  Tiredness.   c.  Increase in depression  d.  Some weight gain.    6.  ALLERGIES:    a.  Sertraline caused mood swings and did not help with depression.  b.  Hydroxyzine:  The patient while on hydroxyzine had a seizure on 04/23/2018.    c.  The patient also had a seizure while on Zyprexa/olanzapine.    d.  The patient has from house dust mites, and American elm some cough or respiratory distress.    PAST MEDICAL HISTORY:    1.  Focal epilepsy started at age 16.  2.  History of ADHD.  3.  Major depression.  4.  Tic disorder.  5.  Asperger syndrome.  6.  History of migraines.  7.  Urinary hesitancy.  8.  Allergic rhinitis.  9.  Vestibular issues with episodic imbalance.  10.  Status post DBS placement  for seizures.    PSYCHIATRIC/PSYCHOLOGICAL:  Yes, the patient is seen by  "[facility].     Drugs tried:   a.  Concerta 36 mg.  b.  Strattera 40 mg.   c.  Zyprexa: Seizure.   d.  Sertraline did not help with mood swings.    e.  Fluoxetine:  max dose up to 80 mg per day.    f.  Hydroxyzine 75 mg in 2017 caused seizures.  g. Melatonin 5 mg for sleep is used.  h.  Vilazodone was started and increased to 20 mg currently.    SEIZURE HISTORY:    1.  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      2.  Seizure types/frequency:  a.  Auras:  \"Seeing bees populating the earth with eggs.\"  He has that currently once per month.  b.  Facial twitching with repetitive hand and arm jerking with loss of consciousness less than 1 minute.    c.  GTCs: None since his last clinic visit.      3.  There have been emergency room visit for epilepsy in the past 12 months.  There were hospitalization for epilepsy in the past 12 months.    4.  Seizure onset occurred at age 16. Treatment started at the same time    5.  Status epilepticus history is negative.    6.  With regard to seizure reporting, the patient is reliable.  He has a seizure calendar.    7.  Precipitating factors include:   a.  Stress before he had the implantation of DBS.    SOCIAL AND ENVIRONMENTAL ASPECTS:  The patient lives with his parents and his sister and his dog.    The patient is not driving.    REVIEW OF SYSTEMS:    GASTROINTESTINAL:  Nothing was mentioned.    PHARMACOTHERAPY INDICATORS:  A.  Pharmaceutical Aspects    1.  Economic assessment:  The patient has Blue [employer] [place] through his parents.  There is no co-payment.  The cost of obtaining prescribed medications does not interfere with compliance.    2.  Pharmacy:  [facility] at [place] [address].      The patient is happy with the services there.    3.  Compliance:  The patient is independent in medication administration.  He does not use a pillbox.  He misses medication rarely, once per month.  The patient is capable of making " medication adjustments.  Parents are available to assist the patient.      B.  Pharmacokinetic Aspects.  1.  Habits and Chemical Use  a.  Alcohol:  Never.  b.  Tobacco:  Never.  c.  Caffeine:  Chocolate.  d.  Recreational drugs:  CBD hemp oil, otherwise nothing.      2.  Demographic characteristics:  The patient was a video visit, so I do not have anything from today.    REVIEW OF ANTI-SEIZURE MEDICATIONS TRIED:    a.  Aptiom/eslicarbazepine: From 3167-8494.  Max dose 1200 mg per day.  It looks like it did not improve seizure frequency, but I did not see any severe side effects during that time.    b.  Depakote/divalproex sodium: From 5328-3818 before surgery.  Max dose 1500 mg per day.  CP max 75 mg/L/5.9 mg/L.  Used in combination with ZONISAMIDE ,Aptiom / eslicarbazepine , LAMICTAL- LAMOTRIGINE  and Melatonin.   Side effects:  Hand tremors.    Depakote could actually be retried. If we need something for mood stabilization or for headaches, maybe we could retry it.  It is not contraindicated now after surgery, if it is not contraindicated for DBS.    c.  Keppra/levetiracetam:  The patient had 2 trials. The first trial was in 2015 max dose 1500 mg/d, and he was very ornery and marquez, and the drug was discontinued because the patient also needed melatonin because of insomnia.  Now it was retried 2022 before surgery of DBS, and if the patient's depression is not improving, see if another anti-seizure medication could be tried and if the depression would improve.     d.  Lamictal/lamotrigine: From 06/2018 to present.  Max dose 400 mg per day.  CP max 11.6 mg/L.  Lamictal is a great drug to keep someone alert.  It is also a good drug for mood stabilization.  It can cause headaches, but I do not think headaches is the result of the lamotrigine.  Recommend to continue with the lamotrigine. So far, we have not found a better medication.    e.  Vimpat/lacosamide was tried in 10/2019.  I have not the dose, but I have that  it was tried, and the patient's suicidal thoughts and suicidal ideation went up, and the drug was discontinued.    f.  Zonegran/zonisamide was tried from 2015 to present.  Max dose 700 mg per day with a CP max of 23 mg/L. So far, zonisamide is more or less efficacious without causing too many side effects.  Please make sure that the patient has enough water intake ( 7 glasses of H2O) to minimize  possibility of kidney stones.    g.  Lorazepam Intensol: The patient has Lorazepam Intensol, but it was not used for the last 3 years according to patient.  Recommend: He always should have something at home for emergency purposes.      RECOMMENDATIONS:  1.  The patient currently complains about waking up at night with heavy breathing and feeling out of it.  We do not know if those are seizures ?  He has them once every other week on an average.    2.  The other issue the patient has a problem with headaches, and those are daily.  3.  A lot of problems are currently increase in depression, and the patient is on a crossover from Prozac 80-40 mg/d and vilazodone increased from 10-20 mg/d.    PLAN:    1.  Headaches.    a.  I recommended that the patient make a list of which foods he thinks he gets an increase in headache, like chocolate, yellow cheese.   b.  We could try gabapentin 100 mg at bedtime to see if it helps with pain, anxiety and sleep, and maybe if these are seizures at night, it should not aggravate those seizures.    2.  Depression is currently a severe issue.  The patient is on a crossover from Prozac to 20 mg vilazodone. If depression is not improving, then maybe we have to consider a Keppra taper, and we could retry maybe Depakote or Aptiom.  If Depakote is retried, it would also help with the headache, but weight gain could be an issue, and lamotrigine dose would have to be decreased. On the other hand, if Aptiom is retried, Lamictal might need increase in dose.  That is for retry options.      3.  For new  anti-seizure drug options, all other anti-seizure drugs can cause severe sedation, can be causing mood issues and would have to be tried drug by drug and side effect profile, what would be a good choice.  It does not look like medications like Dilantin or phenobarbital are good choices and all the other drugs like Fycompa, which can cause severe psychiatric issues and severe sedation .    4.  GeneSight testing might be an idea to see how the patient is metabolizing antidepressant medications.    5.  For more specific anti-seizure medication, let us wait how the patient is adjusting with the depression,.    Thanks for the opportunity to participate in the care of this patient.    Korina Esparza, PharmD  Pharmaceutical Care Coordinator    Korina Esparza, Laith        D: 2023   T: 2023   MT: caitlin    Name:     BRIDGET KNIGHTAndrew  MRN:      [id number]        Account:      [id number]   :      1995           Service Date: 2022       Document: [id number]

## 2023-02-18 ENCOUNTER — TELEPHONE (OUTPATIENT)
Dept: NEUROLOGY | Facility: CLINIC | Age: 28
End: 2023-02-18

## 2023-02-18 NOTE — TELEPHONE ENCOUNTER
"Telephone encounter:    Sergio Padilla is a 26 yo male with medically refractory epilepsy, negative SEEG evaluation who is now s/p bilateral ANT-DBS (8/29/2022).    He calls today with concern for \"deep pain\" at the site of his generator in right chest wall. He states the pain has occurred intermittently over the past few months but was more superficial and is now deep. He also endorses some headache and neck pain. He describes a pink/light red discoloration at the incision site on his chest.    He denies fever, open wound, drainage, or chills. We discussed that it is difficult to evaluate the incision and pain over the phone and that it would be best to be seen in person. Patient wanted to monitor for a bit and go to work but stated he would present to urgent care. I told him to [facility] to [facility] if ongoing issues and to call the clinic Monday to arrange follow up. Patient was agreeable to this.    Agatha Mallory MD, PhD  PGY-2 Neurosurgery  Please page NSGY on call with questions    "

## 2023-02-20 ENCOUNTER — TELEPHONE (OUTPATIENT)
Dept: NEUROSURGERY | Facility: CLINIC | Age: 28
End: 2023-02-20

## 2023-02-20 NOTE — TELEPHONE ENCOUNTER
Pt messaged Dr. Adler requesting a work excuse letter. Left VM for pt asking how many days he will need to be off work and for a fax number to to send the letter. Requested a return call at pt's earliest convenience.

## 2023-02-20 NOTE — TELEPHONE ENCOUNTER
Pt would like work excuse letter emailed to him. He understands this is not HIPAA compliant. He missed work on 2/18/23.     Pt has right side DBS battery, placed 8/29/22 by Dr. Adler. He emailed Dr. Adler on 2/28/23 saying he had pain around the battery site. Today he said the pain has been going on for about 2 weeks. At first it was cramping type pain but now it's just irritating. 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     Afebrile, there is some redness/pink coloration around the incision site, but no redness traveling up the side of his neck along the extension wire. No swelling, drainage or fluid pocket. Pt denies battery flipping or moving within the pocket.     Dr. Adler's reply to pt's email says he should come in if the area is red or if pain persists. We can keep an eye on it for today. I'll follow up with pt tomorrow and we can determine if he should come in for assessment. Pt in agreement with plan.     Nothing further at this time.

## 2023-02-21 ENCOUNTER — TELEPHONE (OUTPATIENT)
Dept: NEUROSURGERY | Facility: CLINIC | Age: 28
End: 2023-02-21

## 2023-02-21 DIAGNOSIS — G40.909 RECURRENT SEIZURES (H): ICD-10-CM

## 2023-02-21 RX ORDER — LEVETIRACETAM 500 MG/1
500 TABLET ORAL 2 TIMES DAILY
Qty: 180 TABLET | Refills: 0 | Status: SHIPPED | OUTPATIENT
Start: 2023-02-21 | End: 2023-05-22

## 2023-02-21 NOTE — TELEPHONE ENCOUNTER
"Pt said the right chest DBS battery site is more painful than yesterday and he would like an in-person appointment to have it assessed. He describes the pain as cramping and sharp. It is aggravated by reaching movements and other small arm movements like eating. He uploaded a photo to [employer]. The incision site looks slightly pink, but it does not appear infected.     Pt asked if turning the generator off would alleviate the pain. I do not think turning it off would change anything (pt did not describe any \"shock\" type sensations), but I would also not want him to turn it off unless he has consulted with [facility] first.     Pt asked if seizures could be causing the pain. He primarily has seizures when sleeping and doesn't know if he has had any lately. If he has pulled a muscle or strained during a seizure it's possible I suppose. He then asked if a seizure would damage the battery. I assured him a seizure would not harm the battery.     Pt declined a video visit this week but would like an in-person appointment next week if possible to assess his pain. He said if it worsens this week he will call me.     I messaged scheduling and requested they follow up with pt to schedule. Nothing further at this time.      "

## 2023-02-21 NOTE — TELEPHONE ENCOUNTER
Left  for pt following up on on the pain he is experiencing at his DBS battery site (see telephone message from 2/20/23). Will try reaching pt later.

## 2023-02-23 ENCOUNTER — TELEPHONE (OUTPATIENT)
Dept: NEUROSURGERY | Facility: CLINIC | Age: 28
End: 2023-02-23

## 2023-02-23 NOTE — TELEPHONE ENCOUNTER
[facility]    Phone Message    May a detailed message be left on voicemail: yes     Reason for Call: Other: Pt called back to schedule DBS appt with Dr. Adler.      Please call pt back at [phone] to schedule appt.    Action Taken: Message routed to:  [facility] ([facility] Neurosurgery    Travel Screening: Not Applicable

## 2023-02-27 ENCOUNTER — OFFICE VISIT (OUTPATIENT)
Dept: NEUROSURGERY | Facility: CLINIC | Age: 28
End: 2023-02-27

## 2023-02-27 VITALS
SYSTOLIC BLOOD PRESSURE: 127 MMHG | HEIGHT: 72 IN | HEART RATE: 81 BPM | DIASTOLIC BLOOD PRESSURE: 93 MMHG | BODY MASS INDEX: 28.99 KG/M2 | RESPIRATION RATE: 16 BRPM | OXYGEN SATURATION: 98 % | WEIGHT: 214 LBS

## 2023-02-27 DIAGNOSIS — G40.009 PARTIAL IDIOPATHIC EPILEPSY WITH SEIZURES OF LOCALIZED ONSET, NOT INTRACTABLE, WITHOUT STATUS EPILEPTICUS (H): Primary | ICD-10-CM

## 2023-02-27 PROBLEM — R42 VERTIGO: Status: ACTIVE | Noted: 2020-12-07

## 2023-02-27 PROBLEM — H53.2 DOUBLE VISION: Status: ACTIVE | Noted: 2020-10-15

## 2023-02-27 PROBLEM — R39.11 URINARY HESITANCY: Status: ACTIVE | Noted: 2020-10-15

## 2023-02-27 PROBLEM — F41.9 ANXIETY: Status: ACTIVE | Noted: 2020-12-07

## 2023-02-27 PROCEDURE — 99214 OFFICE O/P EST MOD 30 MIN: CPT | Performed by: NURSE PRACTITIONER

## 2023-02-27 RX ORDER — VILAZODONE HYDROCHLORIDE 20 MG/1
1 TABLET ORAL
COMMUNITY
Start: 2023-02-21

## 2023-02-27 ASSESSMENT — PAIN SCALES - GENERAL: PAINLEVEL: SEVERE PAIN (6)

## 2023-02-27 NOTE — LETTER
2/27/2023       RE: Amrit Padilla  [address]  [place] [address]     Dear Colleague,    Thank you for referring your patient, Amrit Padilla, to the [facility] [place] [facility]. Please see a copy of my visit note below.    NEUROSURGERY    HISTORY AND PHYSICAL EXAM    Chief Complaint   Patient presents with     DBS Evaluation     Incision check         HISTORY OF PRESENT ILLNESS      26 y/o gentleman with medically refractory epilepsy, who underwent DBS placement on August 29, 2023 by Dr. Adler.  He has continued to report 'aches and pains related to incisions' specifically over the right chest wall and sometimes over the right side of his neck above his wire. He states it is a sharp cramping pain, he notes it lasts for 30 seconds maximum, and it resolves. He notes that the pain primarily went away after surgery and it recently returned approximately 1 week ago. He states it went away over the weekend, He notes that he will get headaches with it sometimes but it is not consistent and is sometimes just 'over the bumps'.  He states that tylenol helps some. 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 He also states that he primarily sleeps on his right side and is not sure if that is what is causing it. He states he is not sure if his seizures have improved significantly as he primarily has them at night.       Past Medical History:   Diagnosis Date     Depressive disorder      Headaches, cluster      Seizures (H)        Past Surgical History:   Procedure Laterality Date     BACK SURGERY       IMPLANT DEEP BRAIN STIMULATION GENERATOR / BATTERY Right 8/29/2022    Procedure: Deep brain stimulator placement, phase II, placement of deep brain stimulator generator/battery over the right chest wall;  Surgeon: Tello Adler MD;  Location:  OR      INSERTION, ELECTRODE LEADS, DEEP BRAIN STIMULATOR, BILATERAL, USING OPTICAL TRACKING SYSTEM, WITH MRI GUIDANCE Bilateral 8/22/2022    Procedure: Intraoperative Magnetic resonance imaging/Stealth assisted Bilateral deep brain stimulator placement, phase I, placement of bilateral deep brain stimulator electrode, target anterior nucleus of thalamus, WITHOUT microelectrode recording;  Surgeon: Tello Adler MD;  Location: [facility] OR     REMOVE DEPTH ELECTRODES N/A 5/5/2022    Procedure: Stereo EEG depth electrode removal;  Surgeon: Jairon Mar MD;  Location: [facility] OR     JAYDEN ASSISTED PLACEMENT DEPTH ELECTRODE Bilateral 4/25/2022    Procedure: JAYDEN-assisted stereotactic placement of bilateral depth electrodes for invasive video electroencephalography monitoring;  Surgeon: Tello Adler MD;  Location: [facility] OR       Social History     Socioeconomic History     Marital status: Single     Spouse name: Not on file     Number of children: Not on file     Years of education: Not on file     Highest education level: Not on file   Occupational History     Not on file   Tobacco Use     Smoking status: Never     Smokeless tobacco: Never   Substance and Sexual Activity     Alcohol use: No     Drug use: No     Sexual activity: Not Currently   Other Topics Concern     Parent/sibling w/ CABG, MI or angioplasty before 65F 55M? No   Social History Narrative     Not on file     Social Determinants of Health     Financial Resource Strain: Not on file   Food Insecurity: Not on file   Transportation Needs: Not on file   Physical Activity: Not on file   Stress: Not on file   Social Connections: Not on file   Intimate Partner Violence: Not on file   Housing Stability: Not on file       Family History   Problem Relation Age of Onset     Hypertension Mother      Hypertension Father      Asthma Father      Asthma Brother      Hereditary Spherocytosis Brother      Asthma Sister      Anxiety Disorder Sister        Current  Outpatient Medications   Medication     HEMP OIL OR EXTRACT OR OTHER CBD CANNABINOID, NOT MEDICAL CANNABIS,     lamoTRIgine (LAMICTAL) 150 MG tablet     levETIRAcetam (KEPPRA) 500 MG tablet     MELATONIN PO     propranolol (INDERAL) 10 MG tablet     vilazodone (VIIBRYD) 20 MG TABS tablet     zonisamide (ZONEGRAN) 100 MG capsule     acetaminophen (TYLENOL) 325 MG tablet     cetirizine (ZYRTEC) 10 MG tablet     No current facility-administered medications for this visit.       Allergies   Allergen Reactions     American Elm Cough, Difficulty breathing and Shortness Of Breath     Hydroxyzine      Other reaction(s): Seizures     Mixed Grasses Cough, Other (See Comments) and Shortness Of Breath     Unknown [No Clinical Screening - See Comments] Rash     Reaction to a seizure med that he can not recall.     Dust Mites      Molds & Smuts      Other reaction(s): Unknown     Sertraline Other (See Comments)     mood swings     Zyprexa [Olanzapine]      Seizure.          REVIEW OF SYSTEMS:   ROS: 10 point ROS neg other than the symptoms noted above in the HPI.    PHYSICAL EXAM  Resp 16   Ht 1.829 m (6')   Wt 97.1 kg (214 lb)   BMI 29.02 kg/m      General: Awake, alert, oriented. Well nourished, well developed, he is not in any acute distress.  HEENT: Head normocephalic, atraumatic. No carotid bruit. Neck supple. Good range of motion. No palpable thyroid mass.  Extremity: Warm with no clubbing or cyanosis. No lower extremity edema.    Incisions: Right chest wall incision is healing well.  No redness.  No drainage.  No fluid collection. No swelling noted around incision or along the wire.     Neurological  Awake, alert and oriented to date, time, place and person.  Speech is fluent.   Face symmetric.    Motor: full strength throughout.full range of motion of arms and neck  Sensation: intact to light touch and pinpoint.      ASSESSMENT  Patient is recovering well from DBS generator/battery placement surgery, he recently  developed pain, however in chart checking, it appears this pain has been an ongoing concern since surgery, however, it had slightly changed over the past week. He notes that he did have complete resolution of pain over the weekend, however it restarted again this morning.  His incision is healing well with no signs of infection.  He does have full range of motion and pain is not reproducible     PLAN:  1.  discussed with patient following up with head CT or ultrasound of chest if he continues with discomfort and concern, otherwise, he will continue with non pharmacological methods and will attempt position changes overnight, as well as decreasing repetitive movements on the right side. We discussed that if symptoms signiciantly worsen or he develops a focal neurological deficit, he should present to the emergency department, otherwise, he has our number to call to discuss ongoing symptoms.   2. Follow up with neurosurgery as needed.        Sincerely,    Emily Morris NP

## 2023-02-27 NOTE — NURSING NOTE
Chief Complaint   Patient presents with     DBS Evaluation     Incision check       Pablito Miller, EMT

## 2023-02-27 NOTE — PROGRESS NOTES
NEUROSURGERY    HISTORY AND PHYSICAL EXAM    Chief Complaint   Patient presents with     DBS Evaluation     Incision check         HISTORY OF PRESENT ILLNESS      28 y/o gentleman with medically refractory epilepsy, who underwent DBS placement on August 29, 2023 by Dr. Adler.  He has continued to report 'aches and pains related to incisions' specifically over the right chest wall and sometimes over the right side of his neck above his wire. He states it is a sharp cramping pain, he notes it lasts for 30 seconds maximum, and it resolves. He notes that the pain primarily went away after surgery and it recently returned approximately 1 week ago. He states it went away over the weekend, He notes that he will get headaches with it sometimes but it is not consistent and is sometimes just 'over the bumps'.  He states that tylenol helps some. 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 He also states that he primarily sleeps on his right side and is not sure if that is what is causing it. He states he is not sure if his seizures have improved significantly as he primarily has them at night.       Past Medical History:   Diagnosis Date     Depressive disorder      Headaches, cluster      Seizures (H)        Past Surgical History:   Procedure Laterality Date     BACK SURGERY       IMPLANT DEEP BRAIN STIMULATION GENERATOR / BATTERY Right 8/29/2022    Procedure: Deep brain stimulator placement, phase II, placement of deep brain stimulator generator/battery over the right chest wall;  Surgeon: Tello Adler MD;  Location: [facility] OR     INSERTION, ELECTRODE LEADS, DEEP BRAIN STIMULATOR, BILATERAL, USING OPTICAL TRACKING SYSTEM, WITH MRI GUIDANCE Bilateral 8/22/2022    Procedure: Intraoperative Magnetic resonance imaging/Stealth assisted Bilateral deep brain stimulator placement, phase I, placement of bilateral deep brain stimulator electrode, target  anterior nucleus of thalamus, WITHOUT microelectrode recording;  Surgeon: Tello Adler MD;  Location: [facility] OR     REMOVE DEPTH ELECTRODES N/A 5/5/2022    Procedure: Stereo EEG depth electrode removal;  Surgeon: Jairon Mar MD;  Location: [facility] OR     JAYDEN ASSISTED PLACEMENT DEPTH ELECTRODE Bilateral 4/25/2022    Procedure: JAYDEN-assisted stereotactic placement of bilateral depth electrodes for invasive video electroencephalography monitoring;  Surgeon: Tello Adler MD;  Location: [facility] OR       Social History     Socioeconomic History     Marital status: Single     Spouse name: Not on file     Number of children: Not on file     Years of education: Not on file     Highest education level: Not on file   Occupational History     Not on file   Tobacco Use     Smoking status: Never     Smokeless tobacco: Never   Substance and Sexual Activity     Alcohol use: No     Drug use: No     Sexual activity: Not Currently   Other Topics Concern     Parent/sibling w/ CABG, MI or angioplasty before 65F 55M? No   Social History Narrative     Not on file     Social Determinants of Health     Financial Resource Strain: Not on file   Food Insecurity: Not on file   Transportation Needs: Not on file   Physical Activity: Not on file   Stress: Not on file   Social Connections: Not on file   Intimate Partner Violence: Not on file   Housing Stability: Not on file       Family History   Problem Relation Age of Onset     Hypertension Mother      Hypertension Father      Asthma Father      Asthma Brother      Hereditary Spherocytosis Brother      Asthma Sister      Anxiety Disorder Sister        Current Outpatient Medications   Medication     HEMP OIL OR EXTRACT OR OTHER CBD CANNABINOID, NOT MEDICAL CANNABIS,     lamoTRIgine (LAMICTAL) 150 MG tablet     levETIRAcetam (KEPPRA) 500 MG tablet     MELATONIN PO     propranolol (INDERAL) 10 MG tablet     vilazodone (VIIBRYD) 20 MG TABS tablet     zonisamide (ZONEGRAN) 100 MG capsule      acetaminophen (TYLENOL) 325 MG tablet     cetirizine (ZYRTEC) 10 MG tablet     No current facility-administered medications for this visit.       Allergies   Allergen Reactions     [place] Cough, Difficulty breathing and Shortness Of Breath     Hydroxyzine      Other reaction(s): Seizures     Mixed Grasses Cough, Other (See Comments) and Shortness Of Breath     Unknown [No Clinical Screening - See Comments] Rash     Reaction to a seizure med that he can not recall.     Dust Mites      Molds & Smuts      Other reaction(s): Unknown     Sertraline Other (See Comments)     mood swings     Zyprexa [Olanzapine]      Seizure.          REVIEW OF SYSTEMS:   ROS: 10 point ROS neg other than the symptoms noted above in the HPI.    PHYSICAL EXAM  Resp 16   Ht 1.829 m (6')   Wt 97.1 kg (214 lb)   BMI 29.02 kg/m      General: Awake, alert, oriented. Well nourished, well developed, he is not in any acute distress.  HEENT: Head normocephalic, atraumatic. No carotid bruit. Neck supple. Good range of motion. No palpable thyroid mass.  Extremity: Warm with no clubbing or cyanosis. No lower extremity edema.    Incisions: Right chest wall incision is healing well.  No redness.  No drainage.  No fluid collection. No swelling noted around incision or along the wire.     Neurological  Awake, alert and oriented to date, time, place and person.  Speech is fluent.   Face symmetric.    Motor: full strength throughout.full range of motion of arms and neck  Sensation: intact to light touch and pinpoint.      ASSESSMENT  Patient is recovering well from DBS generator/battery placement surgery, he recently developed pain, however in chart checking, it appears this pain has been an ongoing concern since surgery, however, it had slightly changed over the past week. He notes that he did have complete resolution of pain over the weekend, however it restarted again this morning.  His incision is healing well with no signs of infection.  He  does have full range of motion and pain is not reproducible     PLAN:  1.  discussed with patient following up with head CT or ultrasound of chest if he continues with discomfort and concern, otherwise, he will continue with non pharmacological methods and will attempt position changes overnight, as well as decreasing repetitive movements on the right side. We discussed that if symptoms signiciantly worsen or he develops a focal neurological deficit, he should present to the emergency department, otherwise, he has our number to call to discuss ongoing symptoms.   2. Follow up with neurosurgery as needed.

## 2023-03-14 ENCOUNTER — TELEPHONE (OUTPATIENT)
Dept: NEUROLOGY | Facility: CLINIC | Age: 28
End: 2023-03-14

## 2023-03-14 NOTE — TELEPHONE ENCOUNTER
What is the concern that needs to be addressed by a nurse? Roxi Mayes from the research team is requesting a bigger room for patient appointment on 3/16/23 at 9:00am    May a detailed message be left on voicemail? Yes    Message routed to: tin ulrich

## 2023-03-16 ENCOUNTER — OFFICE VISIT (OUTPATIENT)
Dept: NEUROLOGY | Facility: CLINIC | Age: 28
End: 2023-03-16

## 2023-03-16 DIAGNOSIS — R52 PAIN: Primary | ICD-10-CM

## 2023-03-16 RX ORDER — GABAPENTIN 100 MG/1
100 CAPSULE ORAL AT BEDTIME
Qty: 60 CAPSULE | Refills: 5 | Status: SHIPPED | OUTPATIENT
Start: 2023-03-16 | End: 2023-09-25

## 2023-03-16 NOTE — LETTER
3/16/2023     RE: Amrit Padilla  : 1995   MRN: [id number]      Dear Colleague,    Thank you for referring your patient, Amrit Padilla, to the [facility] at [facility]. Please see a copy of my visit note below.    CHIEF COMPLIANT:  Follow-up for epilepsy and DBS placement.     HISTORY OF PRESENT ILLNESS:  In person visit today.  He is accompanied by his parents in the clinic.  Patient is a 27-year-old right handed man with history of epilepsy, Asperger's syndrome and depression who was recently admitted for intracranial monitoring with depth electrodes in May for surgical evaluation for intractable epilepsy.  He had DBS placement at the end of Aug. 2022. DBS was turned down at 0.2 mA. 145hz, 90 micro seconds on 10/31/2022. Since the DBS placement, he is not sure if there are any significant changes of his seizures.  His father feels that post-ictal stages are less since the DBS placement. Almost all his seizures are nocturnal. He complains of pain at the surgical sites. He feels that his depression is getting worse recently. He is working with Dr. Barros to adjust his anti-depressant.    Intracranial monitoring was complicated by the development of a right-sided subdural hematoma which is stable with repeat CT head scheduled for tomorrow.  Clinically headaches are improving although he is endorsing scalp discomfort from the stitches which he hopes to have out tomorrow.  He has follow-up scheduled with Dr. Adler.  He is taking approximately 1000 mg acetaminophen twice daily for headache pain.  He describes headache pain as a 5/10 and relieved to about a 4/10 with Tylenol.  He was advised not to use Excedrin or other medications with antiplatelet effect in the setting of subdural hematoma at time of discharge.    Patient was monitored for 10 days with intracranial electrodes during which time he had 4 electroclinical seizures.  All of  the seizures occurred out of sleep which are consistent with his semiology.  He was found to have bilateral upper extremity tonic extension elevation at seizure onset which would followed by generalized activity.  Depth electrodes were in unable to fully localize seizure onset and monitoring was terminated after 4 electroclinical seizures as further data was felt to be unnecessary to determine treatment course.  Dr. Dukes discussed deep brain stimulation (DBS) with patient during hospitalization and today's visit is to further discuss treatment options after intracranial monitoring.    Since discharge from the hospital no known electroclinical seizures, all of his seizures out of sleep and he is not always aware of seizures with that said no postictal confusion appreciated by dad and Amrit has not had any cuts or abrasions in the mouth to suggest a seizure.    Needs to follow-up with Dr. Ben Barros for psychiatric care.  He expressed feeling overwhelmed today by the discussion of epilepsy treatment and surgical options.    Intracranial monitoring 4/25/2022 - 5/5/2022 - 4  seizures were captured, but there was concern that we were not capturing the seizure onset zone and early rapid spread as well as multifocal regions of seizure propagation are possibilities. These suggested that a localized surgical approach would be unlikely to provide seizure freedom. Additionally, it was felt that further seizure recording would not clarify or .     CMC was done in Aug. 2021, consensus was that patient should proceed with invasive monitoring. He should be able to tolerate intracranial monitoring.  The lateralization and localization are not clear based on scalp EEG monitoring. Temporal lobe epilepsy is possible. We will have invasive depth electrodes, 8 symmetric electrodes on each side:     Amygdala: 1  Anterior and posterior hippocampus: 3  Cingulate and prefrontal: 2  Orbital frontal:1  Insula: 1     He saw  Dr. Adler on 11/2/2021 for consultation for invasive monitoring. He is scheduled for F-Gator MRI and CTA on Dec 12/3/2021.     For the past several months, he was having about 2 seizures per week, mostly nocturnal seizures, about 80-90%. He is currently taking aptiom 1200 mg qhs,  zonegran 300-400, Lamictal 150 mg bid, Depakote 500 - 1000. He is also taking CBD oil for anxiety.  Double vision resolved after aptiom was discontinued.     He is complaining of double vision, blurred vision.  He also complains of hand tremor. He had a really bad tremor day about one day ago.     He had VEEG monitoring for presurgical evaluation in March 2021. Total of 9 epileptic seizures were recorded during this admission. These electroclinical seizures showed bifrontal EEG activities at onset, usually with higher amplitude initial activity over the right frontal region compared to the left.  Ictal manifestations variably included tonic stiffening of his upper body, kicking of his legs, vocalizations, and non-purposeful movements of surrounding objects.  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  Aptiom was discontinued at discharge.      No plan for suicide or self injury     Since the hospital discharge, he continue to have frequent seizures, probably several times per week.     He was complaining of vertigo spells, he was seen in the ED.  He was told that he has vestibular problems.       He continue to have small twitches that last for a spit of a second, some of them in clusters. No LOC with these twitches. These likely are not seizures.      Patient tried Peoples diet, but did not continue.     He is seeing Dr. Ben Barros for his depression.  He is also seeing Deo Shipman for therapy. He is complaining of fatigue and sleep over 8-10 hours a day. He is very concerned about the fatigue and is questioning what other options we have for treating his seizures.      They are now open to VEEG monitoring for surgical  "evaluation, resection or neuromodulations. However, they are still not sure if they want to move forward to the surgical treatment.     Mood improved a lot. No suicidal thoughts, no thoughts of hurting himself.     The patient started to have seizures when he was 16 years old and he was managed at the [facility] for the past few years for his seizures.  He is currently taking Aptiom 1200 mg q.h.s. and the Zonegran 300 mg b.i.d. for his epilepsy.  He was recently admitted to the hospital twice because of 2 clusters of seizures, one in January when he was admitted to [facility] and one was in February when he was admitted to [facility].  On both occasions, he had 4-5 back to back generalized tonic-clonic seizures within a short period of time.      According to the mother, the patient has 2 different types of seizures.  Type #1 is described as \"mild seizures,\" during which he will have some facial twitching.  He will have repetitive hand/arm jerking movement.  He will become unresponsive for less than a minute.  Postictally, he was having some confusion and headaches and fatigue.  Before the seizures, he will have an aura of \"seeing bees populating the earth with eggs.\"  This type of seizure is relatively infrequent, average once per month.      Type #2 are generalized tonic-clonic seizures which the patient will have generalized convulsions of the body and his body will be stiff.  It usually will last for 1-2 minutes followed by postictal fatigue and hallucinations.  This happens usually once every 3 months, but sometimes it occurs in clusters with 2-5 seizures back to back.      The patient had tried Keppra and Depakote in the past which did not work.  He had EEGs done in the past which were reported negative.      The patient had severe depression and requested a lot of behavioral health and counseling in the past and at certain time, he was suicidal, but that improved.  At the present time, he is not suicidal.  He " does not think about hurting himself.     TRIGGERS FOR SEIZURES:  Depression.      RISK FACTORS FOR SEIZURES:  He had no history of head trauma with loss of consciousness.  No history of febrile convulsions.  No history of CNS infections.      PAST AEDs: Keppra, Depakote. Vimpat caused suicidal ideations. Depakote discontinued due to risk of platelet dysfunction during intracranial monitoring, and patient resumed Keppra.      PAST MEDICAL HISTORY:  Asperger's disorder, depression, epilepsy.      PAST SURGICAL HISTORY:  Depth electrode intracranial placement for epilepsy surgical evaluation      FAMILY HISTORY:  No family history of seizures.  Both parents have hypertension.  Maternal grandmother had heart disease.      SOCIAL HISTORY:  He is single, living with his mother.  He had some college education but did not finish because of depression; he is hoping to return to college soon.  He is currently working at [employer] and plans to return to work soon.  No smoking, no alcohol, no drug abuse.      REVIEW OF SYSTEMS:  Targeted ROS per HPI>      ALLERGIES:  Sertraline and Zyprexa.     PHYSICAL EXAMINATIONS:    There were no vitals taken for this visit.    General exam: General Appearance: No acute distress. HEENT: Normocephalic, atraumatic. Neck: Supple.  Extremities: No edema.     Neurologic Exam: Alert and oriented x3. Speech fluent, appropriate. Normal attention. Cranial Nerves: Pupils are equal, round, reactive to light and accomodation. Extraocular movement intact. No facial weakness or asymmetry. Hearing normal. Motor Exam: Normal. Coordination:no ataxia.  Gait and Station: normal.      DIAGNOSTIC TESTING: CT scan of the head on 02/27/2018 was reported negative from [facility].  He had EEGs in the past which were reported negative    MRI brain with and without contrast 8/5/2021 [facility] - No supratentorial lesions or significant hippocampal asymmetric/evidence for pathology. Stable cerebellar 7 mm T2  hypertense lesion.     MRI 7 Ernestine 4/22/2022 [facility] -   Impression: Motion degraded exam.  1. No definite temporal lobe abnormality, mass, or congenital lesion  identified as a cause of the patient's seizures.  2. No evidence of abnormal enhancement intracranially.   3. Previously seen abnormal T2 signal in the right cerebellum is not  identified in today's exam, likely obscured secondary to  susceptibility artifacts in the posterior fossa, a less likely  possibility is that the lesion could have resolved.    IMPRESSION:   1. Epilepsy     Since the DBS placement, he is not sure if there are any significant changes of his seizures.  His father feels that post-ictal stages are less since the DBS placement. Almost all his seizures are nocturnal. He complains of pain at the surgical sites.     2.  Asperger's syndrome   3.  Depression. He feels that his depression is getting worse recently. He is working with Dr. Barros to adjust his anti-depressant.     4.  Tremors.  Secondary to Depakote or psychological?    PLAN:   1.  Continue Zonegran 300 mg - 400 mg, Lamictal 150 mg twice daily, Keppra 500 mg twice daily  2.  Continue to follow-up with Dr. Adler for head pain.  3.  Continue follow-up with Dr. Barros for depression.  Continue therapy.  4.  No restriction to resuming CBD and melatonin  5.  Continue DBS at 0.2 mA. 145hz, 90 micro seconds. Will adjust based on seizures and mood changes.  6.  Gabapentin 100 -200 mg every day for head pain.  7.  Follow-up in person in 2 months.  Patient is wondering when he can start to decrease his AEDs. I advised that we need to wait at least one year before we will consider decreasing AEDs.      45 min total time was spent on the day of this visit.      33 min was spent on face to face time  12 min was spent on preparation of visit to review charts and labs, ordering medications and tests, and documentation of clinical information    25 min was spent on DBS  interrogation.  Impedence was checked and adjusted. Setting are at 0.2 mA. 145hz, 90 micro seconds    Again, thank you for allowing me to participate in the care of your patient.      Sincerely,    Norman Dukes MD

## 2023-03-16 NOTE — PATIENT INSTRUCTIONS
Times of Days am pm        Medication Tablet Size Number of Tablets/Capsules Total Daily Dosage                 Keppra 500 1 1        1000 mg    Zonegran 100 3 4        700 mg    Lamictal 100 1.5 1.5        300 mg    Gabapentin 100   1-2        100-200 mg                                                           Carry this with you at all times.  CONTINUE TAKING YOUR OTHER MEDICATIONS AS PREVIOUSLY DIRECTED.      * * *Do not store medications in the bathroom.  Keep medications away from children!* * *         Appointment with Dr. Adler on 5/17/2022

## 2023-03-16 NOTE — LETTER
"March 16, 2023      RE: Amrit Padilla      To whom it may concern,    This letter is to inform that Mr. Amrit Padilla is currently under my care for his epilepsy.  He has a \"Deep Brain Stimulator\" placed in his chest and brain for his epilepsy.  Please accommodate whenever is possible.     Please feel free to contact me at [phone] if you have any questions.      Sincerely,      Norman Dukes MD  [facility]       "

## 2023-03-16 NOTE — PROGRESS NOTES
CHIEF COMPLIANT:  Follow-up for epilepsy and DBS placement.     HISTORY OF PRESENT ILLNESS:  In person visit today.  He is accompanied by his parents in the clinic.  Patient is a 27-year-old right handed man with history of epilepsy, Asperger's syndrome and depression who was recently admitted for intracranial monitoring with depth electrodes in May for surgical evaluation for intractable epilepsy.  He had DBS placement at the end of Aug. 2022. DBS was turned down at 0.2 mA. 145hz, 90 micro seconds on 10/31/2022. Since the DBS placement, he is not sure if there are any significant changes of his seizures.  His father feels that post-ictal stages are less since the DBS placement. Almost all his seizures are nocturnal. He complains of pain at the surgical sites. He feels that his depression is getting worse recently. He is working with Dr. Barros to adjust his anti-depressant.    Intracranial monitoring was complicated by the development of a right-sided subdural hematoma which is stable with repeat CT head scheduled for tomorrow.  Clinically headaches are improving although he is endorsing scalp discomfort from the stitches which he hopes to have out tomorrow.  He has follow-up scheduled with Dr. Adler.  He is taking approximately 1000 mg acetaminophen twice daily for headache pain.  He describes headache pain as a 5/10 and relieved to about a 4/10 with Tylenol.  He was advised not to use Excedrin or other medications with antiplatelet effect in the setting of subdural hematoma at time of discharge.    Patient was monitored for 10 days with intracranial electrodes during which time he had 4 electroclinical seizures.  All of the seizures occurred out of sleep which are consistent with his semiology.  He was found to have bilateral upper extremity tonic extension elevation at seizure onset which would followed by generalized activity.  Depth electrodes were in unable to fully localize seizure onset and monitoring  was terminated after 4 electroclinical seizures as further data was felt to be unnecessary to determine treatment course.  Dr. Dukes discussed deep brain stimulation (DBS) with patient during hospitalization and today's visit is to further discuss treatment options after intracranial monitoring.    Since discharge from the hospital no known electroclinical seizures, all of his seizures out of sleep and he is not always aware of seizures with that said no postictal confusion appreciated by dad and Amrit has not had any cuts or abrasions in the mouth to suggest a seizure.    Needs to follow-up with Dr. Ben Barros for psychiatric care.  He expressed feeling overwhelmed today by the discussion of epilepsy treatment and surgical options.    Intracranial monitoring 4/25/2022 - 5/5/2022 - 4  seizures were captured, but there was concern that we were not capturing the seizure onset zone and early rapid spread as well as multifocal regions of seizure propagation are possibilities. These suggested that a localized surgical approach would be unlikely to provide seizure freedom. Additionally, it was felt that further seizure recording would not clarify or .     CMC was done in Aug. 2021, consensus was that patient should proceed with invasive monitoring. He should be able to tolerate intracranial monitoring.  The lateralization and localization are not clear based on scalp EEG monitoring. Temporal lobe epilepsy is possible. We will have invasive depth electrodes, 8 symmetric electrodes on each side:     Amygdala: 1  Anterior and posterior hippocampus: 3  Cingulate and prefrontal: 2  Orbital frontal:1  Insula: 1     He saw Dr. Adler on 11/2/2021 for consultation for invasive monitoring. He is scheduled for F-Gator MRI and CTA on Dec 12/3/2021.     For the past several months, he was having about 2 seizures per week, mostly nocturnal seizures, about 80-90%. He is currently taking aptiom 1200 mg qhs,  zonegran  300-400, Lamictal 150 mg bid, Depakote 500 - 1000. He is also taking CBD oil for anxiety.  Double vision resolved after aptiom was discontinued.     He is complaining of double vision, blurred vision.  He also complains of hand tremor. He had a really bad tremor day about one day ago.     He had VEEG monitoring for presurgical evaluation in March 2021. Total of 9 epileptic seizures were recorded during this admission. These electroclinical seizures showed bifrontal EEG activities at onset, usually with higher amplitude initial activity over the right frontal region compared to the left.  Ictal manifestations variably included tonic stiffening of his upper body, kicking of his legs, vocalizations, and non-purposeful movements of surrounding objects.  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  Aptiom was discontinued at discharge.      No plan for suicide or self injury     Since the hospital discharge, he continue to have frequent seizures, probably several times per week.     He was complaining of vertigo spells, he was seen in the ED.  He was told that he has vestibular problems.       He continue to have small twitches that last for a spit of a second, some of them in clusters. No LOC with these twitches. These likely are not seizures.      Patient tried Peoples diet, but did not continue.     He is seeing Dr. Ben Barros for his depression.  He is also seeing Deo Shipman for therapy. He is complaining of fatigue and sleep over 8-10 hours a day. He is very concerned about the fatigue and is questioning what other options we have for treating his seizures.      They are now open to VEEG monitoring for surgical evaluation, resection or neuromodulations. However, they are still not sure if they want to move forward to the surgical treatment.     Mood improved a lot. No suicidal thoughts, no thoughts of hurting himself.     The patient started to have seizures when he was 16 years old and he was managed  "at the [facility] for the past few years for his seizures.  He is currently taking Aptiom 1200 mg q.h.s. and the Zonegran 300 mg b.i.d. for his epilepsy.  He was recently admitted to the hospital twice because of 2 clusters of seizures, one in January when he was admitted to [facility] and one was in February when he was admitted to [facility].  On both occasions, he had 4-5 back to back generalized tonic-clonic seizures within a short period of time.      According to the mother, the patient has 2 different types of seizures.  Type #1 is described as \"mild seizures,\" during which he will have some facial twitching.  He will have repetitive hand/arm jerking movement.  He will become unresponsive for less than a minute.  Postictally, he was having some confusion and headaches and fatigue.  Before the seizures, he will have an aura of \"seeing bees populating the earth with eggs.\"  This type of seizure is relatively infrequent, average once per month.      Type #2 are generalized tonic-clonic seizures which the patient will have generalized convulsions of the body and his body will be stiff.  It usually will last for 1-2 minutes followed by postictal fatigue and hallucinations.  This happens usually once every 3 months, but sometimes it occurs in clusters with 2-5 seizures back to back.      The patient had tried Keppra and Depakote in the past which did not work.  He had EEGs done in the past which were reported negative.      The patient had severe depression and requested a lot of behavioral health and counseling in the past and at certain time, he was suicidal, but that improved.  At the present time, he is not suicidal.  He does not think about hurting himself.     TRIGGERS FOR SEIZURES:  Depression.      RISK FACTORS FOR SEIZURES:  He had no history of head trauma with loss of consciousness.  No history of febrile convulsions.  No history of CNS infections.      PAST AEDs: Keppra, Depakote. Vimpat caused suicidal " ideations. Depakote discontinued due to risk of platelet dysfunction during intracranial monitoring, and patient resumed Keppra.      PAST MEDICAL HISTORY:  Asperger's disorder, depression, epilepsy.      PAST SURGICAL HISTORY:  Depth electrode intracranial placement for epilepsy surgical evaluation      FAMILY HISTORY:  No family history of seizures.  Both parents have hypertension.  Maternal grandmother had heart disease.      SOCIAL HISTORY:  He is single, living with his mother.  He had some college education but did not finish because of depression; he is hoping to return to college soon.  He is currently working at [employer] and plans to return to work soon.  No smoking, no alcohol, no drug abuse.      REVIEW OF SYSTEMS:  Targeted ROS per HPI>      ALLERGIES:  Sertraline and Zyprexa.     PHYSICAL EXAMINATIONS:    There were no vitals taken for this visit.    General exam: General Appearance: No acute distress. HEENT: Normocephalic, atraumatic. Neck: Supple.  Extremities: No edema.     Neurologic Exam: Alert and oriented x3. Speech fluent, appropriate. Normal attention. Cranial Nerves: Pupils are equal, round, reactive to light and accomodation. Extraocular movement intact. No facial weakness or asymmetry. Hearing normal. Motor Exam: Normal. Coordination:no ataxia.  Gait and Station: normal.      DIAGNOSTIC TESTING: CT scan of the head on 02/27/2018 was reported negative from [facility] [facility] [employer].  He had EEGs in the past which were reported negative    MRI brain with and without contrast 8/5/2021 [facility] - No supratentorial lesions or significant hippocampal asymmetric/evidence for pathology. Stable cerebellar 7 mm T2 hypertense lesion.     MRI 7 Ernestine 4/22/2022 [facility] -   Impression: Motion degraded exam.  1. No definite temporal lobe abnormality, mass, or congenital lesion  identified as a cause of the patient's seizures.  2. No evidence of abnormal enhancement intracranially.   3. Previously  seen abnormal T2 signal in the right cerebellum is not  identified in today's exam, likely obscured secondary to  susceptibility artifacts in the posterior fossa, a less likely  possibility is that the lesion could have resolved.    IMPRESSION:   1. Epilepsy     Since the DBS placement, he is not sure if there are any significant changes of his seizures.  His father feels that post-ictal stages are less since the DBS placement. Almost all his seizures are nocturnal. He complains of pain at the surgical sites.     2.  Asperger's syndrome   3.  Depression. He feels that his depression is getting worse recently. He is working with Dr. Barros to adjust his anti-depressant.     4.  Tremors.  Secondary to Depakote or psychological?    PLAN:   1.  Continue Zonegran 300 mg - 400 mg, Lamictal 150 mg twice daily, Keppra 500 mg twice daily  2.  Continue to follow-up with Dr. Adler for head pain.  3.  Continue follow-up with Dr. Barros for depression.  Continue therapy.  4.  No restriction to resuming CBD and melatonin  5.  Continue DBS at 0.2 mA. 145hz, 90 micro seconds. Will adjust based on seizures and mood changes.  6.  Gabapentin 100 -200 mg every day for head pain.  7.  Follow-up in person in 2 months.  Patient is wondering when he can start to decrease his AEDs. I advised that we need to wait at least one year before we will consider decreasing AEDs.      45 min total time was spent on the day of this visit.      33 min was spent on face to face time  12 min was spent on preparation of visit to review charts and labs, ordering medications and tests, and documentation of clinical information    25 min was spent on DBS interrogation.  Impedence was checked and adjusted. Setting are at 0.2 mA. 145hz, 90 micro seconds

## 2023-03-21 DIAGNOSIS — L76.82 PAIN AT SURGICAL INCISION: ICD-10-CM

## 2023-03-21 DIAGNOSIS — Z96.89 S/P DEEP BRAIN STIMULATOR PLACEMENT: Primary | ICD-10-CM

## 2023-03-21 PROCEDURE — 99207 PR NO BILLABLE SERVICE THIS VISIT: CPT

## 2023-03-23 NOTE — PROGRESS NOTES
Adult Mental Health Outpatient Group Therapy Progress Note     Client Initial Individualized Goals for Treatment: Use mindful breathing for 2 minutes to manage anxiety.       Treatment Goals:  1.  Client will notify staff when needing assistance to develop or implement a coping plan to manage suicidal or self injurious urges.      2.  Will report on symptoms and identify skills to use to manage negative self-talk, anxiety symptoms, dissociation.   Sergio will also explore ways to increase social connection and strengthen his support network. Sergio will also process his thoughts and feelings regarding his longer-term goals with respect to employment.  Progress will be measured by subjective report.      3.  Sergio will use OT time to explore what is helpful and what is not with respect to managing his time and activity levels.  The client will explore and identify which of his / her routines or lifestyle issues that need to change in order to reduce stress.      4.  Will improve wellness related behaviors by finding a psychiatry provider and individual therapist.     Area of Treatment Focus:  Symptom Management, Personal Safety and Develop / Improve Independent Living Skills    Therapeutic Interventions/Treatment Strategies:  Support, Feedback, Safety Assessments and Structured Activity    Response to Treatment Strategies:  Listened, Focused on Goals, Accepted Support and Alert    Name of Group:  OT Clinic     Description and Outcome:  Sergio  attended and participated in a structured occupational therapy group where intervention focused on coping through structured hands-on activities.  Sergio worked in a self directed, goal oriented manner on an ongoing paper pencil puzzle task with fair concentration and energy.  He was generally quiet in group today.  Mood was even. Client demonstrated understanding of session content by engaging in focused task work to help manage symptoms and stressors.    Is this a Weekly Review of the  Progress on the Treatment Plan?  No   Stable

## 2023-03-24 ENCOUNTER — ANCILLARY PROCEDURE (OUTPATIENT)
Dept: CT IMAGING | Facility: CLINIC | Age: 28
End: 2023-03-24
Attending: NEUROLOGICAL SURGERY

## 2023-03-24 ENCOUNTER — ANCILLARY PROCEDURE (OUTPATIENT)
Dept: GENERAL RADIOLOGY | Facility: CLINIC | Age: 28
End: 2023-03-24
Attending: NEUROLOGICAL SURGERY

## 2023-03-24 DIAGNOSIS — L76.82 PAIN AT SURGICAL INCISION: ICD-10-CM

## 2023-03-24 DIAGNOSIS — Z96.89 S/P DEEP BRAIN STIMULATOR PLACEMENT: ICD-10-CM

## 2023-03-24 PROCEDURE — 71046 X-RAY EXAM CHEST 2 VIEWS: CPT | Mod: GC | Performed by: RADIOLOGY

## 2023-03-24 PROCEDURE — 71250 CT THORAX DX C-: CPT | Mod: GC | Performed by: RADIOLOGY

## 2023-04-07 ENCOUNTER — TELEPHONE (OUTPATIENT)
Dept: NEUROLOGY | Facility: CLINIC | Age: 28
End: 2023-04-07

## 2023-04-07 RX ORDER — ESCITALOPRAM OXALATE 10 MG/1
20 TABLET ORAL
COMMUNITY
Start: 2023-03-29

## 2023-04-07 NOTE — TELEPHONE ENCOUNTER
Chart reviewed.  Current seizure meds      4/7/2023     9:31 AM   AED - ANTIEPILEPTIC DRUGS   Gabapentin 100 mg At Bedtime PO      lamoTRIgine 150 mg BID PO      levETIRAcetam 500 mg BID PO      Zonisamide TAKE 3 CAPS (300 MG) EACH MORNING AND 4 CAPS (400 MG) EACH NIGHT (100 MG CAPS)              No listed meds are extended release    Call placed to patient  He reports he has been having this issue with diarrhea since Sunday  Started taking GBP regularly on Friday  He reports he stopped taking his Vilazodone, and doubled his escitalopram on Wednesday last week under the instruction of  at [facility]. However, dispense history shows a billed refill of vilazodone 40mg tabs three days ogo.    Patient reports stooling every hour at times, and then has a longer time without stooling  Worst when he gets up in the morning, improves through the day, but never disappears.  Mostly liquid comes out, but he has not been eating much in the way of solids. He reports drinking liquids is unchanged.  Some mild cramping, but usually has an urge to defecate, then has to go.  He has had two episodes where he has not reached the bathroom in time and has soiled himself.    May have had a seizure Tuesday, but no loss of awareness, just flashing lights.    Patient is taking gabapentin for head pain, and feels is is working 50:50. By this he means ferquency of  headaches have been reduce in half.    Patient is taking CBD extract (not [facility])    Patient reports he does not have a primary care doctor.      We discussed that he has had a number of medication changes recently, and one of those changes my be responsible for the diarrhea, however it is also possible he has a GI infection such as salmonella or other.  I recommended he call 's office as diarrhea is a commons side effect of escitalopram, and to confirm medication plan.  I will also check with a provider here for recommendations and contact  patient again.  I recommended he establish care with a PCP, and offered to place a referral. Patient declined, stating that his parents would probably want him to go somewhere specific.

## 2023-04-07 NOTE — TELEPHONE ENCOUNTER
What is the concern that needs to be addressed by a nurse? Patient called in stating he is having severe diarrhea  and would like a call back right away .    May a detailed message be left on voicemail?yes       Message routed to:me mincep rn pool

## 2023-04-27 NOTE — TELEPHONE ENCOUNTER
Per ,  it is not likely that gabapentin would cause diarrhea- GI infetion more probable.If it continues- need  local MD evaluation    MyChart message sent to patient with comments and instruction to follow with PCP if the diarrhea has not improved

## 2023-04-28 DIAGNOSIS — G40.219 PARTIAL SYMPTOMATIC EPILEPSY WITH COMPLEX PARTIAL SEIZURES, INTRACTABLE, WITHOUT STATUS EPILEPTICUS (H): ICD-10-CM

## 2023-05-02 ENCOUNTER — OFFICE VISIT (OUTPATIENT)
Dept: NEUROSURGERY | Facility: CLINIC | Age: 28
End: 2023-05-02
Payer: COMMERCIAL

## 2023-05-02 VITALS
DIASTOLIC BLOOD PRESSURE: 84 MMHG | SYSTOLIC BLOOD PRESSURE: 119 MMHG | RESPIRATION RATE: 16 BRPM | OXYGEN SATURATION: 98 % | HEART RATE: 86 BPM

## 2023-05-02 DIAGNOSIS — G40.219 PARTIAL SYMPTOMATIC EPILEPSY WITH COMPLEX PARTIAL SEIZURES, INTRACTABLE, WITHOUT STATUS EPILEPTICUS (H): Primary | ICD-10-CM

## 2023-05-02 DIAGNOSIS — R51.9 ACHING HEADACHE: Primary | ICD-10-CM

## 2023-05-02 PROCEDURE — 99212 OFFICE O/P EST SF 10 MIN: CPT | Performed by: NEUROLOGICAL SURGERY

## 2023-05-02 RX ORDER — ZONISAMIDE 100 MG/1
CAPSULE ORAL
Qty: 630 CAPSULE | Refills: 3 | Status: SHIPPED | OUTPATIENT
Start: 2023-05-02 | End: 2024-05-03

## 2023-05-02 ASSESSMENT — PAIN SCALES - GENERAL: PAINLEVEL: MILD PAIN (3)

## 2023-05-02 NOTE — LETTER
5/2/2023       RE: Amrit Padilla  [address]  [place] [address]       Dear Colleague,    Thank you for referring your patient, Amrit Padilla, to the [facility] [place] at [facility]. Please see a copy of my visit note below.    Neurosurgery Attending Epilepsy Follow-Up Note    HPI: Sergio Padilla is a 26 y/o gentleman with medically refractory epilepsy, negative SEEG evaluation who is now about 8 months s/p bilateral ANT-DBS. He has done well since surgery. He comes in today to discuss pain at his chest wall incision site and headaches. After his SEEG, he had very specific pains related to specific incisions as well as generalized headaches which we at least in part attributed to a small temporal subdural hematoma that developed after SEEG and has since resolved. DBS has been turned on, but at very low amplitude as there were some concerns about exacerbating his depression. He has a chest XR and chest CT which don't show any changes in IPG placement, or fluid collections around the IPG. No real change in seizure frequency or severity since surgery although his parents think that his post-ictal fatigue is better since surgery. Typically lasts 3-4 days, now usually 1 day. Amplitude is currently at minimal voltage so difficult to know efficacy.    Exam:  Awake, alert, oriented x 3  Attends, follows, fluent  PERRL  EOMI  FS  TML  BUE/BLE 5/5, no drift  Wounds c/d/I, no erythema, swelling or pain    Pathology: n/a    Eduardo outcome: no change    A/P: 26 y/o gentleman with medically refractory epilepsy s/p bilateral ANT-DBS with somatic concerns. His incisions look well healed w/o signs of infection. He has not seen a headache specialist yet and is interested in hearing about potential options.    - Headache neurologist consult  - RTC prn      Again, thank you for allowing me to participate in the care of your patient.       Sincerely,    Tello Adler MD

## 2023-05-02 NOTE — TELEPHONE ENCOUNTER
zonisamide (ZONEGRAN) 100 MG capsule    Last Written Prescription Date:  4/22/22  Last Fill Quantity: 630,   # refills: 3  Last Office Visit :3/16/23  Future Office visit:  5/8/23    Filling per [facility] medication refill protocols - seizure medications.  Not all labs required.

## 2023-05-03 NOTE — PROGRESS NOTES
Neurosurgery Attending Epilepsy Follow-Up Note    HPI: Sergio Padilla is a 28 y/o gentleman with medically refractory epilepsy, negative SEEG evaluation who is now about 8 months s/p bilateral ANT-DBS. He has done well since surgery. He comes in today to discuss pain at his chest wall incision site and headaches. After his SEEG, he had very specific pains related to specific incisions as well as generalized headaches which we at least in part attributed to a small temporal subdural hematoma that developed after SEEG and has since resolved. DBS has been turned on, but at very low amplitude as there were some concerns about exacerbating his depression. He has a chest XR and chest CT which don't show any changes in IPG placement, or fluid collections around the IPG. No real change in seizure frequency or severity since surgery although his parents think that his post-ictal fatigue is better since surgery. Typically lasts 3-4 days, now usually 1 day. Amplitude is currently at minimal voltage so difficult to know efficacy.    Exam:  Awake, alert, oriented x 3  Attends, follows, fluent  PERRL  EOMI  FS  TML  BUE/BLE 5/5, no drift  Wounds c/d/I, no erythema, swelling or pain    Pathology: n/a    Eduardo outcome: no change    A/P: 28 y/o gentleman with medically refractory epilepsy s/p bilateral ANT-DBS with somatic concerns. His incisions look well healed w/o signs of infection. He has not seen a headache specialist yet and is interested in hearing about potential options.    - Headache neurologist consult  - RTC prn

## 2023-05-08 ENCOUNTER — OFFICE VISIT (OUTPATIENT)
Dept: NEUROLOGY | Facility: CLINIC | Age: 28
End: 2023-05-08

## 2023-05-08 VITALS
HEART RATE: 83 BPM | SYSTOLIC BLOOD PRESSURE: 127 MMHG | TEMPERATURE: 97 F | WEIGHT: 206.8 LBS | BODY MASS INDEX: 28.01 KG/M2 | DIASTOLIC BLOOD PRESSURE: 89 MMHG | HEIGHT: 72 IN

## 2023-05-08 DIAGNOSIS — G40.909 SEIZURE DISORDER (H): Primary | ICD-10-CM

## 2023-05-08 ASSESSMENT — PAIN SCALES - GENERAL: PAINLEVEL: MILD PAIN (3)

## 2023-05-08 NOTE — LETTER
2023       RE: Amrit Padilla  : 1995   MRN: [id number]        Dear Colleague,    Thank you for referring your patient, Amrit Padilla, to the [facility] EPILEPSY CARE at [facility]. Please see a copy of my visit note below.    CHIEF COMPLIANT:  Follow-up for epilepsy and DBS placement.     HISTORY OF PRESENT ILLNESS:  In person visit today.  He is accompanied by his parents in the clinic.  Patient is a 27-year-old right handed man with history of epilepsy, Asperger's syndrome and depression who was recently admitted for intracranial monitoring with depth electrodes in May for surgical evaluation for intractable epilepsy.  He had DBS placement at the end of Aug. 2022. DBS was turned down at 0.2 mA. 145hz, 90 micro seconds on 10/31/2022. Since the DBS placement, he is not sure if there are any significant changes of his seizures.  His father feels that post-ictal stages are less (about 50%) since the DBS placement. Almost all his seizures are nocturnal.  He feels that his depression is going up and down recently. He is working with Dr. Barros to adjust his anti-depressant. He will see Dr. Barros this week.    He complained of diarrhea recently.  Diarrhea resolved after Gabapentin was stopped.    Intracranial monitoring was complicated by the development of a right-sided subdural hematoma which is stable with repeat CT head scheduled for tomorrow.  Clinically headaches are improving although he is endorsing scalp discomfort from the stitches which he hopes to have out tomorrow.  He has follow-up scheduled with Dr. Adler.  He is taking approximately 1000 mg acetaminophen twice daily for headache pain.  He describes headache pain as a 5/10 and relieved to about a 4/10 with Tylenol.  He was advised not to use Excedrin or other medications with antiplatelet effect in the setting of subdural hematoma at time of discharge.    Patient was monitored for  10 days with intracranial electrodes during which time he had 4 electroclinical seizures.  All of the seizures occurred out of sleep which are consistent with his semiology.  He was found to have bilateral upper extremity tonic extension elevation at seizure onset which would followed by generalized activity.  Depth electrodes were in unable to fully localize seizure onset and monitoring was terminated after 4 electroclinical seizures as further data was felt to be unnecessary to determine treatment course.  Dr. Dukes discussed deep brain stimulation (DBS) with patient during hospitalization and today's visit is to further discuss treatment options after intracranial monitoring.    Since discharge from the hospital no known electroclinical seizures, all of his seizures out of sleep and he is not always aware of seizures with that said no postictal confusion appreciated by dad and Amrit has not had any cuts or abrasions in the mouth to suggest a seizure.    Needs to follow-up with Dr. Ben Barros for psychiatric care.  He expressed feeling overwhelmed today by the discussion of epilepsy treatment and surgical options.    Intracranial monitoring 4/25/2022 - 5/5/2022 - 4  seizures were captured, but there was concern that we were not capturing the seizure onset zone and early rapid spread as well as multifocal regions of seizure propagation are possibilities. These suggested that a localized surgical approach would be unlikely to provide seizure freedom. Additionally, it was felt that further seizure recording would not clarify or .     CMC was done in Aug. 2021, consensus was that patient should proceed with invasive monitoring. He should be able to tolerate intracranial monitoring.  The lateralization and localization are not clear based on scalp EEG monitoring. Temporal lobe epilepsy is possible. We will have invasive depth electrodes, 8 symmetric electrodes on each side:     Amygdala: 1  Anterior  and posterior hippocampus: 3  Cingulate and prefrontal: 2  Orbital frontal:1  Insula: 1     He saw Dr. Adler on 11/2/2021 for consultation for invasive monitoring. He is scheduled for F-Gator MRI and CTA on Dec 12/3/2021.     For the past several months, he was having about 2 seizures per week, mostly nocturnal seizures, about 80-90%. He is currently taking aptiom 1200 mg qhs,  zonegran 300-400, Lamictal 150 mg bid, Depakote 500 - 1000. He is also taking CBD oil for anxiety.  Double vision resolved after aptiom was discontinued.     He is complaining of double vision, blurred vision.  He also complains of hand tremor. He had a really bad tremor day about one day ago.     He had VEEG monitoring for presurgical evaluation in March 2021. Total of 9 epileptic seizures were recorded during this admission. These electroclinical seizures showed bifrontal EEG activities at onset, usually with higher amplitude initial activity over the right frontal region compared to the left.  Ictal manifestations variably included tonic stiffening of his upper body, kicking of his legs, vocalizations, and non-purposeful movements of surrounding objects.  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  Aptiom was discontinued at discharge.      No plan for suicide or self injury     Since the hospital discharge, he continue to have frequent seizures, probably several times per week.     He was complaining of vertigo spells, he was seen in the ED.  He was told that he has vestibular problems.       He continue to have small twitches that last for a spit of a second, some of them in clusters. No LOC with these twitches. These likely are not seizures.      Patient tried Peoples diet, but did not continue.     He is seeing Dr. Ben Barros for his depression.  He is also seeing Deo Shipman for therapy. He is complaining of fatigue and sleep over 8-10 hours a day. He is very concerned about the fatigue and is questioning what other  "options we have for treating his seizures.      They are now open to VEEG monitoring for surgical evaluation, resection or neuromodulations. However, they are still not sure if they want to move forward to the surgical treatment.     Mood improved a lot. No suicidal thoughts, no thoughts of hurting himself.     The patient started to have seizures when he was 16 years old and he was managed at the [facility] for the past few years for his seizures.  He is currently taking Aptiom 1200 mg q.h.s. and the Zonegran 300 mg b.i.d. for his epilepsy.  He was recently admitted to the hospital twice because of 2 clusters of seizures, one in January when he was admitted to [facility] and one was in February when he was admitted to [facility].  On both occasions, he had 4-5 back to back generalized tonic-clonic seizures within a short period of time.      According to the mother, the patient has 2 different types of seizures.  Type #1 is described as \"mild seizures,\" during which he will have some facial twitching.  He will have repetitive hand/arm jerking movement.  He will become unresponsive for less than a minute.  Postictally, he was having some confusion and headaches and fatigue.  Before the seizures, he will have an aura of \"seeing bees populating the earth with eggs.\"  This type of seizure is relatively infrequent, average once per month.      Type #2 are generalized tonic-clonic seizures which the patient will have generalized convulsions of the body and his body will be stiff.  It usually will last for 1-2 minutes followed by postictal fatigue and hallucinations.  This happens usually once every 3 months, but sometimes it occurs in clusters with 2-5 seizures back to back.      The patient had tried Keppra and Depakote in the past which did not work.  He had EEGs done in the past which were reported negative.      The patient had severe depression and requested a lot of behavioral health and counseling in the past and at " certain time, he was suicidal, but that improved.  At the present time, he is not suicidal.  He does not think about hurting himself.     TRIGGERS FOR SEIZURES:  Depression.      RISK FACTORS FOR SEIZURES:  He had no history of head trauma with loss of consciousness.  No history of febrile convulsions.  No history of CNS infections.      PAST AEDs: Keppra, Depakote. Vimpat caused suicidal ideations. Depakote discontinued due to risk of platelet dysfunction during intracranial monitoring, and patient resumed Keppra.      PAST MEDICAL HISTORY:  Asperger's disorder, depression, epilepsy.      PAST SURGICAL HISTORY:  Depth electrode intracranial placement for epilepsy surgical evaluation      FAMILY HISTORY:  No family history of seizures.  Both parents have hypertension.  Maternal grandmother had heart disease.      SOCIAL HISTORY:  He is single, living with his mother.  He had some college education but did not finish because of depression; he is hoping to return to college soon.  He is currently working at [employer] and plans to return to work soon.  No smoking, no alcohol, no drug abuse.      REVIEW OF SYSTEMS:  Targeted ROS per HPI>      ALLERGIES:  Sertraline and Zyprexa.     PHYSICAL EXAMINATIONS:    Height 6' (182.9 cm), weight 206 lb 12.8 oz (93.8 kg).    General exam: General Appearance: No acute distress. HEENT: Normocephalic, atraumatic. Neck: Supple.  Extremities: No edema.     Neurologic Exam: Alert and oriented x3. Speech fluent, appropriate. Normal attention. Cranial Nerves: Pupils are equal, round, reactive to light and accomodation. Extraocular movement intact. No facial weakness or asymmetry. Hearing normal. Motor Exam: Normal. Coordination:no ataxia.  Gait and Station: normal.      DIAGNOSTIC TESTING: CT [facility] of the head [facility] 02/27/2018 [facility]  He had EEGs in the past which were reported negative    MRI brain with and without contrast 8/5/2021 [facility] - No  supratentorial lesions or significant hippocampal asymmetric/evidence for pathology. Stable cerebellar 7 mm T2 hypertense lesion.     MRI 7 Ernestine 4/22/2022 [facility] -   Impression: Motion degraded exam.  1. No definite temporal lobe abnormality, mass, or congenital lesion  identified as a cause of the patient's seizures.  2. No evidence of abnormal enhancement intracranially.   3. Previously seen abnormal T2 signal in the right cerebellum is not  identified in today's exam, likely obscured secondary to  susceptibility artifacts in the posterior fossa, a less likely  possibility is that the lesion could have resolved.    IMPRESSION:   1. Epilepsy     DBS was turned down at 0.2 mA. 145hz, 90 micro seconds on 10/31/2022. Since the DBS placement, he is not sure if there are any significant changes of his seizures.  His father feels that post-ictal stages are less (about 50%) since the DBS placement. Almost all his seizures are nocturnal.      2.  Asperger's syndrome   3.  Depression. He feels that his depression is going up and down recently. He is working with Dr. Barros to adjust his anti-depressant. He will see Dr. Barros this week.     4.  Tremors.  Resolved.    PLAN:   1.  Continue Zonegran 300 mg - 400 mg, Lamictal 150 mg twice daily, Keppra 500 mg twice daily  2.  Continue to follow-up with Dr. Adler.  3.  Continue follow-up with Dr. Barros for depression.  Continue therapy.  4.  No restriction to resuming CBD and melatonin  5.  Increase DBS at 0.5 mA. 145hz, 90 micro seconds. Will adjust based on seizures and mood changes in 2 months.  6.  Off Gabapentin because of diarrhea.  7.  Follow-up in person in 2 months.  Patient is wondering when he can start to decrease his AEDs. I advised that we need to wait at least one year before we will consider decreasing AEDs.      42 min total time was spent on the day of this visit.       30 min was spent on face to face time  12 min was spent on preparation  of visit to review charts and labs, ordering medications and tests, and documentation of clinical information     20 min was spent on DBS interrogation.  Impedence was checked and adjusted. Setting are at 0.2 mA. 145hz, 90 micro seconds        Again, thank you for allowing me to participate in the care of your patient.      Sincerely,    Norman Dukes MD

## 2023-05-08 NOTE — PROGRESS NOTES
CHIEF COMPLIANT:  Follow-up for epilepsy and DBS placement.     HISTORY OF PRESENT ILLNESS:  In person visit today.  He is accompanied by his parents in the clinic.  Patient is a 27-year-old right handed man with history of epilepsy, Asperger's syndrome and depression who was recently admitted for intracranial monitoring with depth electrodes in May for surgical evaluation for intractable epilepsy.  He had DBS placement at the end of Aug. 2022. DBS was turned down at 0.2 mA. 145hz, 90 micro seconds on 10/31/2022. Since the DBS placement, he is not sure if there are any significant changes of his seizures.  His father feels that post-ictal stages are less (about 50%) since the DBS placement. Almost all his seizures are nocturnal.  He feels that his depression is going up and down recently. He is working with Dr. Barros to adjust his anti-depressant. He will see Dr. Barros this week.    He complained of diarrhea recently.  Diarrhea resolved after Gabapentin was stopped.    Intracranial monitoring was complicated by the development of a right-sided subdural hematoma which is stable with repeat CT head scheduled for tomorrow.  Clinically headaches are improving although he is endorsing scalp discomfort from the stitches which he hopes to have out tomorrow.  He has follow-up scheduled with Dr. Adler.  He is taking approximately 1000 mg acetaminophen twice daily for headache pain.  He describes headache pain as a 5/10 and relieved to about a 4/10 with Tylenol.  He was advised not to use Excedrin or other medications with antiplatelet effect in the setting of subdural hematoma at time of discharge.    Patient was monitored for 10 days with intracranial electrodes during which time he had 4 electroclinical seizures.  All of the seizures occurred out of sleep which are consistent with his semiology.  He was found to have bilateral upper extremity tonic extension elevation at seizure onset which would followed by  generalized activity.  Depth electrodes were in unable to fully localize seizure onset and monitoring was terminated after 4 electroclinical seizures as further data was felt to be unnecessary to determine treatment course.  Dr. Dukes discussed deep brain stimulation (DBS) with patient during hospitalization and today's visit is to further discuss treatment options after intracranial monitoring.    Since discharge from the hospital no known electroclinical seizures, all of his seizures out of sleep and he is not always aware of seizures with that said no postictal confusion appreciated by dad and Amrit has not had any cuts or abrasions in the mouth to suggest a seizure.    Needs to follow-up with Dr. Ben Barros for psychiatric care.  He expressed feeling overwhelmed today by the discussion of epilepsy treatment and surgical options.    Intracranial monitoring 4/25/2022 - 5/5/2022 - 4  seizures were captured, but there was concern that we were not capturing the seizure onset zone and early rapid spread as well as multifocal regions of seizure propagation are possibilities. These suggested that a localized surgical approach would be unlikely to provide seizure freedom. Additionally, it was felt that further seizure recording would not clarify or .     CMC was done in Aug. 2021, consensus was that patient should proceed with invasive monitoring. He should be able to tolerate intracranial monitoring.  The lateralization and localization are not clear based on scalp EEG monitoring. Temporal lobe epilepsy is possible. We will have invasive depth electrodes, 8 symmetric electrodes on each side:     Amygdala: 1  Anterior and posterior hippocampus: 3  Cingulate and prefrontal: 2  Orbital frontal:1  Insula: 1     He saw Dr. Adler on 11/2/2021 for consultation for invasive monitoring. He is scheduled for F-Gator MRI and CTA on Dec 12/3/2021.     For the past several months, he was having about 2 seizures  per week, mostly nocturnal seizures, about 80-90%. He is currently taking aptiom 1200 mg qhs,  zonegran 300-400, Lamictal 150 mg bid, Depakote 500 - 1000. He is also taking CBD oil for anxiety.  Double vision resolved after aptiom was discontinued.     He is complaining of double vision, blurred vision.  He also complains of hand tremor. He had a really bad tremor day about one day ago.     He had VEEG monitoring for presurgical evaluation in March 2021. Total of 9 epileptic seizures were recorded during this admission. These electroclinical seizures showed bifrontal EEG activities at onset, usually with higher amplitude initial activity over the right frontal region compared to the left.  Ictal manifestations variably included tonic stiffening of his upper body, kicking of his legs, vocalizations, and non-purposeful movements of surrounding objects.  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  Aptiom was discontinued at discharge.      No plan for suicide or self injury     Since the hospital discharge, he continue to have frequent seizures, probably several times per week.     He was complaining of vertigo spells, he was seen in the ED.  He was told that he has vestibular problems.       He continue to have small twitches that last for a spit of a second, some of them in clusters. No LOC with these twitches. These likely are not seizures.      Patient tried Peoples diet, but did not continue.     He is seeing Dr. Ben Barros for his depression.  He is also seeing Deo Shipman for therapy. He is complaining of fatigue and sleep over 8-10 hours a day. He is very concerned about the fatigue and is questioning what other options we have for treating his seizures.      They are now open to VEEG monitoring for surgical evaluation, resection or neuromodulations. However, they are still not sure if they want to move forward to the surgical treatment.     Mood improved a lot. No suicidal thoughts, no thoughts  "of hurting himself.     The patient started to have seizures when he was 16 years old and he was managed at the [facility] for the past few years for his seizures.  He is currently taking Aptiom 1200 mg q.h.s. and the Zonegran 300 mg b.i.d. for his epilepsy.  He was recently admitted to the hospital twice because of 2 clusters of seizures, one in January when he was admitted to [facility] and one was in February when he was admitted to [facility].  On both occasions, he had 4-5 back to back generalized tonic-clonic seizures within a short period of time.      According to the mother, the patient has 2 different types of seizures.  Type #1 is described as \"mild seizures,\" during which he will have some facial twitching.  He will have repetitive hand/arm jerking movement.  He will become unresponsive for less than a minute.  Postictally, he was having some confusion and headaches and fatigue.  Before the seizures, he will have an aura of \"seeing bees populating the earth with eggs.\"  This type of seizure is relatively infrequent, average once per month.      Type #2 are generalized tonic-clonic seizures which the patient will have generalized convulsions of the body and his body will be stiff.  It usually will last for 1-2 minutes followed by postictal fatigue and hallucinations.  This happens usually once every 3 months, but sometimes it occurs in clusters with 2-5 seizures back to back.      The patient had tried Keppra and Depakote in the past which did not work.  He had EEGs done in the past which were reported negative.      The patient had severe depression and requested a lot of behavioral health and counseling in the past and at certain time, he was suicidal, but that improved.  At the present time, he is not suicidal.  He does not think about hurting himself.     TRIGGERS FOR SEIZURES:  Depression.      RISK FACTORS FOR SEIZURES:  He had no history of head trauma with loss of consciousness.  No history of " febrile convulsions.  No history of CNS infections.      PAST AEDs: Keppra, Depakote. Vimpat caused suicidal ideations. Depakote discontinued due to risk of platelet dysfunction during intracranial monitoring, and patient resumed Keppra.      PAST MEDICAL HISTORY:  Asperger's disorder, depression, epilepsy.      PAST SURGICAL HISTORY:  Depth electrode intracranial placement for epilepsy surgical evaluation      FAMILY HISTORY:  No family history of seizures.  Both parents have hypertension.  Maternal grandmother had heart disease.      SOCIAL HISTORY:  He is single, living with his mother.  He had some college education but did not finish because of depression; he is hoping to return to college soon.  He is currently working at [employer] and plans to return to work soon.  No smoking, no alcohol, no drug abuse.      REVIEW OF SYSTEMS:  Targeted ROS per HPI>      ALLERGIES:  Sertraline and Zyprexa.     PHYSICAL EXAMINATIONS:    Height 6' (182.9 cm), weight 206 lb 12.8 oz (93.8 kg).    General exam: General Appearance: No acute distress. HEENT: Normocephalic, atraumatic. Neck: Supple.  Extremities: No edema.     Neurologic Exam: Alert and oriented x3. Speech fluent, appropriate. Normal attention. Cranial Nerves: Pupils are equal, round, reactive to light and accomodation. Extraocular movement intact. No facial weakness or asymmetry. Hearing normal. Motor Exam: Normal. Coordination:no ataxia.  Gait and Station: normal.      DIAGNOSTIC TESTING: CT scan of the head on 02/27/2018 was reported negative from [facility].  He had EEGs in the past which were reported negative    MRI brain with and without contrast 8/5/2021 [facility] - No supratentorial lesions or significant hippocampal asymmetric/evidence for pathology. Stable cerebellar 7 mm T2 hypertense lesion.     MRI 7 Ernestine 4/22/2022 [facility] -   Impression: Motion degraded exam.  1. No definite temporal lobe abnormality, mass, or congenital  lesion  identified as a cause of the patient's seizures.  2. No evidence of abnormal enhancement intracranially.   3. Previously seen abnormal T2 signal in the right cerebellum is not  identified in today's exam, likely obscured secondary to  susceptibility artifacts in the posterior fossa, a less likely  possibility is that the lesion could have resolved.    IMPRESSION:   1. Epilepsy     DBS was turned down at 0.2 mA. 145hz, 90 micro seconds on 10/31/2022. Since the DBS placement, he is not sure if there are any significant changes of his seizures.  His father feels that post-ictal stages are less (about 50%) since the DBS placement. Almost all his seizures are nocturnal.      2.  Asperger's syndrome   3.  Depression. He feels that his depression is going up and down recently. He is working with Dr. Barros to adjust his anti-depressant. He will see Dr. Barros this week.     4.  Tremors.  Resolved.    PLAN:   1.  Continue Zonegran 300 mg - 400 mg, Lamictal 150 mg twice daily, Keppra 500 mg twice daily  2.  Continue to follow-up with Dr. Adler.  3.  Continue follow-up with Dr. Barros for depression.  Continue therapy.  4.  No restriction to resuming CBD and melatonin  5.  Increase DBS at 0.5 mA. 145hz, 90 micro seconds. Will adjust based on seizures and mood changes in 2 months.  6.  Off Gabapentin because of diarrhea.  7.  Follow-up in person in 2 months.  Patient is wondering when he can start to decrease his AEDs. I advised that we need to wait at least one year before we will consider decreasing AEDs.      42 min total time was spent on the day of this visit.       30 min was spent on face to face time  12 min was spent on preparation of visit to review charts and labs, ordering medications and tests, and documentation of clinical information     20 min was spent on DBS interrogation.  Impedence was checked and adjusted. Setting are at 0.2 mA. 145hz, 90 micro seconds

## 2023-05-15 DIAGNOSIS — G40.909 RECURRENT SEIZURES (H): ICD-10-CM

## 2023-05-18 DIAGNOSIS — G40.909 RECURRENT SEIZURES (H): ICD-10-CM

## 2023-05-18 NOTE — TELEPHONE ENCOUNTER
Refill on Keppra 500mg please send to local pharmacy       [id number] - [place], [place] - [address]  [address]  [facility] [address]  Phone: [phone] Fax: [phone]

## 2023-05-22 RX ORDER — LEVETIRACETAM 500 MG/1
500 TABLET ORAL 2 TIMES DAILY
Qty: 186 TABLET | Refills: 3 | Status: SHIPPED | OUTPATIENT
Start: 2023-05-22

## 2023-05-22 NOTE — TELEPHONE ENCOUNTER
levETIRAcetam (KEPPRA) 500 MG tablet   Last Written Prescription Date: 2/21/23  Last Fill Quantity: 180,   # refills: 0  Last Office Visit : 5/8/23  Future Office visit:  7/17/23

## 2023-05-26 RX ORDER — LEVETIRACETAM 500 MG/1
500 TABLET ORAL 2 TIMES DAILY
Qty: 180 TABLET | Refills: 3 | Status: SHIPPED | OUTPATIENT
Start: 2023-05-26

## 2023-07-07 ENCOUNTER — TELEPHONE (OUTPATIENT)
Dept: NEUROLOGY | Facility: CLINIC | Age: 28
End: 2023-07-07

## 2023-07-07 NOTE — LETTER
7/17/2023       RE: Amrit Padilla  [address]  [facility] [address]          Dear Amrit,      You may share this letter with whomever you feel is appropriate.    You are diagnosed with epilepsy, specifically intractable symptomatic focal epilepsy with seizures with impaired awareness.      Yours Sincerely            Norman Dukes M.D.

## 2023-09-12 ENCOUNTER — TELEPHONE (OUTPATIENT)
Dept: NEUROLOGY | Facility: CLINIC | Age: 28
End: 2023-09-12

## 2023-09-12 NOTE — TELEPHONE ENCOUNTER
What is the concern that needs to be addressed by a nurse? Joellen from [facility] is calling on behalf of Dr.. Barros who is wondering if abilify would be ok for patient to take even on seizure medication... a good call back number is [phone]    May a detailed message be left on voicemail? yes    Date of last office visit:     Message routed to: mincep

## 2023-09-25 ENCOUNTER — OFFICE VISIT (OUTPATIENT)
Dept: NEUROLOGY | Facility: CLINIC | Age: 28
End: 2023-09-25
Payer: COMMERCIAL

## 2023-09-25 VITALS
HEIGHT: 72 IN | TEMPERATURE: 97.2 F | BODY MASS INDEX: 27.14 KG/M2 | DIASTOLIC BLOOD PRESSURE: 70 MMHG | WEIGHT: 200.4 LBS | SYSTOLIC BLOOD PRESSURE: 105 MMHG | HEART RATE: 50 BPM

## 2023-09-25 DIAGNOSIS — G40.909 SEIZURE DISORDER (H): Primary | ICD-10-CM

## 2023-09-25 RX ORDER — DIPHENHYDRAMINE HCL 25 MG
50 CAPSULE ORAL DAILY PRN
COMMUNITY

## 2023-09-25 NOTE — PROGRESS NOTES
CHIEF COMPLIANT:  Follow-up for epilepsy and DBS placement.     HISTORY OF PRESENT ILLNESS:  In person visit today.  He is accompanied by his mother in the clinic.  Patient is a 28-year-old right handed man with history of epilepsy, Asperger's syndrome and depression who was recently admitted for intracranial monitoring with depth electrodes in May for surgical evaluation for intractable epilepsy.  He had DBS placement at the end of Aug. 2022.  Since the DBS placement, he is not sure if there are any significant changes of his seizures.  His father feels that post-ictal stages are less (about 50%) since the DBS placement. Almost all his seizures are nocturnal.  He feels that his depression is going up and down recently. He is working with Dr. Barros to adjust his anti-depressant. He sees Dr. Barros.    During the last visit, DBS was turned up at 0.5 mA. 145hz, 90 micro seconds on 10/31/2022. According to the Mom, his seizures seemed to improved for the past 4 months. He was probably having 3-4 big seizures that he will report to his parents the morning after the seizure.  However, for the past 4 months, he only reported one seizure. He is under a lot of stress lately due to school work.  All his seizures are nocturnal.    PHQ-9 is 13, no thoughts of self harming.    He complained of diarrhea recently.  Diarrhea resolved after Gabapentin was stopped.    Intracranial monitoring was complicated by the development of a right-sided subdural hematoma which is stable with repeat CT head scheduled for tomorrow.  Clinically headaches are improving although he is endorsing scalp discomfort from the stitches which he hopes to have out tomorrow.  He has follow-up scheduled with Dr. Adler.  He is taking approximately 1000 mg acetaminophen twice daily for headache pain.  He describes headache pain as a 5/10 and relieved to about a 4/10 with Tylenol.  He was advised not to use Excedrin or other medications with antiplatelet  effect in the setting of subdural hematoma at time of discharge.    Patient was monitored for 10 days with intracranial electrodes during which time he had 4 electroclinical seizures.  All of the seizures occurred out of sleep which are consistent with his semiology.  He was found to have bilateral upper extremity tonic extension elevation at seizure onset which would followed by generalized activity.  Depth electrodes were in unable to fully localize seizure onset and monitoring was terminated after 4 electroclinical seizures as further data was felt to be unnecessary to determine treatment course.  Dr. Dukes discussed deep brain stimulation (DBS) with patient during hospitalization and today's visit is to further discuss treatment options after intracranial monitoring.    Since discharge from the hospital no known electroclinical seizures, all of his seizures out of sleep and he is not always aware of seizures with that said no postictal confusion appreciated by dad and Amrit has not had any cuts or abrasions in the mouth to suggest a seizure.    Needs to follow-up with Dr. Ben Barros for psychiatric care.  He expressed feeling overwhelmed today by the discussion of epilepsy treatment and surgical options.    Intracranial monitoring 4/25/2022 - 5/5/2022 - 4  seizures were captured, but there was concern that we were not capturing the seizure onset zone and early rapid spread as well as multifocal regions of seizure propagation are possibilities. These suggested that a localized surgical approach would be unlikely to provide seizure freedom. Additionally, it was felt that further seizure recording would not clarify or .     CMC was done in Aug. 2021, consensus was that patient should proceed with invasive monitoring. He should be able to tolerate intracranial monitoring.  The lateralization and localization are not clear based on scalp EEG monitoring. Temporal lobe epilepsy is possible. We will  have invasive depth electrodes, 8 symmetric electrodes on each side:     Amygdala: 1  Anterior and posterior hippocampus: 3  Cingulate and prefrontal: 2  Orbital frontal:1  Insula: 1     He saw Dr. Adler on 11/2/2021 for consultation for invasive monitoring. He is scheduled for F-Gator MRI and CTA on Dec 12/3/2021.     For the past several months, he was having about 2 seizures per week, mostly nocturnal seizures, about 80-90%. He is currently taking aptiom 1200 mg qhs,  zonegran 300-400, Lamictal 150 mg bid, Depakote 500 - 1000. He is also taking CBD oil for anxiety.  Double vision resolved after aptiom was discontinued.     He is complaining of double vision, blurred vision.  He also complains of hand tremor. He had a really bad tremor day about one day ago.     He had VEEG monitoring for presurgical evaluation in March 2021. Total of 9 epileptic seizures were recorded during this admission. These electroclinical seizures showed bifrontal EEG activities at onset, usually with higher amplitude initial activity over the right frontal region compared to the left.  Ictal manifestations variably included tonic stiffening of his upper body, kicking of his legs, vocalizations, and non-purposeful movements of surrounding objects.  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  Aptiom was discontinued at discharge.      No plan for suicide or self injury     Since the hospital discharge, he continue to have frequent seizures, probably several times per week.     He was complaining of vertigo spells, he was seen in the ED.  He was told that he has vestibular problems.       He continue to have small twitches that last for a spit of a second, some of them in clusters. No LOC with these twitches. These likely are not seizures.      Patient tried Peoples diet, but did not continue.     He is seeing Dr. Ben Barros for his depression.  He is also seeing Deo Shipman for therapy. He is complaining of fatigue and  "sleep over 8-10 hours a day. He is very concerned about the fatigue and is questioning what other options we have for treating his seizures.      They are now open to VEEG monitoring for surgical evaluation, resection or neuromodulations. However, they are still not sure if they want to move forward to the surgical treatment.     Mood improved a lot. No suicidal thoughts, no thoughts of hurting himself.     The patient started to have seizures when he was 16 years old and he was managed at the [facility] for the past few years for his seizures.  He is currently taking Aptiom 1200 mg q.h.s. and the Zonegran 300 mg b.i.d. for his epilepsy.  He was recently admitted to the hospital twice because of 2 clusters of seizures, one in January when he was admitted to [facility] and one was in February when he was admitted to [facility].  On both occasions, he had 4-5 back to back generalized tonic-clonic seizures within a short period of time.      According to the mother, the patient has 2 different types of seizures.  Type #1 is described as \"mild seizures,\" during which he will have some facial twitching.  He will have repetitive hand/arm jerking movement.  He will become unresponsive for less than a minute.  Postictally, he was having some confusion and headaches and fatigue.  Before the seizures, he will have an aura of \"seeing bees populating the earth with eggs.\"  This type of seizure is relatively infrequent, average once per month.      Type #2 are generalized tonic-clonic seizures which the patient will have generalized convulsions of the body and his body will be stiff.  It usually will last for 1-2 minutes followed by postictal fatigue and hallucinations.  This happens usually once every 3 months, but sometimes it occurs in clusters with 2-5 seizures back to back.      The patient had tried Keppra and Depakote in the past which did not work.  He had EEGs done in the past which were reported negative.      The patient " had severe depression and requested a lot of behavioral health and counseling in the past and at certain time, he was suicidal, but that improved.  At the present time, he is not suicidal.  He does not think about hurting himself.     TRIGGERS FOR SEIZURES:  Depression.      RISK FACTORS FOR SEIZURES:  He had no history of head trauma with loss of consciousness.  No history of febrile convulsions.  No history of CNS infections.      PAST AEDs: Keppra, Depakote. Vimpat caused suicidal ideations. Depakote discontinued due to risk of platelet dysfunction during intracranial monitoring, and patient resumed Keppra.      PAST MEDICAL HISTORY:  Asperger's disorder, depression, epilepsy.      PAST SURGICAL HISTORY:  Depth electrode intracranial placement for epilepsy surgical evaluation      FAMILY HISTORY:  No family history of seizures.  Both parents have hypertension.  Maternal grandmother had heart disease.      SOCIAL HISTORY:  He is single, living with his mother.  He had some college education but did not finish because of depression; he is hoping to return to college soon.  He is currently working at [employer] and plans to return to work soon.  No smoking, no alcohol, no drug abuse.      REVIEW OF SYSTEMS:  Targeted ROS per HPI>      ALLERGIES:  Sertraline and Zyprexa.     PHYSICAL EXAMINATIONS:    Blood pressure 105/70, pulse 50, temperature 97.2  F (36.2  C), temperature source Temporal, height 6' (182.9 cm), weight 200 lb 6.4 oz (90.9 kg).    General exam: General Appearance: No acute distress. HEENT: Normocephalic, atraumatic. Neck: Supple.  Extremities: No edema.     Neurologic Exam: Alert and oriented x3. Speech fluent, appropriate. Normal attention. Cranial Nerves: Pupils are equal, round, reactive to light and accomodation. Extraocular movement intact. No facial weakness or asymmetry. Hearing normal. Motor Exam: Normal. Coordination:no ataxia.  Gait and Station: normal.      DIAGNOSTIC TESTING: CT scan of the  head on 02/27/2018 was reported negative from [facility].  He had EEGs in the past which were reported negative    MRI brain with and without contrast 8/5/2021 [facility] - No supratentorial lesions or significant hippocampal asymmetric/evidence for pathology. Stable cerebellar 7 mm T2 hypertense lesion.     MRI 7 Ernestine 4/22/2022 [facility] -   Impression: Motion degraded exam.  1. No definite temporal lobe abnormality, mass, or congenital lesion  identified as a cause of the patient's seizures.  2. No evidence of abnormal enhancement intracranially.   3. Previously seen abnormal T2 signal in the right cerebellum is not  identified in today's exam, likely obscured secondary to  susceptibility artifacts in the posterior fossa, a less likely  possibility is that the lesion could have resolved.    IMPRESSION:   1. Epilepsy     During the last visit, DBS was turned up at 0.5 mA. 145hz, 90 micro seconds on 10/31/2022. According to the Mom, his seizures seemed to improved for the past 4 months. He was probably having 3-4 big seizures that he will report to his parents the morning after the seizure.  However, for the past 4 months, he only reported one seizure.  All his seizures are nocturnal.  He is under a lot of stress lately due to school work.    2.  Asperger's syndrome   3.  Depression. He feels that his depression is going up and down recently. He is working with Dr. Barros to adjust his anti-depressant. Dr. Barros is thinking about increasing his anti-depressant.     4.  Tremors.  Resolved.    PLAN:   1.  Continue Zonegran 300 mg - 400 mg, Lamictal 150 mg twice daily, Keppra 500 mg twice daily  2.  Continue to follow-up with Dr. Adler.  3.  Continue follow-up with Dr. Barros for depression. I have no objections if Dr. Barros would like to increase or add some anti-depression meds.  4.  No restriction to resuming CBD and melatonin  5.  Increase DBS at 0.8 mA. 145hz, 90 micro seconds. Will  adjust based on seizures and mood changes in 2 months.  6.  Off Gabapentin because of diarrhea.  7.  Follow-up in person in 3 months.  Patient was wondering when he can start to decrease his AEDs. I advised that we need to see consistent improvement of seizures before we will consider decreasing AEDs.      35 min total time was spent on the day of this visit.       25 min was spent on face to face time  10 min was spent on preparation of visit to review charts and labs, ordering medications and tests, and documentation of clinical information     20 min was spent on DBS interrogation and programming.  Impedence was checked and adjusted. Setting are adjusted to 0.8 mA. 145hz, 90 micro seconds

## 2023-09-25 NOTE — LETTER
2023       RE: Amrit Padilla  : 1995   MRN: [id number]      Dear Colleague,    Thank you for referring your patient, Amrit Padilla, to the [facility] EPILEPSY CARE at [facility]. Please see a copy of my visit note below.    CHIEF COMPLIANT:  Follow-up for epilepsy and DBS placement.     HISTORY OF PRESENT ILLNESS:  In person visit today.  He is accompanied by his mother in the clinic.  Patient is a 28-year-old right handed man with history of epilepsy, Asperger's syndrome and depression who was recently admitted for intracranial monitoring with depth electrodes in May for surgical evaluation for intractable epilepsy.  He had DBS placement at the end of Aug. 2022.  Since the DBS placement, he is not sure if there are any significant changes of his seizures.  His father feels that post-ictal stages are less (about 50%) since the DBS placement. Almost all his seizures are nocturnal.  He feels that his depression is going up and down recently. He is working with Dr. Barros to adjust his anti-depressant. He sees Dr. Barros.    During the last visit, DBS was turned up at 0.5 mA. 145hz, 90 micro seconds on 10/31/2022. According to the Mom, his seizures seemed to improved for the past 4 months. He was probably having 3-4 big seizures that he will report to his parents the morning after the seizure.  However, for the past 4 months, he only reported one seizure. He is under a lot of stress lately due to school work.  All his seizures are nocturnal.    PHQ-9 is 13, no thoughts of self harming.    He complained of diarrhea recently.  Diarrhea resolved after Gabapentin was stopped.    Intracranial monitoring was complicated by the development of a right-sided subdural hematoma which is stable with repeat CT head scheduled for tomorrow.  Clinically headaches are improving although he is endorsing scalp discomfort from the stitches which he hopes  to have out tomorrow.  He has follow-up scheduled with Dr. Adler.  He is taking approximately 1000 mg acetaminophen twice daily for headache pain.  He describes headache pain as a 5/10 and relieved to about a 4/10 with Tylenol.  He was advised not to use Excedrin or other medications with antiplatelet effect in the setting of subdural hematoma at time of discharge.    Patient was monitored for 10 days with intracranial electrodes during which time he had 4 electroclinical seizures.  All of the seizures occurred out of sleep which are consistent with his semiology.  He was found to have bilateral upper extremity tonic extension elevation at seizure onset which would followed by generalized activity.  Depth electrodes were in unable to fully localize seizure onset and monitoring was terminated after 4 electroclinical seizures as further data was felt to be unnecessary to determine treatment course.  Dr. Dukes discussed deep brain stimulation (DBS) with patient during hospitalization and today's visit is to further discuss treatment options after intracranial monitoring.    Since discharge from the hospital no known electroclinical seizures, all of his seizures out of sleep and he is not always aware of seizures with that said no postictal confusion appreciated by dad and Amrit has not had any cuts or abrasions in the mouth to suggest a seizure.    Needs to follow-up with Dr. Ben Barros for psychiatric care.  He expressed feeling overwhelmed today by the discussion of epilepsy treatment and surgical options.    Intracranial monitoring 4/25/2022 - 5/5/2022 - 4  seizures were captured, but there was concern that we were not capturing the seizure onset zone and early rapid spread as well as multifocal regions of seizure propagation are possibilities. These suggested that a localized surgical approach would be unlikely to provide seizure freedom. Additionally, it was felt that further seizure recording would not  clarify or .     CMC was done in Aug. 2021, consensus was that patient should proceed with invasive monitoring. He should be able to tolerate intracranial monitoring.  The lateralization and localization are not clear based on scalp EEG monitoring. Temporal lobe epilepsy is possible. We will have invasive depth electrodes, 8 symmetric electrodes on each side:     Amygdala: 1  Anterior and posterior hippocampus: 3  Cingulate and prefrontal: 2  Orbital frontal:1  Insula: 1     He saw Dr. Adler on 11/2/2021 for consultation for invasive monitoring. He is scheduled for F-Gator MRI and CTA on Dec 12/3/2021.     For the past several months, he was having about 2 seizures per week, mostly nocturnal seizures, about 80-90%. He is currently taking aptiom 1200 mg qhs,  zonegran 300-400, Lamictal 150 mg bid, Depakote 500 - 1000. He is also taking CBD oil for anxiety.  Double vision resolved after aptiom was discontinued.     He is complaining of double vision, blurred vision.  He also complains of hand tremor. He had a really bad tremor day about one day ago.     He had VEEG monitoring for presurgical evaluation in March 2021. Total of 9 epileptic seizures were recorded during this admission. These electroclinical seizures showed bifrontal EEG activities at onset, usually with higher amplitude initial activity over the right frontal region compared to the left.  Ictal manifestations variably included tonic stiffening of his upper body, kicking of his legs, vocalizations, and non-purposeful movements of surrounding objects.  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  Aptiom was discontinued at discharge.      No plan for suicide or self injury     Since the hospital discharge, he continue to have frequent seizures, probably several times per week.     He was complaining of vertigo spells, he was seen in the ED.  He was told that he has vestibular problems.       He continue to have small  "twitches that last for a spit of a second, some of them in clusters. No LOC with these twitches. These likely are not seizures.      Patient tried Peoples diet, but did not continue.     He is seeing Dr. Ben Barros for his depression.  He is also seeing Deo Shipman for therapy. He is complaining of fatigue and sleep over 8-10 hours a day. He is very concerned about the fatigue and is questioning what other options we have for treating his seizures.      They are now open to VEEG monitoring for surgical evaluation, resection or neuromodulations. However, they are still not sure if they want to move forward to the surgical treatment.     Mood improved a lot. No suicidal thoughts, no thoughts of hurting himself.     The patient started to have seizures when he was 16 years old and he was managed at the [facility] for the past few years for his seizures.  He is currently taking Aptiom 1200 mg q.h.s. and the Zonegran 300 mg b.i.d. for his epilepsy.  He was recently admitted to the hospital twice because of 2 clusters of seizures, one in January when he was admitted to [facility] and one was in February when he was admitted to [facility].  On both occasions, he had 4-5 back to back generalized tonic-clonic seizures within a short period of time.      According to the mother, the patient has 2 different types of seizures.  Type #1 is described as \"mild seizures,\" during which he will have some facial twitching.  He will have repetitive hand/arm jerking movement.  He will become unresponsive for less than a minute.  Postictally, he was having some confusion and headaches and fatigue.  Before the seizures, he will have an aura of \"seeing bees populating the earth with eggs.\"  This type of seizure is relatively infrequent, average once per month.      Type #2 are generalized tonic-clonic seizures which the patient will have generalized convulsions of the body and his body will be stiff.  It usually will last for 1-2 minutes " followed by postictal fatigue and hallucinations.  This happens usually once every 3 months, but sometimes it occurs in clusters with 2-5 seizures back to back.      The patient had tried Keppra and Depakote in the past which did not work.  He had EEGs done in the past which were reported negative.      The patient had severe depression and requested a lot of behavioral health and counseling in the past and at certain time, he was suicidal, but that improved.  At the present time, he is not suicidal.  He does not think about hurting himself.     TRIGGERS FOR SEIZURES:  Depression.      RISK FACTORS FOR SEIZURES:  He had no history of head trauma with loss of consciousness.  No history of febrile convulsions.  No history of CNS infections.      PAST AEDs: Keppra, Depakote. Vimpat caused suicidal ideations. Depakote discontinued due to risk of platelet dysfunction during intracranial monitoring, and patient resumed Keppra.      PAST MEDICAL HISTORY:  Asperger's disorder, depression, epilepsy.      PAST SURGICAL HISTORY:  Depth electrode intracranial placement for epilepsy surgical evaluation      FAMILY HISTORY:  No family history of seizures.  Both parents have hypertension.  Maternal grandmother had heart disease.      SOCIAL HISTORY:  He is single, living with his mother.  He had some college education but did not finish because of depression; he is hoping to return to college soon.  He is currently working at [employer] and plans to return to work soon.  No smoking, no alcohol, no drug abuse.      REVIEW OF SYSTEMS:  Targeted ROS per HPI>      ALLERGIES:  Sertraline and Zyprexa.     PHYSICAL EXAMINATIONS:    Blood pressure 105/70, pulse 50, temperature 97.2  F (36.2  C), temperature source Temporal, height 6' (182.9 cm), weight 200 lb 6.4 oz (90.9 kg).    General exam: General Appearance: No acute distress. HEENT: Normocephalic, atraumatic. Neck: Supple.  Extremities: No edema.     Neurologic Exam: Alert and oriented  x3. Speech fluent, appropriate. Normal attention. Cranial Nerves: Pupils are equal, round, reactive to light and accomodation. Extraocular movement intact. No facial weakness or asymmetry. Hearing normal. Motor Exam: Normal. Coordination:no ataxia.  Gait and Station: normal.      DIAGNOSTIC TESTING: CT scan of the head on 02/27/2018 was reported negative from [facility].  He had EEGs in the past which were reported negative    MRI brain with and without contrast 8/5/2021 [facility] - No supratentorial lesions or significant hippocampal asymmetric/evidence for pathology. Stable cerebellar 7 mm T2 hypertense lesion.     MRI 7 Ernestine 4/22/2022 [facility] -   Impression: Motion degraded exam.  1. No definite temporal lobe abnormality, mass, or congenital lesion  identified as a cause of the patient's seizures.  2. No evidence of abnormal enhancement intracranially.   3. Previously seen abnormal T2 signal in the right cerebellum is not  identified in today's exam, likely obscured secondary to  susceptibility artifacts in the posterior fossa, a less likely  possibility is that the lesion could have resolved.    IMPRESSION:   1. Epilepsy     During the last visit, DBS was turned up at 0.5 mA. 145hz, 90 micro seconds on 10/31/2022. According to the Mom, his seizures seemed to improved for the past 4 months. He was probably having 3-4 big seizures that he will report to his parents the morning after the seizure.  However, for the past 4 months, he only reported one seizure.  All his seizures are nocturnal.  He is under a lot of stress lately due to school work.    2.  Asperger's syndrome   3.  Depression. He feels that his depression is going up and down recently. He is working with Dr. Barros to adjust his anti-depressant. Dr. Barros is thinking about increasing his anti-depressant.     4.  Tremors.  Resolved.    PLAN:   1.  Continue Zonegran 300 mg - 400 mg, Lamictal 150 mg twice daily, Keppra 500 mg  twice daily  2.  Continue to follow-up with Dr. Adler.  3.  Continue follow-up with Dr. Barros for depression. I have no objections if Dr. Barros would like to increase or add some anti-depression meds.  4.  No restriction to resuming CBD and melatonin  5.  Increase DBS at 0.8 mA. 145hz, 90 micro seconds. Will adjust based on seizures and mood changes in 2 months.  6.  Off Gabapentin because of diarrhea.  7.  Follow-up in person in 3 months.  Patient was wondering when he can start to decrease his AEDs. I advised that we need to see consistent improvement of seizures before we will consider decreasing AEDs.      35 min total time was spent on the day of this visit.       25 min was spent on face to face time  10 min was spent on preparation of visit to review charts and labs, ordering medications and tests, and documentation of clinical information     20 min was spent on DBS interrogation and programming.  Impedence was checked and adjusted. Setting are adjusted to 0.8 mA. 145hz, 90 micro seconds          Again, thank you for allowing me to participate in the care of your patient.      Sincerely,    Norman Dukes MD

## 2023-09-27 NOTE — TELEPHONE ENCOUNTER
"Per   \"Yes, it is ok to start on Abilify for this patient.   Thanks!   Zhivis \"    [facility], voice message left with call back number      "

## 2023-09-28 ENCOUNTER — TELEPHONE (OUTPATIENT)
Dept: NEUROLOGY | Facility: CLINIC | Age: 28
End: 2023-09-28

## 2023-09-28 NOTE — TELEPHONE ENCOUNTER
What is the concern that needs to be addressed by a nurse? [facility] called back looking to speak with nurse, can call back and leave detailed message on [phone]    May a detailed message be left on voicemail? yes    Date of last office visit:     Message routed to: [facility]

## 2023-09-28 NOTE — TELEPHONE ENCOUNTER
Call placed to [facility]  Voice message left with recommendations from  and a call back number in case there are other questions

## 2023-10-02 ENCOUNTER — TELEPHONE (OUTPATIENT)
Dept: NEUROLOGY | Facility: CLINIC | Age: 28
End: 2023-10-02

## 2023-10-02 NOTE — TELEPHONE ENCOUNTER
What is the concern that needs to be addressed by a nurse? Patient  call in stating no one gave him a call back or left a message. Patient is stating that he is still waiting to for medication recommendation for his headaches he has been having .     May a detailed message be left on voicemail? Yes         Message routed to: [facility] rn pool

## 2023-10-04 NOTE — TELEPHONE ENCOUNTER
Writer returned call to patient.  Received voicemail.  Left message asking for a return call.  Clinic number provided.

## 2023-10-04 NOTE — TELEPHONE ENCOUNTER
Patient reports that he did talk to Dr. Dukes about this headaches at his appointment on Monday-however he didn't feel like he got a good recommendation.      Patient reports having consistent frequent headaches sometimes daily.  He states it is usually around his forehead area, but can feel pain all over his head.      At times sleep for fluid intake will help the headache.        Per patient Dr. Dukes told him he could take tyelnol but only up to 2 a week.  He states that when he has taken the tylenol it has been effective.      He is wondering if there are any other medication suggestions for his headaches.      Routed to Dr. Dukes.

## 2023-10-09 NOTE — TELEPHONE ENCOUNTER
What is the concern that needs to be addressed by a nurse? Patient is asking for a call back regarding his headaches    May a detailed message be left on voicemail? yes    Date of last office visit:     Message routed to: [facility]

## 2023-10-13 ENCOUNTER — TELEPHONE (OUTPATIENT)
Dept: NEUROLOGY | Facility: CLINIC | Age: 28
End: 2023-10-13

## 2023-10-13 NOTE — TELEPHONE ENCOUNTER
Called patient over the phone. No answers. Left a message indicating that he can take tylenol daily as needed for the next 2 weeks, considering his headaches are worse due to recent COVID.    Patient stated in the past that it is ok to leave a message on his phone.

## 2023-10-13 NOTE — TELEPHONE ENCOUNTER
Discussed with the patient regarding headaches.  Advised that he can also taking Advil for his headaches with food.     He will discuss with his parents regarding referral to headache specialist.

## 2023-10-13 NOTE — TELEPHONE ENCOUNTER
What is the concern that needs to be addressed by a nurse? Patient is requesting a phone call to discuss after Covid when he is having headaches due to stress and seizures. Is there any other medication he can take besides Tylenol that will help for the pain.     May a detailed message be left on voicemail? Yes         Message routed to: [facility] rn pool

## 2023-10-18 ENCOUNTER — TELEPHONE (OUTPATIENT)
Dept: NEUROLOGY | Facility: CLINIC | Age: 28
End: 2023-10-18

## 2023-11-03 ENCOUNTER — TELEPHONE (OUTPATIENT)
Dept: NEUROSURGERY | Facility: CLINIC | Age: 28
End: 2023-11-03

## 2023-11-03 NOTE — TELEPHONE ENCOUNTER
Pt s/p bilateral DBS lead placement 8/22/22 and right battery placement on 8/29/22 by Dr. Adler for treatment of epilepsy.     Pt reports about a week of soreness and swelling along the extension wire that is tunneled down the right side of his neck. Pt cannot recall an exacerbating event triggering onset of pain and swelling. He thinks he had an unwitnessed seizure while sleeping 5-6 weeks ago and suspects he may currently be having nocturnal seizures, but isn't sure. He thinks movement during these suspected seizures could have triggered his symptoms.     Pt denies redness along the side of his neck, at the battery site or any of the lead incisions; denies any skin breakdown, fever or chills. He has occasional night sweats which he attributes to one of his depression medications.     Describes pain as a constant sharp ache with intermittent exacerbations of the sharpness. Rates pain as 8/10, but says it may feel a little better today.     He is not taking acetaminophen or ibuprofen or any OTC medication for the pain. He said he was told to reserve these medications for when he has severe headache. He has not tried heat or ice. Advised that he can try these if he chooses. Pain improves when head and neck are in a neutral position while laying down. It is made worse by head/neck movement.     Offered pt an appointment in our clinic for evaluation of the extension wire. Pt said he would like Dr. Adler's opinion on whether he should come to clinic. Will follow-up with Dr. Adler and get back to pt. He is in agreement with plan. Nothing further at this time.

## 2023-11-03 NOTE — TELEPHONE ENCOUNTER
[facility] Call Center    Phone Message    May a detailed message be left on voicemail: yes     Reason for Call: Pt said that he is having problems with his DBS.  He said that the wire feels like it's swollen.  He is requesting a call back from a nurse to discuss.  Thanks.

## 2023-11-03 NOTE — TELEPHONE ENCOUNTER
Attn: Jimena Maldonado, RNCC for Dr. Huerta  *DBS patient with symptoms     Nicki Miller LPN  Neurosurgery

## 2023-11-06 NOTE — TELEPHONE ENCOUNTER
[facility] Call Center    Phone Message    May a detailed message be left on voicemail: yes     Reason for Call: Other: Patient is calling in regards to his pain in his DBS. Please call back to discuss. He stated he was supposed to get a call back from Dr. Conner but has not heard back yet.      Action Taken: Other: Neurosurgery    Travel Screening: Not Applicable

## 2023-11-06 NOTE — TELEPHONE ENCOUNTER
Called pt to check on neck pain/swelling near his right side DBS extension wire which he reported on 11/3. Offered pt an appointment with one of our APPs after discussing the issue with Dr. Adler. Pt said the pain has now moved to his left neck so he doesn't think it's related to the DBS. He still has some pain along the extension wire, however. He will discuss the issue with his family and call me back if he wants an appointment. Nothing further at this time.

## 2023-11-10 NOTE — TELEPHONE ENCOUNTER
[employer] Forms completed. Forms mailed to [employer] at   [address] [place] [id number]    Copy sent to kassi.

## 2023-11-25 DIAGNOSIS — G40.909 RECURRENT SEIZURES (H): ICD-10-CM

## 2023-11-29 RX ORDER — LAMOTRIGINE 150 MG/1
150 TABLET ORAL 2 TIMES DAILY
Qty: 180 TABLET | Refills: 2 | Status: SHIPPED | OUTPATIENT
Start: 2023-11-29 | End: 2024-09-04

## 2023-11-29 NOTE — TELEPHONE ENCOUNTER
LCV:9/25/2023  [facility]    Filling per [facility] medication refill protocols - seizure medications.  Not all labs required.

## 2023-12-11 ENCOUNTER — TELEPHONE (OUTPATIENT)
Dept: NEUROLOGY | Facility: CLINIC | Age: 28
End: 2023-12-11

## 2023-12-11 NOTE — LETTER
1/11/2024       RE: Amrit Padilla  [address]  [facility] [address]        Dear Sergio,    You may share this letter with whomever you choose.    You have a diagnosis of epilepsy, specifically intractable symptomatic focal epilepsy with seizures with impaired awareness.  Your treatment for epilepsy includes anti-seizure medications, and a Deep Brain Stimulation device.    Because of seizures and medications, you may find that at times you will need to reduce your work schedule. Additionally you should not work long shifts (longer than 8 work hours) or rotating shifts, as these may increase your chance of seizure exacerbation.    Sincerely,        Norman Dukes M.D.

## 2023-12-11 NOTE — TELEPHONE ENCOUNTER
What is the concern that needs to be addressed by a nurse? Patient would like a letter that includes his diagnosis and possible reasoning for why patient might be required to work fewer days per week. Patient would like this letter to be sent to him so he can share this with his employer.     May a detailed message be left on voicemail? Yes    Date of last office visit: 9/25/23    Message routed to: [facility] RN Pool

## 2024-01-18 ENCOUNTER — OFFICE VISIT (OUTPATIENT)
Dept: NEUROLOGY | Facility: CLINIC | Age: 29
End: 2024-01-18

## 2024-01-18 VITALS
DIASTOLIC BLOOD PRESSURE: 81 MMHG | BODY MASS INDEX: 27.12 KG/M2 | HEART RATE: 72 BPM | OXYGEN SATURATION: 100 % | WEIGHT: 200 LBS | SYSTOLIC BLOOD PRESSURE: 121 MMHG | TEMPERATURE: 97 F

## 2024-01-18 DIAGNOSIS — G40.909 SEIZURE DISORDER (H): ICD-10-CM

## 2024-01-18 DIAGNOSIS — G44.209 TENSION HEADACHE: Primary | ICD-10-CM

## 2024-01-18 RX ORDER — TOPIRAMATE 25 MG/1
25 TABLET, FILM COATED ORAL 2 TIMES DAILY
Qty: 60 TABLET | Refills: 5 | Status: SHIPPED | OUTPATIENT
Start: 2024-01-18 | End: 2024-02-18

## 2024-01-18 RX ORDER — ARIPIPRAZOLE 2 MG/1
2 TABLET ORAL DAILY
COMMUNITY
Start: 2023-10-24

## 2024-01-18 NOTE — LETTER
"2024       RE: Amrit Padilla  : 1995   MRN: [id number]      Dear Colleague,    Thank you for referring your patient, Amrit Padilla, to the [facility]PILE[facility] at [facility]. Please see a copy of my visit note below.    CHIEF COMPLIANT:  Follow-up for epilepsy and DBS placement.     HISTORY OF PRESENT ILLNESS:  In person visit today.  He is accompanied by his parent in the clinic.  Patient is a 28-year-old right handed man with history of epilepsy, Asperger's syndrome and depression who was recently admitted for intracranial monitoring with depth electrodes in May for surgical evaluation for intractable epilepsy.    During the last visit, DBS was turned up at 0.8 mA from 0.5 mA. 145 hz, 90 micro seconds on 10/31/2022. Patient felt that he was having more seizures with him \"out of it\" recently that he attributes to his school stress and he was being sick recently. He feel that he is having these 1-3 time per week. This is getting better after the final exams of school. But he said that he is not sure how often he is having seizures. According to the parents, his seizures improved for the past 3-4 months. They saw no postictal state for the 3-4 months. In the past, he was probably having 3-4 big seizures per month that he will report to his parents the morning after the seizure.  All his seizures were nocturnal.    He is complaining of a lot of headaches recently, every 2 - 3 days. He may have some photophobia and phonophobia.  Dr. Adler thinks that this is not related to his device.    No thoughts of self harming now.    He had DBS placement at the end of Aug. 2022.  Since the DBS placement, he is not sure if there are any significant changes of his seizures.  His father feels that post-ictal stages are less (about 50%) since the DBS placement. Almost all his seizures are nocturnal.  He feels that his depression is going up " and down recently. He is working with Dr. Barros to adjust his anti-depressant. He sees Dr. Barros    He complained of diarrhea recently.  Diarrhea resolved after Gabapentin was stopped.    Intracranial monitoring was complicated by the development of a right-sided subdural hematoma which is stable with repeat CT head scheduled for tomorrow.  Clinically headaches are improving although he is endorsing scalp discomfort from the stitches which he hopes to have out tomorrow.  He has follow-up scheduled with Dr. Adler.  He is taking approximately 1000 mg acetaminophen twice daily for headache pain.  He describes headache pain as a 5/10 and relieved to about a 4/10 with Tylenol.  He was advised not to use Excedrin or other medications with antiplatelet effect in the setting of subdural hematoma at time of discharge.    Patient was monitored for 10 days with intracranial electrodes during which time he had 4 electroclinical seizures.  All of the seizures occurred out of sleep which are consistent with his semiology.  He was found to have bilateral upper extremity tonic extension elevation at seizure onset which would followed by generalized activity.  Depth electrodes were in unable to fully localize seizure onset and monitoring was terminated after 4 electroclinical seizures as further data was felt to be unnecessary to determine treatment course.  Dr. Dukes discussed deep brain stimulation (DBS) with patient during hospitalization and today's visit is to further discuss treatment options after intracranial monitoring.    Since discharge from the hospital no known electroclinical seizures, all of his seizures out of sleep and he is not always aware of seizures with that said no postictal confusion appreciated by dad and Amrit has not had any cuts or abrasions in the mouth to suggest a seizure.    Needs to follow-up with Dr. Ben Barros for psychiatric care.  He expressed feeling overwhelmed today by the discussion of  epilepsy treatment and surgical options.    Intracranial monitoring 4/25/2022 - 5/5/2022 - 4  seizures were captured, but there was concern that we were not capturing the seizure onset zone and early rapid spread as well as multifocal regions of seizure propagation are possibilities. These suggested that a localized surgical approach would be unlikely to provide seizure freedom. Additionally, it was felt that further seizure recording would not clarify or .     CMC was done in Aug. 2021, consensus was that patient should proceed with invasive monitoring. He should be able to tolerate intracranial monitoring.  The lateralization and localization are not clear based on scalp EEG monitoring. Temporal lobe epilepsy is possible. We will have invasive depth electrodes, 8 symmetric electrodes on each side:     Amygdala: 1  Anterior and posterior hippocampus: 3  Cingulate and prefrontal: 2  Orbital frontal:1  Insula: 1     He saw Dr. Adler on 11/2/2021 for consultation for invasive monitoring. He is scheduled for F-Gator MRI and CTA on Dec 12/3/2021.     For the past several months, he was having about 2 seizures per week, mostly nocturnal seizures, about 80-90%. He is currently taking aptiom 1200 mg qhs,  zonegran 300-400, Lamictal 150 mg bid, Depakote 500 - 1000. He is also taking CBD oil for anxiety.  Double vision resolved after aptiom was discontinued.     He is complaining of double vision, blurred vision.  He also complains of hand tremor. He had a really bad tremor day about one day ago.     He had VEEG monitoring for presurgical evaluation in March 2021. Total of 9 epileptic seizures were recorded during this admission. These electroclinical seizures showed bifrontal EEG activities at onset, usually with higher amplitude initial activity over the right frontal region compared to the left.  Ictal manifestations variably included tonic stiffening of his upper body, kicking of his legs,  "vocalizations, and non-purposeful movements of surrounding objects.  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  Aptiom was discontinued at discharge.      No plan for suicide or self injury     Since the hospital discharge, he continue to have frequent seizures, probably several times per week.     He was complaining of vertigo spells, he was seen in the ED.  He was told that he has vestibular problems.       He continue to have small twitches that last for a spit of a second, some of them in clusters. No LOC with these twitches. These likely are not seizures.      Patient tried Peoples diet, but did not continue.     He is seeing Dr. Ben Barros for his depression.  He is also seeing Deo Shipman for therapy. He is complaining of fatigue and sleep over 8-10 hours a day. He is very concerned about the fatigue and is questioning what other options we have for treating his seizures.      They are now open to VEEG monitoring for surgical evaluation, resection or neuromodulations. However, they are still not sure if they want to move forward to the surgical treatment.     Mood improved a lot. No suicidal thoughts, no thoughts of hurting himself.     The patient started to have seizures when he was 16 years old and he was managed at the [facility] for the past few years for his seizures.  He is currently taking Aptiom 1200 mg q.h.s. and the Zonegran 300 mg b.i.d. for his epilepsy.  He was recently admitted to the hospital twice because of 2 clusters of seizures, one in January when he was admitted to [facility] and one was in February when he was admitted to [facility].  On both occasions, he had 4-5 back to back generalized tonic-clonic seizures within a short period of time.      According to the mother, the patient has 2 different types of seizures.  Type #1 is described as \"mild seizures,\" during which he will have some facial twitching.  He will have repetitive hand/arm jerking movement.  He will become " "unresponsive for less than a minute.  Postictally, he was having some confusion and headaches and fatigue.  Before the seizures, he will have an aura of \"seeing bees populating the earth with eggs.\"  This type of seizure is relatively infrequent, average once per month.      Type #2 are generalized tonic-clonic seizures which the patient will have generalized convulsions of the body and his body will be stiff.  It usually will last for 1-2 minutes followed by postictal fatigue and hallucinations.  This happens usually once every 3 months, but sometimes it occurs in clusters with 2-5 seizures back to back.      The patient had tried Keppra and Depakote in the past which did not work.  He had EEGs done in the past which were reported negative.      The patient had severe depression and requested a lot of behavioral health and counseling in the past and at certain time, he was suicidal, but that improved.  At the present time, he is not suicidal.  He does not think about hurting himself.     TRIGGERS FOR SEIZURES:  Depression.      RISK FACTORS FOR SEIZURES:  He had no history of head trauma with loss of consciousness.  No history of febrile convulsions.  No history of CNS infections.      PAST AEDs: Depakote. Vimpat caused suicidal ideations. Depakote discontinued due to risk of platelet dysfunction during intracranial monitoring, and patient resumed Keppra. Gabapentin caused diarrhea.     PAST MEDICAL HISTORY:  Asperger's disorder, depression, epilepsy.      PAST SURGICAL HISTORY:  Depth electrode intracranial placement for epilepsy surgical evaluation      FAMILY HISTORY:  No family history of seizures.  Both parents have hypertension.  Maternal grandmother had heart disease.      SOCIAL HISTORY:  He is single, living with his mother.  He had some college education but did not finish because of depression; he is hoping to return to college soon.  He is currently working at [employer] and plans to return to work soon.  No " "smoking, no alcohol, no drug abuse.      REVIEW OF SYSTEMS:  Targeted ROS per HPI.      ALLERGIES:  Sertraline and Zyprexa.     PHYSICAL EXAMINATIONS:    Blood pressure 121/81, pulse 72, temperature 97  F (36.1  C), temperature source Temporal, weight 200 lb (90.7 kg), SpO2 100%.    General exam: General Appearance: No acute distress. HEENT: Normocephalic, atraumatic. Neck: Supple.  Extremities: No edema.     Neurologic Exam: Alert and oriented x3. Speech fluent, appropriate. Normal attention. Cranial Nerves: Pupils are equal, round, reactive to light and accomodation. Extraocular movement intact. No facial weakness or asymmetry. Hearing normal. Motor Exam: Normal. Coordination:no ataxia.  Gait and Station: normal.      DIAGNOSTIC TESTING: CT scan of the head on 02/27/2018 was [facility].  He had EEGs in the past which were reported negative    MRI brain with and without contrast 8/5/2021 [facility] - No supratentorial lesions or significant hippocampal asymmetric/evidence for pathology. Stable cerebellar 7 mm T2 hypertense lesion.     MRI 7 Ernestine 4/22/2022 [facility] -   Impression: Motion degraded exam.  1. No definite temporal lobe abnormality, mass, or congenital lesion  identified as a cause of the patient's seizures.  2. No evidence of abnormal enhancement intracranially.   3. Previously seen abnormal T2 signal in the right cerebellum is not  identified in today's exam, likely obscured secondary to  susceptibility artifacts in the posterior fossa, a less likely  possibility is that the lesion could have resolved.    IMPRESSION:   1. Epilepsy     During the last visit, DBS was turned up at 0.8 mA from 0.5 mA. 145hz, 90 micro seconds on 10/31/2022. Patient felt that he was having more seizures with him \"out of it\" recently that he attributes to his school stress and he was being sick recently. He feel that he is having these 1-3 time per week. This is getting better " after the final exams of school. But he said that he is not sure how often he is having seizures. According to the parents, his seizures improved for the past 3-4 months. They saw no postictal state for the 3-4 months. In the past, he was probably having 3-4 big seizures per month that he will report to his parents the morning after the seizure.  All his seizures were nocturnal.    2  Headaches. He is complaining of a lot of headaches recently, every 2 - 3 days. He may have some photophobia and phonophobia.  Dr. Adler thinks that this is not related to his device    3.  Asperger's syndrome   4.  Depression. He is working with Dr. Barros to adjust his anti-depressant.  Abilify was added.  5.  Tremors.  Resolved.    PLAN:   1.  Continue Zonegran 300 mg - 400 mg, Lamictal 150 mg twice daily, Keppra 500 mg twice daily  2.  Continue follow-up with Dr. Barros for depression.  3.  Start Topamax for headaches, 25 mg bid.  4.  No restriction to resuming CBD and melatonin  5.  Continue DBS at 0.8 mA. 145 hz, 90 micro seconds. Will adjust based on seizures and mood changes in 3 months.  6.  Follow-up in person in 3 months.  Patient was wondering when he can start to decrease his AEDs. I advised that we need to see consistent improvement of seizures before we will consider decreasing AEDs.  7.  May refer to Dr. Ramírez for headaches.      30 min total time was spent on the day of this visit.       20 min was spent on face to face time  10 min was spent on preparation of visit to review charts and labs, ordering medications and tests, and documentation of clinical information     15 min was spent on DBS interrogation.     Setting are at 0.8 mA. 145hz, 90 micro seconds      Again, thank you for allowing me to participate in the care of your patient.      Sincerely,    Norman Dukes MD

## 2024-01-18 NOTE — PROGRESS NOTES
"CHIEF COMPLIANT:  Follow-up for epilepsy and DBS placement.     HISTORY OF PRESENT ILLNESS:  In person visit today.  He is accompanied by his parent in the clinic.  Patient is a 28-year-old right handed man with history of epilepsy, Asperger's syndrome and depression who was recently admitted for intracranial monitoring with depth electrodes in May for surgical evaluation for intractable epilepsy.    During the last visit, DBS was turned up at 0.8 mA from 0.5 mA. 145 hz, 90 micro seconds on 10/31/2022. Patient felt that he was having more seizures with him \"out of it\" recently that he attributes to his school stress and he was being sick recently. He feel that he is having these 1-3 time per week. This is getting better after the final exams of school. But he said that he is not sure how often he is having seizures. According to the parents, his seizures improved for the past 3-4 months. They saw no postictal state for the 3-4 months. In the past, he was probably having 3-4 big seizures per month that he will report to his parents the morning after the seizure.  All his seizures were nocturnal.    He is complaining of a lot of headaches recently, every 2 - 3 days. He may have some photophobia and phonophobia.  Dr. Adler thinks that this is not related to his device.    No thoughts of self harming now.    He had DBS placement at the end of Aug. 2022.  Since the DBS placement, he is not sure if there are any significant changes of his seizures.  His father feels that post-ictal stages are less (about 50%) since the DBS placement. Almost all his seizures are nocturnal.  He feels that his depression is going up and down recently. He is working with Dr. Barros to adjust his anti-depressant. He sees Dr. Barros    He complained of diarrhea recently.  Diarrhea resolved after Gabapentin was stopped.    Intracranial monitoring was complicated by the development of a right-sided subdural hematoma which is stable with repeat " CT head scheduled for tomorrow.  Clinically headaches are improving although he is endorsing scalp discomfort from the stitches which he hopes to have out tomorrow.  He has follow-up scheduled with Dr. Adler.  He is taking approximately 1000 mg acetaminophen twice daily for headache pain.  He describes headache pain as a 5/10 and relieved to about a 4/10 with Tylenol.  He was advised not to use Excedrin or other medications with antiplatelet effect in the setting of subdural hematoma at time of discharge.    Patient was monitored for 10 days with intracranial electrodes during which time he had 4 electroclinical seizures.  All of the seizures occurred out of sleep which are consistent with his semiology.  He was found to have bilateral upper extremity tonic extension elevation at seizure onset which would followed by generalized activity.  Depth electrodes were in unable to fully localize seizure onset and monitoring was terminated after 4 electroclinical seizures as further data was felt to be unnecessary to determine treatment course.  Dr. Dukes discussed deep brain stimulation (DBS) with patient during hospitalization and today's visit is to further discuss treatment options after intracranial monitoring.    Since discharge from the hospital no known electroclinical seizures, all of his seizures out of sleep and he is not always aware of seizures with that said no postictal confusion appreciated by dad and Amrit has not had any cuts or abrasions in the mouth to suggest a seizure.    Needs to follow-up with Dr. Ben Barros for psychiatric care.  He expressed feeling overwhelmed today by the discussion of epilepsy treatment and surgical options.    Intracranial monitoring 4/25/2022 - 5/5/2022 - 4  seizures were captured, but there was concern that we were not capturing the seizure onset zone and early rapid spread as well as multifocal regions of seizure propagation are possibilities. These suggested that a  localized surgical approach would be unlikely to provide seizure freedom. Additionally, it was felt that further seizure recording would not clarify or .     CMC was done in Aug. 2021, consensus was that patient should proceed with invasive monitoring. He should be able to tolerate intracranial monitoring.  The lateralization and localization are not clear based on scalp EEG monitoring. Temporal lobe epilepsy is possible. We will have invasive depth electrodes, 8 symmetric electrodes on each side:     Amygdala: 1  Anterior and posterior hippocampus: 3  Cingulate and prefrontal: 2  Orbital frontal:1  Insula: 1     He saw Dr. Adler on 11/2/2021 for consultation for invasive monitoring. He is scheduled for F-Gator MRI and CTA on Dec 12/3/2021.     For the past several months, he was having about 2 seizures per week, mostly nocturnal seizures, about 80-90%. He is currently taking aptiom 1200 mg qhs,  zonegran 300-400, Lamictal 150 mg bid, Depakote 500 - 1000. He is also taking CBD oil for anxiety.  Double vision resolved after aptiom was discontinued.     He is complaining of double vision, blurred vision.  He also complains of hand tremor. He had a really bad tremor day about one day ago.     He had VEEG monitoring for presurgical evaluation in March 2021. Total of 9 epileptic seizures were recorded during this admission. These electroclinical seizures showed bifrontal EEG activities at onset, usually with higher amplitude initial activity over the right frontal region compared to the left.  Ictal manifestations variably included tonic stiffening of his upper body, kicking of his legs, vocalizations, and non-purposeful movements of surrounding objects.  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  Aptiom was discontinued at discharge.      No plan for suicide or self injury     Since the hospital discharge, he continue to have frequent seizures, probably several times per week.     He  "was complaining of vertigo spells, he was seen in the ED.  He was told that he has vestibular problems.       He continue to have small twitches that last for a spit of a second, some of them in clusters. No LOC with these twitches. These likely are not seizures.      Patient tried Peoples diet, but did not continue.     He is seeing Dr. Ben Barros for his depression.  He is also seeing Deo Shipman for therapy. He is complaining of fatigue and sleep over 8-10 hours a day. He is very concerned about the fatigue and is questioning what other options we have for treating his seizures.      They are now open to VEEG monitoring for surgical evaluation, resection or neuromodulations. However, they are still not sure if they want to move forward to the surgical treatment.     Mood improved a lot. No suicidal thoughts, no thoughts of hurting himself.     The patient started to have seizures when he was 16 years old and he was managed at the [facility] for the past few years for his seizures.  He is currently taking Aptiom 1200 mg q.h.s. and the Zonegran 300 mg b.i.d. for his epilepsy.  He was recently admitted to the hospital twice because of 2 clusters of seizures, one in January when he was admitted to [facility] and one was in February when he was admitted to [facility].  On both occasions, he had 4-5 back to back generalized tonic-clonic seizures within a short period of time.      According to the mother, the patient has 2 different types of seizures.  Type #1 is described as \"mild seizures,\" during which he will have some facial twitching.  He will have repetitive hand/arm jerking movement.  He will become unresponsive for less than a minute.  Postictally, he was having some confusion and headaches and fatigue.  Before the seizures, he will have an aura of \"seeing bees populating the earth with eggs.\"  This type of seizure is relatively infrequent, average once per month.      Type #2 are generalized tonic-clonic " seizures which the patient will have generalized convulsions of the body and his body will be stiff.  It usually will last for 1-2 minutes followed by postictal fatigue and hallucinations.  This happens usually once every 3 months, but sometimes it occurs in clusters with 2-5 seizures back to back.      The patient had tried Keppra and Depakote in the past which did not work.  He had EEGs done in the past which were reported negative.      The patient had severe depression and requested a lot of behavioral health and counseling in the past and at certain time, he was suicidal, but that improved.  At the present time, he is not suicidal.  He does not think about hurting himself.     TRIGGERS FOR SEIZURES:  Depression.      RISK FACTORS FOR SEIZURES:  He had no history of head trauma with loss of consciousness.  No history of febrile convulsions.  No history of CNS infections.      PAST AEDs: Depakote. Vimpat caused suicidal ideations. Depakote discontinued due to risk of platelet dysfunction during intracranial monitoring, and patient resumed Keppra. Gabapentin caused diarrhea.     PAST MEDICAL HISTORY:  Asperger's disorder, depression, epilepsy.      PAST SURGICAL HISTORY:  Depth electrode intracranial placement for epilepsy surgical evaluation      FAMILY HISTORY:  No family history of seizures.  Both parents have hypertension.  Maternal grandmother had heart disease.      SOCIAL HISTORY:  He is single, living with his mother.  He had some college education but did not finish because of depression; he is hoping to return to college soon.  He is currently working at [employer] and plans to return to work soon.  No smoking, no alcohol, no drug abuse.      REVIEW OF SYSTEMS:  Targeted ROS per HPI.      ALLERGIES:  Sertraline and Zyprexa.     PHYSICAL EXAMINATIONS:    Blood pressure 121/81, pulse 72, temperature 97  F (36.1  C), temperature source Temporal, weight 200 lb (90.7 kg), SpO2 100%.    General exam: General  "Appearance: No acute distress. HEENT: Normocephalic, atraumatic. Neck: Supple.  Extremities: No edema.     Neurologic Exam: Alert and oriented x3. Speech fluent, appropriate. Normal attention. Cranial Nerves: Pupils are equal, round, reactive to light and accomodation. Extraocular movement intact. No facial weakness or asymmetry. Hearing normal. Motor Exam: Normal. Coordination:no ataxia.  Gait and Station: normal.      DIAGNOSTIC TESTING: CT scan of the head on 02/27/2018 was reported negative from [facility].  He had EEGs in the past which were reported negative    MRI brain with and without contrast 8/5/2021 [facility] - No supratentorial lesions or significant hippocampal asymmetric/evidence for pathology. Stable cerebellar 7 mm T2 hypertense lesion.     MRI 7 Ernestine 4/22/2022 [facility] -   Impression: Motion degraded exam.  1. No definite temporal lobe abnormality, mass, or congenital lesion  identified as a cause of the patient's seizures.  2. No evidence of abnormal enhancement intracranially.   3. Previously seen abnormal T2 signal in the right cerebellum is not  identified in today's exam, likely obscured secondary to  susceptibility artifacts in the posterior fossa, a less likely  possibility is that the lesion could have resolved.    IMPRESSION:   1. Epilepsy     During the last visit, DBS was turned up at 0.8 mA from 0.5 mA. 145hz, 90 micro seconds on 10/31/2022. Patient felt that he was having more seizures with him \"out of it\" recently that he attributes to his school stress and he was being sick recently. He feel that he is having these 1-3 time per week. This is getting better after the final exams of school. But he said that he is not sure how often he is having seizures. According to the parents, his seizures improved for the past 3-4 months. They saw no postictal state for the 3-4 months. In the past, he was probably having 3-4 big seizures per month that he will report to his " parents the morning after the seizure.  All his seizures were nocturnal.    2  Headaches. He is complaining of a lot of headaches recently, every 2 - 3 days. He may have some photophobia and phonophobia.  Dr. Adler thinks that this is not related to his device    3.  Asperger's syndrome   4.  Depression. He is working with Dr. Barros to adjust his anti-depressant.  Abilify was added.  5.  Tremors.  Resolved.    PLAN:   1.  Continue Zonegran 300 mg - 400 mg, Lamictal 150 mg twice daily, Keppra 500 mg twice daily  2.  Continue follow-up with Dr. Barros for depression.  3.  Start Topamax for headaches, 25 mg bid.  4.  No restriction to resuming CBD and melatonin  5.  Continue DBS at 0.8 mA. 145 hz, 90 micro seconds. Will adjust based on seizures and mood changes in 3 months.  6.  Follow-up in person in 3 months.  Patient was wondering when he can start to decrease his AEDs. I advised that we need to see consistent improvement of seizures before we will consider decreasing AEDs.  7.  May refer to Dr. Ramírez for headaches.      30 min total time was spent on the day of this visit.       20 min was spent on face to face time  10 min was spent on preparation of visit to review charts and labs, ordering medications and tests, and documentation of clinical information     15 min was spent on DBS interrogation.     Setting are at 0.8 mA. 145hz, 90 micro seconds

## 2024-02-07 ENCOUNTER — TELEPHONE (OUTPATIENT)
Dept: NEUROLOGY | Facility: CLINIC | Age: 29
End: 2024-02-07

## 2024-02-07 DIAGNOSIS — G43.909 MIGRAINE WITHOUT STATUS MIGRAINOSUS, NOT INTRACTABLE, UNSPECIFIED MIGRAINE TYPE: Primary | ICD-10-CM

## 2024-02-07 NOTE — TELEPHONE ENCOUNTER
What is the concern that needs to be addressed by a nurse?     Amrit Padilla is calling stating that since starting topiramate (TOPAMAX) 25 MG tablet he has been experiencing side effects of feeling slow and out of it for the last 2 weeks. Patient states he has also experienced vomiting since starting medication. Requesting a call back at [phone] to discuss further.     May a detailed message be left on voicemail? YES    Date of last office visit: 01/18/2024    Message routed to: [facility] RN

## 2024-02-07 NOTE — TELEPHONE ENCOUNTER
"Returned call to patient. Patient stated that he started topiramate 50mg BID on 1/19. Patient started feeling symptoms on 1/31. 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 He had a hard time trying to explain this feeling, but he took a 30 minute break at work and his manager wanted him to go home. He is complaining of headaches which he thinks might be from allergies. He describes just feeling slow and out of it and not sure if its from being tired or stress or the medication. He says he generally does not sleep well and these feelings could also be from sleep deprivation. Today he threw up in his mouth and has had a few episodes of mild emesis.      I told Sergio that I would relay the message to Dr. Dukes and see if he has any recommendations. Patient verbalized understanding and had no further questions.    Additionally, Ben Barros his psychiatrist at [facility] suggested he reach out to Dr. Dukes which he wanted me to mention as well.       "

## 2024-02-16 NOTE — TELEPHONE ENCOUNTER
Call placed to patient  He reports the migraines haven't noticeably improved.  He is under a lot of stress at school, and finding that the episodes he is having have made the stress worse.    He reports the episodes as follows:-  Patient feels he can be performing usual activities, he does not lose awareness, however can have difficulty processing everything that is going on.    He has had 2 instances where he feels totally unable to carry on with his activities and had to stop what he was doing without returning to it for a while.  There have been many episodes where the processing issues happen, but he is able to work through it with a brief break.    This didn't happen prior to starting the topiramate.  When he talked with Dr.Brent Barros (psychiatry), it was suggested that he contact  as it might be a medication issue.    Patient is wondering if he can stop taking the TOP, as it has not helped his migraines, and he feel sit is interfering with his daily activities.  Will route to  for recommendations

## 2024-02-18 RX ORDER — AMITRIPTYLINE HYDROCHLORIDE 10 MG/1
10 TABLET ORAL AT BEDTIME
Qty: 30 TABLET | Refills: 5 | Status: SHIPPED | OUTPATIENT
Start: 2024-02-18 | End: 2024-03-20

## 2024-02-18 NOTE — TELEPHONE ENCOUNTER
Discussed with the patient over the phone. He is having side effects with the topamax, and it is not helping his migraine.  Inderal caused diarrhea.  Recommendations:  Start Amitriptyline 10 mg at bedtime.  Discontinue Topamax.

## 2024-02-27 NOTE — PROGRESS NOTES
"Adult Mental Health Outpatient Group Therapy Progress Note     Client Initial Individualized Goals for Treatment: \"I don't know. I just want to the pain to stop.\"     Initial Treatment suggestions for the client during the time between Diagnostic Assessment and completion of the Individualized Treatment Plan:  Follow Safety Plan  Abstain from Substance Use   Ask for more information, support and/or assistance as needed.  Follow up with providers/community supports as needed: Dr. Cori Ibrahim [place]  Report increases or changes in symptoms to staff.  Report any personal safety concerns to staff.   Take medications as prescribed.  Report medication changes and/or side effects to staff.  Attend and participate in groups as scheduled or notify staff if unable to do so.  Report any use of substances to staff as this may impact your symptoms and/or personal safety.  Notify staff if you have any other issues that need to be addressed. This may include any current abuse / neglect / exploitation or other vulnerability.  Follow recommendations of your treatment team and discuss concerns if not in agreement.    See Initial Treatment suggestions for the client during the time between Diagnostic Assessment and completion of the Master Individualized Treatment Plan.    Area of Treatment Focus:  Symptom Management and Personal Safety    Therapeutic Interventions/Treatment Strategies:  Support, Redirection, Feedback, Safety Assessments, Problem Solving and Cognitive Behavioral Therapy    Response to Treatment Strategies:  Listened and Distracted    Name of Group:  Group Psychotherapy    Description and Outcome:  Sergio shared with the group that he had a \"bad day\" yesterday, staying on the couch and having \"really bad thoughts.\"  He described these as self-deprecating thoughts, and said he was unable to use distraction or thought-stopping to help.  I asked Sergio about the nature of the thoughts to help him distinguish between " "intrusive thoughts and hallucinations.  He said the thoughts occurred in \"different voices\" but maintained that they were his own.  Some psychoeducation was provided on psychosis, and the group worked with Sergio to normalize his experience.  Sergio said he had many suicidal thoughts yesterday, but was reluctant to share details.  He said he did not have a specific plan, and that he had experienced such thoughts frequently over the past several weeks.  He initially verbally contracted for safety, and I asked him about his current medications.  He said he had discontinued Seroquel this past weekend due to \"vivid dreams\" and had reduced his daily dose of Prozac.  Sergio said this was not on the advice of a medical provider.  We discussed how making medication changes without medical supervision was potentially dangerous, and may be contributing to some of the symptoms he was describing.  Sergio said he has not yet found a psychiatrist, and I offered to have him meet with our nurse practitioner as soon as possible.  We discussed the importance of medication in his treatment.  Sergio seemed to emotionally decompensate in session, having difficulty making decisions and controlling his emotions.  I again asked him about his personal safety and he was unable to definitively verbally contract for safety.  Sergio said he thought we should speak with his mother to help him decide what he should do, and I suggested he ask her to come in and speak with me, as group was over and she was picking him up outside the nearby entrance.    I met with Sergio and his mother and she and I again asked Sergio to verbally contract for safety, and he was given the option to go home with his mother or to go to the Emergency Department. Sergio became tearful and curled up in the fetal position in a chair, saying he could not decide what to do.  I explained that an assessment in the hospital would be best at this time.  Sergio agreed to proceed with me to the ED, and his " mother joined us after parking her car.      Client will be assessed in the ED to determine if a higher level of care is necessary.  Given his current emotional and mental state, and his non-compliance with medications, admission is recommended.    Is this a Weekly Review of the Progress on the Treatment Plan?  No       done

## 2024-03-11 ENCOUNTER — TELEPHONE (OUTPATIENT)
Dept: NEUROLOGY | Facility: CLINIC | Age: 29
End: 2024-03-11

## 2024-03-11 DIAGNOSIS — G43.919 INTRACTABLE MIGRAINE WITHOUT STATUS MIGRAINOSUS, UNSPECIFIED MIGRAINE TYPE: Primary | ICD-10-CM

## 2024-03-11 NOTE — TELEPHONE ENCOUNTER
What is the concern that needs to be addressed by a nurse?   Patient is calling to give a medication update per DR Dukes, amitriptyline is working but he feels that it is not very effective, feeling very tired .    May a detailed message be left on voicemail? yes    Date of last office visit: 1-18-24    Message routed to: [facility] rn pool

## 2024-03-13 NOTE — TELEPHONE ENCOUNTER
Writer returned call to patient. Patient stated that Dr. Dukes wanted him to check in about a month from starting the medication. Patient stated he is still experiencing migraines. He states that the migraines have decreased, but did not go away 100%. He is unclear if the current headaches he is experiencing are from stress or lack of sleep. He stated that he feels that medication is making him very sleepy. He yawned three times while writer was talking to patient on the phone. Patient stated he is not sure if he wants to continue taking it if he continues to feel this tired. He is taking the medication at bedtime, but feels very sleepy throughout the day. He did note the medication was better then topamax, but also not working 100%.

## 2024-03-20 RX ORDER — NORTRIPTYLINE HCL 10 MG
10 CAPSULE ORAL AT BEDTIME
Qty: 30 CAPSULE | Refills: 5 | Status: SHIPPED | OUTPATIENT
Start: 2024-03-20 | End: 2024-07-22

## 2024-03-20 NOTE — TELEPHONE ENCOUNTER
I will change his Amitriptyline to Nortriptyline, still 10 mg at bedtime.  New order put in EPIC.  Thank you!  [place]       Writer returned call to patient. Informed him of this change. Patient verbalized understanding and will update clinic if they have any side effects.

## 2024-04-19 ENCOUNTER — TELEPHONE (OUTPATIENT)
Dept: NEUROLOGY | Facility: CLINIC | Age: 29
End: 2024-04-19
Payer: COMMERCIAL

## 2024-04-19 NOTE — TELEPHONE ENCOUNTER
What is the concern that needs to be addressed by a nurse?   Pt called to report that the nortriptyline (PAMELOR) 10 MG capsule he has been taking has caused some increased body temperature. Has been sweating a lot. Even if sleeping with a just a sheet and fan. Medication was working for headache but the sweating is causing stress. Also difficulty when sleeping day or night.     May a detailed message be left on voicemail? Yes    Date of last office visit: 1/18/24    Message routed to: Archana Hidalgo

## 2024-04-25 NOTE — TELEPHONE ENCOUNTER
Patient states that the issue did not stop with discontinuing the medication. Does not know if the medication is the problem or if it is something else or if the medication needs to take time to get out of his system for the sweating to stop.     Patient is asking for a call back to discuss     Gave message from provider as written.     Phone number is valid as I verified.

## 2024-04-26 NOTE — TELEPHONE ENCOUNTER
Call returned to patient  He reports he stopped taking the nortriptyline about a week ago when he first started having the night time body temperature changes.  The symptoms have not improved over the past week and continue to happen (mainly at night, but sometimes while awake)    Sergio mentions that the nortriptyline did improve the headaches a little, and since stopping the med they have reverted to where they were previously.    We discussed that Sergio does not have a primary care provider. I offered to place a referral, however Sergio declined, and said he will check with his insurance for covered providers.    Will update  for additional recommendations

## 2024-04-30 NOTE — TELEPHONE ENCOUNTER
Per , should restart the nortriptyline.    Call placed to Sergio, plan discussed.  Scheduled follow up reviewed  Future Appointments   Date Time Provider Department Center   5/2/2024  1:00 PM [place] Dukes MD [facility]     No other questions at this time

## 2024-05-02 ENCOUNTER — OFFICE VISIT (OUTPATIENT)
Dept: NEUROLOGY | Facility: CLINIC | Age: 29
End: 2024-05-02
Payer: COMMERCIAL

## 2024-05-02 VITALS
HEIGHT: 72 IN | WEIGHT: 207.4 LBS | DIASTOLIC BLOOD PRESSURE: 78 MMHG | TEMPERATURE: 97.2 F | SYSTOLIC BLOOD PRESSURE: 118 MMHG | HEART RATE: 96 BPM | BODY MASS INDEX: 28.09 KG/M2

## 2024-05-02 DIAGNOSIS — G40.909 SEIZURE DISORDER (H): Primary | ICD-10-CM

## 2024-05-02 NOTE — PROGRESS NOTES
CHIEF COMPLIANT:  Follow-up for epilepsy and DBS placement.     HISTORY OF PRESENT ILLNESS:  In person visit today.  He is accompanied by his mother in the clinic.  Patient is a 28-year-old right handed man with history of epilepsy, Asperger's syndrome and depression who was recently admitted for intracranial monitoring with depth electrodes in May for surgical evaluation for intractable epilepsy.    During the last visit, DBS was turned up at 0.8 mA from 0.5 mA. 145 hz, 90 micro seconds on 10/31/2022. Patient felt that he has more seizures recently that he attributes to his school stress. He feel that he is having these 1-2 times per week recently. According to the mother, his seizures improved compared to pre-DBS time. They saw him with postictal state for the 2-3 times per month. They feel that the postictal state is much less severe than the pre-DBS time. In the past, he was probably having 3-4 big seizures per month that he will report to his parents the morning after the seizure.  All his seizures are nocturnal.    He still complains of a lot of headaches recently, every 2 - 3 days. He feels that Nortriptyline is helping.    He may have some photophobia and phonophobia.  Dr. Adler thinks that this is not related to his device.    No thoughts of self harming now.    He had DBS placement at the end of Aug. 2022.  Since the DBS placement, he is not sure if there are any significant changes of his seizures.  His father feels that post-ictal stages are less (about 50%) since the DBS placement. Almost all his seizures are nocturnal.  He feels that his depression is going up and down recently. He is working with Dr. Barros to adjust his anti-depressant. He sees Dr. Barros    He complained of diarrhea recently.  Diarrhea resolved after Gabapentin was stopped.    Intracranial monitoring was complicated by the development of a right-sided subdural hematoma which is stable with repeat CT head scheduled for tomorrow.   Clinically headaches are improving although he is endorsing scalp discomfort from the stitches which he hopes to have out tomorrow.  He has follow-up scheduled with Dr. Adler.  He is taking approximately 1000 mg acetaminophen twice daily for headache pain.  He describes headache pain as a 5/10 and relieved to about a 4/10 with Tylenol.  He was advised not to use Excedrin or other medications with antiplatelet effect in the setting of subdural hematoma at time of discharge.    Patient was monitored for 10 days with intracranial electrodes during which time he had 4 electroclinical seizures.  All of the seizures occurred out of sleep which are consistent with his semiology.  He was found to have bilateral upper extremity tonic extension elevation at seizure onset which would followed by generalized activity.  Depth electrodes were in unable to fully localize seizure onset and monitoring was terminated after 4 electroclinical seizures as further data was felt to be unnecessary to determine treatment course.  Dr. Dukes discussed deep brain stimulation (DBS) with patient during hospitalization and today's visit is to further discuss treatment options after intracranial monitoring.    Since discharge from the hospital no known electroclinical seizures, all of his seizures out of sleep and he is not always aware of seizures with that said no postictal confusion appreciated by dad and Amrit has not had any cuts or abrasions in the mouth to suggest a seizure.    Needs to follow-up with Dr. Ben Barros for psychiatric care.  He expressed feeling overwhelmed today by the discussion of epilepsy treatment and surgical options.    Intracranial monitoring 4/25/2022 - 5/5/2022 - 4  seizures were captured, but there was concern that we were not capturing the seizure onset zone and early rapid spread as well as multifocal regions of seizure propagation are possibilities. These suggested that a localized surgical approach would be  unlikely to provide seizure freedom. Additionally, it was felt that further seizure recording would not clarify or .     CMC was done in Aug. 2021, consensus was that patient should proceed with invasive monitoring. He should be able to tolerate intracranial monitoring.  The lateralization and localization are not clear based on scalp EEG monitoring. Temporal lobe epilepsy is possible. We will have invasive depth electrodes, 8 symmetric electrodes on each side:     Amygdala: 1  Anterior and posterior hippocampus: 3  Cingulate and prefrontal: 2  Orbital frontal:1  Insula: 1     He saw Dr. Adler on 11/2/2021 for consultation for invasive monitoring. He is scheduled for F-Gator MRI and CTA on Dec 12/3/2021.     For the past several months, he was having about 2 seizures per week, mostly nocturnal seizures, about 80-90%. He is currently taking aptiom 1200 mg qhs,  zonegran 300-400, Lamictal 150 mg bid, Depakote 500 - 1000. He is also taking CBD oil for anxiety.  Double vision resolved after aptiom was discontinued.     He is complaining of double vision, blurred vision.  He also complains of hand tremor. He had a really bad tremor day about one day ago.     He had VEEG monitoring for presurgical evaluation in March 2021. Total of 9 epileptic seizures were recorded during this admission. These electroclinical seizures showed bifrontal EEG activities at onset, usually with higher amplitude initial activity over the right frontal region compared to the left.  Ictal manifestations variably included tonic stiffening of his upper body, kicking of his legs, vocalizations, and non-purposeful movements of surrounding objects.  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  Aptiom was discontinued at discharge.      No plan for suicide or self injury     Since the hospital discharge, he continue to have frequent seizures, probably several times per week.     He was complaining of vertigo spells, he  "was seen in the ED.  He was told that he has vestibular problems.       He continue to have small twitches that last for a spit of a second, some of them in clusters. No LOC with these twitches. These likely are not seizures.      Patient tried Peoples diet, but did not continue.     He is seeing Dr. Ben Barros for his depression.  He is also seeing Deo Shipman for therapy. He is complaining of fatigue and sleep over 8-10 hours a day. He is very concerned about the fatigue and is questioning what other options we have for treating his seizures.      They are now open to VEEG monitoring for surgical evaluation, resection or neuromodulations. However, they are still not sure if they want to move forward to the surgical treatment.     Mood improved a lot. No suicidal thoughts, no thoughts of hurting himself.     The patient started to have seizures when he was 16 years old and he was managed at the [facility] for the past few years for his seizures.  He is currently taking Aptiom 1200 mg q.h.s. and the Zonegran 300 mg b.i.d. for his epilepsy.  He was recently admitted to the hospital twice because of 2 clusters of seizures, one in January when he was admitted to [facility] and one was in February when he was admitted to [facility].  On both occasions, he had 4-5 back to back generalized tonic-clonic seizures within a short period of time.      According to the mother, the patient has 2 different types of seizures.  Type #1 is described as \"mild seizures,\" during which he will have some facial twitching.  He will have repetitive hand/arm jerking movement.  He will become unresponsive for less than a minute.  Postictally, he was having some confusion and headaches and fatigue.  Before the seizures, he will have an aura of \"seeing bees populating the earth with eggs.\"  This type of seizure is relatively infrequent, average once per month.      Type #2 are generalized tonic-clonic seizures which the patient will have " generalized convulsions of the body and his body will be stiff.  It usually will last for 1-2 minutes followed by postictal fatigue and hallucinations.  This happens usually once every 3 months, but sometimes it occurs in clusters with 2-5 seizures back to back.      The patient had tried Keppra and Depakote in the past which did not work.  He had EEGs done in the past which were reported negative.      The patient had severe depression and requested a lot of behavioral health and counseling in the past and at certain time, he was suicidal, but that improved.  At the present time, he is not suicidal.  He does not think about hurting himself.     TRIGGERS FOR SEIZURES:  Depression.      RISK FACTORS FOR SEIZURES:  He had no history of head trauma with loss of consciousness.  No history of febrile convulsions.  No history of CNS infections.      PAST AEDs: Depakote. Vimpat caused suicidal ideations. Depakote discontinued due to risk of platelet dysfunction during intracranial monitoring, and patient resumed Keppra. Gabapentin caused diarrhea.     PAST MEDICAL HISTORY:  Asperger's disorder, depression, epilepsy.      PAST SURGICAL HISTORY:  Depth electrode intracranial placement for epilepsy surgical evaluation      FAMILY HISTORY:  No family history of seizures.  Both parents have hypertension.  Maternal grandmother had heart disease.      SOCIAL HISTORY:  He is single, living with his mother.  He had some college education but did not finish because of depression; he is hoping to return to college soon.  He is currently working at [employer] and plans to return to work soon.  No smoking, no alcohol, no drug abuse.      REVIEW OF SYSTEMS:  Targeted ROS per HPI.      ALLERGIES:  Sertraline and Zyprexa.     PHYSICAL EXAMINATIONS:    Blood pressure 118/78, pulse 96, temperature 97.2  F (36.2  C), temperature source Temporal, height 6' (182.9 cm), weight 207 lb 6.4 oz (94.1 kg).    General exam: General Appearance: No acute  distress. HEENT: Normocephalic, atraumatic. Neck: Supple.  Extremities: No edema.     Neurologic Exam: Alert and oriented x3. Speech fluent, appropriate. Normal attention. Cranial Nerves: Pupils are equal, round, reactive to light and accomodation. Extraocular movement intact. No facial weakness or asymmetry. Hearing normal. Motor Exam: Normal. Coordination:no ataxia.  Gait and Station: normal.      DIAGNOSTIC TESTING: CT scan of the head on 02/27/2018 was reported negative from [facility].  He had EEGs in the past which were reported negative    MRI brain with and without contrast 8/5/2021 [facility] - No supratentorial lesions or significant hippocampal asymmetric/evidence for pathology. Stable cerebellar 7 mm T2 hypertense lesion.     MRI 7 Ernestine 4/22/2022 [facility] -   Impression: Motion degraded exam.  1. No definite temporal lobe abnormality, mass, or congenital lesion  identified as a cause of the patient's seizures.  2. No evidence of abnormal enhancement intracranially.   3. Previously seen abnormal T2 signal in the right cerebellum is not  identified in today's exam, likely obscured secondary to  susceptibility artifacts in the posterior fossa, a less likely  possibility is that the lesion could have resolved.    IMPRESSION:   1. Epilepsy     He feel that he is having these 1-2 times per week recently. According to the mother, his seizures improved compared to pre-DBS time. They saw him with postictal state for the 2-3 times per month. They feel that the postictal state is much less severe than the pre-DBS time. In the past, he was probably having 3-4 big seizures per month that he will report to his parents the morning after the seizure.  All his seizures are nocturnal.    He attributes the worsening of the seizures to his school stressors. He will finish school within the next 10 days.    He still complains of a lot of headaches recently, every 2 - 3 days. He feels that  Nortriptyline is helping.    2  Headaches. He is complaining of a lot of headaches recently, every 2 - 3 days. He may have some photophobia and phonophobia.  Dr. Adler thinks that this is not related to his device    3.  Asperger's syndrome   4.  Depression. He is working with Dr. Barros to adjust his anti-depressant.  Abilify was added.  5.  Tremors.  Resolved.    PLAN:   1.  Continue Zonegran 300 mg - 400 mg, Lamictal 150 mg twice daily, Keppra 500 mg twice daily  2.  Continue follow-up with Dr. Barros for depression.  3.  Continue with Nortriptyline 10 mg qhs.  4.  Continue with CBD  5.  Continue DBS at 1.1 mA. 165 hz, 40 micro seconds. Will adjust based on seizures and mood changes in 3 months.  6.  Follow-up in person in 3 months.  Patient was wondering when he can start to decrease his AEDs. I advised that we need to see consistent improvement of seizures before we will consider decreasing AEDs.  7.  May refer to Dr. Ramírez for headaches in the near future.      30 min total time was spent on the day of this visit.       20 min was spent on face to face time  10 min was spent on preparation of visit to review charts and labs, ordering medications and tests, and documentation of clinical information     15 min was spent on DBS interrogation.     Setting are at 1.1 mA. 160 hz, 40 micro seconds

## 2024-05-02 NOTE — LETTER
2024       RE: Amrit Padilla  : 1995   MRN: [id number]      Dear Colleague,    Thank you for referring your patient, Amrit Padilla, to the [facility] EPILEPSY CARE at [facility]. Please see a copy of my visit note below.    CHIEF COMPLIANT:  Follow-up for epilepsy and DBS placement.     HISTORY OF PRESENT ILLNESS:  In person visit today.  He is accompanied by his mother in the clinic.  Patient is a 28-year-old right handed man with history of epilepsy, Asperger's syndrome and depression who was recently admitted for intracranial monitoring with depth electrodes in May for surgical evaluation for intractable epilepsy.    During the last visit, DBS was turned up at 0.8 mA from 0.5 mA. 145 hz, 90 micro seconds on 10/31/2022. Patient felt that he has more seizures recently that he attributes to his school stress. He feel that he is having these 1-2 times per week recently. According to the mother, his seizures improved compared to pre-DBS time. They saw him with postictal state for the 2-3 times per month. They feel that the postictal state is much less severe than the pre-DBS time. In the past, he was probably having 3-4 big seizures per month that he will report to his parents the morning after the seizure.  All his seizures are nocturnal.    He still complains of a lot of headaches recently, every 2 - 3 days. He feels that Nortriptyline is helping.    He may have some photophobia and phonophobia.  Dr. Adler thinks that this is not related to his device.    No thoughts of self harming now.    He had DBS placement at the end of Aug. 2022.  Since the DBS placement, he is not sure if there are any significant changes of his seizures.  His father feels that post-ictal stages are less (about 50%) since the DBS placement. Almost all his seizures are nocturnal.  He feels that his depression is going up and down recently. He is working  with Dr. Barros to adjust his anti-depressant. He sees Dr. Barros    He complained of diarrhea recently.  Diarrhea resolved after Gabapentin was stopped.    Intracranial monitoring was complicated by the development of a right-sided subdural hematoma which is stable with repeat CT head scheduled for tomorrow.  Clinically headaches are improving although he is endorsing scalp discomfort from the stitches which he hopes to have out tomorrow.  He has follow-up scheduled with Dr. Adler.  He is taking approximately 1000 mg acetaminophen twice daily for headache pain.  He describes headache pain as a 5/10 and relieved to about a 4/10 with Tylenol.  He was advised not to use Excedrin or other medications with antiplatelet effect in the setting of subdural hematoma at time of discharge.    Patient was monitored for 10 days with intracranial electrodes during which time he had 4 electroclinical seizures.  All of the seizures occurred out of sleep which are consistent with his semiology.  He was found to have bilateral upper extremity tonic extension elevation at seizure onset which would followed by generalized activity.  Depth electrodes were in unable to fully localize seizure onset and monitoring was terminated after 4 electroclinical seizures as further data was felt to be unnecessary to determine treatment course.  Dr. Dukes discussed deep brain stimulation (DBS) with patient during hospitalization and today's visit is to further discuss treatment options after intracranial monitoring.    Since discharge from the hospital no known electroclinical seizures, all of his seizures out of sleep and he is not always aware of seizures with that said no postictal confusion appreciated by dad and Amrit has not had any cuts or abrasions in the mouth to suggest a seizure.    Needs to follow-up with Dr. Ben Barros for psychiatric care.  He expressed feeling overwhelmed today by the discussion of epilepsy treatment and surgical  options.    Intracranial monitoring 4/25/2022 - 5/5/2022 - 4  seizures were captured, but there was concern that we were not capturing the seizure onset zone and early rapid spread as well as multifocal regions of seizure propagation are possibilities. These suggested that a localized surgical approach would be unlikely to provide seizure freedom. Additionally, it was felt that further seizure recording would not clarify or .     CMC was done in Aug. 2021, consensus was that patient should proceed with invasive monitoring. He should be able to tolerate intracranial monitoring.  The lateralization and localization are not clear based on scalp EEG monitoring. Temporal lobe epilepsy is possible. We will have invasive depth electrodes, 8 symmetric electrodes on each side:     Amygdala: 1  Anterior and posterior hippocampus: 3  Cingulate and prefrontal: 2  Orbital frontal:1  Insula: 1     He saw Dr. Adler on 11/2/2021 for consultation for invasive monitoring. He is scheduled for F-Gator MRI and CTA on Dec 12/3/2021.     For the past several months, he was having about 2 seizures per week, mostly nocturnal seizures, about 80-90%. He is currently taking aptiom 1200 mg qhs,  zonegran 300-400, Lamictal 150 mg bid, Depakote 500 - 1000. He is also taking CBD oil for anxiety.  Double vision resolved after aptiom was discontinued.     He is complaining of double vision, blurred vision.  He also complains of hand tremor. He had a really bad tremor day about one day ago.     He had VEEG monitoring for presurgical evaluation in March 2021. Total of 9 epileptic seizures were recorded during this admission. These electroclinical seizures showed bifrontal EEG activities at onset, usually with higher amplitude initial activity over the right frontal region compared to the left.  Ictal manifestations variably included tonic stiffening of his upper body, kicking of his legs, vocalizations, and non-purposeful movements  "of surrounding objects.  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  Aptiom was discontinued at discharge.      No plan for suicide or self injury     Since the hospital discharge, he continue to have frequent seizures, probably several times per week.     He was complaining of vertigo spells, he was seen in the ED.  He was told that he has vestibular problems.       He continue to have small twitches that last for a spit of a second, some of them in clusters. No LOC with these twitches. These likely are not seizures.      Patient tried Peoples diet, but did not continue.     He is seeing Dr. Ben Barros for his depression.  He is also seeing Deo Shipman for therapy. He is complaining of fatigue and sleep over 8-10 hours a day. He is very concerned about the fatigue and is questioning what other options we have for treating his seizures.      They are now open to VEEG monitoring for surgical evaluation, resection or neuromodulations. However, they are still not sure if they want to move forward to the surgical treatment.     Mood improved a lot. No suicidal thoughts, no thoughts of hurting himself.     The patient started to have seizures when he was 16 years old and he was managed at the [facility] for the past few years for his seizures.  He is currently taking Aptiom 1200 mg q.h.s. and the Zonegran 300 mg b.i.d. for his epilepsy.  He was recently admitted to the hospital twice because of 2 clusters of seizures, one in January when he was admitted to [facility] and one was in February when he was [place] [facility]  On both occasions, he had 4-5 back to back generalized tonic-clonic seizures within a short period of time.      According to the mother, the patient has 2 different types of seizures.  Type #1 is described as \"mild seizures,\" during which he will have some facial twitching.  He will have repetitive hand/arm jerking movement.  He will become unresponsive for less than a minute.  " "Postictally, he was having some confusion and headaches and fatigue.  Before the seizures, he will have an aura of \"seeing bees populating the earth with eggs.\"  This type of seizure is relatively infrequent, average once per month.      Type #2 are generalized tonic-clonic seizures which the patient will have generalized convulsions of the body and his body will be stiff.  It usually will last for 1-2 minutes followed by postictal fatigue and hallucinations.  This happens usually once every 3 months, but sometimes it occurs in clusters with 2-5 seizures back to back.      The patient had tried Keppra and Depakote in the past which did not work.  He had EEGs done in the past which were reported negative.      The patient had severe depression and requested a lot of behavioral health and counseling in the past and at certain time, he was suicidal, but that improved.  At the present time, he is not suicidal.  He does not think about hurting himself.     TRIGGERS FOR SEIZURES:  Depression.      RISK FACTORS FOR SEIZURES:  He had no history of head trauma with loss of consciousness.  No history of febrile convulsions.  No history of CNS infections.      PAST AEDs: Depakote. Vimpat caused suicidal ideations. Depakote discontinued due to risk of platelet dysfunction during intracranial monitoring, and patient resumed Keppra. Gabapentin caused diarrhea.     PAST MEDICAL HISTORY:  Asperger's disorder, depression, epilepsy.      PAST SURGICAL HISTORY:  Depth electrode intracranial placement for epilepsy surgical evaluation      FAMILY HISTORY:  No family history of seizures.  Both parents have hypertension.  Maternal grandmother had heart disease.      SOCIAL HISTORY:  He is single, living with his mother.  He had some college education but did not finish because of depression; he is hoping to return to college soon.  He is currently working at [employer] and plans to return to work soon.  No smoking, no alcohol, no drug abuse. "      REVIEW OF SYSTEMS:  Targeted ROS per HPI.      ALLERGIES:  Sertraline and Zyprexa.     PHYSICAL EXAMINATIONS:    Blood pressure 118/78, pulse 96, temperature 97.2  F (36.2  C), temperature source Temporal, height 6' (182.9 cm), weight 207 lb 6.4 oz (94.1 kg).    General exam: General Appearance: No acute distress. HEENT: Normocephalic, atraumatic. Neck: Supple.  Extremities: No edema.     Neurologic Exam: Alert and oriented x3. Speech fluent, appropriate. Normal attention. Cranial Nerves: Pupils are equal, round, reactive to light and accomodation. Extraocular movement intact. No facial weakness or asymmetry. Hearing normal. Motor Exam: Normal. Coordination:no ataxia.  Gait and Station: normal.      DIAGNOSTIC TESTING: CT scan of the head on 02/27/2018 was reported negative from [facility].  He had EEGs in the past which were reported negative    MRI brain with and without contrast 8/5/2021 [facility] - No supratentorial lesions or significant hippocampal asymmetric/evidence for pathology. Stable cerebellar 7 mm T2 hypertense lesion.     MRI 7 Ernestine 4/22/2022 [facility] -   Impression: Motion degraded exam.  1. No definite temporal lobe abnormality, mass, or congenital lesion  identified as a cause of the patient's seizures.  2. No evidence of abnormal enhancement intracranially.   3. Previously seen abnormal T2 signal in the right cerebellum is not  identified in today's exam, likely obscured secondary to  susceptibility artifacts in the posterior fossa, a less likely  possibility is that the lesion could have resolved.    IMPRESSION:   1. Epilepsy     He feel that he is having these 1-2 times per week recently. According to the mother, his seizures improved compared to pre-DBS time. They saw him with postictal state for the 2-3 times per month. They feel that the postictal state is much less severe than the pre-DBS time. In the past, he was probably having 3-4 big seizures per month that  he will report to his parents the morning after the seizure.  All his seizures are nocturnal.    He attributes the worsening of the seizures to his school stressors. He will finish school within the next 10 days.    He still complains of a lot of headaches recently, every 2 - 3 days. He feels that Nortriptyline is helping.    2  Headaches. He is complaining of a lot of headaches recently, every 2 - 3 days. He may have some photophobia and phonophobia.  Dr. Adler thinks that this is not related to his device    3.  Asperger's syndrome   4.  Depression. He is working with Dr. Barros to adjust his anti-depressant.  Abilify was added.  5.  Tremors.  Resolved.    PLAN:   1.  Continue Zonegran 300 mg - 400 mg, Lamictal 150 mg twice daily, Keppra 500 mg twice daily  2.  Continue follow-up with Dr. Barros for depression.  3.  Continue with Nortriptyline 10 mg qhs.  4.  Continue with CBD  5.  Continue DBS at 1.1 mA. 165 hz, 40 micro seconds. Will adjust based on seizures and mood changes in 3 months.  6.  Follow-up in person in 3 months.  Patient was wondering when he can start to decrease his AEDs. I advised that we need to see consistent improvement of seizures before we will consider decreasing AEDs.  7.  May refer to Dr. Ramírez for headaches in the near future.      30 min total time was spent on the day of this visit.       20 min was spent on face to face time  10 min was spent on preparation of visit to review charts and labs, ordering medications and tests, and documentation of clinical information     15 min was spent on DBS interrogation.     Setting are at 1.1 mA. 160 hz, 40 micro seconds        Again, thank you for allowing me to participate in the care of your patient.      Sincerely,    Norman Dukes MD

## 2024-05-03 DIAGNOSIS — G40.219 PARTIAL SYMPTOMATIC EPILEPSY WITH COMPLEX PARTIAL SEIZURES, INTRACTABLE, WITHOUT STATUS EPILEPTICUS (H): ICD-10-CM

## 2024-05-03 RX ORDER — ZONISAMIDE 100 MG/1
CAPSULE ORAL
Qty: 630 CAPSULE | Refills: 3 | Status: SHIPPED | OUTPATIENT
Start: 2024-05-03

## 2024-05-03 NOTE — TELEPHONE ENCOUNTER
Last seen: 5/2/24  RTC: 3 months  Cancel: no  No-show: no  Next appt: 8/29/24    Incoming refill from patient via phones    Medication requested: zonisamide (ZONEGRAN) 100 MG capsule   Directions: TAKE 3 CAPS (300 MG) EACH MORNING AND 4 CAPS (400 MG) EACH NIGHT   Qty: 630 +3 refills  Last refilled: 5/2/23    Medication refill approved per refill protocol

## 2024-05-06 NOTE — TELEPHONE ENCOUNTER
Outreach call placed to Sergio to inquire on his brain MRI scheduling.   
Patient has no objection to blood transfusions.

## 2024-06-02 DIAGNOSIS — G40.909 RECURRENT SEIZURES (H): Primary | ICD-10-CM

## 2024-06-02 RX ORDER — LEVETIRACETAM 500 MG/1
500 TABLET ORAL 2 TIMES DAILY
Qty: 180 TABLET | Refills: 3 | Status: SHIPPED | OUTPATIENT
Start: 2024-06-02

## 2024-06-02 NOTE — TELEPHONE ENCOUNTER
"levETIRAcetam (KEPPRA) 500 MG tablet   Disp-180 tablet, R-3,   Start: 05/26/2023 5/2/2024  [facility]    [facility] MD  Neurology    \" Keppra 500 mg twice daily \"      "

## 2024-07-17 DIAGNOSIS — G43.919 INTRACTABLE MIGRAINE WITHOUT STATUS MIGRAINOSUS, UNSPECIFIED MIGRAINE TYPE: ICD-10-CM

## 2024-07-19 DIAGNOSIS — G43.919 INTRACTABLE MIGRAINE WITHOUT STATUS MIGRAINOSUS, UNSPECIFIED MIGRAINE TYPE: ICD-10-CM

## 2024-07-19 RX ORDER — NORTRIPTYLINE HCL 10 MG
10 CAPSULE ORAL AT BEDTIME
Qty: 90 CAPSULE | Refills: 1 | OUTPATIENT
Start: 2024-07-19

## 2024-07-22 ENCOUNTER — TELEPHONE (OUTPATIENT)
Dept: NEUROLOGY | Facility: CLINIC | Age: 29
End: 2024-07-22

## 2024-07-22 RX ORDER — NORTRIPTYLINE HCL 10 MG
10 CAPSULE ORAL AT BEDTIME
Qty: 90 CAPSULE | Refills: 1 | Status: SHIPPED | OUTPATIENT
Start: 2024-07-22

## 2024-07-22 NOTE — TELEPHONE ENCOUNTER
Received Medical Supplement/Academic Policy Petition forms to be completed. Forms saved to R drive, encounter routed.   Subjective:  No acute events overnight. Patient's abdomen feels softer and still having liquid bowel movements. No chest pain, SOB, or abdominal pain. Eating okay.     Review of Systems:  Constitutional: Negative except as documented in subjective.   Eye: Negative except as documented in subjective.   ENT: Negative except as documented in subjective.   Cardiovascular: Negative except as documented in subjective.   Respiratory: Negative except as documented in subjective.   Gastrointestinal: Negative except as documented in subjective.   Genitourinary: Negative except as documented in subjective.   Musculoskeletal: Negative except as documented in subjective.   Skin: Negative except as documented in subjective.   Neurologic: Negative except as documented in subjective.   Psychiatric: Negative except as documented in subjective.     Inpatient Medications:  Current Facility-Administered Medications   Medication Dose Route Frequency Provider Last Rate Last Admin   • magnesium oxide (MAG-OX) tablet 400 mg  400 mg Oral Once Di MD Sandra       • potassium CHLORIDE 20 mEq/100mL IVPB premix  20 mEq Intravenous Once Di MD Sandra 50 mL/hr at 09/03/22 1220 20 mEq at 09/03/22 1220   • [START ON 9/4/2022] dilTIAZem (TIAZAC,CARDIZEM CD) 24 hr capsule 300 mg  300 mg Oral Daily Sarika Flores MD       • losartan (COZAAR) tablet 100 mg  100 mg Oral Daily DAYA Barber   100 mg at 09/03/22 0830   • sodium chloride (PF) 0.9 % injection 2 mL  2 mL Intracatheter 2 times per day Alfonso Carter MD   2 mL at 09/03/22 0831   • Potassium Standard Replacement Protocol (Levels 3.5 and lower)   Does not apply See Admin Instructions Alfonso Carter MD       • Potassium Replacement (Levels 3.6 - 4)   Does not apply See Admin Instructions Alfonso Carter MD       • Magnesium Standard Replacement Protocol   Does not apply See Admin Instructions Alfonso Carter MD       • insulin lispro (ADMELOG,HumaLOG) - Correction Dose   Subcutaneous TID WC  Alfonso Carter MD       • insulin lispro (ADMELOG,HumaLOG) - Correction Dose   Subcutaneous Nightly Alfonso Carter MD       • hydrALAZINE (APRESOLINE) tablet 100 mg  100 mg Oral TID Alfonso Carter MD   100 mg at 09/03/22 1302   • gabapentin (NEURONTIN) capsule 100 mg  100 mg Oral 2 times per day Alfonso Carter MD   100 mg at 09/03/22 0831   • pantoprazole (PROTONIX) EC tablet 40 mg  40 mg Oral Daily Alfonso Carter MD   40 mg at 09/03/22 0831   • WARFARIN - PHARMACIST MONITORED Misc   Does not apply See Admin Instructions Alfonso Carter MD       • metoPROLOL tartrate (LOPRESSOR) tablet 100 mg  100 mg Oral BID Alfonso Carter MD   100 mg at 09/03/22 0830      Current Facility-Administered Medications   Medication Dose Route Frequency Provider Last Rate Last Admin   • hydrALAZINE (APRESOLINE) injection 10 mg  10 mg Intravenous Q6H PRN Sarika Flores MD       • ondansetron (ZOFRAN) injection 4 mg  4 mg Intravenous Q8H PRN Sarika Flores MD   4 mg at 09/03/22 0827   • sodium chloride 0.9 % flush bag 25 mL  25 mL Intravenous PRN Alfonso Carter MD       • sodium chloride 0.9 % flush bag 25 mL  25 mL Intravenous PRN Alfonso Carter MD       • acetaminophen (TYLENOL) tablet 650 mg  650 mg Oral Q4H PRN Alfonso Carter MD       • melatonin tablet 3 mg  3 mg Oral Nightly PRN Alfonso Carter MD       • dextrose 50 % injection 25 g  25 g Intravenous PRN Alfonso Carter MD       • dextrose 50 % injection 12.5 g  12.5 g Intravenous PRN Alfonso Carter MD       • glucagon (GLUCAGEN) injection 1 mg  1 mg Intramuscular PRN Alfonso Carter MD       • dextrose (GLUTOSE) 40 % gel 15 g  15 g Oral PRN Alfonso Carter MD       • dextrose (GLUTOSE) 40 % gel 30 g  30 g Oral PRN Alfonso Carter MD            Objective:  Visit Vitals  BP (!) 198/79 (BP Location: RUE - Right upper extremity, Patient Position: Semi-Monae's)   Pulse 68   Temp 98.1 °F (36.7 °C) (Oral)   Resp 18   Ht 5' 5\" (1.651 m)   Wt 93.9 kg (207 lb 0.2 oz)   SpO2 96%    BMI 34.45 kg/m²       Physical Exam:  General: A&O x 3, in NAD.  Eye: PERRLA, EOMI.  HENT: Normocephalic, atraumatic. Sinuses non-tender.  Neck: Supple, non-tender.  Respiratory: CTAB, no crackles or wheezes.  Cardiovascular: RRR, no murmurs, normal S1 and S2.  GI: Soft, non-tender, non-distended, normoactive bowel sounds. No masses.  MSK: Normal ROM, no swelling.  Skin: Warm, dry, pink. No lesions or rashes.  Neuro: A&O x 3, CN II - XII grossly intact, normal strength.  Cognition and speech: Speech clear and coherent.  Psychiatric: Cooperative, normal affect.      No intake or output data in the 24 hours ending 09/03/22 1411    Labs:  Recent Results (from the past 24 hour(s))   GLUCOSE, BEDSIDE - POINT OF CARE    Collection Time: 09/02/22  4:00 PM   Result Value Ref Range    GLUCOSE, BEDSIDE - POINT OF CARE 130 (H) 70 - 99 mg/dL   GLUCOSE, BEDSIDE - POINT OF CARE    Collection Time: 09/02/22  8:41 PM   Result Value Ref Range    GLUCOSE, BEDSIDE - POINT OF CARE 118 (H) 70 - 99 mg/dL   GLUCOSE, BEDSIDE - POINT OF CARE    Collection Time: 09/03/22  5:25 AM   Result Value Ref Range    GLUCOSE, BEDSIDE - POINT OF CARE 125 (H) 70 - 99 mg/dL   Prothrombin Time    Collection Time: 09/03/22  7:05 AM   Result Value Ref Range    Prothrombin Time 28.7 (H) 9.7 - 11.8 sec    INR 2.8     Basic Metabolic Panel    Collection Time: 09/03/22  7:05 AM   Result Value Ref Range    Fasting Status      Sodium 141 135 - 145 mmol/L    Potassium 2.7 (LL) 3.4 - 5.1 mmol/L    Chloride 116 (H) 97 - 110 mmol/L    Carbon Dioxide 22 21 - 32 mmol/L    Anion Gap 6 (L) 7 - 19 mmol/L    Glucose 124 (H) 70 - 99 mg/dL    BUN 13 6 - 20 mg/dL    Creatinine 0.68 0.51 - 0.95 mg/dL    Glomerular Filtration Rate 89 >=60    BUN/ Creatinine Ratio 19 7 - 25    Calcium 8.6 8.4 - 10.2 mg/dL   Magnesium    Collection Time: 09/03/22  7:05 AM   Result Value Ref Range    Magnesium 1.7 1.7 - 2.4 mg/dL   CBC with Automated Differential (performable only)    Collection  Time: 09/03/22  7:05 AM   Result Value Ref Range    WBC 6.0 4.2 - 11.0 K/mcL    RBC 4.14 4.00 - 5.20 mil/mcL    HGB 10.3 (L) 12.0 - 15.5 g/dL    HCT 33.1 (L) 36.0 - 46.5 %    MCV 80.0 78.0 - 100.0 fl    MCH 24.9 (L) 26.0 - 34.0 pg    MCHC 31.1 (L) 32.0 - 36.5 g/dL    RDW-CV 23.2 (H) 11.0 - 15.0 %    RDW-SD 65.0 (H) 39.0 - 50.0 fL     140 - 450 K/mcL    NRBC 0 <=0 /100 WBC    Neutrophil, Percent 68 %    Lymphocytes, Percent 22 %    Mono, Percent 9 %    Eosinophils, Percent 1 %    Basophils, Percent 0 %    Immature Granulocytes 0 %    Absolute Neutrophils 4.1 1.8 - 7.7 K/mcL    Absolute Lymphocytes 1.3 1.0 - 4.0 K/mcL    Absolute Monocytes 0.5 0.3 - 0.9 K/mcL    Absolute Eosinophils  0.1 0.0 - 0.5 K/mcL    Absolute Basophils 0.0 0.0 - 0.3 K/mcL    Absolute Immmature Granulocytes 0.0 0.0 - 0.2 K/mcL   GLUCOSE, BEDSIDE - POINT OF CARE    Collection Time: 09/03/22 10:57 AM   Result Value Ref Range    GLUCOSE, BEDSIDE - POINT OF CARE 116 (H) 70 - 99 mg/dL       Imaging:  Radiology:   CT ABDOMEN PELVIS W CONTRAST - IV contrast only   Final Result      1. The radiographic picture is suggestive for pseudoobstruction of the   colon (Hazlet's syndrome).  An obstruction at the anorectal junction is   also possible.  No evidence of inflammatory or ischemic colitis.  Small   bowel is normal.   2.  Chronic cholelithiasis with a contracted gallbladder.      Electronically Signed by: LANE HASSAN    Signed on: 9/1/2022 5:32 PM                Assessment and Plan:  Ms. Hansen is a 76 yo woman w/ h/o pAfib on warfarin, hypertension, hyperlipidemia, DM II, CAD, chronic anemia, obesity who presented on 9/1 from [facility] for abdominal pain and diarrhea.    #Abdominal pain, diarrhea  #Oglivie syndrome  C diff negative. CT scan with findings of colonic pseudo-obstruction. Seen by GI who performed ESTEE with relief of distention.  - diet per GI  - continue monitoring BMs and oral intake    #Hypertension  Blood pressures elevated.   -  diltiazem 240 CD --> increase to 300 CD  - hydralazine 100 TID  - losartan 100 daily  - lopressor 100 BID  - add IV hydralazine prn    #Hypokalemia, severe  #Hypomagnesemia  2/2 diarrhea.  - replete for K >4, Mg >2  - telemetry    #pAfib  - warfarin  - lopressor 100 BID  - diltiazem 240 CD --> 300    #H/o CVA w/ left-sided weakness  - PT/OT  - ELLIOTT return order placed    #DM II  - hold home metformin 850 BID  - ISS, accuchecks    #Chronic anemia  Hgb 9.8 on admission, around baseline.  - monitor    #Obesity  BMI 34.  - weight loss counseling      DVT ppx: warfarin  Code: full  Comm: patient, RN, daughter Elizabeth on the phone  Dispo: clinical improvement    Sarika Flores MD  9/3/2022    More than 35 minutes were spent on patient's care today including more than 50% of time spent in coordinating care.

## 2024-08-01 NOTE — TELEPHONE ENCOUNTER
Pt really needs the information filled out asap. Its for school. He is wondering if there is anyone else who can fill the paperwork  out in Dr. Dukes's absence.

## 2024-08-09 ENCOUNTER — TELEPHONE (OUTPATIENT)
Dept: NEUROLOGY | Facility: CLINIC | Age: 29
End: 2024-08-09

## 2024-08-09 NOTE — TELEPHONE ENCOUNTER
Pt is requesting a work accommodation letter for a 30 mins break per day because of his Epilepsy.

## 2024-08-09 NOTE — LETTER
8/12/2024       RE: Amrit Padilla  [address] [facility] [address]          To Whom It May Concern:      Amrit Padilla is a patient under my care for a chronic medical condition.    Please provide him with a 30 minute break during the shift to aid in medical management of the condition.    Thank you,      Yours Sincerely,          Norman Dukes M.D.

## 2024-08-29 ENCOUNTER — VIRTUAL VISIT (OUTPATIENT)
Dept: NEUROLOGY | Facility: CLINIC | Age: 29
End: 2024-08-29
Payer: COMMERCIAL

## 2024-08-29 DIAGNOSIS — G40.909 RECURRENT SEIZURES (H): Primary | ICD-10-CM

## 2024-08-29 DIAGNOSIS — G44.211 INTRACTABLE EPISODIC TENSION-TYPE HEADACHE: Primary | ICD-10-CM

## 2024-08-29 NOTE — LETTER
2024       RE: Amrit Padilla  : 1995   MRN: [id number]      Dear Colleague,    Thank you for referring your patient, Amrit Padilla, to the [facility] at [facility]. Please see a copy of my visit note below.    Is the patient currently in the state of [place]?Yes    If the video visit is dropped, the invitation should be resent by: Text to cell phone: [phone]    Will anyone else be joining the visit? No      How would you like to obtain your AVS? MyChart    The following updates are needed to allergies and medications:  Medications patient is taking and are not on the medication list: No  Allergies that need to be added to patient's chart: No    Additional patient comments or concerns: Medical form needs to be completed. Headaches and one major seizure on 2024         CHIEF COMPLIANT:  Follow-up for epilepsy and DBS placement.     HISTORY OF PRESENT ILLNESS:  Video call for followed .  He is accompanied by his mother in the clinic.  Patient is a 29-year-old right handed man with history of epilepsy, Asperger's syndrome and depression who was recently admitted for intracranial monitoring with depth electrodes in May for surgical evaluation for intractable epilepsy.    Since the last visit, his seizures remains about the same. DBS are at 1.1 mA. 165 hz, 40 micro seconds. Patient felt that he has more seizures recently that he attributes to his school stress. He feel that he is having these 1-2 times per week recently. He had one incidence that he was confused, which was probably a focal impaired seizure during waking.    No thoughts of self harming now.    His seizures improved compared to pre-DBS time. Parents saw him with postictal state for the 2-3 times per month. They feel that the postictal state is much less severe than the pre-DBS time. In the past, he was probably having 3-4 big seizures per month that he  will report to his parents the morning after the seizure.  All his seizures are nocturnal.    He still complains of a lot of headaches recently, every 2 - 3 days. He feels that Nortriptyline is helping.    He may have some photophobia and phonophobia.  Dr. Adler thinks that this is not related to his device.    He had DBS placement at the end of Aug. 2022.  Since the DBS placement, he is not sure if there are any significant changes of his seizures.  His father feels that post-ictal stages are less (about 50%) since the DBS placement. Almost all his seizures are nocturnal.  He feels that his depression is going up and down recently. He is working with Dr. Barros to adjust his anti-depressant. He sees Dr. Barros    He complained of diarrhea recently.  Diarrhea resolved after Gabapentin was stopped.    Intracranial monitoring was complicated by the development of a right-sided subdural hematoma which is stable with repeat CT head scheduled for tomorrow.  Clinically headaches are improving although he is endorsing scalp discomfort from the stitches which he hopes to have out tomorrow.  He has follow-up scheduled with Dr. Adler.  He is taking approximately 1000 mg acetaminophen twice daily for headache pain.  He describes headache pain as a 5/10 and relieved to about a 4/10 with Tylenol.  He was advised not to use Excedrin or other medications with antiplatelet effect in the setting of subdural hematoma at time of discharge.    Patient was monitored for 10 days with intracranial electrodes during which time he had 4 electroclinical seizures.  All of the seizures occurred out of sleep which are consistent with his semiology.  He was found to have bilateral upper extremity tonic extension elevation at seizure onset which would followed by generalized activity.  Depth electrodes were in unable to fully localize seizure onset and monitoring was terminated after 4 electroclinical seizures as further data was felt to be  unnecessary to determine treatment course.  Dr. Dukes discussed deep brain stimulation (DBS) with patient during hospitalization and today's visit is to further discuss treatment options after intracranial monitoring.    Since discharge from the hospital no known electroclinical seizures, all of his seizures out of sleep and he is not always aware of seizures with that said no postictal confusion appreciated by dad and Amrit has not had any cuts or abrasions in the mouth to suggest a seizure.    Needs to follow-up with Dr. Ben Barros for psychiatric care.  He expressed feeling overwhelmed today by the discussion of epilepsy treatment and surgical options.    Intracranial monitoring 4/25/2022 - 5/5/2022 - 4  seizures were captured, but there was concern that we were not capturing the seizure onset zone and early rapid spread as well as multifocal regions of seizure propagation are possibilities. These suggested that a localized surgical approach would be unlikely to provide seizure freedom. Additionally, it was felt that further seizure recording would not clarify or .     CMC was done in Aug. 2021, consensus was that patient should proceed with invasive monitoring. He should be able to tolerate intracranial monitoring.  The lateralization and localization are not clear based on scalp EEG monitoring. Temporal lobe epilepsy is possible. We will have invasive depth electrodes, 8 symmetric electrodes on each side:     Amygdala: 1  Anterior and posterior hippocampus: 3  Cingulate and prefrontal: 2  Orbital frontal:1  Insula: 1     He saw Dr. Adler on 11/2/2021 for consultation for invasive monitoring. He is scheduled for F-Gator MRI and CTA on Dec 12/3/2021.     For the past several months, he was having about 2 seizures per week, mostly nocturnal seizures, about 80-90%. He is currently taking aptiom 1200 mg qhs,  zonegran 300-400, Lamictal 150 mg bid, Depakote 500 - 1000. He is also taking CBD oil  for anxiety.  Double vision resolved after aptiom was discontinued.     He is complaining of double vision, blurred vision.  He also complains of hand tremor. He had a really bad tremor day about one day ago.     He had VEEG monitoring for presurgical evaluation in March 2021. Total of 9 epileptic seizures were recorded during this admission. These electroclinical seizures showed bifrontal EEG activities at onset, usually with higher amplitude initial activity over the right frontal region compared to the left.  Ictal manifestations variably included tonic stiffening of his upper body, kicking of his legs, vocalizations, and non-purposeful movements of surrounding objects.  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  Aptiom was discontinued at discharge.      No plan for suicide or self injury     Since the hospital discharge, he continue to have frequent seizures, probably several times per week.     He was complaining of vertigo spells, he was seen in the ED.  He was told that he has vestibular problems.       He continue to have small twitches that last for a spit of a second, some of them in clusters. No LOC with these twitches. These likely are not seizures.      Patient tried Peoples diet, but did not continue.     He is seeing Dr. Ben Barros for his depression.  He is also seeing Deo Shipman for therapy. He is complaining of fatigue and sleep over 8-10 hours a day. He is very concerned about the fatigue and is questioning what other options we have for treating his seizures.      They are now open to VEEG monitoring for surgical evaluation, resection or neuromodulations. However, they are still not sure if they want to move forward to the surgical treatment.     Mood improved a lot. No suicidal thoughts, no thoughts of hurting himself.     The patient started to have seizures when he was 16 years old and he was managed at the [facility] for the past few years for his seizures.  He is  "currently taking Aptiom 1200 mg q.h.s. and the Zonegran 300 mg b.i.d. for his epilepsy.  He was recently admitted to the hospital twice because of 2 clusters of seizures, one in January when he was admitted to [facility] and one was in February when he was admitted to [facility].  On both occasions, he had 4-5 back to back generalized tonic-clonic seizures within a short period of time.      According to the mother, the patient has 2 different types of seizures.  Type #1 is described as \"mild seizures,\" during which he will have some facial twitching.  He will have repetitive hand/arm jerking movement.  He will become unresponsive for less than a minute.  Postictally, he was having some confusion and headaches and fatigue.  Before the seizures, he will have an aura of \"seeing bees populating the earth with eggs.\"  This type of seizure is relatively infrequent, average once per month.      Type #2 are generalized tonic-clonic seizures which the patient will have generalized convulsions of the body and his body will be stiff.  It usually will last for 1-2 minutes followed by postictal fatigue and hallucinations.  This happens usually once every 3 months, but sometimes it occurs in clusters with 2-5 seizures back to back.      The patient had tried Keppra and Depakote in the past which did not work.  He had EEGs done in the past which were reported negative.      The patient had severe depression and requested a lot of behavioral health and counseling in the past and at certain time, he was suicidal, but that improved.  At the present time, he is not suicidal.  He does not think about hurting himself.     TRIGGERS FOR SEIZURES:  Depression.      RISK FACTORS FOR SEIZURES:  He had no history of head trauma with loss of consciousness.  No history of febrile convulsions.  No history of CNS infections.      PAST AEDs: Depakote. Vimpat caused suicidal ideations. Depakote discontinued due to risk of platelet dysfunction during " intracranial monitoring, and patient resumed Keppra. Gabapentin caused diarrhea.     PAST MEDICAL HISTORY:  Asperger's disorder, depression, epilepsy.      PAST SURGICAL HISTORY:  Depth electrode intracranial placement for epilepsy surgical evaluation      FAMILY HISTORY:  No family history of seizures.  Both parents have hypertension.  Maternal grandmother had heart disease.      SOCIAL HISTORY:  He is single, living with his mother.  He had some college education but did not finish because of depression; he is hoping to return to college soon.  He is currently working at [employer] and plans to return to work soon.  No smoking, no alcohol, no drug abuse.      REVIEW OF SYSTEMS:  Targeted ROS per HPI.      ALLERGIES:  Sertraline and Zyprexa.     PHYSICAL EXAMINATIONS:    There were no vitals taken for this visit.    General exam: General Appearance: No acute distress. HEENT: Normocephalic, atraumatic. Neck: Supple.  Extremities: No edema.     Neurologic Exam: Alert and oriented x3. Speech fluent, appropriate. Normal attention. Cranial Nerves: Pupils are equal, round, reactive to light and accomodation. Extraocular movement intact. No facial weakness or asymmetry. Hearing normal. Motor Exam: Normal. Coordination:no ataxia.  Gait and Station: normal.      DIAGNOSTIC TESTING: CT scan of the head on 02/27/2018 [facility]  He had EEGs in the past which were reported negative    MRI brain with and without contrast 8/5/2021 [facility] - No supratentorial lesions or significant hippocampal asymmetric/evidence for pathology. Stable cerebellar 7 mm T2 hypertense lesion.     MRI 7 Ernestine 4/22/2022 [facility] -   Impression: Motion degraded exam.  1. No definite temporal lobe abnormality, mass, or congenital lesion  identified as a cause of the patient's seizures.  2. No evidence of abnormal enhancement intracranially.   3. Previously seen abnormal T2 signal in the right cerebellum is  not  identified in today's exam, likely obscured secondary to  susceptibility artifacts in the posterior fossa, a less likely  possibility is that the lesion could have resolved.    IMPRESSION:   1. Epilepsy     Since the last visit, his seizures remains about the same. DBS are at 1.1 mA. 165 hz, 40 micro seconds. Patient felt that he has more seizures recently that he attributes to his school stress. He feel that he is having these 1-2 times per week recently. He had one incidence that he was confused, which was probably a focal impaired seizure during waking.    No thoughts of self harming now.     2  Headaches. He is complaining of a lot of headaches recently, every 2 - 3 days. He may have some photophobia and phonophobia.  Dr. Adler thinks that this is not related to his device    3.  Asperger's syndrome   4.  Depression. He is working with Dr. Barros to adjust his anti-depressant.  Abilify was added.  5.  Tremors.  Resolved.    PLAN:   1.  Continue Zonegran 300 mg - 400 mg, Lamictal 150 mg twice daily, Keppra 500 mg twice daily  2.  Continue follow-up with Dr. Barros for depression.  3.  Continue with Nortriptyline 10 mg at bedtime and CBD  4.  Continue DBS at 1.1 mA. 165 hz, 40 micro seconds. Will adjust based on seizures and mood changes in 3 months.  5.  Follow-up in person in 3 months.  6.  Refer to Dr. Ramírez for headaches.      The longitudinal plan of care for seizures was addressed during this visit. Due to the added complexity in care, I will continue to support this patient in the subsequent management of this condition and with the ongoing continuity of care of this condition.       42 min total time was spent on the day of this visit.      27 min was spent on face to face time  15 min was spent on preparation of visit to review charts and labs, ordering medications and tests, and documentation of clinical information      Again, thank you for allowing me to participate in the care of your  patient.      Sincerely,    Norman Dukes MD

## 2024-08-29 NOTE — PROGRESS NOTES
CHIEF COMPLIANT:  Follow-up for epilepsy and DBS placement.     HISTORY OF PRESENT ILLNESS:  Video call for followed .  He is accompanied by his mother in the clinic.  Patient is a 29-year-old right handed man with history of epilepsy, Asperger's syndrome and depression who was recently admitted for intracranial monitoring with depth electrodes in May for surgical evaluation for intractable epilepsy.    Since the last visit, his seizures remains about the same. DBS are at 1.1 mA. 165 hz, 40 micro seconds. Patient felt that he has more seizures recently that he attributes to his school stress. He feel that he is having these 1-2 times per week recently. He had one incidence that he was confused, which was probably a focal impaired seizure during waking.    No thoughts of self harming now.    His seizures improved compared to pre-DBS time. Parents saw him with postictal state for the 2-3 times per month. They feel that the postictal state is much less severe than the pre-DBS time. In the past, he was probably having 3-4 big seizures per month that he will report to his parents the morning after the seizure.  All his seizures are nocturnal.    He still complains of a lot of headaches recently, every 2 - 3 days. He feels that Nortriptyline is helping.    He may have some photophobia and phonophobia.  Dr. Adler thinks that this is not related to his device.    He had DBS placement at the end of Aug. 2022.  Since the DBS placement, he is not sure if there are any significant changes of his seizures.  His father feels that post-ictal stages are less (about 50%) since the DBS placement. Almost all his seizures are nocturnal.  He feels that his depression is going up and down recently. He is working with Dr. Barros to adjust his anti-depressant. He sees Dr. Barros    He complained of diarrhea recently.  Diarrhea resolved after Gabapentin was stopped.    Intracranial monitoring was complicated by the development of a  right-sided subdural hematoma which is stable with repeat CT head scheduled for tomorrow.  Clinically headaches are improving although he is endorsing scalp discomfort from the stitches which he hopes to have out tomorrow.  He has follow-up scheduled with Dr. Adler.  He is taking approximately 1000 mg acetaminophen twice daily for headache pain.  He describes headache pain as a 5/10 and relieved to about a 4/10 with Tylenol.  He was advised not to use Excedrin or other medications with antiplatelet effect in the setting of subdural hematoma at time of discharge.    Patient was monitored for 10 days with intracranial electrodes during which time he had 4 electroclinical seizures.  All of the seizures occurred out of sleep which are consistent with his semiology.  He was found to have bilateral upper extremity tonic extension elevation at seizure onset which would followed by generalized activity.  Depth electrodes were in unable to fully localize seizure onset and monitoring was terminated after 4 electroclinical seizures as further data was felt to be unnecessary to determine treatment course.  Dr. Dukes discussed deep brain stimulation (DBS) with patient during hospitalization and today's visit is to further discuss treatment options after intracranial monitoring.    Since discharge from the hospital no known electroclinical seizures, all of his seizures out of sleep and he is not always aware of seizures with that said no postictal confusion appreciated by dad and Amrit has not had any cuts or abrasions in the mouth to suggest a seizure.    Needs to follow-up with Dr. Ben Barros for psychiatric care.  He expressed feeling overwhelmed today by the discussion of epilepsy treatment and surgical options.    Intracranial monitoring 4/25/2022 - 5/5/2022 - 4  seizures were captured, but there was concern that we were not capturing the seizure onset zone and early rapid spread as well as multifocal regions of seizure  propagation are possibilities. These suggested that a localized surgical approach would be unlikely to provide seizure freedom. Additionally, it was felt that further seizure recording would not clarify or .     CMC was done in Aug. 2021, consensus was that patient should proceed with invasive monitoring. He should be able to tolerate intracranial monitoring.  The lateralization and localization are not clear based on scalp EEG monitoring. Temporal lobe epilepsy is possible. We will have invasive depth electrodes, 8 symmetric electrodes on each side:     Amygdala: 1  Anterior and posterior hippocampus: 3  Cingulate and prefrontal: 2  Orbital frontal:1  Insula: 1     He saw Dr. Adler on 11/2/2021 for consultation for invasive monitoring. He is scheduled for F-Gator MRI and CTA on Dec 12/3/2021.     For the past several months, he was having about 2 seizures per week, mostly nocturnal seizures, about 80-90%. He is currently taking aptiom 1200 mg qhs,  zonegran 300-400, Lamictal 150 mg bid, Depakote 500 - 1000. He is also taking CBD oil for anxiety.  Double vision resolved after aptiom was discontinued.     He is complaining of double vision, blurred vision.  He also complains of hand tremor. He had a really bad tremor day about one day ago.     He had VEEG monitoring for presurgical evaluation in March 2021. Total of 9 epileptic seizures were recorded during this admission. These electroclinical seizures showed bifrontal EEG activities at onset, usually with higher amplitude initial activity over the right frontal region compared to the left.  Ictal manifestations variably included tonic stiffening of his upper body, kicking of his legs, vocalizations, and non-purposeful movements of surrounding objects.  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  Aptiom was discontinued at discharge.      No plan for suicide or self injury     Since the hospital discharge, he continue to have  "frequent seizures, probably several times per week.     He was complaining of vertigo spells, he was seen in the ED.  He was told that he has vestibular problems.       He continue to have small twitches that last for a spit of a second, some of them in clusters. No LOC with these twitches. These likely are not seizures.      Patient tried Peoples diet, but did not continue.     He is seeing Dr. Ben Barros for his depression.  He is also seeing Deo Shipman for therapy. He is complaining of fatigue and sleep over 8-10 hours a day. He is very concerned about the fatigue and is questioning what other options we have for treating his seizures.      They are now open to VEEG monitoring for surgical evaluation, resection or neuromodulations. However, they are still not sure if they want to move forward to the surgical treatment.     Mood improved a lot. No suicidal thoughts, no thoughts of hurting himself.     The patient started to have seizures when he was 16 years old and he was managed at the [facility] for the past few years for his seizures.  He is currently taking Aptiom 1200 mg q.h.s. and the Zonegran 300 mg b.i.d. for his epilepsy.  He was recently admitted to the hospital twice because of 2 clusters of seizures, one in January when he was admitted to [facility] and one was in February when he was admitted to [facility].  On both occasions, he had 4-5 back to back generalized tonic-clonic seizures within a short period of time.      According to the mother, the patient has 2 different types of seizures.  Type #1 is described as \"mild seizures,\" during which he will have some facial twitching.  He will have repetitive hand/arm jerking movement.  He will become unresponsive for less than a minute.  Postictally, he was having some confusion and headaches and fatigue.  Before the seizures, he will have an aura of \"seeing bees populating the earth with eggs.\"  This type of seizure is relatively infrequent, average once " per month.      Type #2 are generalized tonic-clonic seizures which the patient will have generalized convulsions of the body and his body will be stiff.  It usually will last for 1-2 minutes followed by postictal fatigue and hallucinations.  This happens usually once every 3 months, but sometimes it occurs in clusters with 2-5 seizures back to back.      The patient had tried Keppra and Depakote in the past which did not work.  He had EEGs done in the past which were reported negative.      The patient had severe depression and requested a lot of behavioral health and counseling in the past and at certain time, he was suicidal, but that improved.  At the present time, he is not suicidal.  He does not think about hurting himself.     TRIGGERS FOR SEIZURES:  Depression.      RISK FACTORS FOR SEIZURES:  He had no history of head trauma with loss of consciousness.  No history of febrile convulsions.  No history of CNS infections.      PAST AEDs: Depakote. Vimpat caused suicidal ideations. Depakote discontinued due to risk of platelet dysfunction during intracranial monitoring, and patient resumed Keppra. Gabapentin caused diarrhea.     PAST MEDICAL HISTORY:  Asperger's disorder, depression, epilepsy.      PAST SURGICAL HISTORY:  Depth electrode intracranial placement for epilepsy surgical evaluation      FAMILY HISTORY:  No family history of seizures.  Both parents have hypertension.  Maternal grandmother had heart disease.      SOCIAL HISTORY:  He is single, living with his mother.  He had some college education but did not finish because of depression; he is hoping to return to college soon.  He is currently working at [employer] and plans to return to work soon.  No smoking, no alcohol, no drug abuse.      REVIEW OF SYSTEMS:  Targeted ROS per HPI.      ALLERGIES:  Sertraline and Zyprexa.     PHYSICAL EXAMINATIONS:    There were no vitals taken for this visit.    General exam: General Appearance: No acute distress. HEENT:  Normocephalic, atraumatic. Neck: Supple.  Extremities: No edema.     Neurologic Exam: Alert and oriented x3. Speech fluent, appropriate. Normal attention. Cranial Nerves: Pupils are equal, round, reactive to light and accomodation. Extraocular movement intact. No facial weakness or asymmetry. Hearing normal. Motor Exam: Normal. Coordination:no ataxia.  Gait and Station: normal.      DIAGNOSTIC TESTING: CT scan of the head on 02/27/2018 was reported negative from [facility].  He had EEGs in the past which were reported negative    MRI brain with and without contrast 8/5/2021 [facility] - No supratentorial lesions or significant hippocampal asymmetric/evidence for pathology. Stable cerebellar 7 mm T2 hypertense lesion.     MRI 7 Ernestine 4/22/2022 [facility] -   Impression: Motion degraded exam.  1. No definite temporal lobe abnormality, mass, or congenital lesion  identified as a cause of the patient's seizures.  2. No evidence of abnormal enhancement intracranially.   3. Previously seen abnormal T2 signal in the right cerebellum is not  identified in today's exam, likely obscured secondary to  susceptibility artifacts in the posterior fossa, a less likely  possibility is that the lesion could have resolved.    IMPRESSION:   1. Epilepsy     Since the last visit, his seizures remains about the same. DBS are at 1.1 mA. 165 hz, 40 micro seconds. Patient felt that he has more seizures recently that he attributes to his school stress. He feel that he is having these 1-2 times per week recently. He had one incidence that he was confused, which was probably a focal impaired seizure during waking.    No thoughts of self harming now.     2  Headaches. He is complaining of a lot of headaches recently, every 2 - 3 days. He may have some photophobia and phonophobia.  Dr. Adler thinks that this is not related to his device    3.  Asperger's syndrome   4.  Depression. He is working with Dr. Barros to adjust  his anti-depressant.  Abilify was added.  5.  Tremors.  Resolved.    PLAN:   1.  Continue Zonegran 300 mg - 400 mg, Lamictal 150 mg twice daily, Keppra 500 mg twice daily  2.  Continue follow-up with Dr. Barros for depression.  3.  Continue with Nortriptyline 10 mg at bedtime and CBD  4.  Continue DBS at 1.1 mA. 165 hz, 40 micro seconds. Will adjust based on seizures and mood changes in 3 months.  5.  Follow-up in person in 3 months.  6.  Refer to Dr. Ramírez for headaches.      The longitudinal plan of care for seizures was addressed during this visit. Due to the added complexity in care, I will continue to support this patient in the subsequent management of this condition and with the ongoing continuity of care of this condition.       42 min total time was spent on the day of this visit.      27 min was spent on face to face time  15 min was spent on preparation of visit to review charts and labs, ordering medications and tests, and documentation of clinical information

## 2024-08-29 NOTE — PROGRESS NOTES
Is the patient currently in the state of [place]?Yes    If the video visit is dropped, the invitation should be resent by: Text to cell phone: [phone]    Will anyone else be joining the visit? No      How would you like to obtain your AVS? MyChart    The following updates are needed to allergies and medications:  Medications patient is taking and are not on the medication list: No  Allergies that need to be added to patient's chart: No    Additional patient comments or concerns: Medical form needs to be completed. Headaches and one major seizure on 06/29/2024

## 2024-08-29 NOTE — PATIENT INSTRUCTIONS
PLAN:    1.  Continue Zonegran 300 mg - 400 mg, Lamictal 150 mg twice daily, Keppra 500 mg twice daily  2.  Continue follow-up with Dr. Barros for depression.  3.  Continue with Nortriptyline 10 mg at bedtime and CBD  4.  Continue DBS at 1.1 mA. 165 hz, 40 micro seconds. Will adjust based on seizures and mood changes in 3 months.  5.  Follow-up in person in 3 months.  6.  Mefer to Dr. Ramírez for headaches.

## 2024-08-30 NOTE — TELEPHONE ENCOUNTER
Call placed to patient.  Discussed status of forms.  Patient has received the letter, I provided an update on the forms.

## 2024-09-04 RX ORDER — LAMOTRIGINE 150 MG/1
150 TABLET ORAL 2 TIMES DAILY
Qty: 180 TABLET | Refills: 3 | Status: SHIPPED | OUTPATIENT
Start: 2024-09-04

## 2024-09-10 ENCOUNTER — TELEPHONE (OUTPATIENT)
Dept: NEUROLOGY | Facility: CLINIC | Age: 29
End: 2024-09-10

## 2024-10-15 ENCOUNTER — TELEPHONE (OUTPATIENT)
Dept: NEUROLOGY | Facility: CLINIC | Age: 29
End: 2024-10-15

## 2024-10-15 NOTE — TELEPHONE ENCOUNTER
The patient has been having possible seizure activity. Increase headaches, lack of energy.  Inability to focus. Possible reasons are from stress at school.  He is wondering if the side effects he is experiencing related to seizures.  He just want to get confirmation and get questions answered he can be reached at [phone].

## 2024-10-18 NOTE — TELEPHONE ENCOUNTER
Patient reports he has had a headache for the past six days.  It has been a throbbing type headache on the front and sides of his head.    He is not sure if he had a seizure, but doesn't think he has.  He has has some light headedness and dizziness.  Sergio feels his headache may have improved a little today.  Patient is continuing to take the nortriptyline, but doesn't want to take any OTC meds because he recalls  told him not to.    Has an appointment scheduled with  for Headache management in December    No changes in your other medications  No med compliance issues.  No other signs or symptoms of illness, no ever or cough.    We discussed that he could use warm or cold back, depending on what feels good, to remember to drink adequate fluids.  Will also forward the chart to  for recommendations

## 2024-10-28 NOTE — TELEPHONE ENCOUNTER
"Per   \"  It is ok for him to use OTC pain meds such as tylenol or advil once or twice a week if needed.  Thanks!  Norman   \"    [employer] message sent to patient  "

## 2024-12-02 ASSESSMENT — HEADACHE IMPACT TEST (HIT 6)
HOW OFTEN HAVE YOU FELT FED UP OR IRRITATED BECAUSE OF YOUR HEADACHES: SOMETIMES
WHEN YOU HAVE HEADACHES HOW OFTEN IS THE PAIN SEVERE: RARELY
WHEN YOU HAVE A HEADACHE HOW OFTEN DO YOU WISH YOU COULD LIE DOWN: VERY OFTEN
HOW OFTEN HAVE YOU FELT TOO TIRED TO WORK BECAUSE OF YOUR HEADACHES: SOMETIMES
HIT6 TOTAL SCORE: 59
HOW OFTEN DO HEADACHES LIMIT YOUR DAILY ACTIVITIES: SOMETIMES
HOW OFTEN DID HEADACHS LIMIT CONCENTRATION ON WORK OR DAILY ACTIVITY: SOMETIMES

## 2024-12-02 ASSESSMENT — MIGRAINE DISABILITY ASSESSMENT (MIDAS)
HOW MANY DAYS WAS YOUR PRODUCTIVITY CUT IN HALF BECAUSE OF HEADACHES: 2
HOW MANY DAYS DID YOU MISS WORK OR SCHOOL BECAUSE OF HEADACHES: 5
HOW MANY DAYS IN THE PAST 3 MONTHS HAVE YOU HAD A HEADACHE: 80
ON A SCALE FROM 0-10 ON AVERAGE HOW PAINFUL WERE HEADACHES: 5
TOTAL SCORE: 16
HOW OFTEN WERE SOCIAL ACTIVITIES MISSED DUE TO HEADACHES: 3
HOW MANY DAYS WAS HOUSEWORK PRODUCTIVITY CUT IN HALF DUE TO HEADACHES: 6
HOW MANY DAYS DID YOU NOT DO HOUSEWORK BECAUSE OF HEADACHES: 0

## 2024-12-05 ENCOUNTER — VIRTUAL VISIT (OUTPATIENT)
Dept: NEUROLOGY | Facility: CLINIC | Age: 29
End: 2024-12-05
Attending: PSYCHIATRY & NEUROLOGY
Payer: COMMERCIAL

## 2024-12-05 ENCOUNTER — TELEPHONE (OUTPATIENT)
Dept: NEUROLOGY | Facility: CLINIC | Age: 29
End: 2024-12-05

## 2024-12-05 DIAGNOSIS — G43.719 INTRACTABLE CHRONIC MIGRAINE WITHOUT AURA AND WITHOUT STATUS MIGRAINOSUS: Primary | ICD-10-CM

## 2024-12-05 NOTE — LETTER
12/5/2024      Amrit Padilla  [address]  [facility] [address]      Dear Colleague,    Thank you for referring your patient, Amrit Padilla, to the [facility] [place]. Please see a copy of my visit note below.    Virtual Visit Details    Type of service:  Video Visit     Originating Location (pt. Location): Home    Distant Location (provider location):  On-site  Platform used for Video Visit: [facility]   Virtual Headache Neurology Consult  December 5, 2024     Amrit Padilla MRN# [id number]   YOB: 1995 Age: 29 year old     Requesting physician: Norman Dukes MD         Assessment and Recommendations:     Amrit Padilla is a 29 year old male who presents for further evaluation of headache.    His headache presentation shows a long history of frequent headaches, worsening over the last few years in the setting of epilepsy and deep brain stimulator placement.  He currently has chronic daily headaches with a chronic migraine phenotype.  His mother has migraine, so he may be genetically predisposed to migraine as well.    His virtual neurologic examination is intact today.  He has had an MRI of the brain in 2022, which confirmed lead placement from stimulator device and showed a 6 mm enhancing lesion in the temporal lobe, which was noted to be stable.    I did not recommend additional workup for secondary causes of headache today, but this could be pursued if he does not respond to treatments.    He has previously tried and currently takes antidepressant, antiseizure, and antihypertensive medications, without headache benefit.  Currently, he takes propranolol, nortriptyline, lamotrigine, levetiracetam, escitalopram, and aripiprazole.  -I recommended trial of Nurtec 75 mg oral dissolvable tablet every other day for headache prevention.  This can also be helpful acutely.  -We discussed adding a rescue medication, although he would  like to avoid adding additional medication burden if possible, so we will prioritize Nurtec, which can do both.  -Alternative CGRP inhibitors or botulinum toxin injections using a chronic migraine protocol could also be considered in the future.  -We also discussed the option of injectable medications, although he would prefer a pill at this time.    The longitudinal plan of care for Sergio was addressed during this visit. Due to the added complexity in care, I will continue to support Sergio in the subsequent management of this condition(s) and with the ongoing continuity of care of this condition(s).  I will plan to see her back in 3 months to monitor his progress.    Awa Ramírez MD  Neurology            Chief Complaint:     Chief Complaint   Patient presents with     Video Visit     Intractable episodic tension type headache            History is obtained from the patient and medical record.  Patient was seen via a virtual visit in their home.      Amrit Padilla is a 29 year old male who has been living with headaches for a long time, worsening recently in the last few years.     Headaches are over the top of his head and frontal, bilateral. It is a soreness, bruising pain, changing to feel like somebody hit him when severe. It rates up to 7-8/10. It is 1-2/10 at baseline daily, 30 out of 30 days a month, increasing to be more severe 10-12 days per month. Headaches last all day.  They typically improve after he has been able to sleep, but do not resolve entirely.    Associated with feeling exhausted, needs to go lay down, phonophobia, photophobia.    He denies typical aura. Denies a positional component.  Feels that seizures can trigger headache, but are not always associated.    Triggers: stress, seizures  -He wonders if stress around his college coursework this semester is increasing his headache burden.    Sees a therapist and psychiatrist.  Currently takes Abilify and Lexapro, and low-dose nortriptyline.   He also takes low-dose propranolol.    He has epilepsy, currently treated with antiseizure medication and deep brain stimulator device.  He is followed by Dr. Dukes of epilepsy.    Currently takes propranolol, lamotrigine, levetiracetam, nortriptyline, aripiprazole, escitalopram.               Past Medical History:      Depression/anxiety   Asperger's   Epilepsy           Past Surgical History:     Past Surgical History:   Procedure Laterality Date     BACK SURGERY       IMPLANT DEEP BRAIN STIMULATION GENERATOR / BATTERY Right 8/29/2022    Procedure: Deep brain stimulator placement, phase II, placement of deep brain stimulator generator/battery over the right chest wall;  Surgeon: Tello Adler MD;  Location: [facility] OR     INSERTION, ELECTRODE LEADS, DEEP BRAIN STIMULATOR, BILATERAL, USING OPTICAL TRACKING SYSTEM, WITH MRI GUIDANCE Bilateral 8/22/2022    Procedure: Intraoperative Magnetic resonance imaging/Stealth assisted Bilateral deep brain stimulator placement, phase I, placement of bilateral deep brain stimulator electrode, target anterior nucleus of thalamus, WITHOUT microelectrode recording;  Surgeon: Tello Adler MD;  Location: [facility] OR     REMOVE DEPTH ELECTRODES N/A 5/5/2022    Procedure: Stereo EEG depth electrode removal;  Surgeon: Jairon Mar MD;  Location: [facility] OR     JAYDEN ASSISTED PLACEMENT DEPTH ELECTRODE Bilateral 4/25/2022    Procedure: JAYDEN-assisted stereotactic placement of bilateral depth electrodes for invasive video electroencephalography monitoring;  Surgeon: Tello Adler MD;  Location: [facility] OR             Social History:   He is taking courses at [employer] for journalism/sports writing. He takes the bus to campus.    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    He is a  fan.    Social History     Socioeconomic History     Marital status: Single     Spouse name: Not on file     Number of children: Not on file     Years of education: Not on file      Highest education level: Not on file   Occupational History     Not on file   Tobacco Use     Smoking status: Never     Smokeless tobacco: Never   Substance and Sexual Activity     Alcohol use: No     Drug use: No     Sexual activity: Not Currently   Other Topics Concern     Parent/sibling w/ CABG, MI or angioplasty before 65F 55M? No   Social History Narrative     Not on file     Social Drivers of Health     Financial Resource Strain: Not At Risk (12/14/2023)    Received from [facility]    Financial Resource Strain      Is it hard for you to pay for the very basics like food, housing, medical care or heating?: No   Food Insecurity: Not At Risk (12/14/2023)    Received from [facility]    Food Insecurity      Does your food run out before you have the money to buy more?: No   Transportation Needs: Not At Risk (12/14/2023)    Received from [facility]    Transportation Needs      Does a lack of transportation keep you from your medical appointments or from getting your medications?: No   Physical Activity: Not on file   Stress: Not on file   Social Connections: Not on file   Interpersonal Safety: Not on file   Housing Stability: Not on file             Family History:   Mother with migraine.     Family History   Problem Relation Age of Onset     Hypertension Mother      Hypertension Father      Asthma Father      Asthma Brother      Hereditary Spherocytosis Brother      Asthma Sister      Anxiety Disorder Sister              Allergies:      Allergies   Allergen Reactions     American [facility] Allergy Skin Test Cough, Difficulty breathing and Shortness Of Breath     Hydroxyzine      Other reaction(s): Seizures     Mixed Grasses Cough, Other (See Comments) and Shortness Of Breath     Unknown [No Clinical Screening - See Comments] Rash     Reaction to a seizure med that he can not recall.     Dust Mites      Molds & Smuts      Other reaction(s): Unknown     Sertraline  Other (See Comments)     mood swings     Zyprexa [Olanzapine]      Seizure.              Medications:     Current Outpatient Medications:      acetaminophen (TYLENOL) 325 MG tablet, Take 325-650 mg by mouth every 6 hours as needed for mild pain, Disp: , Rfl:      ARIPiprazole (ABILIFY) 2 MG tablet, Take 2 mg by mouth daily., Disp: , Rfl:      cetirizine (ZYRTEC) 10 MG tablet, Take 10 mg by mouth as needed for allergies Seasonal, Disp: , Rfl:      diphenhydrAMINE (BENADRYL) 25 MG capsule, Take 50 mg by mouth daily as needed for itching or allergies, Disp: , Rfl:      escitalopram (LEXAPRO) 10 MG tablet, Take 20 mg by mouth daily at 2 pm, Disp: , Rfl:      HEMP OIL OR EXTRACT OR OTHER CBD CANNABINOID, NOT MEDICAL CANNABIS,, Take 15 mg by mouth every evening , Disp: , Rfl:      lamoTRIgine (LAMICTAL) 150 MG tablet, Take 1 tablet (150 mg) by mouth 2 times daily., Disp: 180 tablet, Rfl: 3     levETIRAcetam (KEPPRA) 500 MG tablet, TAKE 1 TABLET BY MOUTH TWICE A DAY, Disp: 180 tablet, Rfl: 3     levETIRAcetam (KEPPRA) 500 MG tablet, Take 1 tablet (500 mg) by mouth 2 times daily, Disp: 180 tablet, Rfl: 3     levETIRAcetam (KEPPRA) 500 MG tablet, Take 1 tablet (500 mg) by mouth 2 times daily, Disp: 186 tablet, Rfl: 3     nortriptyline (PAMELOR) 10 MG capsule, TAKE 1 CAPSULE BY MOUTH AT BEDTIME, Disp: 90 capsule, Rfl: 1     zonisamide (ZONEGRAN) 100 MG capsule, TAKE 3 CAPSULES BY MOUTH IN THE MORNING AND 4 CAPS IN THE EVENING, Disp: 630 capsule, Rfl: 3     MELATONIN PO, Take 10 mg by mouth At Bedtime OTC (Patient not taking: Reported on 12/5/2024), Disp: , Rfl:      propranolol (INDERAL) 10 MG tablet, Take 10 mg by mouth as needed (before work) (Patient not taking: Reported on 12/5/2024), Disp: , Rfl:      vilazodone (VIIBRYD) 20 MG TABS tablet, Take 1 tablet by mouth daily at 2 pm (Patient not taking: Reported on 12/5/2024), Disp: , Rfl:           Physical Exam:   There were no vitals taken for this visit.   Physical Exam:    General: NAD  Neurologic:   Mental Status Exam: Alert, awake and oriented to situation. No dysarthria. Speech of normal fluency.   Cranial Nerves: Pupils equal, EOMs intact, no nystagmus, facial movements symmetric, hearing intact to conversation, tongue midline and fully mobile. No tongue atrophy or fasciculations.    Motor: No drift in upper extremities. Able to stand from a seated position without use of arms. No tremors or abnormal movements noted.   Coordination: With arms outstretched, able to touch nose using index finger accurately bilaterally.  Normal finger tapping bilaterally.     Gait: Normal stance and casual gait.    Neck: Normal range of motion with lateral head movements and neck flexion.  Eyes: No conjunctival injection, no scleral icterus.           Data:     MRI brain wo contrast (8/22/2022):  1. New bilateral deep brain stimulators with leads terminating at the  anterior thalami.  2. Stable 6 mm nodular enhancement in the lateral right temporal lobe.        Again, thank you for allowing me to participate in the care of your patient.        Sincerely,        Awa Ramírez MD

## 2024-12-05 NOTE — NURSING NOTE
Current patient location: [address] [address] [place] [address]    Is the patient currently in the state of [place]? YES    Visit mode:VIDEO    If the visit is dropped, the patient can be reconnected by:TELEPHONE VISIT: Phone number:   Telephone Information:   Mobile [phone]       Will anyone else be joining the visit? NO  (If patient encounters technical issues they should call [phone]    Are changes needed to the allergy or medication list? Pt stated no med changes    Are refills needed on medications prescribed by this physician? NO    Rooming Documentation:  Questionnaire(s) completed    Reason for visit: Video Visit (Intractable episodic tension type headache )    Iris ABREU

## 2024-12-05 NOTE — TELEPHONE ENCOUNTER
PRIOR AUTHORIZATION DENIED    Medication: RIMEGEPANT SULFATE 75 MG PO TBDP  Insurance Company: "[employer] - Phone [phone] Fax [phone]  Denial Date: 12/5/2024  Denial Reason(s):     Appeal Information:     Patient Notified: No

## 2024-12-05 NOTE — PROGRESS NOTES
Virtual Visit Details    Type of service:  Video Visit     Originating Location (pt. Location): Home    Distant Location (provider location):  On-site  Platform used for Video Visit: [facility]   Virtual Headache Neurology Consult  December 5, 2024     Amrit Padilla MRN# [id number]   YOB: 1995 Age: 29 year old     Requesting physician: Norman Dukes MD         Assessment and Recommendations:     Amrit Padilla is a 29 year old male who presents for further evaluation of headache.    His headache presentation shows a long history of frequent headaches, worsening over the last few years in the setting of epilepsy and deep brain stimulator placement.  He currently has chronic daily headaches with a chronic migraine phenotype.  His mother has migraine, so he may be genetically predisposed to migraine as well.    His virtual neurologic examination is intact today.  He has had an MRI of the brain in 2022, which confirmed lead placement from stimulator device and showed a 6 mm enhancing lesion in the temporal lobe, which was noted to be stable.    I did not recommend additional workup for secondary causes of headache today, but this could be pursued if he does not respond to treatments.    He has previously tried and currently takes antidepressant, antiseizure, and antihypertensive medications, without headache benefit.  Currently, he takes propranolol, nortriptyline, lamotrigine, levetiracetam, escitalopram, and aripiprazole.  -I recommended trial of Nurtec 75 mg oral dissolvable tablet every other day for headache prevention.  This can also be helpful acutely.  -We discussed adding a rescue medication, although he would like to avoid adding additional medication burden if possible, so we will prioritize Nurtec, which can do both.  -Alternative CGRP inhibitors or botulinum toxin injections using a chronic migraine protocol could also be considered in the future.  -We also  discussed the option of injectable medications, although he would prefer a pill at this time.    The longitudinal plan of care for Sergio was addressed during this visit. Due to the added complexity in care, I will continue to support Sergio in the subsequent management of this condition(s) and with the ongoing continuity of care of this condition(s).  I will plan to see her back in 3 months to monitor his progress.    Awa Ramírez MD  Neurology            Chief Complaint:     Chief Complaint   Patient presents with    Video Visit     Intractable episodic tension type headache            History is obtained from the patient and medical record.  Patient was seen via a virtual visit in their home.      Amrit Padilla is a 29 year old male who has been living with headaches for a long time, worsening recently in the last few years.     Headaches are over the top of his head and frontal, bilateral. It is a soreness, bruising pain, changing to feel like somebody hit him when severe. It rates up to 7-8/10. It is 1-2/10 at baseline daily, 30 out of 30 days a month, increasing to be more severe 10-12 days per month. Headaches last all day.  They typically improve after he has been able to sleep, but do not resolve entirely.    Associated with feeling exhausted, needs to go lay down, phonophobia, photophobia.    He denies typical aura. Denies a positional component.  Feels that seizures can trigger headache, but are not always associated.    Triggers: stress, seizures  -He wonders if stress around his college coursework this semester is increasing his headache burden.    Sees a therapist and psychiatrist.  Currently takes Abilify and Lexapro, and low-dose nortriptyline.  He also takes low-dose propranolol.    He has epilepsy, currently treated with antiseizure medication and deep brain stimulator device.  He is followed by Dr. Dukes of epilepsy.    Currently takes propranolol, lamotrigine, levetiracetam, nortriptyline,  aripiprazole, escitalopram.               Past Medical History:      Depression/anxiety   Asperger's   Epilepsy           Past Surgical History:     Past Surgical History:   Procedure Laterality Date    BACK SURGERY      IMPLANT DEEP BRAIN STIMULATION GENERATOR / BATTERY Right 8/29/2022    Procedure: Deep brain stimulator placement, phase II, placement of deep brain stimulator generator/battery over the right chest wall;  Surgeon: Tello Adler MD;  Location: [facility] OR    INSERTION, ELECTRODE LEADS, DEEP BRAIN STIMULATOR, BILATERAL, USING OPTICAL TRACKING SYSTEM, WITH MRI GUIDANCE Bilateral 8/22/2022    Procedure: Intraoperative Magnetic resonance imaging/Stealth assisted Bilateral deep brain stimulator placement, phase I, placement of bilateral deep brain stimulator electrode, target anterior nucleus of thalamus, WITHOUT microelectrode recording;  Surgeon: Tello Adler MD;  Location: [facility] OR    REMOVE DEPTH ELECTRODES N/A 5/5/2022    Procedure: Stereo EEG depth electrode removal;  Surgeon: Jairon Mar MD;  Location: [facility] OR    JAYDEN ASSISTED PLACEMENT DEPTH ELECTRODE Bilateral 4/25/2022    Procedure: JAYDEN-assisted stereotactic placement of bilateral depth electrodes for invasive video electroencephalography monitoring;  Surgeon: Tello Adler MD;  Location: [facility] OR             Social History:   He is taking courses at [employer] for journalism/sports writing. He takes the bus to campus.    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    He is a  fan.    Social History     Socioeconomic History    Marital status: Single     Spouse name: Not on file    Number of children: Not on file    Years of education: Not on file    Highest education level: Not on file   Occupational History    Not on file   Tobacco Use    Smoking status: Never    Smokeless tobacco: Never   Substance and Sexual Activity    Alcohol use: No    Drug use: No    Sexual activity: Not Currently   Other Topics  Concern    Parent/sibling w/ CABG, MI or angioplasty before 65F 55M? No   Social History Narrative    Not on file     Social Drivers of Health     Financial Resource Strain: Not At Risk (12/14/2023)    Received from [facility]    Financial Resource Strain     Is it hard for you to pay for the very basics like food, housing, medical care or heating?: No   Food Insecurity: Not At Risk (12/14/2023)    Received from [facility]    Food Insecurity     Does your food run out before you have the money to buy more?: No   Transportation Needs: Not At Risk (12/14/2023)    Received from [facility]    Transportation Needs     Does a lack of transportation keep you from your medical appointments or from getting your medications?: No   Physical Activity: Not on file   Stress: Not on file   Social Connections: Not on file   Interpersonal Safety: Not on file   Housing Stability: Not on file             Family History:   Mother with migraine.     Family History   Problem Relation Age of Onset    Hypertension Mother     Hypertension Father     Asthma Father     Asthma Brother     Hereditary Spherocytosis Brother     Asthma Sister     Anxiety Disorder Sister              Allergies:      Allergies   Allergen Reactions    American [facility] Allergy Skin Test Cough, Difficulty breathing and Shortness Of Breath    Hydroxyzine      Other reaction(s): Seizures    Mixed Grasses Cough, Other (See Comments) and Shortness Of Breath    Unknown [No Clinical Screening - See Comments] Rash     Reaction to a seizure med that he can not recall.    Dust Mites     Molds & Smuts      Other reaction(s): Unknown    Sertraline Other (See Comments)     mood swings    Zyprexa [Olanzapine]      Seizure.              Medications:     Current Outpatient Medications:     acetaminophen (TYLENOL) 325 MG tablet, Take 325-650 mg by mouth every 6 hours as needed for mild pain, Disp: , Rfl:     ARIPiprazole  (ABILIFY) 2 MG tablet, Take 2 mg by mouth daily., Disp: , Rfl:     cetirizine (ZYRTEC) 10 MG tablet, Take 10 mg by mouth as needed for allergies Seasonal, Disp: , Rfl:     diphenhydrAMINE (BENADRYL) 25 MG capsule, Take 50 mg by mouth daily as needed for itching or allergies, Disp: , Rfl:     escitalopram (LEXAPRO) 10 MG tablet, Take 20 mg by mouth daily at 2 pm, Disp: , Rfl:     HEMP OIL OR EXTRACT OR OTHER CBD CANNABINOID, NOT MEDICAL CANNABIS,, Take 15 mg by mouth every evening , Disp: , Rfl:     lamoTRIgine (LAMICTAL) 150 MG tablet, Take 1 tablet (150 mg) by mouth 2 times daily., Disp: 180 tablet, Rfl: 3    levETIRAcetam (KEPPRA) 500 MG tablet, TAKE 1 TABLET BY MOUTH TWICE A DAY, Disp: 180 tablet, Rfl: 3    levETIRAcetam (KEPPRA) 500 MG tablet, Take 1 tablet (500 mg) by mouth 2 times daily, Disp: 180 tablet, Rfl: 3    levETIRAcetam (KEPPRA) 500 MG tablet, Take 1 tablet (500 mg) by mouth 2 times daily, Disp: 186 tablet, Rfl: 3    nortriptyline (PAMELOR) 10 MG capsule, TAKE 1 CAPSULE BY MOUTH AT BEDTIME, Disp: 90 capsule, Rfl: 1    zonisamide (ZONEGRAN) 100 MG capsule, TAKE 3 CAPSULES BY MOUTH IN THE MORNING AND 4 CAPS IN THE EVENING, Disp: 630 capsule, Rfl: 3    MELATONIN PO, Take 10 mg by mouth At Bedtime OTC (Patient not taking: Reported on 12/5/2024), Disp: , Rfl:     propranolol (INDERAL) 10 MG tablet, Take 10 mg by mouth as needed (before work) (Patient not taking: Reported on 12/5/2024), Disp: , Rfl:     vilazodone (VIIBRYD) 20 MG TABS tablet, Take 1 tablet by mouth daily at 2 pm (Patient not taking: Reported on 12/5/2024), Disp: , Rfl:           Physical Exam:   There were no vitals taken for this visit.   Physical Exam:   General: NAD  Neurologic:   Mental Status Exam: Alert, awake and oriented to situation. No dysarthria. Speech of normal fluency.   Cranial Nerves: Pupils equal, EOMs intact, no nystagmus, facial movements symmetric, hearing intact to conversation, tongue midline and fully mobile. No  tongue atrophy or fasciculations.    Motor: No drift in upper extremities. Able to stand from a seated position without use of arms. No tremors or abnormal movements noted.   Coordination: With arms outstretched, able to touch nose using index finger accurately bilaterally.  Normal finger tapping bilaterally.     Gait: Normal stance and casual gait.    Neck: Normal range of motion with lateral head movements and neck flexion.  Eyes: No conjunctival injection, no scleral icterus.           Data:     MRI brain wo contrast (8/22/2022):  1. New bilateral deep brain stimulators with leads terminating at the  anterior thalami.  2. Stable 6 mm nodular enhancement in the lateral right temporal lobe.

## 2024-12-09 NOTE — TELEPHONE ENCOUNTER
Provider recommended reaching out to pt to see if they are okay with Ajovy instead.  Dr. Ramírez sent Video Passportshart, but it has not been read yet.      Attempt[facility] to call pt.  [facility] and advised to look at Video Passportshart too.     Tran RN, BSN  [facility]

## 2024-12-10 DIAGNOSIS — G43.719 INTRACTABLE CHRONIC MIGRAINE WITHOUT AURA AND WITHOUT STATUS MIGRAINOSUS: Primary | ICD-10-CM

## 2024-12-10 RX ORDER — FREMANEZUMAB-VFRM 225 MG/1.5ML
1.5 INJECTION SUBCUTANEOUS
Qty: 1.5 ML | Refills: 11 | Status: SHIPPED | OUTPATIENT
Start: 2024-12-10

## 2024-12-10 NOTE — TELEPHONE ENCOUNTER
See [email or web address] encounter from 12/06/2024. Patient responded to Dr. Ramírez's [employer] message and is open to trying Ajovy.    Routing to Dr. Ramírez to advise on ordering this medication.     Alycia GAMEZ RN, BSN  [facility]

## 2024-12-11 NOTE — TELEPHONE ENCOUNTER
Ajovy was ordered by Dr. Ramírez and a separate PA was already initiated.    Sent pt a [employer] message with education/information on this medication. Please see MyChart encounter from 12/06/2024. Instructed patient to reach out with any further questions.    Alycia GAMEZ RN, BSN  [facility] Neurology

## 2025-01-02 ENCOUNTER — OFFICE VISIT (OUTPATIENT)
Dept: NEUROLOGY | Facility: CLINIC | Age: 30
End: 2025-01-02
Payer: COMMERCIAL

## 2025-01-02 VITALS
BODY MASS INDEX: 29.66 KG/M2 | WEIGHT: 219 LBS | TEMPERATURE: 97.1 F | DIASTOLIC BLOOD PRESSURE: 88 MMHG | HEIGHT: 72 IN | SYSTOLIC BLOOD PRESSURE: 131 MMHG | HEART RATE: 92 BPM

## 2025-01-02 DIAGNOSIS — G40.909 RECURRENT SEIZURES (H): ICD-10-CM

## 2025-01-02 DIAGNOSIS — G40.219 PARTIAL SYMPTOMATIC EPILEPSY WITH COMPLEX PARTIAL SEIZURES, INTRACTABLE, WITHOUT STATUS EPILEPTICUS (H): ICD-10-CM

## 2025-01-02 LAB — LEVETIRACETAM SERPL-MCNC: 12 ÂΜG/ML (ref 10–40)

## 2025-01-02 RX ORDER — LEVETIRACETAM 500 MG/1
500 TABLET ORAL 2 TIMES DAILY
Qty: 180 TABLET | Refills: 3 | Status: SHIPPED | OUTPATIENT
Start: 2025-01-02

## 2025-01-02 RX ORDER — ZONISAMIDE 100 MG/1
CAPSULE ORAL
Qty: 630 CAPSULE | Refills: 3 | Status: SHIPPED | OUTPATIENT
Start: 2025-01-02

## 2025-01-02 RX ORDER — LAMOTRIGINE 150 MG/1
150 TABLET ORAL 2 TIMES DAILY
Qty: 180 TABLET | Refills: 3 | Status: SHIPPED | OUTPATIENT
Start: 2025-01-02

## 2025-01-02 NOTE — LETTER
2025       RE: Amrit Padilla  : 1995   MRN: [id number]      Dear Colleague,    Thank you for referring your patient, Amrit Padilla, to the [facility] EPILEPSY [facility] at [facility]. Please see a copy of my visit note below.    CHIEF COMPLIANT:  Follow-up for epilepsy and DBS placement.     HISTORY OF PRESENT ILLNESS:  In person visit for follow up.  He is by himself in the clinic today. His father was on the phone during the visit. Patient is a 29-year-old right handed man with history of epilepsy, Asperger's syndrome and depression who was recently admitted for intracranial monitoring with depth electrodes in May for surgical evaluation for intractable epilepsy.    Since the last visit about 4 months ago, his father felt that his seizure frequency remains about the same, however, severity was down. DBS are at group A 0.8 mA bilaterally, 145 hz, 90 micro sec. Group B 0.7 mA bilaterally, 165 hz, 40 micro sec. Patient felt that his school stress contributed a lot to his seizures. He feel that he is having these 1-2 times per week during the school days, but much less during winter break. He had 2 incidence that he was confused, which was probably a focal impaired seizure during waking in school.    PHQ-9 is 16 today. No thoughts of self harming now.    His seizures improved compared to pre-DBS time. Parents saw him with postictal state for the 2-3 times per month. They feel that the postictal state is much less severe than the pre-DBS time. In the past, he was probably having 3-4 big seizures per month that he will report to his parents the morning after the seizure.  All his seizures are nocturnal.    He still complains of a lot of headaches recently, every 2 - 3 days. He feels that Nortriptyline is helping.    He may have some photophobia and phonophobia.  Dr. Adler thinks that this is not related to his device.    He had DBS  placement at the end of Aug. 2022.  Since the DBS placement, he is not sure if there are any significant changes of his seizures.  His father feels that post-ictal stages are less (about 50%) since the DBS placement. Almost all his seizures are nocturnal.  He feels that his depression is going up and down recently. He is working with Dr. Barros to adjust his anti-depressant. He sees Dr. Barros    He complained of diarrhea recently.  Diarrhea resolved after Gabapentin was stopped.    Intracranial monitoring was complicated by the development of a right-sided subdural hematoma which is stable with repeat CT head scheduled for tomorrow.  Clinically headaches are improving although he is endorsing scalp discomfort from the stitches which he hopes to have out tomorrow.  He has follow-up scheduled with Dr. Adler.  He is taking approximately 1000 mg acetaminophen twice daily for headache pain.  He describes headache pain as a 5/10 and relieved to about a 4/10 with Tylenol.  He was advised not to use Excedrin or other medications with antiplatelet effect in the setting of subdural hematoma at time of discharge.    Patient was monitored for 10 days with intracranial electrodes during which time he had 4 electroclinical seizures.  All of the seizures occurred out of sleep which are consistent with his semiology.  He was found to have bilateral upper extremity tonic extension elevation at seizure onset which would followed by generalized activity.  Depth electrodes were in unable to fully localize seizure onset and monitoring was terminated after 4 electroclinical seizures as further data was felt to be unnecessary to determine treatment course.  Dr. Dukes discussed deep brain stimulation (DBS) with patient during hospitalization and today's visit is to further discuss treatment options after intracranial monitoring.    Since discharge from the hospital no known electroclinical seizures, all of his seizures out of sleep and  he is not always aware of seizures with that said no postictal confusion appreciated by dad and Amrit has not had any cuts or abrasions in the mouth to suggest a seizure.    Needs to follow-up with Dr. Ben Barros for psychiatric care.  He expressed feeling overwhelmed today by the discussion of epilepsy treatment and surgical options.    Intracranial monitoring 4/25/2022 - 5/5/2022 - 4  seizures were captured, but there was concern that we were not capturing the seizure onset zone and early rapid spread as well as multifocal regions of seizure propagation are possibilities. These suggested that a localized surgical approach would be unlikely to provide seizure freedom. Additionally, it was felt that further seizure recording would not clarify or .     CMC was done in Aug. 2021, consensus was that patient should proceed with invasive monitoring. He should be able to tolerate intracranial monitoring.  The lateralization and localization are not clear based on scalp EEG monitoring. Temporal lobe epilepsy is possible. We will have invasive depth electrodes, 8 symmetric electrodes on each side:     Amygdala: 1  Anterior and posterior hippocampus: 3  Cingulate and prefrontal: 2  Orbital frontal:1  Insula: 1     He saw Dr. Adler on 11/2/2021 for consultation for invasive monitoring. He is scheduled for F-Gator MRI and CTA on Dec 12/3/2021.     For the past several months, he was having about 2 seizures per week, mostly nocturnal seizures, about 80-90%. He is currently taking aptiom 1200 mg qhs,  zonegran 300-400, Lamictal 150 mg bid, Depakote 500 - 1000. He is also taking CBD oil for anxiety.  Double vision resolved after aptiom was discontinued.     He is complaining of double vision, blurred vision.  He also complains of hand tremor. He had a really bad tremor day about one day ago.     He had VEEG monitoring for presurgical evaluation in March 2021. Total of 9 epileptic seizures were recorded  during this admission. These electroclinical seizures showed bifrontal EEG activities at onset, usually with higher amplitude initial activity over the right frontal region compared to the left.  Ictal manifestations variably included tonic stiffening of his upper body, kicking of his legs, vocalizations, and non-purposeful movements of surrounding objects.  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  Aptiom was discontinued at discharge.      No plan for suicide or self injury     Since the hospital discharge, he continue to have frequent seizures, probably several times per week.     He was complaining of vertigo spells, he was seen in the ED.  He was told that he has vestibular problems.       He continue to have small twitches that last for a spit of a second, some of them in clusters. No LOC with these twitches. These likely are not seizures.      Patient tried Peoples diet, but did not continue.     He is seeing Dr. Ben Barros for his depression.  He is also seeing Deo Shipman for therapy. He is complaining of fatigue and sleep over 8-10 hours a day. He is very concerned about the fatigue and is questioning what other options we have for treating his seizures.      They are now open to VEEG monitoring for surgical evaluation, resection or neuromodulations. However, they are still not sure if they want to move forward to the surgical treatment.     Mood improved a lot. No suicidal thoughts, no thoughts of hurting himself.     The patient started to have seizures when he was 16 years old and he was managed at the [facility] for the past few years for his seizures.  He is currently taking Aptiom 1200 mg q.h.s. and the Zonegran 300 mg b.i.d. for his epilepsy.  He was recently admitted to the hospital twice because of 2 clusters of seizures, one in January when he was admitted to [facility] and one was in February when he was admitted to [facility].  On both occasions, he had 4-5 back to back  "generalized tonic-clonic seizures within a short period of time.      According to the mother, the patient has 2 different types of seizures.  Type #1 is described as \"mild seizures,\" during which he will have some facial twitching.  He will have repetitive hand/arm jerking movement.  He will become unresponsive for less than a minute.  Postictally, he was having some confusion and headaches and fatigue.  Before the seizures, he will have an aura of \"seeing bees populating the earth with eggs.\"  This type of seizure is relatively infrequent, average once per month.      Type #2 are generalized tonic-clonic seizures which the patient will have generalized convulsions of the body and his body will be stiff.  It usually will last for 1-2 minutes followed by postictal fatigue and hallucinations.  This happens usually once every 3 months, but sometimes it occurs in clusters with 2-5 seizures back to back.      The patient had tried Keppra and Depakote in the past which did not work.  He had EEGs done in the past which were reported negative.      The patient had severe depression and requested a lot of behavioral health and counseling in the past and at certain time, he was suicidal, but that improved.  At the present time, he is not suicidal.  He does not think about hurting himself.     TRIGGERS FOR SEIZURES:  Depression.      RISK FACTORS FOR SEIZURES:  He had no history of head trauma with loss of consciousness.  No history of febrile convulsions.  No history of CNS infections.      PAST AEDs: Depakote. Vimpat caused suicidal ideations. Depakote discontinued due to risk of platelet dysfunction during intracranial monitoring, and patient resumed Keppra. Gabapentin caused diarrhea.     PAST MEDICAL HISTORY:  Asperger's disorder, depression, epilepsy.      PAST SURGICAL HISTORY:  Depth electrode intracranial placement for epilepsy surgical evaluation      FAMILY HISTORY:  No family history of seizures.  Both parents " have hypertension.  Maternal grandmother had heart disease.      SOCIAL HISTORY:  He is single, living with his mother.  He had some college education but did not finish because of depression; he is hoping to return to college soon.  He is currently working at [employer] and plans to return to work soon.  No smoking, no alcohol, no drug abuse.      REVIEW OF SYSTEMS:  Targeted ROS per HPI.      ALLERGIES:  Sertraline and Zyprexa.     PHYSICAL EXAMINATIONS:    Blood pressure 131/88, pulse 92, temperature 97.1  F (36.2  C), temperature source Temporal, height 6' (182.9 cm), weight 219 lb (99.3 kg).    General exam: General Appearance: No acute distress. HEENT: Normocephalic, atraumatic. Neck: Supple.  Extremities: No edema.     Neurologic Exam: Alert and oriented x3. Speech fluent, appropriate. Normal attention. Cranial Nerves: Pupils are equal, round, reactive to light and accomodation. Extraocular movement intact. No facial weakness or asymmetry. Hearing normal. Motor Exam: Normal. Coordination:no ataxia.  Gait and Station: normal.      DIAGNOSTIC TESTING: CT scan of the head on 02/27/2018 was reported negative from [facility].  He had EEGs in the past which were reported negative    MRI brain with and without contrast 8/5/2021 [facility] - No supratentorial lesions or significant hippocampal asymmetric/evidence for pathology. Stable cerebellar 7 mm T2 hypertense lesion.     MRI 7 Ernestine 4/22/2022 [facility] -   Impression: Motion degraded exam.  1. No definite temporal lobe abnormality, mass, or congenital lesion  identified as a cause of the patient's seizures.  2. No evidence of abnormal enhancement intracranially.   3. Previously seen abnormal T2 signal in the right cerebellum is not  identified in today's exam, likely obscured secondary to  susceptibility artifacts in the posterior fossa, a less likely  possibility is that the lesion could have resolved.    IMPRESSION:   1. Epilepsy     Since the  last visit about 4 months ago, his father felt that his seizure frequency remains about the same, however, severity was down. DBS are at group A 0.8 mA, 145 hz, 90 micro sec. Group B 0.7 mA, 165 hz, 40 micro seconds. Patient felt that his school stress contributed a lot to his seizures. He feel that he is having these 1-2 times per week during the school days, but much less during winter break. He had 2 incidence that he was confused, which was probably a focal impaired seizure during waking in school.    PHQ-9 is 16 today. No thoughts of self harming now.    2  Headaches. He is complaining of a lot of headaches. Dr. Adler thinks that this is not related to his device. He is followed by Dr. Ramírez.    3.  Asperger's syndrome   4.  Depression. He is working with a new psychiatrist Dr. Loving to adjust his anti-depressant.    5.  Tremors.  Resolved.    PLAN:   1.  Continue Zonegran 300 mg - 400 mg, Lamictal 150 mg twice daily, Keppra 500 mg twice daily  2.  Continue follow-up with Dr. Howe for depression.  3.  Follow up with DR. Ramírez for headaches.  4.  Change DBS to group A 1.2 mA 145 hz, 90 micro sec, Group B 0.7 mA . Will adjust based on seizures and mood changes in 3 months.  5.  Follow-up in person in 3 months.      The longitudinal plan of care for seizures was addressed during this visit. Due to the added complexity in care, I will continue to support this patient in the subsequent management of this condition and with the ongoing continuity of care of this condition.       42 min total time was spent on the day of this visit.      30 min was spent on face to face time  12 min was spent on preparation of visit to review charts and labs, ordering medications and tests, and documentation of clinical information      Again, thank you for allowing me to participate in the care of your patient.      Sincerely,    Norman Dukes MD

## 2025-01-02 NOTE — PROGRESS NOTES
CHIEF COMPLIANT:  Follow-up for epilepsy and DBS placement.     HISTORY OF PRESENT ILLNESS:  In person visit for follow up.  He is by himself in the clinic today. His father was on the phone during the visit. Patient is a 29-year-old right handed man with history of epilepsy, Asperger's syndrome and depression who was recently admitted for intracranial monitoring with depth electrodes in May for surgical evaluation for intractable epilepsy.    Since the last visit about 4 months ago, his father felt that his seizure frequency remains about the same, however, severity was down. DBS are at group A 0.8 mA bilaterally, 145 hz, 90 micro sec. Group B 0.7 mA bilaterally, 165 hz, 40 micro sec. Patient felt that his school stress contributed a lot to his seizures. He feel that he is having these 1-2 times per week during the school days, but much less during winter break. He had 2 incidence that he was confused, which was probably a focal impaired seizure during waking in school.    PHQ-9 is 16 today. No thoughts of self harming now.    His seizures improved compared to pre-DBS time. Parents saw him with postictal state for the 2-3 times per month. They feel that the postictal state is much less severe than the pre-DBS time. In the past, he was probably having 3-4 big seizures per month that he will report to his parents the morning after the seizure.  All his seizures are nocturnal.    He still complains of a lot of headaches recently, every 2 - 3 days. He feels that Nortriptyline is helping.    He may have some photophobia and phonophobia.  Dr. Adler thinks that this is not related to his device.    He had DBS placement at the end of Aug. 2022.  Since the DBS placement, he is not sure if there are any significant changes of his seizures.  His father feels that post-ictal stages are less (about 50%) since the DBS placement. Almost all his seizures are nocturnal.  He feels that his depression is going up and down  recently. He is working with Dr. Barros to adjust his anti-depressant. He sees Dr. Barros    He complained of diarrhea recently.  Diarrhea resolved after Gabapentin was stopped.    Intracranial monitoring was complicated by the development of a right-sided subdural hematoma which is stable with repeat CT head scheduled for tomorrow.  Clinically headaches are improving although he is endorsing scalp discomfort from the stitches which he hopes to have out tomorrow.  He has follow-up scheduled with Dr. Adler.  He is taking approximately 1000 mg acetaminophen twice daily for headache pain.  He describes headache pain as a 5/10 and relieved to about a 4/10 with Tylenol.  He was advised not to use Excedrin or other medications with antiplatelet effect in the setting of subdural hematoma at time of discharge.    Patient was monitored for 10 days with intracranial electrodes during which time he had 4 electroclinical seizures.  All of the seizures occurred out of sleep which are consistent with his semiology.  He was found to have bilateral upper extremity tonic extension elevation at seizure onset which would followed by generalized activity.  Depth electrodes were in unable to fully localize seizure onset and monitoring was terminated after 4 electroclinical seizures as further data was felt to be unnecessary to determine treatment course.  Dr. Dukes discussed deep brain stimulation (DBS) with patient during hospitalization and today's visit is to further discuss treatment options after intracranial monitoring.    Since discharge from the hospital no known electroclinical seizures, all of his seizures out of sleep and he is not always aware of seizures with that said no postictal confusion appreciated by dad and Amrit has not had any cuts or abrasions in the mouth to suggest a seizure.    Needs to follow-up with Dr. Ben Barros for psychiatric care.  He expressed feeling overwhelmed today by the discussion of epilepsy  treatment and surgical options.    Intracranial monitoring 4/25/2022 - 5/5/2022 - 4  seizures were captured, but there was concern that we were not capturing the seizure onset zone and early rapid spread as well as multifocal regions of seizure propagation are possibilities. These suggested that a localized surgical approach would be unlikely to provide seizure freedom. Additionally, it was felt that further seizure recording would not clarify or .     CMC was done in Aug. 2021, consensus was that patient should proceed with invasive monitoring. He should be able to tolerate intracranial monitoring.  The lateralization and localization are not clear based on scalp EEG monitoring. Temporal lobe epilepsy is possible. We will have invasive depth electrodes, 8 symmetric electrodes on each side:     Amygdala: 1  Anterior and posterior hippocampus: 3  Cingulate and prefrontal: 2  Orbital frontal:1  Insula: 1     He saw Dr. Adler on 11/2/2021 for consultation for invasive monitoring. He is scheduled for F-Gator MRI and CTA on Dec 12/3/2021.     For the past several months, he was having about 2 seizures per week, mostly nocturnal seizures, about 80-90%. He is currently taking aptiom 1200 mg qhs,  zonegran 300-400, Lamictal 150 mg bid, Depakote 500 - 1000. He is also taking CBD oil for anxiety.  Double vision resolved after aptiom was discontinued.     He is complaining of double vision, blurred vision.  He also complains of hand tremor. He had a really bad tremor day about one day ago.     He had VEEG monitoring for presurgical evaluation in March 2021. Total of 9 epileptic seizures were recorded during this admission. These electroclinical seizures showed bifrontal EEG activities at onset, usually with higher amplitude initial activity over the right frontal region compared to the left.  Ictal manifestations variably included tonic stiffening of his upper body, kicking of his legs, vocalizations, and  "non-purposeful movements of surrounding objects.  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  Aptiom was discontinued at discharge.      No plan for suicide or self injury     Since the hospital discharge, he continue to have frequent seizures, probably several times per week.     He was complaining of vertigo spells, he was seen in the ED.  He was told that he has vestibular problems.       He continue to have small twitches that last for a spit of a second, some of them in clusters. No LOC with these twitches. These likely are not seizures.      Patient tried Peoples diet, but did not continue.     He is seeing Dr. Ben Barros for his depression.  He is also seeing Deo Shipman for therapy. He is complaining of fatigue and sleep over 8-10 hours a day. He is very concerned about the fatigue and is questioning what other options we have for treating his seizures.      They are now open to VEEG monitoring for surgical evaluation, resection or neuromodulations. However, they are still not sure if they want to move forward to the surgical treatment.     Mood improved a lot. No suicidal thoughts, no thoughts of hurting himself.     The patient started to have seizures when he was 16 years old and he was managed at the [facility] for the past few years for his seizures.  He is currently taking Aptiom 1200 mg q.h.s. and the Zonegran 300 mg b.i.d. for his epilepsy.  He was recently admitted to the hospital twice because of 2 clusters of seizures, one in January when he was admitted to [facility] and one was in February when he was [place] [facility]  On both occasions, he had 4-5 back to back generalized tonic-clonic seizures within a short period of time.      According to the mother, the patient has 2 different types of seizures.  Type #1 is described as \"mild seizures,\" during which he will have some facial twitching.  He will have repetitive hand/arm jerking movement.  He will become unresponsive for " "less than a minute.  Postictally, he was having some confusion and headaches and fatigue.  Before the seizures, he will have an aura of \"seeing bees populating the earth with eggs.\"  This type of seizure is relatively infrequent, average once per month.      Type #2 are generalized tonic-clonic seizures which the patient will have generalized convulsions of the body and his body will be stiff.  It usually will last for 1-2 minutes followed by postictal fatigue and hallucinations.  This happens usually once every 3 months, but sometimes it occurs in clusters with 2-5 seizures back to back.      The patient had tried Keppra and Depakote in the past which did not work.  He had EEGs done in the past which were reported negative.      The patient had severe depression and requested a lot of behavioral health and counseling in the past and at certain time, he was suicidal, but that improved.  At the present time, he is not suicidal.  He does not think about hurting himself.     TRIGGERS FOR SEIZURES:  Depression.      RISK FACTORS FOR SEIZURES:  He had no history of head trauma with loss of consciousness.  No history of febrile convulsions.  No history of CNS infections.      PAST AEDs: Depakote. Vimpat caused suicidal ideations. Depakote discontinued due to risk of platelet dysfunction during intracranial monitoring, and patient resumed Keppra. Gabapentin caused diarrhea.     PAST MEDICAL HISTORY:  Asperger's disorder, depression, epilepsy.      PAST SURGICAL HISTORY:  Depth electrode intracranial placement for epilepsy surgical evaluation      FAMILY HISTORY:  No family history of seizures.  Both parents have hypertension.  Maternal grandmother had heart disease.      SOCIAL HISTORY:  He is single, living with his mother.  He had some college education but did not finish because of depression; he is hoping to return to college soon.  He is currently working at [employer] and plans to return to work soon.  No smoking, no " alcohol, no drug abuse.      REVIEW OF SYSTEMS:  Targeted ROS per HPI.      ALLERGIES:  Sertraline and Zyprexa.     PHYSICAL EXAMINATIONS:    Blood pressure 131/88, pulse 92, temperature 97.1  F (36.2  C), temperature source Temporal, height 6' (182.9 cm), weight 219 lb (99.3 kg).    General exam: General Appearance: No acute distress. HEENT: Normocephalic, atraumatic. Neck: Supple.  Extremities: No edema.     Neurologic Exam: Alert and oriented x3. Speech fluent, appropriate. Normal attention. Cranial Nerves: Pupils are equal, round, reactive to light and accomodation. Extraocular movement intact. No facial weakness or asymmetry. Hearing normal. Motor Exam: Normal. Coordination:no ataxia.  Gait and Station: normal.      DIAGNOSTIC TESTING: CT scan of the head on 02/27/2018 was reported negative from [facility].  He had EEGs in the past which were reported negative    MRI brain with and without contrast 8/5/2021 [facility] - No supratentorial lesions or significant hippocampal asymmetric/evidence for pathology. Stable cerebellar 7 mm T2 hypertense lesion.     MRI 7 Ernestine 4/22/2022 [facility] -   Impression: Motion degraded exam.  1. No definite temporal lobe abnormality, mass, or congenital lesion  identified as a cause of the patient's seizures.  2. No evidence of abnormal enhancement intracranially.   3. Previously seen abnormal T2 signal in the right cerebellum is not  identified in today's exam, likely obscured secondary to  susceptibility artifacts in the posterior fossa, a less likely  possibility is that the lesion could have resolved.    IMPRESSION:   1. Epilepsy     Since the last visit about 4 months ago, his father felt that his seizure frequency remains about the same, however, severity was down. DBS are at group A 0.8 mA, 145 hz, 90 micro sec. Group B 0.7 mA, 165 hz, 40 micro seconds. Patient felt that his school stress contributed a lot to his seizures. He feel that he is having  these 1-2 times per week during the school days, but much less during winter break. He had 2 incidence that he was confused, which was probably a focal impaired seizure during waking in school.    PHQ-9 is 16 today. No thoughts of self harming now.    2  Headaches. He is complaining of a lot of headaches. Dr. Adler thinks that this is not related to his device. He is followed by Dr. Ramírez.    3.  Asperger's syndrome   4.  Depression. He is working with a new psychiatrist Dr. Loving to adjust his anti-depressant.    5.  Tremors.  Resolved.    PLAN:   1.  Continue Zonegran 300 mg - 400 mg, Lamictal 150 mg twice daily, Keppra 500 mg twice daily  2.  Continue follow-up with Dr. Howe for depression.  3.  Follow up with DR. Ramírez for headaches.  4.  Change DBS to group A 1.2 mA 145 hz, 90 micro sec, Group B 0.7 mA . Will adjust based on seizures and mood changes in 3 months.  5.  Follow-up in person in 3 months.      The longitudinal plan of care for seizures was addressed during this visit. Due to the added complexity in care, I will continue to support this patient in the subsequent management of this condition and with the ongoing continuity of care of this condition.       42 min total time was spent on the day of this visit.      30 min was spent on face to face time  12 min was spent on preparation of visit to review charts and labs, ordering medications and tests, and documentation of clinical information

## 2025-01-04 LAB
LAMOTRIGINE SERPL-MCNC: 4.7 UG/ML
ZONISAMIDE SERPL-MCNC: 27 UG/ML

## 2025-01-24 ENCOUNTER — TELEPHONE (OUTPATIENT)
Dept: NEUROLOGY | Facility: CLINIC | Age: 30
End: 2025-01-24

## 2025-01-24 NOTE — TELEPHONE ENCOUNTER
Incoming call from pt stating his provider wants to start him on Ativan medication and just wants to make sure there will be no drug interactions with the medications that he is currently taking.     Sergio can be reached at [phone].

## 2025-01-31 ENCOUNTER — TELEPHONE (OUTPATIENT)
Dept: NEUROLOGY | Facility: CLINIC | Age: 30
End: 2025-01-31

## 2025-01-31 NOTE — TELEPHONE ENCOUNTER
What is the concern that needs to be addressed by a nurse?  Patient is having night sweats, states he gets sweaty at other times but mostly at night. Pharmacist mentioned this may be due to patient taking both Nortriptyline and Zonisamide. Patient is requesting a call back to discuss.    May a detailed message be left on voicemail?   YES    Pt's phone for return call:  [phone]  Confirmed?  YES    Date of last office visit: 1/2/25    Message routed to: [facility] RN Pool

## 2025-02-05 NOTE — TELEPHONE ENCOUNTER
"Message sent to  for his opinioon s s about ZON and nortriptyine causing night sweats.  Per   \"  No concerns as far as I know.   \"    Message sent to patient  "

## 2025-04-15 ENCOUNTER — TELEPHONE (OUTPATIENT)
Dept: NEUROLOGY | Facility: CLINIC | Age: 30
End: 2025-04-15

## 2025-04-17 NOTE — TELEPHONE ENCOUNTER
RN placed call back to patient.  RN left voicemail instructing patient to contact [employer], the DBS  for questions regarding device functioning. Callback number provided it patient has any additional questions.

## 2025-04-21 ENCOUNTER — TELEPHONE (OUTPATIENT)
Dept: NEUROSURGERY | Facility: CLINIC | Age: 30
End: 2025-04-21
Payer: COMMERCIAL

## 2025-04-21 NOTE — CONFIDENTIAL NOTE
[facility] a therapy Dr. Rocha would like to use for pt's low back pain: Electric muscle stimulation. Pt has DBS and they want to make sure this therapy isn't contraindicated. Left VM and requested a call back.

## 2025-05-02 ENCOUNTER — OFFICE VISIT (OUTPATIENT)
Dept: NEUROLOGY | Facility: CLINIC | Age: 30
End: 2025-05-02
Payer: COMMERCIAL

## 2025-05-02 VITALS
WEIGHT: 211.8 LBS | HEIGHT: 72 IN | DIASTOLIC BLOOD PRESSURE: 92 MMHG | TEMPERATURE: 97.5 F | HEART RATE: 81 BPM | BODY MASS INDEX: 28.69 KG/M2 | OXYGEN SATURATION: 99 % | SYSTOLIC BLOOD PRESSURE: 130 MMHG

## 2025-05-02 DIAGNOSIS — G40.909 SEIZURE DISORDER (H): Primary | ICD-10-CM

## 2025-05-02 NOTE — PROGRESS NOTES
"CHIEF COMPLIANT:  Follow-up for epilepsy and DBS placement.     HISTORY OF PRESENT ILLNESS:  Returns for follow up. Patient is a 29-year-old right handed man with history of epilepsy, Asperger's syndrome and depression who was recently admitted for intracranial monitoring with depth electrodes in May for surgical evaluation for intractable epilepsy.    Since the last visit about 4 months ago, he reports that he probably had 3 \"seizures\" since the last visit, when he felt light headed and headaches and upset stomach all day long after he got up in the morning. Called mom on the phone during the visit. His mother felt that his seizure frequency improved, she has not seen any during the past 4 months. DBS setting was changed to group 1.2 mA bilaterally, 145 hz, 90 micro sec on Jan. 2025, and changed to 1.5 mA, 145 hz, 60 micro sec on Feb. 2025. Patient felt that his school stress contributed a lot to his seizures. Mother reported that he was having these seizures 1-2 times per week during the school days in the past. This is a huge improvement compared to the past. Mother feel that seizure reduction is over 90%. She is very happy about this.    He is much more functional. His function level was about 20% before DBS, and his function is about 70% now.    PHQ-9 is 16 today. No thoughts of self harming now.    His seizures improved compared to pre-DBS time. Parents saw him with postictal state for the 2-3 times per month. They feel that the postictal state is much less severe than the pre-DBS time. In the past, he was probably having 3-4 big seizures per month that he will report to his parents the morning after the seizure.  All his seizures are nocturnal.    He still complains of a lot of headaches recently, every 2 - 3 days. He feels that Nortriptyline is helping.    He may have some photophobia and phonophobia.  Dr. Adler thinks that this is not related to his device.    He had DBS placement at the end of Aug. " 2022.  Since the DBS placement, he is not sure if there are any significant changes of his seizures.  His father feels that post-ictal stages are less (about 50%) since the DBS placement. Almost all his seizures are nocturnal.  He feels that his depression is going up and down recently. He is working with Dr. Barros to adjust his anti-depressant. He sees Dr. Barros    He complained of diarrhea recently.  Diarrhea resolved after Gabapentin was stopped.    Intracranial monitoring was complicated by the development of a right-sided subdural hematoma which is stable with repeat CT head scheduled for tomorrow.  Clinically headaches are improving although he is endorsing scalp discomfort from the stitches which he hopes to have out tomorrow.  He has follow-up scheduled with Dr. Adler.  He is taking approximately 1000 mg acetaminophen twice daily for headache pain.  He describes headache pain as a 5/10 and relieved to about a 4/10 with Tylenol.  He was advised not to use Excedrin or other medications with antiplatelet effect in the setting of subdural hematoma at time of discharge.    Patient was monitored for 10 days with intracranial electrodes during which time he had 4 electroclinical seizures.  All of the seizures occurred out of sleep which are consistent with his semiology.  He was found to have bilateral upper extremity tonic extension elevation at seizure onset which would followed by generalized activity.  Depth electrodes were in unable to fully localize seizure onset and monitoring was terminated after 4 electroclinical seizures as further data was felt to be unnecessary to determine treatment course.  Dr. Dukes discussed deep brain stimulation (DBS) with patient during hospitalization and today's visit is to further discuss treatment options after intracranial monitoring.    Since discharge from the hospital no known electroclinical seizures, all of his seizures out of sleep and he is not always aware of  seizures with that said no postictal confusion appreciated by dad and Amrit has not had any cuts or abrasions in the mouth to suggest a seizure.    Needs to follow-up with Dr. Ben Barros for psychiatric care.  He expressed feeling overwhelmed today by the discussion of epilepsy treatment and surgical options.    Intracranial monitoring 4/25/2022 - 5/5/2022 - 4  seizures were captured, but there was concern that we were not capturing the seizure onset zone and early rapid spread as well as multifocal regions of seizure propagation are possibilities. These suggested that a localized surgical approach would be unlikely to provide seizure freedom. Additionally, it was felt that further seizure recording would not clarify or .     CMC was done in Aug. 2021, consensus was that patient should proceed with invasive monitoring. He should be able to tolerate intracranial monitoring.  The lateralization and localization are not clear based on scalp EEG monitoring. Temporal lobe epilepsy is possible. We will have invasive depth electrodes, 8 symmetric electrodes on each side:     Amygdala: 1  Anterior and posterior hippocampus: 3  Cingulate and prefrontal: 2  Orbital frontal:1  Insula: 1     He saw Dr. Adler on 11/2/2021 for consultation for invasive monitoring. He is scheduled for F-Gator MRI and CTA on Dec 12/3/2021.     For the past several months, he was having about 2 seizures per week, mostly nocturnal seizures, about 80-90%. He is currently taking aptiom 1200 mg qhs,  zonegran 300-400, Lamictal 150 mg bid, Depakote 500 - 1000. He is also taking CBD oil for anxiety.  Double vision resolved after aptiom was discontinued.     He is complaining of double vision, blurred vision.  He also complains of hand tremor. He had a really bad tremor day about one day ago.     He had VEEG monitoring for presurgical evaluation in March 2021. Total of 9 epileptic seizures were recorded during this admission. These  electroclinical seizures showed bifrontal EEG activities at onset, usually with higher amplitude initial activity over the right frontal region compared to the left.  Ictal manifestations variably included tonic stiffening of his upper body, kicking of his legs, vocalizations, and non-purposeful movements of surrounding objects.  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  Aptiom was discontinued at discharge.      No plan for suicide or self injury     Since the hospital discharge, he continue to have frequent seizures, probably several times per week.     He was complaining of vertigo spells, he was seen in the ED.  He was told that he has vestibular problems.       He continue to have small twitches that last for a spit of a second, some of them in clusters. No LOC with these twitches. These likely are not seizures.      Patient tried Peoples diet, but did not continue.     He is seeing Dr. Ben Barros for his depression.  He is also seeing Deo Shipman for therapy. He is complaining of fatigue and sleep over 8-10 hours a day. He is very concerned about the fatigue and is questioning what other options we have for treating his seizures.      They are now open to VEEG monitoring for surgical evaluation, resection or neuromodulations. However, they are still not sure if they want to move forward to the surgical treatment.     Mood improved a lot. No suicidal thoughts, no thoughts of hurting himself.     The patient started to have seizures when he was 16 years old and he was managed at the [facility] for the past few years for his seizures.  He is currently taking Aptiom 1200 mg q.h.s. and the Zonegran 300 mg b.i.d. for his epilepsy.  He was recently admitted to the hospital twice because of 2 clusters of seizures, one in January when he was admitted to [facility] and one was in February when he was admitted to [facility].  On both occasions, he had 4-5 back to back generalized tonic-clonic seizures  "within a short period of time.      According to the mother, the patient has 2 different types of seizures.  Type #1 is described as \"mild seizures,\" during which he will have some facial twitching.  He will have repetitive hand/arm jerking movement.  He will become unresponsive for less than a minute.  Postictally, he was having some confusion and headaches and fatigue.  Before the seizures, he will have an aura of \"seeing bees populating the earth with eggs.\"  This type of seizure is relatively infrequent, average once per month.      Type #2 are generalized tonic-clonic seizures which the patient will have generalized convulsions of the body and his body will be stiff.  It usually will last for 1-2 minutes followed by postictal fatigue and hallucinations.  This happens usually once every 3 months, but sometimes it occurs in clusters with 2-5 seizures back to back.      The patient had tried Keppra and Depakote in the past which did not work.  He had EEGs done in the past which were reported negative.      The patient had severe depression and requested a lot of behavioral health and counseling in the past and at certain time, he was suicidal, but that improved.  At the present time, he is not suicidal.  He does not think about hurting himself.     TRIGGERS FOR SEIZURES:  Depression.      RISK FACTORS FOR SEIZURES:  He had no history of head trauma with loss of consciousness.  No history of febrile convulsions.  No history of CNS infections.      PAST AEDs: Depakote. Vimpat caused suicidal ideations. Depakote discontinued due to risk of platelet dysfunction during intracranial monitoring, and patient resumed Keppra. Gabapentin caused diarrhea.     PAST MEDICAL HISTORY:  Asperger's disorder, depression, epilepsy.      PAST SURGICAL HISTORY:  Depth electrode intracranial placement for epilepsy surgical evaluation      FAMILY HISTORY:  No family history of seizures.  Both parents have hypertension.  Maternal " "grandmother had heart disease.      SOCIAL HISTORY:  He is single, living with his mother.  He had some college education but did not finish because of depression; he is hoping to return to college soon.  He is currently working at [employer] and plans to return to work soon.  No smoking, no alcohol, no drug abuse.      REVIEW OF SYSTEMS:  Targeted ROS per HPI.      ALLERGIES:  Sertraline and Zyprexa.     PHYSICAL EXAMINATIONS:    Height 6' (182.9 cm), weight 211 lb 12.8 oz (96.1 kg).    General exam: General Appearance: No acute distress. HEENT: Normocephalic, atraumatic. Neck: Supple.  Extremities: No edema.     Neurologic Exam: Alert and oriented x3. Speech fluent, appropriate. Normal attention. Cranial Nerves: Pupils are equal, round, reactive to light and accomodation. Extraocular movement intact. No facial weakness or asymmetry. Hearing normal. Motor Exam: Normal. Coordination:no ataxia.  Gait and Station: normal.      DIAGNOSTIC TESTING: CT scan of the head on 02/27/2018 was reported negative from [facility].  He had EEGs in the past which were reported negative    MRI brain with and without contrast 8/5/2021 [facility] - No supratentorial lesions or significant hippocampal asymmetric/evidence for pathology. Stable cerebellar 7 mm T2 hypertense lesion.     MRI 7 Ernestine 4/22/2022 [facility] -   Impression: Motion degraded exam.  1. No definite temporal lobe abnormality, mass, or congenital lesion  identified as a cause of the patient's seizures.  2. No evidence of abnormal enhancement intracranially.   3. Previously seen abnormal T2 signal in the right cerebellum is not  identified in today's exam, likely obscured secondary to  susceptibility artifacts in the posterior fossa, a less likely  possibility is that the lesion could have resolved.    IMPRESSION:   1. Epilepsy     Since the last visit about 4 months ago, he reports that he probably had 3 \"seizures\" since the last visit, when he felt " light headed and headaches and upset stomach all day long after he got up in the morning. Called mom on the phone during the visit. His mother felt that his seizure frequency improved, she has not seen any during the past 4 months. DBS setting was changed to group 1.2 mA bilaterally, 145 hz, 90 micro sec on Jan. 2025, and changed to 1.5 mA, 145 hz, 60 micro sec on Feb. 2025. Patient felt that his school stress contributed a lot to his seizures. Mother reported that he was having these seizures 1-2 times per week during the school days in the past. This is a huge improvement compared to the past. Mother feel that seizure reduction is over 90%. She is very happy about this.    He is much more functional. His function level was about 20% before DBS, and his function is about 70% now.    2  Headaches. He is complaining of a lot of headaches. Dr. Adler thinks that this is not related to his device. He is followed by Dr. Ramírez.  3.  Asperger's syndrome   4.  Depression. He is working with a new psychiatrist Dr. Loving to adjust his anti-depressant.  5.  Tremors.  Resolved.    PLAN:   1.  Continue Zonegran 300 mg - 400 mg, Lamictal 150 mg twice daily, Keppra 500 mg twice daily  2.  Continue follow-up with Dr. Howe for depression.  3.  Follow up with Dr. Ramírez for headaches.  4.  Continue DBS to group 1.5 mA 145 hz, 60 micro sec. Will adjust based on seizures and mood changes in 3 months.  5. Will call his chiropractor regarding his electric therapy for his thigh [phone].    6. Follow-up in person in 3 months.      The longitudinal plan of care for seizures was addressed during this visit. Due to the added complexity in care, I will continue to support this patient in the subsequent management of this condition and with the ongoing continuity of care of this condition.       32 min total time was spent on the day of this visit.      22 min was spent on face to face time  10 min was spent on preparation of  visit to review charts and labs, ordering medications and tests, and documentation of clinical information      15 was spent on interrogation of DBS.

## 2025-05-02 NOTE — LETTER
"2025       RE: Amrit Padilla  : 1995   MRN: [id number]      Dear Colleague,    Thank you for referring your patient, Amrit Padilla, to the [facility] EPILEPSY [facility] at [facility]. Please see a copy of my visit note below.    CHIEF COMPLIANT:  Follow-up for epilepsy and DBS placement.     HISTORY OF PRESENT ILLNESS:  Returns for follow up. Patient is a 29-year-old right handed man with history of epilepsy, Asperger's syndrome and depression who was recently admitted for intracranial monitoring with depth electrodes in May for surgical evaluation for intractable epilepsy.    Since the last visit about 4 months ago, he reports that he probably had 3 \"seizures\" since the last visit, when he felt light headed and headaches and upset stomach all day long after he got up in the morning. Called mom on the phone during the visit. His mother felt that his seizure frequency improved, she has not seen any during the past 4 months. DBS setting was changed to group 1.2 mA bilaterally, 145 hz, 90 micro sec on 2025, and changed to 1.5 mA, 145 hz, 60 micro sec on 2025. Patient felt that his school stress contributed a lot to his seizures. Mother reported that he was having these seizures 1-2 times per week during the school days in the past. This is a huge improvement compared to the past. Mother feel that seizure reduction is over 90%. She is very happy about this.    He is much more functional. His function level was about 20% before DBS, and his function is about 70% now.    PHQ-9 is 16 today. No thoughts of self harming now.    His seizures improved compared to pre-DBS time. Parents saw him with postictal state for the 2-3 times per month. They feel that the postictal state is much less severe than the pre-DBS time. In the past, he was probably having 3-4 big seizures per month that he will report to his parents the morning after the " seizure.  All his seizures are nocturnal.    He still complains of a lot of headaches recently, every 2 - 3 days. He feels that Nortriptyline is helping.    He may have some photophobia and phonophobia.  Dr. Adler thinks that this is not related to his device.    He had DBS placement at the end of Aug. 2022.  Since the DBS placement, he is not sure if there are any significant changes of his seizures.  His father feels that post-ictal stages are less (about 50%) since the DBS placement. Almost all his seizures are nocturnal.  He feels that his depression is going up and down recently. He is working with Dr. Barros to adjust his anti-depressant. He sees Dr. Barros    He complained of diarrhea recently.  Diarrhea resolved after Gabapentin was stopped.    Intracranial monitoring was complicated by the development of a right-sided subdural hematoma which is stable with repeat CT head scheduled for tomorrow.  Clinically headaches are improving although he is endorsing scalp discomfort from the stitches which he hopes to have out tomorrow.  He has follow-up scheduled with Dr. Adler.  He is taking approximately 1000 mg acetaminophen twice daily for headache pain.  He describes headache pain as a 5/10 and relieved to about a 4/10 with Tylenol.  He was advised not to use Excedrin or other medications with antiplatelet effect in the setting of subdural hematoma at time of discharge.    Patient was monitored for 10 days with intracranial electrodes during which time he had 4 electroclinical seizures.  All of the seizures occurred out of sleep which are consistent with his semiology.  He was found to have bilateral upper extremity tonic extension elevation at seizure onset which would followed by generalized activity.  Depth electrodes were in unable to fully localize seizure onset and monitoring was terminated after 4 electroclinical seizures as further data was felt to be unnecessary to determine treatment course.    Sha discussed deep brain stimulation (DBS) with patient during hospitalization and today's visit is to further discuss treatment options after intracranial monitoring.    Since discharge from the hospital no known electroclinical seizures, all of his seizures out of sleep and he is not always aware of seizures with that said no postictal confusion appreciated by dad and Amrit has not had any cuts or abrasions in the mouth to suggest a seizure.    Needs to follow-up with Dr. Ben Barros for psychiatric care.  He expressed feeling overwhelmed today by the discussion of epilepsy treatment and surgical options.    Intracranial monitoring 4/25/2022 - 5/5/2022 - 4  seizures were captured, but there was concern that we were not capturing the seizure onset zone and early rapid spread as well as multifocal regions of seizure propagation are possibilities. These suggested that a localized surgical approach would be unlikely to provide seizure freedom. Additionally, it was felt that further seizure recording would not clarify or .     CMC was done in Aug. 2021, consensus was that patient should proceed with invasive monitoring. He should be able to tolerate intracranial monitoring.  The lateralization and localization are not clear based on scalp EEG monitoring. Temporal lobe epilepsy is possible. We will have invasive depth electrodes, 8 symmetric electrodes on each side:     Amygdala: 1  Anterior and posterior hippocampus: 3  Cingulate and prefrontal: 2  Orbital frontal:1  Insula: 1     He saw Dr. Adler on 11/2/2021 for consultation for invasive monitoring. He is scheduled for F-Gator MRI and CTA on Dec 12/3/2021.     For the past several months, he was having about 2 seizures per week, mostly nocturnal seizures, about 80-90%. He is currently taking aptiom 1200 mg qhs,  zonegran 300-400, Lamictal 150 mg bid, Depakote 500 - 1000. He is also taking CBD oil for anxiety.  Double vision resolved after aptiom  was discontinued.     He is complaining of double vision, blurred vision.  He also complains of hand tremor. He had a really bad tremor day about one day ago.     He had VEEG monitoring for presurgical evaluation in March 2021. Total of 9 epileptic seizures were recorded during this admission. These electroclinical seizures showed bifrontal EEG activities at onset, usually with higher amplitude initial activity over the right frontal region compared to the left.  Ictal manifestations variably included tonic stiffening of his upper body, kicking of his legs, vocalizations, and non-purposeful movements of surrounding objects.  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  Aptiom was discontinued at discharge.      No plan for suicide or self injury     Since the hospital discharge, he continue to have frequent seizures, probably several times per week.     He was complaining of vertigo spells, he was seen in the ED.  He was told that he has vestibular problems.       He continue to have small twitches that last for a spit of a second, some of them in clusters. No LOC with these twitches. These likely are not seizures.      Patient tried Peoples diet, but did not continue.     He is seeing Dr. Ben Barros for his depression.  He is also seeing Deo Shipman for therapy. He is complaining of fatigue and sleep over 8-10 hours a day. He is very concerned about the fatigue and is questioning what other options we have for treating his seizures.      They are now open to VEEG monitoring for surgical evaluation, resection or neuromodulations. However, they are still not sure if they want to move forward to the surgical treatment.     Mood improved a lot. No suicidal thoughts, no thoughts of hurting himself.     The patient started to have seizures when he was 16 years old and he was managed at the [facility] for the past few years for his seizures.  He is currently taking Aptiom 1200 mg q.h.s. and the Zonegran  "300 mg b.i.d. for his epilepsy.  He was recently admitted to the hospital twice because of 2 clusters of seizures, one in January when he was admitted to [facility] and one was in February when he was admitted to [facility].  On both occasions, he had 4-5 back to back generalized tonic-clonic seizures within a short period of time.      According to the mother, the patient has 2 different types of seizures.  Type #1 is described as \"mild seizures,\" during which he will have some facial twitching.  He will have repetitive hand/arm jerking movement.  He will become unresponsive for less than a minute.  Postictally, he was having some confusion and headaches and fatigue.  Before the seizures, he will have an aura of \"seeing bees populating the earth with eggs.\"  This type of seizure is relatively infrequent, average once per month.      Type #2 are generalized tonic-clonic seizures which the patient will have generalized convulsions of the body and his body will be stiff.  It usually will last for 1-2 minutes followed by postictal fatigue and hallucinations.  This happens usually once every 3 months, but sometimes it occurs in clusters with 2-5 seizures back to back.      The patient had tried Keppra and Depakote in the past which did not work.  He had EEGs done in the past which were reported negative.      The patient had severe depression and requested a lot of behavioral health and counseling in the past and at certain time, he was suicidal, but that improved.  At the present time, he is not suicidal.  He does not think about hurting himself.     TRIGGERS FOR SEIZURES:  Depression.      RISK FACTORS FOR SEIZURES:  He had no history of head trauma with loss of consciousness.  No history of febrile convulsions.  No history of CNS infections.      PAST AEDs: Depakote. Vimpat caused suicidal ideations. Depakote discontinued due to risk of platelet dysfunction during intracranial monitoring, and patient resumed Keppra. " Gabapentin caused diarrhea.     PAST MEDICAL HISTORY:  Asperger's disorder, depression, epilepsy.      PAST SURGICAL HISTORY:  Depth electrode intracranial placement for epilepsy surgical evaluation      FAMILY HISTORY:  No family history of seizures.  Both parents have hypertension.  Maternal grandmother had heart disease.      SOCIAL HISTORY:  He is single, living with his mother.  He had some college education but did not finish because of depression; he is hoping to return to college soon.  He is currently working at [employer] and plans to return to work soon.  No smoking, no alcohol, no drug abuse.      REVIEW OF SYSTEMS:  Targeted ROS per HPI.      ALLERGIES:  Sertraline and Zyprexa.     PHYSICAL EXAMINATIONS:    Height 6' (182.9 cm), weight 211 lb 12.8 oz (96.1 kg).    General exam: General Appearance: No acute distress. HEENT: Normocephalic, atraumatic. Neck: Supple.  Extremities: No edema.     Neurologic Exam: Alert and oriented x3. Speech fluent, appropriate. Normal attention. Cranial Nerves: Pupils are equal, round, reactive to light and accomodation. Extraocular movement intact. No facial weakness or asymmetry. Hearing normal. Motor Exam: Normal. Coordination:no ataxia.  Gait and Station: normal.      DIAGNOSTIC TESTING: CT [facility] 02[facility]27/2018 [facility]  He had EEGs in the past which were reported negative    MRI brain with and without contrast 8/5/2021 [facility] - No supratentorial lesions or significant hippocampal asymmetric/evidence for pathology. Stable cerebellar 7 mm T2 hypertense lesion.     MRI 7 Ernestine 4/22/2022 [facility] -   Impression: Motion degraded exam.  1. No definite temporal lobe abnormality, mass, or congenital lesion  identified as a cause of the patient's seizures.  2. No evidence of abnormal enhancement intracranially.   3. Previously seen abnormal T2 signal in the right cerebellum is not  identified in today's exam, likely  "obscured secondary to  susceptibility artifacts in the posterior fossa, a less likely  possibility is that the lesion could have resolved.    IMPRESSION:   1. Epilepsy     Since the last visit about 4 months ago, he reports that he probably had 3 \"seizures\" since the last visit, when he felt light headed and headaches and upset stomach all day long after he got up in the morning. Called mom on the phone during the visit. His mother felt that his seizure frequency improved, she has not seen any during the past 4 months. DBS setting was changed to group 1.2 mA bilaterally, 145 hz, 90 micro sec on Jan. 2025, and changed to 1.5 mA, 145 hz, 60 micro sec on Feb. 2025. Patient felt that his school stress contributed a lot to his seizures. Mother reported that he was having these seizures 1-2 times per week during the school days in the past. This is a huge improvement compared to the past. Mother feel that seizure reduction is over 90%. She is very happy about this.    He is much more functional. His function level was about 20% before DBS, and his function is about 70% now.    2  Headaches. He is complaining of a lot of headaches. Dr. Adler thinks that this is not related to his device. He is followed by Dr. Ramírez.  3.  Asperger's syndrome   4.  Depression. He is working with a new psychiatrist Dr. Loving to adjust his anti-depressant.  5.  Tremors.  Resolved.    PLAN:   1.  Continue Zonegran 300 mg - 400 mg, Lamictal 150 mg twice daily, Keppra 500 mg twice daily  2.  Continue follow-up with Dr. Howe for depression.  3.  Follow up with Dr. Ramírez for headaches.  4.  Continue DBS to group 1.5 mA 145 hz, 60 micro sec. Will adjust based on seizures and mood changes in 3 months.  5. Will call his chiropractor regarding his electric therapy for his thigh [phone].    6. Follow-up in person in 3 months.      The longitudinal plan of care for seizures was addressed during this visit. Due to the added complexity " in care, I will continue to support this patient in the subsequent management of this condition and with the ongoing continuity of care of this condition.       32 min total time was spent on the day of this visit.      22 min was spent on face to face time  10 min was spent on preparation of visit to review charts and labs, ordering medications and tests, and documentation of clinical information      15 was spent on interrogation of DBS.      Again, thank you for allowing me to participate in the care of your patient.      Sincerely,    Norman Dukes MD

## 2025-05-02 NOTE — PATIENT INSTRUCTIONS
PLAN:   1.  Continue Zonegran 300 mg - 400 mg, Lamictal 150 mg twice daily, Keppra 500 mg twice daily  2.  Continue follow-up with Dr. Howe for depression.  3.  Follow up with Dr. Ramírez for headaches.  4.  Continue DBS to group 1.5 mA 145 hz, 60 micro sec. Will adjust based on seizures and mood changes in 3 months.  5. Will call his chiropractor regarding his electric therapy for his thigh [phone].    6. Follow-up in person in 3 months.

## 2025-05-05 ENCOUNTER — TELEPHONE (OUTPATIENT)
Dept: NEUROSURGERY | Facility: CLINIC | Age: 30
End: 2025-05-05
Payer: COMMERCIAL

## 2025-05-05 NOTE — TELEPHONE ENCOUNTER
Received call from Dr. Huy Rocha at [facility] about whether pt can receive electrical muscle stimulation considering that he has [employer] DBS. I let Dr. Rocha I returned the call on 4/21 to get more information but hadn't received a call back. The therapy involves pads applied to skin (in this case, the pt's hip) and electric current is then passed  to the muscle.     I checked with Dr. Adler who didn't see a contraindication. He suggested also checking with [employer]. I discussed with the rep who said there is no contraindication and pt does not need to turn his device off. Communicated this to Dr. Rocha. He expressed understanding and has no further questions at this time.

## 2025-05-17 ENCOUNTER — HEALTH MAINTENANCE LETTER (OUTPATIENT)
Age: 30
End: 2025-05-17

## 2025-05-19 ENCOUNTER — TELEPHONE (OUTPATIENT)
Dept: NEUROLOGY | Facility: CLINIC | Age: 30
End: 2025-05-19

## 2025-05-19 NOTE — TELEPHONE ENCOUNTER
What is the concern that needs to be addressed by a nurse? Patient does not feel well, suspects he had a recent seizure. He is having very random and weird thoughts coming in and he doesn't know where he is half the time. This has been going on for the last 4 hours. He has been trying to sleep and then the weird thought will happen, go away, and then come back so he isn't sure if it is because he is falling asleep or not. No missed, sleep has been disturbed due to taking care of his dogs, getting up around 7 AM instead of his usual time of 9 or 10. Patient also states he was playing a video game last night with the lights on, not sure if that contributed to this. Would like to discuss if this is a seizure or not.    May a detailed message be left on voicemail? Yes    Date of last office visit: 5/02/25    Message routed to: [facility] RN Pool

## 2025-05-21 NOTE — TELEPHONE ENCOUNTER
RN returned call to patient.   Pt stated he felt weird on 5/19/25 after waking up around 3 pm, which was around when he called. Pt stated he felt better around 5 pm that and thinks these feelings were due to grogginess/change in sleep pattern. Pt reports his parents are gone and he is watching the dogs. Therefore, instead of going to sleep 11pm-12am, he is going to sleep from 12am-1am. Instead of waking up around 9-10am, he has to wake up around 7 am to let the dogs out. Pt confirmed with RN he does not think this situation was seizure activity. RN informed patient to contact the clinic if this happens again. Pt verbalized understanding and had no additional questions.

## 2025-07-28 DIAGNOSIS — G43.919 INTRACTABLE MIGRAINE WITHOUT STATUS MIGRAINOSUS, UNSPECIFIED MIGRAINE TYPE: ICD-10-CM

## 2025-07-28 RX ORDER — NORTRIPTYLINE HYDROCHLORIDE 10 MG/1
10 CAPSULE ORAL AT BEDTIME
Qty: 90 CAPSULE | Refills: 3 | Status: SHIPPED | OUTPATIENT
Start: 2025-07-28

## 2025-07-28 NOTE — TELEPHONE ENCOUNTER
Which pharmacy does pt go to?  [id number] - [place], [place] - [address] AT [address]  & [address]       Which medication is pt calling about?  Pamelor    How much medication does pt have left?  <5 days left    Does pt have refills?  NO    Does pt need the refill within 24 hours?  YES    Is medication a controlled substance?  NO    Is pt scheduled for provider follow-up visit?  YES, Appt Date: 8-8    **Prescription ready at [facility] on [address], but changing pharmacy to the [facility] listed**

## 2025-07-28 NOTE — TELEPHONE ENCOUNTER
Last seen: 5/2/25  RTC: 3 months   Cancel: no  No-show: no  Next appt: August 8th 2025    Incoming refill from patient via phones     Medication requested: nortriptyline (PAMELOR) 10 MG capsule   Directions: TAKE 1 CAPSULE BY MOUTH AT BEDTIME -   Qty: 90 + 3   Last refilled: Jan, needs sent to new pharm    Medication refill approved per refill protocol

## 2025-08-01 NOTE — PROGRESS NOTES
Adult Mental Health Outpatient Group Therapy Progress Note     Client Initial Individualized Goals for Treatment: Use mindful breathing for 2 minutes to manage anxiety.     Initial Treatment suggestions for the client during the time between Diagnostic Assessment and completion of the Individualized Treatment Plan:  Follow Safety Plan   Ask for more information, support and/or assistance as needed.  Follow up with providers/community supports as needed: [facility] Stall[facility]  Report increases or changes in symptoms to staff.  Report any personal safety concerns to staff.   Take medications as prescribed.  Report medication changes and/or side effects to staff.  Attend and participate in groups as scheduled or notify staff if unable to do so.  Report any use of substances to staff as this may impact your symptoms and/or personal safety.  Notify staff if you have any other issues that need to be addressed. This may include any current abuse / neglect / exploitation or other vulnerability.  Follow recommendations of your treatment team and discuss concerns if not in agreement.     Area of Treatment Focus:  Symptom Management, Personal Safety and Develop / Improve Independent Living Skills    Therapeutic Interventions/Treatment Strategies:  Support, Feedback, Safety Assessments, Structured Activity and Clarification    Response to Treatment Strategies:  Listened and Alert    Name of Group:  [facility] Clinic     Description and Outcome:  Sergio  attended and participated in a structured occupational therapy group where intervention focused on coping through structured hands-on activities.  Sergio worked in a self directed, goal oriented manner on his ongoing pencil paper puzzle task with fair concentration.  He was generally quiet in group answering questions with brief answers.  Affect was constricted.  Client demonstrated understanding of session content by engaging in focused task work to help manage  Patient informed of results and providers recommendations. All questions answered. Patient verbalized understanding.   symptoms and stressors.     Is this a Weekly Review of the Progress on the Treatment Plan?  No

## 2025-08-07 ENCOUNTER — TELEPHONE (OUTPATIENT)
Dept: NEUROLOGY | Facility: CLINIC | Age: 30
End: 2025-08-07

## 2025-09-01 ENCOUNTER — MYC MEDICAL ADVICE (OUTPATIENT)
Dept: NEUROLOGY | Facility: CLINIC | Age: 30
End: 2025-09-01

## 2025-09-01 DIAGNOSIS — G47.00 INSOMNIA, UNSPECIFIED TYPE: Primary | ICD-10-CM

## 2025-09-04 RX ORDER — TRAZODONE HYDROCHLORIDE 50 MG/1
50 TABLET ORAL AT BEDTIME
Qty: 30 TABLET | Refills: 5 | Status: SHIPPED | OUTPATIENT
Start: 2025-09-04

## (undated) DEVICE — DRSG TELFA 3X8" 1238

## (undated) DEVICE — DRAPE SHEET REV FOLD 3/4 9349

## (undated) DEVICE — DRSG TEGADERM 4X4 3/4" 1626W

## (undated) DEVICE — PREP POVIDONE-IODINE 7.5% SCRUB 4OZ BOTTLE MDS093945

## (undated) DEVICE — SUTURE REMOVAL TRAY 06-6500

## (undated) DEVICE — DRAPE C-ARM W/STRAPS 42X72" 07-CA104

## (undated) DEVICE — HEADRING POINTS STEREOTACTIC DHRSL

## (undated) DEVICE — DRSG STERI STRIP 1/2X4" R1547

## (undated) DEVICE — DRSG PRIMAPORE 02X3" 7133

## (undated) DEVICE — SU VICRYL 2-0 CT-2 CR 8X18" J726D

## (undated) DEVICE — BLADE CLIPPER SGL USE 9680

## (undated) DEVICE — WIPES FOLEY CARE SURESTEP PROVON DFC100

## (undated) DEVICE — STPL SKIN PROXIMATE 35 WIDE PMW35

## (undated) DEVICE — LINEN TOWEL PACK X6 WHITE 5487

## (undated) DEVICE — SUCTION MANIFOLD NEPTUNE 2 SYS 4 PORT 0702-020-000

## (undated) DEVICE — LABEL MEDICATION SYSTEM 3303-P

## (undated) DEVICE — PREP CHLORAPREP CLEAR 3ML 930400

## (undated) DEVICE — SOL WATER IRRIG 1000ML BOTTLE 2F7114

## (undated) DEVICE — SUTURE BOOTS 051003PBX

## (undated) DEVICE — SU VICRYL 3-0 SH 8X18" UND J864D

## (undated) DEVICE — PREP CHLORAPREP 26ML TINTED HI-LITE ORANGE 930815

## (undated) DEVICE — STRAP UNIVERSAL POSITIONING 2-PIECE 4X47X76" 91-287

## (undated) DEVICE — ESU GROUND PAD ADULT W/CORD E7507

## (undated) DEVICE — GLOVE PROTEXIS MICRO 7.5  2D73PM75

## (undated) DEVICE — DRAPE IOBAN INCISE 23X17" 6650EZ

## (undated) DEVICE — SU MONOCRYL 4-0 PS-2 27" UND Y426H

## (undated) DEVICE — LINEN TOWEL PACK X30 5481

## (undated) DEVICE — CATH TRAY FOLEY 16FR W/URINE METER STATLOCK 303316A

## (undated) DEVICE — JELLY LUBRICATING SURGILUBE 2OZ TUBE

## (undated) DEVICE — Device

## (undated) DEVICE — SYR 10ML LL W/O NDL 302995

## (undated) DEVICE — SU MONOCRYL 3-0 PS-1 27" Y936H

## (undated) DEVICE — SYR BULB IRRIG DOVER 60 ML LATEX FREE 67000

## (undated) DEVICE — DECANTER BAG 2002S

## (undated) DEVICE — STPL SKIN 35W ROTATING HEAD PRW35

## (undated) DEVICE — COMB STERILE 7" PLASTIC 14-1200

## (undated) DEVICE — CATH TRAY FOLEY SURESTEP 16FR W/TMP PRB STLK LATEX A319416AM

## (undated) DEVICE — DRAPE SHEET HALF 40X60" 9358

## (undated) DEVICE — SPONGE COTTONOID 1/2X1/2" 20-04S

## (undated) DEVICE — MR SCANNER BORE DRAPE WITH EXTENSION

## (undated) DEVICE — OBTURATOR ANCHOR BOLT ADTECH OB-20-190X

## (undated) DEVICE — COVER CAMERA VIDEO LASER 9X96" 04-CC227

## (undated) DEVICE — PACK NEURO MINOR UMMC SNE32MNMU4

## (undated) DEVICE — DRSG KERLIX 4 1/2"X4YDS ROLL 6715

## (undated) DEVICE — BUR STRK CARBIDE ROUND 5.0MM 5820-110-050C

## (undated) DEVICE — SENSIGHT EXTENSION TUNNELER KIT

## (undated) DEVICE — DRSG XEROFORM 5X9" CUR253590W

## (undated) DEVICE — ADH SKIN CLOSURE PREMIERPRO EXOFIN 1.0ML 3470

## (undated) DEVICE — SU VICRYL 3-0 SH CR 8X18" J774

## (undated) DEVICE — SU ETHILON 3-0 PS-1 18" 1663H

## (undated) DEVICE — PREP SKIN SCRUB TRAY 4461A

## (undated) DEVICE — DRAPE MAYO STAND 23X54 8337

## (undated) DEVICE — SOL RINGERS LACTATED 1000ML BAG 07953-09

## (undated) DEVICE — SPONGE RAY-TEC 4X8" 7318

## (undated) DEVICE — SPONGE SURGIFOAM 100 1974

## (undated) DEVICE — SOL NACL 0.9% IRRIG 1000ML BOTTLE 2F7124

## (undated) DEVICE — DRSG GAUZE 4X4" TRAY 6939

## (undated) DEVICE — PREP POVIDONE IODINE SOLUTION 10% 4OZ BOTTLE 29906-004

## (undated) DEVICE — GLOVE PROTEXIS BLUE W/NEU-THERA 8.0  2D73EB80

## (undated) DEVICE — SU SILK 2-0 TIE 12X30" A305H

## (undated) DEVICE — TUBING SMOKE EVAC 3/8"X10' 0702-045-023

## (undated) DEVICE — RX SURGIFLO HEMOSTATIC MATRIX W/THROMBIN 8ML 2994

## (undated) DEVICE — SHUNT TUNNELER SHEATH 46CM 901118

## (undated) DEVICE — ESU PENCIL SMOKE EVAC W/ROCKER SWITCH 0703-047-000

## (undated) DEVICE — DRAPE ARM ROSA 30X60" ROSAS00055 75165

## (undated) DEVICE — PACK GOWN 3/PK DISP XL SBA32GPFCB

## (undated) DEVICE — PAD CHUX UNDERPAD 23X24" 7136

## (undated) DEVICE — STPL SKIN 35W 059037

## (undated) DEVICE — DRILL KIT ANCHOR BOLT 2.4MM ADTECH DDK2-2.4-30X

## (undated) DEVICE — SUTURE STRIP PLUS

## (undated) DEVICE — SOL ADH LIQUID BENZOIN SWAB 0.6ML C1544

## (undated) DEVICE — SU VICRYL 0 CT-1 27" UND J260H

## (undated) DEVICE — RX BACITRACIN OINTMENT 0.9G 1/32OZ CUR001109

## (undated) DEVICE — SPONGE COTTONOID 1/2X1 1/2" 20-06S

## (undated) DEVICE — LINEN TOWEL PACK X5 5464

## (undated) DEVICE — PATIENT PROGRAMMER

## (undated) DEVICE — PEN MARKING W/RULER DYNJSM04

## (undated) DEVICE — DECANTER VIAL 2006S

## (undated) DEVICE — STPL REMOVER PSX

## (undated) DEVICE — DRAPE U SPLIT 74X120" 29440

## (undated) RX ORDER — ACETAMINOPHEN 325 MG/1
TABLET ORAL
Status: DISPENSED
Start: 2022-08-29

## (undated) RX ORDER — ROCURONIUM BROMIDE 50 MG/5 ML
SYRINGE (ML) INTRAVENOUS
Status: DISPENSED
Start: 2022-04-25

## (undated) RX ORDER — ACETAMINOPHEN 325 MG/1
TABLET ORAL
Status: DISPENSED
Start: 2022-08-22

## (undated) RX ORDER — CEFAZOLIN SODIUM/WATER 2 G/20 ML
SYRINGE (ML) INTRAVENOUS
Status: DISPENSED
Start: 2022-08-22

## (undated) RX ORDER — BUPIVACAINE HYDROCHLORIDE 2.5 MG/ML
INJECTION, SOLUTION EPIDURAL; INFILTRATION; INTRACAUDAL
Status: DISPENSED
Start: 2022-08-22

## (undated) RX ORDER — BUPIVACAINE HYDROCHLORIDE 2.5 MG/ML
INJECTION, SOLUTION EPIDURAL; INFILTRATION; INTRACAUDAL
Status: DISPENSED
Start: 2022-08-29

## (undated) RX ORDER — LIDOCAINE HYDROCHLORIDE AND EPINEPHRINE 10; 10 MG/ML; UG/ML
INJECTION, SOLUTION INFILTRATION; PERINEURAL
Status: DISPENSED
Start: 2022-08-22

## (undated) RX ORDER — FENTANYL CITRATE 50 UG/ML
INJECTION, SOLUTION INTRAMUSCULAR; INTRAVENOUS
Status: DISPENSED
Start: 2022-08-29

## (undated) RX ORDER — PROPOFOL 10 MG/ML
INJECTION, EMULSION INTRAVENOUS
Status: DISPENSED
Start: 2022-05-05

## (undated) RX ORDER — ONDANSETRON 2 MG/ML
INJECTION INTRAMUSCULAR; INTRAVENOUS
Status: DISPENSED
Start: 2022-08-22

## (undated) RX ORDER — FENTANYL CITRATE 50 UG/ML
INJECTION, SOLUTION INTRAMUSCULAR; INTRAVENOUS
Status: DISPENSED
Start: 2022-05-05

## (undated) RX ORDER — FENTANYL CITRATE 50 UG/ML
INJECTION, SOLUTION INTRAMUSCULAR; INTRAVENOUS
Status: DISPENSED
Start: 2022-08-22

## (undated) RX ORDER — HYDROMORPHONE HYDROCHLORIDE 1 MG/ML
INJECTION, SOLUTION INTRAMUSCULAR; INTRAVENOUS; SUBCUTANEOUS
Status: DISPENSED
Start: 2022-08-22

## (undated) RX ORDER — ONDANSETRON 2 MG/ML
INJECTION INTRAMUSCULAR; INTRAVENOUS
Status: DISPENSED
Start: 2022-04-25

## (undated) RX ORDER — DEXAMETHASONE SODIUM PHOSPHATE 4 MG/ML
INJECTION, SOLUTION INTRA-ARTICULAR; INTRALESIONAL; INTRAMUSCULAR; INTRAVENOUS; SOFT TISSUE
Status: DISPENSED
Start: 2022-05-05

## (undated) RX ORDER — EPHEDRINE SULFATE 50 MG/ML
INJECTION, SOLUTION INTRAMUSCULAR; INTRAVENOUS; SUBCUTANEOUS
Status: DISPENSED
Start: 2022-05-05

## (undated) RX ORDER — LIDOCAINE HYDROCHLORIDE 20 MG/ML
INJECTION, SOLUTION EPIDURAL; INFILTRATION; INTRACAUDAL; PERINEURAL
Status: DISPENSED
Start: 2022-04-25

## (undated) RX ORDER — PROPOFOL 10 MG/ML
INJECTION, EMULSION INTRAVENOUS
Status: DISPENSED
Start: 2022-04-25

## (undated) RX ORDER — VANCOMYCIN HYDROCHLORIDE 1 G/20ML
INJECTION, POWDER, LYOPHILIZED, FOR SOLUTION INTRAVENOUS
Status: DISPENSED
Start: 2022-08-22

## (undated) RX ORDER — ROCURONIUM BROMIDE 50 MG/5 ML
SYRINGE (ML) INTRAVENOUS
Status: DISPENSED
Start: 2022-05-05

## (undated) RX ORDER — FENTANYL CITRATE-0.9 % NACL/PF 10 MCG/ML
PLASTIC BAG, INJECTION (ML) INTRAVENOUS
Status: DISPENSED
Start: 2022-05-05

## (undated) RX ORDER — DEXAMETHASONE SODIUM PHOSPHATE 4 MG/ML
INJECTION, SOLUTION INTRA-ARTICULAR; INTRALESIONAL; INTRAMUSCULAR; INTRAVENOUS; SOFT TISSUE
Status: DISPENSED
Start: 2022-08-29

## (undated) RX ORDER — DEXAMETHASONE SODIUM PHOSPHATE 4 MG/ML
INJECTION, SOLUTION INTRA-ARTICULAR; INTRALESIONAL; INTRAMUSCULAR; INTRAVENOUS; SOFT TISSUE
Status: DISPENSED
Start: 2022-04-25

## (undated) RX ORDER — LIDOCAINE HYDROCHLORIDE 20 MG/ML
INJECTION, SOLUTION EPIDURAL; INFILTRATION; INTRACAUDAL; PERINEURAL
Status: DISPENSED
Start: 2022-08-22

## (undated) RX ORDER — BUPIVACAINE HYDROCHLORIDE 2.5 MG/ML
INJECTION, SOLUTION EPIDURAL; INFILTRATION; INTRACAUDAL
Status: DISPENSED
Start: 2022-05-05

## (undated) RX ORDER — ONDANSETRON 2 MG/ML
INJECTION INTRAMUSCULAR; INTRAVENOUS
Status: DISPENSED
Start: 2022-08-29

## (undated) RX ORDER — VANCOMYCIN HYDROCHLORIDE 1 G/20ML
INJECTION, POWDER, LYOPHILIZED, FOR SOLUTION INTRAVENOUS
Status: DISPENSED
Start: 2022-08-29

## (undated) RX ORDER — CEFAZOLIN SODIUM 1 G/3ML
INJECTION, POWDER, FOR SOLUTION INTRAMUSCULAR; INTRAVENOUS
Status: DISPENSED
Start: 2022-08-22

## (undated) RX ORDER — LIDOCAINE HYDROCHLORIDE AND EPINEPHRINE 10; 10 MG/ML; UG/ML
INJECTION, SOLUTION INFILTRATION; PERINEURAL
Status: DISPENSED
Start: 2022-05-05

## (undated) RX ORDER — PROPOFOL 10 MG/ML
INJECTION, EMULSION INTRAVENOUS
Status: DISPENSED
Start: 2022-08-29

## (undated) RX ORDER — ONDANSETRON 2 MG/ML
INJECTION INTRAMUSCULAR; INTRAVENOUS
Status: DISPENSED
Start: 2022-05-05

## (undated) RX ORDER — FENTANYL CITRATE 50 UG/ML
INJECTION, SOLUTION INTRAMUSCULAR; INTRAVENOUS
Status: DISPENSED
Start: 2022-04-25

## (undated) RX ORDER — LIDOCAINE HYDROCHLORIDE AND EPINEPHRINE 10; 10 MG/ML; UG/ML
INJECTION, SOLUTION INFILTRATION; PERINEURAL
Status: DISPENSED
Start: 2022-08-29

## (undated) RX ORDER — LIDOCAINE HYDROCHLORIDE 20 MG/ML
INJECTION, SOLUTION EPIDURAL; INFILTRATION; INTRACAUDAL; PERINEURAL
Status: DISPENSED
Start: 2022-08-29

## (undated) RX ORDER — SODIUM CHLORIDE 9 MG/ML
INJECTION, SOLUTION INTRAVENOUS
Status: DISPENSED
Start: 2022-08-22

## (undated) RX ORDER — LIDOCAINE HYDROCHLORIDE 10 MG/ML
INJECTION, SOLUTION EPIDURAL; INFILTRATION; INTRACAUDAL; PERINEURAL
Status: DISPENSED
Start: 2022-05-05

## (undated) RX ORDER — CEFAZOLIN SODIUM/WATER 2 G/20 ML
SYRINGE (ML) INTRAVENOUS
Status: DISPENSED
Start: 2022-04-25

## (undated) RX ORDER — LIDOCAINE HYDROCHLORIDE AND EPINEPHRINE 10; 10 MG/ML; UG/ML
INJECTION, SOLUTION INFILTRATION; PERINEURAL
Status: DISPENSED
Start: 2022-04-25

## (undated) RX ORDER — HYDROMORPHONE HYDROCHLORIDE 1 MG/ML
INJECTION, SOLUTION INTRAMUSCULAR; INTRAVENOUS; SUBCUTANEOUS
Status: DISPENSED
Start: 2022-04-25

## (undated) RX ORDER — BUPIVACAINE HYDROCHLORIDE 2.5 MG/ML
INJECTION, SOLUTION EPIDURAL; INFILTRATION; INTRACAUDAL
Status: DISPENSED
Start: 2022-04-25

## (undated) RX ORDER — PROPOFOL 10 MG/ML
INJECTION, EMULSION INTRAVENOUS
Status: DISPENSED
Start: 2022-08-22